# Patient Record
Sex: MALE | Race: WHITE | NOT HISPANIC OR LATINO | Employment: FULL TIME | URBAN - METROPOLITAN AREA
[De-identification: names, ages, dates, MRNs, and addresses within clinical notes are randomized per-mention and may not be internally consistent; named-entity substitution may affect disease eponyms.]

---

## 2018-10-20 ENCOUNTER — APPOINTMENT (EMERGENCY)
Dept: RADIOLOGY | Facility: HOSPITAL | Age: 63
DRG: 062 | End: 2018-10-20
Payer: COMMERCIAL

## 2018-10-20 ENCOUNTER — APPOINTMENT (EMERGENCY)
Dept: CT IMAGING | Facility: HOSPITAL | Age: 63
DRG: 062 | End: 2018-10-20
Payer: COMMERCIAL

## 2018-10-20 ENCOUNTER — HOSPITAL ENCOUNTER (INPATIENT)
Facility: HOSPITAL | Age: 63
LOS: 5 days | DRG: 062 | End: 2018-10-25
Attending: EMERGENCY MEDICINE | Admitting: INTERNAL MEDICINE
Payer: COMMERCIAL

## 2018-10-20 DIAGNOSIS — R47.1 DYSARTHRIA: ICD-10-CM

## 2018-10-20 DIAGNOSIS — I63.9 CVA (CEREBRAL VASCULAR ACCIDENT) (HCC): Primary | ICD-10-CM

## 2018-10-20 DIAGNOSIS — R53.1 LEFT-SIDED WEAKNESS: ICD-10-CM

## 2018-10-20 LAB
ANION GAP BLD CALC-SCNC: 15 MMOL/L (ref 4–13)
ANION GAP SERPL CALCULATED.3IONS-SCNC: 6 MMOL/L (ref 4–13)
APTT PPP: 22 SECONDS (ref 24–36)
BUN BLD-MCNC: 23 MG/DL (ref 5–25)
BUN SERPL-MCNC: 18 MG/DL (ref 5–25)
CA-I BLD-SCNC: 1.1 MMOL/L (ref 1.12–1.32)
CALCIUM SERPL-MCNC: 9.1 MG/DL (ref 8.3–10.1)
CHLORIDE BLD-SCNC: 103 MMOL/L (ref 100–108)
CHLORIDE SERPL-SCNC: 103 MMOL/L (ref 100–108)
CO2 SERPL-SCNC: 28 MMOL/L (ref 21–32)
CREAT BLD-MCNC: 1 MG/DL (ref 0.6–1.3)
CREAT SERPL-MCNC: 1.09 MG/DL (ref 0.6–1.3)
ERYTHROCYTE [DISTWIDTH] IN BLOOD BY AUTOMATED COUNT: 14.4 % (ref 11.6–15.1)
GFR SERPL CREATININE-BSD FRML MDRD: 71 ML/MIN/1.73SQ M
GFR SERPL CREATININE-BSD FRML MDRD: 79 ML/MIN/1.73SQ M
GLUCOSE SERPL-MCNC: 132 MG/DL (ref 65–140)
GLUCOSE SERPL-MCNC: 132 MG/DL (ref 65–140)
HCT VFR BLD AUTO: 43 % (ref 36.5–49.3)
HCT VFR BLD CALC: 42 % (ref 36.5–49.3)
HGB BLD-MCNC: 14.4 G/DL (ref 12–17)
HGB BLDA-MCNC: 14.3 G/DL (ref 12–17)
INR PPP: 0.94 (ref 0.86–1.17)
MCH RBC QN AUTO: 29.8 PG (ref 26.8–34.3)
MCHC RBC AUTO-ENTMCNC: 33.5 G/DL (ref 31.4–37.4)
MCV RBC AUTO: 89 FL (ref 82–98)
PCO2 BLD: 30 MMOL/L (ref 21–32)
PLATELET # BLD AUTO: 252 THOUSANDS/UL (ref 149–390)
PMV BLD AUTO: 10 FL (ref 8.9–12.7)
POTASSIUM BLD-SCNC: 5 MMOL/L (ref 3.5–5.3)
POTASSIUM SERPL-SCNC: 5.8 MMOL/L (ref 3.5–5.3)
PROTHROMBIN TIME: 12.3 SECONDS (ref 11.8–14.2)
RBC # BLD AUTO: 4.83 MILLION/UL (ref 3.88–5.62)
SODIUM BLD-SCNC: 142 MMOL/L (ref 136–145)
SODIUM SERPL-SCNC: 137 MMOL/L (ref 136–145)
SPECIMEN SOURCE: ABNORMAL
WBC # BLD AUTO: 8.35 THOUSAND/UL (ref 4.31–10.16)

## 2018-10-20 PROCEDURE — 71045 X-RAY EXAM CHEST 1 VIEW: CPT

## 2018-10-20 PROCEDURE — 86901 BLOOD TYPING SEROLOGIC RH(D): CPT | Performed by: EMERGENCY MEDICINE

## 2018-10-20 PROCEDURE — 96374 THER/PROPH/DIAG INJ IV PUSH: CPT

## 2018-10-20 PROCEDURE — 3E03317 INTRODUCTION OF OTHER THROMBOLYTIC INTO PERIPHERAL VEIN, PERCUTANEOUS APPROACH: ICD-10-PCS | Performed by: EMERGENCY MEDICINE

## 2018-10-20 PROCEDURE — 93005 ELECTROCARDIOGRAM TRACING: CPT

## 2018-10-20 PROCEDURE — 85610 PROTHROMBIN TIME: CPT | Performed by: EMERGENCY MEDICINE

## 2018-10-20 PROCEDURE — 36415 COLL VENOUS BLD VENIPUNCTURE: CPT | Performed by: EMERGENCY MEDICINE

## 2018-10-20 PROCEDURE — 85730 THROMBOPLASTIN TIME PARTIAL: CPT | Performed by: EMERGENCY MEDICINE

## 2018-10-20 PROCEDURE — 70450 CT HEAD/BRAIN W/O DYE: CPT

## 2018-10-20 PROCEDURE — 70498 CT ANGIOGRAPHY NECK: CPT

## 2018-10-20 PROCEDURE — 99291 CRITICAL CARE FIRST HOUR: CPT

## 2018-10-20 PROCEDURE — 86900 BLOOD TYPING SEROLOGIC ABO: CPT | Performed by: EMERGENCY MEDICINE

## 2018-10-20 PROCEDURE — 80047 BASIC METABLC PNL IONIZED CA: CPT

## 2018-10-20 PROCEDURE — 85014 HEMATOCRIT: CPT

## 2018-10-20 PROCEDURE — 86850 RBC ANTIBODY SCREEN: CPT | Performed by: EMERGENCY MEDICINE

## 2018-10-20 PROCEDURE — 85027 COMPLETE CBC AUTOMATED: CPT | Performed by: EMERGENCY MEDICINE

## 2018-10-20 PROCEDURE — 70496 CT ANGIOGRAPHY HEAD: CPT

## 2018-10-20 PROCEDURE — 80048 BASIC METABOLIC PNL TOTAL CA: CPT | Performed by: EMERGENCY MEDICINE

## 2018-10-20 RX ORDER — CHLORHEXIDINE GLUCONATE 0.12 MG/ML
15 RINSE ORAL EVERY 12 HOURS SCHEDULED
Status: DISCONTINUED | OUTPATIENT
Start: 2018-10-20 | End: 2018-10-25 | Stop reason: HOSPADM

## 2018-10-20 RX ORDER — LABETALOL HYDROCHLORIDE 5 MG/ML
10 INJECTION, SOLUTION INTRAVENOUS EVERY 6 HOURS PRN
Status: DISCONTINUED | OUTPATIENT
Start: 2018-10-20 | End: 2018-10-22

## 2018-10-20 RX ORDER — LABETALOL HYDROCHLORIDE 5 MG/ML
10 INJECTION, SOLUTION INTRAVENOUS ONCE
Status: COMPLETED | OUTPATIENT
Start: 2018-10-20 | End: 2018-10-20

## 2018-10-20 RX ORDER — ATORVASTATIN CALCIUM 40 MG/1
40 TABLET, FILM COATED ORAL EVERY EVENING
Status: DISCONTINUED | OUTPATIENT
Start: 2018-10-21 | End: 2018-10-21

## 2018-10-20 RX ADMIN — LABETALOL 20 MG/4 ML (5 MG/ML) INTRAVENOUS SYRINGE 10 MG: at 22:26

## 2018-10-20 RX ADMIN — ALTEPLASE 76.5 MG: KIT at 23:09

## 2018-10-20 RX ADMIN — IOHEXOL 100 ML: 350 INJECTION, SOLUTION INTRAVENOUS at 22:30

## 2018-10-21 ENCOUNTER — APPOINTMENT (INPATIENT)
Dept: NON INVASIVE DIAGNOSTICS | Facility: HOSPITAL | Age: 63
DRG: 062 | End: 2018-10-21
Payer: COMMERCIAL

## 2018-10-21 ENCOUNTER — APPOINTMENT (INPATIENT)
Dept: MRI IMAGING | Facility: HOSPITAL | Age: 63
DRG: 062 | End: 2018-10-21
Payer: COMMERCIAL

## 2018-10-21 PROBLEM — I63.9 RIGHT SIDED CEREBRAL HEMISPHERE CEREBROVASCULAR ACCIDENT (CVA) (HCC): Status: ACTIVE | Noted: 2018-10-21

## 2018-10-21 PROBLEM — G81.94 LEFT HEMIPLEGIA (HCC): Status: ACTIVE | Noted: 2018-10-21

## 2018-10-21 PROBLEM — R47.1 DYSARTHRIA: Status: ACTIVE | Noted: 2018-10-21

## 2018-10-21 PROBLEM — F41.9 ANXIETY: Status: ACTIVE | Noted: 2018-10-21

## 2018-10-21 PROBLEM — I10 HYPERTENSION: Status: ACTIVE | Noted: 2018-10-21

## 2018-10-21 PROBLEM — Z72.0 TOBACCO ABUSE: Status: ACTIVE | Noted: 2018-10-21

## 2018-10-21 LAB
ABO GROUP BLD: NORMAL
ALBUMIN SERPL BCP-MCNC: 3.7 G/DL (ref 3.5–5)
ALP SERPL-CCNC: 47 U/L (ref 46–116)
ALT SERPL W P-5'-P-CCNC: 34 U/L (ref 12–78)
ANION GAP SERPL CALCULATED.3IONS-SCNC: 9 MMOL/L (ref 4–13)
AST SERPL W P-5'-P-CCNC: 20 U/L (ref 5–45)
ATRIAL RATE: 84 BPM
BASOPHILS # BLD AUTO: 0.04 THOUSANDS/ΜL (ref 0–0.1)
BASOPHILS NFR BLD AUTO: 0 % (ref 0–1)
BILIRUB SERPL-MCNC: 0.4 MG/DL (ref 0.2–1)
BLD GP AB SCN SERPL QL: NEGATIVE
BUN SERPL-MCNC: 18 MG/DL (ref 5–25)
CALCIUM SERPL-MCNC: 9 MG/DL (ref 8.3–10.1)
CHLORIDE SERPL-SCNC: 104 MMOL/L (ref 100–108)
CHOLEST SERPL-MCNC: 161 MG/DL (ref 50–200)
CO2 SERPL-SCNC: 26 MMOL/L (ref 21–32)
CREAT SERPL-MCNC: 0.97 MG/DL (ref 0.6–1.3)
EOSINOPHIL # BLD AUTO: 0.13 THOUSAND/ΜL (ref 0–0.61)
EOSINOPHIL NFR BLD AUTO: 1 % (ref 0–6)
ERYTHROCYTE [DISTWIDTH] IN BLOOD BY AUTOMATED COUNT: 14.5 % (ref 11.6–15.1)
EST. AVERAGE GLUCOSE BLD GHB EST-MCNC: 143 MG/DL
GFR SERPL CREATININE-BSD FRML MDRD: 82 ML/MIN/1.73SQ M
GLUCOSE SERPL-MCNC: 108 MG/DL (ref 65–140)
GLUCOSE SERPL-MCNC: 118 MG/DL (ref 65–140)
GLUCOSE SERPL-MCNC: 155 MG/DL (ref 65–140)
HBA1C MFR BLD: 6.6 % (ref 4.2–6.3)
HCT VFR BLD AUTO: 41.3 % (ref 36.5–49.3)
HDLC SERPL-MCNC: 35 MG/DL (ref 40–60)
HGB BLD-MCNC: 13.7 G/DL (ref 12–17)
IMM GRANULOCYTES # BLD AUTO: 0.03 THOUSAND/UL (ref 0–0.2)
IMM GRANULOCYTES NFR BLD AUTO: 0 % (ref 0–2)
INR PPP: 0.99 (ref 0.86–1.17)
LDLC SERPL CALC-MCNC: 83 MG/DL (ref 0–100)
LYMPHOCYTES # BLD AUTO: 2.53 THOUSANDS/ΜL (ref 0.6–4.47)
LYMPHOCYTES NFR BLD AUTO: 26 % (ref 14–44)
MAGNESIUM SERPL-MCNC: 2.1 MG/DL (ref 1.6–2.6)
MCH RBC QN AUTO: 29.5 PG (ref 26.8–34.3)
MCHC RBC AUTO-ENTMCNC: 33.2 G/DL (ref 31.4–37.4)
MCV RBC AUTO: 89 FL (ref 82–98)
MONOCYTES # BLD AUTO: 0.69 THOUSAND/ΜL (ref 0.17–1.22)
MONOCYTES NFR BLD AUTO: 7 % (ref 4–12)
NEUTROPHILS # BLD AUTO: 6.25 THOUSANDS/ΜL (ref 1.85–7.62)
NEUTS SEG NFR BLD AUTO: 66 % (ref 43–75)
NRBC BLD AUTO-RTO: 0 /100 WBCS
P AXIS: 66 DEGREES
PHOSPHATE SERPL-MCNC: 3.1 MG/DL (ref 2.3–4.1)
PLATELET # BLD AUTO: 249 THOUSANDS/UL (ref 149–390)
PMV BLD AUTO: 9.5 FL (ref 8.9–12.7)
POTASSIUM SERPL-SCNC: 3.6 MMOL/L (ref 3.5–5.3)
PR INTERVAL: 144 MS
PROT SERPL-MCNC: 7.1 G/DL (ref 6.4–8.2)
PROTHROMBIN TIME: 12.8 SECONDS (ref 11.8–14.2)
QRS AXIS: 63 DEGREES
QRSD INTERVAL: 90 MS
QT INTERVAL: 344 MS
QTC INTERVAL: 406 MS
RBC # BLD AUTO: 4.65 MILLION/UL (ref 3.88–5.62)
RH BLD: POSITIVE
SODIUM SERPL-SCNC: 139 MMOL/L (ref 136–145)
SPECIMEN EXPIRATION DATE: NORMAL
T WAVE AXIS: 9 DEGREES
TRIGL SERPL-MCNC: 216 MG/DL
VENTRICULAR RATE: 84 BPM
WBC # BLD AUTO: 9.67 THOUSAND/UL (ref 4.31–10.16)

## 2018-10-21 PROCEDURE — 84100 ASSAY OF PHOSPHORUS: CPT | Performed by: NURSE PRACTITIONER

## 2018-10-21 PROCEDURE — 82948 REAGENT STRIP/BLOOD GLUCOSE: CPT

## 2018-10-21 PROCEDURE — 85610 PROTHROMBIN TIME: CPT | Performed by: NURSE PRACTITIONER

## 2018-10-21 PROCEDURE — G8997 SWALLOW GOAL STATUS: HCPCS

## 2018-10-21 PROCEDURE — 85025 COMPLETE CBC W/AUTO DIFF WBC: CPT | Performed by: NURSE PRACTITIONER

## 2018-10-21 PROCEDURE — 80053 COMPREHEN METABOLIC PANEL: CPT | Performed by: NURSE PRACTITIONER

## 2018-10-21 PROCEDURE — 93010 ELECTROCARDIOGRAM REPORT: CPT | Performed by: INTERNAL MEDICINE

## 2018-10-21 PROCEDURE — G8996 SWALLOW CURRENT STATUS: HCPCS

## 2018-10-21 PROCEDURE — 70551 MRI BRAIN STEM W/O DYE: CPT

## 2018-10-21 PROCEDURE — 99255 IP/OBS CONSLTJ NEW/EST HI 80: CPT | Performed by: PSYCHIATRY & NEUROLOGY

## 2018-10-21 PROCEDURE — 83735 ASSAY OF MAGNESIUM: CPT | Performed by: NURSE PRACTITIONER

## 2018-10-21 PROCEDURE — 99223 1ST HOSP IP/OBS HIGH 75: CPT | Performed by: INTERNAL MEDICINE

## 2018-10-21 PROCEDURE — 83036 HEMOGLOBIN GLYCOSYLATED A1C: CPT | Performed by: NURSE PRACTITIONER

## 2018-10-21 PROCEDURE — 80061 LIPID PANEL: CPT | Performed by: NURSE PRACTITIONER

## 2018-10-21 PROCEDURE — 93306 TTE W/DOPPLER COMPLETE: CPT | Performed by: INTERNAL MEDICINE

## 2018-10-21 PROCEDURE — 92610 EVALUATE SWALLOWING FUNCTION: CPT

## 2018-10-21 PROCEDURE — 93306 TTE W/DOPPLER COMPLETE: CPT

## 2018-10-21 RX ORDER — LORAZEPAM 2 MG/ML
0.25 INJECTION INTRAMUSCULAR ONCE
Status: COMPLETED | OUTPATIENT
Start: 2018-10-21 | End: 2018-10-21

## 2018-10-21 RX ORDER — SIMVASTATIN 20 MG
20 TABLET ORAL DAILY
COMMUNITY
End: 2018-10-25 | Stop reason: HOSPADM

## 2018-10-21 RX ORDER — OLMESARTAN MEDOXOMIL, AMLODIPINE AND HYDROCHLOROTHIAZIDE TABLET 40/5/25 MG 40; 5; 25 MG/1; MG/1; MG/1
1 TABLET ORAL DAILY
COMMUNITY
End: 2018-10-25 | Stop reason: HOSPADM

## 2018-10-21 RX ORDER — ATORVASTATIN CALCIUM 40 MG/1
80 TABLET, FILM COATED ORAL EVERY EVENING
Status: DISCONTINUED | OUTPATIENT
Start: 2018-10-21 | End: 2018-10-25 | Stop reason: HOSPADM

## 2018-10-21 RX ORDER — LORAZEPAM 2 MG/ML
0.5 INJECTION INTRAMUSCULAR ONCE AS NEEDED
Status: COMPLETED | OUTPATIENT
Start: 2018-10-21 | End: 2018-10-22

## 2018-10-21 RX ORDER — ALPRAZOLAM 1 MG/1
TABLET ORAL 2 TIMES DAILY
Status: ON HOLD | COMMUNITY
End: 2018-10-25

## 2018-10-21 RX ORDER — POTASSIUM CHLORIDE 14.9 MG/ML
20 INJECTION INTRAVENOUS ONCE
Status: COMPLETED | OUTPATIENT
Start: 2018-10-21 | End: 2018-10-21

## 2018-10-21 RX ORDER — ONDANSETRON 2 MG/ML
4 INJECTION INTRAMUSCULAR; INTRAVENOUS EVERY 6 HOURS PRN
Status: DISCONTINUED | OUTPATIENT
Start: 2018-10-21 | End: 2018-10-25 | Stop reason: HOSPADM

## 2018-10-21 RX ORDER — ONDANSETRON 2 MG/ML
INJECTION INTRAMUSCULAR; INTRAVENOUS
Status: COMPLETED
Start: 2018-10-21 | End: 2018-10-21

## 2018-10-21 RX ORDER — LORAZEPAM 2 MG/ML
0.5 INJECTION INTRAMUSCULAR ONCE
Status: COMPLETED | OUTPATIENT
Start: 2018-10-21 | End: 2018-10-21

## 2018-10-21 RX ORDER — NICOTINE 21 MG/24HR
14 PATCH, TRANSDERMAL 24 HOURS TRANSDERMAL DAILY
Status: DISCONTINUED | OUTPATIENT
Start: 2018-10-21 | End: 2018-10-25 | Stop reason: HOSPADM

## 2018-10-21 RX ORDER — INDOMETHACIN 50 MG/1
50 CAPSULE ORAL
COMMUNITY
End: 2018-10-25 | Stop reason: HOSPADM

## 2018-10-21 RX ADMIN — LORAZEPAM 0.5 MG: 2 INJECTION INTRAMUSCULAR; INTRAVENOUS at 11:23

## 2018-10-21 RX ADMIN — LORAZEPAM 0.25 MG: 2 INJECTION INTRAMUSCULAR; INTRAVENOUS at 04:39

## 2018-10-21 RX ADMIN — DESMOPRESSIN ACETATE 80 MG: 0.2 TABLET ORAL at 19:58

## 2018-10-21 RX ADMIN — LORAZEPAM 0.25 MG: 2 INJECTION INTRAMUSCULAR; INTRAVENOUS at 01:16

## 2018-10-21 RX ADMIN — CHLORHEXIDINE GLUCONATE 0.12% ORAL RINSE 15 ML: 1.2 LIQUID ORAL at 08:27

## 2018-10-21 RX ADMIN — NICOTINE 14 MG: 14 PATCH TRANSDERMAL at 02:29

## 2018-10-21 RX ADMIN — CHLORHEXIDINE GLUCONATE 0.12% ORAL RINSE 15 ML: 1.2 LIQUID ORAL at 21:58

## 2018-10-21 RX ADMIN — ONDANSETRON 4 MG: 2 INJECTION INTRAMUSCULAR; INTRAVENOUS at 09:34

## 2018-10-21 RX ADMIN — POTASSIUM CHLORIDE 20 MEQ: 200 INJECTION, SOLUTION INTRAVENOUS at 08:58

## 2018-10-21 RX ADMIN — NICOTINE 14 MG: 14 PATCH TRANSDERMAL at 08:55

## 2018-10-21 NOTE — PROGRESS NOTES
Stroke Alert Note     Activated - 9:54pm  Responded - 9:55pm    59 y/o M with time of onset at 9pm where he noticed he had right sided headache, and left sided weakness (facial droop and LUE)  NIH score = 3 for dysarthria, left facial droop, and left upper extremity weakness  BP is 197/110,  CT head is negative, and if BP less than 180/105, patient should get IV labetalol to decrease the BP  If BP less than 180/105,  then ok to give TPA  CTA head and neck officially is negative for any stenosis/occlusion, no intervention at this time  Patient can be admitted to ICU for post TPA care

## 2018-10-21 NOTE — ED PROVIDER NOTES
History  Chief Complaint   Patient presents with    STROKE Alert     per"pt  he was at a birthday and hisn girlfriend noticed a left sided droop on his face and he strted slurring, so EMS was called  "     27-year-old male presents to the emergency department via EMS with symptoms concerning for acute CVA  EMS did call ahead regarding abrupt onset dysarthria, left facial and arm weakness  Patient's arm weakness has been lessening as it was reported that the arm was flaccid at one point for EMS  The patient had been sitting at a birthday party with family  At 21:26 EMS had been informed by family that the symptoms had started 30 minutes prior (2056)  The patient shares that he developed a right-sided headache at the time symptoms started  This has fully resolved  He denies history of any similar symptoms  Past medical history significant for hypertension and elevated cholesterol  He does regularly smoke and consumes 2 beers tonight  He is a  and regularly the applies  He denies any other episodes of feeling poorly recently  He has not had any chest discomfort, shortness of breath or lightheadedness  He is not on anti platelet or anticoagulant medications  Prior to Admission Medications   Prescriptions Last Dose Informant Patient Reported? Taking?    ALPRAZolam (XANAX) 1 mg tablet   Yes Yes   Sig: Take by mouth 2 (two) times a day   Olmesartan-Amlodipine-HCTZ 40-5-25 MG TABS   Yes Yes   Sig: Take 1 tablet by mouth daily   indomethacin (INDOCIN) 50 mg capsule   Yes Yes   Sig: Take 50 mg by mouth 3 (three) times a day with meals   simvastatin (ZOCOR) 20 mg tablet   Yes Yes   Sig: Take 20 mg by mouth daily   valsartan 160 mg TABS 320 mg, hydrochlorothiazide 25 mg TABS 25 mg   Yes Yes   Sig: Take by mouth daily      Facility-Administered Medications: None       Past Medical History:   Diagnosis Date    Hypertension     Migraine        Past Surgical History:   Procedure Laterality Date    APPENDECTOMY      KNEE SURGERY      NECK SURGERY      TONSILLECTOMY         Family History   Problem Relation Age of Onset    No Known Problems Mother     No Known Problems Father      I have reviewed and agree with the history as documented  Social History   Substance Use Topics    Smoking status: Current Every Day Smoker     Types: Cigarettes, Cigars    Smokeless tobacco: Never Used    Alcohol use Yes      Comment: socially         Review of Systems   All other systems reviewed and are negative  Physical Exam  Physical Exam   Constitutional: He is oriented to person, place, and time  He appears well-developed and well-nourished  HENT:   Head: Normocephalic  Mouth/Throat: Oropharynx is clear and moist    Mild- moderate left facial droop appreciated   Eyes: Conjunctivae and EOM are normal    Neck: Normal range of motion  Cardiovascular: Normal rate and regular rhythm  Pulmonary/Chest: Effort normal and breath sounds normal    Neurological: He is alert and oriented to person, place, and time  Mild dysarthria to speech  Mild to moderate left facial droop appreciated  Pupils briskly reactive to light  EOMI  No nystagmus  Symmetric sensation in the upper, mid and lower portions of the face  5/5 strength on shoulder shrug, R bicep& tricep movement against resistance  4/5 strength with left bicep movement against resistance  5/5 strength on R hand  &finger abduction  3/5 strength on left hand  and finger abduction  5/5 strength on bilateral hip flexion, dorsi & plantar flexion  Symmetric grossly intact sensation in the UE & LE  No dysmetria or abnormality with heel to shin testing  Gait deferred  See NIH stroke scale     Skin: Skin is warm and dry  Psychiatric: He has a normal mood and affect  His behavior is normal    Nursing note and vitals reviewed        Vital Signs  ED Triage Vitals   Temperature Pulse Respirations Blood Pressure SpO2   10/20/18 2325 10/20/18 2245 10/20/18 2325 10/20/18 2226 10/20/18 2245   98 2 °F (36 8 °C) 74 20 148/72 97 %      Temp Source Heart Rate Source Patient Position - Orthostatic VS BP Location FiO2 (%)   10/20/18 2325 10/20/18 2325 10/20/18 2325 10/20/18 2226 --   Oral Monitor Lying Right arm       Pain Score       --                  Vitals:    10/21/18 0045 10/21/18 0050 10/21/18 0105 10/21/18 0120   BP: (!) 177/98 168/94 163/85 166/81   Pulse: 80 75 76 71   Patient Position - Orthostatic VS:           Visual Acuity  Visual Acuity      Most Recent Value   L Pupil Size (mm)  (P) 3   R Pupil Size (mm)  (P) 3   L Pupil Shape  (P) Round   R Pupil Shape  (P) Round          ED Medications  Medications   atorvastatin (LIPITOR) tablet 40 mg (not administered)   chlorhexidine (PERIDEX) 0 12 % oral rinse 15 mL (0 mL Swish & Spit Hold 10/21/18 0048)   labetalol (NORMODYNE) injection 10 mg (0 mg Intravenous Hold 10/21/18 0048)   labetalol (NORMODYNE) injection 10 mg (10 mg Intravenous Given 10/20/18 2226)   alteplase (ACTIVASE) bolus 8 5 mg (8 5 mg Intravenous Given 10/20/18 2309)   alteplase (ACTIVASE) infusion 76 5 mg (76 5 mg Intravenous Given 10/20/18 2309)   iohexol (OMNIPAQUE) 350 MG/ML injection (MULTI-DOSE) 100 mL (100 mL Intravenous Given 10/20/18 2230)   LORazepam (ATIVAN) 2 mg/mL injection 0 25 mg (0 25 mg Intravenous Given 10/21/18 0116)       Diagnostic Studies  Results Reviewed     Procedure Component Value Units Date/Time    Lipid Panel with Direct LDL reflex [[id number]]     Lab Status:  No result Specimen:  Blood     Hemoglobin A1c w/EAG Estimation [[id number]]     Lab Status:  No result Specimen:  Blood     Comprehensive metabolic panel [[id number]]     Lab Status:  No result Specimen:  Blood     Magnesium [[id number]]     Lab Status:  No result Specimen:  Blood     Phosphorus [[id number]]     Lab Status:  No result Specimen:  Blood     Protime-INR [[id number]]     Lab Status:  No result Specimen:  Blood     CBC and differential [[id number]]     Lab Status:  No result Specimen:  Blood     APTT [[id number]]  (Abnormal) Collected:  10/20/18 2205    Lab Status:  Final result Specimen:  Blood Updated:  10/20/18 2220     PTT 22 (L) seconds     Protime-INR [[id number]]  (Normal) Collected:  10/20/18 2205    Lab Status:  Final result Specimen:  Blood Updated:  10/20/18 2220     Protime 12 3 seconds      INR 1 65    Basic metabolic panel [[id number]]  (Abnormal) Collected:  10/20/18 2206    Lab Status:  Final result Specimen:  Blood Updated:  10/20/18 2218     Sodium 137 mmol/L      Potassium 5 8 (H) mmol/L      Chloride 103 mmol/L      CO2 28 mmol/L      ANION GAP 6 mmol/L      BUN 18 mg/dL      Creatinine 1 09 mg/dL      Glucose 132 mg/dL      Calcium 9 1 mg/dL      eGFR 71 ml/min/1 73sq m     Narrative:         National Kidney Disease Education Program recommendations are as follows:  GFR calculation is accurate only with a steady state creatinine  Chronic Kidney disease less than 60 ml/min/1 73 sq  meters  Kidney failure less than 15 ml/min/1 73 sq  meters      CBC [[id number]]  (Normal) Collected:  10/20/18 2205    Lab Status:  Final result Specimen:  Blood Updated:  10/20/18 2210     WBC 8 35 Thousand/uL      RBC 4 83 Million/uL      Hemoglobin 14 4 g/dL      Hematocrit 43 0 %      MCV 89 fL      MCH 29 8 pg      MCHC 33 5 g/dL      RDW 14 4 %      Platelets 854 Thousands/uL      MPV 10 0 fL     POCT Chem 8+ [[id number]]  (Abnormal) Collected:  10/20/18 2200    Lab Status:  Final result Updated:  10/20/18 2204     SODIUM, I-STAT 142 mmol/l      Potassium, i-STAT 5 0 mmol/L      Chloride, istat 103 mmol/L      CO2, i-STAT 30 mmol/L      Anion Gap, Istat 15 (H) mmol/L      Calcium, Ionized i-STAT 1 10 (L) mmol/L      BUN, I-STAT 23 mg/dl      Creatinine, i-STAT 1 0 mg/dl      eGFR 79 ml/min/1 73sq m      Glucose, i-STAT 132 mg/dl      Hct, i-STAT 42 %      Hgb, i-STAT 14 3 g/dl      Specimen Type VENOUS                 CTA stroke alert (head/neck)   Final Result by Андрей Kingston Esperanza Joseph MD (10/20 8290)      1  There is approximately 50% stenosis of the proximal right ICA  2   There is approximately 30-40% stenosis of the proximal left ICA  3   The basilar artery is diminutive throughout its course, however, is opacified  4   There is an approximately 1 5 cm nodule within the right lobe of the thyroid  Nonemergent outpatient thyroid ultrasound is recommended for further evaluation  5   There is fluid in the visualized thoracic esophagus suggesting gastroesophageal reflux  6   Other findings as described above, please see discussion  Findings were directly discussed with Dr Sanam Boo on October 20, 2018 at 11:02 PM                   Workstation performed: [id number]         XR stroke alert portable chest   ED Interpretation by Shekhar Dave MD (10/20 8061)   Unremarkable cardiac silhouette  Clear lungs  by Caryl Goodpasture (10/20 2212)      CT stroke alert brain    (Results Pending)   MRI Inpatient Order    (Results Pending)              Procedures  ECG 12 Lead Documentation  Date/Time: 10/20/2018 10:46 PM  Performed by: Katarzyna Lunsford  Authorized by: Katarzyna Lunsford     ECG reviewed by me, the ED Provider: yes    Patient location:  ED and bedside  Previous ECG:     Previous ECG:  Unavailable  Rate:     ECG rate:  84    ECG rate assessment: normal    Rhythm:     Rhythm: sinus rhythm    Ectopy:     Ectopy: none    QRS:     QRS axis:  Normal    QRS intervals:  Normal  Conduction:     Conduction: normal    ST segments:     ST segments:  Normal  T waves:     T waves: inverted      Inverted:  III       -    Phone Contacts  ED Phone Contact    ED Course  ED Course as of Oct 21 0129   Sat Oct 20, 2018   2216 Patient has just been moved to room 13  His family has been updated on studies performed and further evaluation/treatment plan  NIH stroke scale is 3   Blood pressure being recheck to see if lately at all as warranted  Neurologist-Dr Mckinney did contact me while the patient was having CT imaging performed  I will contact her with CT results  7664 Systolic pressure has lowered to 159  I did re-speak with neurology-Dr Roland after receiving phone call from radiologist   No acute hemorrhage or evidence of significant ischemia on noncontrast study  Another radiologist is reviewing the contrast study  TPA is to be considered at this time of patient and family are agreeable  Patient aware  Family to be brought to bedside  2231 TPA use reviewed with patient and family  Patient's blood pressure no repeats with systolic in the 284C  He continues to feel well though continues with left facial droop, left arm weakness and mild dysarthria  2319 Official report from CTA has been rendered  I did receive a phone call from the radiologist who reviewed this  No MCA clot identified  Patient does have diminutive basilar vessels  He additionally has 50% right-sided and 40% left-sided proximal internal carotid artery stenosis  Neurologist updated  Family updated  TPA is being administered  I contacted the  to page critical care  2326 Case discussed with Dr Valentin who accepts the patient to his service  Case additionally reviewed with critical care advanced practitioner Elise Mason                      Stroke Assessment     Row Name 10/20/18 2215             NIH Stroke Scale    Interval Baseline      Level of Consciousness (1a ) 0      LOC Questions (1b ) 0      LOC Commands (1c ) 0      Best Gaze (2 ) 0      Visual (3 ) 0      Facial Palsy (4 ) 1      Motor Arm, Left (5a ) 1      Motor Arm, Right (5b ) 0      Motor Leg, Left (6a ) 0      Motor Leg, Right (6b ) 0      Limb Ataxia (7 ) 0      Sensory (8 ) 0      Best Language (9 ) 0      Dysarthria (10 ) 1      Extinction and Inattention (11 ) (Formerly Neglect) 0      Total 3                          MDM  The patient presented with a condition in which there was a high probability of imminent or life-threatening deterioration, and critical care services (excluding separately billable procedures) totalled 30-74 minutes (30)  Disposition  Final diagnoses:   CVA (cerebral vascular accident) ([facility] [address] )   Dysarthria   Left-sided weakness     Time reflects when diagnosis was documented in both MDM as applicable and the Disposition within this note     Time User Action Codes Description Comment    10/20/2018 11:25 PM Teresita Lash A Add [I63 9] CVA (cerebral vascular accident) ([facility] [address] )     10/20/2018 11:25 PM Teresita Lash A Add [R47 1] Dysarthria     10/20/2018 11:25 PM Teresita Lash A Add [R53 1] Left-sided weakness       ED Disposition     ED Disposition Condition Comment    Admit  Case was discussed with Dr Elias Medrano and the patient's admission status was agreed to be Admission Status: inpatient status to the service of Dr Elias Medrano   Follow-up Information    None         Current Discharge Medication List      CONTINUE these medications which have NOT CHANGED    Details   ALPRAZolam (XANAX) 1 mg tablet Take by mouth 2 (two) times a day      indomethacin (INDOCIN) 50 mg capsule Take 50 mg by mouth 3 (three) times a day with meals      Olmesartan-Amlodipine-HCTZ 40-5-25 MG TABS Take 1 tablet by mouth daily      simvastatin (ZOCOR) 20 mg tablet Take 20 mg by mouth daily      valsartan 160 mg TABS 320 mg, hydrochlorothiazide 25 mg TABS 25 mg Take by mouth daily           No discharge procedures on file      ED Provider  Electronically Signed by           Linda Parada MD  10/21/18 Lois Ingram MD  10/21/18 8915

## 2018-10-21 NOTE — PLAN OF CARE
Problem: SLP ADULT - SWALLOWING, IMPAIRED  Goal: Initial SLP swallow eval performed  Outcome: Completed Date Met: 10/21/18  Recommend initiating diet of level 2 mechanical soft and nectar thick liquids at this time

## 2018-10-21 NOTE — H&P
H&P Exam - [address] 58 y o  male MRN: [id number]  Unit/Bed#: ED 15 Encounter: [id number]      Chief Complaint: "I have a headache on the right, and my left arm won't work"    History of Present Illness     Corrinne Ina is a 58 y o  male with past medical history significant for hypertension, hyperlipidemia, and tobacco abuse  The patient was at a party with his wife, he was feeling in his usual state of health, when he used his left hand to  candy  He noted that he was not able to grasp properly, in his wife noted that he had significant right facial drooping  When he went to speak, he was unable to articulate words  He also reported a right-sided headache  Family approximates that this was at approximately 2100  EMS was able to notify the emergency department of a stroke alert, and the patient obtained a CT CTA of the head on arrival, which were negative for hemorrhage  NIHSS score of 3 (dysarthria, left facial droop, left upper extremity weakness) was documented in the ED, although NIHSS of 9 upon my assessment  He received 10 mg IV labetalol for /110, and received tPA at 23:09  His symptoms have somewhat improved, although not completely resolved  He is being admitted to the ICU for post tPA neurologic monitoring  History obtained from the patient and family   -------------------------------------------------------------------------------------------------------------  Assessment and Plan  Principal Problem:    Right sided cerebral hemisphere cerebrovascular accident (CVA) ([facility] [address] )  Active Problems:    Left hemiplegia ([facility] [address] )    Dysarthria    Hypertension    Tobacco abuse    Anxiety      Neuro:   · Acute right-sided CVA - patient s/p tPA  Continue stroke protocol with neuro checks Q 15 minutes for the next 2 hours followed by Q 30 minutes for 6 hours, then hourly for the next 16 hours  For a neurologic deterioration or a new severe headache, will obtain stat CT head  Continue close blood pressure monitoring, detailed below  Neurology consulted  MRI ordered for tomorrow  Will hold off on placing 24 hour post tPA CT at this time  Can start anti-platelet therapy after 24 hours post tPA if imaging is negative for hemorrhage  · Anxiety - patient denies history of anxiety, however he does have Xanax as a previous home medication  He currently feels anxious  Will bring in family to help verbally redirected, however if this is unchanged, can consider very low-dose IV Ativan      CV:   · Hypertension - patient listed as taking almost start on amlodipine hydrochlorothiazide as well as valsartan and hydrochlorothiazide combination pills as an outpatient  Will confirm with family patient's outpatient regimen  Holding at this time  Goal BP < 180/105 for the next 24 hours  Continue frequent vital signs  Echo ordered for tomorrow per protocol   · Hyperlipidemia - start patient on atorvastatin 40 mg daily  Check lipid profile in AM      Pulm:  · Tobacco abuse - patient states that he smokes less than half a pack per day  He also smokes cigars  Refuses nicotine replacement patch at this time  Will provide cessation education  Maintain SpO2 >90%  Continue pulmonary hygiene  Incentive spirometer q1h while awake, encourage coughing and deep breathing  GI:   · Dysarthria - perform nursing dysphagia assessment  SLP consult in place  Keep NPO except for small sips at this time  · Patient does have evidence of GERD on CT  Can start bowel regimen when taking PO       :   · No acute issues  Baseline creatinine unknown  1 09 on admission  No nichols  Strict q4h I/O monitoring  Continue to follow renal function tests  F/E/N:   · No maintenance IVF at this time  Can consider starting gentle hydration if patient needs to remain NPO   · Electrolytes WNL  Repeat BMP in AM  · NPO except small sips at this time    Heme/Onc:   · No acute issues   Continue to trend hemoglobin and platelets, and evaluate for bleeding post tPA  · PPx- holding all chemical VTE prophylaxis for least 24 hours s/p tPA  Continue SCDs to bilateral lower extremities    Endo:   · No acute issues  Will start Accu-Cheks q6hr and can initiate sliding scale insulin if needed in order to maintain goal -180  No known A1c, will check A1c in AM         ID:   · No acute issues  History not consistent with infection  Continue to monitor fever and WBC curve  MSK/Skin:   · Reposition q2h, eliminate pressures points  · Close skin surveillance   · Left hemiplegia - PT/OT      Disposition: ICU for 24 hours     Code Status: Level 1 - Full Code  --------------------------------------------------------------------------------------------------------------    Historical Information   Past Medical History:   Diagnosis Date    Hypertension     Migraine      Past Surgical History:   Procedure Laterality Date    APPENDECTOMY      KNEE SURGERY      NECK SURGERY      TONSILLECTOMY       Social History   History   Alcohol Use    Yes     Comment: socially      History   Drug Use No     History   Smoking Status    Current Every Day Smoker    Types: Cigarettes, Cigars   Smokeless Tobacco    Never Used     Exercise History: Independent ADL    Family History:   Family History   Problem Relation Age of Onset    No Known Problems Mother     No Known Problems Father        Vitals:   Vitals:    10/20/18 2300 10/20/18 2315 10/20/18 2325 10/20/18 2347   BP: 134/70 157/84 156/92 144/80   BP Location:   Left arm Left arm   Pulse: 76 74 75 74   Resp:   20 20   Temp:   98 2 °F (36 8 °C) 97 8 °F (36 6 °C)   TempSrc:   Oral Oral   SpO2: 98% 97%     Weight:         Temp  Min: 97 8 °F (36 6 °C)  Max: 98 2 °F (36 8 °C)  IBW: -88 kg     There is no height or weight on file to calculate BMI  Physical Exam   Constitutional: He is oriented to person, place, and time  He appears well-developed and well-nourished  He is cooperative   No distress  HENT:   Head: Atraumatic  Right-sided facial droop   Eyes: Pupils are equal, round, and reactive to light  No scleral icterus  Right pupil is reactive  Left pupil is reactive  Neck: Neck supple  No JVD present  Carotid bruit is not present  Cardiovascular: Normal rate, regular rhythm and normal heart sounds  No extrasystoles are present  Pulses:       Radial pulses are 2+ on the right side, and 2+ on the left side  Dorsalis pedis pulses are 2+ on the right side, and 2+ on the left side  No peripheral edema    Pulmonary/Chest: Breath sounds normal  No accessory muscle usage  No tachypnea  No respiratory distress  He has no decreased breath sounds  He has no wheezes  He has no rhonchi  Abdominal: Soft  He exhibits no distension  Bowel sounds are decreased  There is no tenderness  Neurological: He is alert and oriented to person, place, and time  GCS eye subscore is 4  GCS verbal subscore is 5  GCS motor subscore is 6  LLE 4/5  LUE 3/5  RUE and RLE 5/5   Skin: Skin is warm, dry and intact  Capillary refill takes less than 2 seconds  He is not diaphoretic  Medications:  Current Facility-Administered Medications   Medication Dose Route Frequency    alteplase (ACTIVASE) infusion 76 5 mg  0 81 mg/kg Intravenous Once    atorvastatin (LIPITOR) tablet 40 mg  40 mg Oral QPM    chlorhexidine (PERIDEX) 0 12 % oral rinse 15 mL  15 mL Swish & Spit Q12H [address]    labetalol (NORMODYNE) injection 10 mg  10 mg Intravenous Q6H PRN     Home medications:  Prior to Admission Medications   Prescriptions Last Dose Informant Patient Reported? Taking?    ALPRAZolam (XANAX) 1 mg tablet   Yes Yes   Sig: Take by mouth 2 (two) times a day   Olmesartan-Amlodipine-HCTZ 40-5-25 MG TABS   Yes Yes   Sig: Take 1 tablet by mouth daily   indomethacin (INDOCIN) 50 mg capsule   Yes Yes   Sig: Take 50 mg by mouth 3 (three) times a day with meals   simvastatin (ZOCOR) 20 mg tablet   Yes Yes   Sig: Take 20 mg by mouth daily   valsartan 160 mg TABS 320 mg, hydrochlorothiazide 25 mg TABS 25 mg   Yes Yes   Sig: Take by mouth daily      Facility-Administered Medications: None     Allergies:  No Known Allergies    Review of Systems   HENT: Negative  Eyes: Negative  Negative for photophobia  Respiratory: Negative  Negative for shortness of breath  Cardiovascular: Negative  Negative for chest pain, palpitations and leg swelling  Gastrointestinal: Negative  Negative for constipation, diarrhea, nausea and vomiting  Endocrine: Negative  Genitourinary: Negative  Musculoskeletal: Negative  Skin: Negative  Allergic/Immunologic: Negative  Neurological: Positive for facial asymmetry, speech difficulty, weakness and headaches  Negative for dizziness, light-headedness and numbness  Psychiatric/Behavioral: Negative  Laboratory and Diagnostics:    Results from last 7 days  Lab Units 10/20/18  2205 10/20/18  2200   WBC Thousand/uL 8 35  --    HEMOGLOBIN g/dL 14 4  --    I STAT HEMOGLOBIN g/dl  --  14 3   HEMATOCRIT % 43 0 42   PLATELETS Thousands/uL 252  --        Results from last 7 days  Lab Units 10/20/18  2206 10/20/18  2200   SODIUM mmol/L 137  --    POTASSIUM mmol/L 5 8*  --    CHLORIDE mmol/L 103  --    CO2 mmol/L 28  --    BUN mg/dL 18  --    CREATININE mg/dL 1 09  --    CALCIUM mg/dL 9 1  --    GLUCOSE, ISTAT mg/dl  --  132            Results from last 7 days  Lab Units 10/20/18  2205   INR  0 94   PTT seconds 22*       EKG: NSR  Imaging: I have personally reviewed pertinent reports  and I have personally reviewed pertinent films in PACS    ------------------------------------------------------------------------------------------------------------    Anticipated Length of Stay is > 2 midnights    Counseling / Coordination of Care  Total time spent today 45 minutes  Greater than 50% of total time was spent with the patient and / or family counseling and / or coordination of care   A description of the counseling / coordination of care: Tanesha Elliott   [facility]

## 2018-10-21 NOTE — ED NOTES
Specimen collected on initial blood collection, not post alteplase as documented        Christ Darnell RN  10/20/18 0194

## 2018-10-21 NOTE — PHYSICAL THERAPY NOTE
PHYSICAL THERAPY NOTE    Patient Name: Eldridge Essex IWIAS'N Date: 10/21/2018  Chart reviewed  Per notes pt received TPA around 23:19   Will follow as appropriate for PT robert Gasca, PT

## 2018-10-21 NOTE — SPEECH THERAPY NOTE
Speech-Language Pathology Bedside Swallow Evaluation      Patient Name: Sebastian JOHNSON Date: 10/21/2018     Problem List  Patient Active Problem List   Diagnosis    Right sided cerebral hemisphere cerebrovascular accident (CVA) ([facility] [address] [facility]    Left hemiplegia ([facility] [address] [facility]    Dysarthria    Hypertension    Tobacco abuse    Anxiety       Past Medical History  Past Medical History:   Diagnosis Date    Hypertension     Migraine        Past Surgical History  Past Surgical History:   Procedure Laterality Date    APPENDECTOMY      KNEE SURGERY      NECK SURGERY      TONSILLECTOMY         Summary   Patient presented with a mild oropharyngeal dysphagia  Patient with reduced oral control and coordination of liquids, reduced mastication and pocketing of solids, and immediate cough with thin liquids  No overt s/s aspiration observed with nectar thick liquids via cup or straw  It is recommended that the patient begin a conservative diet of mechanical soft solids and nectar thick liquids at this time due to overall oropharyngeal weakness and lethargy  It is recommended that the patient be assisted with meals as needed, be monitored for s/s aspiration and pocketing of solids  Patient should eat all meals sitting upright 90 degrees, alternating solids and liquids, and utilizing a slow rate of intake  Recommendations: mechanically altered/level 2 diet and nectar thick liquids     Recommended Form of Meds: whole with liquid and whole with puree     Aspiration precautions and compensatory swallowing strategies: upright posture, only feed when fully alert, slow rate of feeding and alternating bites and sips, monitor for pocketing - cue for lingual and finger sweep, monitor for s/s aspiration or change in status          Current Medical Status  Pt is a 58 y o  male who presented to [facility] [place] with symptoms concerning for acute CVA    EMS did call ahead regarding abrupt onset dysarthria, left facial and arm weakness  Patient's arm weakness has been lessening as it was reported that the arm was flaccid at one point for EMS  The patient had been sitting at a birthday party with family  At 21:26 EMS had been informed by family that the symptoms had started 30 minutes prior (2056)  The patient shares that he developed a right-sided headache at the time symptoms started  This has fully resolved  He denies history of any similar symptoms  Past medical history significant for hypertension and elevated cholesterol  He does regularly smoke and consumes 2 beers tonight  He is a  and regularly the applies  He denies any other episodes of feeling poorly recently  He has not had any chest discomfort, shortness of breath or lightheadedness  He is not on anti platelet or anticoagulant medications  Patient placed on stroke pathway and made NPO  Swallowing orders placed and evaluation completed at bedside  Past medical history:  HTN, migraine  Please see H&P for details      Special Studies:  MRI: Moderate amount of multifocal cortical infarctions involving distinct and separate foci in the right middle cerebral territory  Small amount of petechial hemorrhage in the insular cortex in the region of recent infarction  CXR: No acute cardiopulmonary disease    Social/Education/Vocational Hx:  Pt lives with family/friends      Swallow Information   Current Risks for Dysphagia & Aspiration: CVA and dysarthria     Current Symptoms/Concerns: n/a    Current Diet: NPO      Baseline Diet: regular diet and thin liquids      Baseline Assessment   Behavior/Cognition: alert and lethargic    Speech/Language Status: able to participate in conversation, able to follow commands and intelligible speech     Patient Positioning: upright in bed    Pain Status/Interventions/Response to Interventions:  No report of or nonverbal indications of pain         Swallow Mechanism Exam   Facial: left facial droop  Labial: decreased ROM left side  Lingual: minimal left sided tongue deviation  Velum: unable to visualize  Mandible: adequate ROM  Dentition: adequate  Vocal quality:clear/adequate   Volitional Cough: strong/productive   Tracheostomy: n/a      Consistencies Assessed and Performance   Consistencies Administered: thin liquids, nectar thick, puree and hard solids  Specific materials administered included apple juice, nectar thick apple juice, applesauce, dakota cracker    Oral Stage: mild dysphagia  Patient took puree with adequate labial seal, manipulation, and transfer  Patient with slow and reduced mastication, manipulation, and bolus formation of solids  Patient with pocketing of solids in left buccal cavity  Patient cued for lingual sweep however, was not successful in removing all solids  SLP utilized finger sweep to obtain moderate sized bolus that patient was not aware of independently  Patient took nectar thick liquid and thin liquid via cup and straw with adequate labial seal and suck from straw  Suspect reduced oral control and coordination of liquids  Pharyngeal Stage: mild dysphagia   Swallowing initiation appeared prompt  Laryngeal rise was palpated and judged to be within functional limits  Immediate cough with thin liquids via cup, no overt s/s aspiration observed with nectar thick liquids or additional thin liquid trials  Esophageal Concerns: none reported    Strategies and Efficacy: lingual sweep not as effective, finger sweep more effective at clearing pocketed materials      Summary and Recommendations (see above)      Results Reviewed with: patient, RN and family     Treatment Recommended: Skilled speech therapy services to ensure tolerance of least restrictive diet and reduce risk for aspiration  Frequency of treatment: 3-4x/week    Dysphagia Goals per SLP:     Patient will tolerate least restrictive diet with no overt s/s aspiration     Patient will demonstrate independent use of compensatory strategies at meals with 95% accuracy  Pt/Family Education: initiated  Pt and caregivers would benefit from/require continued education      Speech Therapy Prognosis   Prognosis: good    Prognosis Considerations: age, prior medical history and cognitive status

## 2018-10-21 NOTE — CONSULTS
Consultation - Neurology   Pankaj Jimenez 58 y o  male MRN: [id number]  Unit/Bed#:  Encounter: [id number]      Assessment/Plan   Assessment:  Pankaj Jimenez is a 58 y o  right handed male who is a  and has a history of hypertension, hyperlipidemia, migraine, tobacco use and regular alcohol use who presented to the ED with abrupt onset dysarthria, left facial droop, left upper extremity weakness  CTH negative  CTA with 50% stenosis of the R ICA and 30-40% stenosis of the L ICA  tPA given  Stroke work-up in progress  Plan:  Dysarthria, left facial droop, left sided weakness  Symptoms are persistent and suggestive of acute right-sided ischemia  The patient and girlfriend perception of worsening left upper extremity weakness as well as the discrepancy in his NIH score is of concern and warrants repeat imaging  The patient is scheduled to have an MRI within the next 2 hours  Will await these results  Continue stroke workup  Etiology unknown at this time though vascular imaging suggests an irregular plaque in the right carotid  Will await MRI results to determine location of stroke and thus etiology  Acute ischemic stroke protocol  tPA administered, continue tPA protocol  Hold AC/AP until confirmation that repeat head CT 24 hours post tPA stable  Atorvastatin 40mg   Initial CTH negative for acute abnormality  Repeat CT head 24 hours post tPA as per protocol (even if MRI is completed mid day today)  CTA of head and neck with 50% stenosis of the R ICA and 30-40% stenosis of the L ICA  Hoda raya contrast pending  Echo pending  Telemetry  Lipid panel: total 161, triglycerides 216, HDL 35, LDL 83  HbA1C pending  Secondary risk factor modification  Permissive HTN with max of 180/105   Hyperglycemia control   PT/OT/ST  Stroke Education  Frequent neurological checks  Stat CT head with acute decline of in exam/mental status  Notify neurology with any changes in examination     The patient should follow-up with outpatient stroke neurology  Communication has been sent to the office to schedule a follow-up appointment  HLD  On a statin at home  Now on atorvastatin 40 mg    Hypertension  Permissive HTN with max of 180/105  Resume home antihypertensive regimen after 24 hours if appropriate    Tobacco use  Nicoderm  Tobacco cessation    History of Present Illness     Reason for Consult / Principal Problem: CVA    HPI: Pankaj Jimenez is a 58 y o  right handed male who has a history of hypertension, hyperlipidemia, migraine, tobacco use and regular alcohol use who presented to the ED late last night with stroke-like symptoms  The patient was apparently at a family birthday party when he developed abrupt onset right-sided headache, dysarthria, left facial droop and left upper extremity weakness  EMS apparently reported that the patient's left upper extremity was flaccid upon their arrival   Time of onset was noted to be approximately 2100  Stroke alert was called  NIHSS 3 documented by in neurology phone note and ED note  BP as per stroke alert note was noted to be 197/110  CT head negative for acute abnormality  CTA head and neck revealed 1) approximately 50% stenosis in the right proximal ICA, 2) approximately 30-40% stenosis of the left proximal ICA, 3) Opacified diminutive basilar artery  Patient received IV labetalol for an elevated blood pressure and received tPA at 2309  Subsequent evaluation by a a critical care nurse practitioner revealed an NIHSS of 9  After tPA, the patient's symptoms were reported to have improved somewhat though had not completely resolved  Today on exam, the patient's girlfriend is at bedside  The patient is alert but anxious  He is oriented to month and year and is able to describe the events that brought him to the ED  She and the patient state that his dysarthria has improved compared to yesterday    They both however feel that his left upper extremity weakness appears more significant today than last evening  The patient states that he was able to  better on previous exams  His left lower extremity seems to be improving  Patient has no new complaints  He denies headache  He denies chest pain, palpitation, shortness of breath  Inpatient consult to Neurology  Consult performed by: Jorge Coronado ordered by: Leonel Limon        Review of Systems A 12 point ROS was completed and other than the above mentioned complaints in the HPI, all remaining systems were negative  Historical Information   Past Medical History:   Diagnosis Date    Hypertension     Migraine      Past Surgical History:   Procedure Laterality Date    APPENDECTOMY      KNEE SURGERY      NECK SURGERY      TONSILLECTOMY       Social History   History   Alcohol Use    Yes     Comment: socially      History   Drug Use No     History   Smoking Status    Current Every Day Smoker    Packs/day: 1 00    Types: Cigarettes, Cigars   Smokeless Tobacco    Never Used     Family History:   Family History   Problem Relation Age of Onset    No Known Problems Mother     No Known Problems Father        Review of previous medical records was completed  Meds/Allergies   all current active meds have been reviewed    No Known Allergies    Objective   Vitals:Blood pressure 120/67, pulse 58, temperature 97 8 °F (36 6 °C), temperature source Oral, resp  rate 12, height 5' 9" (1 753 m), weight 90 7 kg (199 lb 15 3 oz), SpO2 95 %  ,Body mass index is 29 53 kg/m²  Intake/Output Summary (Last 24 hours) at 10/21/18 0802  Last data filed at 10/21/18 0350   Gross per 24 hour   Intake                0 ml   Output             1000 ml   Net            -1000 ml       Invasive Devices:    Invasive Devices     Peripheral Intravenous Line            Peripheral IV 10/20/18 Left Hand less than 1 day    Peripheral IV 10/20/18 Right Forearm less than 1 day                Physical Exam   Constitutional: He is oriented to person, place, and time  He appears well-developed and well-nourished  No distress  Supine in bed   HENT:   Head: Normocephalic and atraumatic  Eyes: Pupils are equal, round, and reactive to light  Conjunctivae and EOM are normal  Right eye exhibits no discharge  Left eye exhibits no discharge  No scleral icterus  Neck: Normal range of motion  Neck supple  Cardiovascular: Normal rate, regular rhythm and normal heart sounds  Exam reveals no gallop and no friction rub  No murmur heard  Pulmonary/Chest: Effort normal and breath sounds normal  No stridor  No respiratory distress  He has no wheezes  He has no rales  He exhibits no tenderness  Musculoskeletal: He exhibits no edema, tenderness or deformity  Neurological: He is alert and oriented to person, place, and time  Skin: Skin is warm and dry  No rash noted  No erythema  Psychiatric:   Anxious, pelasant     Neurologic Exam     Mental Status   Oriented to person, place, and time  Follows 2 step commands  Attention: normal  Concentration: normal    Level of consciousness: alert  Knowledge: good  Normal comprehension  Speech is dysarthric with no evidence of aphasia  Cranial Nerves     CN II   Visual acuity: normal (grossly)  Right visual field deficit: none  Left visual field deficit: none     CN III, IV, VI   Pupils are equal, round, and reactive to light  Extraocular motions are normal    Nystagmus: none   Diplopia: none  Conjugate gaze: present    CN V   Facial sensation intact       CN VII   Right facial weakness: none  Left facial weakness: central    CN VIII   Hearing: intact (grossly)    CN IX, X   CN IX normal      CN XI   Right sternocleidomastoid strength: normal  Left sternocleidomastoid strength: normal  Right trapezius strength: normal  Left trapezius strength: weak    CN XII   Tongue: not atrophic  Fasciculations: absent  Tongue deviation: left    Motor Exam   Muscle bulk: normal  Overall muscle tone: normal  Rside: 5/5 strength throughout    LUE:   Deltoids: 2-  Biceps: 1  Triceps: 2-  Wrist extension: trace  Wrist flexion: trace  : trace    LLE  Iliopsoas: 4+  Quadriceps: 5  Hamstrings: 5-  Dorsiflexion: 5-  Plantar flexion: 5       Sensory Exam   Sensation to light touch, temperature, vibration and pinprick intact on the right    - decreased sensation to light touch on the left side  - decreased sensation to temperature on the LUE, intact on LLE  - decreased sensation to pin prick and vibration throughout left side     Gait, Coordination, and Reflexes     Reflexes   Right plantar: normal  Left plantar: upgoing  DTRs 2+       Lab Results:   Recent Results (from the past 24 hour(s))   POCT Chem 8+    Collection Time: 10/20/18 10:00 PM   Result Value Ref Range    SODIUM, I-STAT 142 136 - 145 mmol/l    Potassium, i-STAT 5 0 3 5 - 5 3 mmol/L    Chloride, istat 103 100 - 108 mmol/L    CO2, i-STAT 30 21 - 32 mmol/L    Anion Gap, Istat 15 (H) 4 - 13 mmol/L    Calcium, Ionized i-STAT 1 10 (L) 1 12 - 1 32 mmol/L    BUN, I-STAT 23 5 - 25 mg/dl    Creatinine, i-STAT 1 0 0 6 - 1 3 mg/dl    eGFR 79 ml/min/1 73sq m    Glucose, i-STAT 132 65 - 140 mg/dl    Hct, i-STAT 42 36 5 - 49 3 %    Hgb, i-STAT 14 3 12 0 - 17 0 g/dl    Specimen Type VENOUS    APTT    Collection Time: 10/20/18 10:05 PM   Result Value Ref Range    PTT 22 (L) 24 - 36 seconds   CBC    Collection Time: 10/20/18 10:05 PM   Result Value Ref Range    WBC 8 35 4 31 - 10 16 Thousand/uL    RBC 4 83 3 88 - 5 62 Million/uL    Hemoglobin 14 4 12 0 - 17 0 g/dL    Hematocrit 43 0 36 5 - 49 3 %    MCV 89 82 - 98 fL    MCH 29 8 26 8 - 34 3 pg    MCHC 33 5 31 4 - 37 4 g/dL    RDW 14 4 11 6 - 15 1 %    Platelets 348 665 - 872 Thousands/uL    MPV 10 0 8 9 - 12 7 fL   Protime-INR    Collection Time: 10/20/18 10:05 PM   Result Value Ref Range    Protime 12 3 11 8 - 14 2 seconds    INR 0 94 0 86 - 5 89   Basic metabolic panel    Collection Time: 10/20/18 10:06 PM   Result Value Ref Range    Sodium 137 136 - 145 mmol/L    Potassium 5 8 (H) 3 5 - 5 3 mmol/L    Chloride 103 100 - 108 mmol/L    CO2 28 21 - 32 mmol/L    ANION GAP 6 4 - 13 mmol/L    BUN 18 5 - 25 mg/dL    Creatinine 1 09 0 60 - 1 30 mg/dL    Glucose 132 65 - 140 mg/dL    Calcium 9 1 8 3 - 10 1 mg/dL    eGFR 71 ml/min/1 73sq m   Type and screen    Collection Time: 10/20/18 11:16 PM   Result Value Ref Range    ABO Grouping A     Rh Factor Positive     Antibody Screen Negative     Specimen Expiration Date [id number]    Lipid Panel with Direct LDL reflex    Collection Time: 10/21/18  4:38 AM   Result Value Ref Range    Cholesterol 161 50 - 200 mg/dL    Triglycerides 216 (H) <=150 mg/dL    HDL, Direct 35 (L) 40 - 60 mg/dL    LDL Calculated 83 0 - 100 mg/dL   Comprehensive metabolic panel    Collection Time: 10/21/18  4:38 AM   Result Value Ref Range    Sodium 139 136 - 145 mmol/L    Potassium 3 6 3 5 - 5 3 mmol/L    Chloride 104 100 - 108 mmol/L    CO2 26 21 - 32 mmol/L    ANION GAP 9 4 - 13 mmol/L    BUN 18 5 - 25 mg/dL    Creatinine 0 97 0 60 - 1 30 mg/dL    Glucose 155 (H) 65 - 140 mg/dL    Calcium 9 0 8 3 - 10 1 mg/dL    AST 20 5 - 45 U/L    ALT 34 12 - 78 U/L    Alkaline Phosphatase 47 46 - 116 U/L    Total Protein 7 1 6 4 - 8 2 g/dL    Albumin 3 7 3 5 - 5 0 g/dL    Total Bilirubin 0 40 0 20 - 1 00 mg/dL    eGFR 82 ml/min/1 73sq m   Magnesium    Collection Time: 10/21/18  4:38 AM   Result Value Ref Range    Magnesium 2 1 1 6 - 2 6 mg/dL   Phosphorus    Collection Time: 10/21/18  4:38 AM   Result Value Ref Range    Phosphorus 3 1 2 3 - 4 1 mg/dL   Protime-INR    Collection Time: 10/21/18  4:38 AM   Result Value Ref Range    Protime 12 8 11 8 - 14 2 seconds    INR 0 99 0 86 - 1 17   CBC and differential    Collection Time: 10/21/18  4:38 AM   Result Value Ref Range    WBC 9 67 4 31 - 10 16 Thousand/uL    RBC 4 65 3 88 - 5 62 Million/uL    Hemoglobin 13 7 12 0 - 17 0 g/dL    Hematocrit 41 3 36 5 - 49 3 %    MCV 89 82 - 98 fL MCH 29 5 26 8 - 34 3 pg    MCHC 33 2 31 4 - 37 4 g/dL    RDW 14 5 11 6 - 15 1 %    MPV 9 5 8 9 - 12 7 fL    Platelets 681 233 - 552 Thousands/uL    nRBC 0 /100 WBCs    Neutrophils Relative 66 43 - 75 %    Immat GRANS % 0 0 - 2 %    Lymphocytes Relative 26 14 - 44 %    Monocytes Relative 7 4 - 12 %    Eosinophils Relative 1 0 - 6 %    Basophils Relative 0 0 - 1 %    Neutrophils Absolute 6 25 1 85 - 7 62 Thousands/µL    Immature Grans Absolute 0 03 0 00 - 0 20 Thousand/uL    Lymphocytes Absolute 2 53 0 60 - 4 47 Thousands/µL    Monocytes Absolute 0 69 0 17 - 1 22 Thousand/µL    Eosinophils Absolute 0 13 0 00 - 0 61 Thousand/µL    Basophils Absolute 0 04 0 00 - 0 10 Thousands/µL     Imaging Studies: I have personally reviewed pertinent films in PACS  EKG, Pathology, and Other Studies: I have personally reviewed pertinent reports      VTE Prophylaxis: Reason for no pharmacologic prophylaxis recent tPA    Code Status: Level 1 - Full Code

## 2018-10-22 ENCOUNTER — APPOINTMENT (INPATIENT)
Dept: CT IMAGING | Facility: HOSPITAL | Age: 63
DRG: 062 | End: 2018-10-22
Payer: COMMERCIAL

## 2018-10-22 PROBLEM — I10 HYPERTENSION: Chronic | Status: ACTIVE | Noted: 2018-10-21

## 2018-10-22 PROBLEM — Z72.0 TOBACCO ABUSE: Chronic | Status: ACTIVE | Noted: 2018-10-21

## 2018-10-22 PROBLEM — E78.5 HYPERLIPIDEMIA: Chronic | Status: ACTIVE | Noted: 2018-10-22

## 2018-10-22 PROBLEM — F41.9 ANXIETY: Chronic | Status: ACTIVE | Noted: 2018-10-21

## 2018-10-22 LAB
ANION GAP SERPL CALCULATED.3IONS-SCNC: 8 MMOL/L (ref 4–13)
BASOPHILS # BLD AUTO: 0.02 THOUSANDS/ΜL (ref 0–0.1)
BASOPHILS NFR BLD AUTO: 0 % (ref 0–1)
BUN SERPL-MCNC: 11 MG/DL (ref 5–25)
CALCIUM SERPL-MCNC: 9 MG/DL (ref 8.3–10.1)
CHLORIDE SERPL-SCNC: 104 MMOL/L (ref 100–108)
CO2 SERPL-SCNC: 28 MMOL/L (ref 21–32)
CREAT SERPL-MCNC: 0.96 MG/DL (ref 0.6–1.3)
EOSINOPHIL # BLD AUTO: 0.2 THOUSAND/ΜL (ref 0–0.61)
EOSINOPHIL NFR BLD AUTO: 2 % (ref 0–6)
ERYTHROCYTE [DISTWIDTH] IN BLOOD BY AUTOMATED COUNT: 14.6 % (ref 11.6–15.1)
GFR SERPL CREATININE-BSD FRML MDRD: 83 ML/MIN/1.73SQ M
GLUCOSE SERPL-MCNC: 107 MG/DL (ref 65–140)
GLUCOSE SERPL-MCNC: 108 MG/DL (ref 65–140)
GLUCOSE SERPL-MCNC: 127 MG/DL (ref 65–140)
GLUCOSE SERPL-MCNC: 165 MG/DL (ref 65–140)
GLUCOSE SERPL-MCNC: 167 MG/DL (ref 65–140)
HCT VFR BLD AUTO: 42.7 % (ref 36.5–49.3)
HGB BLD-MCNC: 14.1 G/DL (ref 12–17)
IMM GRANULOCYTES # BLD AUTO: 0.02 THOUSAND/UL (ref 0–0.2)
IMM GRANULOCYTES NFR BLD AUTO: 0 % (ref 0–2)
LYMPHOCYTES # BLD AUTO: 2.66 THOUSANDS/ΜL (ref 0.6–4.47)
LYMPHOCYTES NFR BLD AUTO: 27 % (ref 14–44)
MAGNESIUM SERPL-MCNC: 2 MG/DL (ref 1.6–2.6)
MCH RBC QN AUTO: 29.4 PG (ref 26.8–34.3)
MCHC RBC AUTO-ENTMCNC: 33 G/DL (ref 31.4–37.4)
MCV RBC AUTO: 89 FL (ref 82–98)
MONOCYTES # BLD AUTO: 0.74 THOUSAND/ΜL (ref 0.17–1.22)
MONOCYTES NFR BLD AUTO: 8 % (ref 4–12)
NEUTROPHILS # BLD AUTO: 6.21 THOUSANDS/ΜL (ref 1.85–7.62)
NEUTS SEG NFR BLD AUTO: 63 % (ref 43–75)
NRBC BLD AUTO-RTO: 0 /100 WBCS
PLATELET # BLD AUTO: 225 THOUSANDS/UL (ref 149–390)
PMV BLD AUTO: 9.5 FL (ref 8.9–12.7)
POTASSIUM SERPL-SCNC: 3.7 MMOL/L (ref 3.5–5.3)
RBC # BLD AUTO: 4.8 MILLION/UL (ref 3.88–5.62)
SODIUM SERPL-SCNC: 140 MMOL/L (ref 136–145)
WBC # BLD AUTO: 9.85 THOUSAND/UL (ref 4.31–10.16)

## 2018-10-22 PROCEDURE — G8988 SELF CARE GOAL STATUS: HCPCS

## 2018-10-22 PROCEDURE — 82948 REAGENT STRIP/BLOOD GLUCOSE: CPT

## 2018-10-22 PROCEDURE — G8979 MOBILITY GOAL STATUS: HCPCS

## 2018-10-22 PROCEDURE — 92526 ORAL FUNCTION THERAPY: CPT

## 2018-10-22 PROCEDURE — 97167 OT EVAL HIGH COMPLEX 60 MIN: CPT

## 2018-10-22 PROCEDURE — G8978 MOBILITY CURRENT STATUS: HCPCS

## 2018-10-22 PROCEDURE — 99232 SBSQ HOSP IP/OBS MODERATE 35: CPT | Performed by: INTERNAL MEDICINE

## 2018-10-22 PROCEDURE — 97163 PT EVAL HIGH COMPLEX 45 MIN: CPT

## 2018-10-22 PROCEDURE — 85025 COMPLETE CBC W/AUTO DIFF WBC: CPT | Performed by: PHYSICIAN ASSISTANT

## 2018-10-22 PROCEDURE — 80048 BASIC METABOLIC PNL TOTAL CA: CPT | Performed by: PHYSICIAN ASSISTANT

## 2018-10-22 PROCEDURE — 70450 CT HEAD/BRAIN W/O DYE: CPT

## 2018-10-22 PROCEDURE — 83735 ASSAY OF MAGNESIUM: CPT | Performed by: PHYSICIAN ASSISTANT

## 2018-10-22 PROCEDURE — 99254 IP/OBS CNSLTJ NEW/EST MOD 60: CPT | Performed by: PHYSICAL MEDICINE & REHABILITATION

## 2018-10-22 PROCEDURE — G8987 SELF CARE CURRENT STATUS: HCPCS

## 2018-10-22 PROCEDURE — 99232 SBSQ HOSP IP/OBS MODERATE 35: CPT | Performed by: PSYCHIATRY & NEUROLOGY

## 2018-10-22 PROCEDURE — 97530 THERAPEUTIC ACTIVITIES: CPT

## 2018-10-22 RX ORDER — LORAZEPAM 2 MG/ML
0.5 INJECTION INTRAMUSCULAR ONCE
Status: DISCONTINUED | OUTPATIENT
Start: 2018-10-22 | End: 2018-10-25 | Stop reason: HOSPADM

## 2018-10-22 RX ADMIN — DESMOPRESSIN ACETATE 80 MG: 0.2 TABLET ORAL at 17:07

## 2018-10-22 RX ADMIN — LORAZEPAM 0.5 MG: 2 INJECTION INTRAMUSCULAR; INTRAVENOUS at 00:17

## 2018-10-22 RX ADMIN — NICOTINE 14 MG: 14 PATCH TRANSDERMAL at 09:06

## 2018-10-22 RX ADMIN — CHLORHEXIDINE GLUCONATE 0.12% ORAL RINSE 15 ML: 1.2 LIQUID ORAL at 22:48

## 2018-10-22 NOTE — PLAN OF CARE
Problem: PHYSICAL THERAPY ADULT  Goal: Performs mobility at highest level of function for planned discharge setting  See evaluation for individualized goals  Treatment/Interventions: Functional transfer training, LE strengthening/ROM, Therapeutic exercise, Endurance training, Patient/family training, Equipment eval/education, Bed mobility (PT to see when gait training is appropriate )          See flowsheet documentation for full assessment, interventions and recommendations  Prognosis: Good  Problem List: Decreased strength, Decreased range of motion, Decreased endurance, Impaired balance, Decreased mobility, Decreased coordination, Decreased safety awareness  Assessment: Pt was at a party with his wife, he was feeling in his usual state of health, when he used his left hand to  candy  Noted that he was not able to grasp properly, in his wife noted that he had significant right facial drooping  When he went to speak, he was unable to articulate words  Also reported a right-sided headache  Dx: right sided cerebral hemisphere CVA, left hemiplegia, and dysarthria  order placed for PT eval and tx, w/ activity order of up w/ A and fall precautions  pt presents w/ comorbidities of HTN, hyperlipidemia, migraine, and knee surgery and personal factors of living in 2 story house, anxiety and tobacco use  pt presents w/ weakness, decreased ROM, decreased endurance, impaired balance, impaired coordination, decreased safety awareness and fall risk  these impairments are evident in findings from physical examination (weakness, decreased ROM and impaired coordination), mobility assessment (need for assist x 1 to 2 for bed mobility and transfers, inability to transfer out of bed, need for input for mobility technique), and Barthel Index: 30/100  pt needed input for mobility technique and safety  pt is at risk for falls due to physical and safety awareness deficits   pt's clinical presentation is unstable/unpredictable (evident in poor blood pressure control, need for assist x 1 to 2 w/ all phases of mobility when usually mobilizing independently and need for input for mobility technique/safety)  pt needs inpatient PT tx to improve mobility deficits and progress mobility training as appropriate  discharge recommendation is for inpatient rehab to reduce fall risk and maximize level of functional independence  Recommendation: Post acute IP rehab          See flowsheet documentation for full assessment

## 2018-10-22 NOTE — PLAN OF CARE
Problem: PHYSICAL THERAPY ADULT  Goal: Performs mobility at highest level of function for planned discharge setting  See evaluation for individualized goals  Treatment/Interventions: Functional transfer training, LE strengthening/ROM, Therapeutic exercise, Endurance training, Patient/family training, Equipment eval/education, Bed mobility (PT to see when gait training is appropriate )          See flowsheet documentation for full assessment, interventions and recommendations  Outcome: Progressing  Prognosis: Good  Problem List: Decreased strength, Decreased range of motion, Decreased endurance, Impaired balance, Decreased mobility, Decreased coordination, Decreased safety awareness  Assessment: Therapist introduced large base quad cane use w/ mobility to address impairments noted during evaluation  Pt was noted to have improvement in mobility status w/ use of quad cane w/ decreased level of assistance and completion of stand pivot transfer  Pt continues to require assist x2 and verbal cues w/ mobility for proper technique/safety  Pt is at risk for falling  continued inpatient PT tx is indicated to reduce fall risk factors  Recommendation: Post acute IP rehab          See flowsheet documentation for full assessment

## 2018-10-22 NOTE — PLAN OF CARE
Problem: OCCUPATIONAL THERAPY ADULT  Goal: Performs self-care activities at highest level of function for planned discharge setting  See evaluation for individualized goals  Treatment Interventions: ADL retraining, Functional transfer training, UE strengthening/ROM, Patient/family training, Equipment evaluation/education, Neuromuscular reeducation, Continued evaluation, Activityengagement, Compensatory technique education, Fine motor coordination activities  Equipment Recommended: Bedside commode, Tub seat with back (will continue to assess at rehab)       See flowsheet documentation for full assessment, interventions and recommendations  Limitation: Decreased ADL status, Decreased UE strength, Decreased UE ROM, Decreased Safe judgement during ADL, Decreased endurance, Decreased fine motor control, Decreased self-care trans, Decreased high-level ADLs     Assessment: Pt is a 57yo male [facility] on 10/20/2018  Pt presents w/ right sided cerebral hemisphere CVA and signficant PMH impacting his occupational performancei ncluding HTN  Personal factors include + smoker and steps inside house  Pt reports living w/ son and significant other in multi - level house PTA  Pt reports I w/ ADL/ IADL including driving and working full time  Pt reports no AD or DME use at baseline  Upon evaluation, pt required max A x1 to complete bed mobility supine <> sit and max A x2 using quad cane to complete functional SPT bed to chair to his left  Pt completed LBD to don pants and socks w/ max A  Pt demonstrated R UE AROM and strength WFL and L UE PROM WFL  Pt demonstrated active shoulder elevation and scapular retraction  Pt able to participate in conversation w/ increased time  Pt alert, cooperative and oriented X 4   Pt presents w/ decreased L UE/LE ROM / strength, decreased sitting balance, decreased sitting tolerance, decreased standing balance, decreased standing tolerance, decreased endurance, limited insight into deficits impacting his I w/ dressing, bathing, functional mobility, functional transfers, oral hygiene, food prep / clean up, and clothing mgmt  Pt would benefit from OT while in acute care to address deficits  From an OT perpsective, pt would benefit from post acute rehab when medically stable for discharge from acute care   Will continue to follow  Recommendation: Physiatry Consult  OT Discharge Recommendation: Other (Comment) (to post acute rehab when stable)  OT - OK to Discharge:  (to post acute rehab when stable)

## 2018-10-22 NOTE — PHYSICAL THERAPY NOTE
PHYSICAL THERAPY TREATMENT NOTE    Patient Name: Parag Kennedy  [id number] Date: 10/22/2018     10/22/18 0945   Pain Assessment   Pain Assessment No/denies pain   Restrictions/Precautions   Other Precautions Chair Alarm; Bed Alarm;Telemetry; Fall Risk;Aspiration   General   Chart Reviewed Yes   Family/Caregiver Present Yes   Cognition   Overall Cognitive Status WFL   Arousal/Participation Alert   Attention Attends with cues to redirect   Orientation Level Oriented X4;Other (Comment)  (pt was identified w/ full name and birth date)   Following Commands Follows one step commands with increased time or repetition   Subjective   Subjective pt agreed to participate in PT intervention  Bed Mobility   Additional Comments pt stood 1 minute w/ modx1 w/ large base quad cane  seated rest break x 2 minutes  pt completed stand pivot transfer as noted below  pt was unable to ambulate w/ quad cane  Transfers   Sit to Stand 2  Maximal assistance   Additional items Assist x 2; Increased time required;Verbal cues  (for LE positioning, hand placement)   Stand to Sit 3  Moderate assistance   Additional items Assist x 2; Increased time required;Verbal cues  (for body positioning, controlled descent)   Stand pivot 2  Maximal assistance   Additional items Assist x 2; Increased time required;Verbal cues  (for cane placement, posture)   Ambulation/Elevation   Gait pattern Not appropriate   Assistive Device Large base quad cane   Balance   Static Sitting Poor +   Dynamic Sitting Poor -   Static Standing Poor  (w/ large base quad cane)   Ambulatory Zero   Activity Tolerance   Activity Tolerance Patient limited by fatigue   Nurse Made Aware spoke to Claudeen Siva and Raeshell NSG, Nandini OT   Assessment   Prognosis Good   Problem List Decreased strength;Decreased range of motion;Decreased endurance; Impaired balance;Decreased mobility; Decreased coordination;Decreased safety awareness   Assessment Therapist introduced large base quad cane use w/ mobility to address impairments noted during evaluation  Pt was noted to have improvement in mobility status w/ use of quad cane w/ decreased level of assistance and completion of stand pivot transfer  Pt continues to require assist x2 and verbal cues w/ mobility for proper technique/safety  Pt is at risk for falling  continued inpatient PT tx is indicated to reduce fall risk factors  Goals   Patient Goals get out of bed   STG Expiration Date 11/01/18   Short Term Goal #1 pt will: Increase L LE strength 1/2 grade to facilitate independent mobility, Perform all bed mobility tasks w/ minx1 to decrease fall risk factors, Perform sit <---> stand transfers w/ modx1 to improve independence, Stand pivot transfer w/ least restrictive assistive device w/ modx1 w/o LOB to increase level of independence, Increase static standing balance 1 grade to decrease risk for falls, Complete exercise program independently to improve overall activity tolerance, Tolerate 3 hr OOB to faciliate upright tolerance and Improve Barthel Index score to 55 or greater to facilitate independence  PT to see when ambulation training is appropriate  Treatment Day 1   Plan   Treatment/Interventions Functional transfer training;LE strengthening/ROM; Therapeutic exercise; Endurance training;Patient/family training;Equipment eval/education; Bed mobility  (PT to see when gait training is appropriate)   Progress Progressing toward goals   PT Frequency 5x/wk; Other (Comment)  (and 1x on weekend)   Recommendation   Recommendation Post acute IP rehab   Equipment Recommended Other (Comment)  (large base quad cane)     Skilled inpatient PT recommended while in hospital to progress pt toward treatment goals      Berta Chambers, PT

## 2018-10-22 NOTE — OCCUPATIONAL THERAPY NOTE
[address]  Evaluation     Patient Name: Martha Witt  [id number] Date: 10/22/2018  Problem List  Patient Active Problem List   Diagnosis    Right sided [facility] hemisphere [facility]A) [facility]    Left hemiplegia ([facility]    Dysarthria    Hypertension    Tobacco abuse    Anxiety     Past Medical History  Past Medical History:   Diagnosis Date    Hypertension     Migraine      Past Surgical History  Past Surgical History:   Procedure Laterality Date    APPENDECTOMY      KNEE SURGERY      NECK SURGERY      TONSILLECTOMY        10/22/18 0947   Note Type   Note type Eval only   Restrictions/Precautions   Weight Bearing Precautions Per Order No   Other Precautions Multiple lines;Telemetry; Fall Risk;Aspiration; Chair Alarm   Pain Assessment   Pain Assessment No/denies pain   Home Living   Type of Home House   Home Layout Multi-level; Other (Comment); Laundry in basement  (0 MICKEY; 4 Steps from kitchen / bedroom to family room)   Bathroom Shower/Tub Walk-in shower   Bathroom Toilet Standard   Bathroom Equipment Built-in shower seat;Grab bars in shower   [address] Other (Comment);Grab bars  (no AD)   Additional Comments Pt reports living in apt off house w/ his son  Pt reports 0 MICKEY and 4 steps from [place] / bedroom to family room  Pt added that he is in apt off house and his son lives in house  Pt added that he has opened up apt   Prior Function   Level of [place] Independent with ADLs and functional mobility   Lives With Significant other; Son   ADL Assistance Independent   IADLs Independent   Falls in the last 6 months 0   Vocational Full time employment   Comments Pt reports I w/ ADL/ IADL PTA and no AD for functional mobility   Lifestyle   Autonomy Pt reports I w/ ADL/ IADL PTA including driving   Reciprocal Relationships Supportive / involved family   Service to Others 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 travels 4-5 times a year to [place] for few weeks at time   Intrinsic Gratification Pt reports enjoying his hot [employer] truck, his yard and pool  Pt reports having dog, Manuel   Psychosocial   Psychosocial (WDL) X   Patient Behaviors/Mood Tearful;Sad   ADL   Eating Assistance 4  Minimal Assistance   Eating Deficit Setup; Thickened liquids; Supervision/safety; Increased time to complete   Grooming Assistance 4  Minimal Assistance   Grooming Deficit Setup;Supervision/safety;Verbal cueing; Increased time to complete;Steadying   UB Bathing Assistance Unable to assess   LB Bathing Assistance Unable to assess   LB Dressing Assistance 2  Maximal Assistance   LB Dressing Deficit Don/doff R sock; Don/doff L sock; Thread RLE into pants; Thread LLE into pants;Pull up over hips;Setup;Steadying;Verbal cueing; Increased time to complete;Supervision/safety   Additional Comments benefits from cues for tech and to increase awareness / positioning L UE/LE   Bed Mobility   Supine to Sit 2  Maximal assistance   Additional items Assist x 1; Increased time required;Verbal cues;LE management   Sit to Supine Unable to assess   Additional Comments Pt seated OOB in chair post eval w/ call bell in reach and chair alarm activated; needs met   Transfers   Sit to Stand 2  Maximal assistance   Additional items Assist x 2; Increased time required;Verbal cues   Stand to Sit 2  Maximal assistance   Additional items Assist x 2; Increased time required;Verbal cues   Stand pivot 2  Maximal assistance   Additional items Assist x 2; Increased time required;Verbal cues  (using quad cane)   Additional Comments Pt completed sit <> stand three times during eval from EOB w/ A x2 (max A, x1, mod A x1) and signficant cues for hand placement, tech  Balance   Static Sitting Poor +   Dynamic Sitting Poor -   Static Standing Zero   Activity Tolerance   Activity Tolerance Patient limited by fatigue; Other (Comment)  (multiple lines in ICU)   Medical Staff Made Aware spoke to [facility], [address] [place] [address]   Nurse Made Aware spoke to Gladis KAMARA   RUE Assessment   RUE Assessment WFL   RUE Strength   RUE Overall Strength Within Functional Limits - able to perform ADL tasks with strength   LUE Assessment   LUE Assessment X  (PROM WFL; able to actively elevate shoulder, retract scapula)   LUE Strength   LUE Overall Strength Deficits; Other (Comment)  (2+/5 shoulder elevation, scapular retraction)   LUE Tone   LUE Tone Hypotonic   Hand Function   Gross Motor Coordination (functional R UE)   Fine Motor Coordination (R hand dominance)   Sensation   Light Touch No apparent deficits  (B UE and face)   Sharp/Dull Not tested   Vision-Basic Assessment   Current Vision Wears glasses all the time   Patient Visual Report Other (Comment)  (blurry vision, + time to focus when open eyes)   Vision - Complex Assessment   Ocular Range of Motion WFL   Head Position WDL   Tracking Able to track stimulus in all quads without difficulty   Saccades Decreased speed of pursuit between targets   Perception   Inattention/Neglect Cues to maintain midline in sitting;Cues to maintain midline in standing;Cues to attend to left side of body   Motor Planning Appears intact   Perseveration Not present   Cognition   Overall Cognitive Status [facility]   Arousal/Participation Alert; Cooperative   Attention Attends with cues to redirect   Orientation Level Oriented X4   Memory Decreased recall of precautions   Following Commands Follows one step commands with increased time or repetition   Comments Identified pt by full name and birthdate  Pt able to participate in conversation appropriately and provide social history  Increased time, dysrthria  L facial droop   Assessment   Limitation Decreased ADL status; Decreased UE strength;Decreased UE ROM; Decreased Safe judgement during ADL;Decreased endurance;Decreased fine motor control;Decreased self-care trans;Decreased high-level ADLs   Assessment Pt is a 55yo male [facility] on 10/20/2018   Pt presents w/ right sided cerebral hemisphere CVA and signficant PMH impacting his occupational performancei ncluding HTN  Personal factors include + smoker and steps inside house  Pt reports living w/ son and significant other in multi - level house PTA  Pt reports I w/ ADL/ IADL including driving and working full time  Pt reports no AD or DME use at baseline  Upon evaluation, pt required max A x1 to complete bed mobility supine <> sit and max A x2 using quad cane to complete functional SPT bed to chair to his left  Pt completed LBD to don pants and socks w/ max A  Pt demonstrated R UE AROM and strength WFL and L UE PROM WFL  Pt demonstrated active shoulder elevation and scapular retraction  Pt able to participate in conversation w/ increased time  Pt alert, cooperative and oriented X 4  Pt presents w/ decreased L UE/LE ROM / strength, decreased sitting balance, decreased sitting tolerance, decreased standing balance, decreased standing tolerance, decreased endurance, limited insight into deficits impacting his I w/ dressing, bathing, functional mobility, functional transfers, oral hygiene, food prep / clean up, and clothing mgmt  Pt would benefit from OT while in acute care to address deficits  From an OT perpsective, pt would benefit from post acute rehab when medically stable for discharge from acute care  Will continue to follow   Goals   Patient Goals Pt stated that he wanted to get out of bed  Plan   Treatment Interventions ADL retraining;Functional transfer training;UE strengthening/ROM; Patient/family training;Equipment evaluation/education; Neuromuscular reeducation;Continued evaluation; Activityengagement; Compensatory technique education; Fine motor coordination activities   Goal Expiration Date 11/01/18   OT Frequency 3-5x/wk   Recommendation   Recommendation Physiatry Consult   OT Discharge Recommendation Other (Comment)  (to post acute rehab when stable)   Equipment Recommended Bedside commode;Tub seat with back  (will continue to assess at rehab)   OT - OK to Discharge (to post acute rehab when stable)   Barthel Index   Feeding 5   Bathing 0   Grooming Score 0   Dressing Score 0   Bladder Score 10   Bowels Score 10   Toilet Use Score 0   Transfers (Bed/Chair) Score 5   Mobility (Level Surface) Score 0   Stairs Score 0   Barthel Index Score 30   Modified Yessenia Scale   Modified Yessenia Scale 4   Pt goals to be met in 10 days:  1  Pt will complete bed mobility supine <> sit w/ min A x1 to max I w/ ADL performance and improve activity engagement  2  Pt will demonstrate good attention and participation in continued evaluation to assess UBD  3  Pt will complete LBD w/ min A x1 after set- up while seated at EOB  4  Pt will complete functional transfers using least restrictive device w/ min A x1 to bed, chair, and toilet  5  Pt will complete functional shower transfer using DME / AD w/ min A x1 to max I and safety w/ bathing to return to PLOF  6  Pt will demonstrate increased functional standing tolerance for at least 15 minutes while engaged in oral hygiene using least restrictive device to max I w/ functional mobility / community mobility to return to PLOF  7  Pt will demonstrate good attention and verbalize understanding of L UE PROM / AAROM to increase ROM / strength, and positioning to max I w/ ADL performance  8  Pt will consistently demonstrate understanding of L UE ROM and positioning during ADL performance   9  Pt will demonstrate improved sitting balance unsupported at EOB to at least fair while actively engaged in ADL's to max I to return to PLOF  10  Pt will demonstrate good attention and verbalize understanding of safety precautions during ADL performance  11   Pt will demonstrate good attention and participation in continued evaluation to assess for DME needs and assist in safe discharge planning    Nandini Heredia, OTR/L

## 2018-10-22 NOTE — SOCIAL WORK
LOS 2 days  Patient is not a bundle  Patient is not a readmission  CM met with patient and a lot of family members including his daughter and SO  Patient lives at home in an apartment attached to his son's house  There are no MICKEY but 4 steps in the home  Laundry is located in the basement  Patient has no HX of DME, VNA, or STR  Patient has RX coverage and no issues getting or affording his medications  Patient denies any mental health or substance abuse issues  Patient does not have a POA  Patient works FT and drives himself  CM reviewed PT/OT recommendation for STR  CM discussed Acute rehab at [facility] with patient and family and they are agreeable to this  CM explained the process that once patient is medically stable CM will confirm bed availability then get prior auth from his insurance company  If for some reason a bed is not available at [facility] at that time referrals can be sent to other acute rehabs  CM name and role reviewed  Discharge Checklist reviewed and CM will continue to monitor for progress toward discharge goals in nursing and provider rounds  CM reviewed discharge planning process including the following: identifying caregivers at home, preference for d/c planning needs, [facility] Meds to Bed program, availability of treatment team to discuss questions or concerns patient and/or family may have regarding diagnosis, plan of care, old or new medications and discharge planning  CM name and role reviewed  CM will continue to follow for care coordination and update assessment as necessary

## 2018-10-22 NOTE — PROGRESS NOTES
Progress Note - Neurology   Radha Arzola 58 y o  male MRN: [id number]  Unit/Bed#:  Encounter: [id number]    Assessment/Plan:   3 58year old male with R MCA territory infarction    - Concern for R carotid stenosis as possible etiology  Consider possible vascular surgery evaluation but will discuss with attending neurologist     - Will discuss timing of ASA with attending neurologist given petechial hemorrhage noted on imaging     - Continue on Lipitor 80 mg QPM     - Echocardiogram reviewed, EF 60%, no regional wall motion abnormalities  - Hemoglobin A1c reviewed, 6 6    - Fasting lipid panel reviewed, total cholesterol 161, triglycerides 216, HDL 35, LDL 83     - Stroke education     - PT/OT/Speech therapy  - Monitor exam and notify with changes  2  Tobacco abuse    - Encourage tobacco cessation    3  HLD    - Lipitor     4  HTN    - May normalize blood pressure with goal normotension  Subjective:   Radha Arzola is a 58year old male with PMH HTN, HLD, migraine, tobacco abuse who presented to the hospital with abrupt onset of dysarthria, left facial droop and LUE weakness  IV tPA was given  Yesterday, he was noted to have increased weakness  Repeat imaging demonstrated R MCA territory ischemia with petechial hemorrhage  Today, patient notes persistent weakness of his LUE and notes that he is feeling frustrated due to having no movement in his arm  He also notes mild headache 1/10 in severity           ROS:  History obtained from the patient  General ROS: negative for - chills, fatigue or fever  Ophthalmic ROS: positive for - blurry vision  Respiratory ROS: negative for - shortness of breath  Cardiovascular ROS: negative for - chest pain  Gastrointestinal ROS: negative for - abdominal pain or nausea/vomiting  Musculoskeletal ROS: positive for - muscular weakness  Neurological ROS: positive for - headaches, visual changes and weakness    Medications  Scheduled Meds:  Current Facility-Administered Medications:  atorvastatin 80 mg Oral QPM Sree Valle PA-C   chlorhexidine 15 mL Swish & Spit Q12H [address] [place] , CRNP   labetalol 10 mg Intravenous Q6H PRN Raffaele , CRNP   LORazepam 0 5 mg Intravenous Once Rosa Brown PA-C   nicotine 14 mg Transdermal Daily Raffaele , CRNP   ondansetron 4 mg Intravenous Q6H PRN Sree Valle PA-C     Continuous Infusions:   PRN Meds: labetalol    ondansetron    Vitals: Blood pressure 125/72, pulse 67, temperature 98 6 °F (37 °C), temperature source Oral, resp  rate 16, height 5' 9" (1 753 m), weight 90 7 kg (199 lb 15 3 oz), SpO2 94 %  ,Body mass index is 29 53 kg/m²  Physical Exam: /79 (BP Location: Right arm)   Pulse 75   Temp 97 6 °F (36 4 °C) (Oral)   Resp 22   Ht 5' 9" (1 753 m)   Wt 90 7 kg (199 lb 15 3 oz)   SpO2 96%   BMI 29 53 kg/m²   General appearance: alert and oriented, in no acute distress and speech moderately dysarthric    Head: Normocephalic, without obvious abnormality, atraumatic  Eyes: negative findings: lids and lashes normal, conjunctivae and sclerae normal, pupils equal, round, reactive to light and accomodation and visual fields full to confrontation  Lungs: clear to auscultation bilaterally  Heart: regular rate and rhythm  Abdomen: soft, non-tender; bowel sounds normal; no masses,  no organomegaly  Extremities: extremities normal, warm and well-perfused; no cyanosis, clubbing, or edema  Skin: Skin color, texture, turgor normal  No rashes or lesions  Neurologic: Mental status: Alert, oriented, thought content appropriate  Cranial nerves: II: visual field normal, II: pupils equal, round, reactive to light and accommodation, III,VII: ptosis no ptosis noted, III,IV,VI: extraocular muscles extra-ocular motions intact, V: facial light touch sensation normal bilaterally, VII: upper facial muscle function abnormal on the left, VII: lower facial muscle function normal bilaterally, XII: tongue strength normal  Sensory: normal, Grossly intact to crude touch  Motor: Motor strength RUE and RLE 5/5, LUE 0/5, LLE 4+/5      Labs  Recent Results (from the past 24 hour(s))   Fingerstick Glucose (POCT)    Collection Time: 10/21/18 12:00 PM   Result Value Ref Range    POC Glucose 108 65 - 140 mg/dl   Fingerstick Glucose (POCT)    Collection Time: 10/21/18  6:40 PM   Result Value Ref Range    POC Glucose 118 65 - 140 mg/dl   Fingerstick Glucose (POCT)    Collection Time: 10/22/18 12:12 AM   Result Value Ref Range    POC Glucose 108 65 - 140 mg/dl   CBC and differential    Collection Time: 10/22/18  4:36 AM   Result Value Ref Range    WBC 9 85 4 31 - 10 16 Thousand/uL    RBC 4 80 3 88 - 5 62 Million/uL    Hemoglobin 14 1 12 0 - 17 0 g/dL    Hematocrit 42 7 36 5 - 49 3 %    MCV 89 82 - 98 fL    MCH 29 4 26 8 - 34 3 pg    MCHC 33 0 31 4 - 37 4 g/dL    RDW 14 6 11 6 - 15 1 %    MPV 9 5 8 9 - 12 7 fL    Platelets 705 325 - 039 Thousands/uL    nRBC 0 /100 WBCs    Neutrophils Relative 63 43 - 75 %    Immat GRANS % 0 0 - 2 %    Lymphocytes Relative 27 14 - 44 %    Monocytes Relative 8 4 - 12 %    Eosinophils Relative 2 0 - 6 %    Basophils Relative 0 0 - 1 %    Neutrophils Absolute 6 21 1 85 - 7 62 Thousands/µL    Immature Grans Absolute 0 02 0 00 - 0 20 Thousand/uL    Lymphocytes Absolute 2 66 0 60 - 4 47 Thousands/µL    Monocytes Absolute 0 74 0 17 - 1 22 Thousand/µL    Eosinophils Absolute 0 20 0 00 - 0 61 Thousand/µL    Basophils Absolute 0 02 0 00 - 0 10 Thousands/µL   Basic metabolic panel    Collection Time: 10/22/18  4:36 AM   Result Value Ref Range    Sodium 140 136 - 145 mmol/L    Potassium 3 7 3 5 - 5 3 mmol/L    Chloride 104 100 - 108 mmol/L    CO2 28 21 - 32 mmol/L    ANION GAP 8 4 - 13 mmol/L    BUN 11 5 - 25 mg/dL    Creatinine 0 96 0 60 - 1 30 mg/dL    Glucose 107 65 - 140 mg/dL    Calcium 9 0 8 3 - 10 1 mg/dL    eGFR 83 ml/min/1 73sq m   Magnesium    Collection Time: 10/22/18  4:36 AM   Result Value Ref Range    Magnesium 2 0 1 6 - 2 6 mg/dL     Imaging   Personally reviewed images and reports in PACs  MRI brain without contrast- Moderate amount of multifocal cortical infarctions involving distinct and separate foci in the right middle cerebral territory  Small amount of petechial hemorrhage in the insular cortex in the region of recent infarction  CTH- Sequela of embolic infarction including interval evolution and demarcation of right insular infarction  No midline shift  Small infarctions within the right frontal lobe and right temporal occipital junction are better evaluated on prior MRI  Areas of petechial hemorrhage are better detected on prior MRI  No large delayed intracranial hemorrhage  VTE Prophylaxis: Reason for no pharmacologic prophylaxis Recent IV tPA and petechial hemorrhage  Counseling / Coordination of Care  Total time spent today 30 minutes  Greater than 50% of total time was spent with the patient and / or family counseling and / or coordination of care  A description of the counseling / coordination of care: Discussed case and plan of care with patient and his family at bedside  Discussed that at this time, most important factors are to determine timing of antiplatelet and also to work with rehab  No way to determine exactly how much use patient will regain in his LUE at this time, but important to work with therapy early   Will discuss further work-up with attending neurologist

## 2018-10-22 NOTE — CONSULTS
PHYSICAL MEDICINE AND REHABILITATION CONSULT NOTE  Elsi Adrian 58 y o  male MRN: [id number]  Unit/Bed#:  Encounter: [id number]    Requested by (Physician/Service): Lima Amaro MD  Reason for Consultation:  Assessment of rehabilitation needs  Reason for Hospitalization:  CVA (cerebral vascular accident) ([facility] [address] ) [I63 9]  Stroke (cerebrum) ([address] ) [I63 9]  Dysarthria [R47 1]  Left-sided weakness [R53 1]     History of Present Illness:  Elsi Adrian is a 58 y o  male who  has a past medical history of Hypertension and Migraine  who presented to the [address] [place], [place] [address]  who presented to the ED with abrupt onset dysarthria, left facial droop, left upper extremity weakness  CT H negative  CTA with 50% stenosis of the right ICA and 30 to 40% stenosis of the left IC A  TPA was given  PM&R consulted for rehabilitation recommendations  Restrictions include:  none    Hospital Complications/Comorbidities:   Complications: As above  Comorbidities: As above    Morbid Obesity (BMI > 40) No     Last Weight Last BMI   Wt Readings from Last 1 Encounters:   10/21/18 90 7 kg (199 lb 15 3 oz)    Body mass index is 29 53 kg/m²  Functional History:     Prior to Admission:     Functional Status: Patient was independent with mobility/ambulation, transfers, ADL's, IADL's  Present:  Physical Therapy Occupational Therapy Speech Therapy          Past Medical History:   Past Surgical History:   Family History:     Past Medical History:   Diagnosis Date    Hypertension     Migraine     Past Surgical History:   Procedure Laterality Date    APPENDECTOMY      KNEE SURGERY      NECK SURGERY      TONSILLECTOMY       Family History   Problem Relation Age of Onset    No Known Problems Mother     No Known Problems Father      - No family history of neurologic diseases        Medications:    Current Facility-Administered Medications:     atorvastatin (LIPITOR) tablet 80 mg, 80 mg, Oral, QPM, Gwyn Vaughn PA-C, 80 mg at 10/21/18 1958    chlorhexidine (PERIDEX) 0 12 % oral rinse 15 mL, 15 mL, Swish & Spit, Q12H [address], Deo Atul [place], CRNP, 15 mL at 10/21/18 2158    LORazepam (ATIVAN) 2 mg/mL injection 0 5 mg, 0 5 mg, Intravenous, Once, Rakesh Jackson PA-C    nicotine (NICODERM CQ) 14 mg/24hr TD 24 hr patch 14 mg, 14 mg, Transdermal, Daily, Cinderella Side, CRNP, 14 mg at 10/22/18 8506    ondansetron (ZOFRAN) injection 4 mg, 4 mg, Intravenous, Q6H PRN, Gwyn Vaughn PA-C, 4 mg at 10/21/18 1327    Allergies:   No Known Allergies     Social History:    Social History     Social History    Marital status: Single     Spouse name: N/A    Number of children: N/A    Years of education: N/A     Social History Main Topics    Smoking status: Current Every Day Smoker     Packs/day: 1 00     Types: Cigarettes, Cigars    Smokeless tobacco: Never Used    Alcohol use Yes      Comment: socially     Drug use: No    Sexual activity: Not Asked     Other Topics Concern    None     Social History Narrative    None        Omero Norwood lives in a multilevel house with 0 steps to enter  Review of Systems: A 10-point review of systems was performed  Negative except as listed above       Physical Exam:  Vital Signs:      Temp:  [97 6 °F (36 4 °C)-98 6 °F (37 °C)] 97 6 °F (36 4 °C)  HR:  [54-75] 66  Resp:  [12-29] 16  BP: (106-163)/() 120/71   Intake/Output Summary (Last 24 hours) at 10/22/18 1344  Last data filed at 10/22/18 1327   Gross per 24 hour   Intake              360 ml   Output             1775 ml   Net            -1415 ml        Laboratory:      Lab Results   Component Value Date    HGB 14 1 10/22/2018    HCT 42 7 10/22/2018    WBC 9 85 10/22/2018     Lab Results   Component Value Date    BUN 11 10/22/2018     10/22/2018    K 3 7 10/22/2018     10/22/2018    GLUCOSE 132 10/20/2018    CREATININE 0 96 10/22/2018     Lab Results   Component Value Date    PROTIME 12 8 10/21/2018 INR 0 99 10/21/2018        General: alert, no apparent distress, cooperative and comfortable  Head: Normal, normocephalic, atraumatic  Eye: Normal external eye, conjunctiva, lids   Ears: Normal external ears  Nose: Normal external nose, mucus membranes  Pharynx: Dental Hygiene adequate  Normal buccal mucosa  Normal pharynx  Neck / Thyroid: Supple, no masses, nodes, nodules or enlargement  Pulmonary: clear to auscultation bilaterally and no crackles, no wheezes, chest expansion normal  Cardiovascular: normal rate, regular rhythm, normal S1, S2, no murmurs, rubs, clicks or gallops  Abdomen: soft, nontender, nondistended, no masses or organomegaly  Skin/Extremity: no rashes, no erythema, no peripheral edema    Neurologic:  Cranial nerves 2-12 are intact except left facial droop  Speech is dysarthric with no evidence of aphasia  Normal bulk and tone on motor examination  Deep tendon reflexes are 2+ bilaterally, right plantar is flexor and left plantar is extensor  Psych: Appropriate affect, alert and oriented to person, place and time   Musculoskeletal - Strength:   Right  Left  Site  Right  Left  Site    5 2- S Ab: Shoulder Abductors  5  4+  HF: Hip Flexors    5 1  EF: Elbow Flexors  5 5 KF: Knee Flexors    5  2- EE: Elbow Extensors  5  5-  KE: Knee Extensors    5  1 WE: Wrist Extensors  5  5-  DR: Dorsi Flexors    5  1 FF: Finger Flexors  5  5  PF: Plantar Flexors    5  1 HI: Hand Intrinsics  5  5-  EHL: Extensor Hallucis Longus         Imaging: Reviewed  Ct Head Wo Contrast    Result Date: 10/22/2018  Impression: 1  Sequela of embolic infarction including interval evolution and demarcation of right insular infarction  No midline shift  2   Small infarctions within the right frontal lobe and right temporal occipital junction are better evaluated on prior MRI  3   Areas of petechial hemorrhage are better detected on prior MRI  No large delayed intracranial hemorrhage   Workstation performed: [id number] Mri Brain Wo Contrast    Result Date: 10/21/2018  Impression: 1  Moderate amount of multifocal cortical infarctions involving distinct and separate foci in the right middle cerebral territory  2   Small amount of petechial hemorrhage in the insular cortex in the region of recent infarction Workstation performed: [id number]     Xr Stroke Alert Portable Chest    Result Date: 10/21/2018  Impression: No acute cardiopulmonary disease  Workstation performed: [id number]     Ct Stroke Alert Brain    Result Date: 10/21/2018  Impression: No evidence of acute intracranial hemorrhage  Proceed with stroke protocol  Findings were directly discussed with NICOLAS Husain on 10/20/2018 10:22 PM  Workstation performed: [id number]     Cta Stroke Alert (head/neck)    Result Date: 10/20/2018  Impression: 1  There is approximately 50% stenosis of the proximal right ICA  2   There is approximately 30-40% stenosis of the proximal left ICA  3   The basilar artery is diminutive throughout its course, however, is opacified  4   There is an approximately 1 5 cm nodule within the right lobe of the thyroid  Nonemergent outpatient thyroid ultrasound is recommended for further evaluation  5   There is fluid in the visualized thoracic esophagus suggesting gastroesophageal reflux  6   Other findings as described above, please see discussion  Findings were directly discussed with Dr Alma Lugo on October 20, 2018 at 11:02 PM  Workstation performed: [id number]       Assessment and Recommendations:  16-year-old male with right MCA CVA   Impairments:  Impaired functional mobility and ability to perform ADL's  Impaired speech    Recommendations:  - Chart reviewed  - Imaging reviewed   - Continue PT/OT while inpatient        - Pt is unable to safely return home until he is at the supervision/contact-guard functional level (25% assistance level with or without a device)  modified-independent functional level (0% assistance with a device) independent functional level (0% assistance without a device)  -Based on patient's prior and current functional status, he will benefit acute inpatient rehabilitation when medically stable         - Disposition unclear at this point in time but inpatient rehab a possibility in the near future pending achievement of clinically stability, potential for functional progress  Thank you for allowing the PM&R service to participate in the care of this patient  We will continue to follow Garfield[place] progress with you  Please do not hesitate to call with questions or concerns    ** Please Note: Fluency Direct voice to text software may have been used in the creation of this document   **

## 2018-10-22 NOTE — PHYSICAL THERAPY NOTE
PHYSICAL THERAPY EVALUATION NOTE    Patient Name: Alissa CONTEHIQUIRINO Date: 10/22/2018  AGE:   58 y o  Mrn:   [id number]  ADMIT DX:  CVA (cerebral vascular accident) ([facility] [address] ) [I63 9]  Stroke (cerebrum) ([facility] [address] ) [I63 9]  Dysarthria [R47 1]  Left-sided weakness [R53 1]    Past Medical History:   Diagnosis Date    Hypertension     Migraine      Length Of Stay: 2  PHYSICAL THERAPY EVALUATION :   10/22/18 0922   Pain Assessment   Pain Assessment No/denies pain   Home Living   Type of [facility] Two level; Other (Comment)  (no MICKEY  4 steps in home )   Additional Comments lives w/ significant other  ambulates w/o device  no DME  independent w/ ADLs and driving  no falls in last 6 months  Prior Function   Comments pt seen supine in bed w/ family present  agreed to participate in PT eval  denied pain or dizziness  pt wants to get out of bed  Restrictions/Precautions   Other Precautions Chair Alarm; Bed Alarm;Telemetry; Fall Risk;Aspiration   General   Additional Pertinent History 10/21/18 at 20:00, blood pressure was 149/102  Family/Caregiver Present Yes   Cognition   Overall Cognitive Status WFL   Arousal/Participation Alert   Orientation Level Oriented X4;Other (Comment)  (pt was identified w/ full name and birth date)   Following Commands Follows one step commands with increased time or repetition   Comments supine blood pressure 125/79   room air resting pulse ox 96% and 69 BPM, active 95% and 81 BPM    RUE Assessment   RUE Assessment WFL   LUE Assessment   LUE Assessment X   RLE Assessment   RLE Assessment WFL  (4/5)   LLE Assessment   LLE Assessment X  (hip flex 3-/5 ext 3/5, knee flex 3-/5 ext 3/5, ankle 3-/5)   Coordination   Movements are Fluid and Coordinated 0   Coordination and Movement Description (impaired coordination L LE)   Light Touch   RLE Light Touch Grossly intact   LLE Light Touch Grossly intact   Bed Mobility   Supine to Sit 2  Maximal assistance   Additional items Assist x 1;HOB elevated; Increased time required;Verbal cues;LE management   Additional Comments pt stood 45 seconds and 1 minute w/o device w/ maxx2  left trunk lean noted  facilitation was needed to maintain left hip and knee extension  pt performed bilateral weight shift but was unable to complete stand pivot transfer    (additional standing not possible due to fatigue )   Transfers   Sit to Stand 2  Maximal assistance   Additional items Assist x 2; Increased time required;Verbal cues  (for LE positioning, hand placement, breathing technique)   Stand to Sit 2  Maximal assistance   Additional items Assist x 2;Verbal cues  (for hand placement  controlled descent)   Stand pivot Unable to assess   Ambulation/Elevation   Gait pattern Not appropriate   Assistive Device Other (Comment)  (hand hold)   Balance   Static Sitting Poor +   Dynamic Sitting Poor -   Static Standing Zero   Ambulatory Zero   Activity Tolerance   Activity Tolerance Patient limited by fatigue   Nurse Made Aware spoke to [facility] [place] [facility] [place]   Assessment   Prognosis Good   Problem List Decreased strength;Decreased range of motion;Decreased endurance; Impaired balance;Decreased mobility; Decreased coordination;Decreased safety awareness   Assessment Pt was at a party with his wife, he was feeling in his usual state of health, when he used his left hand to  candy  Noted that he was not able to grasp properly, in his wife noted that he had significant right facial drooping  When he went to speak, he was unable to articulate words  Also reported a right-sided headache  Dx: right sided cerebral hemisphere CVA, left hemiplegia, and dysarthria  order placed for PT eval and tx, w/ activity order of up w/ A and fall precautions  pt presents w/ comorbidities of HTN, hyperlipidemia, migraine, and knee surgery and personal factors of living in 2 story house, anxiety and tobacco use   pt presents w/ weakness, decreased ROM, decreased endurance, impaired balance, impaired coordination, decreased safety awareness and fall risk  these impairments are evident in findings from physical examination (weakness, decreased ROM and impaired coordination), mobility assessment (need for assist x 1 to 2 for bed mobility and transfers, inability to transfer out of bed, need for input for mobility technique), and Barthel Index: 30/100  pt needed input for mobility technique and safety  pt is at risk for falls due to physical and safety awareness deficits  pt's clinical presentation is unstable/unpredictable (evident in poor blood pressure control, need for assist x 1 to 2 w/ all phases of mobility when usually mobilizing independently and need for input for mobility technique/safety)  pt needs inpatient PT tx to improve mobility deficits and progress mobility training as appropriate  discharge recommendation is for inpatient rehab to reduce fall risk and maximize level of functional independence  Goals   Patient Goals get out of bed   STG Expiration Date 11/01/18   Short Term Goal #1 pt will: Increase L LE strength 1/2 grade to facilitate independent mobility, Perform all bed mobility tasks w/ minx1 to decrease fall risk factors, Perform sit <---> stand transfers w/ modx1 to improve independence, Stand pivot transfer w/ least restrictive assistive device w/ modx1 w/o LOB to increase level of independence, Increase static standing balance 1 grade to decrease risk for falls, Complete exercise program independently to improve overall activity tolerance, Tolerate 3 hr OOB to faciliate upright tolerance and Improve Barthel Index score to 55 or greater to facilitate independence  PT to see when ambulation training is appropriate  Plan   Treatment/Interventions Functional transfer training;LE strengthening/ROM; Therapeutic exercise; Endurance training;Patient/family training;Equipment eval/education; Bed mobility  (PT to see when gait training is appropriate )   PT Frequency 5x/wk; Other (Comment)  (and 1x on weekend)   Recommendation   Recommendation Post acute IP rehab   Modified [place] Scale   Modified [place] Scale 4   Barthel Index   Feeding 5   Bathing 0   Grooming Score 0   Dressing Score 0   Bladder Score 10   Bowels Score 10   Toilet Use Score 0   Transfers (Bed/Chair) Score 5   Mobility (Level Surface) Score 0   Stairs Score 0   Barthel Index Score 30     Skilled PT recommended while in hospital and upon DC to progress pt toward treatment goals       Johanna Corral, PT

## 2018-10-22 NOTE — PROGRESS NOTES
Progress Note - Critical Care   Carmelita Haile 58 y o  male MRN: [id number]  Unit/Bed#:  Encounter: [id number]    Assessment:   Patient Active Problem List   Diagnosis    Right sided cerebral hemisphere cerebrovascular accident (CVA) ([facility] [address] )    Left hemiplegia ([address] )    Dysarthria    Hypertension    Tobacco abuse    Anxiety     Plan:              Neuro: Patient s/p R MCA distribution infarction s/p IV tPA in ED  24h CT imaging shows evolving infarction  MRI show insular infarct and some minor petechial hemorrhage  Will defer starting antiplatelet therapy to neurology   Continue to monitor neuro checks and NIH SS                 CV: Continue to monitor on telemetry  Echo shows yesenia EF with no valvular abnormalities   Continue statin therapy   BP controlled off antihypertensives                  Lung: Encourage pulmonary toilet                  GI: Patient evaluated by SLP and cleared for mechanical soft and nectar thick liquids                  FEN: Continue diet                  : Patient voiding spontaneously                  ID: NTD                 Heme: NTD                 Endo: NTD                            Msk/Skin: Mobilize with PT  Will need rehab                 Disposition: Transfer to floor    Chief Complaint: Worsened L arm weakness since arrival     HPI/24hr events: Patient had 24H post-TPA imaging yesterday that did not show any large hemorrhage  Physical Exam:   Physical Exam   Constitutional: No distress  HENT:   Head: Normocephalic and atraumatic  Eyes: Pupils are equal, round, and reactive to light  EOM are normal    Cardiovascular: Normal rate, regular rhythm, normal heart sounds and intact distal pulses  Pulmonary/Chest: Effort normal and breath sounds normal    Abdominal: Soft  Musculoskeletal: Normal range of motion  He exhibits no edema     Neurological:    NIH Stroke Scale      Level of Consciousness (1a ) 0      LOC Questions (1b ) 0      LOC Commands (1c ) 0 Best Gaze (2 ) 0      Visual (3 ) 0      Facial Palsy (4 ) 1      Motor Arm, Left (5a ) 4    Motor Arm, Right (5b ) 0      Motor Leg, Left (6a ) 0      Motor Leg, Right (6b ) 0      Limb Ataxia (7 ) 0      Sensory (8 ) 0      Best Language (9 ) 0      Dysarthria (10 ) 1      Extinction and Inattention (11 ) (Formerly Neglect) 0      Total 6           Skin: Skin is warm and dry  He is not diaphoretic  Vitals:    10/22/18 0400 10/22/18 0500 10/22/18 0600 10/22/18 0700   BP: 129/63 144/74 136/72 122/76   BP Location:    Right arm   Pulse: 55 59 60 55   Resp: 13 17 12 12   Temp:    98 6 °F (37 °C)   TempSrc:    Oral   SpO2: 95% 95% 93% 94%   Weight:       Height:                 Temperature:   Temp (24hrs), Av 2 °F (36 8 °C), Min:97 8 °F (36 6 °C), Max:98 6 °F (37 °C)    Current: Temperature: 98 6 °F (37 °C)    Weights:   IBW: 70 7 kg    Body mass index is 29 53 kg/m²  Weight (last 2 days)     Date/Time   Weight    10/21/18 0145  90 7 (199 96)    10/20/18 2334  90 7 (199 96)    10/20/18 2228  94 5 (208 34)              Hemodynamic Monitoring:  N/A     Non-Invasive/Invasive Ventilation Settings:  Respiratory    Lab Data (Last 4 hours)    None         O2/Vent Data (Last 4 hours)    None              No results found for: PHART, UPN5DAG, PO2ART, SLU3TMP, R7SKSSSV, BEART, SOURCE  SpO2: SpO2: 94 %    Intake and Outputs:  I/O       10/20 07 - 10/21 0700 10/21 0701 - 10/22 0700 10/22 0701 - 10/23 07    P  O  0 0     IV Piggyback  100     Total Intake(mL/kg) 0 (0) 100 (1 1)     Urine (mL/kg/hr) 1000 1675 (0 8)     Total Output 1000 1675      Net -1000 -1575                 UOP: 0 8mg/kg/hour   Nutrition:        Diet Orders            Start     Ordered    10/21/18 1600  Diet Dysphagia/Modified Consistency;  Dysphagia 2-Mechanical Soft; Nectar Thick Liquid  Diet effective now     Question Answer Comment   Diet Type Dysphagia/Modified Consistency    Dysphagia/Modified Consistency Dysphagia 2-Mechanical Soft Liquid Modifier Nectar Thick Liquid    RD to adjust diet per protocol? Yes        10/21/18 1559    10/21/18 0649  Room Service  Once     Question:  Type of Service  Answer:  Room Service - Appropriate with Assistance    10/21/18 0648          Labs:     Results from last 7 days  Lab Units 10/22/18  0436 10/21/18  0438 10/20/18  2205   WBC Thousand/uL 9 85 9 67 8 35   HEMOGLOBIN g/dL 14 1 13 7 14 4   HEMATOCRIT % 42 7 41 3 43 0   PLATELETS Thousands/uL 225 249 252   NEUTROS PCT % 63 66  --    MONOS PCT % 8 7  --       Results from last 7 days  Lab Units 10/22/18  0436 10/21/18  0438 10/20/18  2206 10/20/18  2200   SODIUM mmol/L 140 139 137  --    POTASSIUM mmol/L 3 7 3 6 5 8*  --    CHLORIDE mmol/L 104 104 103  --    CO2 mmol/L 28 26 28  --    BUN mg/dL 11 18 18  --    CREATININE mg/dL 0 96 0 97 1 09  --    CALCIUM mg/dL 9 0 9 0 9 1  --    ALK PHOS U/L  --  47  --   --    ALT U/L  --  34  --   --    AST U/L  --  20  --   --    GLUCOSE, ISTAT mg/dl  --   --   --  132       Results from last 7 days  Lab Units 10/22/18  0436 10/21/18  0438   MAGNESIUM mg/dL 2 0 2 1       Results from last 7 days  Lab Units 10/21/18  0438   PHOSPHORUS mg/dL 3 1        Results from last 7 days  Lab Units 10/21/18  0438 10/20/18  2205   INR  0 99 0 94   PTT seconds  --  22*         No results found for: TROPONINI    Imaging: CT brain- Evolving R infarct I have personally reviewed pertinent reports     and I have personally reviewed pertinent films in PACS    EKG: Reviewed    Micro:  No results found for: Lilliam Song, WOUNDCULT, SPUTUMCULTUR    Allergies: No Known Allergies    Medications:   Scheduled Meds:  Current Facility-Administered Medications:  atorvastatin 80 mg Oral QPM Neil Ly PA-C   chlorhexidine 15 mL Swish & Spit Q12H [address] Luan SinghPATEL   labetalol 10 mg Intravenous Q6H PRN Luan Singh, CRNP   LORazepam 0 5 mg Intravenous Once Shannan Kennedy PA-C   nicotine 14 mg Transdermal Daily Carlos Elliott PATEL Allred   ondansetron 4 mg Intravenous Q6H PRN Cameron Perez PA-C     Continuous Infusions:   PRN Meds:    labetalol 10 mg Q6H PRN   ondansetron 4 mg Q6H PRN       VTE Pharmacologic Prophylaxis: Will defer to neurology secondary to MRI findings   VTE Mechanical Prophylaxis: sequential compression device    Invasive lines and devices: Invasive Devices     Peripheral Intravenous Line            Peripheral IV 10/20/18 Left Hand 1 day    Peripheral IV 10/20/18 Right Forearm 1 day                   Counseling / Coordination of Care  Total Critical Care time spent 22 minutes excluding procedures, teaching and family updates  Code Status: Level 1 - Full Code     Portions of the record may have been created with voice recognition software  Occasional wrong word or "sound a like" substitutions may have occurred due to the inherent limitations of voice recognition software  Read the chart carefully and recognize, using context, where substitutions have occurred       Rise BAILEY Morfin

## 2018-10-22 NOTE — UTILIZATION REVIEW
Thank you,  Janee Tomasn  Utilization Review Department  Phone: [phone]; Fax [phone]  ATTENTION: Please call with any questions or concerns to [phone]  and carefully follow the prompts so that you are directed to the right person  Send all requests for admission clinical reviews, approved or denied determinations and any other requests to fax [phone]  All voicemails are confidential    Initial Clinical Review    Admission: Date/Time/Statement: INPATIENT ADMISSION 10/20/18 2326     Orders Placed This Encounter   Procedures    Inpatient Admission (expected length of stay for this patient is greater than two midnights)     Standing Status:   Standing     Number of Occurrences:   1     Order Specific Question:   Admitting Physician     Answer:   Elijah Hogan [[id number]]     Order Specific Question:   Level of Care     Answer:   Critical Care [15]     Order Specific Question:   Estimated length of stay     Answer:   More than 2 Midnights     Order Specific Question:   Certification     Answer:   I certify that inpatient services are medically necessary for this patient for a duration of greater than two midnights  See H&P and MD Progress Notes for additional information about the patient's course of treatment  ED: Date/Time/Mode of Arrival:   ED Arrival Information     Expected Arrival Acuity Means of Arrival Escorted By Service Admission Type    - 10/20/2018 21:41 Emergent Ambulance [address] Emergency    [address] Doron Barraza          Chief Complaint:   Chief Complaint   Patient presents with    STROKE Alert     per"pt  he was at a birthday and hisn girlfriend noticed a left sided droop on his face and he strted slurring, so EMS was called "       History of Illness:  58 y o  male with past medical history significant for hypertension, hyperlipidemia, and tobacco abuse    The patient was at a party with his wife, he was feeling in his usual state of health, when he used his left hand to  candy  He noted that he was not able to grasp properly, in his wife noted that he had significant right facial drooping  When he went to speak, he was unable to articulate words  He also reported a right-sided headache  Family approximates that this was at approximately 2100  EMS was able to notify the emergency department of a stroke alert,     ED Vital Signs:   ED Triage Vitals   Temperature Pulse Respirations Blood Pressure SpO2   10/20/18 2325 10/20/18 2245 10/20/18 2325 10/20/18 2226 10/20/18 2245   98 2 °F (36 8 °C) 74 20 148/72 97 %      Temp Source Heart Rate Source Patient Position - Orthostatic VS BP Location FiO2 (%)   10/20/18 2325 10/20/18 2325 10/20/18 2325 10/20/18 2226 --   Oral Monitor Lying Right arm       Pain Score       10/21/18 0120       No Pain        Wt Readings from Last 1 Encounters:   10/21/18 90 7 kg (199 lb 15 3 oz)       Vital Signs (abnormal): 10/21/2018   10/21/18 0030  181/97   10/21/18 0028  175/90   10/20/18 2245  171/101         Abnormal Labs/Diagnostic Test Results: 10/20/2018 anion gap 15, calcium ionized 1 1, potassium 5 8, PTT 22,   10/20/2018 CTA neck and brain w contrast:   There is approximately 50% stenosis of the proximal right ICA  2  Verma Luis is approximately 30-40% stenosis of the proximal left ICA  3   The basilar artery is diminutive throughout its course, however, is opacified  4  Verma Luis is an approximately 1 5 cm nodule within the right lobe of the thyroid   Nonemergent outpatient thyroid ultrasound is recommended for further evaluation  5   There is fluid in the visualized thoracic esophagus suggesting gastroesophageal reflux    10/21/2018 glucose 155, triglycerides 216, HDL 35, HGBA1c 6 6,   EKG:ECG rate assessment: normal    Rhythm:     Rhythm: sinus rhythm    Ectopy:     Ectopy: none    QRS:     QRS axis:  Normal    QRS intervals:  Normal  Conduction:     Conduction: normal    ST segments:   ST segments:  Normal  T waves:     T waves: inverted      Inverted:  III  10/21/2018 MRI brain wo contrast 1   Moderate amount of multifocal cortical infarctions involving distinct and separate foci in the right middle cerebral territory  2   Small amount of petechial hemorrhage in the insular cortex in the region of recent infarction   10/22/2018 CTA brain wo contrast:  1   Sequela of embolic infarction including interval evolution and demarcation of right insular infarction   No midline shift  2   Small infarctions within the right frontal lobe and right temporal occipital junction are better evaluated on prior MRI  3   Areas of petechial hemorrhage are better detected on prior MRI       ED Treatment:   Medication Administration from 10/20/2018 2140 to 10/21/2018 0039       Date/Time Order Dose Route Action Action by Comments     10/20/2018 2226 labetalol (NORMODYNE) injection 10 mg 10 mg Intravenous Given Izzy Miller RN      10/20/2018 2309 alteplase (ACTIVASE) bolus 8 5 mg 8 5 mg Intravenous Given Shannon Watkins RN      10/20/2018 2309 alteplase (ACTIVASE) infusion 76 5 mg 76 5 mg Intravenous Given Giselle Tsang RN           Past Medical/Surgical History: Active Ambulatory Problems     Diagnosis Date Noted    No Active Ambulatory Problems       Past Medical History:   Diagnosis Date    Hypertension     Migraine        Admitting Diagnosis: CVA (cerebral vascular accident) ([facility] [address] ) [I63 9]  Stroke (cerebrum) ([facility]) [I63 9]  Dysarthria [R47 1]  Left-sided weakness [R53 1]    Age/Sex: 58 y o  male    Assessment/Plan: 58 y o  Male past medical history significant for hypertension, hyperlipidemia, and tobacco abuse  At a party with his wife when he began experiencing symptoms of difficulty speaking ; unable to grasp proprly with right facial drooping with right sided headache   Stroke alert, continue with stroke protocol; -neuro checks Q 15 min for next 2 hrs followed by Q 30 min for 6 hrs then hourly for the next 16 hrs  For new headache-stat CT head  MRi tomorrow  Start antiplatelet therapy after 24 hrs post patient tPA if imaging negative  Continue BP medication  Goal BP<180/105; ECHO, lipid profile  Maintain SpO2 >90%  Nursing dysphagia assessment for dysarthria  Strict I/O  NPO except small sips if prolonged NPO-gentle IV hydration  Left hemiplegia-PT/OT; accuchecks & sliding scale insulin, goal BS (140-160)  HGB&HCT&platelets for bleeding post tPA      Admission Orders:  Scheduled Meds:   Current Facility-Administered Medications:  atorvastatin 80 mg Oral QPM   chlorhexidine 15 mL Swish & Spit Q12H [address]   labetalol 10 mg Intravenous Q6H PRN   LORazepam 0 5 mg Intravenous Once   nicotine 14 mg Transdermal Daily   ondansetron 4 mg Intravenous Q6H PRN     Continuous Infusions:    PRN Meds:   48 hr telemetry  CAM(ICU) assessment  Cardio-pulmonary monitoring  Baseline strole alert  Neuro checks vital signs per note (stat Q 15 min etc-see above)  OT/PT SPEECH eval & treat  Inpt to Nutrition, Physical medicine rehab  scd  Diet dysphagia/modified consistency after nursing dysphagia assessment  Fall precautions  Up w assist

## 2018-10-22 NOTE — SPEECH THERAPY NOTE
Speech Language/Pathology    Speech/Language Pathology Progress Note    Patient Name: Mikayla SANCHES Date: 10/22/2018       Subjective:  Pt seen sitting upright in a chair for follow up dysphagia tx  Pt was initially sleepy, but able to participate in tx  Many family members were present for support  Per nsg, pt with decreased PO intake today due to limited dietary choices  Objective:  Pt trialed toast with jelly, NTL via cup, and thins via cup and straw  Pt with occasional decreased bolus containment of toast with material hanging outside L corner of lips  Significantly prolonged mastication and decreased bolus formation with mild-mod L sided oral residue and occasional L sided pocketing in the buccal cavity was also seen with larger back to back bites of toast  Pt was able to implement a finger sweep to clear pocketed material and once instructed pt was also able to carry over an independent use of a liquid wash, which cleared oral residue  Adequate bolus retrieval, oral control, and posterior transfer with timely swallow initiation noted with NTL via cup and thins via cup and straw  No overt s/s of aspiration noted throughout tx session  Pt was noted to take larger sips via cup than straw but still displayed no overt s/s of aspiration  Assessment:  Pt presents with increased control and coordination of liquids- pt tolerated trials of thin liquids with no overt s/s of aspiration noted  Pt continues to present with mild-mod oral dysphagia characterized by increased precessing and pocketing/oral residue  Will continue strategy training with pt to clear L buccal pocketing and oral residue      Plan/Recommendations:  - Rx continue dysphagia 2 with upgrade to thin liquids with small single sips  - Pt sitting completely upright for all meals  - Alternate solids/liquids  - Continue finger sweep and liquid wash

## 2018-10-23 ENCOUNTER — APPOINTMENT (INPATIENT)
Dept: ULTRASOUND IMAGING | Facility: HOSPITAL | Age: 63
DRG: 062 | End: 2018-10-23
Payer: COMMERCIAL

## 2018-10-23 ENCOUNTER — APPOINTMENT (INPATIENT)
Dept: RADIOLOGY | Facility: HOSPITAL | Age: 63
DRG: 062 | End: 2018-10-23
Payer: COMMERCIAL

## 2018-10-23 PROBLEM — M79.606 LEG PAIN: Status: ACTIVE | Noted: 2018-10-23

## 2018-10-23 LAB
GLUCOSE SERPL-MCNC: 81 MG/DL (ref 65–140)
GLUCOSE SERPL-MCNC: 89 MG/DL (ref 65–140)

## 2018-10-23 PROCEDURE — 73610 X-RAY EXAM OF ANKLE: CPT

## 2018-10-23 PROCEDURE — 99232 SBSQ HOSP IP/OBS MODERATE 35: CPT | Performed by: PSYCHIATRY & NEUROLOGY

## 2018-10-23 PROCEDURE — 97535 SELF CARE MNGMENT TRAINING: CPT

## 2018-10-23 PROCEDURE — 92526 ORAL FUNCTION THERAPY: CPT

## 2018-10-23 PROCEDURE — 82948 REAGENT STRIP/BLOOD GLUCOSE: CPT

## 2018-10-23 PROCEDURE — 93880 EXTRACRANIAL BILAT STUDY: CPT | Performed by: SURGERY

## 2018-10-23 PROCEDURE — 93971 EXTREMITY STUDY: CPT | Performed by: SURGERY

## 2018-10-23 PROCEDURE — 97530 THERAPEUTIC ACTIVITIES: CPT

## 2018-10-23 PROCEDURE — 93880 EXTRACRANIAL BILAT STUDY: CPT

## 2018-10-23 PROCEDURE — 99232 SBSQ HOSP IP/OBS MODERATE 35: CPT | Performed by: INTERNAL MEDICINE

## 2018-10-23 PROCEDURE — 93971 EXTREMITY STUDY: CPT

## 2018-10-23 PROCEDURE — 73630 X-RAY EXAM OF FOOT: CPT

## 2018-10-23 RX ORDER — ASPIRIN 81 MG/1
81 TABLET ORAL DAILY
Status: DISCONTINUED | OUTPATIENT
Start: 2018-10-23 | End: 2018-10-25 | Stop reason: HOSPADM

## 2018-10-23 RX ORDER — ACETAMINOPHEN 325 MG/1
650 TABLET ORAL EVERY 6 HOURS PRN
Status: DISCONTINUED | OUTPATIENT
Start: 2018-10-23 | End: 2018-10-25 | Stop reason: HOSPADM

## 2018-10-23 RX ORDER — OXYCODONE HYDROCHLORIDE 5 MG/1
5 TABLET ORAL EVERY 4 HOURS PRN
Status: DISCONTINUED | OUTPATIENT
Start: 2018-10-23 | End: 2018-10-25 | Stop reason: HOSPADM

## 2018-10-23 RX ADMIN — ACETAMINOPHEN 650 MG: 325 TABLET ORAL at 08:49

## 2018-10-23 RX ADMIN — NICOTINE 14 MG: 14 PATCH TRANSDERMAL at 08:31

## 2018-10-23 RX ADMIN — ASPIRIN 81 MG: 81 TABLET, COATED ORAL at 16:03

## 2018-10-23 RX ADMIN — OXYCODONE HYDROCHLORIDE 5 MG: 5 TABLET ORAL at 14:05

## 2018-10-23 RX ADMIN — DESMOPRESSIN ACETATE 80 MG: 0.2 TABLET ORAL at 17:55

## 2018-10-23 RX ADMIN — CHLORHEXIDINE GLUCONATE 0.12% ORAL RINSE 15 ML: 1.2 LIQUID ORAL at 21:12

## 2018-10-23 NOTE — PLAN OF CARE
Problem: DISCHARGE PLANNING  Goal: Discharge to home or other facility with appropriate resources  INTERVENTIONS:  - Identify barriers to discharge w/patient and caregiver  - Arrange for needed discharge resources and transportation as appropriate  - Identify discharge learning needs (meds, wound care, etc )  - Arrange for interpretive services to assist at discharge as needed  - Refer to Case Management Department for coordinating discharge planning if the patient needs post-hospital services based on physician/advanced practitioner order or complex needs related to functional status, cognitive ability, or social support system   Outcome: Marcin Hernandez has been received from pt's insurance  Cm will wait to arrange transport for pt as Cardiology has ordered VAS carotid test to be done prior to DC  Cm will follow up in the morning to determine whether pt is able to be DC'd

## 2018-10-23 NOTE — PROGRESS NOTES
Progress Note - Neurology   Felipe Márquez 58 y o  male MRN: [id number]  Unit/Bed#: -01 Encounter: [id number]    Assessment/Plan:   3 58year old male with R MCA territory infarction   - Continue on stroke pathway  - Concern for R carotid stenosis as possible etiology  - Will likely start ASA 81 mg QD today  - Echocardiogram reviewed, EF 60%, no regional wall motion abnormalities  - Hemoglobin A1c reviewed, 6 6    - Will start Fluoxetine 20 mg post-stroke day 5 for motor function recovery and also may help with mood  - Stroke education     - PT/OT/Speech therapy  - Monitor exam and notify with changes  - Patient will need eventual outpatient stroke neurology follow-up  Office has been contacted to arrange appointment  2  Tobacco abuse    - Encourage tobacco cessation  Discussed this at bedside with patient and his daughter and he notes he is eager to quit and does not even think about wanting to smoke at this point  3  HLD    - Fasting lipid panel reviewed, total cholesterol 161, triglycerides 216, HDL 35, LDL 83     - Continue on atorvastatin 80 mg QPM      4  HTN    - Goal normotension  5  L ankle/foot pain    - Patient complains of L foot and ankle pain and also noted L ankle ecchymosis  - Denies fall or trauma but does state he thinks he hit his foot on the bottom of the bed  - Will check foot/ankle x-ray  Subjective:   Felipe Márquez is a 58year old male with PMH of HTN, HLD, migraine, tobacco abuse who presented to the hospital with abrupt onset of dysarthria, left facial droop and LUE weakness  IV tPA was given  Repeat imaging demonstrated R MCA territory ischemia with petechial hemorrhage  He complains today of L foot and ankle pain  He also notes that he is having mood changes        ROS:  History obtained from the patient  General ROS: negative for - chills, fatigue or fever  Respiratory ROS: negative for - shortness of breath  Cardiovascular ROS: negative for - chest pain  Gastrointestinal ROS: negative for - abdominal pain or nausea/vomiting  Musculoskeletal ROS: positive for - L ankle/foot pain  Neurological ROS: positive for - headaches and weakness  negative for - dizziness, numbness/tingling, speech problems or visual changes    Medications  Scheduled Meds:  Current Facility-Administered Medications:  atorvastatin 80 mg Oral QPM Suyapa Oviedo PA-C   chlorhexidine 15 mL Swish & Spit Q12H [address] PATEL Carlton   LORazepam 0 5 mg Intravenous Once Kalia Cunningham PA-C   nicotine 14 mg Transdermal Daily PATEL Lange   ondansetron 4 mg Intravenous Q6H PRN Suyapa Oviedo PA-C     Continuous Infusions:   PRN Meds: ondansetron    Vitals: Blood pressure 160/81, pulse 63, temperature 98 4 °F (36 9 °C), temperature source Oral, resp  rate 20, height 5' 9" (1 753 m), weight 90 7 kg (199 lb 15 3 oz), SpO2 96 %  ,Body mass index is 29 53 kg/m²  Physical Exam: /81 (BP Location: Right arm)   Pulse 63   Temp 98 4 °F (36 9 °C) (Oral)   Resp 20   Ht 5' 9" (1 753 m)   Wt 90 7 kg (199 lb 15 3 oz)   SpO2 96%   BMI 29 53 kg/m²   General appearance: alert and oriented, in no acute distress  Head: Normocephalic, without obvious abnormality, atraumatic  Eyes: negative findings: lids and lashes normal, conjunctivae and sclerae normal, pupils equal, round, reactive to light and accomodation and visual fields full to confrontation  Lungs: clear to auscultation bilaterally  Heart: regular rate and rhythm  Abdomen: soft, non-tender; bowel sounds normal; no masses,  no organomegaly  Extremities: No edema noted, L medial ankle ecchymosis noted  Skin: Skin color, texture, turgor normal  No rashes or lesions  Neurologic: Mental status: Alert, oriented, thought content appropriate, speech mildly dysarthric     Cranial nerves: II: visual field normal, II: pupils equal, round, reactive to light and accommodation, III,VII: ptosis no ptosis noted, III,IV,VI: extraocular muscles extra-ocular motions intact, V: facial light touch sensation normal bilaterally, VII: upper facial muscle function abnormal bilaterally, VII: lower facial muscle function abnormal on the left, XII: tongue strength normal  Sensory: normal, Grossly intact to crude touch  Motor: Motor strength 5/5 RUE and RLE, LUE slightly able to abduct L arm, LLE 4/5, pain limited distally secondary to ankle/foot pain  Coordination: finger to nose normal on the right, unable to assess L secondary to weakness  Labs  Recent Results (from the past 24 hour(s))   Fingerstick Glucose (POCT)    Collection Time: 10/22/18 11:42 AM   Result Value Ref Range    POC Glucose 167 (H) 65 - 140 mg/dl   Fingerstick Glucose (POCT)    Collection Time: 10/22/18  5:58 PM   Result Value Ref Range    POC Glucose 165 (H) 65 - 140 mg/dl   Fingerstick Glucose (POCT)    Collection Time: 10/22/18 11:46 PM   Result Value Ref Range    POC Glucose 127 65 - 140 mg/dl   Fingerstick Glucose (POCT)    Collection Time: 10/23/18  6:13 AM   Result Value Ref Range    POC Glucose 89 65 - 140 mg/dl     Imaging   No new neuro imaging available for review  VTE Prophylaxis: Reason for no pharmacologic prophylaxis Recent tPA and petechial hemorrhage  Counseling / Coordination of Care  Total time spent today 26 minutes  Greater than 50% of total time was spent with the patient and / or family counseling and / or coordination of care  A description of the counseling / coordination of care: Discussed plan of care with patient and his daughter at bedside  Will continue with therapies today  Plan to check L ankle/foot x-ray due to pain and ecchymosis (no history of fall but unclear if he his foot in his sleep)  Will plan to initiate Fluoxetine 20 mg post-stroke day 5 for motor recovery (FLAME trial)  This may also help with mood as patient notes that he is having significant mood changes post-stroke  Patient and his daughter expressed understanding

## 2018-10-23 NOTE — SPEECH THERAPY NOTE
Speech Language/Pathology    Speech/Language Pathology Progress Note    Patient Name: Alissa SAVAGE Date: 10/23/2018      Subjective:  Pt seen for follow up dysphagia tx sitting upright in a chair  Several family members at bedside  Objective:  Pt seen with lunch meal of andrews pasta with chicken gravy and thin liquids via straw  Pt with increased oral processing time, but with adequate mastication, posterior transfer and swallow initiation with pasta  Pt did have min-mild L sided pocketing and L sided oral residue, but pt was able to independently implement liquid wash and finger sweep when appropriate to remove material  Pt reported he has more sensation/awareness on L side and can feel the material get stuck  Pt did have an occasional immediate cough with thin liquids  Coughing was noted to happen when pt was attempting to eat and talk at the same time or when pt took larger bites of pasta and attempted to clear larger amounts of residue/pocketing with the liquid wash  ST reviewed safety strategies with pt and family  Pt demonstrated understanding  No other overt s/s of aspriation noted during tx session  Assessment:  Pt continues to present with oral dysphagia secondary to increased oral processing time with L sided pocketing/oral residue  Oral symptoms due appear to be improving due to decreased amounts of pocketing  Pt reports he is beginning to have more sensation/awareness when material is pocketed       Plan/Recommendations:  -Continue current diet dysphagia 2-mechanical/thins  -Will continue to follow for diet tolerance/possible diet upgrade as appropriate  -Will continue to reinforce the following swallowing/safety strategies:   Finger/tongue sweep for pocketed material   Alternate liquids/solids to clear oral residue   Small bites of solid textures   Slow rate of intake (family reports pt eats fast when ST not present)   Quiet environment during meals- no talking while food is in mouth

## 2018-10-23 NOTE — PLAN OF CARE
Activity Intolerance/Impaired Mobility     Mobility/activity is maintained at optimum level for patient Progressing        Communication Impairment     Ability to express needs and understand communication [address] Discharge to home or other facility with appropriate resources Progressing        Knowledge Deficit     Patient/family/caregiver demonstrates understanding of disease process, treatment plan, medications, and discharge instructions Progressing        Neurological Deficit     Neurological status is stable or improving Progressing        Nutrition     Nutrition/Hydration status is improving Progressing        Potential for Aspiration     Non-ventilated patient's risk of aspiration is minimized Progressing     Ventilated patient's risk of aspiration is minimized Progressing        Potential for Falls     Patient will remain free of falls Progressing        Prexisting or High Potential for Compromised Skin Integrity     Skin integrity is maintained or improved Progressing

## 2018-10-23 NOTE — PROGRESS NOTES
Progress Note Dequan Model 1955, 58 y o  male MRN: [id number]    Unit/Bed#: -01 Encounter: [id number]    Primary Care Provider: Daily Jackson MD   Date and time admitted to hospital: 10/20/2018  9:41 PM        Leg pain   Assessment & Plan    Venous doppler  X-ray   Will follow up  Oxycodone for now  Hypertension   Assessment & Plan    BP is 156/93  Will continue with current meds      * Right sided cerebral hemisphere cerebrovascular accident (CVA) [facility])   Assessment & Plan    Asa to be restarted when neuro cleared  Also need to clarify regarding chem prop for DVT discussed with neurology and they will advise after they review chart and see patient  Likely DC to arc tomorrow  VTE Pharmacologic Prophylaxis:   Pharmacologic: Heparin  Mechanical VTE Prophylaxis in Place: Yes    Patient Centered Rounds: I have performed bedside rounds with nursing staff today  Discussions with Specialists or Other Care Team Provider: Discussed with Neurology regarding chem prop for DvT and asa  Education and Discussions with Family / Patient: Discussed with patient and his wife and brother  Time Spent for Care: 30 minutes  More than 50% of total time spent on counseling and coordination of care as described above  Current Length of Stay: 3 day(s)    Current Patient Status: Inpatient   Certification Statement: The patient will continue to require additional inpatient hospital stay due to work up of limb pain  Discharge Plan: Not medically stable for DC  Code Status: Level 1 - Full Code      Subjective:   Patient seen and examined      " I feel better"  C/o right leg pain  Objective:     Vitals:   Temp (24hrs), Av 4 °F (36 9 °C), Min:98 2 °F (36 8 °C), Max:98 6 °F (37 °C)    Temp:  [98 2 °F (36 8 °C)-98 6 °F (37 °C)] 98 2 °F (36 8 °C)  HR:  [58-98] 81  Resp:  [13-20] 16  BP: (122-160)/(67-93) 156/93  SpO2:  [96 %-99 %] 96 %  Body mass index is 29 53 kg/m²       Input and Output Summary (last 24 hours): Intake/Output Summary (Last 24 hours) at 10/23/18 1546  Last data filed at 10/23/18 1531   Gross per 24 hour   Intake              360 ml   Output              480 ml   Net             -120 ml       Physical Exam:     Physical Exam   Constitutional: He is oriented to person, place, and time  He appears well-developed  No distress  HENT:   Head: Normocephalic and atraumatic  Mouth/Throat: No oropharyngeal exudate  Eyes: Right eye exhibits no discharge  Left eye exhibits no discharge  No scleral icterus  Neck: No JVD present  No tracheal deviation present  No thyromegaly present  Cardiovascular: Normal rate  Exam reveals no gallop and no friction rub  No murmur heard  Pulmonary/Chest: Effort normal  No respiratory distress  He has no wheezes  He has no rales  He exhibits no tenderness  Abdominal: Soft  He exhibits no distension and no mass  There is no tenderness  There is no rebound and no guarding  Musculoskeletal: He exhibits no edema, tenderness or deformity  Lymphadenopathy:     He has no cervical adenopathy  Neurological: He is alert and oriented to person, place, and time  He displays abnormal reflex  A cranial nerve deficit is present  Coordination abnormal    Skin: Skin is warm  No rash noted  He is not diaphoretic  No erythema  No pallor  Psychiatric: He has a normal mood and affect           Additional Data:     Labs:      Results from last 7 days  Lab Units 10/22/18  0436   WBC Thousand/uL 9 85   HEMOGLOBIN g/dL 14 1   HEMATOCRIT % 42 7   PLATELETS Thousands/uL 225   NEUTROS PCT % 63   LYMPHS PCT % 27   MONOS PCT % 8   EOS PCT % 2       Results from last 7 days  Lab Units 10/22/18  0436 10/21/18  0438  10/20/18  2200   SODIUM mmol/L 140 139  < >  --    POTASSIUM mmol/L 3 7 3 6  < >  --    CHLORIDE mmol/L 104 104  < >  --    CO2 mmol/L 28 26  < >  --    BUN mg/dL 11 18  < >  --    CREATININE mg/dL 0 96 0 97  < >  --    ANION GAP ISTAT mmol/L --   --   --  15*   ANION GAP mmol/L 8 9  < >  --    CALCIUM mg/dL 9 0 9 0  < >  --    ALBUMIN g/dL  --  3 7  --   --    TOTAL BILIRUBIN mg/dL  --  0 40  --   --    ALK PHOS U/L  --  47  --   --    ALT U/L  --  34  --   --    AST U/L  --  20  --   --    GLUCOSE, ISTAT mg/dl  --   --   --  132   < > = values in this interval not displayed  Results from last 7 days  Lab Units 10/21/18  0438   INR  0 99       Results from last 7 days  Lab Units 10/23/18  0613 10/22/18  2346 10/22/18  1758 10/22/18  1142 10/22/18  0012 10/21/18  1840 10/21/18  1200   POC GLUCOSE mg/dl 89 127 165* 167* 108 118 108       Results from last 7 days  Lab Units 10/21/18  0438   HEMOGLOBIN A1C % 6 6*               * I Have Reviewed All Lab Data Listed Above  * Additional Pertinent Lab Tests Reviewed: Maribel 65 Admission Reviewed    Imaging:    Imaging Reports Reviewed Today Include:   X-ray ankle -   "  No acute osseous abnormality "  Imaging Personally Reviewed by Myself Includes:  As above  Recent Cultures (last 7 days):           Last 24 Hours Medication List:     Current Facility-Administered Medications:  acetaminophen 650 mg Oral Q6H PRN Lendel Caulk, PA-C   aspirin 81 mg Oral Daily Lendel Caulk, PA-C   atorvastatin 80 mg Oral QPM Fantasma Olive, PA-C   chlorhexidine 15 mL Swish & Spit Q12H [address] PATEL Carlton   LORazepam 0 5 mg Intravenous Once Lajuan Mcardle, PA-C   nicotine 14 mg Transdermal Daily PATEL Dupont   ondansetron 4 mg Intravenous Q6H PRN Fantasma Segal PA-C   oxyCODONE 5 mg Oral Q4H PRN Meseret Mccormick MD        Today, Patient Was Seen By: Meseret Mccormick MD    ** Please Note: Dictation voice to text software may have been used in the creation of this document   **

## 2018-10-23 NOTE — OCCUPATIONAL THERAPY NOTE
OccupationalTherapy Progress Note     Patient Name: Yaneth Varghese  QANUP'N Date: 10/23/2018  Problem List  Patient Active Problem List   Diagnosis    Right sided cerebral hemisphere [facility]vascular accident [facility]A) ([facility] )    Left hemiplegia ([facility] )    Dysarthria    Hypertension    Tobacco abuse    Anxiety    Hyperlipidemia    Leg pain      10/23/18 1542   Restrictions/Precautions   Weight Bearing Precautions Per Order No   Other Precautions Limb alert;Telemetry; Fall Risk;Pain;Aspiration   General   Response to Previous Treatment Patient with no complaints from previous session   Family/Caregiver Present Several visitors / family members present   Pain Assessment   Pain Assessment 0-10  (no pain resting in bed)   Pain Type Acute pain   Pain Location Ankle   Pain Orientation Left   Pain Descriptors Other (Comment)  (pain to light touch and w/ weight bearing)   Hospital Pain Intervention(s) Repositioned; Ambulation/increased activity; Emotional support  (RN< Kathia provided medication prior to session)   Response to Interventions tolerated   ADL   Grooming Assistance 5  Supervision/Setup   Grooming Deficit Setup;Supervision/safety; Increased time to complete   Grooming Comments to doff / don glasses seated at EOB   LB Dressing Assistance 2  Maximal Assistance   LB Dressing Deficit Don/doff R sock; Don/doff L sock; Setup;Steadying;Supervision/safety; Increased time to complete   Bed Mobility   Supine to Sit 3  Moderate assistance   Additional items Assist x 1;HOB elevated; Increased time required;LE management;Verbal cues; Bedrails   Sit to Supine 3  Moderate assistance   Additional items Assist x 1; Increased time required;Verbal cues;LE management; Bedrails   Additional Comments Pt able to scoot HOB to pt's R w/ mod A x1 using bedrail on R  Pt returned to bed at end of session w/ call bell in reach and family present;  Needs met   Transfers   Sit to Stand Unable to assess  (due to pain in L ankle)   Therapeutic Exercise - ROM UE-ROM Yes   ROM - Left Upper Extremities    L Shoulder AAROM; Flexion   L Elbow Elbow flexion;Elbow extension;AAROM;PROM   L Wrist Wrist flexion;Wrist extension;AAROM;PROM   L Hand PROM   L Position Seated   Equipment Other (Comment)  (washcloth)   LUE ROM Comment seated at EOB  Pt engaged in L UE AAROM while seated at EOB using tray table and R UE   Exercise Tools   Exercise Tools (washcloth, stress ball)   Neuromuscular Education   Weight Bearing Technique Yes   Response to Weight Bearing Technique L UE weight bearing through forearm seated at EOB; max facilitation at elbow, wrist / hand    Cognition   Overall Cognitive Status WFL  (impulsive at times)   Arousal/Participation Alert; Cooperative   Attention Attends with cues to redirect   Orientation Level Oriented X4   Memory Decreased recall of precautions   Following Commands Follows one step commands with increased time or repetition   Comments Identified pt by full name and wrist band   Activity Tolerance   Activity Tolerance Patient limited by fatigue;Patient limited by pain   Medical Staff Made Aware spoke to RNKinza   Assessment   Assessment Pt seen for OT tx session this afternoon focusing on challenging sitting tolerance/ balance unsupported at EOB and L UE ROM / weight bearing  Pt required less physical assistance for bed mobiltiy and improved sitting balance at EOB this afternoon  Continue to recomment post acute rehab when medically stable for discharge from acute care  WIll continue to follow   Plan   Treatment Interventions ADL retraining;Functional transfer training;UE strengthening/ROM; Endurance training;Patient/family training;Equipment evaluation/education; Compensatory technique education;Continued evaluation; Activityengagement   Goal Expiration Date 11/01/18   Treatment Day 1   OT Frequency 3-5x/wk   Recommendation   Recommendation Physiatry Consult; Other (comment)  (neuropsych)   OT Discharge Recommendation Other (Comment)  (to post acute rehab)   Equipment Recommended Bedside commode;Tub seat with back   OT - OK to Discharge (when medically stable)   Barthel Index   Feeding 5   Bathing 0   Grooming Score 0   Dressing Score 5   Bladder Score 10   Bowels Score 10   Toilet Use Score 5   Transfers (Bed/Chair) Score 5   Mobility (Level Surface) Score 0   Stairs Score 0   Barthel Index Score 40   Modified Newberry Scale   Modified Newberry Scale 4   Nandini Daniel, OTR/L

## 2018-10-23 NOTE — PLAN OF CARE
Activity Intolerance/Impaired Mobility     Mobility/activity is maintained at optimum level for patient Progressing        Communication Impairment     Ability to express needs and understand communication [address] Discharge to home or other facility with appropriate resources Progressing        Knowledge Deficit     Patient/family/caregiver demonstrates understanding of disease process, treatment plan, medications, and discharge instructions Progressing        Neurological Deficit     Neurological status is stable or improving Progressing        Nutrition     Nutrition/Hydration status is improving Progressing        Potential for Aspiration     Non-ventilated patient's risk of aspiration is minimized Progressing     Ventilated patient's risk of aspiration is minimized Progressing        Potential for Falls     Patient will remain free of falls Progressing        Prexisting or High Potential for Compromised Skin Integrity     Skin integrity is maintained or improved Progressing

## 2018-10-23 NOTE — SOCIAL WORK
Brian Smith has been received from [facility]  Cm will wait to arrange transport for pt as Cardiology has ordered VAS carotid test to be done prior to DC  Cm will follow up in the morning to determine whether pt is able to be DC'd

## 2018-10-23 NOTE — PLAN OF CARE
Problem: OCCUPATIONAL THERAPY ADULT  Goal: Performs self-care activities at highest level of function for planned discharge setting  See evaluation for individualized goals  Treatment Interventions: ADL retraining, Functional transfer training, UE strengthening/ROM, Patient/family training, Equipment evaluation/education, Neuromuscular reeducation, Continued evaluation, Activityengagement, Compensatory technique education, Fine motor coordination activities  Equipment Recommended: Bedside commode, Tub seat with back (will continue to assess at rehab)       See flowsheet documentation for full assessment, interventions and recommendations  Outcome: Progressing  Limitation: Decreased ADL status, Decreased UE strength, Decreased UE ROM, Decreased Safe judgement during ADL, Decreased endurance, Decreased fine motor control, Decreased self-care trans, Decreased high-level ADLs     Assessment: Pt seen for OT tx session this afternoon focusing on challenging sitting tolerance/ balance unsupported at EOB and L UE ROM / weight bearing  Pt required less physical assistance for bed mobiltiy and improved sitting balance at EOB this afternoon  Continue to recomment post acute rehab when medically stable for discharge from acute care   WIll continue to follow  Recommendation: Physiatry Consult, Other (comment) (neuropsych)  OT Discharge Recommendation: Other (Comment) (to post acute rehab)  OT - OK to Discharge:  (when medically stable)

## 2018-10-23 NOTE — ASSESSMENT & PLAN NOTE
Asa to be restarted when neuro cleared  Also need to clarify regarding chem prop for DVT discussed with neurology and they will advise after they review chart and see patient  Likely DC to [facility] tomorrow

## 2018-10-23 NOTE — PLAN OF CARE
Problem: DISCHARGE PLANNING  Goal: Discharge to home or other facility with appropriate resources  INTERVENTIONS:  - Identify barriers to discharge w/patient and caregiver  - Arrange for needed discharge resources and transportation as appropriate  - Identify discharge learning needs (meds, wound care, etc )  - Arrange for interpretive services to assist at discharge as needed  - Refer to Case Management Department for coordinating discharge planning if the patient needs post-hospital services based on physician/advanced practitioner order or complex needs related to functional status, cognitive ability, or social support system   Outcome: Progressing  LOS 3  Not a bundle; Not a readmission  Cm received call from [facility] who stated pt is accepted  Admissions office is submitting for auth as pt is a potential DC for tomorrow  Cm updated pt and family  Cm will follow

## 2018-10-23 NOTE — SOCIAL WORK
LOS 3   Not a bundle; Not a readmission  Cm received call from [facility] who stated pt is accepted  Admissions office is submitting for auth as pt is a potential DC for tomorrow  Cm updated pt and family  Cm will follow

## 2018-10-24 LAB
ANION GAP SERPL CALCULATED.3IONS-SCNC: 11 MMOL/L (ref 4–13)
BASOPHILS # BLD AUTO: 0.04 THOUSANDS/ΜL (ref 0–0.1)
BASOPHILS NFR BLD AUTO: 0 % (ref 0–1)
BUN SERPL-MCNC: 12 MG/DL (ref 5–25)
CALCIUM SERPL-MCNC: 9.2 MG/DL (ref 8.3–10.1)
CHLORIDE SERPL-SCNC: 104 MMOL/L (ref 100–108)
CO2 SERPL-SCNC: 26 MMOL/L (ref 21–32)
CREAT SERPL-MCNC: 0.99 MG/DL (ref 0.6–1.3)
EOSINOPHIL # BLD AUTO: 0.27 THOUSAND/ΜL (ref 0–0.61)
EOSINOPHIL NFR BLD AUTO: 3 % (ref 0–6)
ERYTHROCYTE [DISTWIDTH] IN BLOOD BY AUTOMATED COUNT: 14.6 % (ref 11.6–15.1)
GFR SERPL CREATININE-BSD FRML MDRD: 80 ML/MIN/1.73SQ M
GLUCOSE SERPL-MCNC: 109 MG/DL (ref 65–140)
GLUCOSE SERPL-MCNC: 110 MG/DL (ref 65–140)
GLUCOSE SERPL-MCNC: 88 MG/DL (ref 65–140)
GLUCOSE SERPL-MCNC: 98 MG/DL (ref 65–140)
HCT VFR BLD AUTO: 41.8 % (ref 36.5–49.3)
HGB BLD-MCNC: 13.9 G/DL (ref 12–17)
IMM GRANULOCYTES # BLD AUTO: 0.03 THOUSAND/UL (ref 0–0.2)
IMM GRANULOCYTES NFR BLD AUTO: 0 % (ref 0–2)
LYMPHOCYTES # BLD AUTO: 2.41 THOUSANDS/ΜL (ref 0.6–4.47)
LYMPHOCYTES NFR BLD AUTO: 24 % (ref 14–44)
MCH RBC QN AUTO: 29.3 PG (ref 26.8–34.3)
MCHC RBC AUTO-ENTMCNC: 33.3 G/DL (ref 31.4–37.4)
MCV RBC AUTO: 88 FL (ref 82–98)
MONOCYTES # BLD AUTO: 0.95 THOUSAND/ΜL (ref 0.17–1.22)
MONOCYTES NFR BLD AUTO: 10 % (ref 4–12)
NEUTROPHILS # BLD AUTO: 6.26 THOUSANDS/ΜL (ref 1.85–7.62)
NEUTS SEG NFR BLD AUTO: 63 % (ref 43–75)
NRBC BLD AUTO-RTO: 0 /100 WBCS
PLATELET # BLD AUTO: 240 THOUSANDS/UL (ref 149–390)
PMV BLD AUTO: 9.8 FL (ref 8.9–12.7)
POTASSIUM SERPL-SCNC: 3.7 MMOL/L (ref 3.5–5.3)
RBC # BLD AUTO: 4.74 MILLION/UL (ref 3.88–5.62)
SODIUM SERPL-SCNC: 141 MMOL/L (ref 136–145)
WBC # BLD AUTO: 9.96 THOUSAND/UL (ref 4.31–10.16)

## 2018-10-24 PROCEDURE — 99232 SBSQ HOSP IP/OBS MODERATE 35: CPT | Performed by: INTERNAL MEDICINE

## 2018-10-24 PROCEDURE — 82948 REAGENT STRIP/BLOOD GLUCOSE: CPT

## 2018-10-24 PROCEDURE — 85025 COMPLETE CBC W/AUTO DIFF WBC: CPT | Performed by: INTERNAL MEDICINE

## 2018-10-24 PROCEDURE — 97535 SELF CARE MNGMENT TRAINING: CPT

## 2018-10-24 PROCEDURE — 99232 SBSQ HOSP IP/OBS MODERATE 35: CPT | Performed by: PSYCHIATRY & NEUROLOGY

## 2018-10-24 PROCEDURE — 97530 THERAPEUTIC ACTIVITIES: CPT

## 2018-10-24 PROCEDURE — 80048 BASIC METABOLIC PNL TOTAL CA: CPT | Performed by: INTERNAL MEDICINE

## 2018-10-24 RX ORDER — CLOPIDOGREL BISULFATE 75 MG/1
300 TABLET ORAL ONCE
Status: COMPLETED | OUTPATIENT
Start: 2018-10-24 | End: 2018-10-24

## 2018-10-24 RX ADMIN — CHLORHEXIDINE GLUCONATE 0.12% ORAL RINSE 15 ML: 1.2 LIQUID ORAL at 21:43

## 2018-10-24 RX ADMIN — DESMOPRESSIN ACETATE 80 MG: 0.2 TABLET ORAL at 17:08

## 2018-10-24 RX ADMIN — NICOTINE 14 MG: 14 PATCH TRANSDERMAL at 08:19

## 2018-10-24 RX ADMIN — CHLORHEXIDINE GLUCONATE 0.12% ORAL RINSE 15 ML: 1.2 LIQUID ORAL at 08:18

## 2018-10-24 RX ADMIN — ASPIRIN 81 MG: 81 TABLET, COATED ORAL at 08:20

## 2018-10-24 RX ADMIN — CLOPIDOGREL 300 MG: 75 TABLET, FILM COATED ORAL at 17:08

## 2018-10-24 RX ADMIN — ENOXAPARIN SODIUM 40 MG: 40 INJECTION SUBCUTANEOUS at 08:19

## 2018-10-24 RX ADMIN — OXYCODONE HYDROCHLORIDE 5 MG: 5 TABLET ORAL at 21:24

## 2018-10-24 RX ADMIN — ACETAMINOPHEN 650 MG: 325 TABLET ORAL at 01:39

## 2018-10-24 NOTE — PLAN OF CARE
Activity Intolerance/Impaired Mobility     Mobility/activity is maintained at optimum level for patient Progressing        Communication Impairment     Ability to express needs and understand communication Progressing        Knowledge Deficit     Patient/family/caregiver demonstrates understanding of disease process, treatment plan, medications, and discharge instructions Progressing        Neurological Deficit     Neurological status is stable or improving Progressing        Nutrition     Nutrition/Hydration status is improving Progressing        Potential for Aspiration     Non-ventilated patient's risk of aspiration is minimized Progressing     Ventilated patient's risk of aspiration is minimized Progressing        Potential for Falls     Patient will remain free of falls Progressing        Prexisting or High Potential for Compromised Skin Integrity     Skin integrity is maintained or improved Progressing

## 2018-10-24 NOTE — PLAN OF CARE
Problem: OCCUPATIONAL THERAPY ADULT  Goal: Performs self-care activities at highest level of function for planned discharge setting  See evaluation for individualized goals  Treatment Interventions: ADL retraining, Functional transfer training, UE strengthening/ROM, Patient/family training, Equipment evaluation/education, Neuromuscular reeducation, Continued evaluation, Activityengagement, Compensatory technique education, Fine motor coordination activities  Equipment Recommended: Bedside commode, Tub seat with back (will continue to assess at rehab)       See flowsheet documentation for full assessment, interventions and recommendations  Outcome: Progressing  Limitation: Decreased ADL status, Decreased UE strength, Decreased UE ROM, Decreased Safe judgement during ADL, Decreased endurance, Decreased fine motor control, Decreased self-care trans, Decreased high-level ADLs     Assessment: Pt seen for OT tx session this afternono focusing on functional toilet transfer and toileting using bedside commode  Pt required max A X 2 to complete functional transfer to / from commode and max A for clothing mgmt, personal hygiene after bowel movement  Pt seated OOB in chair at end of session w/ L UE positioned on pillow w/ rolled washcloth in hand for positioning  Continue to recommend post acute rehab when medically stable for discharge from acute care   Will continue to follow  Recommendation: Physiatry Consult, Other (comment) (neurpsych consult)  OT Discharge Recommendation: Other (Comment) (to post acute rehab)  OT - OK to Discharge:  (to post acute rehab when stable)

## 2018-10-24 NOTE — PHYSICAL THERAPY NOTE
PHYSICAL THERAPY NOTE    Patient Name: Anabelle FRAUSTO Date: 10/24/2018        10/24/18 1341   Pain Assessment   Pain Assessment 0-10   Pain Score Worst Possible Pain  (when standing )   Pain Type Acute pain   Pain Location Ankle   Pain Orientation Left   Restrictions/Precautions   Weight Bearing Precautions Per Order No   Other Precautions Limb alert;Telemetry; Fall Risk;Pain;Aspiration; Chair Alarm; Bed Alarm   General   Family/Caregiver Present Yes   Subjective   Subjective Patient supine in bed and is agreeable to therapy session  Patient identifers obtained from name &   Bed Mobility   Supine to Sit 3  Moderate assistance   Additional items Assist x 1;HOB elevated; Bedrails; Increased time required;Verbal cues;LE management   Sit to Supine Unable to assess   Additional Comments Patient seated OOB in recliner post session with chair alarm engaged, call bell and belongings in reach  Transfers   Sit to Stand 2  Maximal assistance  (max a x 1 & min a x 1; L knee block)   Additional items Assist x 2; Increased time required;Verbal cues   Stand to Sit 2  Maximal assistance  (max a x 1, min a x 1)   Additional items Assist x 2;Armrests; Increased time required;Verbal cues   Stand pivot 2  Maximal assistance   Additional items Assist x 2;Armrests; Increased time required;Verbal cues  (large base quad cane, L knee block)   Additional Comments Standing tolerance x 3 trials with max 70 seconds using large base quad cane on R and faciliation and L knee block   Ambulation/Elevation   Assistive Device Large base quad cane   Balance   Static Sitting Poor +   Dynamic Sitting Poor -   Static Standing Zero   Ambulatory Zero   Activity Tolerance   Activity Tolerance Patient limited by fatigue;Patient limited by pain   Medical Staff Made Aware Spoke to DOUGLAS Dominguez   Nurse Made Aware Spoke to Sharon Santos RN    Assessment   Prognosis Good   Problem List Decreased strength;Decreased range of motion;Decreased endurance; Impaired balance;Decreased mobility; Decreased coordination;Decreased safety awareness   Assessment Patient seen co-treat with DOUGLAS Delatorre secondary to increased amount of physical assistance required for mobility tasks  Patient eager and willing to participate in therapy session  Requires mod a x 1 for supine to sit transfers with ability to trasnfer with min a for inital 75% and mod a for remainder  Multiple sit to stand transfers performed needing max x1 on L side with L knee block as knee claudine when weight bearing, requires min a x1 on R side with use of large base quad cane for support  Standing tolerance with maximum of 70 seconds using large base quad cane for support  Stand pivot transfer from EOB to recliner with max a x 2 and extensive cuing for cane placement, stepping sequence and posture as well as L knee block and weight shift assistance  Patient fatigues post session seated OOB in recliner  Continue to focus on OOB mobility with progression of transfers and standing tolerance as appropriate  Goals   STG Expiration Date 11/01/18   Treatment Day 2   Plan   Treatment/Interventions Functional transfer training;LE strengthening/ROM; Therapeutic exercise; Endurance training;Patient/family training;Equipment eval/education; Bed mobility  (PT to see when gait training is appropriate)   Progress Progressing toward goals   PT Frequency 5x/wk; Other (Comment)  (and 1x on weekend)   Recommendation   Recommendation Post acute IP rehab   Equipment Recommended Other (Comment)  (large base quad cane)       Efra Steele PTA

## 2018-10-24 NOTE — PROGRESS NOTES
Progress Note Gregory Contreras 1955, 58 y o  male MRN: [id number]    Unit/Bed#: -01 Encounter: [id number]    Primary Care Provider: Suni Barrera MD   Date and time admitted to hospital: 10/20/2018  9:41 PM        Leg pain   Assessment & Plan    Venous doppler and X-ray are negative  Tobacco abuse   Assessment & Plan    States he is quitting  Hypertension   Assessment & Plan    BP is 130/80  Will continue with current meds      * Right sided cerebral hemisphere cerebrovascular accident (CVA) ([address] )   Assessment & Plan    Asa 81 mg QD  Also plavix load 300 mg now the 75 mg once a day for 90 days  For [facility] rehab tomorrow  VTE Pharmacologic Prophylaxis:   Pharmacologic: Enoxaparin (Lovenox)  Mechanical VTE Prophylaxis in Place: Yes    Patient Centered Rounds: I have performed bedside rounds with nursing staff today  Discussions with Specialists or Other Care Team Provider: Discussed with neuro today for DAPT  Education and Discussions with Family / Patient: Discussed with brother and wife  Time Spent for Care: 30 minutes  More than 50% of total time spent on counseling and coordination of care as described above  Current Length of Stay: 4 day(s)    Current Patient Status: Inpatient   Certification Statement: The patient will continue to require additional inpatient hospital stay due to  [address] rehab  Discharge Plan: Likely tomorrow   Medically stable for DC  Code Status: Level 1 - Full Code      Subjective:   Patient seen and examined      " I feel okay"    Objective:     Vitals:   Temp (24hrs), Av 1 °F (36 7 °C), Min:97 8 °F (36 6 °C), Max:98 7 °F (37 1 °C)    Temp:  [97 8 °F (36 6 °C)-98 7 °F (37 1 °C)] 97 8 °F (36 6 °C)  HR:  [63-74] 74  Resp:  [16-18] 18  BP: (124-152)/(61-80) 135/80  SpO2:  [95 %-97 %] 97 %  Body mass index is 29 24 kg/m²  Input and Output Summary (last 24 hours):        Intake/Output Summary (Last 24 hours) at 10/24/18 1607  Last data filed at 10/24/18 1500   Gross per 24 hour   Intake              360 ml   Output             1650 ml   Net            -1290 ml       Physical Exam:     Physical Exam   Constitutional: He is oriented to person, place, and time  He appears well-developed  No distress  HENT:   Head: Normocephalic  Mouth/Throat: No oropharyngeal exudate  Eyes: Right eye exhibits no discharge  Left eye exhibits no discharge  No scleral icterus  Neck: No JVD present  No tracheal deviation present  No thyromegaly present  Cardiovascular: Normal rate  Exam reveals no gallop and no friction rub  No murmur heard  Pulmonary/Chest: Effort normal  No respiratory distress  He has no wheezes  He has no rales  He exhibits no tenderness  Abdominal: Soft  He exhibits no distension and no mass  There is no tenderness  There is no rebound and no guarding  Musculoskeletal: Normal range of motion  He exhibits no edema, tenderness or deformity  Lymphadenopathy:     He has no cervical adenopathy  Neurological: He is alert and oriented to person, place, and time  He displays abnormal reflex  A cranial nerve deficit is present  He exhibits abnormal muscle tone  Coordination abnormal    Skin: Skin is warm  No rash noted  He is not diaphoretic  No erythema  No pallor  Psychiatric: He has a normal mood and affect           Additional Data:     Labs:      Results from last 7 days  Lab Units 10/24/18  0424   WBC Thousand/uL 9 96   HEMOGLOBIN g/dL 13 9   HEMATOCRIT % 41 8   PLATELETS Thousands/uL 240   NEUTROS PCT % 63   LYMPHS PCT % 24   MONOS PCT % 10   EOS PCT % 3       Results from last 7 days  Lab Units 10/24/18  0424  10/21/18  0438  10/20/18  2200   SODIUM mmol/L 141  < > 139  < >  --    POTASSIUM mmol/L 3 7  < > 3 6  < >  --    CHLORIDE mmol/L 104  < > 104  < >  --    CO2 mmol/L 26  < > 26  < >  --    BUN mg/dL 12  < > 18  < >  --    CREATININE mg/dL 0 99  < > 0 97  < >  --    ANION GAP ISTAT mmol/L  --   --   --   --  15* ANION GAP mmol/L 11  < > 9  < >  --    CALCIUM mg/dL 9 2  < > 9 0  < >  --    ALBUMIN g/dL  --   --  3 7  --   --    TOTAL BILIRUBIN mg/dL  --   --  0 40  --   --    ALK PHOS U/L  --   --  47  --   --    ALT U/L  --   --  34  --   --    AST U/L  --   --  20  --   --    GLUCOSE, ISTAT mg/dl  --   --   --   --  132   < > = values in this interval not displayed  Results from last 7 days  Lab Units 10/21/18  0438   INR  0 99       Results from last 7 days  Lab Units 10/24/18  1226 10/24/18  0645 10/23/18  1811 10/23/18  0613 10/22/18  2346 10/22/18  1758 10/22/18  1142 10/22/18  0012 10/21/18  1840 10/21/18  1200   POC GLUCOSE mg/dl 98 110 81 89 127 165* 167* 108 118 108       Results from last 7 days  Lab Units 10/21/18  0438   HEMOGLOBIN A1C % 6 6*               * I Have Reviewed All Lab Data Listed Above  * Additional Pertinent Lab Tests Reviewed: [address] Admission Reviewed    Imaging:    Imaging Reports Reviewed Today Include:   X-ray ankle -   "No acute osseous abnormality "  Imaging Personally Reviewed by Myself Includes:  As above  Recent Cultures (last 7 days):           Last 24 Hours Medication List:     Current Facility-Administered Medications:  acetaminophen 650 mg Oral Q6H PRN Margarita Lute, PA-C   aspirin 81 mg Oral Daily Margarita Lute, PA-C   atorvastatin 80 mg Oral QPM Waubaykathya Renteria PA-C   chlorhexidine 15 mL Swish & Spit Q12H [facility] PATEL Ramirez   clopidogrel 300 mg Oral Once Idalia Ureña MD   enoxaparin 40 mg Subcutaneous Q24H [facility] Margarita Kevin, PA-C   LORazepam 0 5 mg Intravenous Once Lugene Naval, PA-CASSI   nicotine 14 mg Transdermal Daily PATEL Ramirez   ondansetron 4 mg Intravenous Q6H PRN Luz Renteria PA-C   oxyCODONE 5 mg Oral Q4H PRN Idalia Ureña MD        Today, Patient Was Seen By: Idalia Ureña MD    ** Please Note: Dictation voice to text software may have been used in the creation of this document   **

## 2018-10-24 NOTE — OCCUPATIONAL THERAPY NOTE
OccupationalTherapy Progress Note     Patient Name: Soha Flanagan  KDPAC'U Date: 10/24/2018  Problem List  Patient Active Problem List   Diagnosis    Right sided cerebral hemisphere cerebrovascular accident (CVA) ([address] )    Left hemiplegia ([address] )    Dysarthria    Hypertension    Tobacco abuse    Anxiety    Hyperlipidemia    Leg pain           10/24/18 1339   Restrictions/Precautions   Weight Bearing Precautions Per Order No   Other Precautions Chair Alarm; Bed Alarm;Telemetry;Limb alert; Fall Risk;Pain;Aspiration   General   Response to Previous Treatment Patient with no complaints from previous session   Family/Caregiver Present Pt's wife ( but close) present throughout and friend / co - worker arrived at end of session   Lifestyle   Reciprocal Relationships Supportive, involved family / friends / co -workers present daily   Intrinsic Gratification Pt enjoyed talking about his family and dog, Manuel  Pain Assessment   Pain Assessment 0-10   Pain Type Acute pain   Pain Location Ankle   Pain Orientation Left   Hospital Pain Intervention(s) Cold applied;Repositioned; Ambulation/increased activity; Emotional support   Response to Interventions tolerated   ADL   Grooming Assistance 5  Supervision/Setup   Grooming Deficit Setup; Increased time to complete   Grooming Comments seated in chair at tray table to don glasses and use tissue to wipe face/ eyes   LB Dressing Assistance 2  Maximal Assistance   LB Dressing Deficit Thread RLE into pants; Thread LLE into pants;Pull up over hips; Other (Comment)  (don shorts while seated at EOB)   LB Dressing Comments mod A to thread R/L LE into shorts and max A to stand and pull up over hips   Bed Mobility   Supine to Sit 3  Moderate assistance  (min A to intiate w/ HOB elevated; mod A to complete)   Additional items Assist x 1;HOB elevated; Bedrails; Increased time required;Verbal cues;LE management   Sit to Supine Unable to assess   Additional Comments Pt intiated bed mobility supine to sit w/ min A; required mod A x1 to complete sit to square hips and advance L LE to floor  Pt seated OOB in chair at end of session w/ call bell in reach, chair alarm activated and needs met  Transfers   Sit to Stand 2  Maximal assistance  (max A x1, min A x1)   Additional items Assist x 2; Increased time required;Verbal cues;Armrests  (assist to support L UE for safety / positioning; block Lknee)   Stand to Sit 2  Maximal assistance  (max A x1, min A x1)   Additional items Assist x 2; Increased time required;Verbal cues;Armrests   Stand pivot 2  Maximal assistance   Additional items Assist x 2; Increased time required;Verbal cues;Armrests  (using wide base quad cane on R)   Additional Comments Pt seen w/ Neema RITCHIE for co -tx this afternoon due to physica asisstance needed and significant cues / prompts  Pt complete sit <> stand three times during session   ROM - Left Upper Extremities    LUE ROM Comment Provided pt w/ rolled up wash cloth for positioning L hand seated in chair w/ L UE propped on pillow at end of session  Provided pt w/ L UE sling for support / positioning during functional transfers / mobility  Therapist chasity (Tiger Text) MD, Dr Omar Graham RE: L UE sling   Cognition   Overall Cognitive Status [facility]   Arousal/Participation Alert; Cooperative   Attention Within functional limits   Orientation Level Oriented to person;Oriented to place;Oriented to situation;Oriented to time   Memory Decreased recall of precautions   Following Commands Follows one step commands with increased time or repetition   Comments Identified pt by full name and wristband  Pt labile and tearful at times during session; benefits from encouragement  Activity Tolerance   Activity Tolerance Patient limited by pain; Patient tolerated treatment well   Medical Staff Made Aware spoke to Raza RITCHIE; RN, Sp Echavarria;  Messaged via [place] Text MD, Dr Shellie Collet seen for OT tx session this afternoon w/ PTA, Jim Klein  Pt benefits from co - tx w/ PTA due to physical assistance and cues / prompts needed for OOB to chair  Pt demonstrated increased activity tolerance this afternoon  Pt enaged in LBD to don shorts w/ mod A x1  Continue to recommend post acute rehab when medically stable for discharge from acute care  Will continue to follow   Plan   Treatment Interventions ADL retraining;Functional transfer training;UE strengthening/ROM; Endurance training;Patient/family training;Equipment evaluation/education; Neuromuscular reeducation; Fine motor coordination activities; Compensatory technique education; Activityengagement   Goal Expiration Date 11/01/18   Treatment Day 2   OT Frequency 3-5x/wk   Recommendation   Recommendation Physiatry Consult; Other (comment)  (neuropsych to addres concerns identified on Neuro QQL)   OT Discharge Recommendation Other (Comment)  (to post acute rehab)   Equipment Recommended Bedside commode;Tub seat with back  (will continue to assess at rehab)   OT - OK to Discharge (to post acute rehab)   Barthel Index   Feeding 5   Bathing 0   Grooming Score 5   Dressing Score 5   Bladder Score 10   Bowels Score 10   Toilet Use Score 5   Transfers (Bed/Chair) Score 5   Mobility (Level Surface) Score 0   Stairs Score 0   Barthel Index Score 45   Modified Priddy Scale   Modified Priddy Scale 4   Nandini Daniel, OTR/L

## 2018-10-24 NOTE — PLAN OF CARE
Problem: OCCUPATIONAL THERAPY ADULT  Goal: Performs self-care activities at highest level of function for planned discharge setting  See evaluation for individualized goals  Treatment Interventions: ADL retraining, Functional transfer training, UE strengthening/ROM, Patient/family training, Equipment evaluation/education, Neuromuscular reeducation, Continued evaluation, Activityengagement, Compensatory technique education, Fine motor coordination activities  Equipment Recommended: Bedside commode, Tub seat with back (will continue to assess at rehab)       See flowsheet documentation for full assessment, interventions and recommendations  Outcome: Progressing  Limitation: Decreased ADL status, Decreased UE strength, Decreased UE ROM, Decreased Safe judgement during ADL, Decreased endurance, Decreased fine motor control, Decreased self-care trans, Decreased high-level ADLs     Assessment: Pt seen for OT tx session this afternoon w/ PTA, Neema  Pt benefits from co - tx w/ PTA due to physical assistance and cues / prompts needed for OOB to chair  Pt demonstrated increased activity tolerance this afternoon  Pt enaged in LBD to don shorts w/ mod A x1  Continue to recommend post acute rehab when medically stable for discharge from acute care   Will continue to follow  Recommendation: Physiatry Consult, Other (comment) (neuropsych to addres concerns identified on Neuro QQL)  OT Discharge Recommendation: Other (Comment) (to post acute rehab)  OT - OK to Discharge:  (to post acute rehab)

## 2018-10-24 NOTE — SOCIAL WORK
LOS 4   Not a bundle; Not a readmission  Cm contacted [facility] who stated they have one bed available today and they would need to know as soon as possible if pt is stable for DC  Leighann Mason has been received from insurance ([id number])  Cm contacted [facility] who stated they needed to touch base with Neuro to DC  Cm attempted to arrange transport for pt in the event pt is stable  [facility] stated they were not able to hold bed and pt's bed has been filled  [facility] stated they will have a bed tomorrow for pt  Cm arranged [facility] through [facility] with an 1100  time tomorrow  Pt, family, nursing, [facility] and facility aware of discharge arrangements  Pt is aware of cost associated with [facility] transport  Cm will continue to be available in the event additional needs arise

## 2018-10-24 NOTE — ASSESSMENT & PLAN NOTE
Asa 81 mg QD  Also plavix load 300 mg now the 75 mg once a day for 90 days  For [facility] [place] [place] rehab tomorrow

## 2018-10-24 NOTE — PROGRESS NOTES
Progress Note - Neurology   Anabelle Monet 58 y o  male MRN: [id number]  Unit/Bed#: -01 Encounter: [id number]    Assessment/Plan:   R MCA infarctions, etiology thromboembolic 2/2 large vessel atherosclerosis in R ICA vs  cardiac embolism  Per CUS, <50% stenosis bilaterally however with irregular plaque in R ICA  Also see potential thrombus formation overlying plaque on CTA, which raises risk of thromboembolic disease    - Would load with Plavix 300mg and continue on DAPT (ASA 81 and Plavix 75) for 90 days followed by aspirin monotherapy   - Echocardiogram reviewed, EF 60%, no regional wall motion abnormalities  -  Cardiology consulted with regard to prolonged cardiac monitoring as an outpatient for underlying Afib, which would change antithrombotic management if identified   - Hemoglobin A1c reviewed, 6 6    - Will start Fluoxetine 20 mg post-stroke day 5 for motor function recovery and also may help with mood  - Stroke education     - PT/OT/Speech therapy  - Patient will need eventual outpatient stroke neurology follow-up  Office has been contacted to arrange appointment  Subjective:   No events overnight  CUS performed yesterday, revealing irregular plaque in R ICA along with <50% stenosis bilaterally      ROS:  12-Point ROS was negative with exception of mood changes related to his deficits    Medications  Scheduled Meds:    Current Facility-Administered Medications:  acetaminophen 650 mg Oral Q6H PRN Andrew Almanzar PA-C   aspirin 81 mg Oral Daily Andrew Almanzar PA-C   atorvastatin 80 mg Oral QPM Sebastian Julien PA-C   chlorhexidine 15 mL Swish & Spit Q12H [facility] Camillia Buerger, CRNP   enoxaparin 40 mg Subcutaneous Q24H [facility] Andrew Almanzar PA-C   LORazepam 0 5 mg Intravenous Once Rachelle Morris PA-C   nicotine 14 mg Transdermal Daily Camillia Buerger, CRNP   ondansetron 4 mg Intravenous Q6H PRN Sebastian Julien PA-C   oxyCODONE 5 mg Oral Q4H PRN Johnny Baker MD     Continuous Infusions:   PRN Meds:   acetaminophen    ondansetron    oxyCODONE    Vitals: Blood pressure 152/61, pulse 68, temperature 97 8 °F (36 6 °C), temperature source Oral, resp  rate 18, height 5' 9" (1 753 m), weight 89 8 kg (197 lb 15 6 oz), SpO2 96 %  ,Body mass index is 29 24 kg/m²  Physical Exam: /61 (BP Location: Right arm)   Pulse 68   Temp 97 8 °F (36 6 °C) (Oral)   Resp 18   Ht 5' 9" (1 753 m)   Wt 89 8 kg (197 lb 15 6 oz) Comment: L sided weakness  SpO2 96%   BMI 29 24 kg/m²   General appearance: alert and oriented, in no acute distress  Head: Normocephalic, without obvious abnormality, atraumatic  Eyes: conjunctivae/corneas clear  PERRL, EOM's intact  Fundi benign    Lungs: clear to auscultation bilaterally  Heart: regular rate and rhythm  Abdomen: soft, non-tender; bowel sounds normal; no masses,  no organomegaly  Extremities: ecchymosis over L ankle  Skin: Skin color, texture, turgor normal  No rashes or lesions  Neurologic: Mental status: Alert, oriented, thought content appropriate  Cranial nerves: II: visual field normal, II: pupils equal, round, reactive to light and accommodation, III,VII: ptosis no ptosis noted, III,IV,VI: extraocular muscles extra-ocular motions intact, V: facial light touch sensation normal bilaterally, VII: upper facial muscle function normal bilaterally, VII: lower facial muscle function abnormal on the left, XII: tongue strength normal  Sensory: normal  Motor: 3-4/5 proximally in LUE, 0/5 distally; 4/5 in LLE; 5/5 throughout on R  Coordination: finger to nose normal on the right    Labs  Recent Results (from the past 24 hour(s))   Fingerstick Glucose (POCT)    Collection Time: 10/23/18  6:11 PM   Result Value Ref Range    POC Glucose 81 65 - 140 mg/dl   CBC and differential    Collection Time: 10/24/18  4:24 AM   Result Value Ref Range    WBC 9 96 4 31 - 10 16 Thousand/uL    RBC 4 74 3 88 - 5 62 Million/uL    Hemoglobin 13 9 12 0 - 17 0 g/dL    Hematocrit 41 8 36 5 - 49 3 %    MCV 88 82 - 98 fL    MCH 29 3 26 8 - 34 3 pg    MCHC 33 3 31 4 - 37 4 g/dL    RDW 14 6 11 6 - 15 1 %    MPV 9 8 8 9 - 12 7 fL    Platelets 875 611 - 032 Thousands/uL    nRBC 0 /100 WBCs    Neutrophils Relative 63 43 - 75 %    Immat GRANS % 0 0 - 2 %    Lymphocytes Relative 24 14 - 44 %    Monocytes Relative 10 4 - 12 %    Eosinophils Relative 3 0 - 6 %    Basophils Relative 0 0 - 1 %    Neutrophils Absolute 6 26 1 85 - 7 62 Thousands/µL    Immature Grans Absolute 0 03 0 00 - 0 20 Thousand/uL    Lymphocytes Absolute 2 41 0 60 - 4 47 Thousands/µL    Monocytes Absolute 0 95 0 17 - 1 22 Thousand/µL    Eosinophils Absolute 0 27 0 00 - 0 61 Thousand/µL    Basophils Absolute 0 04 0 00 - 0 10 Thousands/µL   Basic metabolic panel    Collection Time: 10/24/18  4:24 AM   Result Value Ref Range    Sodium 141 136 - 145 mmol/L    Potassium 3 7 3 5 - 5 3 mmol/L    Chloride 104 100 - 108 mmol/L    CO2 26 21 - 32 mmol/L    ANION GAP 11 4 - 13 mmol/L    BUN 12 5 - 25 mg/dL    Creatinine 0 99 0 60 - 1 30 mg/dL    Glucose 88 65 - 140 mg/dL    Calcium 9 2 8 3 - 10 1 mg/dL    eGFR 80 ml/min/1 73sq m   Fingerstick Glucose (POCT)    Collection Time: 10/24/18  6:45 AM   Result Value Ref Range    POC Glucose 110 65 - 140 mg/dl   Fingerstick Glucose (POCT)    Collection Time: 10/24/18 12:26 PM   Result Value Ref Range    POC Glucose 98 65 - 140 mg/dl     Imaging   No new neuro imaging available for review      VTE Prophylaxis: Enoxaparin (Lovenox)

## 2018-10-24 NOTE — PLAN OF CARE
Problem: PHYSICAL THERAPY ADULT  Goal: Performs mobility at highest level of function for planned discharge setting  See evaluation for individualized goals  Treatment/Interventions: Functional transfer training, LE strengthening/ROM, Therapeutic exercise, Endurance training, Patient/family training, Equipment eval/education, Bed mobility (PT to see when gait training is appropriate )          See flowsheet documentation for full assessment, interventions and recommendations  Outcome: Progressing  Prognosis: Good  Problem List: Decreased strength, Decreased range of motion, Decreased endurance, Impaired balance, Decreased mobility, Decreased coordination, Decreased safety awareness  Assessment: Patient seen co-treat with DOUGLAS Delatorre secondary to increased amount of physical assistance required for mobility tasks  Patient eager and willing to participate in therapy session  Requires mod a x 1 for supine to sit transfers with ability to trasnfer with min a for inital 75% and mod a for remainder  Multiple sit to stand transfers performed needing max x1 on L side with L knee block as knee claudine when weight bearing, requires min a x1 on R side with use of large base quad cane for support  Standing tolerance with maximum of 70 seconds using large base quad cane for support  Stand pivot transfer from EOB to recliner with max a x 2 and extensive cuing for cane placement, stepping sequence and posture as well as L knee block and weight shift assistance  Patient fatigues post session seated OOB in recliner  Continue to focus on OOB mobility with progression of transfers and standing tolerance as appropriate  Recommendation: Post acute IP rehab          See flowsheet documentation for full assessment

## 2018-10-24 NOTE — PROGRESS NOTES
PHYSICAL MEDICINE AND REHABILITATION   PREADMISSION ASSESSMENT     Projected [facility] Diagnoses:  Impairment of mobility, safety, Activities of Daily Living (ADLs), and cognitive/communication skills due to Stroke:  01 1  Left Body Involvement (Right Brain)  Etiologic: Multiple embolic-appearing Right MCA infarctions   Date of Onset: 10/20/18   Date of surgery: N/A    PATIENT INFORMATION  Name: Niesha Mccord Phone #: [phone] (home)   Address: [address] [place] [id number]  YOB: 1955 Age: 58 y o  SS#   Marital Status: Single  Ethnicity:    Employment Status: currently employed  Extended Emergency Contact Information  Primary Emergency Contact: Alvinrishi Lali  Address: [place] [address] [address] [place] [address] Phone: [phone]  Mobile Phone: [phone]  Relation: Daughter  Secondary Emergency Contact: Bartolome Max   [address] Phone: [phone]  Relation: None  Advance Directive: Level 1 Full Code, Unknown Advanced Directive     INSURANCE/COVERAGE:     Primary Payor: Zebedee Bumpers / Plan: [id number] / Product Type: Blue Fee for Service /   Secondary Payer: PRIVATE PAY   Payer Contact: [employer] Payer Contact:   Contact Phone: [phone] option 2 Contact Phone:   Authorization #: [id number]  Coverage Dates: 10/25/18 - 10/31/18  LCD: 10/31/18 with an update due on 10/31  Medicare Days:  Medical Record #: [id number]    REFERRAL SOURCE:   Referring provider: Mustapha Zamora MD  Referring facility: [address] [place] [address]   Room: /-01  PCP: Radha Gomes MD PCP phone number: [phone]    MEDICAL INFORMATION  HPI: Patient is a 58year old male who presented to the [address] [place] [address] on 10/20/18 with complaints of a right sided headache, left sided weakness (to the LUE) as well as a facial droop  The patient was unable to articulate his words   Upon admission the patients NIH score was 3 and had a BP of 197/110  A CT of the head was done, which was negative  A CTA of the head and neck were done and negative for any stenosis/occulsion - with no intervention planned  Upon arrival of critical care, the patient was noted to have an NIH of 9 by that time  The patient did receive tPA  An MRI was completed which revealed moderate multifocal cortical infarcts in right MCA territory, small petechial hemorrhage in insular cortex  A CT of the head was done on 10/22/18 which showed evolving infarct in the right insular regin, small infarcts right frontal lobe and temp/occiptal region  A echocardiogram was done which revealed an EF of 60% with no regional wall motion abnormalities  The patient continued with left sided hemiparesis and was started on Fluoxentine 20 mg daily 5 days post stroke to potentially help with motor function recovery  A CTA was with 50% stenosis of the right ICA and 30 to 40% stenosis of the left ICA  Neurology continued to follow the patient through the patients hospital course  On 10/23/18 Neurology was stating that the patient continues to demonstrate multiple embolic-appearing R MCA infarctions in setting of moderate R ICA stenosis  Does have considerable plaque with potential adjacent thrombus, which would raise suspicion for this carotid being the etiology of his infarcts  Per Neurology a carotid ultrasound was done to further clarify the level of stenosis  If the carotid ultrasound was negative, they would consider loop placement for afib vs  Dual antiplatelet therapy for large vessel athero going forward  At this time the patient is being cleared for discharge to rehab and both PT/OT as well as PM&R are all recommending inpatient acute rehab  Insurance authorization has been obtained and the patient will transfer to [address] [place] [address] in [facility] later today  Past Medical History:   Past Surgical History:    Allergies:     Past Medical History:   Diagnosis Date    Hypertension     Migraine     Past Surgical History:   Procedure Laterality Date    APPENDECTOMY      KNEE SURGERY      NECK SURGERY      TONSILLECTOMY       No Known Allergies      Comorbidities: left hemiplegia, dysarthria, hypertension, tobacco abuse, anxiety, hyperlipidemia, and leg pain, left lower facial droop  CURRENT VITAL SIGNS:   Temp:  [97 8 °F (36 6 °C)-98 8 °F (37 1 °C)] 98 °F (36 7 °C)  HR:  [60-74] 60  Resp:  [18] 18  BP: (135-152)/(61-80) 139/66   Intake/Output Summary (Last 24 hours) at 10/25/18 1035  Last data filed at 10/25/18 0900   Gross per 24 hour   Intake              840 ml   Output             1000 ml   Net             -160 ml        LABORATORY RESULTS:      Lab Results   Component Value Date    HGB 13 9 10/24/2018    HCT 41 8 10/24/2018    WBC 9 96 10/24/2018     Lab Results   Component Value Date    BUN 12 10/24/2018     10/24/2018    K 3 7 10/24/2018     10/24/2018    GLUCOSE 132 10/20/2018    CREATININE 0 99 10/24/2018     Lab Results   Component Value Date    PROTIME 12 8 10/21/2018    INR 0 99 10/21/2018        DIAGNOSTIC STUDIES:  Xr Ankle 3+ Vw Left    Result Date: 10/23/2018  Impression: No acute osseous abnormality  Workstation performed: [id number]     Xr Foot 3+ Vw Left    Result Date: 10/23/2018  Impression: No acute osseous abnormality  Workstation performed: [id number]     Ct Head Wo Contrast    Result Date: 10/22/2018  Impression: 1  Sequela of embolic infarction including interval evolution and demarcation of right insular infarction  No midline shift  2   Small infarctions within the right frontal lobe and right temporal occipital junction are better evaluated on prior MRI  3   Areas of petechial hemorrhage are better detected on prior MRI  No large delayed intracranial hemorrhage  Workstation performed: [id number]     Mri Brain Wo Contrast    Result Date: 10/21/2018  Impression: 1    Moderate amount of multifocal cortical infarctions involving distinct and separate foci in the right middle cerebral territory  2   Small amount of petechial hemorrhage in the insular cortex in the region of recent infarction Workstation performed: [id number]     Xr Stroke Alert Portable Chest    Result Date: 10/21/2018  Impression: No acute cardiopulmonary disease  Workstation performed: [id number]     Ct Stroke Alert Brain    Result Date: 10/21/2018  Impression: No evidence of acute intracranial hemorrhage  Proceed with stroke protocol  Findings were directly discussed with NICOLAS Tiwari on 10/20/2018 10:22 PM  Workstation performed: [id number]     Cta Stroke Alert (head/neck)    Result Date: 10/20/2018  Impression: 1  There is approximately 50% stenosis of the proximal right ICA  2   There is approximately 30-40% stenosis of the proximal left ICA  3   The basilar artery is diminutive throughout its course, however, is opacified  4   There is an approximately 1 5 cm nodule within the right lobe of the thyroid  Nonemergent outpatient thyroid ultrasound is recommended for further evaluation  5   There is fluid in the visualized thoracic esophagus suggesting gastroesophageal reflux  6   Other findings as described above, please see discussion   Findings were directly discussed with Dr Shaan Menendez on October 20, 2018 at 11:02 PM  Workstation performed: [id number]       PRECAUTIONS/SPECIAL NEEDS:  Tobacco:   History   Smoking Status    Current Every Day Smoker    Packs/day: 1 00    Types: Cigarettes, Cigars   Smokeless Tobacco    Never Used   , Alcohol:    History   Alcohol Use    Yes     Comment: socially    , Anticoagulation:  aspirin 81 mg orally every day and lovenox, Edema Management, Safety Concerns, Pain Management, IV: Type: peripheral Location: both the right forearm and the left hand Reason: medications and fluids, Aspiration Risk/Precautions, Dietary Restrictions: Dysphagia 2 Mechanical Soft - Thin Liquids, Language Preference: English and fall precautions    MEDICATIONS:     Current Facility-Administered Medications:     acetaminophen (TYLENOL) tablet 650 mg, 650 mg, Oral, Q6H PRN, Amalia Billingsley PA-C, 650 mg at 10/25/18 0541    aspirin (ECOTRIN LOW STRENGTH) EC tablet 81 mg, 81 mg, Oral, Daily, Amalia Billingsley PA-C, 81 mg at 10/25/18 3021    atorvastatin (LIPITOR) tablet 80 mg, 80 mg, Oral, QPM, Suyapa Oviedo PA-C, 80 mg at 10/24/18 1708    chlorhexidine (PERIDEX) 0 12 % oral rinse 15 mL, 15 mL, Swish & Spit, Q12H [address], PATEL Lange, 15 mL at 10/24/18 2143    enoxaparin (LOVENOX) subcutaneous injection 40 mg, 40 mg, Subcutaneous, Q24H [address], Amalia Billingsley PA-C, 40 mg at 10/25/18 3170    LORazepam (ATIVAN) 2 mg/mL injection 0 5 mg, 0 5 mg, Intravenous, Once, Rakesh Jackson PA-C    nicotine (NICODERM CQ) 14 mg/24hr TD 24 hr patch 14 mg, 14 mg, Transdermal, Daily, PATEL Lange, 14 mg at 10/25/18 2726    ondansetron (ZOFRAN) injection 4 mg, 4 mg, Intravenous, Q6H PRN, Suyapa Oviedo PA-C, 4 mg at 10/21/18 4881    oxyCODONE (ROXICODONE) IR tablet 5 mg, 5 mg, Oral, Q4H PRN, Ashly Salgado MD, 5 mg at 10/24/18 2124    SKIN INTEGRITY:   no rashes, no erythema, no peripheral edema    PRIOR LEVEL OF FUNCTION:  [place] [facility] [place]mother in law it" [facility] home  Lashonda Wong is single and lives alone, however he lives in the apartment that is directly attached to his sons home  The son does work, but they leave all of the doors open as if it is one large home in case the patient needs anything  Self Care: Independent, Indoor Mobility: Independent, Stairs (in/outdoor): Independent and Cognition: Independent    HOME ENVIRONMENT:  The living area: can live on one level  There are no steps to enter the home, however he would have 4 steps to navigate in order to get to the [facility] where the TV is located      The patient will not have 24 hour supervision/physical assistance available upon discharge  The patient does live in a home that is directly connected to his sons home  The son is able to assist and supervise when he is home, if the patient needs, however he does work  PREVIOUS DME:  Equipment in home (previous DME): Wendy Carrera    FUNCTIONAL STATUS:  Physical Therapy Occupational Therapy Speech Therapy   10/24/18  Subjective   Subjective Patient supine in bed and is agreeable to therapy session  Patient identifers obtained from name &   Bed Mobility   Supine to Sit 3  Moderate assistance   Additional items Assist x 1;HOB elevated; Bedrails; Increased time required;Verbal cues;LE management   Sit to Supine Unable to assess   Additional Comments Patient seated OOB in recliner post session with chair alarm engaged, call bell and belongings in reach  Transfers   Sit to Stand 2  Maximal assistance  (max a x 1 & min a x 1; L knee block)   Additional items Assist x 2; Increased time required;Verbal cues   Stand to Sit 2  Maximal assistance  (max a x 1, min a x 1)   Additional items Assist x 2;Armrests; Increased time required;Verbal cues   Stand pivot 2  Maximal assistance   Additional items Assist x 2;Armrests; Increased time required;Verbal cues  (large base quad cane, L knee block)   Additional Comments Standing tolerance x 3 trials with max 70 seconds using large base quad cane on R and faciliation and L knee block   Ambulation/Elevation   Assistive Device Large base quad cane   Balance   Static Sitting Poor +   Dynamic Sitting Poor -   Static Standing Zero   Ambulatory Zero   Activity Tolerance   Activity Tolerance Patient limited by fatigue;Patient limited by pain   Medical Staff Made Aware Spoke to DOUGLAS Dominguez   Nurse Made Aware Spoke to Sandoval Klein RN    Assessment   Prognosis Good   Problem List Decreased strength;Decreased range of motion;Decreased endurance; Impaired balance;Decreased mobility; Decreased coordination;Decreased safety awareness   Assessment Patient seen co-treat with Caleb Cabrera secondary to increased amount of physical assistance required for mobility tasks  Patient eager and willing to participate in therapy session  Requires mod a x 1 for supine to sit transfers with ability to trasnfer with min a for inital 75% and mod a for remainder  Multiple sit to stand transfers performed needing max x1 on L side with L knee block as knee claudine when weight bearing, requires min a x1 on R side with use of large base quad cane for support  Standing tolerance with maximum of 70 seconds using large base quad cane for support  Stand pivot transfer from EOB to recliner with max a x 2 and extensive cuing for cane placement, stepping sequence and posture as well as L knee block and weight shift assistance  Patient fatigues post session seated OOB in recliner  Continue to focus on OOB mobility with progression of transfers and standing tolerance as appropriate  10/24/18  ADL   LB Dressing Assistance 2  Maximal Assistance   LB Dressing Deficit Pull up over hips;Setup; Requires assistive device for steadying;Steadying;Verbal cueing;Supervision/safety; Increased time to complete   LB Dressing Comments standing in front of commode to manage shorts up / down over hips   Toileting Assistance  2  Maximal Assistance   Toileting Deficit Setup;Supervison/safety; Bedside commode;Clothing management up;Clothing management down;Perineal hygiene   Toileting Comments required max A to complete personal hygiene, clothing mgmt, and transfer to/ from commode   Bed Mobility   Supine to Sit Unable to assess   Sit to Supine Unable to assess   Additional Comments Pt seated OOB in chair upon therapist arrival and post session w/ call bell in reach, chair alarm activated and needs met   Transfers   Sit to Stand 2  Maximal assistance   Additional items Assist x 2; Increased time required;Armrests; Verbal cues   Stand to Sit 2  Maximal assistance   Additional items Assist x 2; Increased time required;Verbal cues;Armrests   Stand pivot 2  Maximal assistance   Additional items Assist x 2; Increased time required;Verbal cues;Armrests   Toilet transfer 2  Maximal assistance   Additional items Assist x 2;Commode; Increased time required;Armrests; Verbal cues   Additional Comments Pt required max A x2 (RN, Nadeen Gowers 2nd assist) to complete functional SPT using large bse quad cane to/ from commode towards pt's R side going to commode  Pt benefits from signficant cues for sequencing and assist / facilitation at L knee  Toilet Transfers   Toilet Transfer From Other (Comment)  (bedside recliner chair)   Toilet Transfer Type To and from   Toilet Transfer to Standard bedside commode   Toilet Transfer Technique Stand pivot   Toilet Transfers Maximal assistance;Verbal cues  (max A x2)   Cognition   Overall Cognitive Status WFL  (emotially labile, tearful)   Arousal/Participation Alert; Cooperative   Attention Within functional limits   Orientation Level Oriented to person;Oriented to place;Oriented to time;Oriented to situation   Memory Decreased recall of precautions   Following Commands Follows one step commands without difficulty  (increaed time)   Comments Identified pt by full name and wrist band  Activity Tolerance   Activity Tolerance Patient tolerated treatment well;Patient limited by fatigue   Medical Staff Made Aware spoke to RN, Nadeen Gowers   Assessment   Assessment Pt seen for OT tx session this afternono focusing on functional toilet transfer and toileting using bedside commode  Pt required max A X 2 to complete functional transfer to / from commode and max A for clothing mgmt, personal hygiene after bowel movement  Pt seated OOB in chair at end of session w/ L UE positioned on pillow w/ rolled washcloth in hand for positioning  Continue to recommend post acute rehab when medically stable for discharge from acute care  Will continue to follow      10/23/18  Subjective:  Pt seen for follow up dysphagia tx sitting upright in a chair   Several family members at bedside       Objective:  Pt seen with lunch meal of andrews pasta with chicken gravy and thin liquids via straw  Pt with increased oral processing time, but with adequate mastication, posterior transfer and swallow initiation with pasta  Pt did have min-mild L sided pocketing and L sided oral residue, but pt was able to independently implement liquid wash and finger sweep when appropriate to remove material  Pt reported he has more sensation/awareness on L side and can feel the material get stuck  Pt did have an occasional immediate cough with thin liquids  Coughing was noted to happen when pt was attempting to eat and talk at the same time or when pt took larger bites of pasta and attempted to clear larger amounts of residue/pocketing with the liquid wash  ST reviewed safety strategies with pt and family  Pt demonstrated understanding  No other overt s/s of aspriation noted during tx session       Assessment:  Pt continues to present with oral dysphagia secondary to increased oral processing time with L sided pocketing/oral residue  Oral symptoms due appear to be improving due to decreased amounts of pocketing  Pt reports he is beginning to have more sensation/awareness when material is pocketed       Plan/Recommendations:  -Continue current diet dysphagia 2-mechanical/thins  -Will continue to follow for diet tolerance/possible diet upgrade as appropriate  -Will continue to reinforce the following swallowing/safety strategies:              Finger/tongue sweep for pocketed material              Alternate liquids/solids to clear oral residue              Small bites of solid textures              Slow rate of intake (family reports pt eats fast when ST not present)              Quiet environment during meals- no talking while food is in mouth        CURRENT GAP IN FUNCTION     Prior to Admission:     Functional Status: Patient was independent with mobility/ambulation, transfers, ADL's, IADL's      Estimated length of stay: 10 to 14 days    Anticipated Post-Discharge Disposition/Treatment  Disposition: Return to previous home/apartment  Outpatient Services: Physical Therapy (PT), Occupational Therapy (OT) and Speech Therapy    BARRIERS TO DISCHARGE  Lovenox, Weakness, Pain, Diminished cognition/Mentation change, Balance Difficulty, Fatigue, Home Accessibility, Caregiver Accessibility, Financial Resources, Equipment Needs, Resource Availability and Lives Alone    INTERVENTIONS FOR DISCHARGE  Adaptive equipment, Patient/Family/Caregiver Education, [employer], Support Group, Financial Assistance, Arrange DME needs, Home Modifcations, Medication Changes per MD recommendations, Therapy exercises, Center of balance support  and Energy conservation education     REQUIRED THERAPY:  Patient will require PT, OT and ST 60 minutes each per day, five days per week to achieve rehab goals  REQUIRED FUNCTIONAL AND MEDICAL MANAGEMENT FOR INPATIENT REHABILITATION:  Skin:  There are no pressure sores currently, Pain Management: Overall pain is moderately controlled, Deep Vein Thrombosis (DVT) Prophylaxis:  lovenox and aspirin 81mg, and further internal medicine management of additional medical conditions while on the [facility], PT/OT/ST intervention, patient/family education and training, and any needed consults PRN    RECOMMENDED LEVEL OF CARE:  Patient is a 58year old male who originally presented to the [address] [place] [address] on 10/20/18 as a stroke alert  Imaging did confirm that the patient had sustained multiple embolic appearing right MCA infarctions in the setting of moderate right ICA stenosis  Prior to admission the patient was fully independent with both ADLs and IADLs as well as functional mobility  At this time the patient currently requires max assist with ADLS and max assist with PT  The patient is using a large base quad cane    The patient is also being seen by ST and is on a dysphagia 2 mechanical soft diet with thin liquids  At this time close medical management and PM&R management is recommended for the patient while on the [facility] to help monitor labs as well as other medical conditions  Nursing management will be required to monitor bowel/bladder function to prevent incontinent episodes and skin breakdown as well as education on medication changes  Inpatient acute rehab is recommended at this time for the patient to maximize overall strength, endurance, self care, and mobility upon discharge to home with the support of his family

## 2018-10-24 NOTE — PLAN OF CARE
Problem: DISCHARGE PLANNING  Goal: Discharge to home or other facility with appropriate resources  INTERVENTIONS:  - Identify barriers to discharge w/patient and caregiver  - Arrange for needed discharge resources and transportation as appropriate  - Identify discharge learning needs (meds, wound care, etc )  - Arrange for interpretive services to assist at discharge as needed  - Refer to Case Management Department for coordinating discharge planning if the patient needs post-hospital services based on physician/advanced practitioner order or complex needs related to functional status, cognitive ability, or social support system   Outcome: Completed Date Met: 10/24/18  LOS 4  Not a bundle; Not a readmission  Cm contacted [facility] who stated they have one bed available today and they would need to know as soon as possible if pt is stable for DC  Cm contacted [facility] who stated they needed to touch base with Neuro to DC  Cm attempted to arrange transport for pt in the event pt is stable  [facility] stated they were not able to hold bed and pt's bed has been filled  [facility] stated they will have a bed tomorrow for pt  Cm arranged [facility] through [facility] with an 1100  time tomorrow  Pt, family, nursing, [facility] and facility aware of discharge arrangements  Pt is aware of cost associated with [facility] transport  Cm will continue to be available in the event additional needs arise

## 2018-10-24 NOTE — OCCUPATIONAL THERAPY NOTE
OccupationalTherapy Progress Note     Patient Name: Pankaj GOLDBERG Date: 10/24/2018  Problem List  Patient Active Problem List   Diagnosis    Right sided cerebral hemisphere cerebrovascular accident (CVA) [facility] [address] [facility]    Left hemiplegia ([facility] [address] )    Dysarthria    Hypertension    Tobacco abuse    Anxiety    Hyperlipidemia    Leg pain         10/24/18 1459   Restrictions/Precautions   Weight Bearing Precautions Per Order No   Other Precautions Chair Alarm; Bed Alarm;Aspiration;Limb alert;Telemetry; Fall Risk;Pain   General   Response to Previous Treatment Patient reporting fatigue but able to participate   Family/Caregiver Present Pt's family present at start of tx; stepped out of room during session   Pain Assessment   Pain Assessment FLACC   Pain Location Ankle   Pain Orientation Left   Pain Rating: FLACC (Rest) - Face 0   Pain Rating: FLACC (Rest) - Legs 0   Pain Rating: FLACC (Rest) - Activity 0   Pain Rating: FLACC (Rest) - Cry 1   Pain Rating: FLACC (Rest) - Consolability 1   Score: FLACC (Rest) 2   Pain Rating: FLACC (Activity) - Face 1   Pain Rating: FLACC (Activity) - Legs 1   Pain Rating: FLACC (Activity) - Activity 1   Pain Rating: FLACC (Activity) - Cry 1   Pain Rating: FLACC (Activity) - Consolability 1   Score: FLACC (Activity) 5   ADL   LB Dressing Assistance 2  Maximal Assistance   LB Dressing Deficit Pull up over hips;Setup; Requires assistive device for steadying;Steadying;Verbal cueing;Supervision/safety; Increased time to complete   LB Dressing Comments standing in front of commode to manage shorts up / down over hips   Toileting Assistance  2  Maximal Assistance   Toileting Deficit Setup;Supervison/safety; Bedside commode;Clothing management up;Clothing management down;Perineal hygiene   Toileting Comments required max A to complete personal hygiene, clothing mgmt, and transfer to/ from commode   Bed Mobility   Supine to Sit Unable to assess   Sit to Supine Unable to assess   Additional Comments Pt seated OOB in chair upon therapist arrival and post session w/ call bell in reach, chair alarm activated and needs met   Transfers   Sit to Stand 2  Maximal assistance   Additional items Assist x 2; Increased time required;Armrests; Verbal cues   Stand to Sit 2  Maximal assistance   Additional items Assist x 2; Increased time required;Verbal cues;Armrests   Stand pivot 2  Maximal assistance   Additional items Assist x 2; Increased time required;Verbal cues;Armrests   Toilet transfer 2  Maximal assistance   Additional items Assist x 2;Commode; Increased time required;Armrests; Verbal cues   Additional Comments Pt required max A x2 (Omero KAMARA 2nd assist) to complete functional SPT using large bse quad cane to/ from commode towards pt's R side going to commode  Pt benefits from signficant cues for sequencing and assist / facilitation at L knee  Toilet Transfers   Toilet Transfer From Other (Comment)  (bedside recliner chair)   Toilet Transfer Type To and from   Toilet Transfer to Standard bedside commode   Toilet Transfer Technique Stand pivot   Toilet Transfers Maximal assistance;Verbal cues  (max A x2)   Cognition   Overall Cognitive Status WFL  (emotially labile, tearful)   Arousal/Participation Alert; Cooperative   Attention Within functional limits   Orientation Level Oriented to person;Oriented to place;Oriented to time;Oriented to situation   Memory Decreased recall of precautions   Following Commands Follows one step commands without difficulty  (increaed time)   Comments Identified pt by full name and wrist band  Activity Tolerance   Activity Tolerance Patient tolerated treatment well;Patient limited by fatigue   Medical Staff Made Aware spoke to Omero KAMARA   Assessment   Assessment Pt seen for OT tx session this afternono focusing on functional toilet transfer and toileting using bedside commode   Pt required max A X 2 to complete functional transfer to / from commode and max A for clothing mgmt, personal hygiene after bowel movement  Pt seated OOB in chair at end of session w/ L UE positioned on pillow w/ rolled washcloth in hand for positioning  Continue to recommend post acute rehab when medically stable for discharge from acute care  Will continue to follow   Plan   Treatment Interventions ADL retraining;Functional transfer training;UE strengthening/ROM; Patient/family training;Neuromuscular reeducation; Activityengagement   Goal Expiration Date 11/01/18   Treatment Day 3   OT Frequency 3-5x/wk   Recommendation   Recommendation Physiatry Consult; Other (comment)  (neurpsych consult)   OT Discharge Recommendation Other (Comment)  (to post acute rehab)   Equipment Recommended Bedside commode;Tub seat with back  (will continue to assess at rehab)   OT - OK to Discharge (to post acute rehab when stable)   Barthel Index   Feeding 5   Bathing 0   Grooming Score 5   Dressing Score 5   Bladder Score 10   Bowels Score 10   Toilet Use Score 5   Transfers (Bed/Chair) Score 5   Mobility (Level Surface) Score 0   Stairs Score 0   Barthel Index Score 45   Modified Worcester Scale   Modified Worcester Scale 4   Nandini Daniel, OTR/L

## 2018-10-25 ENCOUNTER — HOSPITAL ENCOUNTER (INPATIENT)
Facility: HOSPITAL | Age: 63
LOS: 27 days | Discharge: HOME | DRG: 041 | End: 2018-11-21
Attending: PHYSICAL MEDICINE & REHABILITATION | Admitting: PHYSICAL MEDICINE & REHABILITATION
Payer: COMMERCIAL

## 2018-10-25 VITALS
RESPIRATION RATE: 18 BRPM | BODY MASS INDEX: 29.29 KG/M2 | SYSTOLIC BLOOD PRESSURE: 139 MMHG | DIASTOLIC BLOOD PRESSURE: 66 MMHG | TEMPERATURE: 98 F | HEIGHT: 69 IN | HEART RATE: 60 BPM | OXYGEN SATURATION: 96 % | WEIGHT: 197.75 LBS

## 2018-10-25 DIAGNOSIS — I63.9 RIGHT SIDED CEREBRAL HEMISPHERE CEREBROVASCULAR ACCIDENT (CVA) (HCC): ICD-10-CM

## 2018-10-25 DIAGNOSIS — I10 HYPERTENSION: Chronic | ICD-10-CM

## 2018-10-25 DIAGNOSIS — E78.5 HYPERLIPIDEMIA: Chronic | ICD-10-CM

## 2018-10-25 DIAGNOSIS — F41.9 ANXIETY: Chronic | ICD-10-CM

## 2018-10-25 DIAGNOSIS — I10 ESSENTIAL HYPERTENSION: Primary | Chronic | ICD-10-CM

## 2018-10-25 PROBLEM — R73.09 ELEVATED HEMOGLOBIN A1C: Status: ACTIVE | Noted: 2018-10-25

## 2018-10-25 PROBLEM — R39.11 URINARY HESITANCY: Status: ACTIVE | Noted: 2018-10-25

## 2018-10-25 PROBLEM — E11.9 TYPE 2 DIABETES MELLITUS WITHOUT COMPLICATION, WITHOUT LONG-TERM CURRENT USE OF INSULIN (HCC): Status: ACTIVE | Noted: 2018-10-25

## 2018-10-25 PROBLEM — M25.572 ACUTE LEFT ANKLE PAIN: Status: ACTIVE | Noted: 2018-10-23

## 2018-10-25 LAB
GLUCOSE SERPL-MCNC: 151 MG/DL (ref 65–140)
GLUCOSE SERPL-MCNC: 92 MG/DL (ref 65–140)

## 2018-10-25 PROCEDURE — 99255 IP/OBS CONSLTJ NEW/EST HI 80: CPT | Performed by: PHYSICAL MEDICINE & REHABILITATION

## 2018-10-25 PROCEDURE — 82948 REAGENT STRIP/BLOOD GLUCOSE: CPT

## 2018-10-25 PROCEDURE — 99239 HOSP IP/OBS DSCHRG MGMT >30: CPT | Performed by: INTERNAL MEDICINE

## 2018-10-25 RX ORDER — NICOTINE 21 MG/24HR
14 PATCH, TRANSDERMAL 24 HOURS TRANSDERMAL DAILY
Status: DISCONTINUED | OUTPATIENT
Start: 2018-10-26 | End: 2018-11-12

## 2018-10-25 RX ORDER — CHLORHEXIDINE GLUCONATE 0.12 MG/ML
15 RINSE ORAL EVERY 12 HOURS SCHEDULED
Status: CANCELLED | OUTPATIENT
Start: 2018-10-25

## 2018-10-25 RX ORDER — ASPIRIN 81 MG/1
81 TABLET ORAL DAILY
Status: DISCONTINUED | OUTPATIENT
Start: 2018-10-26 | End: 2018-11-21 | Stop reason: HOSPADM

## 2018-10-25 RX ORDER — NICOTINE 21 MG/24HR
14 PATCH, TRANSDERMAL 24 HOURS TRANSDERMAL DAILY
Status: CANCELLED | OUTPATIENT
Start: 2018-10-26

## 2018-10-25 RX ORDER — ATORVASTATIN CALCIUM 80 MG/1
80 TABLET, FILM COATED ORAL EVERY EVENING
Qty: 30 TABLET | Refills: 0 | Status: ON HOLD | OUTPATIENT
Start: 2018-10-25 | End: 2018-11-20 | Stop reason: CLARIF

## 2018-10-25 RX ORDER — ALPRAZOLAM 1 MG/1
1 TABLET ORAL 2 TIMES DAILY PRN
Qty: 6 TABLET | Refills: 0 | Status: ON HOLD | OUTPATIENT
Start: 2018-10-25 | End: 2018-11-20 | Stop reason: CLARIF

## 2018-10-25 RX ORDER — ONDANSETRON 2 MG/ML
4 INJECTION INTRAMUSCULAR; INTRAVENOUS EVERY 6 HOURS PRN
Status: CANCELLED | OUTPATIENT
Start: 2018-10-25

## 2018-10-25 RX ORDER — OXYCODONE HYDROCHLORIDE 5 MG/1
5 TABLET ORAL EVERY 4 HOURS PRN
Status: CANCELLED | OUTPATIENT
Start: 2018-10-25

## 2018-10-25 RX ORDER — OXYCODONE HYDROCHLORIDE 5 MG/1
5 TABLET ORAL EVERY 4 HOURS PRN
Qty: 12 TABLET | Refills: 0 | Status: ON HOLD | OUTPATIENT
Start: 2018-10-25 | End: 2018-11-20 | Stop reason: CLARIF

## 2018-10-25 RX ORDER — COLCHICINE 0.6 MG/1
1.2 TABLET ORAL ONCE
Status: COMPLETED | OUTPATIENT
Start: 2018-10-25 | End: 2018-10-25

## 2018-10-25 RX ORDER — ATORVASTATIN CALCIUM 40 MG/1
80 TABLET, FILM COATED ORAL EVERY EVENING
Status: CANCELLED | OUTPATIENT
Start: 2018-10-25

## 2018-10-25 RX ORDER — PREDNISONE 20 MG/1
40 TABLET ORAL DAILY
Status: COMPLETED | OUTPATIENT
Start: 2018-10-26 | End: 2018-10-27

## 2018-10-25 RX ORDER — ACETAMINOPHEN 325 MG/1
650 TABLET ORAL EVERY 6 HOURS PRN
Status: DISCONTINUED | OUTPATIENT
Start: 2018-10-25 | End: 2018-11-20

## 2018-10-25 RX ORDER — CLOPIDOGREL BISULFATE 75 MG/1
75 TABLET ORAL DAILY
Qty: 30 TABLET | Refills: 0 | Status: ON HOLD | OUTPATIENT
Start: 2018-10-25 | End: 2018-11-20 | Stop reason: CLARIF

## 2018-10-25 RX ORDER — ATORVASTATIN CALCIUM 80 MG/1
80 TABLET, FILM COATED ORAL EVERY EVENING
Status: DISCONTINUED | OUTPATIENT
Start: 2018-10-25 | End: 2018-11-21 | Stop reason: HOSPADM

## 2018-10-25 RX ORDER — ONDANSETRON 2 MG/ML
4 INJECTION INTRAMUSCULAR; INTRAVENOUS EVERY 6 HOURS PRN
Status: DISCONTINUED | OUTPATIENT
Start: 2018-10-25 | End: 2018-11-20

## 2018-10-25 RX ORDER — NICOTINE 21 MG/24HR
1 PATCH, TRANSDERMAL 24 HOURS TRANSDERMAL DAILY
Qty: 28 PATCH | Refills: 0 | Status: ON HOLD | OUTPATIENT
Start: 2018-10-26 | End: 2018-11-20 | Stop reason: CLARIF

## 2018-10-25 RX ORDER — PREDNISONE 20 MG/1
20 TABLET ORAL DAILY
Status: COMPLETED | OUTPATIENT
Start: 2018-10-28 | End: 2018-10-29

## 2018-10-25 RX ORDER — ASPIRIN 81 MG/1
81 TABLET ORAL DAILY
Qty: 30 TABLET | Refills: 0 | Status: ON HOLD | OUTPATIENT
Start: 2018-10-26 | End: 2018-11-20 | Stop reason: CLARIF

## 2018-10-25 RX ORDER — COLCHICINE 0.6 MG/1
0.6 TABLET ORAL ONCE
Status: COMPLETED | OUTPATIENT
Start: 2018-10-25 | End: 2018-10-25

## 2018-10-25 RX ORDER — CLOPIDOGREL BISULFATE 75 MG/1
75 TABLET ORAL DAILY
Status: DISCONTINUED | OUTPATIENT
Start: 2018-10-26 | End: 2018-11-21 | Stop reason: HOSPADM

## 2018-10-25 RX ORDER — LOSARTAN POTASSIUM 50 MG/1
50 TABLET ORAL DAILY
Status: DISCONTINUED | OUTPATIENT
Start: 2018-10-25 | End: 2018-11-21 | Stop reason: HOSPADM

## 2018-10-25 RX ORDER — ASPIRIN 81 MG/1
81 TABLET ORAL DAILY
Status: CANCELLED | OUTPATIENT
Start: 2018-10-26

## 2018-10-25 RX ORDER — ACETAMINOPHEN 325 MG/1
650 TABLET ORAL EVERY 6 HOURS PRN
Status: CANCELLED | OUTPATIENT
Start: 2018-10-25

## 2018-10-25 RX ORDER — FLUOXETINE HYDROCHLORIDE 20 MG/1
20 CAPSULE ORAL DAILY
Status: DISCONTINUED | OUTPATIENT
Start: 2018-10-25 | End: 2018-11-21 | Stop reason: HOSPADM

## 2018-10-25 RX ORDER — OXYCODONE HYDROCHLORIDE 5 MG/1
2.5 TABLET ORAL EVERY 4 HOURS PRN
Status: DISCONTINUED | OUTPATIENT
Start: 2018-10-25 | End: 2018-11-20

## 2018-10-25 RX ADMIN — INSULIN LISPRO 1 UNITS: 100 INJECTION, SOLUTION INTRAVENOUS; SUBCUTANEOUS at 18:44

## 2018-10-25 RX ADMIN — COLCHICINE 0.6 MG: 0.6 TABLET, FILM COATED ORAL at 18:09

## 2018-10-25 RX ADMIN — ACETAMINOPHEN 650 MG: 325 TABLET ORAL at 05:41

## 2018-10-25 RX ADMIN — COLCHICINE 1.2 MG: 0.6 TABLET, FILM COATED ORAL at 15:51

## 2018-10-25 RX ADMIN — LOSARTAN POTASSIUM 50 MG: 50 TABLET, FILM COATED ORAL at 15:51

## 2018-10-25 RX ADMIN — ASPIRIN 81 MG: 81 TABLET, COATED ORAL at 08:28

## 2018-10-25 RX ADMIN — FLUOXETINE 20 MG: 20 CAPSULE ORAL at 15:51

## 2018-10-25 RX ADMIN — ATORVASTATIN CALCIUM 80 MG: 80 TABLET, FILM COATED ORAL at 18:09

## 2018-10-25 RX ADMIN — ENOXAPARIN SODIUM 40 MG: 40 INJECTION SUBCUTANEOUS at 08:28

## 2018-10-25 RX ADMIN — NICOTINE 14 MG: 14 PATCH TRANSDERMAL at 08:29

## 2018-10-25 NOTE — UTILIZATION REVIEW
Thank you,  Janee Tomasn  Utilization Review Department  Phone: [phone]; Fax [phone]  ATTENTION: Please call with any questions or concerns to [phone]  and carefully follow the prompts so that you are directed to the right person  Send all requests for admission clinical reviews, approved or denied determinations and any other requests to fax [phone]   All voicemails are confidential    Continued Stay Review    Date: 10/25/2018    Vital Signs: Temp:  [97 8 °F (36 6 °C)-98 8 °F (37 1 °C)] 98 °F (36 7 °C)  HR:  [60-74] 60  Resp:  [18] 18  BP: (135-152)/(61-80) 139/66    Medications:   Scheduled Meds: Current Facility-Administered Medications:     acetaminophen (TYLENOL) tablet 650 mg, 650 mg, Oral, Q6H PRN, Niles Desnon PA-C, 650 mg at 10/25/18 0541    aspirin (ECOTRIN LOW STRENGTH) EC tablet 81 mg, 81 mg, Oral, Daily, Niles Denson PA-C, 81 mg at 10/25/18 8837    atorvastatin (LIPITOR) tablet 80 mg, 80 mg, Oral, QPM, Herminia Rosado PA-C, 80 mg at 10/24/18 1708    chlorhexidine (PERIDEX) 0 12 % oral rinse 15 mL, 15 mL, Swish & Spit, Q12H [address], , CRNP, 15 mL at 10/24/18 2143    enoxaparin (LOVENOX) subcutaneous injection 40 mg, 40 mg, Subcutaneous, Q24H [address], Niles Denson PA-C, 40 mg at 10/25/18 9632    LORazepam (ATIVAN) 2 mg/mL injection 0 5 mg, 0 5 mg, Intravenous, Once, Rakesh Jackson PA-C    nicotine (NICODERM CQ) 14 mg/24hr TD 24 hr patch 14 mg, 14 mg, Transdermal, Daily, , CRNP, 14 mg at 10/25/18 0829    ondansetron (ZOFRAN) injection 4 mg, 4 mg, Intravenous, Q6H PRN, Herminia Rosado PA-C, 4 mg at 10/21/18 0934    oxyCODONE (ROXICODONE) IR tablet 5 mg, 5 mg, Oral, Q4H PRN, Jens Ritter MD, 5 mg at 10/24/18 8451  Continuous Infusions: none  PRN Meds:     Abnormal Labs/Diagnostic Results: none    Age/Sex: 58 y o  male     Assessment/Plan: 10/25/2018 Attending MD:  Leg pain   Assessment & Plan     Venous doppler and X-ray are negative  Improved        Tobacco abuse   Assessment & Plan     States he is quitting        Hypertension   Assessment & Plan     BP is 139/66  Will continue with current meds        * Right sided cerebral hemisphere cerebrovascular accident (CVA) ([address] )   Assessment & Plan     Asa 81 mg QD  Also plavix load 300 mg yesterday the 75 mg once a day for 90 days  For [facility] rehab today  To start SSRI on day five will defer to neuro/PM&R  Will need holter monitor, can be placed with EP consult at [place]  10/25 Physical Medicine MD:  currently medically stable and has goals appropriate for acute inpatient rehabilitation due to the recent R MCA CVA  He will need 24-hour physiatry and nursing to implement plan of care and comanagement with internal medicine to maintain medical stability  He is of Moderate Risk due to the following comorbid conditions: carotid artery disease with risk of thromboembolic stroke with worsening cognition and function, accelerated hypertension, maintenance of cardiopulmonary function to maximize participation in therapy, bowel/bladder management, monitoring for and treating migraines, hyperlipidemia, tobacco abuse, dysarthria, oral dysphagia with risk of aspiration, and left hemiparesis monitoring for the development of spasticity that can impact function  He will tolerate 3-4 hours of  physical therapy, occupational therapy, speech therapy, neuropsychology and nursing disciplines to maximize function  It is expected that Tory Cho will make gains and will improve to S-Jemima level with transfers,  S-Jemima with mobility (S with wheelchair mobility) and S with ADLs and will be able to [facility]  Post-discharge treatment care plan will be developed and implemented for discharge  Estimated length of stay is expected to be 2-3 weeks     Discharge Plan:10/25 LE

## 2018-10-25 NOTE — ASSESSMENT & PLAN NOTE
Asa 81 mg QD  Also plavix load 300 mg yesterday the 75 mg once a day for 90 days  For [facility] rehab today  To start SSRI on day five will defer to neuro/PM&R  Will need holter monitor, can be placed with EP consult at [place]

## 2018-10-25 NOTE — CONSULTS
Consultation - Tory hCo 58 y o  male MRN: [id number]    Unit/Bed#: -01 Encounter: [id number]        History of Present Illness     HPI: Tory Cho is a 58y o  year old male who presents with multiple embolic R MCA infarcts  The pt developed acute onset of left hemiparesis as well as dysarthria and right sided headache while at a party  He immediately presented to ER and was evaluated and felt a good candidate for tPA  After tPA he initially had improvement of his symptoms however, he then developed left hemiparesis within 24 hours  Echocardiogram was normal, carotid u/s shows diffuse athersclerosis and it was felt that this was the source of the CVA  He was loaded with plavix and ASA 81mg was added, his statin was switched to high dose atorvastatin  During his hospitalization he developed acute left ankle pain  Xrays were done which were negative  The patient does have a h/o of gout taking indocin 50mg tid at home  ROS:  Constitutional: generalized weakness  HENT: Negative  Respiratory: Negative  Cardiovascular: Negative  Gastrointestinal: Negative  Musculoskeletal: L hemiparesis  Neurological: L facial droop, mild dysarthria; L hemiparesis  Psychiatric/Behavioral: Negative          Historical Information   Past Medical History:   Diagnosis Date    Hypertension     Migraine      Past Surgical History:   Procedure Laterality Date    APPENDECTOMY      KNEE SURGERY      NECK SURGERY      TONSILLECTOMY       Social History   History   Alcohol Use    Yes     Comment: socially      History   Drug Use No     History   Smoking Status    Current Every Day Smoker    Packs/day: 1 00    Types: Cigarettes, Cigars   Smokeless Tobacco    Never Used     Family History   Problem Relation Age of Onset    No Known Problems Mother     No Known Problems Father        Meds/Allergies   current meds:  Current Facility-Administered Medications   Medication Dose Route Frequency    acetaminophen (TYLENOL) tablet 650 mg  650 mg Oral Q6H PRN    [START ON 10/26/2018] aspirin (ECOTRIN LOW STRENGTH) EC tablet 81 mg  81 mg Oral Daily    atorvastatin (LIPITOR) tablet 80 mg  80 mg Oral QPM    [START ON 10/26/2018] enoxaparin (LOVENOX) subcutaneous injection 40 mg  40 mg Subcutaneous Q24H [facility]    [START ON 10/26/2018] nicotine (NICODERM CQ) 14 mg/24hr TD 24 hr patch 14 mg  14 mg Transdermal Daily    ondansetron (ZOFRAN) injection 4 mg  4 mg Intravenous Q6H PRN    oxyCODONE (ROXICODONE) IR tablet 2 5 mg  2 5 mg Oral Q4H PRN    pneumococcal 23-valent polysaccharide vaccine (PNEUMOVAX-23) injection 0 5 mL  0 5 mL Subcutaneous Prior to discharge       PTA meds:   Prescriptions Prior to Admission   Medication    [START ON 10/26/2018] aspirin (ECOTRIN LOW STRENGTH) 81 mg EC tablet    atorvastatin (LIPITOR) 80 mg tablet    clopidogrel (PLAVIX) 75 mg tablet    [START ON 10/26/2018] enoxaparin (LOVENOX) 40 mg/0 4 mL    [START ON 10/26/2018] nicotine (NICODERM CQ) 14 mg/24hr TD 24 hr patch    oxyCODONE (ROXICODONE) 5 mg immediate release tablet    ALPRAZolam (XANAX) 1 mg tablet     No Known Allergies    Objective   Vitals: Blood pressure 146/90, pulse 65, temperature 97 6 °F (36 4 °C), temperature source Oral, resp  rate 18, height 5' 9" (1 753 m), weight 83 2 kg (183 lb 6 4 oz), SpO2 94 %  Physical Exam   Constitutional: Pt is oriented to person, place, and time  HENT:  Normocephalic  EOM are normal  PERRLAt  Neck supple  Cardiovascular: Normal rate and regular rhythm  Pulmonary: Breath sounds normal  No respiratory distress  Pt has no wheezes nor rales  Abdominal: Soft  Bowel sounds are normal  Pt exhibits no distension  There is no tenderness  There is no rebound and no guarding  Neurological: Pt is alert and oriented to person, place, and time  Psychiatric: Pt has a normal mood and affect         Lab Results:   Results from last 7 days  Lab Units 10/24/18  9128 10/22/18  0436   WBC Thousand/uL 9 96 9 85   HEMOGLOBIN g/dL 13 9 14 1   HEMATOCRIT % 41 8 42 7   PLATELETS Thousands/uL 240 225       Results from last 7 days  Lab Units 10/24/18  0424 10/22/18  0436  10/20/18  2200   SODIUM mmol/L 141 140  < >  --    POTASSIUM mmol/L 3 7 3 7  < >  --    CHLORIDE mmol/L 104 104  < >  --    CO2 mmol/L 26 28  < >  --    BUN mg/dL 12 11  < >  --    CREATININE mg/dL 0 99 0 96  < >  --    GLUCOSE, ISTAT mg/dl  --   --   --  132   CALCIUM mg/dL 9 2 9 0  < >  --    < > = values in this interval not displayed  Results from last 7 days  Lab Units 10/21/18  0438   HEMOGLOBIN A1C % 6 6*       Results from last 7 days  Lab Units 10/21/18  0438 10/20/18  2205   INR  0 99 0 94       Glucose, i-STAT (mg/dl)   Date Value   10/20/2018 132       Labs reviewed    Imaging: reviewed  EKG, Pathology, and Other Studies: I have personally reviewed pertinent reports  VTE Prophylaxis: Enoxaparin (Lovenox)    Code Status: Level 1 - Full Code   Advance Directive and Living Will:      Power of :    POLST:      Assessment/Plan     # Multiple embolic R MCA CVA:  Cont ASA/Plavix for 90 days then Aspirin and statin therapy only; aggressive rehabilitation per PMR; f/u neurology on d/c; pt will also need outpatient f/u w/cardiology to determine whether or not he will need a loop vs event recorder    # HTN:  Off all meds while in acute; was previously on ARB/amlodipine/hctz; will add back losartan 50mg daily    # DM II:  Diet controlled; recent hgb A1C 6 6; pt aware and avoids excess sugar; cont sliding scale    # Tobacco abuse:  Cont patch; recommend cessation    # Anxiety:  Cont xanax, add fluoxetine 20mg daily; neuropsychiatry to see    # LLE pain:  Xrays negative, likely gout; pt was taking indocin 50mg 3x daily as outpt; will give colchicine today and a steroid taper over 4 days then dc      Counseling / Coordination of Care  Total floor / unit time spent today 45 minutes   Greater than 50% of total time was spent with the patient and / or family counseling and / or coordination of care        Angus Adam

## 2018-10-25 NOTE — DISCHARGE SUMMARY
Discharge- Gricelda Lawton 1955, 58 y o  male MRN: [id number]    Unit/Bed#: -01 Encounter: [id number]    Primary Care Provider: Judi Tarango MD   Date and time admitted to hospital: 10/20/2018  9:41 PM        Leg pain   Assessment & Plan    Venous doppler and X-ray are negative  Improved  Tobacco abuse   Assessment & Plan    States he is quitting  Hypertension   Assessment & Plan    BP is 139/66  Will continue with current meds  * Right sided cerebral hemisphere cerebrovascular accident (CVA) ([address] )   Assessment & Plan    Asa 81 mg QD  Also plavix load 300 mg yesterday the 75 mg once a day for 90 days  For [facility] rehab today  To start SSRI on day five will defer to neuro/PM&R  Will need holter monitor, [facility] Tabatha Emerson MD  PCP: Judi Tarango MD  Admission Date:   Admission Orders     Ordered        10/20/18 2326  Inpatient Admission (expected length of stay for this patient is greater than two midnights)  Once             Discharge Date: 10/25/18    Resolved Problems  Date Reviewed: 10/25/2018    None          Consultations During Hospital Stay:  · Neurology- Dr Garrison Hillman    Procedures Performed:     · MRI brain -   "1   Moderate amount of multifocal cortical infarctions involving distinct and separate foci in the right middle cerebral territory  2   Small amount of petechial hemorrhage in the insular cortex in the region of recent infarction "    Significant Findings / Test Results:     · As above  Incidental Findings:   · None   Test Results Pending at Discharge (will require follow up):   · Holter monitor  Outpatient Tests Requested:  · Holter monitor - called [facility] and signed out to them to consult EP to have this placed  Complications:    None  Reason for Admission:   Left sided weakness and facial droop       Hospital Course:     Gricelda Lawton is a 58 y o  male patient who originally presented to the hospital on 10/20/2018 due to left-sided weakness and facial droop  He also had a speech impairment and was found to have an acute stroke  The patient received tPA at 11:00 p m  on the day of admission after neurology assessment  He was initially on the ICU for neuro checks and monitoring post tPA He had no further complications during his hospital stay apart from a small amount of petechial hemorrhage noted on MR I  Aspirin was started on day 3 following his stroke and Plavix load was started on day for 300 mg given once  Following this the patient is to be discharged arc on aspirin 81 mg once a day 75 mg once a day of Plavix as well as high-dose atorvastatin  The patient did have carotid Dopplers done as well which showed less than 50% stenosis bilaterally, these were reviewed by Neurology who did not think that the patient needed a vascular surgery consultation at this time  The patient will need Holter monitor in the outpatient setting and since he is going to [facility] I have relayed to the staff there that he will need an electrophysiology consult have this placed  The patient made good progress while in hospital for the last 5 days and his left-sided weakness was improving rapidly as well as speech at the time of discharge  He was initially complaining of some left lower extremity pain which was also improving at the time of discharge for completion of venous Doppler was done as well as an x-ray of his ankle both of which were negative for any acute pathology  Please see above list of diagnoses and related plan for additional information  Condition at Discharge: stable     Discharge Day Visit / Exam:     Subjective:    Patient seen and examined  Patient is "annoyed" about paperwork he has to fill out for work       Otherwise he is feeling "okay"  Vitals: Blood Pressure: 139/66 (10/25/18 0700)  Pulse: 60 (10/25/18 0700)  Temperature: 98 °F (36 7 °C) (10/25/18 0700)  Temp Source: Oral (10/25/18 0700)  Respirations: 18 (10/25/18 0700)  Height: 5' 9" (175 3 cm) (10/21/18 0145)  Weight - Scale: 89 7 kg (197 lb 12 oz) (10/25/18 0600)  SpO2: 96 % (10/25/18 0700)  Exam:   Physical Exam   Constitutional: He is oriented to person, place, and time  He appears well-developed  No distress  HENT:   Head: Normocephalic  Mouth/Throat: No oropharyngeal exudate  Eyes: Right eye exhibits no discharge  Left eye exhibits no discharge  No scleral icterus  Neck: No JVD present  No tracheal deviation present  No thyromegaly present  Cardiovascular: Normal rate  Exam reveals no gallop and no friction rub  No murmur heard  Pulmonary/Chest: Effort normal  No respiratory distress  He has no wheezes  He has no rales  He exhibits no tenderness  Abdominal: Soft  He exhibits no distension and no mass  There is no tenderness  There is no rebound and no guarding  Musculoskeletal: He exhibits no edema, tenderness or deformity  Lymphadenopathy:     He has no cervical adenopathy  Neurological: He is alert and oriented to person, place, and time  He displays abnormal reflex  A cranial nerve deficit is present  Coordination abnormal    Skin: No rash noted  He is not diaphoretic  No erythema  No pallor  Psychiatric: He has a normal mood and affect  Discussion with Family: Discussed with patient and his brother  Discharge instructions/Information to patient and family:   See after visit summary for information provided to patient and family  Provisions for Follow-Up Care:  See after visit summary for information related to follow-up care and any pertinent home health orders  Disposition:     Acute Rehab at [facility]  For Discharges to [facility]:   · Not Applicable to this Patient - Not Applicable to this Patient    Planned Readmission: none  Discharge Statement:  I spent 45 minutes discharging the patient   This time was spent on the day of discharge  I had direct contact with the patient on the day of discharge  Greater than 50% of the total time was spent examining patient, answering all patient questions, arranging and discussing plan of care with patient as well as directly providing post-discharge instructions  Additional time then spent on discharge activities  Discharge Medications:  See after visit summary for reconciled discharge medications provided to patient and family        ** Please Note: This note has been constructed using a voice recognition system **

## 2018-10-25 NOTE — H&P
PHYSICAL MEDICINE AND REHABILITATION H&P/ADMISSION NOTE  Carmelita Haile 58 y o  male MRN: [id number]  Unit/Bed#: -01 Encounter: [id number]     Rehab Diagnosis: Impairment of mobility, safety, Activities of Daily Living (ADLs), and cognitive/communication skills due to Stroke:  01 1  Left Body Involvement (Right Brain)    History of Present Illness:   Carmelita Haile is a 58 y o  male who presented to the [facility] on 10/20/18 with new onset left-sided diminished sensation, weakness, difficulty speaking, and facial droop  He has a PMH significant for HTN, HLD, and tobacco abuse  CTH was negative for hemorrhage  NIHSS 9  He received IV Labetalol for /110 and tPA  Echo showed EF 60% with no RWMA  A1C 6 6  CTA showed 50% stenosis of the proximal R ICA, and 30-40% stenosis of the proximal L ICA  MRI Brain showed multifocal cortical infarctions in the distribution of the R MCA  Concern was for thromboembolic etiology due to carotid disease  Ultimately, Neurology recommended Plavix load (10/24), and then DAPT for 90 days followed by monotherapy with ASA  Cardiology was consulted who recommended holter monitor to evaluate for underlying afib  They also started him on Fluoxetine 20mg post stroke day 5 for motor function recovery  His course was c/b by left lower extremity ankle pain, which began in his toe  Of note, he does have a history of gout  Venous doppler and X-ray were negative for clot or acute injury  He was determined to be stable for transport to the [facility] on 10/25/18  Subjective: At this time he denies any headache, dizziness, lightheadedness, difficulty sleeping, changes in vision, N/V  He denies any new numbness/tingling  He has a history of gout that his presented in his left great toe  He developed L great toe pain during his inpatient stay, which spread to his ankle, and now has an effusion   He has been off of his gout medication at home, which his daughter states he was on indomethacin  No fevers, chills  He had a BM this morning  Having some difficulty initiating his stream, but is able to urinate when he turns on a faucet  He denies ayn dysuria  This is a new symptoms since his stroke  Review of Systems: A 10-point review of systems was performed  Negative except as listed above  Plan:     * Right sided cerebral hemisphere cerebrovascular accident (CVA) ([address] )   Assessment & Plan    On DAPT for 90 days, with aspirin/plavix, followed by monotherapy with ASA  Possibly cardioembolic in etiology  Consulted EP for eval/placement of loop recorder    - Fluoxetine 20mg post stroke for motor recovery  Monitor for tone  Urinary hesitancy   Assessment & Plan    Denies dysuria  WBC WNL  Afebrile  - PVRx3, or bladder scan Q6hr if no void  Cath for >400cc  If all <150cc can discontinue  - Monitor  May need flomax if retaining and blood pressure can tolerate  Type 2 diabetes mellitus without complication, without long-term current use of insulin [facility])   Assessment & Plan    Lab Results   Component Value Date    HGBA1C 6 6 (H) 10/21/2018       Recent Labs      10/24/18   1226  10/24/18   1924  10/25/18   0636  10/25/18   1814   POCGLU  98  109  92  151*       Blood Sugar Average: Last 72 hrs:  (P) 151     Consult dietary, internal medicine  May need to be placed on oral medications to maintain euglycemia  Correctional dosing insulin and accuchecks  May need supplies to monitor blood sugars at home at discharge  Will need follow-up with PCP  Acute left ankle pain   Assessment & Plan    With swelling  Likely 2/2 to known history of gout  - No plan to tap ankle at this time  - On Indocin 50mg TID at home  - No NSAIDs at this time  - Per IM Colchicine and steroid taper   - Monitor  Hyperlipidemia   Assessment & Plan    Continue statin  Anxiety   Assessment & Plan    Was on Xanax on inpatient side, but has not used in several days    - Consult neuropsychology Dr Teresa Verduzco     Tobacco abuse   Assessment & Plan    Continue nicotine patch, wean down  Hypertension   Assessment & Plan    Continue Losartan 50mg  IM following, recs appreciated  Goal SBP <140     Dysarthria   Assessment & Plan    SLP following  Intelligible  Left hemiplegia [facility])   Assessment & Plan    Monitor for tone  At this time LUE with decreased tone  Has good strength in LLE  Drug regimen reviewed, all potential adverse effects identified and addressed:    Scheduled Meds:    Current Facility-Administered Medications:  acetaminophen 650 mg Oral Q6H PRN Johny Stewart MD   [START ON 10/26/2018] aspirin 81 mg Oral Daily Johny Stewart MD   atorvastatin 80 mg Oral QPM Johny Stewart MD   [START ON 10/26/2018] clopidogrel 75 mg Oral Daily PATEL Fox   [START ON 10/26/2018] enoxaparin 40 mg Subcutaneous Q24H [facility] Johny Stewart MD   FLUoxetine 20 mg Oral Daily PATEL Fox   insulin lispro 1-5 Units Subcutaneous 4 times day Johny Stewart MD   losartan 50 mg Oral Daily LorPATEL Sawyer   [START ON 10/26/2018] nicotine 14 mg Transdermal Daily Johny Stewart MD   ondansetron 4 mg Intravenous Q6H PRN Johny Stewart MD   oxyCODONE 2 5 mg Oral Q4H PRN Johny Stewart MD   pneumococcal 23-valent polysaccharide vaccine 0 5 mL Subcutaneous Prior to discharge Johny Stewart MD   Anaya Lay ON 10/28/2018] predniSONE 20 mg Oral Daily PATEL Fox   [START ON 10/26/2018] predniSONE 40 mg Oral Daily PATEL Fox        Restrictions include:  none Fall precautions      Functional History - Prior to Admission:      Functional Status: Patient was independent with mobility/ambulation, transfers, ADL's, IADL's  Functional Status Upon Admission to [facility]:  Mobility: ModA bed mobility  Transfers: MaxA  ADLs: MaxA Lb dressing, toileting    Susan Calles lives in a mother in law suite type home connected to son's house      The living area: can live on one level , 4 steps to [employer] where the TV is  Equipment in home: Grab Bars  There No steps to enter the home  Patient/family's goals: Return to previous home/apartment  The patient will not have 24 hour supervision/physical assistance available upon discharge      Physical Exam:  Temp:  [97 6 °F (36 4 °C)-98 8 °F (37 1 °C)] 97 6 °F (36 4 °C)  HR:  [60-74] 65  Resp:  [18] 18  BP: (135-146)/(66-90) 146/90  SpO2:  [94 %-97 %] 94 %    Gen: No acute distress, Well-nourished, well-appearing  HEENT: Moist mucus membranes, Normocephalic/Atraumatic PEERL  EOMI  Cardiovascular: Regular rate, rhythm, S1/S2  Distal pulses palpable  Heme/Extr: No edema/clubbing/cyanosis  Pulmonary: Non-labored breathing  Lungs CTAB  : No nichols  GI: Soft, non-tender, non-distended  BS+  MSK: PROM is full in all extremities  No effusions or deformities  Bulk is symmetric  No L heel cord tightness See below for MMT scores  Integumentary: Skin is warm, dry  No rashes or ulcers  Neuro: AAOx3, L CN7, tongue deviates to the left, peripheral vision intact  Sensation intact to light touch throughout  No extinction  Speech is intact but dysarthric  Appropriate to questioning  UE with decreased tone  DTRs: Diminished on the left compared to the right in biceps, triceps, brachioradialis  No clonus  Yoko's  absent, plantar response is extensor on the left  Coord: Normal FTN on the right  MMT:   Strength:   Right  Left  Site  Right  Left  Site    5 1-2  S Ab: Shoulder Abductors  5  4+  HF: Hip Flexors    5 1  EF: Elbow Flexors  5  5 KF: Knee Flexors    5  2  EE: Elbow Extensors  5  5  KE: Knee Extensors    5  0  WE: Wrist Extensors  5  4+  DR: Dorsi Flexors    5  0  FF: Finger Flexors  5  4+  PF: Plantar Flexors    5  0  HI: Hand Intrinsics  5  4  EHL: Extensor Hallucis Longus   Psych: Normal mood and affect       Laboratory:      Results from last 7 days  Lab Units 10/24/18  0424 10/22/18  0436 10/21/18  0438   HEMOGLOBIN g/dL 13 9 14 1 13 7   HEMATOCRIT % 41 8 42 7 41 3   WBC Thousand/uL 9 96 9 85 9 67       Results from last 7 days  Lab Units 10/24/18  0424 10/22/18  0436 10/21/18  0438  10/20/18  2200   BUN mg/dL 12 11 18  < >  --    SODIUM mmol/L 141 140 139  < >  --    POTASSIUM mmol/L 3 7 3 7 3 6  < >  --    CHLORIDE mmol/L 104 104 104  < >  --    GLUCOSE, ISTAT mg/dl  --   --   --   --  132   CREATININE mg/dL 0 99 0 96 0 97  < >  --    AST U/L  --   --  20  --   --    ALT U/L  --   --  34  --   --    < > = values in this interval not displayed  Results from last 7 days  Lab Units 10/21/18  0438 10/20/18  2205   PROTIME seconds 12 8 12 3   INR  0 99 0 94        Wt Readings from Last 1 Encounters:   10/25/18 83 2 kg (183 lb 6 4 oz)     Estimated body mass index is 27 08 kg/m² as calculated from the following:    Height as of this encounter: 5' 9" (1 753 m)  Weight as of this encounter: 83 2 kg (183 lb 6 4 oz)  Imaging: reviewed     Rehabilitation Prognosis: good     Tolerance for three hours of therapy a day: good     Family/Patient Goals:  Patient/family's goals: Return to previous home/apartment  Patient will receive PT, OT and ST 60 minutes each per day, five days per week to achieve rehab goals  Mobility Goals:   Bed Mobility: Moderate Rains  Bed to wheelchair transfer: Supervision  Sit to stand: Supervision  Ambulation: Stand by Assist  Stairs: Minimal Assist  Manual wheelchair: Moderate Rains    Activities of Daily Living (ADLs) Goals:  Eating: Independent  Hygiene and Grooming: Moderate Rains  Bathing: Supervision  Upper Extremity Dressing: Minimal Assist  Diet / Swallowing: Supervision  Lower Extremity Dressing: Supervision  Tub/shower Transfers: Contact Guard Assist  Toilet Transfers: Minimal Assist  Toileting / Hygiene:  Supervision  Directing cares:  Moderate Rains    Cognition / Communication:  Cognition grossly intact, Speech intact or Decreased carry over    Discharge Planning:  Rehabilitation and discharge goals discussed with the patient and/or family  Case Managment and Social Work to review patient/family resources and to coordinate Discharge Planning  Estimated length of stay: 2 - 3 weeks    Patient and Family Education and Training:  Rehabilitation and discharge goals discussed with the patient and/or family  Patient/family education/training needs to be discussed in weekly team meeting  Other equipment:  Will need a wheelchair evaluation      Past Medical History:   Past Surgical History:   Family History:   Social history:   Past Medical History:   Diagnosis Date    Hypertension     Migraine     Past Surgical History:   Procedure Laterality Date    APPENDECTOMY      KNEE SURGERY      NECK SURGERY      TONSILLECTOMY       Family History   Problem Relation Age of Onset    No Known Problems Mother     No Known Problems Father       Social History     Social History    Marital status: Single     Spouse name: N/A    Number of children: N/A    Years of education: N/A     Social History Main Topics    Smoking status: Current Every Day Smoker     Packs/day: 1 00     Types: Cigarettes, Cigars    Smokeless tobacco: Never Used    Alcohol use Yes      Comment: socially     Drug use: No    Sexual activity: Not Asked     Other Topics Concern    None     Social History Narrative    None          Current Medical Diagnosis Allergies   Patient Active Problem List   Diagnosis    Right sided cerebral [facility] [address] accident ([address]) [address] [address] [address]    Left hemiplegia [address] [address]    Dysarthria    Hypertension    Tobacco abuse    Anxiety    Hyperlipidemia    Leg pain    No Known Allergies        Medical Necessity Criteria for [facility] Admission: He is of Moderate Risk due to the following comorbid conditions: carotid artery disease with risk of thromboembolic stroke with worsening cognition and function, accelerated hypertension, maintenance of cardiopulmonary function to maximize participation in therapy, bowel/bladder management, monitoring for and treating migraines, hyperlipidemia, tobacco abuse, dysarthria, oral dysphagia with risk of aspiration, and left hemiparesis monitoring for the development of spasticity that can impact function    In addition, the preadmission screen, post-admission physical evaluation, overall plan of care and admissions order demonstrate a reasonable expectation that the following criteria were met at the time of admission to the [facility]  1  The patient requires active and ongoing therapeutic intervention of multiple therapy disciplines (physical therapy, occupational therapy, speech-language pathology, or prosthetics/orthotics), one of which is physical or occupational therapy  2  Patient requires an intensive rehabilitation therapy program, as defined in Chapter 1, section 110 2 2 of the CMS Medicare Policy Manual  This intensive rehabilitation therapy program will consist of at least 3 hours of therapy per day at least 5 days per week or at least 15 hours of intensive rehabilitation therapy within a 7 consecutive day period, beginning with the date of admission to the [facility]  3  The patient is reasonably expected to actively participate in, and benefit significantly from, the intensive rehabilitation therapy program as defined in Chapter 1, section 110 2 2 of the CMS Medicare Policy Manual at this time of admission to the [facility]  He can reasonably be expected to make measurable improvement (that will be of practical value to improve the patients functional capacity or adaptation to impairments) as a result of the rehabilitation treatment, as defined in section 110 3, and such improvement can be expected to be made within the prescribed period of time   As noted in the CMS Medicare Policy Manual, the patient need not be expected to achieve complete independence in the domain of self-care nor be expected to return to his or her prior level of functioning in order to meet this standard  4  The patient must require physician supervision by a rehabilitation physician  As such, a rehabilitation physician will conduct face-to-face visits with the patient at least 3 days per week throughout the patients stay in the [facility] to assess the patient both medically and functionally, as well as to modify the course of treatment as needed to maximize the patients capacity to benefit from the rehabilitation process  5  The patient requires an intensive and coordinated interdisciplinary approach to providing rehabilitation, as defined in Chapter 1, section 110 2 5 of the CMS Medicare Policy Manual  This will be achieved through periodic team conferences, conducted at least once in a 7-day period, and comprising of an interdisciplinary team of medical professionals consisting of: a rehabilitation physician, registered nurse,  and/or , and a licensed/certified therapist from each therapy discipline involved in treating the patient  Changes Since Pre-admission Assessment: None -This patient's participation in rehab continues to be reasonable, necessary and appropriate  CMS Required Post-Admission Physician Evaluation Elements  History and Physical, including medical history, functional history and active comorbidities as in above text      PostAdmission Physician Evaluation:  The patient has the potential to make improvement and is in need of physical, occupational, and/or therapy services  The patient may also need nutritional services  Given the patient's complex medical condition and risk of further medical complications, rehabilitative services cannot be safely provided at a lower level of care, such as a skilled nursing facility  I have reviewed the patient's functional and medical status at the time of the preadmission screening and they are the same as on the day of this admission   I acknowledge that I have personally performed a full physical examination on this patient within 24 hours of admission  The patient and/or family demonstrated understanding the rehabilitation program and the discharge process after we discussed them      Agree in entirety: yes  Minor adaptions: none    Major changes: none     Ulices Morejon MD  Physical Medicine and Rehabilitation    ** Please Note: Fluency Direct voice to text software may have been used in the creation of this document   **

## 2018-10-25 NOTE — PLAN OF CARE
Knowledge Deficit     Patient/family/caregiver demonstrates understanding of disease process, treatment plan, medications, and discharge instructions Adequate for Discharge

## 2018-10-26 LAB
ANION GAP SERPL CALCULATED.3IONS-SCNC: 7 MMOL/L (ref 4–13)
BUN SERPL-MCNC: 16 MG/DL (ref 5–25)
CALCIUM SERPL-MCNC: 8.9 MG/DL (ref 8.3–10.1)
CHLORIDE SERPL-SCNC: 105 MMOL/L (ref 100–108)
CO2 SERPL-SCNC: 24 MMOL/L (ref 21–32)
CREAT SERPL-MCNC: 0.84 MG/DL (ref 0.6–1.3)
ERYTHROCYTE [DISTWIDTH] IN BLOOD BY AUTOMATED COUNT: 14.2 % (ref 11.6–15.1)
GFR SERPL CREATININE-BSD FRML MDRD: 93 ML/MIN/1.73SQ M
GLUCOSE SERPL-MCNC: 101 MG/DL (ref 65–140)
GLUCOSE SERPL-MCNC: 115 MG/DL (ref 65–140)
GLUCOSE SERPL-MCNC: 131 MG/DL (ref 65–140)
GLUCOSE SERPL-MCNC: 160 MG/DL (ref 65–140)
GLUCOSE SERPL-MCNC: 91 MG/DL (ref 65–140)
GLUCOSE SERPL-MCNC: 98 MG/DL (ref 65–140)
HCT VFR BLD AUTO: 41.8 % (ref 36.5–49.3)
HCV AB SER QL: NORMAL
HGB BLD-MCNC: 13.9 G/DL (ref 12–17)
MCH RBC QN AUTO: 29.3 PG (ref 26.8–34.3)
MCHC RBC AUTO-ENTMCNC: 33.3 G/DL (ref 31.4–37.4)
MCV RBC AUTO: 88 FL (ref 82–98)
PLATELET # BLD AUTO: 253 THOUSANDS/UL (ref 149–390)
PMV BLD AUTO: 10 FL (ref 8.9–12.7)
POTASSIUM SERPL-SCNC: 3.8 MMOL/L (ref 3.5–5.3)
RBC # BLD AUTO: 4.75 MILLION/UL (ref 3.88–5.62)
SODIUM SERPL-SCNC: 136 MMOL/L (ref 136–145)
WBC # BLD AUTO: 8.06 THOUSAND/UL (ref 4.31–10.16)

## 2018-10-26 PROCEDURE — 97163 PT EVAL HIGH COMPLEX 45 MIN: CPT

## 2018-10-26 PROCEDURE — 97116 GAIT TRAINING THERAPY: CPT

## 2018-10-26 PROCEDURE — 86803 HEPATITIS C AB TEST: CPT | Performed by: PHYSICAL MEDICINE & REHABILITATION

## 2018-10-26 PROCEDURE — 97127 HB COGNITIVE SKILLS DEVELOPMENT, EACH 15 MUNUTES: CPT

## 2018-10-26 PROCEDURE — 82948 REAGENT STRIP/BLOOD GLUCOSE: CPT

## 2018-10-26 PROCEDURE — 97167 OT EVAL HIGH COMPLEX 60 MIN: CPT

## 2018-10-26 PROCEDURE — 92523 SPEECH SOUND LANG COMPREHEN: CPT

## 2018-10-26 PROCEDURE — G0515 COGNITIVE SKILLS DEVELOPMENT: HCPCS

## 2018-10-26 PROCEDURE — 85027 COMPLETE CBC AUTOMATED: CPT | Performed by: PHYSICAL MEDICINE & REHABILITATION

## 2018-10-26 PROCEDURE — 99232 SBSQ HOSP IP/OBS MODERATE 35: CPT | Performed by: PHYSICAL MEDICINE & REHABILITATION

## 2018-10-26 PROCEDURE — 92610 EVALUATE SWALLOWING FUNCTION: CPT

## 2018-10-26 PROCEDURE — 97530 THERAPEUTIC ACTIVITIES: CPT

## 2018-10-26 PROCEDURE — 97535 SELF CARE MNGMENT TRAINING: CPT

## 2018-10-26 PROCEDURE — 80048 BASIC METABOLIC PNL TOTAL CA: CPT | Performed by: PHYSICAL MEDICINE & REHABILITATION

## 2018-10-26 RX ORDER — LANOLIN ALCOHOL/MO/W.PET/CERES
3 CREAM (GRAM) TOPICAL
Status: DISCONTINUED | OUTPATIENT
Start: 2018-10-26 | End: 2018-11-01

## 2018-10-26 RX ADMIN — FLUOXETINE 20 MG: 20 CAPSULE ORAL at 08:54

## 2018-10-26 RX ADMIN — ATORVASTATIN CALCIUM 80 MG: 80 TABLET, FILM COATED ORAL at 17:12

## 2018-10-26 RX ADMIN — MELATONIN 3 MG: at 21:49

## 2018-10-26 RX ADMIN — LOSARTAN POTASSIUM 50 MG: 50 TABLET, FILM COATED ORAL at 08:54

## 2018-10-26 RX ADMIN — ENOXAPARIN SODIUM 40 MG: 40 INJECTION SUBCUTANEOUS at 08:53

## 2018-10-26 RX ADMIN — PREDNISONE 40 MG: 20 TABLET ORAL at 08:54

## 2018-10-26 RX ADMIN — INSULIN LISPRO 1 UNITS: 100 INJECTION, SOLUTION INTRAVENOUS; SUBCUTANEOUS at 16:32

## 2018-10-26 RX ADMIN — NICOTINE 14 MG: 14 PATCH, EXTENDED RELEASE TRANSDERMAL at 08:53

## 2018-10-26 RX ADMIN — NYSTATIN 500000 UNITS: 100000 SUSPENSION ORAL at 17:12

## 2018-10-26 RX ADMIN — CLOPIDOGREL 75 MG: 75 TABLET, FILM COATED ORAL at 08:54

## 2018-10-26 RX ADMIN — NYSTATIN 500000 UNITS: 100000 SUSPENSION ORAL at 21:49

## 2018-10-26 RX ADMIN — ASPIRIN 81 MG: 81 TABLET, COATED ORAL at 08:54

## 2018-10-26 RX ADMIN — NYSTATIN 500000 UNITS: 100000 SUSPENSION ORAL at 12:54

## 2018-10-26 NOTE — PROGRESS NOTES
Internal Medicine Progress Note  Patient: Parag Kennedy  Age/sex: 58 y o  male  Medical Record #: [id number]      ASSESSMENT/PLAN:  Parag Kennedy is seen and examined and management for following issues:    Multiple embolic R MCA CVA:  Cont ASA/Plavix for 90 days then Aspirin and statin therapy only; EP to see for a LOOP now     HTN:  Off all meds while in acute; was previously on ARB/amlodipine/hctz; yesterday added back Losartan 50mg daily  No other changes today  OP med list will need to be clarified so will call Dr Blake Filter office on Monday      DM II:  Diet controlled; recent HbA1C 6 6; pt aware and avoids excess sugar; cont sliding scale and add DM diet today; BS 92, X, 151, 91; fasting today 101     Tobacco abuse:  Cont patch; recommend cessation     Anxiety:  Cont Fluoxetine 20mg daily; neuropsychiatry to see     Left ankle and left 2nd toe pain:  Xray of foot and ankle werenegative, likely gout; pt was taking Indocin 50mg 3x daily as outpt; yesterday, gave Colchicine 1 2 mg x 1 and 0 6mg x 1 and started a steroid taper over 4 days then dc; says improving    Thrush:  Nystatin ordered      Subjective: Patient seen and examined   New or overnight issues     ROS:   GI: denies abdominal pain, change bowel habits or reflux symptoms  Neuro: No new neurologic changes  Respiratory: No Cough, SOB  Cardiovascular: No CP, palpitations     Scheduled Meds:    Current Facility-Administered Medications:  acetaminophen 650 mg Oral Q6H PRN Kevon Zhang MD   aspirin 81 mg Oral Daily Kevon Zhang MD   atorvastatin 80 mg Oral QPM Kevon Zhang MD   clopidogrel 75 mg Oral Daily Roxene Najjar, CRNP   enoxaparin 40 mg Subcutaneous Q24H [address] Kevon Zhang MD   FLUoxetine 20 mg Oral Daily PATEL Gautam   insulin lispro 1-5 Units Subcutaneous TID AC PATEL Sutherland   insulin lispro 1-5 Units Subcutaneous HS PATEL Sutherland   losartan 50 mg Oral Daily Roxene Najjar, CRNP   nicotine 14 mg Transdermal Daily Kumar Gabriel MD   ondansetron 4 mg Intravenous Q6H PRN Kumar Gabriel MD   oxyCODONE 2 5 mg Oral Q4H PRN Kumar Gabriel MD   pneumococcal 23-valent polysaccharide vaccine 0 5 mL Subcutaneous Prior to discharge Kumar Gabriel MD   Jong Juan Carlos ON 10/28/2018] predniSONE 20 mg Oral Daily PATEL Ho   predniSONE 40 mg Oral Daily PATEL Ho       Labs:       Results from last 7 days  Lab Units 10/26/18  0611 10/24/18  0424   WBC Thousand/uL 8 06 9 96   HEMOGLOBIN g/dL 13 9 13 9   HEMATOCRIT % 41 8 41 8   PLATELETS Thousands/uL 253 240       Results from last 7 days  Lab Units 10/26/18  0611 10/24/18  0424  10/20/18  2200   SODIUM mmol/L 136 141  < >  --    POTASSIUM mmol/L 3 8 3 7  < >  --    CHLORIDE mmol/L 105 104  < >  --    CO2 mmol/L 24 26  < >  --    BUN mg/dL 16 12  < >  --    CREATININE mg/dL 0 84 0 99  < >  --    GLUCOSE, ISTAT mg/dl  --   --   --  132   CALCIUM mg/dL 8 9 9 2  < >  --    < > = values in this interval not displayed      Results from last 7 days  Lab Units 10/21/18  0438   HEMOGLOBIN A1C % 6 6*       Results from last 7 days  Lab Units 10/21/18  0438 10/20/18  2205   INR  0 99 0 94        Glucose, i-STAT (mg/dl)   Date Value   10/20/2018 132       Labs reviewed    Physical Examination:  Vitals:   Vitals:    10/25/18 1253 10/25/18 2034 10/26/18 0500   BP: 146/90 151/87 131/86   BP Location: Right arm Left arm    Pulse: 65 63 65   Resp: 18 20 20   Temp: 97 6 °F (36 4 °C) 97 7 °F (36 5 °C) 99 3 °F (37 4 °C)   TempSrc: Oral Oral Oral   SpO2: 94% 96% 95%   Weight: 83 2 kg (183 lb 6 4 oz)     Height: 5' 9" (1 753 m)       Constitutional:  NAD; pleasant; nontoxic  HEENT:  AT/NC; oropharynx + for thrush on tongue   Neck: negative for JVD  CV:  +S1, S2;  RRR; no rub/murmur  Pulmonary:  BBS without crackles/wheeze/rhonci; resp are unlabored  Abdominal:  soft, +BS, ND/NT; no mass  Skin:  no rashes  Musculoskeletal:  Left medial ankle 1+ edema but no redness; left 2nd toe w/o red/erythema  :  no nichols   Neurological/Psych:  AAO; Can slightly move left fingers but not grasp = can lift at shoulder but not flex at elbow;  LLE HF 4/5 with DF/PF 4-/5; + left facial droop and tongue deviation; speech is clear; no depression/anxiety        [x ] Total time spent: 30 Mins and greater than 50% of this time was spent counseling/coordinating care  ** Please Note: Dragon 360 Dictation voice to text software may have been used in the creation of this document   **

## 2018-10-26 NOTE — ASSESSMENT & PLAN NOTE
· No record of any benzodiazepines (or any other controlled medications) found in the [employer] database going back 1 year  · No complaint of anxiety during admission  · On prozac for stroke motor recovery

## 2018-10-26 NOTE — ASSESSMENT & PLAN NOTE
· On DAPT for 90 days, with aspirin/plavix, followed by monotherapy with ASA  · Possibly cardioembolic in etiology   Consulted EP for eval/placement of loop recorder however they recommend JAYE first which was negative for thrombus in MICHELLE however did reveal aortic atheroma, will d/w neuro (Dr Mahlon Angelucci) if they feel the atheroma is the etiology of the stroke and did not feel that atheroma was likely severe enough to change from  to [facility] even if this was the potential etiology so management recommendations per neuro was unchanged and patient had LOOP placed   · Fluoxetine 20mg post stroke for motor recovery

## 2018-10-26 NOTE — PROGRESS NOTES
Pt is resting in bed  Pt has many family members at bedside   Pt has no complaints of pain at this time

## 2018-10-26 NOTE — PROGRESS NOTES
SLP TAA   10/26/18 1100   Patient Data   Rehab Impairment Impairment of mobility, safety, Activities of Daily Living (ADLs), and cognitive/communication skills due to Stroke   Etiologic Diagnosis Multiple embolic-appearing Right MCA infarctions    Date of Onset 10/20/18   Baseline Information   Vocation Work Full Time   Transportation    Prior IADL Participation   Money Management Identify Money;Estimate Costs;Estimate Change;Combine Bills;Manage Checkbook   Meal Preparation Full Participation   Laundry Full Participation   Home Cleaning Full Participation   Prior Level of Function   Functional Cognition 3  Independent - Patient completed the activities by him/herself, with or without an assistive device, with no assistance from a helper  Psychosocial   Psychosocial (WDL) WDL   Patient Behaviors/Mood Cooperative;Pleasant   Restrictions/Precautions   Precautions Aspiration; Fall Risk;Supervision on toilet/commode   Pain Assessment   Pain Assessment 0-10   Pain Score No Pain   QI: Eating   Assistance Needed Set-up / clean-up   Assistance Provided by [place] No physical assistance   Eating CARE Score 5   Eating Assessment   Swallow Precautions Yes   Bedside Swallow Results Yes  (recs for dysphagia 2 and thin liquids)   VBS Study Results No   Food To Mouth Yes   Noted Other  (throat clearing)   Positioning Upright;Out of Bed  (straws preferred due to decreased lip seal)   Safety Needs Increase Time;Other  (minimize distractions during meals )   Meal Assessed Lunch   QI: Swallowing/Nutritional Status Modified food consistency   Current Diet Dysphia II; Thin   Intake Mode PO;Self   Finishes Timely Yes   Opens Packages No   Findings Pt presenting w/ mild-moderate oral dysphagia at time of assessment  Recommend pt continue dysphagia 2 diet and thin liquids  See SLP Rehab note for full details      Eating (FIM) 5 - Patient needs help to open contianers or set up tray   Comprehension   Assist Devices Glasses Auditory Basic;Complex   Visual Basic;Complex   Findings Pt completing formalized cognitive linguistic assessment  See SLP Rehab note for full details  QI: Comprehension 4  Undestands: Clear comprehension without cues or repetitions   Comprehension (FIM) 6 - Has only MILD difficulty with complex/abstract info   Expression   Verbal Basic   Non-Verbal Basic   Intelligibility Sentence   Findings Pt presenting w/ mild dysarthria, more so noted as pt fatigues  Imprecise articulation and slurred speech noted at end of assessment, however, ability to express basic needs remained functional  See SLP Rehab note for full details  QI: Expression 3  Exhibits some difficulty with expressing needs and ideas (e g , some words or finishing thoughts) or speech is not clear   Expression (FIM) 5 - Needs help/cues only RARELY (< 10% of the time)   Social Interaction   Cooperation with staff   Participation Individual   Behaviors observed Appropriate   Findings Pt was cooperative and participatory throughout assessment  Social Interaction (FIM) 7 - Interacts appropriately without assistive device, medication or helper   Problem Solving   Routine Manages call bell   Findings Pt completed formalized cognitive linguistic assessment  See SLP Rehab note for full details  Problem solving (FIM) 5 - Solves basic problems 90% of time   Memory   Recognize People Yes   Remember Routine Yes   Initiates Tasks Yes   Short-Term Impaired  (very mild deficits noted as pt fatigues)   Long Term Intact   Findings Pt completed formalized cognitive linguistic assessment  See SLP Rehab note for full details  Memory (FIM) 5 - Needs cueing reminders <10%   Cognition   Overall Cognitive Status WFL   Arousal/Participation Alert; Cooperative   Attention Within functional limits   Orientation Level Oriented X4   Memory Decreased short term memory   Following Commands Follows multistep commands inconsistently   Comments Pt completed formalized cognitive linguistic assessment  See SLP Rehab note for full details  Discharge Information   Vocational Plan Return to work; Full Time   Barriers to Return to [employer] Access;Skill Set Limitation;Transportation Issues;Strength; Endurance;Communication   Patient's Discharge Plan To return home with support from family   Patient's Rehab Expectations "To get moving better and eventually go back to work"   Barriers to Discharge Home Decreased Strength;Decreased Endurance; Safety Considerations;Limited Family Support  (family works during the day)   Impressions Pt is presenting w/ overall functional basic level cognitive linguistic skills, as well as mild dysarthria in conversation  Pt was previously independent for all IADLs and working full time, therefore will benefit from further skilled ST intervention to maximize functional expression abilities, as well as for further assessment of higher level cognitive linguistic tasks/skills in order to maximize skills  Assessed bedside, pt is also presenting w/ mild-moderate oral dysphagia w/ recommendations for a dysphagia level 2 diet and thin liquids  Pt presents w/ good rehab potential to maximize swallow function and will benefit from skilled ST intervention to achieve safest and least restrictive diet w/ goal for baseline diet of regular/thins      SLP Therapy Minutes   SLP Time In 1100   SLP Time Out 1230   SLP Total Time (minutes) 90   SLP Mode of treatment - Individual (minutes) 90   SLP Mode of treatment - Concurrent (minutes) 0   SLP Mode of treatment - Group (minutes) 0   SLP Mode of treatment - Co-treat (minutes) 0   SLP Mode of Teatment - Total time(minutes) 90 minutes

## 2018-10-26 NOTE — UTILIZATION REVIEW
Notification of Discharge  This is a Notification of Discharge from our facility [address]  Please be advised that this patient has been discharge from our facility  Below you will find the admission and discharge date and time including the patients disposition  PRESENTATION DATE: 10/20/2018  9:41 PM  IP ADMISSION DATE: 10/20/18 2326  DISCHARGE DATE: 10/25/2018 12:00 PM  DISPOSITION: [facility] [place] Utilization Review Department  Phone: [phone]; Fax [phone]  ATTENTION: The Network Utilization Review Department is now centralized for our 9 Facilities  Make a note that we have a new phone and fax numbers for our Department  Please call with any questions or concerns to [phone] and carefully follow the prompts so that you are directed to the right person  All voicemails are confidential  Fax any determinations, approvals, denials, and requests for initial or continue stay review clinical to [phone]  Due to HIGH CALL volume, it would be easier if you could please send faxed requests to expedite your requests and in part, help us provide discharge notifications faster    Reference #[id number]

## 2018-10-26 NOTE — ASSESSMENT & PLAN NOTE
· Not on any meds at home  · Per IM ISS or accuchecks no longer needed based on blood sugars   · Diet controlled   · OP FU with PCP with further testing/treatment and/or specialist referral at PCP's discretion

## 2018-10-26 NOTE — PROGRESS NOTES
10/26/18 8361   Patient Data   Rehab Impairment Impairment of mobility, safety, Activities of Daily Living (ADLs), and cognitive/communication skills due to Stroke   Etiologic Diagnosis Multiple embolic-appearing Right MCA infarctions    Date of Onset 10/20/18   Home Setup   Type of Home Single Level   Method of Entry Ramp   Number of Stairs 0   Number of Stairs in Home 4  (Pt stated he does not need to access this area of the house)   Baseline Information   Prior Device(s) Used (none)   Prior Level of Function   Self-Care 3  Independent - Patient completed the activities by him/herself, with or without an assistive device, with no assistance from a helper  Indoor-Mobility (Ambulation) 3  Independent - Patient completed the activities by him/herself, with or without an assistive device, with no assistance from a helper  Stairs 3  Independent - Patient completed the activities by him/herself, with or without an assistive device, with no assistance from a helper  Functional Cognition 3  Independent - Patient completed the activities by him/herself, with or without an assistive device, with no assistance from a helper  Prior Device Used (none)   Patient Preference   Nickname (Patient Preference) Selin Mathews   Restrictions/Precautions   Precautions Fall Risk;Supervision on toilet/commode   Weight Bearing Restrictions No   ROM Restrictions No   Braces or Orthoses (none)   Pain Assessment   Pain Assessment 0-10   Pain Score 3   Pain Location Ankle   Pain Orientation Left   Hospital Pain Intervention(s) Ambulation/increased activity; Rest   Response to Interventions no complaints of inc pain    QI: Roll Left and Right   Assistance Needed Physical assistance   Assistance Provided by [place] Less than 25%   Comment S rolling to L, Darin rolling to R, use of bed rails to roll to R    Roll Left and Right CARE Score 3   Bed Mobility   Able to Roll Left to Right;Right to Left   Adaptive Equipment Side Rails   Findings S rolling to L, Darin rolling to R, use of bed rails to roll to R    QI: Sit to Lying   Assistance Needed Incidental touching   Sit to Lying CARE Score 4   QI: Lying to Sitting on Side of Bed   Assistance Needed Physical assistance   Assistance Provided by [place] Less than 25%   Lying to Sitting on Side of Bed CARE Score 3   QI: Sit to Stand   Assistance Needed Physical assistance   Assistance Provided by [place] 50%-74%   Sit to Stand CARE Score 2   QI: Chair/Bed-to-Chair Transfer   Assistance Needed Physical assistance   Assistance Provided by [place] Total assistance   Comment ModAx1, MinAx1   Chair/Bed-to-Chair Transfer CARE Score 1   QI: Car Transfer   Reason if not Attempted Safety concerns   Car Transfer CARE Score 88   Transfer Bed/Chair/Wheelchair   Positioning Concerns Hemiplegia   Limitations Noted In Balance; Coordination; Endurance;Sensation; Sequencing;UE Strength;LE Strength   Adaptive Equipment Hand Hold   Stand Pivot Moderate Assist;Minimal Assist;Assist x 2   Sit to Stand Moderate Assist;Assist x 1   Stand to Sit Moderate Assist;Assist x 1   Supine to Sit Minimal;Assist x 1   Sit to Supine (CGAx1)   Findings second person for safety   Bed, Chair, Wheelchair Transfer (FIM) 1 - Patient requires assist of two people   QI: Walk 10 Feet   Assistance Needed Physical assistance   Assistance Provided by [place] Total assistance   Comment HHAx2   Walk 10 Feet CARE Score 1   QI: Walk 50 Feet with Two Turns   Reason if not Attempted Safety concerns   Walk 50 Feet with Two Turns CARE Score 88   QI: Walk 150 Feet   Reason if not Attempted Safety concerns   Walk 150 Feet CARE Score 88   QI: Walking 10 Feet on Uneven Surfaces   Reason if not Attempted Safety concerns   Walking 10 Feet on Uneven Surfaces CARE Score 88   Ambulation   Does the patient walk? 2  Yes   Primary Discharge Mode of Locomotion Wheelchair  (WC vs walk based on pt progress)   Walk Assist Level Maximum Assist   Gait Pattern Inconsistant [place]; Slow [place];Decreased foot clearance;L hemiparesis;L knee hyperextension; Step to; Step through;Trendelenburg;Decreased L stance;Decreased R stance; Improper weight shift   Assist Device Hand Hold   Distance Walked (feet) 30 ft   Limitations Noted In Balance; Coordination; Endurance; Heel Strike;Midline Orientation;Posture;Speed; Sequencing;Strength;Swing   Findings 30'x6 HHAx1 with R handrail and chair follow  d/c primary mode of locmotion also pending family support as currently pt will be home alone during the day adn may need to use WC to get to [place] Level  Walking (FIM) 1 - Patient ambulates less than 49 feet regardless of assist/device/set up   Wheelchair mobility   QI: Does the patient use a wheelchair? 0  No   QI: 1 Step (Curb)   Reason if not Attempted Safety concerns   1 Step (Curb) CARE Score 88   QI: 4 Steps   Reason if not Attempted Safety concerns   4 Steps CARE Score 88   QI: 12 Steps   Reason if not Attempted Safety concerns   12 Steps CARE Score 88   Comprehension   QI: Comprehension 4  Undestands: Clear comprehension without cues or repetitions   Comprehension (FIM) 6 - Understands complex/abstract but requires more time   Expression   QI: Expression 4  Express complex messages without difficulty and with speech that is clear and easy to [place]   Expression (FIM) 6 - Expresses complex/abstract but requires:  more time   Social Interaction   Social Interaction (FIM) 7 - Interacts appropriately without assistive device, medication or helper   Problem Solving   Problem solving (FIM) 5 - Solves basic problems 90% of time   Memory   Memory (FIM) 5 - Recalls/performs request 90% of time   RLE Assessment   RLE Assessment WFL   LLE Assessment   LLE Assessment X   Strength LLE   LLE Overall Strength 3+/5  (grossly 3+/5)   Coordination   Movements are Fluid and Coordinated 0   Sensation   Light Touch No apparent deficits   Cognition   Arousal/Participation Alert; Cooperative   Discharge Information   Patient's Discharge Plan To return home to his house with support from family   Patient's Rehab Expectations "To walk better and be able to get back to work eventually"   Barriers to Discharge Home Decreased Strength;Decreased Endurance;Limited Family Support  (family works during the day)   Impressions Pt presents s/p CVA  Currently pt requires Ax2 for functional transfers and mobility secondary to inability to maintain midline, LUE and LLE hemiparesis, decreased strength, and decreased endurance all resulting in decreased balance and righting reactions leading to increased risk of falls  While ambulating pt demonstrates L knee hyperextension, L Trendelenberg and lateral lean to L  Pt demonstrates good rehab potential  Primary mode of ambulation will be determined based on pt's progression with therapy  Currently anticipate d/c home at [address] [facility] mobility as pt reports his family works during the day and would not have 24hr supervision/assist available  However future sessions should focus on increased ambulation, strengthening, and functional mobility to promote progress toward prior level of function  ELOS 4 weeks to achieve [place] WC level and S level ambulation      PT Therapy Minutes   PT Time In 0900   PT Time Out 0930   PT Total Time (minutes) 30   PT Mode of treatment - Individual (minutes) 30   PT Mode of treatment - Concurrent (minutes) 0   PT Mode of treatment - Group (minutes) 0   PT Mode of treatment - Co-treat (minutes) 0   PT Mode of Teatment - Total time(minutes) 30 minutes

## 2018-10-26 NOTE — ASSESSMENT & PLAN NOTE
· Resolved and cleared for regular consistency diet with thin liquids by ST which patient is tolerating w/o difficulty, no s/s of aspiration

## 2018-10-26 NOTE — ASSESSMENT & PLAN NOTE
· Currently on cozaar per IM   · discussed with patient that since he is unsure of his blood pressure medications at home to continue to take the cozaar (losartan) that he has been prescribed and not to resume any blood pressure medications he was taking prior to admission until after discussing with his family doctor first

## 2018-10-26 NOTE — PROGRESS NOTES
DOUGLAS jones   10/26/18 0700   Patient Data   Rehab Impairment Impairment of mobility, safety, Activities of Daily Living (ADLs), and cognitive/communication skills due to Stroke   Etiologic Diagnosis Impairment of mobility, safety, Activities of Daily Living (ADLs), and cognitive/communication skills due to Stroke   Date of Onset 10/20/18   Home Setup   Type of Home Single Level   Method of Entry Ramp   Number of Stairs 0   Number of Stairs in Home 4  (pt does not need to access, it is a sunken living room)   First Floor Bathroom Full; Shower;Curtain;Grab Bars   First Floor Bathroom Accessibility Grab bars in tub/shower   Home Modification Comment Pt currently lives in the "in law suite" of his sons home with no MICKEY and steps inside only to access sunken living room  Pt reports it is not necessary for him to do stairs  Pt lives alone there and his son works during the day   [address] [employer] Work Full Time   Transportation    Prior IADL Participation   Money Management Identify Money;Estimate Costs;Estimate Change;Combine Bills;Manage Checkbook   Meal Preparation Full Participation   Laundry Full Participation   Home Cleaning Full Participation   Prior Level of Function   Self-Care 3  Independent - Patient completed the activities by him/herself, with or without an assistive device, with no assistance from a helper  Functional Cognition 3  Independent - Patient completed the activities by him/herself, with or without an assistive device, with no assistance from a helper     Psychosocial   Psychosocial (WDL) WDL   Patient Behaviors/Mood Cooperative;Pleasant   Restrictions/Precautions   Precautions Fall Risk;Supervision on toilet/commode   Pain Assessment   Pain Assessment No/denies pain   Pain Score No Pain   QI: Eating   Assistance Needed Supervision;Set-up / clean-up   Eating CARE Score 4   Eating Assessment   Findings sitting in recliner, pt req cues to position L UE in supported position   Eating (FIM) 5 - Patient requires supervision, cueing or coaxing   QI: Oral Hygiene   Assistance Needed Physical assistance   Assistance Provided by [place] 75% or more   Oral Hygiene CARE Score 2   Grooming   Able To Initiate Tasks; Shave;Comb/Brush Hair;Wash/Dry Face;Brush/Clean Teeth;Wash/Dry Hands   Limitation Noted In Safety;Strength; Coordination   Findings OT initiated grooming ins tance with Mod/max A to maintain position  Pt completed from seated level with A for container management and shaving at this time  QI: Shower/Bathe Self   Assistance Needed Physical assistance   Assistance Provided by [place] Total assistance   Shower/Bathe Self CARE Score 1   Bathing   Assessed Bath Style Shower   Anticipated D/C Bath Style Shower   Able to Gurpreet Girish No   Able to [employer] Temperature No   Able to Wash/Rinse/Dry (body part) L Upper Leg;R Upper Leg;Chest;Abdomen   Limitations Noted in Balance; Coordination; Endurance;ROM;Safety;Strength   Positioning Seated;Standing   Adaptive Equipment Shower Bars; Shower Seat;[employer]  Pt completed bathing routine with R UE as L UE with no distal AROM or strength at this time  OT provided A for positioning of L UE for bathing with RU E pt req A to stand at grab bars with A of another for bottom hygiene   LLE knee blocking    Bathing (FIM) 1 - Patient requires two helpers   QI: Upper Body Dressing   Assistance Needed Physical assistance   Assistance Provided by [place] 75% or more   Upper Body Dressing CARE Score 2   QI: Lower Body Dressing   Assistance Needed Physical assistance   Assistance Provided by [place] Total assistance   Lower Body Dressing CARE Score 1   QI: Putting On/Taking Off Footwear   Assistance Needed Physical assistance   Assistance Provided by [place] 75% or more   Putting On/Taking Off Footwear CARE Score 2   QI: Picking Up Object   Reason if not Attempted Safety concerns   Picking Up Object CARE Score 88   Dressing/Undressing Clothing   Remove UB Clothes Pullover Shirt   Remove LB Clothes Pants;Socks   Don UB Clothes Pullover Shirt   Don LB Clothes Shorts;Socks; Shoes   Limitations Noted In Balance; Coordination; Endurance; Safety;Strength;ROM   Positioning Supported Sit;Standing   Findings Pt req Max A for UB dressing 2* L UE hemiparesis and sititng balance deficits  Pt additionally req TA for LB dressing with sit<>Stand transition utilizing grab bar  Pt attempts to release UE for assisting with clothing management however pt unable to maintain stability in stance  UB Dressing (FIM) 2 - Patient completes 25-49% of all tasks   LB Dressing (FIM) 1 - Patient completes less than 25% of all tasks   QI: [address] Needed Physical assistance   Assistance Provided by [place] Total assistance   Toileting Hygiene CARE Score 1   Toileting   Findings Recommending A x 2 for clothing management when in stance   Toileting (FIM) 1 - Patient requires two helpers   QI: Lying to Sitting on Side of Bed   Assistance Needed Physical assistance   Assistance Provided by [place] 50%-74%   Lying to Sitting on Side of Bed CARE Score 2   QI: Sit to Stand   Assistance Needed Physical assistance   Assistance Provided by [place] 50%-74%   Sit to Stand CARE Score 2   QI: Chair/Bed-to-Chair Transfer   Assistance Needed Physical assistance   Assistance Provided by [place] Total assistance   Chair/Bed-to-Chair Transfer CARE Score 1   Transfer Bed/Chair/Wheelchair   Findings Pt completed short distance fxnl mob and xfers with hemiwalker req A x 2 for safety   pt completed SPT with no AD at Max A x 1 level    Bed, Chair, Wheelchair Transfer (FIM) 1 - Patient requires mechanical lift for transfer   QI: Toilet Transfer   Assistance Needed Physical assistance   Assistance Provided by [place] Total assistance   Toilet Transfer CARE Score 1   Toilet Transfer   Findings SPT with hemiwalker, A x 2 for LLE instability and assist for balance and UE positioning   Toilet Transfer (FIM) 1 - Patient requires assist of two people   Tub/Shower Transfer   Limitations Noted In Balance; Endurance;ROM;Safety;UE Strength;LE Strength   Adaptive Equipment Grab Bars;Seat with Back   Assessed Shower   Shower Transfer (FIM) 1 - Patient requires assist of two people   Comprehension   QI: Comprehension 4  Undestands: Clear comprehension without cues or repetitions   Comprehension (FIM) 6 - Understands complex/abstract but requires  glasses for visual comp   Expression   QI: Expression 3  Exhibits some difficulty with expressing needs and ideas (e g , some words or finishing thoughts) or speech is not clear   Expression (FIM) 5 - Needs help/cues only RARELY (< 10% of the time)   Social Interaction   Social Interaction (FIM) 7 - Interacts appropriately without assistive device, medication or helper   Problem Solving   Problem solving (FIM) 5 - Solves complex problems But requires cues from helper   Memory   Memory (FIM) 5 - Needs cueing reminders <10%   LUE Assessment   LUE Assessment X   Coordination   Coordination and Movement Description Pt with noted limited ROM against gravity of LUE, shoulder and scap activation, OT will further assess ROM   Sensation   Light Touch No apparent deficits   Cognition   Overall Cognitive Status WFL   Perception   Inattention/Neglect Cues to attend to left side of body;Cues to maintain midline in standing;Cues to maintain midline in sitting   Discharge Information   Vocational Plan Return to work; Full Time   Barriers to Return to [employer] Access;Skill Set Limitation;Transportation Issues;Strength; Endurance;Communication   Patient's Discharge Plan To return home to his house with support from family   Patient's Rehab Expectations "To walk better and be able to get back to work eventually"   Barriers to Discharge Home Decreased Strength;Decreased Endurance; Safety Considerations   Impressions Pt is a 59 y/o male s/p R MCA CVA   Prior to admission, pt was fully indep, working FT, driving, and was caring for his dog  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  Pt lives in the in law suite of his sons home with no MICKEY and not req to complete steps when inside  Currently at time of evaluation pt presents with deficits in L UE and LE hemiparesis, impaired balance, dec ROM, minor anterior subluxation, and impaired proprioception limiting indep and safety with ADL fxn and xfers  Pt is extremely pleasant and motivated for tx and demo G potential to achieve greater level of indep with estimated 4 week acute rehab stay      OT Therapy Minutes   OT Time In 0700   OT Time Out 0845   OT Total Time (minutes) 105   OT Mode of treatment - Individual (minutes) 105   OT Mode of treatment - Concurrent (minutes) 0   OT Mode of treatment - Group (minutes) 0   OT Mode of treatment - Co-treat (minutes) 0   OT Mode of Teatment - Total time(minutes) 105 minutes

## 2018-10-26 NOTE — PROGRESS NOTES
10/26/18 1000   Pain Assessment   Pain Assessment 0-10   Pain Score 3   Pain Location Ankle   Pain Orientation Left   Hospital Pain Intervention(s) Ambulation/increased activity; Rest  (AFO while walking)   Response to Interventions noted increased L ankle pain with walking, improvement with use of AFO, pt did not quantify   Restrictions/Precautions   Precautions Fall Risk;Supervision on toilet/commode   Weight Bearing Restrictions No   ROM Restrictions No   Cognition   Arousal/Participation Alert; Cooperative   Subjective   Subjective pt reported ready for therapy    QI: Sit to Stand   Assistance Needed Physical assistance   Assistance Provided by [place] 50%-74%   Sit to Stand CARE Score 2   QI: Chair/Bed-to-Chair Transfer   Assistance Needed Physical assistance   Assistance Provided by [place] Total assistance   Comment ModAx1 with MinAx1   Chair/Bed-to-Chair Transfer CARE Score 1   Transfer Bed/Chair/Wheelchair   Limitations Noted In Balance; Coordination; Endurance; Sequencing;UE Strength;LE Strength   Adaptive Equipment Hand Hold   Stand Pivot Moderate Assist;Minimal Assist;Assist x 2   Sit to Stand Moderate Assist;Assist x 1   Stand to Sit Moderate Assist;Assist x 1   Bed, Chair, Wheelchair Transfer (FIM) 1 - Patient requires assist of two people   QI: Walk 10 Feet   Assistance Needed Physical assistance   Assistance Provided by [place] Total assistance   Comment HHAx2 and BWSS   Walk 10 Feet CARE Score 1   QI: Walk 50 Feet with Two Turns   Assistance Needed Physical assistance   Assistance Provided by [place] Total assistance   Comment HHAx2 and BWSS   Walk 50 Feet with Two Turns CARE Score 1   QI: Walk 150 Feet   Assistance Needed Physical assistance   Assistance Provided by [place] Total assistance   Comment HHAx2 and BWSS   Walk 150 Feet CARE Score 1   QI: Walking 10 Feet on Uneven Surfaces   Reason if not Attempted Safety concerns   Walking 10 Feet on Uneven Surfaces CARE Score 88   Ambulation   Does the patient walk? 2  Yes   Primary Discharge Mode of Locomotion Wheelchair   Walk Assist Level Maximum Assist   Gait Pattern Inconsistant Soraida; Slow [place];Decreased foot clearance;L foot drag;L hemiparesis;L knee hyperextension; Step to; Step through;Trendelenburg;Decreased L stance;Decreased R stance; Improper weight shift   Assist Device Hand Hold  (BWSS)   Distance Walked (feet) 30 ft   Limitations Noted In Balance; Coordination; Endurance; Heel Strike;Midline Orientation;Posture; Sequencing;Speed;Strength;Swing   Findings 30'x4 HHAx2 with chair follow, 180' in BWSS   Walking (FIM) 1 - Patient requires assist of two people   Wheelchair mobility   QI: Does the patient use a wheelchair? 0  No   Therapeutic Interventions   Neuromuscular Re-Education walking with HHAx2 or in BWSS with focus on step through gait pattern   Equipment Use   Body Weight Support System 360' in BWSS, use of flexion assist wrap and AFO together and with AFO individually   Assessment   Treatment Assessment Pt tolerated treatment session well  Session focused on increased ambulation using HHAx2 and with BWSS  Assessed gait with use of flexion assist wrap to minimize L knee hyperextension which decreased hyperextension but was still notable  Trialed use of L AFO to decrease L knee hyperextension which decreased hyperextension but still notable  TF to L glutes to decrease Trendelenberg gait pattern with improvement  Pt demonstrated increased difficulty maintainnig midline when in BWSS compared to HHAx2 as pt was unable to use RUE to correct lateral lean  Future sessions should continue to focus on increased ambulation, strengthening, endurance, and midline orientation with NuStep as additional time to promote progress toward prior level of function  Problem List Decreased range of motion;Decreased strength;Decreased endurance; Impaired balance;Decreased mobility; Decreased coordination   Barriers to Discharge Decreased caregiver support   PT Barriers Physical Impairment Decreased strength;Decreased range of motion;Decreased endurance; Impaired balance;Decreased mobility; Decreased coordination   Functional Limitation Car transfers; Ramp negotiation;Stair negotiation;Standing;Transfers; Walking; Wheelchair management   Plan   Treatment/Interventions Functional transfer training;LE strengthening/ROM; Elevations; Therapeutic exercise; Endurance training;Patient/family training;Equipment eval/education; Bed mobility;Gait training; Compensatory technique education   Progress Progressing toward goals   Recommendation   Recommendation Home with family support; Outpatient PT   Equipment Recommended (LRAD)   PT Therapy Minutes   PT Time In 1000   PT Time Out 1100   PT Total Time (minutes) 60   PT Mode of treatment - Individual (minutes) 60   PT Mode of treatment - Concurrent (minutes) 0   PT Mode of treatment - Group (minutes) 0   PT Mode of treatment - Co-treat (minutes) 0   PT Mode of Teatment - Total time(minutes) 60 minutes   Therapy Time missed   Time missed?  No

## 2018-10-26 NOTE — TREATMENT PLAN
Individualized Plan of Gavino Moreno 58 y o  male MRN: [id number]  Unit/Bed#: -01 Encounter: [id number]     PATIENT INFORMATION  ADMISSION DATE: 10/25/2018 12:41 PM KELECHI CATEGORY:Stroke:  01 1  Left Body Involvement (Right Brain)   ADMISSION DIAGNOSIS: Stroke ([facility] [address] ) [I63 [id number]]  EXPECTED LOS: 10-14 days     MEDICAL/FUNCTIONAL PROGNOSIS  Based on my assessment of the patient's medical conditions and current functional status, the prognosis for attaining medical and functional goals or the IRF stay is:  Good    Medical Goals: Patient will be able to manage medical conditions and comorbid conditions with medications and follow up upon completion of rehab program    [address]: Home - with supervision and intermittent assistance    ANTICIPATED FOLLOW-UP SERVICE:   Home Health Services: PT, OT, SLP and Nursing     DISCIPLINE SPECIFIC PLANS:  Required Disciplines & Services: Rehabillitation Nursing, Case Management and Psychology    REQUIRED THERAPY:  Therapy Hours per Day Days per Week Total Days   Physical Therapy 1 5 5   Occupational Therapy 1 5 5   Speech/Language Therapy 1 5 5   NOTE: Additional therapy time(s) may be added as appropriate to meet patient needs and to achieve functional goals      Patient will either participate in above therapy regimen or participate in 900 minutes of therapy within 7 day week consisting of PT, OT and SLP due to the following medical procedure/condition:Stroke:  01 1  Left Body Involvement (Right Brain)    ANTICIPATED FUNCTIONAL OUTCOMES:  ADL: Patient will require supervision with ADLs with least restrictive device upon completion of rehab program   Bladder/Bowel: Patient will return to premorbid level for bladder/bowel management upon completion of rehab program   Transfers: Patient will be independent with transfers with least restrictive device upon completion of rehab program Locomotion: Patient will be independent with locomotion with least restrictive device upon completion of rehab program   Cognitive: Patient will be independent for basic and complex tasks upon completion of rehab program     DISCHARGE PLANNING NEEDS  Equipment needs: Discharge needs to be reviewed with team      REHAB ANTICIPATED PARTICIPATION RESTRICTIONS:  None

## 2018-10-26 NOTE — ASSESSMENT & PLAN NOTE
· Left ankle  · Per patient was Indocin 50mg TID PTA however was not able to tolerate due to diarrhea and has not been taking for 2 months   · XR of left ankle and foot negative   · s/p colchicine and steroid taper per IM  · Resolved per patient   · Of note per patient this is only the 2nd gout flare patient has ever had

## 2018-10-26 NOTE — SOCIAL WORK
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

## 2018-10-26 NOTE — PROGRESS NOTES
SLP Cognitive Linguistic and Bedside Swallow Evaluations   10/26/18 1100   Pain Assessment   Pain Assessment 0-10   Pain Score No Pain   Restrictions/Precautions   Precautions Aspiration; Fall Risk;Supervision on toilet/commode   Cognitive Linguisitic Assessments   Cognitive Linquistic Quick Test (CLQT) Pt completed the CLQT w/ a Composite Severity Rating Score of 4 0 out of 4 0, correlating to overall cognitive linguistic skills that are [facility] at time of assessment in comparison to age matched peers ranging from 22-74 y/o  Pt's cognitive domain scores are as follows: Attention (197)-WNL, Memory (159)-WNL, Executive Functions (27)-WNL, Language (31)-WNL, Visuospatial Skills (90)-WNL and Clock Drawing (13)-WNL  Pt scored at or above criterion cut score for 9/10 total tasks assessed  Pt educated on results of assessment      Executive Function Skills   Problem Solving X   Managing Finances To be assessed in therapy   Managing Medications To be assessed in therapy   Safety/Judgement WFL   Insight WFL   Impulsive Mildly impulsive  (w/ rate of PO intake)   Task Initiation WFL   Flexibility of Thought Reduced flexibility   Planning Reduced planning skills   Organization WFL   Processing Speed Delayed  (was [facility] but became slower as session progressed)   Memory Skills   Orientation Level Oriented X4   Long Term Biographical Recall WFL - Within Functional Limits   Short Term Immediate Recall WFL - Within Functional Limits   Short Term Recall of Paragraph WFL - Within Functional Limits   Short Term Working Recall Mild Impairment  (imapcted by fatigue)   Memory (FIM) 5 - Needs cueing reminders <10%   Social Interaction (FIM) 7 - Interacts appropriately without assistive device, medication or helper   Auditory Comprehension   Word Level Comprehension WFL   Yes/No Questions WFL   Comphrehends Conversation Complex   Speech/Swallow Mechanism Exam   Labial Symmetry Abnormal symmetry left   Labial Strength Reduced   Labial ROM Reduced left   Facial Symmetry Left droop   Facial Strength Reduced   Facial ROM Mild; Moderate; Reduced left   Lingual Symmetry Left Deviation   Lingual Strength Mild reduced   Lingual ROM Mild reduced   Velum WFL   Mandible WFL   Dentition Adequate   Volitional Cough Strong   Vocal Quality clear adequate   Volitional Swallow WFL   Respratory Status Room air   Motor Speech Evaluation   Dysarthria Yes   Mild Dystharia Impaired articulation   Intelligibility Intelligibility reduced   Intelligibility Rating Conversation level intelligibility;75%-89%   Speech/Language/Cognition Assessmetn   Treatment Assessment Pt completed cognitive linguistic assessment where formalized assessment results correlated to overall basic cognitive linguistic skills  Pt engaged in informal pt interview where pt was oriented x4 and demonstrated LTM recall of biographical info  During pt interview, pt reports that he was completing all IADLs independently to include medication management, finance management, cooking, cleaning, and driving, including working a full time job as a  which he frequently travels for internationally  Pt also reported slower processing and difficulty w/ expression  Pt lives in an in law suite, however, pt's son works during the day and pt will be alone but reports that friends can assist during the day  Informally assessed, as evaluation progressed, pt began demonstrating mild dysarthria characterized by imprecise articulation and slurred speech which was 2/2 L sided lingual, labial and facial weakness  Pt educated on dysarthria and reports that at times he has difficulty w/ expression, especially when fatigued  Based on assessment, pt will benefit from skilled ST intervention at this time to further assess and maximize higher level cognitive linguistic skills required for completion of higher level IADLs and return to work and to maximize verbal expression skills      Swallow Information   Current Risks for Dysphagia & Aspiration Dysarthria;General debilitation;New Neuro event;Brain injury   Current Symptoms/Concerns Cough;Clear throat; With food; With liquids; Difficulty chewing;Pockets food   Current Diet Dysphagia mechanical soft; Thin liquid   Baseline Diet Regular; Thin liquids   Consistencies Assessed and Performance   Materials Admnistered Mechanical Soft/Level 2;Soft/Level 3; Thin liquid   Oral Stage Mild impaired; Moderate impaired   Phargngeal Stage WFL; Mild impaired   Swallow Mechanics WFL; Mild delayed;Swallow initation; Appears prompt;Good [place]al rise;Aspiration risk   Esophageal Concerns No s/s reported   Recommendations   Risk for Aspiration Mild   Diet Solid Recommendation Level 2 Dysphagia/ mechanical soft/altered   Diet Liquid Recommendation Thin liquid   Recommended Form of Meds Whole; With thin liquid; As tolerated   General Precautions Aspiration precautions; Feed only when alert;Minimize distractions;Upright as possible for all oral intake;Remain upright for 45 mins after meals  (OOB for meals)   Compensatory Swallowing Strategies Place food/straw in on right side; Alternate solids and liquids; External pacing; Check for pocketing of food on the left;Cue for lingual sweep;Voluntary throat clear/cough to clear penetration   Results Reviewed with RN;PT/Family/Caregiver   QI: Eating   Assistance Needed Set-up / clean-up   Assistance Provided by [place] No physical assistance   Eating CARE Score 5   Swallow Assessment   Swallow Treatment Assessment Pt assessed upright and OOB for swallow function where pt is currently on a dysphagia level 2 diet and thin liquids  Pt assessed w/ lunch tray consisting of pasta, minced carrots and roast beef which appeared more like dysphagia 3 chopped, along w/ ~180cc thin liquids taken by straw sips   Prior to beginning meal, pt expressed desire for thins to be taken by straw vs cup due to anterior leakage on L side when taken by cup 2/2 decreased lip seal  Following assist w/ tray set up, pt demonstrated ability to self feed w/ adequate bolus retrieval  Mastication of softer solids was mildly slower and effortful but appeared functional w/ current diet items, noting more difficulty w/ level 3 roast beef chunks  Decreased bolus formation, noting consistent mild L side pocketing which appeared to be in L lateral buccal area and L anterior area between lips and gums  Intermittently throughout meal, pt noted to use lingual sweeps and liquid wash independently  A-p transfers of thins appeared timely w/ the exception of when using as liquid wash  Overall swallow initiation of thins appeared [facility] and appeared to fluctuate between timely and mildly delayed w/ solids but remained functional  At end of meal, pt required verbal cue to clear trace to min amts of L pocketing that remained where pt effectively used a finger sweep and verbalized need for these strategies, demonstrating insight into deficits  Trace anterior loss of masticated items on L side which pt also demonstrated awareness of and cleared w/ a napkin  Throat clear x1 noted when pt was attempting to chew and talk, however, no further overt s/s aspiration observed w/ meal  Discussed refraining from speech during meals  Pt is presenting w/ overall mild-moderate oral dysphagia at time of assessment across items assessed  Will recommend dysphagia level 2 diet and thin liquids at this time  Pt will benefit from skilled ST intervention to maximize swallow function to achieve safest and least restrictive diet w/ pt goal to achieve baseline diet of regular and thins  Swallow Assessment Prognosis   Prognosis Good   Prognosis Considerations Medical diagnosis; Medical prognosis; Severity of impairments   SLP Therapy Minutes   SLP Time In 1100   SLP Time Out 1230   SLP Total Time (minutes) 90   SLP Mode of treatment - Individual (minutes) 90   SLP Mode of treatment - Concurrent (minutes) 0   SLP Mode of treatment - Group (minutes) 0   SLP Mode of treatment - Co-treat (minutes) 0   SLP Mode of Teatment - Total time(minutes) 90 minutes   Therapy Time missed   Time missed?  No   Daily FIM Score   Problem solving (FIM) 5 - Solves basic problems 90% of time   Comprehension (FIM) 6 - Has only MILD difficulty with complex/abstract info   Expression (FIM) 5 - Needs help/cues only RARELY (< 10% of the time)   Eating (FIM) 5 - Patient needs help to open contianers or set up tray

## 2018-10-26 NOTE — PROGRESS NOTES
Physical Medicine and Rehabilitation Progress Note  Omero Norwood 58 y o  male MRN: [id number]  Unit/Bed#: -01 Encounter: [id number]    HPI: 59yoM PMH HTN, HLD, tobacco abuse with R MCA CVA  Plan for work-up for cardioembolic etiology  Now on DAPT  Chief Complaint/Subjective: Feeling well today  Excited to work with therapy  Denies any headaches, pain, N/V, fevers, chills, CP, SOB  Had some difficulty sleeping overnight, and willing to try melatonin, but thinks this will ultimately improve  No acute events overnight  ROS:  A 10 point review of systems was negative except for what is noted in the HPI  Assessment/Plan:      * Right sided cerebral hemisphere cerebrovascular accident (CVA) ([facility] [address] )   Assessment & Plan    On DAPT for 90 days, with aspirin/plavix, followed by monotherapy with ASA  Possibly cardioembolic in etiology  Consulted EP for eval/placement of loop recorder    - Fluoxetine 20mg post stroke for motor recovery  Monitor for tone  Urinary hesitancy   Assessment & Plan    Denies dysuria  WBC WNL  Afebrile  Improved today  -PVR <150cc  Discontinue scanning   - Monitor  Type 2 diabetes mellitus without complication, without long-term current use of insulin [facility])   Assessment & Plan    Lab Results   Component Value Date    HGBA1C 6 6 (H) 10/21/2018       Recent Labs      10/25/18   1814  10/26/18   0002  10/26/18   0619  10/26/18   1110   POCGLU  151*  91  101  115       Blood Sugar Average: Last 72 hrs:  (P) 151     Consult dietary, internal medicine  May need to be placed on oral medications to maintain euglycemia  Correctional dosing insulin and accuchecks  May need supplies to monitor blood sugars at home at discharge  Will need follow-up with PCP  Acute left ankle pain   Assessment & Plan    With swelling   Likely 2/2 to known history of gout  - No plan to tap ankle at this time  - On Indocin 50mg TID at home  - No NSAIDs at this time  - Per IM Colchicine and steroid taper   - Monitor  Hyperlipidemia   Assessment & Plan    Continue statin  Anxiety   Assessment & Plan    Was on Xanax on inpatient side, but has not used in several days  - Consult neuropsychology Dr Rosalia Fajardo     Tobacco abuse   Assessment & Plan    Continue nicotine patch, wean down  Hypertension   Assessment & Plan    Continue Losartan 50mg  IM following, recs appreciated  Goal SBP <140     Dysarthria   Assessment & Plan    SLP following  Intelligible  Left hemiplegia [facility])   Assessment & Plan    Monitor for tone  At this time LUE with decreased tone  Has good strength in LLE  Scheduled Meds:    Current Facility-Administered Medications:  acetaminophen 650 mg Oral Q6H PRN Sasha Carmen, MD   aspirin 81 mg Oral Daily Sasha Carmen, MD   atorvastatin 80 mg Oral QPM Sasha Carmen, MD   clopidogrel 75 mg Oral Daily PATEL Stubbs   enoxaparin 40 mg Subcutaneous Q24H [facility] Sasha Carmen, MD   FLUoxetine 20 mg Oral Daily PATEL Stubbs   insulin lispro 1-5 Units Subcutaneous HS Sasha Carmen, MD   insulin lispro 1-5 Units Subcutaneous TID AC Sasha Carmen, MD   losartan 50 mg Oral Daily PATEL Stubbs   melatonin 3 mg Oral HS Sasha Carmen, MD   nicotine 14 mg Transdermal Daily Sasha Carmen, MD   nystatin 500,000 Units Swish & Swallow 4x Daily PATEL Blackwell   ondansetron 4 mg Intravenous Q6H PRN Sasha Carmen, MD   oxyCODONE 2 5 mg Oral Q4H PRN Sasha Carmen, MD   pneumococcal 23-valent polysaccharide vaccine 0 5 mL Subcutaneous Prior to discharge MD Harleen Byrne ON 10/28/2018] predniSONE 20 mg Oral Daily PATEL Stubbs   predniSONE 40 mg Oral Daily PATEL Stubbs      Health Maintenance  #Bowel: Last BM 10/25  Not on regimen  Monitor  #Bladder: Continent  No Back  #Skin/Pressure Injury Prevention: Turn Q2hr in bed, with weight shifts K23-42mmf in wheelchair  #DVT Prophylaxis: Lovenox 40mg,   SCDs  #Code Status:  Full Code  #FEN: Ladan Durant Soft/Thins  Diabetic diet  #Dispo: TBD at next team meeting 10/31  Objective:    Functional Update:  PT pending  10/26 OT: S setup/cleanup with eating, maxA oral hygiene, grooming, total A shower/bathe, maxA UB dressing, Total Assist LB dressing, toileting, toileting hygiene  Mechanical lift for transfer  SLP pending    Allergies per EMR    Physical Exam:  Temp:  [97 6 °F (36 4 °C)-99 3 °F (37 4 °C)] 99 3 °F (37 4 °C)  HR:  [63-65] 65  Resp:  [18-20] 20  BP: (131-151)/(86-90) 131/86  Oxygen Therapy  SpO2: 95 %    Gen: No acute distress, Well-nourished, well-appearing  HEENT: Moist mucus membranes  Cardiovascular: Regular rate, rhythm, S1/S2  Distal pulses palpable  Heme/Extr: No edema  Pulmonary: Non-labored beathing  : No nichols  GI: Soft, non-tender, non-distended  MSK: ROM is full in all extremities  No effusions or deformities  Bulk is symmetric  Observed transfer from bed to wheelchair require 2 person assist    Integumentary: Skin is warm, dry  No rashes or ulcers  Neuro: AAOx3, Speech is dysarthric  Appropriate to questioning  Ataxia LLE  LUE densely weak  Psych: Normal mood and affect  Diagnostic Studies: reviewed, no new imaging  No results found  Laboratory:      Results from last 7 days  Lab Units 10/26/18  0611 10/24/18  0424 10/22/18  0436   HEMOGLOBIN g/dL 13 9 13 9 14 1   HEMATOCRIT % 41 8 41 8 42 7   WBC [place]/uL 8 06 9 96 9 85       Results from last 7 days  Lab Units 10/26/18  0611 10/24/18  0424 10/22/18  0436 10/21/18  0438  10/20/18  2200   BUN mg/dL 16 12 11 18  < >  --    SODIUM mmol/L 136 141 140 139  < >  --    POTASSIUM mmol/L 3 8 3 7 3 7 3 6  < >  --    CHLORIDE mmol/L 105 104 104 104  < >  --    GLUCOSE, ISTAT mg/dl  --   --   --   --   --  132   CREATININE mg/dL 0 84 0 99 0 96 0 97  < >  --    AST U/L  --   --   --  20  --   --    ALT U/L  --   --   --  34  --   --    < > = values in this interval not displayed      Results from last 7 days  Lab Units 10/21/18  2904 10/20/18  2205   PROTIME seconds 12 8 12 3   INR  0 99 0 94        Patient Active Problem List   Diagnosis    Right sided cerebral hemisphere cerebrovascular accident (CVA) ([facility] [address] )    Left hemiplegia ([facility] [address] )    Dysarthria    Hypertension    Tobacco abuse    Anxiety    Hyperlipidemia    Acute left ankle pain    Type 2 diabetes mellitus without complication, without long-term current use of insulin ([facility] [address] )    Urinary hesitancy       ** Please Note: Fluency Direct voice to text software may have been used in the creation of this document  **    Total visit time:  At least 25 minutes, with more than 50% spent counseling/coordinating care

## 2018-10-27 LAB
GLUCOSE SERPL-MCNC: 125 MG/DL (ref 65–140)
GLUCOSE SERPL-MCNC: 137 MG/DL (ref 65–140)
GLUCOSE SERPL-MCNC: 181 MG/DL (ref 65–140)
GLUCOSE SERPL-MCNC: 57 MG/DL (ref 65–140)
GLUCOSE SERPL-MCNC: 96 MG/DL (ref 65–140)

## 2018-10-27 PROCEDURE — 82948 REAGENT STRIP/BLOOD GLUCOSE: CPT

## 2018-10-27 PROCEDURE — 97116 GAIT TRAINING THERAPY: CPT

## 2018-10-27 PROCEDURE — G0515 COGNITIVE SKILLS DEVELOPMENT: HCPCS

## 2018-10-27 PROCEDURE — 97127 HB COGNITIVE SKILLS DEVELOPMENT, EACH 15 MUNUTES: CPT

## 2018-10-27 PROCEDURE — 92526 ORAL FUNCTION THERAPY: CPT

## 2018-10-27 PROCEDURE — 97530 THERAPEUTIC ACTIVITIES: CPT

## 2018-10-27 PROCEDURE — 97112 NEUROMUSCULAR REEDUCATION: CPT

## 2018-10-27 PROCEDURE — 97110 THERAPEUTIC EXERCISES: CPT

## 2018-10-27 RX ADMIN — ATORVASTATIN CALCIUM 80 MG: 80 TABLET, FILM COATED ORAL at 17:12

## 2018-10-27 RX ADMIN — ENOXAPARIN SODIUM 40 MG: 40 INJECTION SUBCUTANEOUS at 08:59

## 2018-10-27 RX ADMIN — ASPIRIN 81 MG: 81 TABLET, COATED ORAL at 08:54

## 2018-10-27 RX ADMIN — NYSTATIN 500000 UNITS: 100000 SUSPENSION ORAL at 21:00

## 2018-10-27 RX ADMIN — NYSTATIN 500000 UNITS: 100000 SUSPENSION ORAL at 08:54

## 2018-10-27 RX ADMIN — MELATONIN 3 MG: at 20:43

## 2018-10-27 RX ADMIN — INSULIN LISPRO 1 UNITS: 100 INJECTION, SOLUTION INTRAVENOUS; SUBCUTANEOUS at 08:59

## 2018-10-27 RX ADMIN — NYSTATIN 500000 UNITS: 100000 SUSPENSION ORAL at 17:12

## 2018-10-27 RX ADMIN — FLUOXETINE 20 MG: 20 CAPSULE ORAL at 08:54

## 2018-10-27 RX ADMIN — CLOPIDOGREL 75 MG: 75 TABLET, FILM COATED ORAL at 08:54

## 2018-10-27 RX ADMIN — PREDNISONE 40 MG: 20 TABLET ORAL at 08:54

## 2018-10-27 RX ADMIN — NYSTATIN 500000 UNITS: 100000 SUSPENSION ORAL at 11:48

## 2018-10-27 RX ADMIN — NICOTINE 14 MG: 14 PATCH, EXTENDED RELEASE TRANSDERMAL at 08:54

## 2018-10-27 NOTE — PROGRESS NOTES
10/27/18 1000   Pain Assessment   Pain Assessment No/denies pain   Pain Score No Pain   Restrictions/Precautions   Precautions Aspiration; Fall Risk;Supervision on toilet/commode   Weight Bearing Restrictions No   ROM Restrictions No   QI: Sit to Stand   Assistance Needed Physical assistance   Assistance Provided by [place] 25%-49%   Comment VC's for proper technique and LLE knee blocking  Sit to Stand CARE Score 3   QI: Chair/Bed-to-Chair Transfer   Assistance Needed Physical assistance   Assistance Provided by [place] 75% or more   Chair/Bed-to-Chair Transfer CARE Score 2   Transfer Bed/Chair/Wheelchair   Positioning Concerns Hemiplegia   Limitations Noted In Balance; Coordination; Endurance;LE Strength;UE Strength   Adaptive Equipment None   Findings SPT with LLE knee blocking  Bed, Chair, Wheelchair Transfer (FIM) 2 - [place] needs to lift or boost to rise AND assist to sit   Functional Standing Tolerance   Time ~10 minutes with A to block LLE and maintain WBing through LUE while in stance but overall Jemima  Activity Card scan board while in static stance  Comments Pt with slight L lateral lean at times but was able to self correct with VC's to identify  Neuromuscular Education   Weight Bearing Technique Yes   LUE Weight Bearing Forearm standing   Response to Weight Bearing Technique WBing through extended LUE while in stance at tabletop with point of control at wrist and elbow to maintain position  Functional Movement Patterns Pt participated in [address] assisted movement therapy in order to promote proximal shoulder strength  Pt positioned on chair with trunk and shoulder straps in place to reduce compensation  Pt utilizes arm trough for LUE support  Pt engaged in Fit process adjusting all planes to pt's appropriate end range  Pt tolerates 20 reps x 4 sets on guided mode and an additional 10 reps x 2 on initiated mode with noticeable fatigue  Pt overall tolerates session well   States that he found the REOGO to be very beneficial    Cognition   Overall Cognitive Status [facility]   Arousal/Participation Alert; Cooperative   Attention Within functional limits   Orientation Level Oriented X4   Memory Decreased short term memory   Following Commands Follows multistep commands inconsistently   Activity Tolerance   Activity Tolerance Patient tolerated treatment well   Assessment   Treatment Assessment Pt participated in skilled OT services with focus on LUE neuro re-ed, functional transfers, and standing tolerance/balance  Pt is very motivated to participate in therapy and engages throughout session  Pt continues to be limited by L sided weakness  Pt demos increased ability to complete functional SPTs and standing tolerance with blocking of LLE and support of LUE  Pt will continue to benefit from skilled OT services with focus on LUE neuro re-ed, standing balance, functional transfers, and I/ADL retraining  Prognosis Good   Problem List Decreased range of motion;Decreased strength;Decreased endurance; Impaired balance;Decreased mobility; Decreased coordination   Plan   Treatment/Interventions ADL retraining;Functional transfer training; Therapeutic exercise; Endurance training;Equipment eval/education; Compensatory technique education   Progress Progressing toward goals   Recommendation   OT Discharge Recommendation Home with family support   OT Therapy Minutes   OT Time In 1000   OT Time Out 1130   OT Total Time (minutes) 90   OT Mode of treatment - Individual (minutes) 90   OT Mode of treatment - Concurrent (minutes) 0   OT Mode of treatment - Group (minutes) 0   OT Mode of treatment - Co-treat (minutes) 0   OT Mode of Teatment - Total time(minutes) 90 minutes   Therapy Time missed   Time missed?  No

## 2018-10-27 NOTE — PROGRESS NOTES
Speech Note     10/27/18 0800   Pain Assessment   Pain Assessment No/denies pain   Memory Skills   Orientation Level Oriented X4   Memory (FIM) 5 - Recalls/performs request 90% of time   Social Interaction (FIM) 7 - Interacts appropriately without assistive device, medication or helper   Speech/Language/Cognition Assessmetn   Treatment Assessment Pt  started session with a word deduction task, pt  provided 4 clue words auditorally, pt  stated key word with 26/30 accuracy increasing to 30/30 when given verbal cues  Pt  then provided with 4 words auditorally, pt  recalled words with 30/32 accuracy  Pt  answered questions based on the words with 14/16 accuracy  Pt  engaged in informal conversation demonstrating intact long term and short term memory  Pt continues to demonstrate mild higher level cognitive linguistic deficits  Pt  will continue to benefit from skilled ST services  Swallow Information   Current Risks for Dysphagia & Aspiration Dysarthria;General debilitation;New Neuro event;Brain injury   Current Symptoms/Concerns Cough;Clear throat; With food; With liquids; Difficulty chewing;Pockets food   Current Diet Dysphagia mechanical soft; Thin liquid   Baseline Diet Regular; Thin liquids   Consistencies Assessed and Performance   Materials Admnistered Mechanical Soft/Level 2;Soft/Level 3   Oral Stage Mild impaired; Moderate impaired   Phargngeal Stage Mild impaired;WFL   Swallow Mechanics Mild delayed;WFL;Swallow initation; Appears prompt;Good Larygneal rise;Aspiration risk   Esophageal Concerns No s/s reported   Recommendations   Risk for Aspiration Mild   Recommendations Consider oral diet; Dysphagia treatment   Diet Solid Recommendation Level 2 Dysphagia/ mechanical soft/altered   Diet Liquid Recommendation Thin liquid   Recommended Form of Meds Whole; With thin liquid; As desired; As tolerated   General Precautions Aspiration precautions; Feed only when alert;Remain upright for 45 mins after meals;Upright as possible for all oral intake;Minimize distractions   Compensatory Swallowing Strategies Place food/straw in on right side; Alternate solids and liquids; External pacing; Check for pocketing of food on the left;Cue for lingual sweep;Voluntary throat clear/cough to clear penetration   Results Reviewed with PT/Family/Caregiver   QI: Eating   Assistance Needed Set-up / clean-up   Assistance Provided by [place] No physical assistance   Eating CARE Score 5   Swallow Assessment   Swallow Treatment Assessment Pt  seen during breakfast with level 3 trial tray  Pt  completed 100% of meal and 120 cc of thin liquid via cup  Pt  was set up for tray and I with self-feed  Pt  demonstrated adequate bite size and rate throughout meal  Pt  demonstrated functional mastication of all solids  Pt  demonstrated anterior spillage on L x1 however, pt  aware and self corrected  v Pt  deomonstrated mild pocketing however independently cleared with lingual sweep and liquid wash  Pt  demonstrated adequate bolus formation and ap transfer  Pt  continues to demonstrates prompt swallow initiation and strong hyolaryngeal, rise  No overt s/sx observed throughout meal  Pt  continues to demonstrate mild-moderate oral dysphagia at this time  Continue to recommend level 2 diet with thin liquids with trials of level 3 with SLP supervision only  At the end of session pt  completed 5 sets of 9 lip exercises and 5 sets of 4 facial/cheek exercises  Swallow Assessment Prognosis   Prognosis Good   Prognosis Considerations Medical diagnosis; Medical prognosis; Severity of impairments   Additional Comments   SLP Therapy Minutes   SLP Time In 0800   SLP Time Out 0900   SLP Total Time (minutes) 60   SLP Mode of treatment - Individual (minutes) 60   SLP Mode of treatment - Concurrent (minutes) 0   SLP Mode of treatment - Group (minutes) 0   SLP Mode of treatment - Co-treat (minutes) 0   SLP Mode of Teatment - Total time(minutes) 60 minutes   Therapy Time missed   Time missed? No   Daily FIM Score   Problem solving (FIM) 5 - Solves basic problems 90% of time   Comprehension (FIM) 6 - Understands complex/abstract but requires more time   Expression (FIM) 6 - Expresses complex/abstract but requires:  more time   Eating (FIM) 6 - Patient requires modified diet

## 2018-10-27 NOTE — PROGRESS NOTES
10/27/18 1230   Pain Assessment   Pain Assessment No/denies pain   Pain Score No Pain   Restrictions/Precautions   Precautions Aspiration; Fall Risk   Weight Bearing Restrictions No   ROM Restrictions No   Cognition   Overall Cognitive Status WFL   Arousal/Participation Alert; Cooperative   Attention Within functional limits   Orientation Level Oriented X4   Following Commands Follows multistep commands inconsistently   Subjective   Subjective Pt pleasant and receptive to PT, eager to participate   QI: Sit to Stand   Assistance Needed Physical assistance   Assistance Provided by [place] Less than 25%   Comment Verbal cueing for generation of anterior weight shift, maintenance of midline during entirety of transfer   Sit to Stand CARE Score 3   Transfer Bed/Chair/Wheelchair   Limitations Noted In Balance; Coordination;UE Strength;LE Strength; Sequencing   Sit Pivot Moderate Assist   Sit to Stand Minimal Assist   Stand to Sit Minimal Assist   Bed, Chair, Wheelchair Transfer (FIM) 3 - Patient completes 50 - 74% of all tasks   QI: Walk 10 Feet   Assistance Needed Physical assistance; Adaptive equipment;Verbal cues   Assistance Provided by [place] 25%-49%   Walk 10 Feet CARE Score 3   QI: Walk 50 Feet with Two Turns   Reason if not Attempted Safety concerns   Walk 50 Feet with Two Turns CARE Score 88   QI: Walk 150 Feet   Reason if not Attempted Safety concerns   Walk 150 Feet CARE Score 88   QI: Walking 10 Feet on Uneven Surfaces   Reason if not Attempted Safety concerns   Walking 10 Feet on Uneven Surfaces CARE Score 88   Ambulation   Does the patient walk? 2  Yes   Primary Discharge Mode of Locomotion Walk   Walk Assist Level Minimum Assist   Gait Pattern L knee hyperextension;L hemiparesis; Inconsistant [place]ora[place];Decreased L stance;Narrow LIZA;Lateral deviation   Assist Device Parallel Bars  [employer] Rail)   Distance Walked (feet) 30 ft  (x3)   Limitations Noted In Balance; Coordination; Heel Strike;Midline Orientation;Posture;Swing;Speed;Strength   Findings Pt ambulates in // and then along persaud rail holding on with R hand with WCF  Left UE placed in sling to prevent LOB secondary to UE hemiplegia and abnormal trunk rotation  Demonstrates excellent heel strike initial contact bilaterally and is able to to control left knee stance phase hyperextension with verbal cueing to take shorter steps and consciously "squeeze" L quad prior and during right LE advancement  Verbal cueing for forward gaze maintenance ("Pick a point and walk towards it") eliminates abnormal trunk rotation creating greater dynamic stability  Walking (FIM) 1 - Patient ambulates less than 49 feet regardless of assist/device/set up   Wheelchair mobility   QI: Does the patient use a wheelchair? 0  No   QI: 1 Step (Curb)   Reason if not Attempted Safety concerns   1 Step (Curb) CARE Score 88   Therapeutic Interventions   Strengthening All standing TE performed within // with PT guarding left knee: stagger stance lunge and return with maintenance of partial knee flexion throughout, alt step toe tap with weigth shift and avoidance of left knee hyperextension, // march, persaud rail mini squats with sustained position of hip and knee flexion  Seated TE at end of session included marching, LAQs (L with 2 5# cw), and reciprocal DF/PF ankle pumps  Pt provided with written instruction to perform seated TE when resting in his room  Flexibility 2' manual stretching of left gastroc and hamstrings   Balance Standing weight shift, // sidestepping, sustained stagger stance without UE hold   Equipment Use   NuStep L3 x 10' B LE, R UE (L UE in sling)   Assessment   Treatment Assessment Pt participated in skilled PT session focusing on normalization of LE weight bearing, gait training over household distances, functional strengthening, and endurance training    Gait deviations consisting of left knee hyperextension in stance, poor midline maintenance with abnormalities of trunk rotation secondary to hemiplegia of the left UE (improves dramatically when slung), and occasional ataxia manifesting as left foot placement across midline with associated near LOB  Pt responds briskly and appropriately to verbal cueing demonstrating improved heel strike and avoidance of left knee hyperextension through conscious co-activation of left LE musculature prior to right LE advancement  Demonstrates good endurance while on NuStep maintaining > 90 steps per minute with peak HR of 98 BPM, no complaints of fatigue or dyspnea  Pt issued simple seated exercise guide to be performed when in his room focusing on reciprocal activation of LE muscle groups, demonstrating appropriate performance and understanding of all components  Pt resting comfortably in room chair at end of session, all needs in reach, family members present  He will continue to benefit from skilled PT intervention to maximize safety with all mobility while normalizing his gait pattern as he progresses towards d/c  Family/Caregiver Present Daughter and MARION   Problem List Decreased strength;Decreased range of motion; Impaired balance;Decreased coordination;Decreased mobility   Barriers to Discharge Decreased caregiver support   PT Barriers   Physical Impairment Decreased strength;Decreased range of motion;Decreased endurance; Impaired balance;Decreased coordination   Functional Limitation Car transfers; Ramp negotiation;Stair negotiation;Standing;Transfers; Walking   Plan   Treatment/Interventions Functional transfer training;LE strengthening/ROM; Therapeutic exercise;Elevations; Endurance training;Bed mobility;Gait training   Progress Progressing toward goals   Recommendation   Recommendation Outpatient PT; Home with family support   PT Therapy Minutes   PT Time In 1230   PT Time Out 1400   PT Total Time (minutes) 90   PT Mode of treatment - Individual (minutes) 90   PT Mode of treatment - Concurrent (minutes) 0   PT Mode of treatment - Group (minutes) 0   PT Mode of treatment - Co-treat (minutes) 0   PT Mode of Teatment - Total time(minutes) 90 minutes   Therapy Time missed   Time missed?  No

## 2018-10-27 NOTE — PROGRESS NOTES
Internal Medicine Progress Note  Patient: Nieves Nettles  Age/sex: 58 y o  male  Medical Record #: [id number]      ASSESSMENT/PLAN:  Nieves Nettles is seen and examined and management for following issues:    Multiple embolic R MCA CVA:  Cont ASA/Plavix for 90 days then Aspirin and statin therapy only; EP to see for a LOOP now     HTN:  Off all meds while in acute; was previously on ARB/amlodipine/hctz; yesterday added back Losartan 50mg daily  No other changes today  OP med list will need to be clarified so will call Dr Christina Longoria office on Monday      DM II:  Diet controlled; recent HbA1C 6 6; pt aware and avoids excess sugar; cont sliding scale and add DM diet today; , 160, 131, 57/181     Tobacco abuse:  Cont patch; recommend cessation     Anxiety:  Cont Fluoxetine 20mg daily; neuropsychiatry to see     Left ankle and left 2nd toe pain:  Xray of foot and ankle were negative, likely gout; pt was taking Indocin 50mg 3x daily as outpt; yesterday, gave Colchicine 1 2 mg x 1 and 0 6mg x 1 and started a steroid taper over 4 days then dc; says improving    Thrush:  Nystatin ordered      Subjective: Patient seen and examined  Patient states that the left ankle pain is greatly improved  He states that he did not sleep well last night despite melatonin  He denies any other complaints      ROS:   GI: denies abdominal pain, change bowel habits or reflux symptoms  Neuro: No new neurologic changes  Respiratory: No Cough, SOB  Cardiovascular: No CP, palpitations     Scheduled Meds:    Current Facility-Administered Medications:  acetaminophen 650 mg Oral Q6H PRN Randell De Guzman MD   aspirin 81 mg Oral Daily Randell De Guzman MD   atorvastatin 80 mg Oral QPM Randell De Guzman MD   clopidogrel 75 mg Oral Daily PATEL Puente   enoxaparin 40 mg Subcutaneous Q24H [facility] Randell De Guzman MD   FLUoxetine 20 mg Oral Daily PATEL Gautam   insulin lispro 1-5 Units Subcutaneous HS Randell De Guzman MD   insulin lispro 1-5 Units Subcutaneous TID [facility] Ivan Briones MD   losartan 50 mg Oral Daily PATEL Muñoz   melatonin 3 mg Oral HS Ivan Briones MD   nicotine 14 mg Transdermal Daily Ivan Briones MD   nystatin 500,000 Units Swish & Swallow 4x Daily Bill ObrienPATEL pittman   ondansetron 4 mg Intravenous Q6H PRN Ivan Briones MD   oxyCODONE 2 5 mg Oral Q4H PRN Ivan Briones MD   pneumococcal 23-valent polysaccharide vaccine 0 5 mL Subcutaneous Prior to discharge MD Gracie Lazo ON 10/28/2018] predniSONE 20 mg Oral Daily PATEL Muñoz   predniSONE 40 mg Oral Daily PATEL Muñoz       Labs:       Results from last 7 days  Lab Units 10/26/18  0611 10/24/18  0424   WBC Thousand/uL 8 06 9 96   HEMOGLOBIN g/dL 13 9 13 9   HEMATOCRIT % 41 8 41 8   PLATELETS Thousands/uL 253 240       Results from last 7 days  Lab Units 10/26/18  0611 10/24/18  0424  10/20/18  2200   SODIUM mmol/L 136 141  < >  --    POTASSIUM mmol/L 3 8 3 7  < >  --    CHLORIDE mmol/L 105 104  < >  --    CO2 mmol/L 24 26  < >  --    BUN mg/dL 16 12  < >  --    CREATININE mg/dL 0 84 0 99  < >  --    GLUCOSE, ISTAT mg/dl  --   --   --  132   CALCIUM mg/dL 8 9 9 2  < >  --    < > = values in this interval not displayed      Results from last 7 days  Lab Units 10/21/18  0438   HEMOGLOBIN A1C % 6 6*       Results from last 7 days  Lab Units 10/21/18  0438 10/20/18  2205   INR  0 99 0 94          Glucose, i-STAT (mg/dl)   Date Value   10/20/2018 132       Labs reviewed    Physical Examination:  Vitals:   Vitals:    10/26/18 0500 10/26/18 1313 10/26/18 2006 10/27/18 0610   BP: 131/86 135/73 120/70 111/61   BP Location:  Left arm Right arm Right arm   Pulse: 65 65 62 (!) 49   Resp: 20 20 19 18   Temp: 99 3 °F (37 4 °C) 97 9 °F (36 6 °C) 98 3 °F (36 8 °C) 97 6 °F (36 4 °C)   TempSrc: Oral Oral Oral Oral   SpO2: 95% 92% 97% 97%   Weight:       Height:         Constitutional:  NAD; pleasant; nontoxic  HEENT:  AT/NC; oropharynx + for thrush on tongue Neck: negative for JVD  CV:  +S1, S2;  RRR; no rub/murmur  Pulmonary:  BBS without crackles/wheeze/rhonci; resp are unlabored  Abdominal:  soft, +BS, ND/NT; no mass  Skin:  no rashes  Musculoskeletal:  Left medial ankle 1+ edema but no redness; left 2nd toe w/o red/erythema  :  no nichols   Neurological/Psych:  AAO; Can slightly move left fingers but not grasp = can lift at shoulder but not flex at elbow;  LLE HF 4/5 with DF/PF 4-/5; + left facial droop and tongue deviation; speech is clear; no depression/anxiety        [ X ] Total time spent: 30 Mins and greater than 50% of this time was spent counseling/coordinating care  ** Please Note: Dragon 360 Dictation voice to text software may have been used in the creation of this document   **

## 2018-10-28 LAB
GLUCOSE SERPL-MCNC: 103 MG/DL (ref 65–140)
GLUCOSE SERPL-MCNC: 110 MG/DL (ref 65–140)
GLUCOSE SERPL-MCNC: 121 MG/DL (ref 65–140)
GLUCOSE SERPL-MCNC: 94 MG/DL (ref 65–140)

## 2018-10-28 PROCEDURE — 97112 NEUROMUSCULAR REEDUCATION: CPT

## 2018-10-28 PROCEDURE — 97530 THERAPEUTIC ACTIVITIES: CPT

## 2018-10-28 PROCEDURE — 82948 REAGENT STRIP/BLOOD GLUCOSE: CPT

## 2018-10-28 RX ORDER — POLYETHYLENE GLYCOL 3350 17 G/17G
17 POWDER, FOR SOLUTION ORAL DAILY PRN
Status: DISCONTINUED | OUTPATIENT
Start: 2018-10-28 | End: 2018-11-20

## 2018-10-28 RX ORDER — SENNOSIDES 8.6 MG
1 TABLET ORAL
Status: DISCONTINUED | OUTPATIENT
Start: 2018-10-28 | End: 2018-11-20

## 2018-10-28 RX ORDER — DOCUSATE SODIUM 100 MG/1
100 CAPSULE, LIQUID FILLED ORAL 2 TIMES DAILY
Status: DISCONTINUED | OUTPATIENT
Start: 2018-10-28 | End: 2018-11-20

## 2018-10-28 RX ADMIN — PREDNISONE 20 MG: 20 TABLET ORAL at 10:04

## 2018-10-28 RX ADMIN — SENNOSIDES 8.6 MG: 8.6 TABLET, FILM COATED ORAL at 21:30

## 2018-10-28 RX ADMIN — NYSTATIN 500000 UNITS: 100000 SUSPENSION ORAL at 21:30

## 2018-10-28 RX ADMIN — FLUOXETINE 20 MG: 20 CAPSULE ORAL at 10:03

## 2018-10-28 RX ADMIN — ENOXAPARIN SODIUM 40 MG: 40 INJECTION SUBCUTANEOUS at 10:03

## 2018-10-28 RX ADMIN — LOSARTAN POTASSIUM 50 MG: 50 TABLET, FILM COATED ORAL at 10:05

## 2018-10-28 RX ADMIN — DOCUSATE SODIUM 100 MG: 100 CAPSULE, LIQUID FILLED ORAL at 19:50

## 2018-10-28 RX ADMIN — NYSTATIN 500000 UNITS: 100000 SUSPENSION ORAL at 10:04

## 2018-10-28 RX ADMIN — MELATONIN 3 MG: at 19:50

## 2018-10-28 RX ADMIN — DOCUSATE SODIUM 100 MG: 100 CAPSULE, LIQUID FILLED ORAL at 10:05

## 2018-10-28 RX ADMIN — ATORVASTATIN CALCIUM 80 MG: 80 TABLET, FILM COATED ORAL at 19:50

## 2018-10-28 RX ADMIN — NYSTATIN 500000 UNITS: 100000 SUSPENSION ORAL at 11:49

## 2018-10-28 RX ADMIN — ASPIRIN 81 MG: 81 TABLET, COATED ORAL at 10:03

## 2018-10-28 RX ADMIN — NICOTINE 14 MG: 14 PATCH, EXTENDED RELEASE TRANSDERMAL at 10:05

## 2018-10-28 RX ADMIN — CLOPIDOGREL 75 MG: 75 TABLET, FILM COATED ORAL at 10:05

## 2018-10-28 RX ADMIN — NYSTATIN 500000 UNITS: 100000 SUSPENSION ORAL at 19:50

## 2018-10-28 NOTE — PROGRESS NOTES
10/28/18 0900   Pain Assessment   Pain Assessment No/denies pain   Pain Score No Pain   Restrictions/Precautions   Precautions Fall Risk;Supervision on toilet/commode   Weight Bearing Restrictions No   ROM Restrictions No   QI: Lying to Sitting on Side of Bed   Assistance Needed Supervision;Verbal cues   Assistance Provided by [place] No physical assistance   Lying to Sitting on Side of Bed CARE Score 4   QI: Sit to Stand   Assistance Needed Physical assistance   Assistance Provided by [place] Less than 25%   Sit to Stand CARE Score 3   QI: Chair/Bed-to-Chair Transfer   Assistance Needed Physical assistance   Assistance Provided by [place] 50%-74%   Chair/Bed-to-Chair Transfer CARE Score 2   Transfer Bed/Chair/Wheelchair   Positioning Concerns Hemiplegia   Limitations Noted In Balance; Coordination;UE Strength;LE Strength   Adaptive Equipment None   Findings SPT VC's for proper set up  Bed, Chair, Wheelchair Transfer (FIM) 3 - [place] needs to lift, boost or assist to stand OR sit   Neuromuscular Education   Functional Movement Patterns Pt participated in [facility] 20 assisted movement therapy in order to promote proximal shoulder strength  Pt positioned on chair with trunk and shoulder straps in place to reduce compensation  Pt utilizes arm trough for LUE support  Pt tolerates 20 reps x 1 on guided mode and then progresses to initiated mode for 20 reps x 3 sets  Attempted Step initiated mode this session but pt was unable to achieve end range despite tactile/VC's  Pt overall tolerates session well and stays engaged and motivated throughout entirety of task  Cognition   Overall Cognitive Status WFL   Arousal/Participation Alert; Cooperative   Attention Within functional limits   Orientation Level Oriented X4   Memory Decreased short term memory   Following Commands Follows multistep commands inconsistently   Activity Tolerance   Activity Tolerance Patient tolerated treatment well   Assessment   Treatment Assessment Pt participated in skilled OT services with focus on LUE neuro re-ed and functional transfers  Pt demos increased independence with SPTs without an AD this session but continues to require up to modA to complete safely 2* to dec coordination/strength of both LUE/LLE  Pt continues to require VC's for proper LUE placement during transfers and when sitting OOB and on proper handling/joint protection techniques  Prognosis Good   Problem List Decreased strength;Decreased range of motion; Impaired balance;Decreased coordination;Decreased mobility   Plan   Treatment/Interventions ADL retraining;Functional transfer training; Therapeutic exercise; Endurance training;Equipment eval/education; Compensatory technique education   Progress Progressing toward goals   Recommendation   OT Discharge Recommendation Home with family support   OT Therapy Minutes   OT Time In 0900   OT Time Out 1000   OT Total Time (minutes) 60   OT Mode of treatment - Individual (minutes) 60   OT Mode of treatment - Concurrent (minutes) 0   OT Mode of treatment - Group (minutes) 0   OT Mode of treatment - Co-treat (minutes) 0   OT Mode of Teatment - Total time(minutes) 60 minutes   Therapy Time missed   Time missed?  No

## 2018-10-28 NOTE — PROGRESS NOTES
10/28/18 1100   Pain Assessment   Pain Assessment No/denies pain   Restrictions/Precautions   Precautions Aspiration;Bed/chair alarms; Fall Risk   Cognition   Overall Cognitive Status WFL   Subjective   Subjective pt states he slept well last night, pt ready for therapy   QI: Sit to Stand   Assistance Needed Physical assistance   Assistance Provided by [place] Less than 25%   Sit to Stand CARE Score 3   QI: Chair/Bed-to-Chair Transfer   Assistance Needed Physical assistance   Assistance Provided by [place] Less than 25%   Chair/Bed-to-Chair Transfer CARE Score 3   Transfer Bed/Chair/Wheelchair   Limitations Noted In Balance; Coordination;UE Strength;LE Strength   Adaptive Equipment None   Stand Pivot Minimal Assist   Sit to Stand Contact Guard;Minimal Assist   Stand to Sit Contact Guard   Bed, Chair, Wheelchair Transfer (FIM) 4 - Patient completes 75% of all tasks   QI: Walk 10 Feet   Assistance Needed Physical assistance   Assistance Provided by [place] 50%-74%   Comment Ax2 for safety   Walk 10 Feet CARE Score 2   QI: Walk 50 Feet with Two Turns   Assistance Needed Physical assistance   Assistance Provided by [place] 50%-74%   Walk 50 Feet with Two Turns CARE Score 2   QI: Walk 150 Feet   Assistance Needed Physical assistance   Assistance Provided by [place] 50%-74%   Walk 150 Feet CARE Score 2   QI: Walking 10 Feet on Uneven Surfaces   Reason if not Attempted Safety concerns   Walking 10 Feet on Uneven Surfaces CARE Score 88   Ambulation   Does the patient walk? 2  Yes   Primary Discharge Mode of Locomotion Wheelchair   Walk Assist Level Moderate Assist   Gait Pattern Inconsistant Soraida;Decreased foot clearance;L knee hyperextension; Step through; Improper weight shift; Lateral deviation   Assist Device Hand Hold   Distance Walked (feet) 75 ft   Limitations Noted In Balance; Coordination; Heel Strike;Midline Orientation;Speed;Strength;Swing   Findings HHA on R, second person for safety on L   poor wt shift, difficulty taking even step lengths at this  Walking (FIM) 1 - Patient requires assist of two people   Wheelchair mobility   QI: Does the patient use a wheelchair? 0  No   QI: Indicate the type of wheelchair 1  Manual   QI: 1 Step (Curb)   Reason if not Attempted Safety concerns   1 Step (Curb) CARE Score 88   QI: 4 Steps   Reason if not Attempted Safety concerns   4 Steps CARE Score 88   QI: 12 Steps   Reason if not Attempted Safety concerns   12 Steps CARE Score 88   Therapeutic Interventions   Other repeated STS on edge of mat, elevated  working on midline control  worked on trunk rotation R and L   repeated partial sits to work on glute strength and midline  Assessment   Treatment Assessment pt cont to have difficulty with maintaining midline control during functional txs and gait training  due to deficits in strenght, coordination and balance to remains fall risk and poor righting reactions at this time  will cont to work on deficits to progress with functional mobility and safety for d/c home  Problem List Decreased strength;Decreased range of motion; Impaired balance;Decreased coordination;Decreased mobility   Barriers to Discharge Decreased caregiver support   PT Barriers   Physical Impairment Decreased strength;Decreased range of motion;Decreased endurance; Impaired balance;Decreased coordination   Functional Limitation Car transfers; Ramp negotiation;Stair negotiation;Standing;Transfers; Walking   Plan   Treatment/Interventions Functional transfer training;LE strengthening/ROM; Endurance training;Patient/family training;Gait training   Progress Progressing toward goals   Recommendation   Recommendation Outpatient PT; Home with family support   PT Therapy Minutes   PT Time In 1100   PT Time Out 1130   PT Total Time (minutes) 30   PT Mode of treatment - Individual (minutes) 30   PT Mode of treatment - Concurrent (minutes) 0   PT Mode of treatment - Group (minutes) 0   PT Mode of treatment - Co-treat (minutes) 0 PT Mode of Teatment - Total time(minutes) 30 minutes   Therapy Time missed   Time missed?  No

## 2018-10-28 NOTE — PROGRESS NOTES
Internal Medicine Progress Note  Patient: Jeremy Chan  Age/sex: 58 y o  male  Medical Record #: [id number]      ASSESSMENT/PLAN:  Jeremy Chan is seen and examined and management for following issues:    Multiple embolic R MCA CVA:  Cont ASA/Plavix for 90 days then Aspirin and statin therapy only; EP to see for a LOOP      HTN:  Off all meds while in acute; was previously on ARB/amlodipine/hctz; yesterday added back Losartan 50mg daily  No other changes today  OP med list will need to be clarified so will call Dr Allison Morley office on Monday      DM II:  Diet controlled; recent HbA1C 6 6; pt aware and avoids excess sugar; cont sliding scale and add DM diet today; BS 96, 137, 125, 94     Tobacco abuse:  Cont patch; recommend cessation     Anxiety:  Cont Fluoxetine 20mg daily; neuropsychiatry to see     Left ankle and left 2nd toe pain:  Xray of foot and ankle were negative, likely gout; pt was taking Indocin 50mg 3x daily as outpt; yesterday, gave Colchicine 1 2 mg x 1 and 0 6mg x 1 and started a steroid taper over 4 days then dc; says improving    Thrush:  Nystatin ordered      Subjective: Patient seen and examined  Pt states he is doing well  He continues to have improvement of the Lt ankle pain  He denies any other complaints      ROS:   GI: denies abdominal pain, change bowel habits or reflux symptoms  Neuro: No new neurologic changes  Respiratory: No Cough, SOB  Cardiovascular: No CP, palpitations     Scheduled Meds:    Current Facility-Administered Medications:  acetaminophen 650 mg Oral Q6H PRN Snow Ness MD   aspirin 81 mg Oral Daily Snow Ness MD   atorvastatin 80 mg Oral QPM Snow Ness MD   clopidogrel 75 mg Oral Daily PATEL Jean   enoxaparin 40 mg Subcutaneous Q24H [facility] Snow Ness MD   FLUoxetine 20 mg Oral Daily PATEL Jean   insulin lispro 1-5 Units Subcutaneous HS Snow Ness MD   insulin lispro 1-5 Units Subcutaneous TID AC Snow Ness MD   losartan 50 mg Oral Daily PATEL Bunn   melatonin 3 mg Oral HS Yasmani Hooks MD   nicotine 14 mg Transdermal Daily Yasmani Hooks MD   nystatin 500,000 Units Swish & Swallow 4x Daily PATEL Kaba   ondansetron 4 mg Intravenous Q6H PRN Yasmani Hooks MD   oxyCODONE 2 5 mg Oral Q4H PRN Yasmani Hooks MD   pneumococcal 23-valent polysaccharide vaccine 0 5 mL Subcutaneous Prior to discharge Yasmani Hooks MD   predniSONE 20 mg Oral Daily PATEL Bunn       Labs:       Results from last 7 days  Lab Units 10/26/18  0611 10/24/18  0424   WBC Thousand/uL 8 06 9 96   HEMOGLOBIN g/dL 13 9 13 9   HEMATOCRIT % 41 8 41 8   PLATELETS Thousands/uL 253 240       Results from last 7 days  Lab Units 10/26/18  0611 10/24/18  0424   SODIUM mmol/L 136 141   POTASSIUM mmol/L 3 8 3 7   CHLORIDE mmol/L 105 104   CO2 mmol/L 24 26   BUN mg/dL 16 12   CREATININE mg/dL 0 84 0 99   CALCIUM mg/dL 8 9 9 2                  Glucose, i-STAT (mg/dl)   Date Value   10/20/2018 132       Labs reviewed    Physical Examination:  Vitals:   Vitals:    10/27/18 0900 10/27/18 1358 10/27/18 2025 10/28/18 0500   BP: 115/59 137/82 122/61 128/65   BP Location:  Left arm Right arm Right arm   Pulse: (!) 54 71 57 58   Resp:  20 18 18   Temp:  97 5 °F (36 4 °C) 98 °F (36 7 °C) 97 6 °F (36 4 °C)   TempSrc:  Oral Oral Oral   SpO2:  96% 95% 95%   Weight:       Height:         Constitutional:  NAD; pleasant; nontoxic  HEENT:  AT/NC; oropharynx + for thrush on tongue   Neck: negative for JVD  CV:  +S1, S2;  RRR; no rub/murmur  Pulmonary:  BBS without crackles/wheeze/rhonci; resp are unlabored  Abdominal:  soft, +BS, ND/NT; no mass  Skin:  no rashes  Musculoskeletal:  Left medial ankle 1+ edema but no redness; left 2nd toe w/o red/erythema  :  no nichols   Neurological/Psych:  AAO;   Can slightly move left fingers but not grasp = can lift at shoulder but not flex at elbow;  LLE HF 4/5 with DF/PF 4-/5; + left facial droop and tongue deviation; speech is clear; no depression/anxiety        [ X ] Total time spent: 30 Mins and greater than 50% of this time was spent counseling/coordinating care  ** Please Note: Dragon 360 Dictation voice to text software may have been used in the creation of this document   **

## 2018-10-29 PROBLEM — M10.9 GOUT: Status: ACTIVE | Noted: 2018-10-23

## 2018-10-29 PROBLEM — R13.10 DYSPHAGIA: Status: ACTIVE | Noted: 2018-10-21

## 2018-10-29 LAB
ANION GAP SERPL CALCULATED.3IONS-SCNC: 7 MMOL/L (ref 4–13)
BASOPHILS # BLD AUTO: 0.03 THOUSANDS/ΜL (ref 0–0.1)
BASOPHILS NFR BLD AUTO: 0 % (ref 0–1)
BUN SERPL-MCNC: 21 MG/DL (ref 5–25)
CALCIUM SERPL-MCNC: 8.8 MG/DL (ref 8.3–10.1)
CHLORIDE SERPL-SCNC: 107 MMOL/L (ref 100–108)
CO2 SERPL-SCNC: 26 MMOL/L (ref 21–32)
CREAT SERPL-MCNC: 0.84 MG/DL (ref 0.6–1.3)
EOSINOPHIL # BLD AUTO: 0.13 THOUSAND/ΜL (ref 0–0.61)
EOSINOPHIL NFR BLD AUTO: 2 % (ref 0–6)
ERYTHROCYTE [DISTWIDTH] IN BLOOD BY AUTOMATED COUNT: 13.8 % (ref 11.6–15.1)
GFR SERPL CREATININE-BSD FRML MDRD: 93 ML/MIN/1.73SQ M
GLUCOSE P FAST SERPL-MCNC: 84 MG/DL (ref 65–99)
GLUCOSE SERPL-MCNC: 131 MG/DL (ref 65–140)
GLUCOSE SERPL-MCNC: 82 MG/DL (ref 65–140)
GLUCOSE SERPL-MCNC: 84 MG/DL (ref 65–140)
HCT VFR BLD AUTO: 40.2 % (ref 36.5–49.3)
HEMOCCULT STL QL: NEGATIVE
HGB BLD-MCNC: 13.4 G/DL (ref 12–17)
IMM GRANULOCYTES # BLD AUTO: 0.04 THOUSAND/UL (ref 0–0.2)
IMM GRANULOCYTES NFR BLD AUTO: 1 % (ref 0–2)
LYMPHOCYTES # BLD AUTO: 3.27 THOUSANDS/ΜL (ref 0.6–4.47)
LYMPHOCYTES NFR BLD AUTO: 38 % (ref 14–44)
MCH RBC QN AUTO: 29.3 PG (ref 26.8–34.3)
MCHC RBC AUTO-ENTMCNC: 33.3 G/DL (ref 31.4–37.4)
MCV RBC AUTO: 88 FL (ref 82–98)
MONOCYTES # BLD AUTO: 0.81 THOUSAND/ΜL (ref 0.17–1.22)
MONOCYTES NFR BLD AUTO: 9 % (ref 4–12)
NEUTROPHILS # BLD AUTO: 4.31 THOUSANDS/ΜL (ref 1.85–7.62)
NEUTS SEG NFR BLD AUTO: 50 % (ref 43–75)
NRBC BLD AUTO-RTO: 0 /100 WBCS
PLATELET # BLD AUTO: 267 THOUSANDS/UL (ref 149–390)
PMV BLD AUTO: 9.8 FL (ref 8.9–12.7)
POTASSIUM SERPL-SCNC: 3.6 MMOL/L (ref 3.5–5.3)
RBC # BLD AUTO: 4.58 MILLION/UL (ref 3.88–5.62)
SODIUM SERPL-SCNC: 140 MMOL/L (ref 136–145)
WBC # BLD AUTO: 8.59 THOUSAND/UL (ref 4.31–10.16)

## 2018-10-29 PROCEDURE — 97530 THERAPEUTIC ACTIVITIES: CPT

## 2018-10-29 PROCEDURE — 97112 NEUROMUSCULAR REEDUCATION: CPT

## 2018-10-29 PROCEDURE — 82272 OCCULT BLD FECES 1-3 TESTS: CPT | Performed by: PHYSICAL MEDICINE & REHABILITATION

## 2018-10-29 PROCEDURE — 97127 HB COGNITIVE SKILLS DEVELOPMENT, EACH 15 MUNUTES: CPT

## 2018-10-29 PROCEDURE — 97150 GROUP THERAPEUTIC PROCEDURES: CPT

## 2018-10-29 PROCEDURE — 82948 REAGENT STRIP/BLOOD GLUCOSE: CPT

## 2018-10-29 PROCEDURE — 99232 SBSQ HOSP IP/OBS MODERATE 35: CPT | Performed by: PHYSICAL MEDICINE & REHABILITATION

## 2018-10-29 PROCEDURE — 97535 SELF CARE MNGMENT TRAINING: CPT

## 2018-10-29 PROCEDURE — 92526 ORAL FUNCTION THERAPY: CPT

## 2018-10-29 PROCEDURE — 85025 COMPLETE CBC W/AUTO DIFF WBC: CPT | Performed by: NURSE PRACTITIONER

## 2018-10-29 PROCEDURE — 97116 GAIT TRAINING THERAPY: CPT

## 2018-10-29 PROCEDURE — G0515 COGNITIVE SKILLS DEVELOPMENT: HCPCS

## 2018-10-29 PROCEDURE — 80048 BASIC METABOLIC PNL TOTAL CA: CPT | Performed by: NURSE PRACTITIONER

## 2018-10-29 RX ADMIN — NYSTATIN 500000 UNITS: 100000 SUSPENSION ORAL at 21:21

## 2018-10-29 RX ADMIN — NYSTATIN 500000 UNITS: 100000 SUSPENSION ORAL at 17:04

## 2018-10-29 RX ADMIN — NICOTINE 14 MG: 14 PATCH, EXTENDED RELEASE TRANSDERMAL at 08:54

## 2018-10-29 RX ADMIN — DOCUSATE SODIUM 100 MG: 100 CAPSULE, LIQUID FILLED ORAL at 08:53

## 2018-10-29 RX ADMIN — ATORVASTATIN CALCIUM 80 MG: 80 TABLET, FILM COATED ORAL at 17:04

## 2018-10-29 RX ADMIN — NYSTATIN 500000 UNITS: 100000 SUSPENSION ORAL at 08:52

## 2018-10-29 RX ADMIN — ENOXAPARIN SODIUM 40 MG: 40 INJECTION SUBCUTANEOUS at 08:53

## 2018-10-29 RX ADMIN — ASPIRIN 81 MG: 81 TABLET, COATED ORAL at 08:53

## 2018-10-29 RX ADMIN — NYSTATIN 500000 UNITS: 100000 SUSPENSION ORAL at 12:09

## 2018-10-29 RX ADMIN — PREDNISONE 20 MG: 20 TABLET ORAL at 08:53

## 2018-10-29 RX ADMIN — LOSARTAN POTASSIUM 50 MG: 50 TABLET, FILM COATED ORAL at 08:53

## 2018-10-29 RX ADMIN — FLUOXETINE 20 MG: 20 CAPSULE ORAL at 08:53

## 2018-10-29 RX ADMIN — SENNOSIDES 8.6 MG: 8.6 TABLET, FILM COATED ORAL at 21:21

## 2018-10-29 RX ADMIN — CLOPIDOGREL 75 MG: 75 TABLET, FILM COATED ORAL at 08:53

## 2018-10-29 RX ADMIN — MELATONIN 3 MG: at 21:21

## 2018-10-29 NOTE — PROGRESS NOTES
10/29/18 1030   Pain Assessment   Pain Assessment No/denies pain   Restrictions/Precautions   Precautions Aspiration;Bed/chair alarms; Fall Risk;Supervision on toilet/commode   Cognition   Arousal/Participation Cooperative   Subjective   Subjective pt reported ready for therapy    QI: Sit to Stand   Assistance Needed Physical assistance; Adaptive equipment   Assistance Provided by [place] Less than 25%   Sit to Stand CARE Score 3   QI: Chair/Bed-to-Chair Transfer   Assistance Needed Physical assistance; Adaptive equipment   Assistance Provided by [place] 50%-74%   Chair/Bed-to-Chair Transfer CARE Score 2   Transfer Bed/Chair/Wheelchair   Limitations Noted In Balance; Endurance;LE Strength;UE Strength   Adaptive Equipment None   Stand Pivot Moderate Assist   Sit to Stand Minimal Assist   Stand to Sit Minimal Assist   Bed, Chair, Wheelchair Transfer (FIM) 2 - [place] needs to lift or boost to rise AND assist to sit   Equipment Use   NuStep 20 Min, started at level 5 then 4  1 05 miles total  no UE support    Assessment   Treatment Assessment Session focused on NuStep for additional LLE strengthening and to progress overall activity tolerance  Pt will cont to benefit from skilled PT to make further gains in I and safety wtih functional mobility  Barriers to Discharge Inaccessible home environment;Decreased caregiver support   PT Barriers   Physical Impairment Decreased strength;Decreased range of motion;Decreased endurance; Impaired balance;Decreased mobility; Decreased coordination   Functional Limitation Car transfers;Stair negotiation;Standing;Transfers; Walking   Plan   Treatment/Interventions LE strengthening/ROM; Functional transfer training; Therapeutic exercise; Endurance training;Patient/family training;Equipment eval/education; Bed mobility;Gait training   Progress Progressing toward goals   Recommendation   Recommendation Outpatient PT; Home with family support   PT Therapy Minutes   PT Time In 1030   PT Time Out 1100   PT Total Time (minutes) 30   PT Mode of treatment - Individual (minutes) 0   PT Mode of treatment - Concurrent (minutes) 30   PT Mode of treatment - Group (minutes) 0   PT Mode of treatment - Co-treat (minutes) 0   PT Mode of Teatment - Total time(minutes) 30 minutes   Therapy Time missed   Time missed?  No

## 2018-10-29 NOTE — PROGRESS NOTES
10/29/18 1300   Pain Assessment   Pain Assessment No/denies pain   Pain Score No Pain   Restrictions/Precautions   Precautions Aspiration;Bed/chair alarms; Fall Risk;Supervision on toilet/commode   Weight Bearing Restrictions No   ROM Restrictions No   Cognition   Arousal/Participation Alert; Cooperative   Subjective   Subjective Pt reported he was ready for therapy   QI: Sit to Stand   Assistance Needed Verbal cues; Physical assistance   Assistance Provided by [place] Less than 25%   Comment VC to use BLE equally   Sit to Stand CARE Score 3   QI: Chair/Bed-to-Chair Transfer   Assistance Needed Physical assistance   Assistance Provided by [place] 50%-74%   Chair/Bed-to-Chair Transfer CARE Score 2   Transfer Bed/Chair/Wheelchair   Limitations Noted In Balance; Endurance;UE Strength;LE Strength   Adaptive Equipment None   Stand Pivot Moderate Assist   Sit to Stand Minimal Assist   Stand to Sit Minimal Assist   Bed, Chair, Wheelchair Transfer (FIM) 2 - Patient completes 25 - 49% of all tasks   QI: Walk 10 Feet   Assistance Needed Physical assistance   Assistance Provided by [place] Total assistance   Comment R handrail Darin or HHAx1 on R with ModAx2   Walk 10 Feet CARE Score 1   QI: Walk 50 Feet with Two Turns   Assistance Needed Physical assistance   Assistance Provided by [place] Total assistance   Comment R handrail Darin or HHAx1 on R with ModAx2   Walk 50 Feet with Two Turns CARE Score 1   QI: Walk 150 Feet   Assistance Needed Physical assistance   Assistance Provided by [place] Total assistance   Comment R handrail Darin or HHAx1 on R with ModAx2 and chair follow   Walk 150 Feet CARE Score 1   Ambulation   Does the patient walk? 2  Yes   Primary Discharge Mode of Locomotion Walk;Wheelchair  (WC vs walk based on pt progress)   Walk Assist Level Maximum Assist   Gait Pattern Inconsistant Soraida; Slow Soraida;Decreased foot clearance;L foot drag;L hemiparesis;L knee hyperextension; Step through;Trendelenburg;Decreased L stance; Improper weight shift   Assist Device Hand Hold   Distance Walked (feet) 150 ft   Limitations Noted In Balance; Coordination; Endurance;Midline Orientation;Posture;Speed;Strength;Swing   Findings 30'x14 with R handrail, 450'x1 ModAx2 with chair follow HHA on R   Walking (FIM) 1 - Patient requires assist of two people   Wheelchair mobility   QI: Does the patient use a wheelchair? 0  No   Assessment   Treatment Assessment Pt tolerated treatment session well  Session focused on increased ambulation  Continued to note L knee hyperextension, L trendelenberg, LLE adduction and external rotation  At beginning of session pt was able to decrease L knee hyperextension with minimal verbal cuing  Pt reports he is able to feel when his knee hyperextends  As pt fatigues frequency and severity of hyperextension increases  Used flexion assist wrap and AFO to minimize L knee hyperextension with some noted improvement  Trendelenberg gait likely due to decreased abductor strengthen and decreased core strength  Pt continues to demonstrate difficulty maintaining midline when not ambulating with assist of handrail  Trialed use of SPC on R side to increase sensory input; pt was better able to maintain midline however continued to demonstrate difficulty secondary to weakness and inability to weight shift  Future sessions should continue to focus on increased ambulation, L sided strengthening, and functional mobility to promote progress toward prior level of function  Problem List Decreased strength;Decreased range of motion;Decreased endurance; Impaired balance;Decreased mobility; Decreased coordination   Barriers to Discharge Inaccessible home environment;Decreased caregiver support   PT Barriers   Physical Impairment Decreased strength;Decreased range of motion;Decreased endurance; Impaired balance;Decreased mobility; Decreased coordination   Functional Limitation Car transfers;Stair negotiation;Standing;Transfers; Walking   Plan Treatment/Interventions Functional transfer training;LE strengthening/ROM; Elevations; Therapeutic exercise; Endurance training;Patient/family training;Equipment eval/education; Bed mobility;Gait training; Compensatory technique education   Progress Progressing toward goals   Recommendation   Recommendation Home with family support; Outpatient PT   Equipment Recommended (LRAD)   PT Therapy Minutes   PT Time In 1300   PT Time Out 1400   PT Total Time (minutes) 60   PT Mode of treatment - Individual (minutes) 30   PT Mode of treatment - Concurrent (minutes) 30   PT Mode of treatment - Group (minutes) 0   PT Mode of treatment - Co-treat (minutes) 0   PT Mode of Teatment - Total time(minutes) 60 minutes   Therapy Time missed   Time missed?  No

## 2018-10-29 NOTE — PROGRESS NOTES
Internal Medicine Progress Note  Patient: Shea Hanson  Age/sex: 58 y o  male  Medical Record #: [id number]      ASSESSMENT/PLAN:  Shea Hanson is seen and examined and management for following issues:    Multiple embolic R MCA CVA:  Cont ASA/Plavix for 90 days then Aspirin and statin therapy only; EP to see for a LOOP      HTN:  Off all meds while in acute; was previously on ARB/amlodipine/hctz;  added back Losartan 50mg daily  No other changes today  OP med list will need to be clarified so will call Dr Shahriar Vora office on Monday      DM II:  Diet controlled; recent HbA1C 6 6; pt aware and avoids excess sugar; cont sliding scale and DM diet = will stop Accuchecks/coverage since BSs have been ok; BS 94, 103, 121, 110; fasting today 82     Tobacco abuse:  Cont patch; recommend cessation     Anxiety:  Cont Fluoxetine 20mg daily; neuropsychiatry to see     Left ankle and left 2nd toe pain:  Xray of foot and ankle were negative, likely gout; pt was taking Indocin 50mg 3x daily as outpt; yesterday, gave Colchicine 1 2 mg x 1 and 0 6mg x 1 and started a steroid taper over 4 days then dc; says resolved    Thrush:  Nystatin ordered      Subjective: Patient seen and examined  Pt states he is doing well  He continues to have improvement of the Lt ankle pain  He denies any other complaints      ROS:   GI: denies abdominal pain, change bowel habits or reflux symptoms  Neuro: No new neurologic changes  Respiratory: No Cough, SOB  Cardiovascular: No CP, palpitations     Scheduled Meds:    Current Facility-Administered Medications:  acetaminophen 650 mg Oral Q6H PRN Melodie Myers MD   aspirin 81 mg Oral Daily Melodie Myers MD   atorvastatin 80 mg Oral QPM Melodie Myers MD   clopidogrel 75 mg Oral Daily PATEL Gautam   docusate sodium 100 mg Oral BID Bernarda Yao PA-C   enoxaparin 40 mg Subcutaneous Q24H [facility] Melodie Myers MD   FLUoxetine 20 mg Oral Daily PATEL Gautam   insulin lispro 1-5 Units Subcutaneous HS Aubrie Mtz MD   insulin lispro 1-5 Units Subcutaneous TID [facility] Aubrie Mtz MD   losartan 50 mg Oral Daily PATEL Hopkins   melatonin 3 mg Oral HS Aubrie Mtz MD   nicotine 14 mg Transdermal Daily Aubrie Mtz MD   nystatin 500,000 Units Swish & Swallow 4x Daily ShantiPATEL Harmon   ondansetron 4 mg Intravenous Q6H PRN Aubrie Mtz MD   oxyCODONE 2 5 mg Oral Q4H PRN Aubrie Mtz MD   pneumococcal 23-valent polysaccharide vaccine 0 5 mL Subcutaneous Prior to discharge Aubrie Mtz MD   polyethylene glycol 17 g Oral Daily PRN Bernarda Yao PA-C   senna 1 tablet Oral HS Bernarda Yao PA-C       Labs:       Results from last 7 days  Lab Units 10/29/18  0652 10/26/18  0611   WBC Thousand/uL 8 59 8 06   HEMOGLOBIN g/dL 13 4 13 9   HEMATOCRIT % 40 2 41 8   PLATELETS Thousands/uL 267 253       Results from last 7 days  Lab Units 10/29/18  0610 10/26/18  0611   SODIUM mmol/L 140 136   POTASSIUM mmol/L 3 6 3 8   CHLORIDE mmol/L 107 105   CO2 mmol/L 26 24   BUN mg/dL 21 16   CREATININE mg/dL 0 84 0 84   CALCIUM mg/dL 8 8 8 9                  Glucose, i-STAT (mg/dl)   Date Value   10/20/2018 132       Labs reviewed    Physical Examination:  Vitals:   Vitals:    10/28/18 0500 10/28/18 1341 10/28/18 2046 10/29/18 0608   BP: 128/65 119/61 114/60 140/73   BP Location: Right arm Right arm Right arm Right arm   Pulse: 58 59 (!) 50 55   Resp: 18 18 16 18   Temp: 97 6 °F (36 4 °C) 97 9 °F (36 6 °C) 98 2 °F (36 8 °C) 97 8 °F (36 6 °C)   TempSrc: Oral Oral Oral Oral   SpO2: 95% 98% 95% 97%   Weight:       Height:         Constitutional:  NAD; pleasant; nontoxic  HEENT:  AT/NC; oropharynx + for thrush on tongue   Neck: negative for JVD  CV:  +S1, S2;  RRR; no rub/murmur  Pulmonary:  BBS without crackles/wheeze/rhonci; resp are unlabored  Abdominal:  soft, +BS, ND/NT; no mass  Skin:  no rashes  Musculoskeletal:  Left medial ankle 1+ edema but no redness; left 2nd toe w/o red/erythema  :  no nichols   Neurological/Psych:  AAO; Can slightly move left fingers but not grasp = can lift at shoulder but not flex at elbow;  LLE HF 4/5 with DF/PF 4-/5; + left facial droop and tongue deviation; speech is clear; no depression/anxiety        [ X ] Total time spent: 30 Mins and greater than 50% of this time was spent counseling/coordinating care  ** Please Note: Dragon 360 Dictation voice to text software may have been used in the creation of this document   **

## 2018-10-29 NOTE — PROGRESS NOTES
10/29/18 1110   Pain Assessment   Pain Assessment No/denies pain   Pain Score No Pain   Restrictions/Precautions   Precautions Aspiration;Bed/chair alarms; Fall Risk;Supervision on toilet/commode   Memory Skills   Memory (FIM) 5 - Needs cueing reminders <10%   Social Interaction (FIM) 7 - Interacts appropriately without assistive device, medication or helper   Speech/Language/Cognition Assessmetn   Treatment Assessment Pt seen for skilled ST session w/ focus on higher level, more complex cognitive linguistic skills  When engaged in conversation targeting STM recall at beginning of session, pt accurately recalled weekend visitors and informed SLP of therapy tasks completed in [address] [place] [address] Dr wynn/ other therapist  Pt also discussed the types of tasks required for his job duties which include time calculations, use of computers/excel and process management, which SLP will aim to incorporate in upcoming sessions  Pt then engaged in a map reading task focusing on visual logic and reasoning skills  When presented w/ questions requiring the need for both comprehension skills, visual attention and logic, pt provided accurate responses in 5/8 trials  Pt was observed to attempt to respond quickly and did not always scan all info/attend to details, however, when cued for scanning all info, pt then improved accuracy of task in total w/ need for only min verbal cues  When presented w/ a deduction puzzle, again focusing on higher level reasoning, as well as attention to detail and working memory skills, pt completed task w/ overall min assist but did benefit from cues to carryover use of strategies to maintain attention to important details and to use process of elimination  Of note, pt completed tasks in a louder environment in which he did appear to maintain concentration w/o increased difficulty   Pt is making progress towards goals, however, will continue to benefit from skilled ST intervention at this time targeting higher level cognitive linguistic skills for increased independence  Swallow Information   Current Risks for Dysphagia & Aspiration General debilitation;New Neuro event;Brain injury; Dysarthria   Current Symptoms/Concerns Cough;Clear throat;Difficulty chewing;Pockets food   Current Diet Dysphagia mechanical soft; Thin liquid   Baseline Diet Regular; Thin liquids   Consistencies Assessed and Performance   Materials Admnistered Soft/Level 3;Regular/Solid; Thin liquid   Oral Stage Mild impaired   Phargngeal Stage Mild impaired;WFL   Swallow Mechanics Mild delayed;WFL;Swallow initation; Appears prompt;Good Larygneal rise;Aspiration risk   Esophageal Concerns No s/s reported   Recommendations   Risk for Aspiration Mild   Diet Solid Recommendation Level 3 Dysphagia/ advanced/ soft to chew   Diet Liquid Recommendation Thin liquid   Recommended Form of Meds Whole; With thin liquid; As desired; As tolerated   General Precautions Aspiration precautions; Feed only when alert;Minimize distractions;Upright as possible for all oral intake;Remain upright for 45 mins after meals  (OOB for meals)   Compensatory Swallowing Strategies Place food/straw in on right side; Alternate solids and liquids; External pacing; Check for pocketing of food on the left;Cue for lingual sweep;Voluntary throat clear/cough to clear penetration   Results Reviewed with MD;RN;PT/Family/Caregiver   QI: Eating   Assistance Needed Set-up / clean-up   Assistance Provided by [place] No physical assistance   Eating CARE Score 5   Swallow Assessment   Swallow Treatment Assessment Pt seen for dysphagia therapy w/ full level 3/regular diet trial tray consisting of chicken marsala, brussel sprouts, rice and fresh fruit where pt consumed 100% of meal and ~120cc thin liquids taken by straw as per pt's preference  Following assist w/ tray set up, pt demonstrated ability to self feed w/ appropriate rate of feeding but at times taking larger bite sizes   Demonstrated functional mastication of all solids w/ slower initial manipulation of bolus w/ improved bolus formation, noting mild L side pocketing  Utilizing lingual sweeps and liquid wash, pt independently cleared all residual  Overall swallow initiation continued to appear timely throughout meal  While no overt s/s noted w/ chicken, rise or brussel sprouts, however, coughing x2 observed w/ fresh fruit, suspecting due to decreased oral control/containment of ambient fluid/juice from fruit  Pt encouraged to continue coughing until cleared  Due to improvement of mastication/bolus formation/transfers of solids, will recommend upgrade to dysphagia level 3 diet and thin liquids  Pt will continue to benefit from skilled ST intervention to maximize swallow function and to continue to determine safest and least restrictive diet at this time  Swallow Assessment Prognosis   Prognosis Good   Prognosis Considerations Medical diagnosis; Medical prognosis; Severity of impairments   SLP Therapy Minutes   SLP Time In 1110   SLP Time Out 1210   SLP Total Time (minutes) 60   SLP Mode of treatment - Individual (minutes) 60   SLP Mode of treatment - Concurrent (minutes) 0   SLP Mode of treatment - Group (minutes) 0   SLP Mode of treatment - Co-treat (minutes) 0   SLP Mode of Teatment - Total time(minutes) 60 minutes   Therapy Time missed   Time missed?  No   Daily FIM Score   Problem solving (FIM) 5 - Solves complex problems But requires cues from helper   Comprehension (FIM) 6 - Understands complex/abstract but requires more time   Expression (FIM) 6 - Expresses complex/abstract but requires:  more time   Eating (FIM) 5 - Patient needs help to open contianers or set up tray

## 2018-10-29 NOTE — PROGRESS NOTES
Physical Medicine and Rehabilitation Progress Note  Patience Driver 58 y o  male MRN: [id number]  Unit/Bed#: -01 Encounter: [id number]    HPI: Patient is 59 yo male with right cerebral infarcts likely embolic in nature     Chief Complaint/Subjective: Patient denies any issues over the weekend     ROS:  negative unless noted above     Assessment/Plan:      DM (diabetes mellitus) ([facility] )   Assessment & Plan    · Not on any meds at home  · On ISS as inpt, IM to determine if oral agent will need to be started     Gout   Assessment & Plan    · Left ankle  · On Indocin 50mg TID at home (currently on hold, will d/w IM as patient is on DAPT)  · XR of left ankle and foot negative   · s/p colchicine and steroid taper per IM  · Resolved per patient      Hyperlipidemia   Assessment & Plan    · Home zocor 20 mg changed to lipitor 80 mg      Anxiety   Assessment & Plan    · No record of any benzodiazepines (or any other controlled medications) found in the [place] database going back 1 year  · Neuropsychology following       Hypertension   Assessment & Plan    · IM to clarify home meds as records are not clear ( olmesartan vs valsartan ?, HCTZ 25 mg vs 50 mg ? norvasc ?)  · Currently on cozaar per IM      Dysphagia   Assessment & Plan    · SLP following  · Modified diet per SLP recs      * Right sided cerebral hemisphere cerebrovascular accident (CVA) ([facility] )   Assessment & Plan    · On DAPT for 90 days, with aspirin/plavix, followed by monotherapy with ASA  · Possibly cardioembolic in etiology   Consulted EP for eval/placement of loop recorder however they have recommend JAYE first and if negative then they will place loop  · Fluoxetine 20mg post stroke for motor recovery         Scheduled Meds:  Current Facility-Administered Medications:  acetaminophen 650 mg Oral Q6H PRN Yoon Blanco MD   aspirin 81 mg Oral Daily Yoon Blanco MD   atorvastatin 80 mg Oral QPM Yoon Blanco MD   clopidogrel 75 mg Oral Daily Queta Henley CRNP   docusate sodium 100 mg Oral BID Bernarda Yao PA-C   enoxaparin 40 mg Subcutaneous Q24H Jerson Linder MD   FLUoxetine 20 mg Oral Daily PATEL Gautam   losartan 50 mg Oral Daily PATEL Kern   melatonin 3 mg Oral HS Phuong Roberts MD   nicotine 14 mg Transdermal Daily Phuong Roberts MD   nystatin 500,000 Units Swish & Swallow 4x Daily PATEL Whitman   ondansetron 4 mg Intravenous Q6H PRN Phuong Roberts MD   oxyCODONE 2 5 mg Oral Q4H PRN Phuong Roberts MD   pneumococcal 23-valent polysaccharide vaccine 0 5 mL Subcutaneous Prior to discharge Phuong Roberts MD   polyethylene glycol 17 g Oral Daily PRN Bernarda Yao PA-C   senna 1 tablet Oral HS Bernarda Yao PA-C        Incidental findings  1) thyroid nodules: per radiology report recs non-emergent OP thyroid U/S, OP FU with PCP to arrange U/S  2) subpleural bullae: OP FU with PCP with further testing/treatment and/or specialist referral at PCP's discretion     Note: patient noted to have petechial hemorrhage on imaging however has been cleared for DAPT and lovenox (for DVT ppx) by neuro  DVT ppx: SCDs, lovenox     Objective:    Functional Update:  Mobility: max WC  Transfers: mod  ADLs: max     Allergies per EMR    Physical Exam:      General: alert, no apparent distress, cooperative and comfortable  HEENT:  Head: Normocephalic, no lesions, without obvious abnormality    CARDIAC:  +S1/2  LUNGS:  respirations unlabored  ABDOMEN:  soft   EXTREMITIES:  no signficant LE edema  NEURO:   awake, alert, appropriately answering questions, 2/5 LUE finger flexion, 2/5 LUE shoulder abduction otherwise 0-1/5 LUE, +dysarthria  PSYCH:  mood/affect currently stable      Diagnostic Studies: reviewed, no new imaging  No orders to display       Laboratory:      Results from last 7 days  Lab Units 10/29/18  1509 10/26/18  0611 10/24/18  0424   HEMOGLOBIN g/dL 13 4 13 9 13 9   HEMATOCRIT % 40 2 41 8 41 8   WBC Thousand/uL 8 59 8  06 9 96       Results from last 7 days  Lab Units 10/29/18  0610 10/26/18  0611 10/24/18  0424   BUN mg/dL 21 16 12   SODIUM mmol/L 140 136 141   POTASSIUM mmol/L 3 6 3 8 3 7   CHLORIDE mmol/L 107 105 104   CREATININE mg/dL 0 84 0 84 0 99            Patient Active Problem List   Diagnosis    Right sided cerebral hemisphere cerebrovascular accident (CVA) ([facility] )    Dysphagia    Hypertension    Anxiety    Hyperlipidemia    Gout    DM (diabetes mellitus) ([facility] )           Total visit time:  At least 25 minutes, with more than 50% spent counseling/coordinating care

## 2018-10-29 NOTE — PROGRESS NOTES
10/29/18 0700   Pain Assessment   Pain Assessment No/denies pain   Pain Score No Pain   Restrictions/Precautions   Precautions Aspiration;Bed/chair alarms; Fall Risk   Weight Bearing Restrictions No   ROM Restrictions No   QI: Oral Hygiene   Assistance Needed Verbal cues; Supervision   Assistance Provided by [employer] No physical assistance   Comment Pt demos compensatory strategies with oral care while using RUE seated in w/c at this time  Oral Hygiene CARE Score 4   Grooming   Able To Initiate Tasks; Acquire Items; Wash/Dry Hands;Brush/Clean Teeth;Wash/Dry Face;Comb/Brush Hair   Limitation Noted In Coordination; Safety;Strength   Grooming (FIM) 5 - [employer] sets up supplies or applies device   QI: Shower/Bathe Self   Assistance Needed Physical assistance   Assistance Provided by [employer] 25%-49%   Shower/Bathe Self CARE Score 3   Bathing   Assessed Bath Style Shower   Anticipated D/C Bath Style Shower   Able to Gurpreet Girish No   Able to [employer] Temperature Yes   Able to Wash/Rinse/Dry (body part) Left Arm;L Upper Leg;R Upper Leg;L Lower Leg/Foot;R Lower Leg/Foot;Chest;Abdomen;Perineal Area   Limitations Noted in Balance; Coordination; Endurance; Safety;Strength;ROM   Positioning Seated;Standing   Adaptive Equipment Hand Held Shower; Tub Bench; Shower Bars   Findings  Pt completes all bathing tasks with use of RUE as pt with dec ROM on LUE  Pt demos ability to complete shoulder flexion on L side to wash underarm  Pt requires A to wash RUE and rear hygiene  Bathing (FIM) 3 - Patient completes 5/10  6/10 or 7/10 parts   Tub/Shower Transfer   Limitations Noted In Balance; Endurance;UE Strength;LE Strength;ROM; Coordination   Adaptive Equipment Transfer Bench;Grab Bars   Assessed Shower   Findings Pt was able to complete sit pivot from w/c to tub bench with use of UE support on grab bar to go into shower  Swap out completed while in stance at grab bar 2* to safety concerns      Shower Transfer (FIM) 1 - Patient requires assist of two people   QI: Upper Body Dressing   Assistance Needed Supervision;Verbal cues   Assistance Provided by [place] No physical assistance   Comment Pt is able to don/doff pullover shirt with VC's for antoine dressing technique  Upper Body Dressing CARE Score 4   QI: Lower Body Dressing   Assistance Needed Physical assistance   Assistance Provided by [place] Less than 25%   Comment Pt requires A to complete CM over hips  Lower Body Dressing CARE Score 3   QI: Putting On/Taking Off Footwear   Assistance Needed Physical assistance   Assistance Provided by [place] 25%-49%   Comment Pt requires A to tie laces on shoes only  Educated on elastic shoe laces for increased independence  Pt was able to successfully don/doff slip on style with Supervision  Putting On/Taking Off Footwear CARE Score 3   Dressing/Undressing Clothing   Remove UB Clothes Pullover Shirt   Remove LB Clothes Pants;Socks   Don UB Clothes Pullover Shirt   Don LB Clothes Socks; Shoes; Shorts   Limitations Noted In Balance; Endurance;ROM;Strength   Positioning Supported Sit;Standing   UB Dressing (FIM) 5 - Patient requires supervision/monitoring   LB Dressing (FIM) 3 - Patient completes  50-74% of all tasks   QI: Lying to Sitting on Side of Bed   Assistance Needed Supervision;Verbal cues   Assistance Provided by [place] No physical assistance   Comment VC's for LUE attention  Lying to Sitting on Side of Bed CARE Score 4   QI: Sit to Stand   Assistance Needed Physical assistance   Assistance Provided by [place] Less than 25%   Comment Pt completes sit to stand transfers with overall Jemima after VC's for proper technique/form  Sit to Stand CARE Score 3   QI: Chair/Bed-to-Chair Transfer   Assistance Needed Physical assistance   Assistance Provided by [place] 50%-74%   Chair/Bed-to-Chair Transfer CARE Score 2   Transfer Bed/Chair/Wheelchair   Positioning Concerns Hemiplegia   Limitations Noted In Balance; Coordination; Endurance;LE Strength;UE Strength   Bed, Chair, Wheelchair Transfer (FIM) 3 - Steele needs to lift, boost or assist to stand OR sit   QI: Toilet Transfer   Assistance Needed Physical assistance   Assistance Provided by [place] 50%-74%   Toilet Transfer CARE Score 2   Toilet Transfer   Findings SPT with use of grab bars for UE support  Toilet Transfer (FIM) 3 - Steele needs to lift, boost or assist to stand OR sit   Functional Standing Tolerance   Time 5 minutes    Activity Static standing with focus on WBing through LLE/LUE while at tabletop to complete higher level peg board activity  Comments Pt tolerates well with overall CGA and VC's for maintaining posture  Cognition   Overall Cognitive Status WFL   Arousal/Participation Alert; Cooperative   Attention Within functional limits   Orientation Level Oriented X4   Memory Within functional limits   Following Commands Follows multistep commands inconsistently   Activity Tolerance   Activity Tolerance Patient tolerated treatment well   Assessment   Treatment Assessment Pt participated in skilled OT services with focus on ADL retraining  Pt is demonstrating increased shoulder movement including flexion/ext but requires VC's to dec trunk compensation during dressing/bathing tasks  Pt continues to demo distal weakness from elbow to digits with trace digit flexion noted  Pt demos increased compensatory strategies to utilize LUE as functional A and manipulate grooming supplies with RUE  Pt does at times demos slight L neglect and requires VC's to protect LUE specifically with bed mobility tasks and functional transfers  Pt will continue to benefit from skilled OT services with focus on LUE neuro re-ed, functional transfers, standing/sitting balance  Prognosis Good   Problem List Decreased strength;Decreased range of motion; Impaired balance;Decreased coordination;Decreased mobility   Plan   Treatment/Interventions ADL retraining;Functional transfer training; Therapeutic exercise; Endurance training;Equipment eval/education; Compensatory technique education; Bed mobility   Progress Progressing toward goals   Recommendation   OT Discharge Recommendation Home with family support   OT Therapy Minutes   OT Time In 0700   OT Time Out 0830   OT Total Time (minutes) 90   OT Mode of treatment - Individual (minutes) 90   OT Mode of treatment - Concurrent (minutes) 0   OT Mode of treatment - Group (minutes) 0   OT Mode of treatment - Co-treat (minutes) 0   OT Mode of Teatment - Total time(minutes) 90 minutes   Therapy Time missed   Time missed?  No

## 2018-10-29 NOTE — PROGRESS NOTES
Stroke Education Series    Pt participated in skilled Stroke Education Series in a group setting to address the topic of Depression and Anxiety post stroke in both verbal and written formats  Education within this session included the signs and symptoms of Depression and Anxiety and the impact of psychological health after experiencing a stroke  The goal of this education session was to provide patient with the tools to identify the presence of these signs and symptoms within his/her self and to have the resources to improve overall coping skills; therefore increasing his/her overall quality of life  Following education, pt's response to education is: verbalizes understanding        Start Time: 1430    End Time: 4556

## 2018-10-30 ENCOUNTER — APPOINTMENT (INPATIENT)
Dept: NON INVASIVE DIAGNOSTICS | Facility: HOSPITAL | Age: 63
DRG: 041 | End: 2018-10-30
Payer: COMMERCIAL

## 2018-10-30 PROCEDURE — C1764 EVENT RECORDER, CARDIAC: HCPCS

## 2018-10-30 PROCEDURE — 97112 NEUROMUSCULAR REEDUCATION: CPT | Performed by: OCCUPATIONAL THERAPY ASSISTANT

## 2018-10-30 PROCEDURE — 0JH632Z INSERTION OF MONITORING DEVICE INTO CHEST SUBCUTANEOUS TISSUE AND FASCIA, PERCUTANEOUS APPROACH: ICD-10-PCS | Performed by: INTERNAL MEDICINE

## 2018-10-30 PROCEDURE — 97530 THERAPEUTIC ACTIVITIES: CPT | Performed by: OCCUPATIONAL THERAPY ASSISTANT

## 2018-10-30 PROCEDURE — 33282 HB IMPLANT PAT-ACTIVE HT RECORD: CPT

## 2018-10-30 PROCEDURE — 97116 GAIT TRAINING THERAPY: CPT

## 2018-10-30 PROCEDURE — 92526 ORAL FUNCTION THERAPY: CPT

## 2018-10-30 PROCEDURE — 97530 THERAPEUTIC ACTIVITIES: CPT

## 2018-10-30 PROCEDURE — 93312 ECHO TRANSESOPHAGEAL: CPT

## 2018-10-30 PROCEDURE — 93312 ECHO TRANSESOPHAGEAL: CPT | Performed by: INTERNAL MEDICINE

## 2018-10-30 PROCEDURE — 97112 NEUROMUSCULAR REEDUCATION: CPT

## 2018-10-30 PROCEDURE — 93320 DOPPLER ECHO COMPLETE: CPT | Performed by: INTERNAL MEDICINE

## 2018-10-30 PROCEDURE — 93325 DOPPLER ECHO COLOR FLOW MAPG: CPT | Performed by: INTERNAL MEDICINE

## 2018-10-30 PROCEDURE — 33282 PR IMPLANTATION PT-ACTIVATED CARDIAC EVENT RECORDER: CPT | Performed by: INTERNAL MEDICINE

## 2018-10-30 PROCEDURE — 99232 SBSQ HOSP IP/OBS MODERATE 35: CPT | Performed by: PHYSICAL MEDICINE & REHABILITATION

## 2018-10-30 PROCEDURE — 97110 THERAPEUTIC EXERCISES: CPT | Performed by: OCCUPATIONAL THERAPY ASSISTANT

## 2018-10-30 RX ORDER — PROPOFOL 10 MG/ML
INJECTION, EMULSION INTRAVENOUS AS NEEDED
Status: DISCONTINUED | OUTPATIENT
Start: 2018-10-30 | End: 2018-10-30 | Stop reason: SURG

## 2018-10-30 RX ORDER — LIDOCAINE HYDROCHLORIDE AND EPINEPHRINE 10; 10 MG/ML; UG/ML
INJECTION, SOLUTION INFILTRATION; PERINEURAL CODE/TRAUMA/SEDATION MEDICATION
Status: COMPLETED | OUTPATIENT
Start: 2018-10-30 | End: 2018-10-30

## 2018-10-30 RX ORDER — LIDOCAINE HYDROCHLORIDE 10 MG/ML
INJECTION, SOLUTION INFILTRATION; PERINEURAL AS NEEDED
Status: DISCONTINUED | OUTPATIENT
Start: 2018-10-30 | End: 2018-10-30 | Stop reason: SURG

## 2018-10-30 RX ORDER — SODIUM CHLORIDE 9 MG/ML
INJECTION, SOLUTION INTRAVENOUS CONTINUOUS PRN
Status: DISCONTINUED | OUTPATIENT
Start: 2018-10-30 | End: 2018-10-30 | Stop reason: SURG

## 2018-10-30 RX ORDER — PROPOFOL 10 MG/ML
INJECTION, EMULSION INTRAVENOUS CONTINUOUS PRN
Status: DISCONTINUED | OUTPATIENT
Start: 2018-10-30 | End: 2018-10-30 | Stop reason: SURG

## 2018-10-30 RX ADMIN — ASPIRIN 81 MG: 81 TABLET, COATED ORAL at 10:36

## 2018-10-30 RX ADMIN — ENOXAPARIN SODIUM 40 MG: 40 INJECTION SUBCUTANEOUS at 10:27

## 2018-10-30 RX ADMIN — PROPOFOL 80 MG: 10 INJECTION, EMULSION INTRAVENOUS at 08:36

## 2018-10-30 RX ADMIN — LIDOCAINE HYDROCHLORIDE 50 MG: 10 INJECTION, SOLUTION INFILTRATION; PERINEURAL at 08:36

## 2018-10-30 RX ADMIN — NICOTINE 14 MG: 14 PATCH, EXTENDED RELEASE TRANSDERMAL at 10:38

## 2018-10-30 RX ADMIN — MELATONIN 3 MG: at 21:51

## 2018-10-30 RX ADMIN — ACETAMINOPHEN 650 MG: 325 TABLET, FILM COATED ORAL at 23:20

## 2018-10-30 RX ADMIN — NYSTATIN 500000 UNITS: 100000 SUSPENSION ORAL at 17:34

## 2018-10-30 RX ADMIN — DOCUSATE SODIUM 100 MG: 100 CAPSULE, LIQUID FILLED ORAL at 10:27

## 2018-10-30 RX ADMIN — CLOPIDOGREL 75 MG: 75 TABLET, FILM COATED ORAL at 10:36

## 2018-10-30 RX ADMIN — NYSTATIN 500000 UNITS: 100000 SUSPENSION ORAL at 21:51

## 2018-10-30 RX ADMIN — PROPOFOL 130 MCG/KG/MIN: 10 INJECTION, EMULSION INTRAVENOUS at 08:37

## 2018-10-30 RX ADMIN — LIDOCAINE HYDROCHLORIDE AND EPINEPHRINE 15 ML: 10; 10 INJECTION, SOLUTION INFILTRATION; PERINEURAL at 17:03

## 2018-10-30 RX ADMIN — ATORVASTATIN CALCIUM 80 MG: 80 TABLET, FILM COATED ORAL at 17:34

## 2018-10-30 RX ADMIN — FLUOXETINE 20 MG: 20 CAPSULE ORAL at 10:28

## 2018-10-30 RX ADMIN — DOCUSATE SODIUM 100 MG: 100 CAPSULE, LIQUID FILLED ORAL at 17:34

## 2018-10-30 RX ADMIN — NYSTATIN 500000 UNITS: 100000 SUSPENSION ORAL at 10:28

## 2018-10-30 RX ADMIN — SODIUM CHLORIDE: 0.9 INJECTION, SOLUTION INTRAVENOUS at 08:34

## 2018-10-30 RX ADMIN — SENNOSIDES 8.6 MG: 8.6 TABLET, FILM COATED ORAL at 21:51

## 2018-10-30 NOTE — PROGRESS NOTES
Internal Medicine Progress Note  Patient: Milind Sandoval  Age/sex: 58 y o  male  Medical Record #: [id number]      ASSESSMENT/PLAN:  Milind Sandoval is seen and examined and management for following issues:    Multiple embolic R MCA CVA:  Cont ASA/Plavix for 90 days then Aspirin and statin therapy only; EP did order JAYE which was done today and now he is for a LOOP      HTN:  Off all meds while in acute; was previously on ARB/amlodipine/hctz;  added back Losartan 50mg daily  No other changes today  OP med list will need to be clarified so will call Dr Viera Pleasant office      DM II:  Diet controlled; recent HbA1C 6 6; pt aware and avoids excess sugar; cont sliding scale and DM diet = yesterday, stopped Accuchecks/coverage since BSs have been ok      Tobacco abuse:  Cont patch; recommend cessation     Anxiety:  Cont Fluoxetine 20mg daily; neuropsychiatry to see     Left ankle and left 2nd toe pain:  Xray of foot and ankle were negative, likely gout; pt was taking Indocin 50mg 3x daily as outpt;  Colchicine 1 2 mg x 1, 0 6mg x 1 and a steroid taper over 4 --> now resolved    Thrush:  Nystatin ordered      Subjective: Patient seen and examined  Pt states he is doing well  He continues to have improvement of the Lt ankle pain  He denies any other complaints      ROS:   GI: denies abdominal pain, change bowel habits or reflux symptoms  Neuro: No new neurologic changes  Respiratory: No Cough, SOB  Cardiovascular: No CP, palpitations     Scheduled Meds:    Current Facility-Administered Medications:  acetaminophen 650 mg Oral Q6H PRN Tk George MD   aspirin 81 mg Oral Daily Tk George MD   atorvastatin 80 mg Oral QPM Tk George MD   clopidogrel 75 mg Oral Daily PATEL Gautam   docusate sodium 100 mg Oral BID Bernarda Yao PA-C   enoxaparin 40 mg Subcutaneous Q24H [address] Tk George MD   FLUoxetine 20 mg Oral Daily PATEL Gautam   losartan 50 mg Oral Daily PATEL Gautam   melatonin 3 mg Oral HS Francisco Javier Brambila MD   nicotine 14 mg Transdermal Daily Francisco Javier Brambila MD   nystatin 500,000 Units Swish & Swallow 4x Daily Carmencita Mohs, CRNP   ondansetron 4 mg Intravenous Q6H PRN Francisco Javier Brambila MD   oxyCODONE 2 5 mg Oral Q4H PRN Francisco Javier Brambila MD   pneumococcal 23-valent polysaccharide vaccine 0 5 mL Subcutaneous Prior to discharge Francisco Javier Brambila MD   polyethylene glycol 17 g Oral Daily PRN Bernarda Yao PA-C   senna 1 tablet Oral HS Bernarda Yao PA-C       Labs:       Results from last 7 days  Lab Units 10/29/18  1509 10/26/18  0611   WBC Thousand/uL 8 59 8 06   HEMOGLOBIN g/dL 13 4 13 9   HEMATOCRIT % 40 2 41 8   PLATELETS Thousands/uL 267 253       Results from last 7 days  Lab Units 10/29/18  0610 10/26/18  0611   SODIUM mmol/L 140 136   POTASSIUM mmol/L 3 6 3 8   CHLORIDE mmol/L 107 105   CO2 mmol/L 26 24   BUN mg/dL 21 16   CREATININE mg/dL 0 84 0 84   CALCIUM mg/dL 8 8 8 9                  Glucose, i-STAT (mg/dl)   Date Value   10/20/2018 132       Labs reviewed    Physical Examination:  Vitals:   Vitals:    10/29/18 0608 10/29/18 1409 10/29/18 2032 10/30/18 0643   BP: 140/73 155/87 145/70 116/69   BP Location: Right arm Right arm Right arm Left arm   Pulse: 55 73 56 (!) 52   Resp: 18 18 18 20   Temp: 97 8 °F (36 6 °C) 98 5 °F (36 9 °C) 97 7 °F (36 5 °C) (!) 97 4 °F (36 3 °C)   TempSrc: Oral Oral Oral Oral   SpO2: 97% 96% 96% 96%   Weight:       Height:         Constitutional:  NAD; pleasant; nontoxic  HEENT:  AT/NC; oropharynx + for thrush on tongue   Neck: negative for JVD  CV:  +S1, S2;  RRR; no rub/murmur  Pulmonary:  BBS without crackles/wheeze/rhonci; resp are unlabored  Abdominal:  soft, +BS, ND/NT; no mass  Skin:  no rashes  Musculoskeletal:  Left medial ankle 1+ edema but no redness; left 2nd toe w/o red/erythema  :  no nichols   Neurological/Psych:  AAO;   Can slightly move left fingers but not grasp = can lift at shoulder but not flex at elbow;  LLE HF 4/5 with DF/PF 4-/5; + left facial droop and tongue deviation; speech is clear; no depression/anxiety        [ X ] Total time spent: 30 Mins and greater than 50% of this time was spent counseling/coordinating care  ** Please Note: Dragon 360 Dictation voice to text software may have been used in the creation of this document   **

## 2018-10-30 NOTE — ANESTHESIA POSTPROCEDURE EVALUATION
Post-Op Assessment Note      CV Status:  Stable    Mental Status:  Alert and awake    Hydration Status:  Euvolemic    PONV Controlled:  Controlled    Airway Patency:  Patent    Post Op Vitals Reviewed: Yes          Staff: CRNA           BP   103/52   Temp     Pulse 44   Resp   16   SpO2   99

## 2018-10-30 NOTE — PCC SPEECH THERAPY
Pt is currently being followed for speech/cognitive/dysphagia therapy  On initial evaluation, pt completed the CLQT with scores correlating to overall cognitive linguistic skills that are [facility] [place] at time of assessment in comparison to age matched peers  However, informally assessed, pt demonstrating slower processing and decreased higher level reasoning, which was also reported by pt  Pt also presenting with mild dysarthria characterized by imprecise articulation of sounds  While pt demonstrates overall functional basic level cognitive linguistic skills and verbal expression, pt's functioning is impacted by his level of fatigue  Pt reports that he completed all ADLs independently and had a full time job prior to hospitalization  Pt will benefit from skilled ST intervention targeting higher level, more complex cognitive linguistic skills to maximize overall independence and to work towards returning to his job  Regarding swallow function, pt is presenting with mild oral dysphagia characterized by weaker mastication, L side pocketing and slower a-p transfers, however, pt is demonstrating improvement in independent use of strategies to clear residual  Pt was on a level 2 diet and thin liquids on admission but has recently been advanced to a dysphagia level 3 diet and appears to be tolerating without increased oral/pharyngeal difficulties or overt s/sx aspiration  Pt will benefit from further skilled ST intervention to maximize swallow function to safely achieve baseline diet of regular and thin liquids  Update week of 11/6/2018: Pt was previously being followed for dysphagia therapy, however, pt has now progressed to tolerating a regular diet and thin liquids without overt s/sx aspiration and without increased oropharyngeal difficulties  Pt demonstrating good carryover of swallow strategies to include liquid wash, lingual sweeps and finger sweeps to clear min to trace amts of L side pocketing   Improvement in pacing and bite size also noted  Further skilled ST intervention is not warranted to continue at this time for dysphagia therapy  Regarding cognition, pt has made progress towards goals and is functioning at overall mod I level, noting problem solving to fluctuate to from supervision to mod I level  Pt inconsistently demonstrates slower processing, decreased abstract thinking and decreased attention  Due to the above mentioned deficits, as well as pt's desire to continue skilled ST intervention (pt reports continued cognitive deficits), pt will benefit from further skilled ST intervention to maximize higher level, more abstract cognitive linguistic skills at this time  Update week of 11/13/2018: Pt continues to be seen for ST with sessions targeting higher level cognitive linguistic skills, along with higher level language skills  Pt continues to report and demonstrate difficulty with higher level processing and reasoning related to executive functions, as well a more complex language tasks related to expression and comprehension, specifically with written information  Pt demonstrating good insight into deficits and their impact on his ability to return to work due to the nature of his job which requires abstract thinking with higher level complexity tasks  While pt is demonstrating overall functional basic level cognitive linguistic and language skills, pt will continue to benefit from further skilled ST intervention to maximize higher level cognitive linguistic/language skills to achieve mod I to in independent level in these areas  Update week of 11/20/2018: Pt is currently being followed for skilled ST services with focus on higher level cognitive and language skills  Pt has made good progress and has achieved all goals related to cognition   Pt has demonstrated improvement in mental flexibility and more complex reasoning/problem solving, however, does still benefit from increased time for processing and preparation/organization of ideas  Related to language, pt is demonstrating verbal expression skills at mod I level, demonstrating the ability to verbalize complex information without assistance  However, pt continues to present with difficulties in higher level written expression skills in relation to spelling and syntax to which demonstrates insight  Pt is highly motivate to improve cognitive functioning on order to achieve baseline skills  Overall, pt is functioning at mod I level for cognitive linguistic skills but will benefit from further skilled ST through outpatient services to continue to improve higher level cognitive linguistic skills and language skills as pt desires to return to work

## 2018-10-30 NOTE — PROGRESS NOTES
10/30/18 1030   Pain Assessment   Pain Assessment No/denies pain   Pain Score No Pain   Restrictions/Precautions   Precautions Fall Risk;Supervision on toilet/commode   Weight Bearing Restrictions No   ROM Restrictions No   Cognition   Arousal/Participation Cooperative; Alert   Subjective   Subjective Pt reports he is tried but ready for therapy  QI: Sit to Stand   Assistance Needed Incidental touching   Sit to Stand CARE Score 4   QI: Chair/Bed-to-Chair Transfer   Assistance Needed Physical assistance   Assistance Provided by [place] Less than 25%   Comment stand pivot to R side   Chair/Bed-to-Chair Transfer CARE Score 3   Transfer Bed/Chair/Wheelchair   Limitations Noted In Balance; Coordination; Endurance; Sequencing;UE Strength;LE Strength   Adaptive Equipment None   Stand Pivot Minimal Assist   Sit to Stand Contact Guard   Stand to [employer] [place], Chair, Wheelchair Transfer (FIM) 4 - Patient completes 75% of all tasks   QI: Walk 10 Feet   Assistance Needed Physical assistance   Assistance Provided by [place] Total assistance   Comment BWSS   Walk 10 Feet CARE Score 1   QI: Walk 50 Feet with Two Turns   Assistance Needed Physical assistance   Assistance Provided by [place] Total assistance   Comment BWSS   Walk 50 Feet with Two Turns CARE Score 1   QI: Walk 150 Feet   Assistance Needed Physical assistance   Assistance Provided by [place] Total assistance   Comment BWSS   Walk 150 Feet CARE Score 1   Ambulation   Does the patient walk? 2  Yes   Primary Discharge Mode of Locomotion Walk;Wheelchair  (WC vs walk based on pt progress)   Walk Assist Level Maximum Assist   Gait Pattern Inconsistant [place];Decreased foot clearance;L hemiparesis;L knee hyperextension; Step to; Step through;Trendelenburg;Decreased L stance; Improper weight shift   Assist Device (BWSS)   Distance Walked (feet) 360 ft   Limitations Noted In Balance; Coordination; Endurance;Midline Orientation;Posture;Speed;Strength;Swing   Findings 450'x1, 360'x1, 90'x1, 45'x3 forward and backward all in BWSS   Walking (FIM) 1 - Patient requires assist of two people   Therapeutic Interventions   Balance walking forward/backwards in BWSS 270', side stepping in BWSS 45' L and R    Equipment Use   Body Weight Support System see distances walked forward, backward, and sidestepping in BWSS above   Assessment   Treatment Assessment Pt tolerated treatment well  Session focused on increased ambulation in BWSS  Pt demonstrated improvement compared to initial use of BWSS  Continues to demonstrate L knee hyperextension with use of L AFO and L trendelenberg  Pt is able to feel L knee hyperextension and is able to control motion initially; dynamic stability decreases as pt fatigues  Pt demonstrates increased difficulty controling hyperextension when walking backwards  Side steps to the L were more difficult compared to side steps to R secondary to difficulty weight shifting  When walking forward, noted improvement in weight shift when instructed to "walk as fast as you can " Pt was fatigued during session secondary to JAYE done earlier in the morning; seated rest breaks required throughout session secondary to fatigue  Future sessions should continue to focus on increased ambulation, strengthening, endurance, and functional mobility to promote progress toward PLOF  Family/Caregiver Present Brother   Problem List Decreased strength;Decreased range of motion;Decreased endurance; Impaired balance;Decreased mobility; Decreased coordination   Barriers to Discharge Inaccessible home environment;Decreased caregiver support   PT Barriers   Physical Impairment Decreased strength;Decreased range of motion;Decreased endurance; Impaired balance;Decreased mobility; Decreased coordination   Functional Limitation Car transfers; Ramp negotiation;Stair negotiation;Standing;Transfers; Walking; Wheelchair management   Plan   Treatment/Interventions Functional transfer training;LE strengthening/ROM; Elevations; Therapeutic exercise; Endurance training;Patient/family training;Equipment eval/education; Bed mobility;Gait training; Compensatory technique education   Progress Progressing toward goals   Recommendation   Recommendation Home with family support; Outpatient PT   Equipment Recommended (LRAD)   PT Therapy Minutes   PT Time In 1030   PT Time Out 1145   PT Total Time (minutes) 75   PT Mode of treatment - Individual (minutes) 75   PT Mode of treatment - Concurrent (minutes) 0   PT Mode of treatment - Group (minutes) 0   PT Mode of treatment - Co-treat (minutes) 0   PT Mode of Teatment - Total time(minutes) 75 minutes   Therapy Time missed   Time missed?  No

## 2018-10-30 NOTE — PROGRESS NOTES
10/30/18 1145   Pain Assessment   Pain Assessment No/denies pain   Pain Score No Pain   Restrictions/Precautions   Precautions Aspiration; Fall Risk;Supervision on toilet/commode   Speech/Language/Cognition Assessmetn   Treatment Assessment Prior to meal, pt engaged in oral motor exercises, which also included resistance exercises targeting labial, lingual and jaw ROM and strength  Pt completed 10-15reps of 5 different exercises  Pt demonstrated ability to follow all commands and carryover exercises w/o SLP models  Educated on completing exercises bedside 2x per day as directed on handout  Swallow Information   Current Risks for Dysphagia & Aspiration Dysarthria;General debilitation;New Neuro event;Brain injury   Current Symptoms/Concerns Cough;Clear throat;Difficulty chewing;Pockets food   Current Diet Dysphagia advance; Thin liquid   Baseline Diet Regular; Thin liquids   Consistencies Assessed and Performance   Materials Admnistered Soft/Level 3; Thin liquid   Oral Stage Mild impaired   Phargngeal Stage WFL; Mild impaired   Swallow Mechanics WFL; Mild delayed;Swallow initation;Good [place]arygneal rise;Aspiration risk   Esophageal Concerns No s/s reported   Recommendations   Risk for Aspiration Mild   Diet Solid Recommendation Level 3 Dysphagia/ advanced/ soft to chew   Diet Liquid Recommendation Thin liquid   Recommended Form of Meds Whole; With thin liquid; As desired; As tolerated   General Precautions Aspiration precautions; Feed only when alert;Minimize distractions;Upright as possible for all oral intake;Remain upright for 45 mins after meals  (OOB for meals)   Compensatory Swallowing Strategies Place food/straw in on right side; Alternate solids and liquids; External pacing; Check for pocketing of food on the left;Cue for lingual sweep;Voluntary throat clear/cough to clear penetration   Results Reviewed with RN;PT/Family/Caregiver   QI: Eating   Assistance Needed Set-up / clean-up   Assistance Provided by [place] No physical assistance   Eating CARE Score 5   Swallow Assessment   Swallow Treatment Assessment Pt seen during lunch where pt was recently upgraded to a level 3 diet w/ thin liquids  Pt consumed ~40% of meal consisting of pulled pork, chopped broccoli, and mashed potatoes, along w/ ~2oz thin liquids taken by straw, noting pt reports of decreased appetite today  Following assist w/ tray set up, pt demonstrated fully functional mastication of all items, noting mild L side pocketing present to which pt remains able to independently clear using lingual sweeps, finger sweeps and liquid wash  Trace L anterior loss of solids w/ pt requiring verbal cue to clear  Timely a-p transfers of solids and liquids w/ overall swallow initiation appearing to fluctuate from timely to intermittently mildly delayed w/ solids  No overt s/sx of aspiration observed during meal w/ pt appearing to tolerating diet upgrade w/ improved oral function  Will continue to recommend level 3 diet and thin liquids  Discussed POC to trial regular diet textures in next session to work towards safely achieving baseline diet of regular/thins as per pt's personal goal     Swallow Assessment Prognosis   Prognosis Good   Prognosis Considerations Medical diagnosis; Medical prognosis; Severity of impairments   SLP Therapy Minutes   SLP Time In 1145   SLP Time Out 1220   SLP Total Time (minutes) 35   SLP Mode of treatment - Individual (minutes) 35   SLP Mode of treatment - Concurrent (minutes) 0   SLP Mode of treatment - Group (minutes) 0   SLP Mode of treatment - Co-treat (minutes) 0   SLP Mode of Teatment - Total time(minutes) 35 minutes   Therapy Time missed   Time missed?  No   Daily FIM Score   Eating (FIM) 5 - Patient needs help to open contianers or set up tray

## 2018-10-30 NOTE — PROGRESS NOTES
Physical Medicine and Rehabilitation Progress Note  Carmelita Haile 58 y o  male MRN: [id number]  Unit/Bed#: -01 Encounter: [id number]    HPI: Patient is 59 yo male with right cerebral infarcts likely embolic in nature     Chief Complaint/Subjective: Updated patient on active medical issues including JAYE and possible LOOP     ROS:  negative unless noted above     Assessment/Plan:      DM (diabetes mellitus) ([facility] )   Assessment & Plan    · Not on any meds at home  · On ISS as inpt, IM to determine if oral agent will need to be started     Gout   Assessment & Plan    · Left ankle  · On Indocin 50mg TID at home (currently on hold, will d/w IM as patient is on DAPT)  · XR of left ankle and foot negative   · s/p colchicine and steroid taper per IM  · Resolved per patient      Hyperlipidemia   Assessment & Plan    · Home zocor 20 mg changed to lipitor 80 mg      Anxiety   Assessment & Plan    · No record of any benzodiazepines (or any other controlled medications) found in the [place] database going back 1 year  · Neuropsychology following       Hypertension   Assessment & Plan    · IM to clarify home meds as records are not clear ( olmesartan vs valsartan ?, HCTZ 25 mg vs 50 mg ? norvasc ?)  · Currently on cozaar per IM      Dysphagia   Assessment & Plan    · SLP following  · Modified diet per SLP recs      * Right sided cerebral hemisphere cerebrovascular accident (CVA) ([facility] )   Assessment & Plan    · On DAPT for 90 days, with aspirin/plavix, followed by monotherapy with ASA  · Possibly cardioembolic in etiology   Consulted EP for eval/placement of loop recorder however they recommend JAYE first which was negative for thrombus in [place] however did reveal aortic atheroma, will d/w neuro if they feel the atheroma is the etiology of the stroke or if EP should proceed with LOOP  · Fluoxetine 20mg post stroke for motor recovery         Scheduled Meds:    Current Facility-Administered Medications:  acetaminophen 650 mg Oral Q6H PRN Kathryn Drake MD   aspirin 81 mg Oral Daily Kathryn Drake MD   atorvastatin 80 mg Oral QPM Kathryn Drake MD   clopidogrel 75 mg Oral Daily PATEL Gautam   docusate sodium 100 mg Oral BID Bernarda Yao PA-C   enoxaparin 40 mg Subcutaneous Q24H Niurka Turcios MD   FLUoxetine 20 mg Oral Daily Jewelene Sample, CRNP   losartan 50 mg Oral Daily Jewelene Sample, CRANDREINA   melatonin 3 mg Oral HS Kathryn Drake MD   nicotine 14 mg Transdermal Daily Kathryn Drake MD   nystatin 500,000 Units Swish & Swallow 4x Daily Jeffrey Bounds, PATEL   ondansetron 4 mg Intravenous Q6H PRN Kathryn Drake MD   oxyCODONE 2 5 mg Oral Q4H PRN Kathryn Drake MD   pneumococcal 23-valent polysaccharide vaccine 0 5 mL Subcutaneous Prior to discharge Kathryn Drake MD   polyethylene glycol 17 g Oral Daily PRN Bernarda Yao PA-C   senna 1 tablet Oral HS Bernarda Yao PA-C        Incidental findings  1) thyroid nodules: per radiology report recs non-emergent OP thyroid U/S, OP FU with PCP to arrange U/S  2) subpleural bullae: OP FU with PCP with further testing/treatment and/or specialist referral at PCP's discretion     Note: patient noted to have petechial hemorrhage on imaging however has been cleared for DAPT and lovenox (for DVT ppx) by neuro  DVT ppx: SCDs, lovenox     Objective:    Functional Update:  Mobility: max WC  Transfers: mod  ADLs: max         Physical Exam:        General: alert, no apparent distress, cooperative and comfortable  HEENT:  Head: Normocephalic, no lesions, without obvious abnormality    CARDIAC:  +S1/2  LUNGS:  respirations unlabored  ABDOMEN:  soft   EXTREMITIES:  no signficant LE edema  NEURO:   awake, alert, appropriately answering questions, 2/5 LUE finger flexion, 2/5 LUE shoulder abduction otherwise 0-1/5 LUE, +dysarthria  PSYCH:  mood/affect currently stable      Diagnostic Studies: reviewed, no new imaging  No orders to display       Laboratory:      Results from last 7 days  Lab Units 10/29/18  1509 10/26/18  0611 10/24/18  0424   HEMOGLOBIN g/dL 13 4 13 9 13 9   HEMATOCRIT % 40 2 41 8 41 8   WBC [place]/uL 8 59 8 06 9 96       Results from last 7 days  Lab Units 10/29/18  0610 10/26/18  0611 10/24/18  0424   BUN mg/dL 21 16 12   SODIUM mmol/L 140 136 141   POTASSIUM mmol/L 3 6 3 8 3 7   CHLORIDE mmol/L 107 105 104   CREATININE mg/dL 0 84 0 84 0 99            Patient Active Problem List   Diagnosis    Right sided cerebral hemisphere cerebrovascular accident (CVA) ([facility] )    Dysphagia    Hypertension    Anxiety    Hyperlipidemia    Gout    DM (diabetes mellitus) ([facility] )           Total visit time:  At least 25 minutes, with more than 50% spent counseling/coordinating care

## 2018-10-30 NOTE — PROGRESS NOTES
10/30/18 1500   Pain Assessment   Pain Assessment No/denies pain   Pain Score No Pain   Restrictions/Precautions   Precautions Fall Risk;Supervision on toilet/commode   Weight Bearing Restrictions No   ROM Restrictions No   Cognition   Arousal/Participation Cooperative; Alert   Subjective   Subjective Pt reports he is ready for therapy  QI: Sit to Lying   Assistance Needed Incidental touching   Sit to Lying CARE Score 4   QI: Sit to Stand   Assistance Needed Physical assistance   Assistance Provided by [place] Less than 25%   Sit to Stand CARE Score 3   QI: Chair/Bed-to-Chair Transfer   Assistance Needed Physical assistance   Assistance Provided by [place] 50%-74%   Comment Darin at beginning of session SPT to R side, ModA at end of session SPT to L   Chair/Bed-to-Chair Transfer CARE Score 2   Transfer Bed/Chair/Wheelchair   Limitations Noted In Balance; Coordination; Endurance;UE Strength;LE Strength   Adaptive Equipment None   Stand Pivot Moderate Assist   Sit to Stand Minimal Assist   Stand to Sit Minimal Assist   Sit to Supine Contact Guard   Bed, Chair, Wheelchair Transfer (FIM) 2 - Patient completes 25 - 49% of all tasks   Equipment Use   NuStep 20 min, level 4 for first 15 min then level 5 for last 5 min, BLE only without foot straps to promote extensor use and minimize hip flexor use   Assessment   Treatment Assessment Pt tolerated treatment session well  Session focused on use of NuStep to promote LLE strengthening  Increased assist required at end of session secondary to SPT to weaker side and fatigue  Pt would continue to benefit from skilled PT to address functional deficits and progress toward prior level of function  Problem List Decreased strength;Decreased range of motion;Decreased endurance; Impaired balance;Decreased mobility; Decreased coordination   Barriers to Discharge Inaccessible home environment;Decreased caregiver support   PT Barriers   Physical Impairment Decreased strength;Decreased range of motion;Decreased endurance; Impaired balance;Decreased mobility; Decreased coordination   Functional Limitation Car transfers; Ramp negotiation;Stair negotiation;Transfers;Standing;Walking; Wheelchair management   Plan   Treatment/Interventions Functional transfer training;LE strengthening/ROM; Elevations; Therapeutic exercise; Endurance training;Patient/family training;Equipment eval/education; Bed mobility;Gait training; Compensatory technique education   Progress Progressing toward goals   Recommendation   Recommendation Home with family support; Outpatient PT   Equipment Recommended (LRAD)   PT Therapy Minutes   PT Time In 1500   PT Time Out 1530   PT Total Time (minutes) 30   PT Mode of treatment - Individual (minutes) 30   PT Mode of treatment - Concurrent (minutes) 0   PT Mode of treatment - Group (minutes) 0   PT Mode of treatment - Co-treat (minutes) 0   PT Mode of Teatment - Total time(minutes) 30 minutes   Therapy Time missed   Time missed?  No

## 2018-10-30 NOTE — PCC OCCUPATIONAL THERAPY
Pt made excellent progress throughout rehab stay, plan to D/C 11/21 home with family support and continued outpatient OT for neuromuscular re-education of (L) U to increase pt's independence in ADL, IADL, leisure, work, and social participation

## 2018-10-30 NOTE — PROGRESS NOTES
Per Cath lab, patient will receive loop recorder procedure at bedside on Oct 31 in the afternoon   Patient had JAYE this AM

## 2018-10-30 NOTE — PROGRESS NOTES
Occupational Therapy Treatment Note:       10/30/18 1230   Pain Assessment   Pain Assessment No/denies pain   Pain Score No Pain   Restrictions/Precautions   Precautions Fall Risk;Supervision on toilet/commode   Weight Bearing Restrictions No   ROM Restrictions No   QI: Sit to Stand   Assistance Needed [place] / Haroon Fuentes; Verbal cues; Physical assistance   Assistance Provided by [place] 25%-49%   Comment Minimal lifting assist warranted; support provided from the front with blocking of L knee  Sit to Stand CARE Score 3   QI: Chair/Bed-to-Chair Transfer   Assistance Needed Rebecca Starr / Haroon Fuentes; Physical assistance   Assistance Provided by [place] Less than 25%   Comment SPT to R with support provided from the front with blocking of L knee  Chair/Bed-to-Chair Transfer CARE Score 3   Transfer Bed/Chair/Wheelchair   Positioning Concerns Hemiplegia   Limitations Noted In Balance; Coordination; Endurance;UE Strength;LE Strength   Adaptive Equipment None   Stand Pivot Minimal Assist   Sit to Stand Minimal   Stand to Sit Minimal   Bed, Chair, Wheelchair Transfer (FIM) 4 - Patient completes 75% of all tasks   Exercise Tools   Exercise Tools Yes   Other Exercise Tool 1 Pt completed LUE AAROM table top towel exercises for shoulder flex/ext, horizontal abd/add, and elbow flex/ext at 3 sets/10 reps; pt able to complete AROM for shoulder ext, horizontal add, and elbow flex with support to stabilize elbow and isolate mov't; VCs warranted to maintain upright and stationary trunk in order to avoid compensation  Pt completed AAROM wrist flex/ext with FA stabilized at 2 sets/10 reps  Other Exercise Tool 2 Performed PROM to LUE in all planes with prolonged stretch for shoulder mov'ts in order to decrease limitations in ROM and risk for contracture; pt reported to feel noted tension relief   Instructed pt on self ROM exercises in all planes which pt able to complete at 1 set/10 reps with S     Other Exercise Tool 3 Pt completed chair push ups with Jemima at 4 sets/5 reps with assist to WB through L hand and block L knee; VCs warranted for foot placement and to initially scoot forward edge of chair for increased success  Pt completed RUE 1# free weight exercises in various planes with exception of 4# for bicep curls at 3 sets/10 reps with focus on maintaining good upright and unsupported posture in order to improve upon core strengthening as well  Cognition   Overall Cognitive Status WFL   Arousal/Participation Alert; Cooperative   Attention Within functional limits   Orientation Level Oriented X4   Memory Within functional limits   Following Commands Follows multistep commands with increased time or repetition   Activity Tolerance   Activity Tolerance Patient tolerated treatment well   Assessment   Treatment Assessment OT tx sessions focused on transfers, LUE neuro re-ed, LUE ROM, RUE strength, and overall activity tolerance/endurance  Refer above for details on pt performance  Pt would benefit from continued skilled OT services in order to achieve highest functional abilities  Prognosis Good   Problem List Decreased strength;Decreased range of motion;Decreased endurance; Impaired balance;Decreased mobility; Decreased coordination; Impaired tone   Barriers to Discharge Inaccessible home environment;Decreased caregiver support   Plan   Treatment/Interventions Functional transfer training;LE strengthening/ROM; Therapeutic exercise; Endurance training;Patient/family training;Equipment eval/education; Compensatory technique education;OT   Progress Progressing toward goals   Recommendation   OT Discharge Recommendation Home with family support   OT Therapy Minutes   OT Time In 1230   OT Time Out 1400   OT Total Time (minutes) 90   OT Mode of treatment - Individual (minutes) 90   OT Mode of treatment - Concurrent (minutes) 0   OT Mode of treatment - Group (minutes) 0   OT Mode of treatment - Co-treat (minutes) 0   OT Mode of Teatment - Total time(minutes) 90 minutes   Therapy Time missed   Time missed?  No   Josefa Gave, [address]

## 2018-10-30 NOTE — PCC PHYSICAL THERAPY
Pt has met LTGs and is able to d/c home today with continued outpt PT services and family support  Barriers to home resolved

## 2018-10-30 NOTE — ANESTHESIA PREPROCEDURE EVALUATION
Review of Systems/Medical History  Patient summary reviewed  Chart reviewed  No history of anesthetic complications     Cardiovascular  EKG reviewed, Hyperlipidemia, Hypertension ,    Pulmonary  Smoker cigarette smoker and cigar smoker  ,        GI/Hepatic    GERD well controlled,        Negative  ROS        Endo/Other  Diabetes type 2 ,      GYN  Negative gynecology ROS          Hematology  Negative hematology ROS      Musculoskeletal    Comment: Cervical fusion in past      Neurology    CVA , residual symptoms, Headaches,    Psychology   Anxiety,              Physical Exam    Airway    Mallampati score: II  TM Distance: >3 FB  Neck ROM: full     Dental   No notable dental hx     Cardiovascular  Cardiovascular exam normal    Pulmonary  Pulmonary exam normal     Other Findings        Anesthesia Plan  ASA Score- 3     Anesthesia Type- IV sedation with anesthesia with ASA Monitors  Additional Monitors:   Airway Plan:         Plan Factors-  Patient did not smoke on day of surgery  Induction- intravenous  Postoperative Plan-     Informed Consent- Anesthetic plan and risks discussed with patient  I personally reviewed this patient with the CRNA  Discussed and agreed on the Anesthesia Plan with the CRNA  Elba Barron Chart reviewed and pt seen/examined prior to anesthetic  I agree with the CRNA's assessment and plan   R/B/A d/w pt who agrees with plan, questions were answered & case d/w CRNA prior to initiation of anesthetic  Hunter Rothman MD

## 2018-10-31 PROCEDURE — 97110 THERAPEUTIC EXERCISES: CPT

## 2018-10-31 PROCEDURE — 99233 SBSQ HOSP IP/OBS HIGH 50: CPT | Performed by: PHYSICAL MEDICINE & REHABILITATION

## 2018-10-31 PROCEDURE — 97116 GAIT TRAINING THERAPY: CPT

## 2018-10-31 PROCEDURE — 97530 THERAPEUTIC ACTIVITIES: CPT

## 2018-10-31 PROCEDURE — 92526 ORAL FUNCTION THERAPY: CPT

## 2018-10-31 PROCEDURE — 97112 NEUROMUSCULAR REEDUCATION: CPT

## 2018-10-31 PROCEDURE — 97535 SELF CARE MNGMENT TRAINING: CPT

## 2018-10-31 RX ADMIN — FLUOXETINE 20 MG: 20 CAPSULE ORAL at 09:03

## 2018-10-31 RX ADMIN — CLOPIDOGREL 75 MG: 75 TABLET, FILM COATED ORAL at 09:02

## 2018-10-31 RX ADMIN — DOCUSATE SODIUM 100 MG: 100 CAPSULE, LIQUID FILLED ORAL at 09:02

## 2018-10-31 RX ADMIN — MELATONIN 3 MG: at 21:21

## 2018-10-31 RX ADMIN — SENNOSIDES 8.6 MG: 8.6 TABLET, FILM COATED ORAL at 21:21

## 2018-10-31 RX ADMIN — ASPIRIN 81 MG: 81 TABLET, COATED ORAL at 09:02

## 2018-10-31 RX ADMIN — DOCUSATE SODIUM 100 MG: 100 CAPSULE, LIQUID FILLED ORAL at 17:47

## 2018-10-31 RX ADMIN — NYSTATIN 500000 UNITS: 100000 SUSPENSION ORAL at 09:04

## 2018-10-31 RX ADMIN — LOSARTAN POTASSIUM 50 MG: 50 TABLET, FILM COATED ORAL at 09:03

## 2018-10-31 RX ADMIN — NYSTATIN 500000 UNITS: 100000 SUSPENSION ORAL at 17:47

## 2018-10-31 RX ADMIN — NYSTATIN 500000 UNITS: 100000 SUSPENSION ORAL at 21:21

## 2018-10-31 RX ADMIN — NYSTATIN 500000 UNITS: 100000 SUSPENSION ORAL at 14:06

## 2018-10-31 RX ADMIN — ATORVASTATIN CALCIUM 80 MG: 80 TABLET, FILM COATED ORAL at 17:47

## 2018-10-31 RX ADMIN — ENOXAPARIN SODIUM 40 MG: 40 INJECTION SUBCUTANEOUS at 09:03

## 2018-10-31 RX ADMIN — NICOTINE 14 MG: 14 PATCH, EXTENDED RELEASE TRANSDERMAL at 09:03

## 2018-10-31 RX ADMIN — ACETAMINOPHEN 650 MG: 325 TABLET, FILM COATED ORAL at 23:31

## 2018-10-31 NOTE — PCC NURSING
Admitted to [facility] s/p new onset left-sided diminished sensation, weakness, difficulty speaking, and facial droop  He has a PMH significant for HTN, HLD, and tobacco abuse  CTH was negative for hemorrhage  NIHSS 9  He received IV Labetalol for /110 and tPA  Echo showed EF 60% with no RWMA  A1C 6 6  CTA showed 50% stenosis of the proximal R ICA, and 30-40% stenosis of the proximal L ICA  MRI Brain showed multifocal cortical infarctions in the distribution of the R MCA  Concern was for thromboembolic etiology due to carotid disease  Ultimately, Neurology recommended Plavix load (10/24), and then DAPT for 90 days followed by monotherapy with ASA  Cardiology was consulted who recommended holter monitor to evaluate for underlying afib  They also started him on Fluoxetine 20mg post stroke day 5 for motor function recovery  have a history of gout  Venous doppler and X-ray were negative for clot or acute injury  Pt underwent JAYE and loop recorder placement on 10/30  continent of bowel and bladder  HTN managed with Losartan  Nicotine patch intact  This week we will continue to labs and vitals, we will increase safety awareness and keep pt free from injury  We will prevent skin breakdown with turning and repositioning and weight shifts  Pt will continue to manage dm with diet  11/21- pt D/C [place]

## 2018-10-31 NOTE — PROGRESS NOTES
Internal Medicine Progress Note  Patient: Omero Norwood  Age/sex: 58 y o  male  Medical Record #: [id number]      ASSESSMENT/PLAN:  Omero Norwood is seen and examined and management for following issues:    Multiple embolic R MCA CVA:  Cont ASA/Plavix for 90 days then Aspirin and statin therapy only; EP did JAYE/LOOP implant yesterday  LOOP recorder implant 10/30: Will watch site      HTN:  Off all meds while in acute; was previously on ARB/amlodipine/hctz;  added back Losartan 50mg daily on 10/25/18  No other changes today  OP med list will need to be clarified so will call Dr Jagdish Funes office      DM II:  Diet controlled; recent HbA1C 6 6; pt aware and avoids excess sugar; cont sliding scale and DM diet = stopped Accuchecks/coverage since BSs had been ok      Tobacco abuse:  Cont patch; recommend cessation     Anxiety:  Cont Fluoxetine 20mg daily; neuropsychiatry to see     Left ankle and left 2nd toe pain:  Xray of foot and ankle were negative, likely gout; pt was taking Indocin 50mg 3x daily as outpt;  Colchicine 1 2 mg x 1, 0 6mg x 1 and a steroid taper over 4 --> now resolved    Thrush:  Nystatin ordered      Subjective: Patient seen and examined  He denies any complaints      ROS:   GI: denies abdominal pain, change bowel habits or reflux symptoms  Neuro: No new neurologic changes  Respiratory: No Cough, SOB  Cardiovascular: No CP, palpitations     Scheduled Meds:    Current Facility-Administered Medications:  acetaminophen 650 mg Oral Q6H PRN Kumar Gabriel MD   aspirin 81 mg Oral Daily Kumar Gabriel MD   atorvastatin 80 mg Oral QPM Kumar Gabriel MD   clopidogrel 75 mg Oral Daily PATEL Gautam   docusate sodium 100 mg Oral BID Bernarda Yao PA-C   enoxaparin 40 mg Subcutaneous Q24H [address] Kumar Gabriel MD   FLUoxetine 20 mg Oral Daily PATEL Ho   losartan 50 mg Oral Daily PATEL Ho   melatonin 3 mg Oral HS Kumar Gabriel MD   nicotine 14 mg Transdermal Daily Evan Rivera Depadua, MD   nystatin 500,000 Units Swish & Swallow 4x Daily PATEL Olivo   ondansetron 4 mg Intravenous Q6H PRN Dolly Kate MD   oxyCODONE 2 5 mg Oral Q4H PRN Dolly Kate MD   pneumococcal 23-valent polysaccharide vaccine 0 5 mL Subcutaneous Prior to discharge Dolly Kate MD   polyethylene glycol 17 g Oral Daily PRN Bernarda Yao PA-C   senna 1 tablet Oral HS Bernarda Yao PA-C       Labs:       Results from last 7 days  Lab Units 10/29/18  1509 10/26/18  0611   WBC Thousand/uL 8 59 8 06   HEMOGLOBIN g/dL 13 4 13 9   HEMATOCRIT % 40 2 41 8   PLATELETS Thousands/uL 267 253       Results from last 7 days  Lab Units 10/29/18  0610 10/26/18  0611   SODIUM mmol/L 140 136   POTASSIUM mmol/L 3 6 3 8   CHLORIDE mmol/L 107 105   CO2 mmol/L 26 24   BUN mg/dL 21 16   CREATININE mg/dL 0 84 0 84   CALCIUM mg/dL 8 8 8 9                  Glucose, i-STAT (mg/dl)   Date Value   10/20/2018 132       Labs reviewed    Physical Examination:  Vitals:   Vitals:    10/30/18 1000 10/30/18 2032 10/31/18 0542 10/31/18 0848   BP: 128/69 124/73 119/65 122/61   BP Location: Right arm Right arm Right arm Right arm   Pulse: (!) 51 58 (!) 54 64   Resp: 20 20 20    Temp: 97 5 °F (36 4 °C) 98 4 °F (36 9 °C) 98 2 °F (36 8 °C)    TempSrc: Oral Oral Oral    SpO2: 97% 96% 95%    Weight:   83 2 kg (183 lb 6 8 oz)    Height:         Constitutional:  NAD; pleasant; nontoxic  HEENT:  AT/NC; oropharynx + for thrush on tongue   Neck: negative for JVD  CV:  +S1, S2;  RRR; no rub/murmur; LOOP dressing is dry/intact  Pulmonary:  BBS without crackles/wheeze/rhonci; resp are unlabored  Abdominal:  soft, +BS, ND/NT; no mass  Skin:  no rashes  Musculoskeletal:  Left medial ankle 1+ edema but no redness; left 2nd toe w/o red/erythema  :  no nichols   Neurological/Psych:  AAO;   Can weakly move left fingers = can lift at shoulder but not flex at elbow;  LLE HF 4/5 with DF/PF 4-/5; + left facial droop and tongue deviation; speech is clear; no depression/anxiety        [ X ] Total time spent: 30 Mins and greater than 50% of this time was spent counseling/coordinating care  ** Please Note: Dragon 360 Dictation voice to text software may have been used in the creation of this document   **

## 2018-10-31 NOTE — PROGRESS NOTES
10/31/18 0700   Pain Assessment   Pain Assessment 0-10   Pain Score 3   Pain Location Chest  (loop recorder placement site)   Restrictions/Precautions   Precautions Fall Risk;Supervision on toilet/commode   Eating Assessment   Findings seated upright in recliner chair with LUE supported on pillow   Eating (FIM) 5 - Patient needs help to open contianers or set up tray   QI: Oral Hygiene   Assistance Needed Supervision   Oral Hygiene CARE Score 4   Grooming   Able To Initiate Tasks;Comb/Brush Hair;Wash/Dry Face;Brush/Clean Teeth;Wash/Dry Hands; Shave   Limitation Noted In Strength; Safety; Coordination   Findings Pt completed grooming tasks at seated level 2* dec balance with UE release  Pt able to complete compensatory container management  Grooming (FIM) 5 - [place] sets up supplies or applies device   QI: Shower/Bathe Self   Assistance Needed Physical assistance   Assistance Provided by [place] 25%-49%   Shower/Bathe Self CARE Score 3   Bathing   Assessed Bath Style Sponge Bath   Anticipated D/C Bath Style Shower   Able to [place] No   Able to [employer] Temperature No   Able to Wash/Rinse/Dry (body part) Left Arm;L Upper Leg;R Upper Leg;R Lower Leg/Foot;Chest;Abdomen;Perineal Area; Buttocks   Limitations Noted in Balance; Coordination; Endurance;ROM;Safety;Strength   Positioning Seated;Standing   Findings  Pt with loop recorder placed yesterday and req to keep incision dry for 2 days  Pt unable to shower today, pt completes sponge bathing today  Pt req A for steadying when in stance 2* LLE instability and weakness  pt demo improved proximal L UE control today, able to flex shoulder to allow for bathing of L underarm with RUE     Bathing (FIM) 3 - Patient completes 5/10  6/10 or 7/10 parts   QI: Upper Body Dressing   Assistance Needed Physical assistance   Assistance Provided by [place] Less than 25%   Upper Body Dressing CARE Score 3   QI: Lower Body Dressing   Assistance Needed Physical assistance Assistance Provided by [place] Less than 25%   Lower Body Dressing CARE Score 3   QI: Putting On/Taking Off Footwear   Assistance Needed Physical assistance   Assistance Provided by [place] 50%-74%   Putting On/Taking Off Footwear CARE Score 2   QI: Picking Up Object   Reason if not Attempted Safety concerns   Picking Up Object CARE Score 88   Dressing/Undressing Clothing   Remove UB Clothes Pullover Shirt   Remove LB Clothes Pants;Socks   [address] LB Clothes Pants;Socks; Shoes   Limitations Noted In Balance; Buttoning; Endurance; Safety;Strength;ROM   Positioning Supported Sit;Standing   Findings Pt able to thread b/l LEs to pants with use of R UE 2* L UE distal weakness  pt able to don over hips in stance with min/mod A to steady with UE release  Pt req cues for carryover of hemidressing tech for UB dressing to inc success  UB Dressing (FIM) 4 - Patient completes 75% of all tasks   LB Dressing (FIM) 3 - Patient completes  50-74% of all tasks   QI: Lying to Sitting on Side of Bed   Assistance Needed Physical assistance   Assistance Provided by [place] Less than 25%   Comment hand rail   Lying to Sitting on Side of Bed CARE Score 3   QI: Sit to Stand   Assistance Needed Physical assistance   Assistance Provided by [place] Less than 25%   Sit to Stand CARE Score 3   QI: Chair/Bed-to-Chair Transfer   Assistance Needed Physical assistance   Assistance Provided by [place] 50%-74%   Chair/Bed-to-Chair Transfer CARE Score 2   Transfer Bed/Chair/Wheelchair   Findings LOB with stand>sit when transferring to L 2* hemiparesis and balance deficits     Bed, Chair, Wheelchair Transfer (FIM) 3 - [place] needs to lift, boost or assist to stand OR sit   QI: [address] Needed Physical assistance   Assistance Provided by [place] 25%-49%   Toileting Hygiene CARE Score 3   Toileting   Findings mod A to steady in stance with clothing management   Toileting (FIM) 3 - Patient completes  50-74% of all tasks   QI: Toilet Transfer   Assistance Needed Physical assistance   Assistance Provided by [place] 50%-74%   Toilet Transfer CARE Score 2   Toilet Transfer   Findings SPT with grab bar assist   Toilet Transfer (FIM) 3 - [place] needs to lift, boost or assist to stand OR sit   Neuromuscular Education   Vibration Pt engaged in vibration tech application to distal L UE for muscular facilitation and to promote and facilitate functional strength and range of distal LUE digits and wrist  OT applied high frequency vibration to digit flexors in neutral forearm position with manual support applied at wrist  Pt without vibration applied with noted trace contraction and able to sustain digit 1-3 flexion <1/4 range  With high freq vibration applied, pt demo ability to perform digit flexion of MCP>DIP of digits 1-4 full range to contact palm and and 3/4 range of 5th digit  Pt completed grasp/release training with use of cup for functional purpose  Pt then completed wrist extension/flexion with high freq vibration applied to wrist extensors, pt able to achieve 3/4 range  OT finally applied high freq vibration to bicep/tricep for reciprocal elbow flex/ext with full tricep ROM achieved and 1/2 range bicep flex   Cognition   Overall Cognitive Status WFL   Assessment   Treatment Assessment Pt engaged in skilled OT tx with focus on ADL fxn, L UE neuromuscular reducation, and pt education  see above for further details  Pt had a loop recorder placed yesterday and is to keep incision dry for 2 days  Pt continues to complain of lack of sleep, stating his mind races at night and he was uncomfortable in the bed  OT to review side lying positioning and notified MD  Pt is demonstrating improvement of L UE strength with active strength of digit flexors/extensors noted that inc with high freq vibration   Pt is continuing to make progress and is highly motivated for tx   OT Therapy Minutes   OT Time In 0700   OT Time Out 0830   OT Total Time (minutes) 90   OT Mode of treatment - Individual (minutes) 90   OT Mode of treatment - Concurrent (minutes) 0   OT Mode of treatment - Group (minutes) 0   OT Mode of treatment - Co-treat (minutes) 0   OT Mode of Teatment - Total time(minutes) 90 minutes   Therapy Time missed   Time missed?  No

## 2018-10-31 NOTE — PROGRESS NOTES
10/31/18 0900   Pain Assessment   Pain Assessment No/denies pain   Pain Score No Pain   Response to Interventions Pt reports soreness in chest secondary to loop recorder placement   Restrictions/Precautions   Precautions Fall Risk;Supervision on toilet/commode   Weight Bearing Restrictions No   ROM Restrictions No   Cognition   Arousal/Participation Cooperative; Alert   Subjective   Subjective Pt reports he is ready for therapy  QI: Sit to Lying   Assistance Needed Incidental touching   Sit to Lying CARE Score 4   QI: Lying to Sitting on Side of Bed   Assistance Needed Incidental touching   Lying to Sitting on Side of Bed CARE Score 4   QI: Sit to Stand   Assistance Needed Physical assistance   Assistance Provided by [place] Less than 25%   Sit to Stand CARE Score 3   QI: Chair/Bed-to-Chair Transfer   Assistance Needed Physical assistance   Assistance Provided by [place] 25%-49%   Comment Stand pivot WC<>mat table   Chair/Bed-to-Chair Transfer CARE Score 3   Transfer Bed/Chair/Wheelchair   Limitations Noted In Balance; Coordination; Endurance;UE Strength;LE Strength   Adaptive Equipment None   Stand Pivot Moderate Assist   Sit to Stand Minimal Assist   Stand to Sit Contact Guard   Supine to Sit Contact Guard   Sit to Supine Contact Guard   Bed, Chair, Wheelchair Transfer (FIM) 3 - Patient completes 50 - 74% of all tasks   Therapeutic Interventions   Strengthening step to and step through (step through with and without yellow TB and green TB) with L and R on 4" and 6"  steps, bridge 2x10, bridge with adduction squeeze 1x10, supine L hip adduction with green TB 3 10, supine L abduction with green TB 3x10   Assessment   Treatment Assessment Pt tolerated treatment well  Session focused on LLE strengthening to decrease L knee hyperextension and trendelenberg gait pattern and to promote functional mobility   Verbal cuing required for step though pattern on  steps for pt to abduct L leg slightly when stepping down as pt was landing with foot in adduction  Pt reported difficulty in controling placement of LLE as he steps down  Added adduction squeeze while bridging secondary to difficulty keeping LLE in neutral when not focusing on LLE positioning  Future sessions should continue to benefit from skilled PT to address deficits in ambulation, strength, endurance, and functional mobility to promote progress toward prior level of function  Family/Caregiver Present Brother present for second half of session   Problem List Decreased strength;Decreased endurance; Impaired balance;Decreased mobility; Decreased coordination   Barriers to Discharge Decreased caregiver support   PT Barriers   Physical Impairment Decreased strength;Decreased endurance; Impaired balance;Decreased mobility; Decreased coordination   Functional Limitation Car transfers;Stair negotiation;Standing;Transfers; Walking;Ramp negotiation; Wheelchair management   Plan   Treatment/Interventions Functional transfer training;LE strengthening/ROM; Therapeutic exercise;Elevations; Endurance training;Patient/family training;Equipment eval/education; Bed mobility;Gait training; Compensatory technique education   Progress Progressing toward goals   Recommendation   Recommendation Home with family support; Outpatient PT   Equipment Recommended (LRAD)   PT Therapy Minutes   PT Time In 0900   PT Time Out 1020   PT Total Time (minutes) 80   PT Mode of treatment - Individual (minutes) 80   PT Mode of treatment - Concurrent (minutes) 0   PT Mode of treatment - Group (minutes) 0   PT Mode of treatment - Co-treat (minutes) 0   PT Mode of Teatment - Total time(minutes) 80 minutes   Therapy Time missed   Time missed?  No

## 2018-10-31 NOTE — PROGRESS NOTES
10/31/18 1300   Pain Assessment   Pain Assessment 0-10   Pain Score (No pain, c/o chest soreness from loop recoreder placement)   Restrictions/Precautions   Precautions Fall Risk;Supervision on toilet/commode   Weight Bearing Restrictions No   ROM Restrictions No   Cognition   Arousal/Participation Cooperative; Alert   Subjective   Subjective Pt reports he is ready for therapy  QI: Sit to Lying   Assistance Needed Supervision   Sit to Lying CARE Score 4   QI: Sit to Stand   Assistance Needed Incidental touching   Sit to Stand CARE Score 4   QI: Chair/Bed-to-Chair Transfer   Assistance Needed Physical assistance;Verbal cues   Assistance Provided by [place] Less than 25%   Comment stand pivot from recliner to WC and WC to bed, VCs to  feet as he turns   Chair/Bed-to-Chair Transfer CARE Score 3   Transfer Bed/Chair/Wheelchair   Limitations Noted In Balance; Coordination; Endurance;UE Strength;LE Strength   Adaptive Equipment None   Stand Pivot Minimal Assist   Sit to Stand Contact Guard   Stand to [facility]   Sit to Supine Supervision   Bed, Chair, Wheelchair Transfer (FIM) 4 - Patient completes 75% of all tasks   QI: Walk 10 Feet   Assistance Needed Physical assistance   Assistance Provided by [place] Total assistance   Comment BWSS   Walk 10 Feet CARE Score 1   QI: Walk 50 Feet with Two Turns   Assistance Needed Physical assistance   Assistance Provided by [place] Total assistance   Comment BWSS   Walk 50 Feet with Two Turns CARE Score 1   QI: Walk 150 Feet   Assistance Needed Physical assistance   Assistance Provided by [place] Total assistance   Comment BWSS   Walk 150 Feet CARE Score 1   Ambulation   Does the patient walk? 2  Yes   Primary Discharge Mode of Locomotion Walk;Wheelchair  (WC vs walk based on pt progress)   Walk Assist Level Maximum Assist   Gait Pattern Inconsistant Soraida;Decreased foot clearance;L foot drag;L hemiparesis;L knee hyperextension; Step through; Step to;Decreased L stance; Improper weight shift   Assist Device (BWSS)   Distance Walked (feet) 90 ft   Limitations Noted In Balance; Endurance;Midline Orientation;Posture;Speed;Strength;Swing   Findings 450'x2, side step 45'x1 B/L, 90'x1, side step 20'x1 B/L, forward then backward back to starting point 270'x1 all in BWSS   Walking (FIM) 1 - Patient requires assist of two people   Equipment Use   Body Weight Support System See above for distances walked in BWSS   Assessment   Treatment Assessment Pt tolerated treatment well  Session focused on increased ambulation with use of BWSS  Noted improvements in gait compared to yesterday's session using BWSS  Noted improvement in L knee hyperextension; did not need to use L AFO for additional support to minimize hyperextension  Additionally, noted improvement in midline orientation while walking  Pt reported increased fatigue throughout session; increase in L foot drag and L knee hyperextension with increased fatigue  Pt would continue to benefit from skilled PT to address deficits in L sided weakness, endurance, ambulation, and functional mobility to promote progress toward prior level of function  Problem List Decreased strength;Decreased endurance;Decreased mobility; Impaired balance;Decreased coordination   Barriers to Discharge Decreased caregiver support   PT Barriers   Physical Impairment Decreased strength;Decreased endurance; Impaired balance;Decreased mobility; Decreased coordination   Functional Limitation Car transfers; Ramp negotiation;Stair negotiation;Standing;Transfers; Walking; Wheelchair management   Plan   Treatment/Interventions Functional transfer training;Elevations;LE strengthening/ROM; Therapeutic exercise; Endurance training;Patient/family training;Equipment eval/education; Bed mobility;Gait training; Compensatory technique education   Progress Progressing toward goals   Recommendation   Recommendation Home with family support; Outpatient PT   Equipment Recommended (LRAD) PT Therapy Minutes   PT Time In 1300   PT Time Out 1400   PT Total Time (minutes) 60   PT Mode of treatment - Individual (minutes) 60   PT Mode of treatment - Concurrent (minutes) 0   PT Mode of treatment - Group (minutes) 0   PT Mode of treatment - Co-treat (minutes) 0   PT Mode of Teatment - Total time(minutes) 60 minutes   Therapy Time missed   Time missed?  No

## 2018-10-31 NOTE — PROGRESS NOTES
10/31/18 1200   Pain Assessment   Pain Assessment No/denies pain   Pain Score No Pain   Restrictions/Precautions   Precautions Fall Risk;Supervision on toilet/commode   Swallow Information   Current Risks for Dysphagia & Aspiration Dysarthria;General debilitation;New Neuro event;Brain injury   Current Symptoms/Concerns Cough;Clear throat;Difficulty chewing;Pockets food   Current Diet Dysphagia advance; Thin liquid   Baseline Diet Regular; Thin liquids   Consistencies Assessed and Performance   Materials Admnistered Soft/Level 3;Regular/Solid; Thin liquid   Oral Stage WFL; Mild impaired   Phargngeal Stage WFL; Mild impaired   Swallow Mechanics WFL; Mild delayed;Swallow initation;Good [place]al rise;Aspiration risk   Esophageal Concerns No s/s reported   Recommendations   Risk for Aspiration Mild   Diet Solid Recommendation Level 3 Dysphagia/ advanced/ soft to chew   Diet Liquid Recommendation Thin liquid   Recommended Form of Meds Whole; With thin liquid; As desired; As tolerated   General Precautions Aspiration precautions; Feed only when alert;Minimize distractions;Upright as possible for all oral intake;Remain upright for 45 mins after meals  (OOB for meals)   Compensatory Swallowing Strategies Place food/straw in on right side; Alternate solids and liquids; External pacing; Check for pocketing of food on the left;Cue for lingual sweep   Results Reviewed with MD;RN;PT/Family/Caregiver   QI: Eating   Assistance Needed Set-up / clean-up   Assistance Provided by [place] No physical assistance   Eating CARE Score 5   Swallow Assessment   Swallow Treatment Assessment Pt seen during lunch w/ trials of regular diet items where pt consumed 100% of turkey, roasted potatoes and mixed veggie blend, along w/ ~180cc thin liquids taken by straw sips   Pt offered additional regular diet items to include a tossed salad and fresh fruit, however, pt declined and was full from rest of meal  Following assist w/ tray set up, pt demonstrated ability to self feed using appropriate rate and bite sizes  Mastication appeared more functional and complete across meal w/ improvement in bolus formation and a-p transfers, noting only trace to min L side pocketing which pt continues to clear by independently utilizing strategies  Overall a-p transfers of solids are improving w/ swallow initiation also appearing timely across all items  No overt s/s aspiration observed w/ meal  Pt appears to be tolerating initial trials of regular diet items, however, will continue to recommend level 3 diet and thin liquids at this time  Will further trial regular diet items w/ SLP supervision w/ potential for diet upgrade in upcoming sessions  Swallow Assessment Prognosis   Prognosis Good   Prognosis Considerations Medical diagnosis; Medical prognosis; Severity of impairments   SLP Therapy Minutes   SLP Time In 1200   SLP Time Out 1230   SLP Total Time (minutes) 30   SLP Mode of treatment - Individual (minutes) 30   SLP Mode of treatment - Concurrent (minutes) 0   SLP Mode of treatment - Group (minutes) 0   SLP Mode of treatment - Co-treat (minutes) 0   SLP Mode of Teatment - Total time(minutes) 30 minutes   Therapy Time missed   Time missed?  No   Daily FIM Score   Eating (FIM) 5 - Patient needs help to open contianers or set up tray

## 2018-10-31 NOTE — PROGRESS NOTES
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     Chief Complaint/Subjective: Updated patient on d/w neuro regarding atheroma and LOOP recorder     ROS:  negative unless noted above     Assessment/Plan:      DM (diabetes mellitus) ([facility] )   Assessment & Plan    · Not on any meds at home  · On ISS as inpt, IM to determine if oral agent will need to be started     Gout   Assessment & Plan    · Left ankle  · On Indocin 50mg TID at home (currently on hold, will d/w IM as patient is on DAPT)  · XR of left ankle and foot negative   · s/p colchicine and steroid taper per IM  · Resolved per patient      Hyperlipidemia   Assessment & Plan    · Home zocor 20 mg changed to lipitor 80 mg      Anxiety   Assessment & Plan    · No record of any benzodiazepines (or any other controlled medications) found in the [place]APD[place] database going back 1 year  · Neuropsychology following       Hypertension   Assessment & Plan    · IM to clarify home meds as records are not clear ( olmesartan vs valsartan ?, HCTZ 25 mg vs 50 mg ? norvasc ?)  · Currently on cozaar per IM      Dysphagia   Assessment & Plan    · SLP following  · Modified diet per SLP recs      * Right sided cerebral hemisphere cerebrovascular accident (CVA) ([facility] )   Assessment & Plan    · On DAPT for 90 days, with aspirin/plavix, followed by monotherapy with ASA  · Possibly cardioembolic in etiology   Consulted EP for eval/placement of loop recorder however they recommend JAYE first which was negative for thrombus in [place] however did reveal aortic atheroma, will d/w neuro (Dr Tigist Avalos) if they feel the atheroma is the etiology of the stroke and did not feel that atheroma was likely severe enough to change from  to [facility] even if this was the potential etiology so management recommendations per neuro was unchanged and patient had LOOP placed   · Fluoxetine 20mg post stroke for motor recovery         Scheduled Meds:    Current Facility-Administered Medications:  acetaminophen 650 mg Oral Q6H PRN Sasha Carmen, MD   aspirin 81 mg Oral Daily Sasha Filter, MD   atorvastatin 80 mg Oral QPM Sasha Filter, MD   clopidogrel 75 mg Oral Daily PATEL Gautam   docusate sodium 100 mg Oral BID Bernarda Yao PA-C   enoxaparin 40 mg Subcutaneous Q24H Jayashree Vasquez MD   FLUoxetine 20 mg Oral Daily PATEL Stubbs   losartan 50 mg Oral Daily PATEL Stubbs   melatonin 3 mg Oral HS Sasha Filter, MD   nicotine 14 mg Transdermal Daily Sasha Filter, MD   nystatin 500,000 Units Swish & Swallow 4x Daily PATEL Blackwell   ondansetron 4 mg Intravenous Q6H PRN Sasha Filter, MD   oxyCODONE 2 5 mg Oral Q4H PRN Sasha Filter, MD   pneumococcal 23-valent polysaccharide vaccine 0 5 mL Subcutaneous Prior to discharge Sasha Carmen, MD   polyethylene glycol 17 g Oral Daily PRN Bernarda Yao PA-C   senna 1 tablet Oral HS Bernarda Yao PA-C        Incidental findings  1) thyroid nodules: per radiology report recs non-emergent OP thyroid U/S, OP FU with PCP to arrange U/S  2) subpleural bullae: OP FU with PCP with further testing/treatment and/or specialist referral at PCP's discretion     Note: patient noted to have petechial hemorrhage on imaging however has been cleared for DAPT and lovenox (for DVT ppx) by neuro  DVT ppx: SCDs, lovenox  Dispo: reteam     Objective:    Functional Update:  Mobility: amb mod x 2 (with chair follow)   Transfers: mod  ADLs: mod         Physical Exam:    T: 98 2  HR: 64  BP: 122/61  RR: 16 (not 20)  POx: 95%      General: alert, no apparent distress, cooperative and comfortable  HEENT:  Head: Normocephalic, no lesions, without obvious abnormality    CARDIAC:  +S1/2  LUNGS:  respirations unlabored  ABDOMEN:  soft   EXTREMITIES:  no signficant LE edema  NEURO:   awake, alert, appropriately answering questions, 2/5 LUE finger flexion, 2/5 LUE shoulder abduction otherwise 0-1/5 LUE, +dysarthria  PSYCH:  mood/affect currently stable      Diagnostic Studies: reviewed, no new imaging  No orders to display       Laboratory:      Results from last 7 days  Lab Units 10/29/18  1509 10/26/18  0611   HEMOGLOBIN g/dL 13 4 13 9   HEMATOCRIT % 40 2 41 8   WBC Thousand/uL 8 59 8 06       Results from last 7 days  Lab Units 10/29/18  0610 10/26/18  0611   BUN mg/dL 21 16   SODIUM mmol/L 140 136   POTASSIUM mmol/L 3 6 3 8   CHLORIDE mmol/L 107 105   CREATININE mg/dL 0 84 0 84            Patient Active Problem List   Diagnosis    Right sided [facility] )    Dysphagia    Hypertension    Anxiety    Hyperlipidemia    Gout    DM (diabetes mellitus) ([facility] )           Total time spent: At least 35 minutes, with more than 50% spent counseling/coordinating care  In addition, this patient was discussed by the interdisciplinary team in weekly case conference today  The care of the patient was extensively discussed with all care providers and an appropriate rehabilitation plan was formulated unique for this patient  Barriers were identified preventing progression of therapy and appropriate interventions were discussed with each discipline  Please see the team note for input from all disciplines regarding barriers, intervention, and discharge planning

## 2018-10-31 NOTE — TEAM CONFERENCE
Acute RehabilitationTeam Conference Note  Date: 10/31/2018   Time: 11:36 AM       Patient Name:  Elsi Adrian       Medical Record Number: [id number]   YOB: 1955  Sex: Male          Room/Bed:  [facility] 973/[facility] 973-01  Payor Info:  Payor: Josh Jordan / Plan: [id number] / Product Type: Blue Fee for Service /      Admitting Diagnosis: Stroke ([facility] ) [I63 9]   Admit Date/Time:  10/25/2018 12:41 PM  Admission Comments: No comment available     Primary Diagnosis:  Right sided cerebral hemisphere cerebrovascular accident (CVA) ([facility] )  Principal Problem: Right sided cerebral hemisphere cerebrovascular accident (CVA) ([facility] )    Patient Active Problem List    Diagnosis Date Noted    DM (diabetes mellitus) ([place] [address] ) 10/25/2018    Gout 10/23/2018    Hyperlipidemia 10/22/2018    Right sided cerebral hemisphere cerebrovascular accident (CVA) ([place] [address] ) 10/21/2018    Dysphagia 10/21/2018    Hypertension 10/21/2018    Anxiety 10/21/2018       Physical Therapy:    Weight Bearing Status: Full Weight Bearing  Transfers: Moderate Assistance  Bed Mobility: Minimal Assistance  Amulation Distance (ft): 150 feet  Ambulation: Moderate Assistance, Assist of 2 (with chair follow)  Assistive Device for Ambulation: Hand Hold Assistance  Discharge Recommendations: Home with:  [address] with[de-identified] Family Support, First Floor Setup, Outpatient Physical Therapy    10/30: Pt presents s/p CVA  Currently pt requires modAx1 for transfers and modAx2 with chair follow for ambulation  Pt demonstrates deficits in midline orientation, L sided weakness, balance, righting reactions, ambulation, and functional mobility  Barriers to home include limited family support during the day as family is at work and decreased safety with functional mobility secondary to weakness and decreased balance and righting reactions  Pt would continue to benefit from skilled PT to address previously stated deficits to promote progress toward PLOF      Occupational Therapy:  Eating: Supervision  Grooming: Supervision  Bathing: Moderate Assistance  Bathing: Moderate Assistance  Upper Body Dressing: Supervision  Lower Body Dressing: Moderate Assistance  Toileting: Total Assistance  Tub/Shower Transfer: Total Assistance, Assist of 2 (Shower )  Toilet Transfer: Minimal Assistance  Cognition: Within Defined Limits  Orientation: Person, Place, Time, Situation  Discharge Recommendations: Home with:  [address] with[de-identified] 24 Hour Supervision, 24 Hour Assistance (Vs  SNF pending pt progress and level of support available  )       Pt is showing gradual progress towards achieving OT goals  Pt's barriers towards achieving goals include L sided hemiplegia, and decreased dynamic sitting balance, standing balance/tolerance, independence with ADL tasks, and overall strength and activity tolerance/endurance  Pt would benefit from continued skilled OT services in order to achieve highest functional abilities  Speech Therapy:  Mode of Communication: Verbal  Speech/Language: Dysarthia (mild, more notable when fatigued)  Cognition: Exceptions to WNL  Cognition: Decreased Executive Functions, Decreased Attention  Orientation: Person, Place, Time, Situation  Swallowing: Exceptions to WNL  Swallowing: Oral Dysphagia, Aspiration Risk  Diet Recommendations: Level 3/Denture Soft, Thin  Discharge Recommendations: Home with:  [address] with[de-identified] Family Support, Outpatient Speech Therapy  Pt is currently being followed for speech/cognitive/dysphagia therapy  On initial evaluation, pt completed the CLQT with scores correlating to overall cognitive linguistic skills that are [facility] at time of assessment in comparison to age matched peers  However, informally assessed, pt demonstrating slower processing and decreased higher level reasoning, which was also reported by pt  Pt also presenting with mild dysarthria characterized by imprecise articulation of sounds   While pt demonstrates overall functional basic level cognitive linguistic skills and verbal expression, pt's functioning is impacted by his level of fatigue  Pt reports that he completed all ADLs independently and had a full time job prior to hospitalization  Pt will benefit from skilled ST intervention targeting higher level, more complex cognitive linguistic skills to maximize overall independence and to work towards returning to his job  Regarding swallow function, pt is presenting with mild oral dysphagia characterized by weaker mastication, L side pocketing and slower a-p transfers, however, pt is demonstrating improvement in independent use of strategies to clear residual  Pt was on a level 2 diet and thin liquids on admission but has recently been advanced to a dysphagia level 3 diet and appears to be tolerating without increased oral/pharyngeal difficulties or overt s/sx aspiration  Pt will benefit from further skilled ST intervention to maximize swallow function to safely achieve baseline diet of regular and thin liquids  Nursing Notes:  Appetite: Good  Diet Type: Dysphagia III, Thin Liquids                      Diet Patient/Family Education Complete: Yes                Incision 10/30/18 Chest Mid (Active)   Incision Description Clean;Dry; Intact 10/31/2018  8:57 AM   [place]-wound Assessment Clean;Dry; Intact 10/31/2018  8:57 AM   Closure Sutures 10/30/2018  5:03 PM   Dressing Other (Comment) 10/31/2018  8:57 AM   Wound packed? No 10/30/2018  5:03 PM   Patient Tolerance Tolerated well 10/31/2018  8:57 AM   Dressing Status Clean;Dry; Intact 10/31/2018  8:57 AM              Bladder: 6 - Modified [place]     Bladder Patient/Family Education: Yes  Bowel: 6 - Modified [place]     Bowel Patient/Family Education: Yes  Pain Location: Chest (loop recorder placement site)  Pain Orientation: Mid  Pain Score: 0                       Hospital Pain Intervention(s): Ambulation/increased activity, Rest (AFO while walking)     Medication Management/Safety  Injectable:  (heparin)  Safe Administration: Yes  Medication Patient/Family Education Complete: Yes    Eldridge Essex is a 58 y o  male who presented to the [facility] on 10/20/18 with new onset left-sided diminished sensation, weakness, difficulty speaking, and facial droop  He has a PMH significant for HTN, HLD, and tobacco abuse  CTH was negative for hemorrhage  NIHSS 9  He received IV Labetalol for /110 and tPA  Echo showed EF 60% with no RWMA  A1C 6 6  CTA showed 50% stenosis of the proximal R ICA, and 30-40% stenosis of the proximal L ICA  MRI Brain showed multifocal cortical infarctions in the distribution of the R MCA  Concern was for thromboembolic etiology due to carotid disease  Ultimately, Neurology recommended Plavix load (10/24), and then DAPT for 90 days followed by monotherapy with ASA  Cardiology was consulted who recommended holter monitor to evaluate for underlying afib  They also started him on Fluoxetine 20mg post stroke day 5 for motor function recovery  His course was c/b by left lower extremity ankle pain, which began in his toe  Of note, he does have a history of gout  Venous doppler and X-ray were negative for clot or acute injury  He was determined to be stable for transport to the [facility] on 10/25/18  Pt underwent JAYE and loop recorder placement on 10/30    This week we will continue to encourage independence with ADL's  We will monitor labs and vitals, we will increase safety awareness and keep pt free from injury  Case Management:     Discharge Planning  Living Arrangements: Children  Support Systems: Children, Family members  Assistance Needed: tbd  Type of Current Residence: Private residence  Current [address] : No  Pt is participating well with therapy and is expected to return home with family support  Pt has been educated on potential recommendations for contd therapy services  contd stay review due today  Is the patient actively participating in therapies? yes  List any modifications to the treatment plan:     Barriers Interventions   Bed  Mobility, repositioning Therapy exercises   fatigue Energy conservation education   Left ue hemiparesis Therapy exercises   Mild inattention Cueing to attend to the left   balance Therapy exercises     Is the patient making expected progress toward goals? yes  List any update or changes to goals:     Medical Goals: Patient will be medically stable for discharge to [facility] upon completion of rehab program and Patient will be able to manage medical conditions and comorbid conditions with medications and follow up upon completion of rehab program    Weekly Team Goals:   Rehab Team Goals  ADL Team Goal: Patient will require supervision with ADLs with least restrictive device upon completion of rehab program  Bowel/Bladder Team Goal: Patient will return to premorbid level for bladder/bowel management upon completion of rehab program  Transfer Team Goal: Patient will be independent with transfers with least restrictive device upon completion of rehab program  Locomotion Team Goal: Patient will be independent with locomotion with least restrictive device upon completion of rehab program  Cognitive Team Goal: Patient will be independent for basic and complex tasks upon completion of rehab program    Discussion: in attendance to review pts progress is rn pt ot slp cm and physician  Pt is making good progress and is resolving barriers to dc  Pt is on modified diet but is making good progress and willl be advanced soon  Pt is using body weight support system but w/o is a mod a of two for funcitonal movement  Pt is getting some return in left wrist and hand  Pt does not have 24 hr support on dc at this time       Anticipated Discharge Date:  reteam [facility] Team Members Present:

## 2018-10-31 NOTE — SOCIAL WORK
CM spoke with Jesus Hernández at [facility] [place] [phone] with clinical review   Patient approved for continued stay with LCD 11/7 and review at that time

## 2018-11-01 PROBLEM — E87.6 HYPOKALEMIA: Status: ACTIVE | Noted: 2018-11-01

## 2018-11-01 LAB
ANION GAP SERPL CALCULATED.3IONS-SCNC: 6 MMOL/L (ref 4–13)
BASOPHILS # BLD AUTO: 0.03 THOUSANDS/ΜL (ref 0–0.1)
BASOPHILS NFR BLD AUTO: 0 % (ref 0–1)
BUN SERPL-MCNC: 14 MG/DL (ref 5–25)
CALCIUM SERPL-MCNC: 8.6 MG/DL (ref 8.3–10.1)
CHLORIDE SERPL-SCNC: 107 MMOL/L (ref 100–108)
CO2 SERPL-SCNC: 25 MMOL/L (ref 21–32)
CREAT SERPL-MCNC: 0.87 MG/DL (ref 0.6–1.3)
EOSINOPHIL # BLD AUTO: 0.2 THOUSAND/ΜL (ref 0–0.61)
EOSINOPHIL NFR BLD AUTO: 3 % (ref 0–6)
ERYTHROCYTE [DISTWIDTH] IN BLOOD BY AUTOMATED COUNT: 14 % (ref 11.6–15.1)
GFR SERPL CREATININE-BSD FRML MDRD: 91 ML/MIN/1.73SQ M
GLUCOSE SERPL-MCNC: 92 MG/DL (ref 65–140)
HCT VFR BLD AUTO: 40.8 % (ref 36.5–49.3)
HGB BLD-MCNC: 13.6 G/DL (ref 12–17)
IMM GRANULOCYTES # BLD AUTO: 0.05 THOUSAND/UL (ref 0–0.2)
IMM GRANULOCYTES NFR BLD AUTO: 1 % (ref 0–2)
LYMPHOCYTES # BLD AUTO: 2.34 THOUSANDS/ΜL (ref 0.6–4.47)
LYMPHOCYTES NFR BLD AUTO: 30 % (ref 14–44)
MCH RBC QN AUTO: 29.5 PG (ref 26.8–34.3)
MCHC RBC AUTO-ENTMCNC: 33.3 G/DL (ref 31.4–37.4)
MCV RBC AUTO: 89 FL (ref 82–98)
MONOCYTES # BLD AUTO: 0.84 THOUSAND/ΜL (ref 0.17–1.22)
MONOCYTES NFR BLD AUTO: 11 % (ref 4–12)
NEUTROPHILS # BLD AUTO: 4.29 THOUSANDS/ΜL (ref 1.85–7.62)
NEUTS SEG NFR BLD AUTO: 55 % (ref 43–75)
NRBC BLD AUTO-RTO: 0 /100 WBCS
PLATELET # BLD AUTO: 254 THOUSANDS/UL (ref 149–390)
PMV BLD AUTO: 10.2 FL (ref 8.9–12.7)
POTASSIUM SERPL-SCNC: 3.4 MMOL/L (ref 3.5–5.3)
RBC # BLD AUTO: 4.61 MILLION/UL (ref 3.88–5.62)
SODIUM SERPL-SCNC: 138 MMOL/L (ref 136–145)
WBC # BLD AUTO: 7.75 THOUSAND/UL (ref 4.31–10.16)

## 2018-11-01 PROCEDURE — 85025 COMPLETE CBC W/AUTO DIFF WBC: CPT | Performed by: NURSE PRACTITIONER

## 2018-11-01 PROCEDURE — 99232 SBSQ HOSP IP/OBS MODERATE 35: CPT | Performed by: PHYSICAL MEDICINE & REHABILITATION

## 2018-11-01 PROCEDURE — 97530 THERAPEUTIC ACTIVITIES: CPT

## 2018-11-01 PROCEDURE — 92526 ORAL FUNCTION THERAPY: CPT

## 2018-11-01 PROCEDURE — 97112 NEUROMUSCULAR REEDUCATION: CPT

## 2018-11-01 PROCEDURE — 97116 GAIT TRAINING THERAPY: CPT

## 2018-11-01 PROCEDURE — 80048 BASIC METABOLIC PNL TOTAL CA: CPT | Performed by: NURSE PRACTITIONER

## 2018-11-01 PROCEDURE — 97150 GROUP THERAPEUTIC PROCEDURES: CPT

## 2018-11-01 RX ORDER — POTASSIUM CHLORIDE 20 MEQ/1
40 TABLET, EXTENDED RELEASE ORAL ONCE
Status: COMPLETED | OUTPATIENT
Start: 2018-11-01 | End: 2018-11-01

## 2018-11-01 RX ORDER — LANOLIN ALCOHOL/MO/W.PET/CERES
6 CREAM (GRAM) TOPICAL
Status: DISCONTINUED | OUTPATIENT
Start: 2018-11-01 | End: 2018-11-09

## 2018-11-01 RX ADMIN — NYSTATIN 500000 UNITS: 100000 SUSPENSION ORAL at 21:10

## 2018-11-01 RX ADMIN — NYSTATIN 500000 UNITS: 100000 SUSPENSION ORAL at 11:27

## 2018-11-01 RX ADMIN — SENNOSIDES 8.6 MG: 8.6 TABLET, FILM COATED ORAL at 21:10

## 2018-11-01 RX ADMIN — ENOXAPARIN SODIUM 40 MG: 40 INJECTION SUBCUTANEOUS at 09:33

## 2018-11-01 RX ADMIN — DOCUSATE SODIUM 100 MG: 100 CAPSULE, LIQUID FILLED ORAL at 17:07

## 2018-11-01 RX ADMIN — MELATONIN 6 MG: at 21:10

## 2018-11-01 RX ADMIN — ATORVASTATIN CALCIUM 80 MG: 80 TABLET, FILM COATED ORAL at 17:06

## 2018-11-01 RX ADMIN — NYSTATIN 500000 UNITS: 100000 SUSPENSION ORAL at 09:30

## 2018-11-01 RX ADMIN — ASPIRIN 81 MG: 81 TABLET, COATED ORAL at 09:30

## 2018-11-01 RX ADMIN — NICOTINE 14 MG: 14 PATCH, EXTENDED RELEASE TRANSDERMAL at 09:30

## 2018-11-01 RX ADMIN — POTASSIUM CHLORIDE 40 MEQ: 1500 TABLET, EXTENDED RELEASE ORAL at 11:27

## 2018-11-01 RX ADMIN — CLOPIDOGREL 75 MG: 75 TABLET, FILM COATED ORAL at 09:29

## 2018-11-01 RX ADMIN — NYSTATIN 500000 UNITS: 100000 SUSPENSION ORAL at 17:07

## 2018-11-01 RX ADMIN — LOSARTAN POTASSIUM 50 MG: 50 TABLET, FILM COATED ORAL at 09:29

## 2018-11-01 RX ADMIN — FLUOXETINE 20 MG: 20 CAPSULE ORAL at 09:30

## 2018-11-01 RX ADMIN — DOCUSATE SODIUM 100 MG: 100 CAPSULE, LIQUID FILLED ORAL at 09:30

## 2018-11-01 NOTE — SOCIAL WORK
Met w/pt and reviewed team update and barriers to dc  Cm confirmed insurance approval for an additional week  Pt in agreement that balance and his left arm use are his biggest barriers and goals for dc

## 2018-11-01 NOTE — PROGRESS NOTES
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     Chief Complaint/Subjective: Patient denies any events overnight     ROS:  negative unless noted above     Assessment/Plan:      Hypokalemia   Assessment & Plan    · Mild at 3 4   · Repletion given by IM      DM (diabetes mellitus) ([facility] )   Assessment & Plan    · Not on any meds at home  · On ISS as inpt, IM to determine if oral agent will need to be started     Gout   Assessment & Plan    · Left ankle  · On Indocin 50mg TID at home (currently on hold, will d/w IM as patient is on DAPT)  · XR of left ankle and foot negative   · s/p colchicine and steroid taper per IM  · Resolved per patient      Hyperlipidemia   Assessment & Plan    · Home zocor 20 mg changed to lipitor 80 mg      Anxiety   Assessment & Plan    · No record of any benzodiazepines (or any other controlled medications) found in the [facility] database going back 1 year  · Neuropsychology following       Hypertension   Assessment & Plan    · IM to clarify home meds as records are not clear ( olmesartan vs valsartan ?, HCTZ 25 mg vs 50 mg ? norvasc ?)  · Currently on cozaar per IM      Dysphagia   Assessment & Plan    · SLP following  · Modified diet per SLP recs      * Right sided cerebral hemisphere cerebrovascular accident (CVA) ([facility] )   Assessment & Plan    · On DAPT for 90 days, with aspirin/plavix, followed by monotherapy with ASA  · Possibly cardioembolic in etiology   Consulted EP for eval/placement of loop recorder however they recommend JAYE first which was negative for thrombus in MICHELLE however did reveal aortic atheroma, will d/w neuro (Dr Heber mEery) if they feel the atheroma is the etiology of the stroke and did not feel that atheroma was likely severe enough to change from  to [facility] even if this was the potential etiology so management recommendations per neuro was unchanged and patient had LOOP placed   · Fluoxetine 20mg post stroke for motor recovery         Scheduled Meds:    Current Facility-Administered Medications:  acetaminophen 650 mg Oral Q6H PRN Justine Tinoco MD   aspirin 81 mg Oral Daily Justine Tinoco MD   atorvastatin 80 mg Oral QPM Justine Tinoco MD   clopidogrel 75 mg Oral Daily PATEL Gautam   docusate sodium 100 mg Oral BID Bernarda Yao PA-C   enoxaparin 40 mg Subcutaneous Q24H Tania Palacio MD   FLUoxetine 20 mg Oral Daily PATEL Gautam   losartan 50 mg Oral Daily PATEL Kelley   melatonin 3 mg Oral HS Justine Tinoco MD   nicotine 14 mg Transdermal Daily Justine Tinoco MD   nystatin 500,000 Units Swish & Swallow 4x Daily [place] PATEL Kothari   ondansetron 4 mg Intravenous Q6H PRN Justine Tinoco MD   oxyCODONE 2 5 mg Oral Q4H PRN Justine Tinoco MD   pneumococcal 23-valent polysaccharide vaccine 0 5 mL Subcutaneous Prior to discharge Justine Tinoco MD   polyethylene glycol 17 g Oral Daily PRN Bernarda Yao PA-C   senna 1 tablet Oral HS Bernarda Yao PA-C        Incidental findings  1) thyroid nodules: per radiology report recs non-emergent OP thyroid U/S, OP FU with PCP to arrange U/S  2) subpleural bullae: OP FU with PCP with further testing/treatment and/or specialist referral at PCP's discretion     Note: patient noted to have petechial hemorrhage on imaging however has been cleared for DAPT and lovenox (for DVT ppx) by neuro  DVT ppx: SCDs, lovenox  Dispo: reteam     Objective:    Functional Update:  Mobility: amb mod x 2 (with chair follow)   Transfers: mod  ADLs: mod         Physical Exam:      T: 98 3  HR: 69  BP: 131/59  RR: 16 (not 20)  POx: 96%      General: alert, no apparent distress, cooperative and comfortable  HEENT:  Head: Normocephalic, no lesions, without obvious abnormality    CARDIAC:  +S1/2  LUNGS:  respirations unlabored  ABDOMEN:  soft EXTREMITIES:  no signficant LE edema  NEURO:   awake, alert, appropriately answering questions, 2/5 LUE finger flexion, 2/5 LUE shoulder abduction otherwise 0-1/5 LUE, +dysarthria  PSYCH:  mood/affect currently stable      Diagnostic Studies: reviewed, no new imaging  No orders to display       Laboratory:      Results from last 7 days  Lab Units 11/01/18  0611 10/29/18  1509 10/26/18  0611   HEMOGLOBIN g/dL 13 6 13 4 13 9   HEMATOCRIT % 40 8 40 2 41 8   WBC Thousand/uL 7 75 8 59 8 06       Results from last 7 days  Lab Units 11/01/18  0611 10/29/18  0610 10/26/18  0611   BUN mg/dL 14 21 16   POTASSIUM mmol/L 3 4* 3 6 3 8   CHLORIDE mmol/L 107 107 105   CREATININE mg/dL 0 87 0 84 0 84            Patient Active Problem List   Diagnosis    Right sided cerebral hemisphere cerebrovascular accident (CVA) ([facility] )    Dysphagia    Hypertension    Anxiety    Hyperlipidemia    Gout    DM (diabetes mellitus) ([facility] )    Hypokalemia           Total visit time:  At least 25 minutes, with more than 50% spent counseling/coordinating care

## 2018-11-01 NOTE — PROGRESS NOTES
11/01/18 1230   Pain Assessment   Pain Assessment No/denies pain   Restrictions/Precautions   Precautions Fall Risk;Supervision on toilet/commode   Cognition   Arousal/Participation Cooperative   Subjective   Subjective pt reported being ready for therapy    QI: Sit to Stand   Assistance Needed Incidental touching   Sit to Stand CARE Score 4   QI: Chair/Bed-to-Chair Transfer   Assistance Needed Physical assistance   Assistance Provided by [place] Less than 25%   Chair/Bed-to-Chair Transfer CARE Score 3   Transfer Bed/Chair/Wheelchair   Limitations Noted In Balance; Coordination; Endurance;LE Strength;UE Strength   Stand Pivot Minimal Assist   Sit to Stand Contact Guard   Stand to [facility], Chair, Wheelchair Transfer (FIM) 4 - [place] lifts one extremity during transfer   QI: 4 Steps   Assistance Needed Physical assistance   Assistance Provided by [place] Total assistance   Comment Ax2   4 Steps CARE Score 1   QI: 12 Steps   Assistance Needed Physical assistance   Assistance Provided by [place] Total assistance   Comment Ax2   12 Steps CARE Score 1   Stairs   Type Stairs   # of Steps 20  (x2)   Weight Bearing Precautions Fall Risk   Assist Devices Single Rail   Findings session foucsed on stair training  step through pattern on 6" side with R hand on, with LLE up and down first, then wtih added 3lb ankle weight  then no ankle weight and no RUE on rail  Also with LLE on first step and RLE up and down  Darin at most, 2nd person for safety irais when TF to L glutes/guarding L knee so it didn;'t buckle  Practiced up and down forward on  steps x12 then FF x2  in FF< gait belt, reciprocal pattern the whole time   Stairs (FIM) 1 - Patient requires assist of two people   Assessment   Treatment Assessment Pt cont to make progress towards LTGs, with further improvement in LLE strength and standing balance   Pt cont to have minor LOB to L side when practicing stair training without RUE, indicating L trunk cont to be weak  Pt demonstrates progress with LLE strength in regards to being able to perform FF with reciprocal pattern and no LOB, however L knee does demonstrate some dec motor control due to weakness in quads  Pt will cont to benefit from skilled PT to progress towards PLOF  CUrrently planning on POC focusing on 1 hour of gait training in BWSS and then 30 min of stair training, and NuStep as additional     Barriers to Discharge Inaccessible home environment;Decreased caregiver support   PT Barriers   Physical Impairment Decreased strength;Decreased range of motion;Decreased endurance; Impaired balance;Decreased mobility   Functional Limitation Car transfers;Transfers; Walking;Standing;Stair negotiation   Plan   Treatment/Interventions Functional transfer training;LE strengthening/ROM; Elevations; Therapeutic exercise; Endurance training;Patient/family training;Equipment eval/education; Bed mobility;Gait training   Progress Progressing toward goals   Recommendation   Recommendation Outpatient PT; Home with family support   PT Therapy Minutes   PT Time In 1230   PT Time Out 1300   PT Total Time (minutes) 30   PT Mode of treatment - Individual (minutes) 30   PT Mode of treatment - Concurrent (minutes) 0   PT Mode of treatment - Group (minutes) 0   PT Mode of treatment - Co-treat (minutes) 0   PT Mode of Teatment - Total time(minutes) 30 minutes   Therapy Time missed   Time missed?  No

## 2018-11-01 NOTE — PROGRESS NOTES
11/01/18 0730   Pain Assessment   Pain Assessment No/denies pain   Pain Score No Pain   Restrictions/Precautions   Precautions Fall Risk;Supervision on toilet/commode;Aspiration   Swallow Information   Current Risks for Dysphagia & Aspiration Dysarthria;General debilitation;New Neuro event;Brain injury   Current Symptoms/Concerns Difficulty chewing;Pockets food   Current Diet Dysphagia advance; Thin liquid   Baseline Diet Regular; Thin liquids   Consistencies Assessed and Performance   Materials Admnistered Soft/Level 3;Regular/Solid; Thin liquid   Oral Stage WFL; Mild impaired   Phargngeal Stage WFL; Mild impaired   Swallow Mechanics WFL; Mild delayed;Swallow initation;Good [place] rise;Aspiration risk   Esophageal Concerns No s/s reported   Recommendations   Risk for Aspiration Mild   Diet Solid Recommendation Level 3 Dysphagia/ advanced/ soft to chew   Diet Liquid Recommendation Thin liquid   Recommended Form of Meds Whole; With thin liquid; As desired; As tolerated   General Precautions Aspiration precautions; Feed only when alert;Minimize distractions;Upright as possible for all oral intake;Remain upright for 45 mins after meals  (OOB for meals)   Compensatory Swallowing Strategies Place food/straw in on right side; Alternate solids and liquids; External pacing; Check for pocketing of food on the left;Cue for lingual sweep   Results Reviewed with PT/Family/Caregiver   QI: Eating   Assistance Needed Set-up / clean-up   Assistance Provided by [place] No physical assistance   Eating CARE Score 5   Swallow Assessment   Swallow Treatment Assessment Pt seen during bfast for ongoing trials of regular diet items where pt consumed ~50% of meal consisting of an english muffin, sausage thalia, an english muffin and fresh fruit (pt declined fresh fruit), along w/ ~120cc thin liquids by cup sip  Improvement in L sided lip seal w/o anterior loss of solids or liquids during session   Mastication of solids was grossly [facility] w/ improvement in a-p transfers, however, pt does continue to have mild L sided pocketing which he independently clears  Timely a-p transfers of all items w/ swallow initiation of softer solids and thins appearing timely but mildly delayed w/ more regular solids  No overt s/s aspiration observed during meal  Pt is making good progress towards swallow goals  Will continue to recommend level 3 diet w/ thin liquids at this time  Recommend additional trial x1 of regular diet items w/ SLP w/ potential for diet upgrade  Swallow Assessment Prognosis   Prognosis Good   Prognosis Considerations Medical diagnosis; Medical prognosis; Severity of impairments   SLP Therapy Minutes   SLP Time In 0730   SLP Time Out 0800   SLP Total Time (minutes) 30   SLP Mode of treatment - Individual (minutes) 30   SLP Mode of treatment - Concurrent (minutes) 0   SLP Mode of treatment - Group (minutes) 0   SLP Mode of treatment - Co-treat (minutes) 0   SLP Mode of Teatment - Total time(minutes) 30 minutes   Therapy Time missed   Time missed?  No   Daily FIM Score   Eating (FIM) 5 - Patient needs help to open contianers or set up tray

## 2018-11-01 NOTE — PROGRESS NOTES
11/01/18 1345   Pain Assessment   Pain Assessment No/denies pain   Pain Score No Pain   Restrictions/Precautions   Precautions Fall Risk;Supervision on toilet/commode   Weight Bearing Restrictions No   ROM Restrictions No   QI: Sit to Stand   Assistance Needed Physical assistance   Assistance Provided by [place] 25%-49%   Sit to Stand CARE Score 3   QI: Chair/Bed-to-Chair Transfer   Assistance Needed Physical assistance   Assistance Provided by [place] 50%-74%   Chair/Bed-to-Chair Transfer CARE Score 2   Transfer Bed/Chair/Wheelchair   Positioning Concerns Hemiplegia   Limitations Noted In Balance; Endurance;UE Strength;LE Strength   Bed, Chair, Wheelchair Transfer (FIM) 3 - [place] needs to lift, boost or assist to stand OR sit   Cognition   Overall Cognitive Status WFL   Arousal/Participation Cooperative   Attention Within functional limits   Orientation Level Oriented X4   Memory Within functional limits   Following Commands Follows multistep commands with increased time or repetition   Additional Activities   Additional Activities (Psychosocial)   Additional Activities Comments Neuro QOL Anxiety and Depression short form completed  Anxiety- Short Form:--used to measure anxious s/s  For scoring purposes, scores of 25+ indicative of moderate-severe depressive or anxious symptoms warranting medical management or psychotherapeutic interventions Score: 26 /40 Depression- Short Form: --used to measure depressive s/s  For scoring purposes, scores of 25+ indicative of moderate-severe depressive or anxious symptoms warranting medical management or psychotherapeutic interventions Score: 18/40  Activity Tolerance   Activity Tolerance Patient tolerated treatment well   Assessment   Treatment Assessment Pt participated in skilled OT services with focus on functional transfers, neuro QOL anxiety/depression scale, and joint protection/positioning techniques   Pt continues to demo slight inattention to LUE during functional transfers and requires VC's for proper positioning while seated in chair  Pt reports feeling nervous about having another stroke and states his feelings amplify in the evenings  Pt will continue to benefit from skilled OT services with focus on LUE neuro re-ed, functional transfers, and endurance  Prognosis Good   Problem List Decreased strength;Decreased endurance; Impaired balance;Decreased mobility; Decreased coordination   Plan   Treatment/Interventions ADL retraining;Functional transfer training; Therapeutic exercise; Endurance training;Equipment eval/education; Compensatory technique education   Progress Progressing toward goals   OT Therapy Minutes   OT Time In 1345   OT Time Out 1410   OT Total Time (minutes) 25   OT Mode of treatment - Individual (minutes) 25   OT Mode of treatment - Concurrent (minutes) 0   OT Mode of treatment - Group (minutes) 0   OT Mode of treatment - Co-treat (minutes) 0   OT Mode of Teatment - Total time(minutes) 25 minutes   Therapy Time missed   Time missed?  No

## 2018-11-01 NOTE — PROGRESS NOTES
Stroke Education Series    Pt participated in skilled Stroke Education Series in a group setting to address the topic of Stroke 101: Understanding the Basics of Stroke in both verbal and written formats  Education within this session reviewed the basic structural/functional components of the brain and included information on the causes of stroke, related signs/symptoms, risk factors, and the process of stroke rehabilitation  The goal of this education was to provide the patient with general understanding of how the brain functions and how a stroke can impact his/her performance  In addition, this series aimed to provide the patient with the information that can help reduce the risk of sustaining another stroke  Following education, pt's response to education is: verbalizes understanding and demonstrates understanding        Start Time: 1430    End Time: 0719

## 2018-11-01 NOTE — PROGRESS NOTES
Internal Medicine Progress Note  Patient: Kalee Pizarro  Age/sex: 58 y o  male  Medical Record #: [id number]      ASSESSMENT/PLAN:  Kalee Pizarro is seen and examined and management for following issues:    Multiple embolic R MCA CVA:  Cont ASA/Plavix for 90 days then Aspirin and statin therapy only; EP did JAYE/LOOP implant 10/31/18  LOOP recorder implant 10/30: Will watch site      HTN:  Off all meds while in acute; was previously on ARB/amlodipine/hctz;  added back Losartan 50mg daily on 10/25/18  No other changes today        DM II:  Diet controlled; recent HbA1C 6 6; pt aware and avoids excess sugar; cont sliding scale and DM diet = stopped Accuchecks/coverage since BSs had been ok      Tobacco abuse:  Cont patch; recommend cessation     Anxiety:  Cont Fluoxetine 20mg daily; neuropsychiatry to see     Left ankle and left 2nd toe pain:  Xray of foot and ankle were negative, likely gout; pt was taking Indocin 50mg 3x daily as outpt;  Colchicine 1 2 mg x 1, 0 6mg x 1 and a steroid taper over 4 days --> now resolved    Thrush:  Nystatin ordered    Hypokalemia:  Kdur 40 meq x 1 now      Subjective: Patient seen and examined  He denies any complaints      ROS:   GI: denies abdominal pain, change bowel habits or reflux symptoms  Neuro: No new neurologic changes  Respiratory: No Cough, SOB  Cardiovascular: No CP, palpitations     Scheduled Meds:    Current Facility-Administered Medications:  acetaminophen 650 mg Oral Q6H PRN Jose Payan MD   aspirin 81 mg Oral Daily Jose Payan MD   atorvastatin 80 mg Oral QPM Jose Payan MD   clopidogrel 75 mg Oral Daily PATEL Gautam   docusate sodium 100 mg Oral BID Bernarda Yao PA-C   enoxaparin 40 mg Subcutaneous Q24H [address] Jose Payan MD   FLUoxetine 20 mg Oral Daily PATEL Gautam   losartan 50 mg Oral Daily PATEL Cruz   melatonin 3 mg Oral HS Jose Payan MD   nicotine 14 mg Transdermal Daily Jose Payan MD   nystatin 500,000 Units Swish & Swallow 4x Daily [place] PATEL Thomas   ondansetron 4 mg Intravenous Q6H PRN Melodie Myers MD   oxyCODONE 2 5 mg Oral Q4H PRN Melodie Myers MD   pneumococcal 23-valent polysaccharide vaccine 0 5 mL Subcutaneous Prior to discharge Melodie Myers MD   polyethylene glycol 17 g Oral Daily PRN Bernarda Yao PA-C   senna 1 tablet Oral HS Bernarda Yao PA-C       Labs:       Results from last 7 days  Lab Units 11/01/18  0611 10/29/18  1509   WBC Thousand/uL 7 75 8 59   HEMOGLOBIN g/dL 13 6 13 4   HEMATOCRIT % 40 8 40 2   PLATELETS Thousands/uL 254 267       Results from last 7 days  Lab Units 11/01/18  0611 10/29/18  0610   SODIUM mmol/L 138 140   POTASSIUM mmol/L 3 4* 3 6   CHLORIDE mmol/L 107 107   CO2 mmol/L 25 26   BUN mg/dL 14 21   CREATININE mg/dL 0 87 0 84   CALCIUM mg/dL 8 6 8 8                  Glucose, i-STAT (mg/dl)   Date Value   10/20/2018 132       Labs reviewed    Physical Examination:  Vitals:   Vitals:    10/31/18 1403 10/31/18 2035 11/01/18 0500 11/01/18 0936   BP: 121/67 122/58 127/60 131/59   BP Location: Right arm Right arm  Right arm   Pulse: 57 68 (!) 52 69   Resp: 20 17 20    Temp: 98 1 °F (36 7 °C) 98 °F (36 7 °C) 98 3 °F (36 8 °C)    TempSrc: Oral Oral Oral    SpO2: 95% 95% 96%    Weight:       Height:         Constitutional:  NAD; pleasant; nontoxic  HEENT:  AT/NC; oropharynx + for thrush on tongue   Neck: negative for JVD  CV:  +S1, S2;  RRR; no rub/murmur; LOOP dressing is dry/intact  Pulmonary:  BBS without crackles/wheeze/rhonci; resp are unlabored  Abdominal:  soft, +BS, ND/NT; no mass  Skin:  no rashes  Musculoskeletal:  No edema  :  no nichols   Neurological/Psych:  AAO;   Can weakly move left fingers = can lift at shoulder but not flex at elbow;  LLE HF 4/5 with DF/PF 4-/5; + left facial droop and tongue deviation; speech is clear; no depression/anxiety        [ X ] Total time spent: 30 Mins and greater than 50% of this time was spent counseling/coordinating care  ** Please Note: Dragon 360 Dictation voice to text software may have been used in the creation of this document   **

## 2018-11-01 NOTE — PROGRESS NOTES
Occupational Therapy Treatment Note       11/01/18 1000   Pain Assessment   Pain Assessment No/denies pain   Restrictions/Precautions   Precautions Fall Risk;Supervision on toilet/commode   Lifestyle   Autonomy "I feel really good, I could see my arm muscles working"    QI: Sit to [employer] Physical assistance   Assistance Provided by [place] 25%-49%   Sit to Stand CARE Score 3   QI: Chair/Bed-to-Chair Transfer   Assistance Needed Physical assistance   Assistance Provided by [place] 50%-74%   Comment mod assist stand pivot transfer    Chair/Bed-to-Chair Transfer CARE Score 2   Transfer Bed/Chair/Wheelchair   Positioning Concerns Hemiplegia   Limitations Noted In Balance; Endurance;UE Strength;LE Strength   Adaptive Equipment None   Stand Pivot Moderate Assist;Assist x 1   Sit to Stand Minimal;Assist x 1   Stand to Sit Minimal;Assist x 1   Findings mod assist x1 stand pivot transfer with no device, provided with (L) knee block    Bed, Chair, Wheelchair Transfer (FIM) 3 - [place] needs to lift, boost or assist to stand OR sit   Neuromuscular Education   Functional Movement Patterns Pt engaged in ReoGo-Assisted Therapy for motor learning and mass repetition to increase (L) UE functional use during ADLs/IADLS/functional transfers, as well as to facilitate and promote normal movement patterns  Pt under "[email or web address]" in Reo-go system  Pt assisted into chair with trunk and shoulder supports in place to decrease compensatory trunk movement patterns  Pt utilized arm trough to engage in session  Educated pt on visual imagery and guided thought to increase neuromuscular recovery and promote muscle facilitation while engaging in Reo-Go exercises  Pt completed 1 set of 20 reps forward thrust in initiated motion  Pt able to initiate forward thrust in protraction without assistance   During first set of 20 reps pt required physical assistance to decrease compensatory movement into (L) scapular elevation while leaning to (R) 2* impaired strength at (L) shoulder/elbow while completing retraction  OTR/L instructed pt on visual imagery and focusing on moving (L) elbow "backwards" during retraction to decrease compensatory movement patterns and initiate scapular retraction with elbow flexion  Pt with good carryover of technique and improved movement patterns  Pt completed 1 set of 10 reps with minimal assistance to decrease compensatory movement patterns and max verbal cues to use visual imagery while visually attending to (L) elbow  During last set of 10 reps pt required no physical assistance, noted with no compensatory movement patterns and required only mod verbal cues to visually attend to (L) elbow  Cognition   Overall Cognitive Status WFL   Activity Tolerance   Activity Tolerance Patient tolerated treatment well   Assessment   Treatment Assessment Pt participated in skilled OT tx session focused on (L) UE neuromuscular re-education using Reo-Go assisted therapy, pt/family (brother) education, stand pivot transfers with no device  See above for further details on functional performance  Pt with good tolerance of Reo-go assisted therapy and presented with improved use of (L) scapular protraction/retraction and elbow flexion/extension instead of compensatory (L) scapular elevation and trunk leaning to (R) after education on visually attending to (L) UE and focusing on flexion of elbow during retraction  Pt able to initiate (L) digits flexion to 1/3 range  Continue OT plan of care with focus on (L) UE neuromuscular re-education using Reo-Go assisted therapy, weight bearing, standing balance/tolerance, to increase independence in ADL/IADL/leisure/work activities and decrease burden of care  Prognosis Good   Problem List Decreased strength;Decreased endurance; Impaired balance;Decreased mobility; Decreased coordination   Plan   Treatment/Interventions Functional transfer training;ADL retraining; Therapeutic exercise; Endurance training;Cognitive reorientation;Patient/family training;Equipment eval/education; Compensatory technique education; Bed mobility   Progress Progressing toward goals   Recommendation   OT Discharge Recommendation (pending progress )   OT Therapy Minutes   OT Time In 1000   OT Time Out 1100   OT Total Time (minutes) 60   OT Mode of treatment - Individual (minutes) 60   OT Mode of treatment - Concurrent (minutes) 0   OT Mode of treatment - Group (minutes) 0   OT Mode of treatment - Co-treat (minutes) 0   OT Mode of Teatment - Total time(minutes) 60 minutes   Therapy Time missed   Time missed?  No       Yuridia Parisi MS, OTR/L , CBIS

## 2018-11-01 NOTE — PROGRESS NOTES
11/01/18 0830   Pain Assessment   Pain Assessment No/denies pain   Restrictions/Precautions   Precautions Fall Risk;Supervision on toilet/commode   Cognition   Arousal/Participation Cooperative   Subjective   Subjective pt reported he didn't sleep well last night  He reported "feeling down"  Discussed with pt how when depression  symptoms are managed appropriately, stroke recovery is better  When pt;'s are exercising/doing therapy, this also helps to manage depression and will improve stroke recovery  Discussed this with Dr Lexi Marcano as well who is seeing pt later today  QI: Sit to Stand   Assistance Needed Incidental touching   Sit to Stand CARE Score 4   QI: Chair/Bed-to-Chair Transfer   Assistance Needed Physical assistance   Assistance Provided by [place] Less than 25%   Chair/Bed-to-Chair Transfer CARE Score 3   Transfer Bed/Chair/Wheelchair   Limitations Noted In Balance; Coordination; Endurance;LE Strength;UE Strength   Adaptive Equipment None   Stand Pivot Minimal Assist   Sit to Stand Contact Guard   Stand to [facility], Chair, Wheelchair Transfer (FIM) 4 - Patient requires steadying assist or light touching   QI: Walk 10 Feet   Assistance Needed Physical assistance   Assistance Provided by [place] Total assistance   Walk 10 Feet CARE Score 1   QI: Walk 50 Feet with Two Turns   Assistance Needed Physical assistance   Assistance Provided by [place] Total assistance   Walk 50 Feet with Two Turns CARE Score 1   QI: Walk 150 Feet   Assistance Needed Physical assistance   Assistance Provided by [place] Total assistance   Walk 150 Feet CARE Score 1   QI: Walking 10 Feet on Uneven Surfaces   Assistance Needed Physical assistance   Assistance Provided by [place] Total assistance   Walking 10 Feet on Uneven Surfaces CARE Score 1   Ambulation   Does the patient walk? 2   Yes   Primary Discharge Mode of Locomotion Walk   Walk Assist Level (TOTAL, BWSS)   Gait Pattern Decreased foot clearance;L knee hyperextension; Improper weight shift; Step through   Assist Device (BWSS)   Distance Walked (feet) 800 ft  (640, 480, 160)   Limitations Noted In Balance; Heel Strike; Endurance;Swing;Strength;Speed   Findings pt able to cont to ambualte at faster gait speeds, which improves step through pattern and L foot clearance   Walking (FIM) 1 - Patient requires assist of two people   Assessment   Treatment Assessment Cont to note inc L knee hyperextension when pt is more fatigued  Pt cont to demonstrate improvement in step through gait pattern bilat and walking backwards with greater step length and walking sideways each direction with improved lateral weight shifting  Started walking over obstacles, starting with smaller weighted dowels and adding in WC leg rest and small bolster, all of which move and will allow pt to know when he hits them  Gradually throgh session he improved upon how to clear each object, adjust step length and height and speed to accomodate for various spacing between objects  Barriers to Discharge Inaccessible home environment;Decreased caregiver support   PT Barriers   Physical Impairment Decreased strength;Decreased range of motion;Decreased endurance; Impaired balance;Decreased mobility   Functional Limitation Car transfers;Standing;Stair negotiation;Transfers; Walking   Plan   Treatment/Interventions Functional transfer training;LE strengthening/ROM; Elevations; Therapeutic exercise; Endurance training;Patient/family training;Equipment eval/education; Bed mobility;Gait training   Progress Progressing toward goals   Recommendation   Recommendation Outpatient PT; Home with family support   PT Therapy Minutes   PT Time In 0830   PT Time Out 0930   PT Total Time (minutes) 60   PT Mode of treatment - Individual (minutes) 60   PT Mode of treatment - Concurrent (minutes) 0   PT Mode of treatment - Group (minutes) 0   PT Mode of treatment - Co-treat (minutes) 0   PT Mode of Teatment - Total time(minutes) 60 minutes   Therapy Time missed   Time missed?  No

## 2018-11-02 PROCEDURE — 99232 SBSQ HOSP IP/OBS MODERATE 35: CPT | Performed by: PHYSICAL MEDICINE & REHABILITATION

## 2018-11-02 PROCEDURE — 97150 GROUP THERAPEUTIC PROCEDURES: CPT

## 2018-11-02 PROCEDURE — 97112 NEUROMUSCULAR REEDUCATION: CPT

## 2018-11-02 PROCEDURE — 97530 THERAPEUTIC ACTIVITIES: CPT

## 2018-11-02 PROCEDURE — 97116 GAIT TRAINING THERAPY: CPT

## 2018-11-02 RX ADMIN — ENOXAPARIN SODIUM 40 MG: 40 INJECTION SUBCUTANEOUS at 08:01

## 2018-11-02 RX ADMIN — ATORVASTATIN CALCIUM 80 MG: 80 TABLET, FILM COATED ORAL at 18:38

## 2018-11-02 RX ADMIN — MELATONIN 6 MG: at 22:07

## 2018-11-02 RX ADMIN — FLUOXETINE 20 MG: 20 CAPSULE ORAL at 08:01

## 2018-11-02 RX ADMIN — NICOTINE 14 MG: 14 PATCH, EXTENDED RELEASE TRANSDERMAL at 08:01

## 2018-11-02 RX ADMIN — ASPIRIN 81 MG: 81 TABLET, COATED ORAL at 08:01

## 2018-11-02 RX ADMIN — SENNOSIDES 8.6 MG: 8.6 TABLET, FILM COATED ORAL at 22:07

## 2018-11-02 RX ADMIN — ACETAMINOPHEN 650 MG: 325 TABLET, FILM COATED ORAL at 08:04

## 2018-11-02 RX ADMIN — CLOPIDOGREL 75 MG: 75 TABLET, FILM COATED ORAL at 08:01

## 2018-11-02 RX ADMIN — ACETAMINOPHEN 650 MG: 325 TABLET, FILM COATED ORAL at 22:06

## 2018-11-02 RX ADMIN — LOSARTAN POTASSIUM 50 MG: 50 TABLET, FILM COATED ORAL at 08:01

## 2018-11-02 RX ADMIN — NYSTATIN 500000 UNITS: 100000 SUSPENSION ORAL at 08:01

## 2018-11-02 NOTE — PROGRESS NOTES
11/02/18 1000   Pain Assessment   Pain Assessment No/denies pain   Pain Score No Pain   Restrictions/Precautions   Precautions Fall Risk;Supervision on toilet/commode   Weight Bearing Restrictions No   ROM Restrictions No   QI: Sit to Stand   Assistance Needed Physical assistance   Assistance Provided by [place] Less than 25%   Sit to Stand CARE Score 3   QI: Chair/Bed-to-Chair Transfer   Assistance Needed Physical assistance   Assistance Provided by [place] Less than 25%   Chair/Bed-to-Chair Transfer CARE Score 3   Transfer Bed/Chair/Wheelchair   Positioning Concerns Hemiplegia   Limitations Noted In Balance; Endurance; Coordination;UE Strength;LE Strength   Adaptive Equipment None   Bed, Chair, Wheelchair Transfer (FIM) 4 - Patient requires steadying assist or light touching   Neuromuscular Education   Weight Bearing Technique Yes   LUE Weight Bearing Extended arm seated; Extended arm standing   Response to Weight Bearing Technique WBing through extended arm while in stance with support at elbow  Then while seated with extended arm into therapy ball with A to maintain position  Functional Movement Patterns Pt participated in [facility] 20 assisted movement therapy in order to promote proximal shoulder strength  Pt positioned on chair with trunk and shoulder straps in place to reduce compensation  Pt utilizes arm trough for LUE support  Pt completes forward thrust exercise  Pt tolerates 20 reps x 1 on guided mode and then an additional 20 reps x 7 on initiated mode  Pt demos increased ability to achieve shoulder protraction but requires increased effort to achieve shoulder retraction  Pt overall tolerates session well with increased rest breaks     Cognition   Overall Cognitive Status WFL   Arousal/Participation Cooperative   Attention Within functional limits   Orientation Level Oriented X4   Memory Within functional limits   Following Commands Follows multistep commands with increased time or repetition   Activity Tolerance   Activity Tolerance Patient tolerated treatment well   Assessment   Treatment Assessment Pt participated in skilled OT services with focus on LUE neuro re-ed, standing tolerance, balance, and functional transfers  Pt orly increased stability while in stance this session requiring only steadying A while in stance  He orly increased attention this session with only min VC's required to adjust positioning during functional transfers  Pt continues to be very  Motivated to meet goals and engages well during session  Pt will continue to benefit from skilled OT services following POC  Prognosis Good   Problem List Decreased strength;Decreased endurance; Impaired balance;Decreased mobility; Decreased coordination   Plan   Treatment/Interventions ADL retraining;Functional transfer training; Therapeutic exercise; Endurance training;Equipment eval/education; Compensatory technique education;Patient/family training   Progress Progressing toward goals   OT Therapy Minutes   OT Time In 1000   OT Time Out 1130   OT Total Time (minutes) 90   OT Mode of treatment - Individual (minutes) 90   OT Mode of treatment - Concurrent (minutes) 0   OT Mode of treatment - Group (minutes) 0   OT Mode of treatment - Co-treat (minutes) 0   OT Mode of Teatment - Total time(minutes) 90 minutes   Therapy Time missed   Time missed?  No

## 2018-11-02 NOTE — PHYSICAL THERAPY NOTE
Stroke Education Series    Pt participated in skilled Stroke Education Series in a group setting to address the topic of Purpose of Rehab post stroke in both verbal and written formats  Education within this session included a review of the individual roles of the rehab team, functions of the acute rehab center, and neuro rehabilitation treatment strategies  The goal of this education session was to provide the patient with understanding of the overall importance of the therapy process  Additionally, this series aimed to help the patient connect neuro rehabilitation treatment strategies to his or her individual therapy process  Overall, to increase his/her engagement and provide contextual meaning to daily treatment sessions  Following education, pt's response to education is: verbalizes understanding and demonstrates understanding        Start Time: 1330    End Time: 1415

## 2018-11-02 NOTE — PROGRESS NOTES
Internal Medicine Progress Note  Patient: Adelfa Lanes  Age/sex: 58 y o  male  Medical Record #: [id number]      ASSESSMENT/PLAN:  Adelfa Lanes is seen and examined and management for following issues:    Multiple embolic R MCA CVA:  Cont ASA/Plavix for 90 days then Aspirin and statin therapy only; EP did JAYE/LOOP implant 10/31/18  LOOP recorder implant 10/30: Will continue to watch site      HTN:  Off all meds while in acute; was previously on ARB/amlodipine/hctz;  added back Losartan 50mg daily on 10/25/18  No other changes today        DM II:  Diet controlled; recent HbA1C 6 6; pt aware and avoids excess sugar; cont sliding scale and DM diet = stopped Accuchecks/coverage since BSs had been ok      Tobacco abuse:  Cont patch; recommend cessation     Anxiety:  Cont Fluoxetine 20mg daily; neuropsychiatry to see     Left ankle and left 2nd toe pain:  Xray of foot and ankle were negative, likely gout; pt was taking Indocin 50mg 3x daily prn as outpt;  Colchicine 1 2 mg x 1, 0 6mg x 1 and a steroid taper over 4 days --> resolved but this AM had some mild pain = will watch and if reoccurs then restart Prednisone taper and not use Indocin  Exam reveals to redness/swelling tenderness of left ankle today  Thrush:  Nystatin ordered    Hypokalemia:  Kdur 40 meq x 1 given yesterday      Subjective: Patient seen and examined  He denies any complaints      ROS:   GI: denies abdominal pain, change bowel habits or reflux symptoms  Neuro: No new neurologic changes  Respiratory: No Cough, SOB  Cardiovascular: No CP, palpitations     Scheduled Meds:    Current Facility-Administered Medications:  acetaminophen 650 mg Oral Q6H PRN Jerzy Norris MD   aspirin 81 mg Oral Daily Jerzy Norris MD   atorvastatin 80 mg Oral QPM Jerzy Norris MD   clopidogrel 75 mg Oral Daily PATEL Gautam   docusate sodium 100 mg Oral BID Bernarda Yao PA-C   enoxaparin 40 mg Subcutaneous Q24H [address] Jerzy Norris MD FLUoxetine 20 mg Oral Daily PATEL Gautam   losartan 50 mg Oral Daily Elizabeth Heller CRANDREINA   melatonin 6 mg Oral HS Wilbert Torres MD   nicotine 14 mg Transdermal Daily Mandeep Cao MD   nystatin 500,000 Units Swish & Swallow 4x Daily PATEL Owen   ondansetron 4 mg Intravenous Q6H PRN Mandeep Cao MD   oxyCODONE 2 5 mg Oral Q4H PRN Mandeep Cao MD   pneumococcal 23-valent polysaccharide vaccine 0 5 mL Subcutaneous Prior to discharge Mandeep Cao MD   polyethylene glycol 17 g Oral Daily PRN Bernarda Yao PA-C   senna 1 tablet Oral HS Bernarda Yao PA-C       Labs:       Results from last 7 days  Lab Units 11/01/18  0611 10/29/18  1509   WBC Thousand/uL 7 75 8 59   HEMOGLOBIN g/dL 13 6 13 4   HEMATOCRIT % 40 8 40 2   PLATELETS Thousands/uL 254 267       Results from last 7 days  Lab Units 11/01/18  0611 10/29/18  0610   POTASSIUM mmol/L 3 4* 3 6   CHLORIDE mmol/L 107 107   CO2 mmol/L 25 26   BUN mg/dL 14 21   CREATININE mg/dL 0 87 0 84   CALCIUM mg/dL 8 6 8 8                  Glucose, i-STAT (mg/dl)   Date Value   10/20/2018 132       Labs reviewed    Physical Examination:  Vitals:   Vitals:    11/01/18 0936 11/01/18 1421 11/01/18 2030 11/02/18 0608   BP: 131/59 135/65 132/63 118/62   BP Location: Right arm Right arm Right arm Right arm   Pulse: 69 66 (!) 54 (!) 50   Resp:  20 20 20   Temp:  98 1 °F (36 7 °C) 98 6 °F (37 °C) 98 6 °F (37 °C)   TempSrc:  Oral Oral Oral   SpO2:  95% 96% 96%   Weight:       Height:         Constitutional:  NAD; pleasant; nontoxic  HEENT:  AT/NC; oropharynx + for thrush on tongue   Neck: negative for JVD  CV:  +S1, S2;  RRR; no rub/murmur; LOOP incision is w/o erythema/drainage  Pulmonary:  BBS without crackles/wheeze/rhonci; resp are unlabored  Abdominal:  soft, +BS, ND/NT; no mass  Skin:  no rashes  Musculoskeletal:  No edema  :  no nichols   Neurological/Psych:  AAO;   Can weakly move left fingers = can lift at shoulder but not flex at elbow; LLE HF 4/5 with DF/PF 4-/5; + left facial droop and tongue deviation; speech is clear; no depression/anxiety        [ X ] Total time spent: 30 Mins and greater than 50% of this time was spent counseling/coordinating care  ** Please Note: Dragon 360 Dictation voice to text software may have been used in the creation of this document   **

## 2018-11-02 NOTE — PROGRESS NOTES
11/02/18 1230   Pain Assessment   Pain Assessment No/denies pain   Pain Score No Pain  (c/o soreness in L ankle)   Restrictions/Precautions   Precautions Fall Risk;Supervision on toilet/commode   Weight Bearing Restrictions No   ROM Restrictions No   Braces or Orthoses (none)   Cognition   Arousal/Participation Cooperative; Alert   Subjective   Subjective Pt reports he is ready for therapy  QI: Sit to Stand   Assistance Needed Physical assistance   Assistance Provided by [place] Less than 25%   Sit to Stand CARE Score 3   QI: Chair/Bed-to-Chair Transfer   Assistance Needed Physical assistance   Assistance Provided by [place] Less than 25%   Chair/Bed-to-Chair Transfer CARE Score 3   Transfer Bed/Chair/Wheelchair   Limitations Noted In Balance; Endurance;UE Strength;LE Strength   Adaptive Equipment (BWSS)   Stand Pivot Minimal Assist   Sit to Stand Minimal Assist   Stand to Sit Minimal Assist   Bed, Chair, Wheelchair Transfer (FIM) 2 - [place] needs to lift or boost to rise AND assist to sit   QI: Walk 10 Feet   Assistance Needed Physical assistance   Assistance Provided by [place] Total assistance   Comment BWSS   Walk 10 Feet CARE Score 1   QI: Walk 50 Feet with Two Turns   Assistance Needed Physical assistance   Assistance Provided by [place] Total assistance   Comment BWSS   Walk 50 Feet with Two Turns CARE Score 1   QI: Walk 150 Feet   Assistance Needed Physical assistance   Assistance Provided by [place] Total assistance   Comment BWSS   Walk 150 Feet CARE Score 1   Ambulation   Does the patient walk? 2  Yes   Primary Discharge Mode of Locomotion Walk;Wheelchair  (WC vs walk based on pt progress)   Walk Assist Level Maximum Assist   Gait Pattern Decreased foot clearance;L hemiparesis;L knee hyperextension; Improper weight shift;Decreased R stance; Step through   Assist Device (BWSS)   Distance Walked (feet) 720 ft   Limitations Noted In Balance; Coordination;Midline Orientation;Speed;Strength;Swing;Posture   Findings 720'x2, 350'x2 all in BWSS   Walking (FIM) 1 - Patient requires assist of two people   Equipment Use   Body Weight Support System See above for distances walked  Distances are a combination of walking forward, backward, side stepping, stepping over bolsters and therabars, walking forward with ball toss  Assessment   Treatment Assessment Pt tolerated treatment session well  Session focused on use of BWSS to promote improved gait pattern  Noted decrease in lateral lean to L compared to previous sessions in BWSS  Continued to note improvement in L knee hyperextension and L foot clearance  Used 2# and 3# ankle weight on LLE to promote LLE hip flexor strengthening  Noted improvement in quality of gait after removing ankle weight; additionally, pt noted improvemnt as he stated it felt easier compared to before walking with the weight on  Pt was able to step over obstacles of varying sizes with inconsistency; noted improvement when pt was focused on clearing the object  Trialed walking with ball toss to challenge pt's balance with external focus; noted increase in L foot drag with ball toss compared to without  Future sessions should continue to focus on use of BWSS and challenge dynamic balance to promote progress toward LTGs  Problem List Decreased endurance; Impaired balance;Decreased mobility; Impaired sensation;Decreased range of motion;Decreased strength  (Simultaneous filing  User may not have seen previous data )   Barriers to Discharge Decreased caregiver support   PT Barriers   Physical Impairment Decreased strength;Decreased endurance; Impaired balance;Decreased mobility; Impaired sensation;Decreased range of motion   Functional Limitation Car transfers;Stair negotiation;Standing;Transfers; Walking   Plan   Treatment/Interventions Functional transfer training;LE strengthening/ROM; Elevations; Therapeutic exercise; Endurance training;Patient/family training;Equipment eval/education; Bed mobility;Gait training; Compensatory technique education   Progress Progressing toward goals   Recommendation   Recommendation Home with family support; Outpatient PT   Equipment Recommended (LRAD)   PT Therapy Minutes   PT Time In 1230   PT Time Out 1330   PT Total Time (minutes) 60   PT Mode of treatment - Individual (minutes) 60   PT Mode of treatment - Concurrent (minutes) 0   PT Mode of treatment - Group (minutes) 0   PT Mode of treatment - Co-treat (minutes) 0   PT Mode of Teatment - Total time(minutes) 60 minutes   Therapy Time missed   Time missed?  No

## 2018-11-02 NOTE — PROGRESS NOTES
Physical Medicine and Rehabilitation Progress Note  Soha Flanagan 58 y o  male MRN: [id number]  Unit/Bed#: -01 Encounter: [id number]    HPI: Patient is 59 yo male with right cerebral infarcts likely embolic in nature     Chief Complaint/Subjective: Patient without complaint currently     ROS:  negative unless noted above     Assessment/Plan:      Hypokalemia   Assessment & Plan    · Mild at 3 4   · Repletion given by IM      DM [facility] ([facility]   Assessment & Plan    · Not on any meds at home  · On ISS as inpt, IM to determine if oral agent will need to be started     Gout   Assessment & Plan    · Left ankle  · Per patient was Indocin 50mg TID PTA however was not able to tolerate due to diarrhea and has not been taking for 2 months   · XR of left ankle and foot negative   · s/p colchicine and steroid taper per IM  · Resolved per patient   · Of note per patient this is only the 2nd gout flare patient has ever had     Hyperlipidemia   Assessment & Plan    · Home zocor 20 mg changed to lipitor 80 mg      Anxiety   Assessment & Plan    · No record of any benzodiazepines (or any other controlled medications) found in the [place] database going back 1 year  · Neuropsychology following       Hypertension   Assessment & Plan    · IM to clarify home meds as records are not clear ( olmesartan vs valsartan ?, HCTZ 25 mg vs 50 mg ? norvasc ?)  · Currently on cozaar per IM      Dysphagia   Assessment & Plan    · SLP following  · Modified diet per SLP recs      * Right sided cerebral hemisphere cerebrovascular accident (CVA) ([facility] )   Assessment & Plan    · On DAPT for 90 days, with aspirin/plavix, followed by monotherapy with ASA  · Possibly cardioembolic in etiology   Consulted EP for eval/placement of loop recorder however they recommend JAYE first which was negative for thrombus in [place] however did reveal aortic atheroma, will d/w neuro (Dr Harriett Erickson) if they feel the atheroma is the etiology of the stroke and did not feel that atheroma was likely severe enough to change from  to [facility] even if this was the potential etiology so management recommendations per neuro was unchanged and patient had LOOP placed   · Fluoxetine 20mg post stroke for motor recovery         Scheduled Meds:    Current Facility-Administered Medications:  acetaminophen 650 mg Oral Q6H PRN Maritza Kumari MD   aspirin 81 mg Oral Daily Maritza Kumari MD   atorvastatin 80 mg Oral QPM Maritza Kumari MD   clopidogrel 75 mg Oral Daily PATEL Gautam   docusate sodium 100 mg Oral BID Bernarda Yao PA-C   enoxaparin 40 mg Subcutaneous Q24H [facility] Maritza Kumari MD   FLUoxetine 20 mg Oral Daily PATEL Gautam   losartan 50 mg Oral Daily PATEL Gautam   melatonin 6 mg Oral HS Maritza Pryor MD   nicotine 14 mg Transdermal Daily Maritza Kumari MD   ondansetron 4 mg Intravenous Q6H PRN aMritza Kumari MD   oxyCODONE 2 5 mg Oral Q4H PRN Maritza Kumari MD   pneumococcal 23-valent polysaccharide vaccine 0 5 mL Subcutaneous Prior to discharge Maritza Kumari MD   polyethylene glycol 17 g Oral Daily PRN Bernarda Yao PA-C   senna 1 tablet Oral HS Bernarda Yao PA-C        Incidental findings  1) thyroid nodules: per radiology report recs non-emergent OP thyroid U/S, OP FU with PCP to arrange U/S  2) subpleural bullae: OP FU with PCP with further testing/treatment and/or specialist referral at PCP's discretion     Note: patient noted to have petechial hemorrhage on imaging however has been cleared for DAPT and lovenox (for DVT ppx) by neuro  DVT ppx: SCDs, lovenox  Dispo: reteam     Objective:    Functional Update:  Mobility: amb mod x 2 (with chair follow)   Transfers: mod  ADLs: mod         Physical Exam:        General: alert, no apparent distress, cooperative and comfortable  HEENT:  Head: Normocephalic, no lesions, without obvious abnormality    CARDIAC:  +S1/2  LUNGS:  respirations unlabored  ABDOMEN:  soft   EXTREMITIES:  no signficant LE edema  NEURO:   awake, alert, appropriately answering questions, 2/5 LUE finger flexion, 2/5 LUE shoulder abduction otherwise 0-1/5 LUE, +dysarthria improved  PSYCH:  mood/affect currently stable      Diagnostic Studies: reviewed, no new imaging  No orders to display       Laboratory:      Results from last 7 days  Lab Units 11/01/18  0611 10/29/18  1509   HEMOGLOBIN g/dL 13 6 13 4   HEMATOCRIT % 40 8 40 2   WBC Thousand/uL 7 75 8 59       Results from last 7 days  Lab Units 11/01/18  0611 10/29/18  0610   BUN mg/dL 14 21   POTASSIUM mmol/L 3 4* 3 6   CHLORIDE mmol/L 107 107   CREATININE mg/dL 0 87 0 84            Patient Active Problem List   Diagnosis    [facility]    Dysphagia    Hypertension    Anxiety    Hyperlipidemia    Gout    [facility]    Hypokalemia           Total visit time:  At least 25 minutes, with more than 50% spent counseling/coordinating care

## 2018-11-02 NOTE — PROGRESS NOTES
11/02/18 0900   Pain Assessment   Pain Assessment No/denies pain   Restrictions/Precautions   Precautions Fall Risk;Supervision on toilet/commode   Cognition   Arousal/Participation Cooperative   Subjective   Subjective pt reported feeling ready for therapy  pt reported L ankle was sore earlier today and he took tylenol with nursing, discuscsed how we can use the AFO during the session if he gets ankle pain  Pt did not report any ankle pain during the session    QI: Sit to Stand   Assistance Needed Physical assistance   Assistance Provided by [place] Less than 25%   Sit to Stand CARE Score 3   QI: Chair/Bed-to-Chair Transfer   Assistance Needed Physical assistance   Assistance Provided by [place] Less than 25%   Chair/Bed-to-Chair Transfer CARE Score 3   Transfer Bed/Chair/Wheelchair   Limitations Noted In Balance; Endurance;LE Strength;UE Strength   Stand Pivot Minimal Assist   Sit to Stand Minimal Assist   Stand to Sit Minimal Assist   Bed, Chair, Wheelchair Transfer (FIM) 2 - [place] needs to lift or boost to rise AND assist to sit   Wheelchair mobility   Wheelchair Assist Level Minimum Assist   Method Right lower extremity; Left lower extremity   Distance Level Surface (feet) 50 ft   Findings Darin for around turns, for therex purposes   QI: 4 Steps   Assistance Needed Physical assistance; Adaptive equipment   Assistance Provided by [place] Total assistance   Comment 2nd person for safety   4 Steps CARE Score 1   QI: 12 Steps   Assistance Needed Physical assistance; Adaptive equipment   Assistance Provided by [place] Total assistance   Comment 2nd person for safety   12 Steps CARE Score 1   Stairs   Type Stairs   # of Steps 20  (x2)   Weight Bearing Precautions Fall Risk   Assist Devices Single Rail   Findings reciprocal pattern up and down  gait belt, Darin x1 and 2nd person present for safety  toes caught x1 ascending at very end of 2nd attempt   also practiced reciprocal step through pattern on 6"  steps with LLE in stance first and then LLE moving up/down in swing phase with 4lb ankle weight on, use of RUE on hand rail  one person to guard L knee to prevent hyperextension and other person guarding for balance support  attempted without RUE adn with HHA, however this was not enough support and pt was having LOB to L side    Stairs (FIM) 1 - Patient requires assist of two people   Equipment Use   NuStep 20 min level 4 start of session BLE only    Assessment   Treatment Assessment Session focused on NuStep to facilitate activity tolerance and stair training  Pt cont to demonstrate progress in LLE strength in regards to lifting through a reciprocal pattern on stairs as well as in stance phase, however cont to need RUE on HR for support to prevent a fall  Pt will cont to benefit from skilled PT to further progress overall activity tolerance (pt asked if he could wheel in the hallway in [facility] during the weekend to get out of his room and he is okay to do this), as well as to continue gait training in [facility] to further facilitate imrpovement in ambulation and functional mobility  Barriers to Discharge Inaccessible home environment;Decreased caregiver support   PT Barriers   Physical Impairment Decreased strength;Decreased range of motion;Decreased endurance; Impaired balance;Decreased mobility   Functional Limitation Car transfers;Stair negotiation;Standing;Transfers; Walking   Plan   Treatment/Interventions Functional transfer training;LE strengthening/ROM; Elevations; Therapeutic exercise; Endurance training;Patient/family training;Equipment eval/education; Bed mobility;Gait training   Progress Progressing toward goals   Recommendation   Recommendation Outpatient PT; Home with family support   PT Therapy Minutes   PT Time In 0900   PT Time Out 1000   PT Total Time (minutes) 60   PT Mode of treatment - Individual (minutes) 30   PT Mode of treatment - Concurrent (minutes) 30   PT Mode of treatment - Group (minutes) 0   PT Mode of treatment - Co-treat (minutes) 0   PT Mode of Teatment - Total time(minutes) 60 minutes   Therapy Time missed   Time missed?  No

## 2018-11-03 PROCEDURE — G0515 COGNITIVE SKILLS DEVELOPMENT: HCPCS

## 2018-11-03 PROCEDURE — 97112 NEUROMUSCULAR REEDUCATION: CPT

## 2018-11-03 PROCEDURE — 97116 GAIT TRAINING THERAPY: CPT

## 2018-11-03 PROCEDURE — 97127 HB COGNITIVE SKILLS DEVELOPMENT, EACH 15 MUNUTES: CPT

## 2018-11-03 RX ADMIN — NICOTINE 14 MG: 14 PATCH, EXTENDED RELEASE TRANSDERMAL at 09:39

## 2018-11-03 RX ADMIN — CLOPIDOGREL 75 MG: 75 TABLET, FILM COATED ORAL at 09:39

## 2018-11-03 RX ADMIN — ENOXAPARIN SODIUM 40 MG: 40 INJECTION SUBCUTANEOUS at 09:39

## 2018-11-03 RX ADMIN — ASPIRIN 81 MG: 81 TABLET, COATED ORAL at 09:39

## 2018-11-03 RX ADMIN — FLUOXETINE 20 MG: 20 CAPSULE ORAL at 09:39

## 2018-11-03 RX ADMIN — MELATONIN 6 MG: at 23:00

## 2018-11-03 RX ADMIN — LOSARTAN POTASSIUM 50 MG: 50 TABLET, FILM COATED ORAL at 09:39

## 2018-11-03 RX ADMIN — ATORVASTATIN CALCIUM 80 MG: 80 TABLET, FILM COATED ORAL at 17:25

## 2018-11-03 RX ADMIN — SENNOSIDES 8.6 MG: 8.6 TABLET, FILM COATED ORAL at 23:28

## 2018-11-03 NOTE — PROGRESS NOTES
Pt has been complaining that he cant sleep at night  He was on 3 mg of melatonin that was upped to 6 mg and he was still awake all night   Every time I went in to check on him he was awake and seemed anxious and restless

## 2018-11-03 NOTE — PROGRESS NOTES
11/03/18 0830   Pain Assessment   Pain Assessment No/denies pain   Restrictions/Precautions   Precautions Fall Risk   Cognition   Overall Cognitive Status WFL   Subjective   Subjective not sleeping well   QI: Sit to Stand   Assistance Needed Incidental touching   Sit to Stand CARE Score 4   QI: Chair/Bed-to-Chair Transfer   Assistance Needed Incidental touching   Chair/Bed-to-Chair Transfer CARE Score 4   Transfer Bed/Chair/Wheelchair   Adaptive Equipment None   Stand Pivot Contact Guard   Bed, Chair, Wheelchair Transfer (FIM) 4 - Patient requires steadying assist or light touching   QI: Walk 10 Feet   Assistance Needed Physical assistance;Verbal cues   Assistance Provided by [place] 50%-74%   Walk 10 Feet CARE Score 2   QI: Walk 50 Feet with Two Turns   Assistance Needed Physical assistance   Assistance Provided by [place] 50%-74%   Walk 50 Feet with Two Turns CARE Score 2   QI: Walk 150 Feet   Assistance Needed Physical assistance   Assistance Provided by [place] 50%-74%   Walk 150 Feet CARE Score 2   QI: Walking 10 Feet on Uneven Surfaces   Reason if not Attempted Safety concerns   Walking 10 Feet on Uneven Surfaces CARE Score 88   Ambulation   Does the patient walk? 2  Yes   Walk Assist Level Moderate Assist;Maximum Assist   Gait Pattern Narrow LIZA; Trendelenburg;L hemiparesis   Assist Device Hand Hold   Distance Walked (feet) 200 ft   Limitations Noted In Endurance;Balance; Coordination; Heel Strike;Strength;Posture;Midline Orientation;Swing   Findings Left MAFO   Walking (FIM) 2 - Patient ambulates between 50 - 149 feet regardless of assist/device/set up   Therapeutic Interventions   Neuromuscular Re-Education using  steps; woked L LE stepping floor to top step and also in stance phase   Pt abducting L LE onto 6" step while working on wt shifting and improving midline control, then carried over to gait training   Other Comments   Comments Pt continues to have Left hip weakness and decreased knee control resulting in trendlenberg and hyeprextension in stance phase   Assessment   Treatment Assessment Pt able to carryover improved l ERROL velazqueziol during practice until fatigue sets in  Will benefit from continued neuro re-ed, closed chain strengthening and fnctional activities to maxiimize his functional Ind    PT Barriers   Physical Impairment Decreased endurance;Decreased strength; Impaired balance;Decreased mobility; Decreased coordination   Functional Limitation Walking;Transfers;Standing   Recommendation   Recommendation Outpatient PT; Home with family support   PT Therapy Minutes   PT Time In 0830   PT Time Out 0900   PT Total Time (minutes) 30   PT Mode of treatment - Individual (minutes) 30   PT Mode of treatment - Concurrent (minutes) 0   PT Mode of treatment - Group (minutes) 0   PT Mode of treatment - Co-treat (minutes) 0   PT Mode of Teatment - Total time(minutes) 30 minutes   Therapy Time missed   Time missed?  No

## 2018-11-03 NOTE — PROGRESS NOTES
11/03/18 1300   Pain Assessment   Pain Assessment No/denies pain   Pain Score No Pain   Restrictions/Precautions   Precautions Fall Risk   Memory Skills   Memory (FIM) 6 - Recognizes with extra time   Social Interaction (FIM) 6 - Interacts appropriately with others BUT requires extra  time   Speech/Language/Cognition Assessmetn   Treatment Assessment Pt participated in skilled ST session w/ focus on higher level cognitive linguistic skills  Began session w/ informal discussion about therapy progress and routines where pt demonstrated STM recall to be [facility], noting accurate recall of specific details regarding situations and previous conversations  Pt also reports that today he logged onto his computer and was able to pay bills, as well as respond to work emails w/o difficulty  Pt also engaged in discussion about improvement in his speech clarity, but also stated other physical areas of need, demonstrating insight into deficits  When presented w/ a Fo3 words, pt correctly sequenced items by progressing degree in 14/15 trials, noting pt's ability to prompting correct single error  Of note, pt also completed the second portion of this task w/ a visitor present where he was demonstrating divided attention skills by engaging in convo along w/ written sequencing  Pt will benefit from brief skilled ST f/u to maximize higher level cognitive linguistic skills at this time  SLP Therapy Minutes   SLP Time In 1300   SLP Time Out 1330   SLP Total Time (minutes) 30   SLP Mode of treatment - Individual (minutes) 30   SLP Mode of treatment - Concurrent (minutes) 0   SLP Mode of treatment - Group (minutes) 0   SLP Mode of treatment - Co-treat (minutes) 0   SLP Mode of Teatment - Total time(minutes) 30 minutes   Therapy Time missed   Time missed?  No   Daily FIM Score   Problem solving (FIM) 6 - Solves complex problems BUT requires extra time   Comprehension (FIM) 6 - Understands complex/abstract but requires more time Expression (FIM) 6 - Expresses complex/abstract but requires:  more time

## 2018-11-03 NOTE — PROGRESS NOTES
Internal Medicine Progress Note  Patient: Omero Norwood  Age/sex: 58 y o  male  Medical Record #: [id number]      ASSESSMENT/PLAN:  Omero Norwood is seen and examined and management for following issues:    Multiple embolic R MCA CVA:  Cont ASA/Plavix for 90 days then Aspirin and statin therapy only; EP did JAYE/LOOP implant 10/31/18  LOOP recorder implant 10/30: Will continue to watch site      HTN:  Off all meds while in acute; was previously on ARB/amlodipine/hctz;  added back Losartan 50mg daily on 10/25/18  No other changes today        DM II:  Diet controlled; recent HbA1C 6 6; pt aware and avoids excess sugar; cont sliding scale and DM diet = stopped Accuchecks/coverage since BSs had been ok      Tobacco abuse:  Cont patch; recommend cessation     Anxiety:  Cont Fluoxetine 20mg daily; neuropsychiatry to see     Left ankle and left 2nd toe pain:  Xray of foot and ankle were negative, likely gout; pt was taking Indocin 50mg 3x daily prn as outpt;  Colchicine 1 2 mg x 1, 0 6mg x 1 and a steroid taper over 4 days --> resolved but this AM had some mild pain = will watch and if reoccurs then restart Prednisone taper and not use Indocin  Exam reveals to redness/swelling tenderness of left ankle today  Thrush:  Nystatin ordered    Hypokalemia:  Kdur 40 meq x 1 given yesterday      Subjective: Patient seen and examined  He denies any complaints      ROS:   GI: denies abdominal pain, change bowel habits or reflux symptoms  Neuro: No new neurologic changes  Respiratory: No Cough, SOB  Cardiovascular: No CP, palpitations     Scheduled Meds:    Current Facility-Administered Medications:  acetaminophen 650 mg Oral Q6H PRN Kumar Gabriel MD   aspirin 81 mg Oral Daily Kumar Gabriel MD   atorvastatin 80 mg Oral QPM Kumar Gabriel MD   clopidogrel 75 mg Oral Daily PATEL Gautam   docusate sodium 100 mg Oral BID Bernarda Yao PA-C   enoxaparin 40 mg Subcutaneous Q24H [address] Kumar Gabriel MD FLUoxetine 20 mg Oral Daily PATEL Gautam   losartan 50 mg Oral Daily PATEL Cruz   melatonin 6 mg Oral HS Mireya Delgado MD   nicotine 14 mg Transdermal Daily Jose Payan MD   ondansetron 4 mg Intravenous Q6H PRN Jose Payan MD   oxyCODONE 2 5 mg Oral Q4H PRN Jose Payan MD   pneumococcal 23-valent polysaccharide vaccine 0 5 mL Subcutaneous Prior to discharge Jose Payan MD   polyethylene glycol 17 g Oral Daily PRN Bernarda Yao PA-C   senna 1 tablet Oral HS Bernarda Yao PA-C       Labs:       Results from last 7 days  Lab Units 11/01/18  0611 10/29/18  1509   WBC Thousand/uL 7 75 8 59   HEMOGLOBIN g/dL 13 6 13 4   HEMATOCRIT % 40 8 40 2   PLATELETS Thousands/uL 254 267       Results from last 7 days  Lab Units 11/01/18  0611 10/29/18  0610   POTASSIUM mmol/L 3 4* 3 6   CHLORIDE mmol/L 107 107   CO2 mmol/L 25 26   BUN mg/dL 14 21   CREATININE mg/dL 0 87 0 84   CALCIUM mg/dL 8 6 8 8                  Glucose, i-STAT (mg/dl)   Date Value   10/20/2018 132       Labs reviewed    Physical Examination:  Vitals:   Vitals:    11/02/18 0608 11/02/18 2001 11/03/18 0500 11/03/18 1343   BP: 118/62 131/66 113/62 149/70   BP Location: Right arm Right arm  Right arm   Pulse: (!) 50 59 (!) 52 59   Resp: 20 20 20 20   Temp: 98 6 °F (37 °C) 97 8 °F (36 6 °C) 97 5 °F (36 4 °C) 98 7 °F (37 1 °C)   TempSrc: Oral Oral Oral Oral   SpO2: 96% 93% 98% 98%   Weight:       Height:         Constitutional:  NAD; pleasant; nontoxic  HEENT:  AT/NC; oropharynx + for thrush on tongue   Neck: negative for JVD  CV:  +S1, S2;  RRR; no rub/murmur; LOOP incision is w/o erythema/drainage  Pulmonary:  BBS without crackles/wheeze/rhonci; resp are unlabored  Abdominal:  soft, +BS, ND/NT; no mass  Skin:  no rashes  Musculoskeletal:  No edema  :  no nichols   Neurological/Psych:  AAO;   Can weakly move left fingers = can lift at shoulder but not flex at elbow;  LLE HF 4/5 with DF/PF 4-/5; + left facial droop and tongue deviation; speech is clear; no depression/anxiety        [ X ] Total time spent: 30 Mins and greater than 50% of this time was spent counseling/coordinating care  ** Please Note: Dragon 360 Dictation voice to text software may have been used in the creation of this document   **

## 2018-11-03 NOTE — PROGRESS NOTES
Occupational therapy progress note     11/03/18 1030   Pain Assessment   Pain Assessment No/denies pain   Restrictions/Precautions   Precautions Fall Risk   QI: Chair/Bed-to-Chair Transfer   Assistance Needed Physical assistance   Assistance Provided by [place] Less than 25%   Chair/Bed-to-Chair Transfer CARE Score 3   Transfer Bed/Chair/Wheelchair   Positioning Concerns Skin Integrity; Hemiplegia   Limitations Noted In Balance; Endurance;UE Strength   Stand Pivot Contact Guard   Sit to Stand Minimal   Findings Pt CGA for all stand pivot transfers  No new concerns   ROM - Left Upper Extremities    LUE ROM Comment PROM all planes 2 x 30 second holds to increased ROM and decrease risk of contracture  AROM with muscle tapping biceps flexion/ext, wrist flexion/ext and gross grasp  Neuromuscular Education   Weight Bearing Technique Yes   LUE Weight Bearing Forearm seated   Response to Weight Bearing Technique tolerated well   Comments Pt engaged in seated weight bearing through R forarm with cross body reaching with LUE to retrieve bean bag and toss into basket above head level  3 x 20 reps  Therapist support required to maintain positioning for maximal benefit  Cognition   Arousal/Participation Alert; Cooperative   Orientation Level Oriented X4   Activity Tolerance   Activity Tolerance Patient tolerated treatment well   Assessment   Treatment Assessment Pt engaged in skilled OT session with focus on functional transfers, LUE weight bearing and P/AROM  Continue with current POC  Problem List Decreased strength;Decreased range of motion;Decreased endurance; Impaired balance;Decreased mobility   Plan   Treatment/Interventions ADL retraining;Functional transfer training; Therapeutic exercise; Endurance training;Patient/family training;Bed mobility; Compensatory technique education   Progress Progressing toward goals   OT Therapy Minutes   OT Time In 1030   OT Time Out 1100   OT Total Time (minutes) 30   OT Mode of treatment - Individual (minutes) 30   OT Mode of treatment - Concurrent (minutes) 0   OT Mode of treatment - Group (minutes) 0   OT Mode of treatment - Co-treat (minutes) 0   OT Mode of Teatment - Total time(minutes) 30 minutes   Therapy Time missed   Time missed?  No   Chayo Mckee, OT

## 2018-11-04 PROCEDURE — 97112 NEUROMUSCULAR REEDUCATION: CPT

## 2018-11-04 PROCEDURE — 97112 NEUROMUSCULAR REEDUCATION: CPT | Performed by: OCCUPATIONAL THERAPY ASSISTANT

## 2018-11-04 PROCEDURE — 97116 GAIT TRAINING THERAPY: CPT

## 2018-11-04 PROCEDURE — 97530 THERAPEUTIC ACTIVITIES: CPT | Performed by: OCCUPATIONAL THERAPY ASSISTANT

## 2018-11-04 PROCEDURE — 92526 ORAL FUNCTION THERAPY: CPT

## 2018-11-04 RX ADMIN — NICOTINE 14 MG: 14 PATCH, EXTENDED RELEASE TRANSDERMAL at 08:35

## 2018-11-04 RX ADMIN — ASPIRIN 81 MG: 81 TABLET, COATED ORAL at 08:34

## 2018-11-04 RX ADMIN — CLOPIDOGREL 75 MG: 75 TABLET, FILM COATED ORAL at 08:33

## 2018-11-04 RX ADMIN — ATORVASTATIN CALCIUM 80 MG: 80 TABLET, FILM COATED ORAL at 17:18

## 2018-11-04 RX ADMIN — MELATONIN 6 MG: at 21:40

## 2018-11-04 RX ADMIN — FLUOXETINE 20 MG: 20 CAPSULE ORAL at 08:34

## 2018-11-04 RX ADMIN — LOSARTAN POTASSIUM 50 MG: 50 TABLET, FILM COATED ORAL at 08:33

## 2018-11-04 RX ADMIN — ENOXAPARIN SODIUM 40 MG: 40 INJECTION SUBCUTANEOUS at 08:34

## 2018-11-04 RX ADMIN — SENNOSIDES 8.6 MG: 8.6 TABLET, FILM COATED ORAL at 21:40

## 2018-11-04 NOTE — PROGRESS NOTES
Occupational Therapy Treatment Note:       11/04/18 1100   Pain Assessment   Pain Assessment No/denies pain   Pain Score No Pain   Restrictions/Precautions   Precautions Fall Risk   Weight Bearing Restrictions No   ROM Restrictions No   QI: Sit to Lying   Assistance Needed Supervision   Assistance Provided by [place] No physical assistance   Sit to Lying CARE Score 4   QI: Lying to Sitting on Side of Bed   Assistance Needed Supervision   Assistance Provided by [place] No physical assistance   Comment HOB flat without use of bed rail  Lying to Sitting on Side of Bed CARE Score 4   QI: Sit to Stand   Assistance Needed Incidental touching   Assistance Provided by [place] No physical assistance   Comment No AD  Sit to Stand CARE Score 4   QI: Chair/Bed-to-Chair Transfer   Assistance Needed Yusuf Melara / Leyda Schaeffer; Incidental touching   Assistance Provided by [place] Less than 25%   Comment SPT to R no AD  Chair/Bed-to-Chair Transfer CARE Score 3   Transfer Bed/Chair/Wheelchair   Positioning Concerns Hemiplegia   Limitations Noted In Balance; Coordination; Endurance;UE Strength;LE Strength   Adaptive Equipment None   Stand Pivot Contact Guard   Sit to Stand Minimal  (CGA )   Stand to Sit Minimal  (CGA )   Supine to Sit Supervision   Sit to Supine Supervision   Bed, Chair, Wheelchair Transfer (FIM) 4 - Patient requires steadying assist or light touching   Neuromuscular Education   Comments While supine in bed pt completed LUE AROM with air splint donned for shoulder flex/ext, horizontal abd/add, and abd/add at 2 sets/5 reps with focus on performing with slow and controlled mov'ts  Pt completed AAROM for elbow flex/ext with upper arm stabilized in order to isolate elbow mov't at 3 sets/5 reps   Performed PROM for FA supination/pronation, wrist flex/ext, radial/ulnar deviation, digit flex/ext, thumb abd/add, and thumb opposition in order to decrease risk for contracture and limitations in ROM; pt unable to initiate FA sup/pron at this time however was able to complete AAROM for wrist flex/ext and initiate digit flex  Pt reported he completes self ROM exercises often daily  Cognition   Overall Cognitive Status WFL   Arousal/Participation Alert; Cooperative   Attention Within functional limits   Orientation Level Oriented X4   Memory Within functional limits   Following Commands Follows multistep commands with increased time or repetition   Activity Tolerance   Activity Tolerance Patient tolerated treatment well   Assessment   Treatment Assessment OT tx session focused on bed mobility, transfers, LUE neuro re-ed, and overall activity tolerance/endurance  Refer above for details on pt performance  Pt would benefit from continued skilled OT services in order to achieve highest functional abilities  Prognosis Good   Problem List Decreased strength;Decreased range of motion;Decreased endurance; Impaired balance;Decreased mobility; Decreased coordination; Impaired tone   Barriers to Discharge Decreased caregiver support   Plan   Treatment/Interventions Functional transfer training; Therapeutic exercise; Endurance training;Patient/family training;Equipment eval/education; Bed mobility; Compensatory technique education;OT   Progress Progressing toward goals   OT Therapy Minutes   OT Time In 1100   OT Time Out 1130   OT Total Time (minutes) 30   OT Mode of treatment - Individual (minutes) 30   OT Mode of treatment - Concurrent (minutes) 0   OT Mode of treatment - Group (minutes) 0   OT Mode of treatment - Co-treat (minutes) 0   OT Mode of Teatment - Total time(minutes) 30 minutes   Therapy Time missed   Time missed?  No   Elvie Ang, [address]

## 2018-11-04 NOTE — PROGRESS NOTES
11/04/18 1155   Pain Assessment   Pain Assessment No/denies pain   Pain Score No Pain   Restrictions/Precautions   Precautions Aspiration; Fall Risk   Swallow Information   Current Risks for Dysphagia & Aspiration Dysarthria;General debilitation;New Neuro event;Brain injury   Current Symptoms/Concerns Difficulty chewing;Pockets food   Current Diet Dysphagia advance; Thin liquid   Baseline Diet Regular; Thin liquids   Consistencies Assessed and Performance   Materials Admnistered Soft/Level 3;Regular/Solid; Thin liquid   Oral Stage WFL; Mild impaired   Phargngeal Stage WFL; Mild impaired   Swallow Mechanics WFL; Mild delayed;Swallow initation;Good Larygneal rise;Aspiration risk   Esophageal Concerns No s/s reported   Recommendations   Risk for Aspiration Mild   Diet Solid Recommendation Regular consistency   Diet Liquid Recommendation Thin liquid   Recommended Form of Meds Whole; With thin liquid; As desired; As tolerated   General Precautions Aspiration precautions; Feed only when alert;Minimize distractions;Upright as possible for all oral intake;Remain upright for 45 mins after meals  (OOB for meals, minimize distractions)   Compensatory Swallowing Strategies Place food/straw in on right side; Alternate solids and liquids; External pacing; Check for pocketing of food on the left;Cue for lingual sweep   Results Reviewed with RN;PT/Family/Caregiver   QI: Eating   Assistance Needed Set-up / clean-up   Assistance Provided by [place] No physical assistance   Eating CARE Score 5   Swallow Assessment   Swallow Treatment Assessment Pt seen during lunch w/ regular diet trial tray consisting of a cheeseburger w/ lettuce/tomato, tossed salad, and tomato soup where he consumed 100% of meal and ~120cc thin liquids by straw sips  Following assist w/ tray set up, pt demonstrated ability to self feed w/ appropriate pacing and bite sizes   Mastication of solids appeared functional and complete w/ timely transfers, however, pt continues w/ mild L sided pocketing  Pt able to independently clear pocketing given increased time by using linguals sweeps, liquid wash and finger sweeps  Swallows appeared overall timely, intermittently observing a slower swallow w/ regular solids  Coughing noted x1 when pt was talking while eating, however, after review of refraining from speech while eating, pt carried over strategy w/ remainder of meal  No further overt s/sx aspiration observed w/ meal  Pt demonstrating overall improvement in oral and pharyngeal swallow function  Will recommend upgrade to regular diet and thin liquids  Pt will benefit from brief skilled ST f/u to monitor and assure tolerance of recent diet upgrade, as well as to further educate and assure carryover of safe swallow strategies  Swallow Assessment Prognosis   Prognosis Good   Prognosis Considerations Medical diagnosis; Medical prognosis; Severity of impairments   SLP Therapy Minutes   SLP Time In 1155   SLP Time Out 1225   SLP Total Time (minutes) 30   SLP Mode of treatment - Individual (minutes) 30   SLP Mode of treatment - Concurrent (minutes) 0   SLP Mode of treatment - Group (minutes) 0   SLP Mode of treatment - Co-treat (minutes) 0   SLP Mode of Teatment - Total time(minutes) 30 minutes   Therapy Time missed   Time missed?  No   Daily FIM Score   Eating (FIM) 5 - Patient needs help to open contianers or set up tray

## 2018-11-04 NOTE — PROGRESS NOTES
Internal Medicine Progress Note  Patient: Kian Dubon  Age/sex: 58 y o  male  Medical Record #: [id number]      ASSESSMENT/PLAN:  Kian Dubon is seen and examined and management for following issues:    Multiple embolic R MCA CVA:  Cont ASA/Plavix for 90 days then Aspirin and statin therapy only; EP did JAYE/LOOP implant 10/31/18  LOOP recorder implant 10/30: Will continue to watch site      HTN:  Off all meds while in acute; was previously on ARB/amlodipine/hctz;  added back Losartan 50mg daily on 10/25/18  No other changes today        DM II:  Diet controlled; recent HbA1C 6 6; pt aware and avoids excess sugar; cont sliding scale and DM diet = stopped Accuchecks/coverage since BSs had been ok      Tobacco abuse:  Cont patch; recommend cessation     Anxiety:  Cont Fluoxetine 20mg daily; neuropsychiatry to see     Left ankle and left 2nd toe pain:  Xray of foot and ankle were negative, likely gout; pt was taking Indocin 50mg 3x daily prn as outpt;  Colchicine 1 2 mg x 1, 0 6mg x 1 and a steroid taper over 4 days --> resolved but this AM had some mild pain = will watch and if reoccurs then restart Prednisone taper and not use Indocin  Exam reveals to redness/swelling tenderness of left ankle today  Thrush:  Nystatin ordered    Hypokalemia:  Kdur 40 meq x 1 give 11/1      Subjective: Patient seen and examined  He denies any complaints      ROS:   GI: denies abdominal pain, change bowel habits or reflux symptoms  Neuro: No new neurologic changes  Respiratory: No Cough, SOB  Cardiovascular: No CP, palpitations     Scheduled Meds:    Current Facility-Administered Medications:  acetaminophen 650 mg Oral Q6H PRN Dalia Coon MD   aspirin 81 mg Oral Daily Dalia Coon MD   atorvastatin 80 mg Oral QPM Dalia Coon MD   clopidogrel 75 mg Oral Daily PATEL Gautam   docusate sodium 100 mg Oral BID Bernarda Yao PA-C   enoxaparin 40 mg Subcutaneous Q24H [facility] Dalia Coon MD   FLUoxetine 20 mg Oral Daily PATEL Betancourt   losartan 50 mg Oral Daily PATEL Betancourt   melatonin 6 mg Oral HS Maritza Pryor MD   nicotine 14 mg Transdermal Daily Maritza Kumari MD   ondansetron 4 mg Intravenous Q6H PRN Maritza Kumari MD   oxyCODONE 2 5 mg Oral Q4H PRN Maritza Kumari MD   pneumococcal 23-valent polysaccharide vaccine 0 5 mL Subcutaneous Prior to discharge Maritza Kumari MD   polyethylene glycol 17 g Oral Daily PRN Bernarda Yao PA-C   senna 1 tablet Oral HS Bernarda Yao PA-C       Labs:       Results from last 7 days  Lab Units 11/01/18  0611 10/29/18  1509   WBC Thousand/uL 7 75 8 59   HEMOGLOBIN g/dL 13 6 13 4   HEMATOCRIT % 40 8 40 2   PLATELETS Thousands/uL 254 267       Results from last 7 days  Lab Units 11/01/18  0611 10/29/18  0610   POTASSIUM mmol/L 3 4* 3 6   CHLORIDE mmol/L 107 107   CO2 mmol/L 25 26   BUN mg/dL 14 21   CREATININE mg/dL 0 87 0 84   CALCIUM mg/dL 8 6 8 8                  Glucose, i-STAT (mg/dl)   Date Value   10/20/2018 132       Labs reviewed    Physical Examination:  Vitals:   Vitals:    11/03/18 1343 11/03/18 2024 11/03/18 2235 11/04/18 0645   BP: 149/70 141/77  131/67   BP Location: Right arm Right arm  Right arm   Pulse: 59 (!) 51 58 (!) 52   Resp: 20 18 18   Temp: 98 7 °F (37 1 °C) 98 4 °F (36 9 °C)  98 °F (36 7 °C)   TempSrc: Oral Oral  Oral   SpO2: 98% 97%  97%   Weight:       Height:         Constitutional:  NAD; pleasant; nontoxic  HEENT:  AT/NC; oropharynx + for thrush on tongue   Neck: negative for JVD  CV:  +S1, S2;  RRR; no rub/murmur; LOOP incision is w/o erythema/drainage  Pulmonary:  BBS without crackles/wheeze/rhonci; resp are unlabored  Abdominal:  soft, +BS, ND/NT; no mass  Skin:  no rashes  Musculoskeletal:  No edema  :  no nichols   Neurological/Psych:  AAO;   Can weakly move left fingers = can lift at shoulder but not flex at elbow;  LLE HF 4/5 with DF/PF 4-/5; + left facial droop and tongue deviation; speech is clear; no depression/anxiety        [ X ] Total time spent: 30 Mins and greater than 50% of this time was spent counseling/coordinating care  ** Please Note: Dragon 360 Dictation voice to text software may have been used in the creation of this document   **

## 2018-11-04 NOTE — PROGRESS NOTES
11/04/18 1400   Pain Assessment   Pain Assessment No/denies pain   Restrictions/Precautions   Precautions Fall Risk   Braces or Orthoses Sling  ([place] sling for mobility L UE)   Cognition   Overall Cognitive Status WFL   Subjective   Subjective Slept better last night   QI: Sit to Stand   Assistance Needed Supervision   Sit to Stand CARE Score 4   QI: Chair/Bed-to-Chair Transfer   Assistance Needed Supervision; Incidental touching   Chair/Bed-to-Chair Transfer CARE Score 4   Transfer Bed/Chair/Wheelchair   Stand Pivot Contact Guard   Sit to Stand Supervision   Stand to Sit Supervision   Bed, Chair, Wheelchair Transfer (FIM) 4 - Patient requires steadying assist or light touching   QI: Walk 10 Feet   Assistance Needed Physical assistance; Adaptive equipment;Verbal cues   Assistance Provided by [place] 25%-49%   Walk 10 Feet CARE Score 3   QI: Walk 50 Feet with Two Turns   Assistance Needed Physical assistance;Verbal cues; Adaptive equipment   Assistance Provided by [place] 25%-49%   Walk 50 Feet with Two Turns CARE Score 3   QI: Walk 150 Feet   Assistance Needed Physical assistance;Verbal cues; Adaptive equipment   Assistance Provided by [place] 25%-49%   Walk 150 Feet CARE Score 3   QI: Walking 10 Feet on Uneven Surfaces   Reason if not Attempted Safety concerns   Walking 10 Feet on Uneven Surfaces CARE Score 88   Ambulation   Does the patient walk? 2  Yes   Primary Discharge Mode of Locomotion Walk   Walk Assist Level Minimum Assist;Moderate Assist   Gait Pattern L hemiparesis; Improper weight shift;L knee hyperextension;Trendelenburg   Assist Device Cane   Distance Walked (feet) 150 ft  (x2, 325 x 1)   Limitations Noted In Strength; Sequencing;Midline Orientation; Heel Strike; Endurance; Coordination;Balance   Findings Able to ambulate with improved left knee control today, NO MAFO requred  Pt did require A with wt shift as he fatigued      Walking (FIM) 4 - Patient requires steadying assist or light touching AND distance 150 feet or more, no rest   Therapeutic Interventions   Neuromuscular Re-Education using  steps pt performed hip flex to top step, worked on B Le as stance leg  Pt with much improved midline control and wt shift throughout  L LE abduction onto 6" step to work on coord, strength and wt shift  Pt demo good carryover then to ambulation  Assessment   Treatment Assessment Pt seen for 30 min session agian to work on L LE coordniation, control and gait  All activity and wlaking performed w/o MAFO and pt demo impropved awareness and control of left knee, intermittent hyperextension only  PT Therapy Minutes   PT Time In 1400   PT Time Out 1430   PT Total Time (minutes) 30   PT Mode of treatment - Individual (minutes) 30   PT Mode of treatment - Concurrent (minutes) 0   PT Mode of treatment - Group (minutes) 0   PT Mode of treatment - Co-treat (minutes) 0   PT Mode of Teatment - Total time(minutes) 30 minutes   Therapy Time missed   Time missed?  No

## 2018-11-05 LAB
ANION GAP SERPL CALCULATED.3IONS-SCNC: 4 MMOL/L (ref 4–13)
BASOPHILS # BLD AUTO: 0.04 THOUSANDS/ΜL (ref 0–0.1)
BASOPHILS NFR BLD AUTO: 0 % (ref 0–1)
BUN SERPL-MCNC: 11 MG/DL (ref 5–25)
CALCIUM SERPL-MCNC: 9 MG/DL (ref 8.3–10.1)
CHLORIDE SERPL-SCNC: 105 MMOL/L (ref 100–108)
CO2 SERPL-SCNC: 28 MMOL/L (ref 21–32)
CREAT SERPL-MCNC: 1.03 MG/DL (ref 0.6–1.3)
EOSINOPHIL # BLD AUTO: 0.16 THOUSAND/ΜL (ref 0–0.61)
EOSINOPHIL NFR BLD AUTO: 2 % (ref 0–6)
ERYTHROCYTE [DISTWIDTH] IN BLOOD BY AUTOMATED COUNT: 14.4 % (ref 11.6–15.1)
GFR SERPL CREATININE-BSD FRML MDRD: 76 ML/MIN/1.73SQ M
GLUCOSE SERPL-MCNC: 91 MG/DL (ref 65–140)
HCT VFR BLD AUTO: 41.9 % (ref 36.5–49.3)
HGB BLD-MCNC: 13.9 G/DL (ref 12–17)
IMM GRANULOCYTES # BLD AUTO: 0.03 THOUSAND/UL (ref 0–0.2)
IMM GRANULOCYTES NFR BLD AUTO: 0 % (ref 0–2)
LYMPHOCYTES # BLD AUTO: 1.88 THOUSANDS/ΜL (ref 0.6–4.47)
LYMPHOCYTES NFR BLD AUTO: 21 % (ref 14–44)
MCH RBC QN AUTO: 29.8 PG (ref 26.8–34.3)
MCHC RBC AUTO-ENTMCNC: 33.2 G/DL (ref 31.4–37.4)
MCV RBC AUTO: 90 FL (ref 82–98)
MONOCYTES # BLD AUTO: 0.73 THOUSAND/ΜL (ref 0.17–1.22)
MONOCYTES NFR BLD AUTO: 8 % (ref 4–12)
NEUTROPHILS # BLD AUTO: 6.27 THOUSANDS/ΜL (ref 1.85–7.62)
NEUTS SEG NFR BLD AUTO: 69 % (ref 43–75)
NRBC BLD AUTO-RTO: 0 /100 WBCS
PLATELET # BLD AUTO: 268 THOUSANDS/UL (ref 149–390)
PMV BLD AUTO: 10.3 FL (ref 8.9–12.7)
POTASSIUM SERPL-SCNC: 3.7 MMOL/L (ref 3.5–5.3)
RBC # BLD AUTO: 4.66 MILLION/UL (ref 3.88–5.62)
SODIUM SERPL-SCNC: 137 MMOL/L (ref 136–145)
WBC # BLD AUTO: 9.11 THOUSAND/UL (ref 4.31–10.16)

## 2018-11-05 PROCEDURE — 92526 ORAL FUNCTION THERAPY: CPT

## 2018-11-05 PROCEDURE — 99232 SBSQ HOSP IP/OBS MODERATE 35: CPT | Performed by: PHYSICAL MEDICINE & REHABILITATION

## 2018-11-05 PROCEDURE — 97535 SELF CARE MNGMENT TRAINING: CPT

## 2018-11-05 PROCEDURE — 85025 COMPLETE CBC W/AUTO DIFF WBC: CPT | Performed by: NURSE PRACTITIONER

## 2018-11-05 PROCEDURE — 97116 GAIT TRAINING THERAPY: CPT

## 2018-11-05 PROCEDURE — 97112 NEUROMUSCULAR REEDUCATION: CPT

## 2018-11-05 PROCEDURE — 80048 BASIC METABOLIC PNL TOTAL CA: CPT | Performed by: NURSE PRACTITIONER

## 2018-11-05 RX ADMIN — ATORVASTATIN CALCIUM 80 MG: 80 TABLET, FILM COATED ORAL at 17:54

## 2018-11-05 RX ADMIN — NICOTINE 14 MG: 14 PATCH, EXTENDED RELEASE TRANSDERMAL at 09:15

## 2018-11-05 RX ADMIN — DOCUSATE SODIUM 100 MG: 100 CAPSULE, LIQUID FILLED ORAL at 17:54

## 2018-11-05 RX ADMIN — ENOXAPARIN SODIUM 40 MG: 40 INJECTION SUBCUTANEOUS at 09:15

## 2018-11-05 RX ADMIN — FLUOXETINE 20 MG: 20 CAPSULE ORAL at 09:15

## 2018-11-05 RX ADMIN — ASPIRIN 81 MG: 81 TABLET, COATED ORAL at 09:15

## 2018-11-05 RX ADMIN — LOSARTAN POTASSIUM 50 MG: 50 TABLET, FILM COATED ORAL at 09:14

## 2018-11-05 RX ADMIN — MELATONIN 6 MG: at 20:26

## 2018-11-05 RX ADMIN — CLOPIDOGREL 75 MG: 75 TABLET, FILM COATED ORAL at 09:15

## 2018-11-05 NOTE — PROGRESS NOTES
11/05/18 1430   Pain Assessment   Pain Assessment No/denies pain   Restrictions/Precautions   Precautions Fall Risk;Supervision on toilet/commode   Cognition   Arousal/Participation Cooperative   Subjective   Subjective pt reported feleing ready for therapy    QI: Sit to Stand   Assistance Needed Incidental touching; Adaptive equipment   Sit to Stand CARE Score 4   QI: Chair/Bed-to-Chair Transfer   Assistance Needed Physical assistance; Adaptive equipment   Assistance Provided by [place] Less than 25%   Chair/Bed-to-Chair Transfer CARE Score 3   Transfer Bed/Chair/Wheelchair   Limitations Noted In Balance; Endurance;LE Strength;UE Strength   Adaptive Equipment None   Stand Pivot Minimal Assist   Sit to Stand Contact Guard   Stand to [facility], Chair, Wheelchair Transfer (FIM) 4 - Patient completes 75% of all tasks   QI: Walk 10 Feet   Assistance Needed Physical assistance; Adaptive equipment   Assistance Provided by [place] Less than 25%   Walk 10 Feet CARE Score 3   QI: Walk 50 Feet with Two Turns   Assistance Needed Physical assistance; Adaptive equipment   Assistance Provided by [place] Less than 25%   Walk 50 Feet with Two Turns CARE Score 3   QI: Walk 150 Feet   Assistance Needed Physical assistance   Assistance Provided by [place] Less than 25%   Walk 150 Feet CARE Score 3   Ambulation   Does the patient walk? 2  Yes   Primary Discharge Mode of Locomotion Walk   Walk Assist Level Minimum Assist   Gait Pattern Step through; Improper weight shift;L knee hyperextension;Decreased foot clearance;L hemiparesis   Assist Device Cane;Hand Hold   Distance Walked (feet) 380 ft  (x3)   Limitations Noted In Heel Strike;Balance   Findings x2 with HHA RUE and x2 with SPC RUE and gait belt   Darin for all 4, 2nd person when HHA but Darin x1 with SPC   Walking (FIM) 4 - Patient requires steadying assist or light touching AND distance 150 feet or more, no rest   Equipment Use   NuStep 24 min end of session BLE Only level 4 Assessment   Treatment Assessment Pt cont to make gains in functional ambulation  Pt demonstrates continued progress with activity tolerance and safety with walking  Cont to use gait belt for safety due to dec righting reactions, however pt is also progressing in this area  Pt will cont to benefit from skilled PT to further progress safety and I and continue with stair and gait training  PT Barriers   Physical Impairment Decreased strength;Decreased range of motion;Decreased endurance; Impaired balance;Decreased mobility   Functional Limitation Car transfers;Stair negotiation;Standing;Transfers; Walking   Plan   Treatment/Interventions Functional transfer training;Elevations;LE strengthening/ROM; Therapeutic exercise; Endurance training;Patient/family training;Equipment eval/education; Bed mobility;Gait training   Progress Progressing toward goals   Recommendation   Recommendation Outpatient PT   PT Therapy Minutes   PT Time In 1430   PT Time Out 1530   PT Total Time (minutes) 60   PT Mode of treatment - Individual (minutes) 60   PT Mode of treatment - Concurrent (minutes) 0   PT Mode of treatment - Group (minutes) 0   PT Mode of treatment - Co-treat (minutes) 0   PT Mode of Teatment - Total time(minutes) 60 minutes   Therapy Time missed   Time missed?  No

## 2018-11-05 NOTE — PROGRESS NOTES
11/05/18 0950   Pain Assessment   Pain Assessment No/denies pain   Restrictions/Precautions   Precautions Fall Risk;Supervision on toilet/commode   Cognition   Arousal/Participation Cooperative   Subjective   Subjective pt reported feelnig well and ready for therapy    QI: Roll Left and Right   Assistance Needed Supervision   Roll Left and Right CARE Score 4   QI: Sit to Lying   Assistance Needed Supervision   Sit to Lying CARE Score 4   QI: Lying to Sitting on Side of Bed   Assistance Needed Supervision   Lying to Sitting on Side of Bed CARE Score 4   QI: Sit to Stand   Assistance Needed Incidental touching   Sit to Stand CARE Score 4   QI: Chair/Bed-to-Chair Transfer   Assistance Needed Physical assistance   Assistance Provided by [place] Less than 25%   Chair/Bed-to-Chair Transfer CARE Score 3   Transfer Bed/Chair/Wheelchair   Limitations Noted In Balance; Endurance;LE Strength;UE Strength   Adaptive Equipment None   Stand Pivot Minimal Assist   Sit to Stand Contact Guard   Stand to Sit Contact Guard   Supine to Sit Supervision   Sit to Supine Supervision   Bed, Chair, Wheelchair Transfer (FIM) 4 - Patient completes 75% of all tasks   QI: Walk 10 Feet   Assistance Needed Adaptive equipment;Physical assistance   Assistance Provided by [place] Less than 25%   Walk 10 Feet CARE Score 3   QI: Walk 50 Feet with Two Turns   Assistance Needed Physical assistance   Assistance Provided by [place] Less than 25%   Comment 2nd person for safety    Walk 50 Feet with Two Turns CARE Score 3   QI: Walk 150 Feet   Assistance Needed Physical assistance   Assistance Provided by [place] Less than 25%   Comment 2nd person for safety    Walk 150 Feet CARE Score 3   QI: Walking 10 Feet on Uneven Surfaces   Assistance Needed Physical assistance; Adaptive equipment   Assistance Provided by [place] Total assistance   Comment 2nd person for safety    Walking 10 Feet on Uneven Surfaces CARE Score 1   Ambulation   Does the patient walk? 2   Yes Primary Discharge Mode of Locomotion Walk   Walk Assist Level Minimum Assist   Gait Pattern Inconsistant [place]; Slow [place];Decreased foot clearance; Step through; Improper weight shift   Assist Device Hand Hold;Cane   Distance Walked (feet) 350 ft  (x4)   Limitations Noted In Balance; Endurance; Heel Strike;Swing;Strength;Speed   Findings session foucsed on step through walking outside of BWSS, practiced with SPC RUE and HHA RUE x2 each, noted dec L foot clearance when fatigued  also incorporated 4 weighted dowels for pt to walk over, and 2nd person for safety  went both directions around the loop to practice turns to the R and L  overall Darin, CGA at times, gait belt, 2nd person for safety    Walking (FIM) 1 - Patient requires assist of two people   QI: 4 Steps   Assistance Needed Physical assistance   Assistance Provided by [place] Total assistance   Comment 2nd person for safety    4 Steps CARE Score 1   QI: 12 Steps   Assistance Needed Physical assistance; Adaptive equipment   Assistance Provided by [place] Total assistance   Comment 2nd person for safety    12 Steps CARE Score 1   Stairs   Type Stairs   # of Steps 20   Weight Bearing Precautions Fall Risk   Assist Devices Single Rail   Findings CGA-Darin x1 with 2nd person for safety  x4 in FF, reciprocal up and down no LOB  Last time up proivded VC to note catch the lip of the stairs and pt able to do so 100% of the time    Therapeutic Interventions   Neuromuscular Re-Education practiced repeated scooting up and down side of mat with A to position L hand to facilitate WBing into LUE  repeated supine<>sit with HOB positioned on both ends to practice rolling and movement of body with awareness of LUE and use of LUE as able based upon positioning  repeated rolling both directions with legs bent and then wtih legs straight to promote core strengthening and when rolling to R side, AAROM for D2 PNF pattern LUE to facilitate LUE use and core strength   repeated sit<>stands from low high lo mat with RUE on 2" then 4" block with both arms extended in front to facilitate use of abs and AAROM LUE as needed tos upport arm and shoulder, blocking behind L knee to prevent hyperextension  stair practice: repeated reciprocal pattern on stairs to promote strength of LLE in stance phase without and then with 5lb ankle weight RLE  when RLE in stance phase and LLE lifting up and down without ankle weight, no RUE on arm rest as long as able to, and then RUE on arm rest with 5lb ankle weight LLE, with therapist leg on top landing to provide external support for pt to not swing leg out to the side  Assessment   Treatment Assessment Pt cont to make gains towards LTGs  Pt was able to perform session wtih improvement in activity tolerance and ambulatory balance outside of the BWSS  Session focused on variable and random gait training practice, including stair training and practice  Cont to work on other dynamic exercises to strengthen L side, promote use of LUE and improve strength of L core to improve balance with walking and stairs  Barriers to Discharge Inaccessible home environment;Decreased caregiver support   PT Barriers   Physical Impairment Decreased strength;Decreased range of motion;Decreased endurance; Impaired balance;Decreased mobility   Functional Limitation Car transfers;Stair negotiation;Standing;Transfers; Walking   Plan   Treatment/Interventions Functional transfer training;LE strengthening/ROM; Elevations; Therapeutic exercise; Endurance training;Patient/family training;Equipment eval/education;Gait training;Bed mobility   Progress Progressing toward goals   Recommendation   Recommendation Outpatient PT; Home with family support   PT Therapy Minutes   PT Time In 36   PT Time Out 1100   PT Total Time (minutes) 70   PT Mode of treatment - Individual (minutes) 70   PT Mode of treatment - Concurrent (minutes) 0   PT Mode of treatment - Group (minutes) 0   PT Mode of treatment - Co-treat (minutes) 0   PT Mode of Teatment - Total time(minutes) 70 minutes   Therapy Time missed   Time missed?  No

## 2018-11-05 NOTE — PROGRESS NOTES
11/05/18 0700   Pain Assessment   Pain Assessment No/denies pain   Pain Score No Pain   Restrictions/Precautions   Precautions Fall Risk   Weight Bearing Restrictions No   ROM Restrictions No   Eating Assessment   Eating (FIM) 5 - Patient needs help to open contianers or set up tray   QI: [address] Provided by [place] No physical assistance   Oral Hygiene CARE Score 4   Grooming   Able To Initiate Tasks; Acquire Items; Wash/Dry Hands;Brush/Clean Teeth;Wash/Dry Face;Comb/Brush Hair   Limitation Noted In Strength; Coordination   Grooming (FIM) 5 - Patient requires supervision/monitoring   QI: Shower/Bathe Self   Assistance Needed Physical assistance   Assistance Provided by Emery Less than 25%   Shower/Bathe Self CARE Score 3   Bathing   Assessed Bath Style Shower   Anticipated D/C Bath Style Shower   Able to [place] Yes   Able to [employer] Temperature Yes   Able to Wash/Rinse/Dry (body part) Left Arm;L Upper Leg;R Upper Leg;L Lower Leg/Foot;R Lower Leg/Foot;Chest;Abdomen;Perineal Area; Buttocks   Limitations Noted in Balance; Coordination; Endurance; Safety;ROM;Strength   Positioning Seated;Standing   Adaptive Equipment Hand Held Shower; Shower Bars;Tub Bench   Findings  Pt 6 days post having loop recorder placed, per nursing pt ok to shower this date  Pt continues to present with LUE weakness and requires A to bathe RUE at this time  Bathing (FIM) 4 - Patient completes 8/10 or 9/10 parts   Tub/Shower Transfer   Limitations Noted In Balance; Endurance; Coordination;UE Strength;LE Strength;ROM   Adaptive Equipment Transfer Bench;Grab Bars   Assessed Shower   Findings Sit pivot from w/c <> tub bench with use of UE support on grab bar      Shower Transfer (FIM) 4 - Patient requires steadying assist or light touching   QI: Upper Body Dressing   Assistance Needed Supervision   Assistance Provided by Emery No physical assistance   Comment Steve GARCIA carryover of antoine dressing techniques  Upper Body Dressing CARE Score 4   QI: Lower Body Dressing   Assistance Needed Physical assistance   Assistance Provided by [employer] Less than 25%   Comment Pt requires A to manage clothing over hip on L side  Lower Body Dressing CARE Score 3   QI: Putting On/Taking Off Footwear   Assistance Needed Physical assistance   Assistance Provided by [employer] Less than 25%   Comment Pt is able to don/doff footwear with overall Jemima for managing laces  Pt educated on elastic shoe laces and demos ability to simulate donning/doffing slip ons with overall supervision  Putting On/Taking Off Footwear CARE Score 3   Dressing/Undressing Clothing   Remove UB Clothes Pullover Shirt   Remove LB Clothes Shorts;Socks   Don UB Clothes Pullover Shirt   Don LB Clothes Socks; Shoes; Shorts   Limitations Noted In Balance; Endurance; Safety;ROM;Strength   Positioning Supported Sit;Standing   Findings Pt requires A to complete CM over hips  UB Dressing (FIM) 5 - Patient requires supervision/monitoring   LB Dressing (FIM) 3 - Patient completes  50-74% of all tasks   QI: Sit to Stand   Assistance Needed Incidental touching   Assistance Provided by [employer] No physical assistance   Sit to Stand CARE Score 4   QI: Chair/Bed-to-Chair Transfer   Assistance Needed Physical assistance   Assistance Provided by [employer] Less than 25%   Chair/Bed-to-Chair Transfer CARE Score 3   Transfer Bed/Chair/Wheelchair   Bed, Chair, Wheelchair Transfer (FIM) 4 - Patient requires steadying assist or light touching   Neuromuscular Education   Functional Movement Patterns Pt participated in [facility] 20 assisted movement therapy in order to promote proximal shoulder strength  Pt positioned on chair with trunk and shoulder straps in place to reduce compensation  Pt utilizes arm trough for LUE support  Pt tolerates 20 reps x 1 on guided mode and then an additional 20 reps x 3 on initiated mode   Pt continues to demo increased ability to complete shoulder protraction but requires increased time and effort for shoulder retraction  Pt provided with VC's to breathe and to initiate rest breaks appropriately  Pt overall tolerates session well with increased time and rest breaks to manage fatigue  Cognition   Overall Cognitive Status WFL   Arousal/Participation Alert; Cooperative   Attention Within functional limits   Orientation Level Oriented X4   Memory Within functional limits   Following Commands Follows multistep commands with increased time or repetition   Activity Tolerance   Activity Tolerance Patient tolerated treatment well   Assessment   Treatment Assessment Pt participated in skilled OT services with focus on LUE neuro re-ed, ADL retraining, and functional transfers  Pt continues to make progress with functional transfers requiring only Jemima for SPTs this session  Pt continues to require Jemima for UB bathing 2* to LUE weakness  Pt also requires A for CM over hips while in stance  Pt continues to be motivated to meet goals  Pt will continue to benefit from skilled OT services with focus on LUE neuro re-ed, functional transfers, and endurance  Prognosis Good   Problem List Decreased strength;Decreased range of motion;Decreased endurance; Impaired balance;Decreased mobility; Decreased coordination; Impaired tone   Plan   Treatment/Interventions ADL retraining;Functional transfer training; Therapeutic exercise; Endurance training;Cognitive reorientation;Equipment eval/education; Bed mobility; Compensatory technique education   Progress Progressing toward goals   Recommendation   OT Discharge Recommendation Home with family support   OT Therapy Minutes   OT Time In 0700   OT Time Out 0830   OT Total Time (minutes) 90   OT Mode of treatment - Individual (minutes) 90   OT Mode of treatment - Concurrent (minutes) 0   OT Mode of treatment - Group (minutes) 0   OT Mode of treatment - Co-treat (minutes) 0   OT Mode of Teatment - Total time(minutes) 90 minutes   Therapy Time missed   Time missed?  No

## 2018-11-05 NOTE — PROGRESS NOTES
Internal Medicine Progress Note  Patient: Susan Calles  Age/sex: 58 y o  male  Medical Record #: [id number]      ASSESSMENT/PLAN:  Susan Calles is seen and examined and management for following issues:    Multiple embolic R MCA CVA:  Cont ASA/Plavix for 90 days then Aspirin and statin therapy only; EP did JAYE/LOOP implant 10/31/18  LOOP recorder implant 10/30: Will continue to watch site  Suture to be due out 11/7/18      HTN:  Off all meds while in acute; was previously on ARB/amlodipine/hctz;  added back Losartan 50mg daily on 10/25/18  No other changes today        DM II:  Diet controlled; recent HbA1C 6 6; pt aware and avoids excess sugar; cont sliding scale and DM diet = stopped Accuchecks/coverage since BSs had been ok      Tobacco abuse:  Cont patch; recommend cessation     Anxiety:  Cont Fluoxetine 20mg daily; neuropsychiatry to see     Left ankle and left 2nd toe pain:  Xray of foot and ankle were negative, likely gout; pt was taking Indocin 50mg 3x daily prn as outpt;  Colchicine 1 2 mg x 1, 0 6mg x 1 and a steroid taper over 4 days     Thrush:  Nystatin completed      Subjective: Patient seen and examined  He denies any complaints      ROS:   GI: denies abdominal pain, change bowel habits or reflux symptoms  Neuro: No new neurologic changes  Respiratory: No Cough, SOB  Cardiovascular: No CP, palpitations     Scheduled Meds:    Current Facility-Administered Medications:  acetaminophen 650 mg Oral Q6H PRN Johny Stewart MD   aspirin 81 mg Oral Daily Johny Stewart MD   atorvastatin 80 mg Oral QPM Johny Stewart MD   clopidogrel 75 mg Oral Daily PATEL Gautam   docusate sodium 100 mg Oral BID Bernarda Yao PA-C   enoxaparin 40 mg Subcutaneous Q24H [address] Johny Stewart MD   FLUoxetine 20 mg Oral Daily PATEL Gautam   losartan 50 mg Oral Daily PATEL Gautam   melatonin 6 mg Oral HS Starla Hickman MD   nicotine 14 mg Transdermal Daily Johny Stewart MD   ondansetron 4 mg Intravenous Q6H PRN Jose Payan MD   oxyCODONE 2 5 mg Oral Q4H PRN Jose Payan MD   pneumococcal 23-valent polysaccharide vaccine 0 5 mL Subcutaneous Prior to discharge Jose Payan MD   polyethylene glycol 17 g Oral Daily PRN Bernarda Yao PA-C   senna 1 tablet Oral HS Bernarda Yao PA-C       Labs:       Results from last 7 days  Lab Units 11/05/18  0948 11/01/18  0611   WBC Thousand/uL 9 11 7 75   HEMOGLOBIN g/dL 13 9 13 6   HEMATOCRIT % 41 9 40 8   PLATELETS Thousands/uL 268 254       Results from last 7 days  Lab Units 11/05/18  0948 11/01/18  0611   POTASSIUM mmol/L 3 7 3 4*   CHLORIDE mmol/L 105 107   CO2 mmol/L 28 25   BUN mg/dL 11 14   CREATININE mg/dL 1 03 0 87   CALCIUM mg/dL 9 0 8 6                  Glucose, i-STAT (mg/dl)   Date Value   10/20/2018 132       Labs reviewed    Physical Examination:  Vitals:   Vitals:    11/04/18 0645 11/04/18 1300 11/04/18 2010 11/05/18 0708   BP: 131/67 108/59 131/63 139/66   BP Location: Right arm Right arm Right arm Right arm   Pulse: (!) 52 78 56 57   Resp: 18 18 18 18   Temp: 98 °F (36 7 °C) 97 9 °F (36 6 °C) 98 °F (36 7 °C) 97 9 °F (36 6 °C)   TempSrc: Oral Oral Oral Oral   SpO2: 97% 97% 98% 94%   Weight:       Height:         Constitutional:  NAD; pleasant; nontoxic  HEENT:  AT/NC; oropharynx + for thrush on tongue   Neck: negative for JVD  CV:  +S1, S2;  RRR; no rub/murmur; LOOP incision is w/o erythema/drainage  Pulmonary:  BBS without crackles/wheeze/rhonci; resp are unlabored  Abdominal:  soft, +BS, ND/NT; no mass  Skin:  no rashes  Musculoskeletal:  No edema  :  no nichols   Neurological/Psych:  AAO;  Very weak left hand grasp = can lift at shoulder but not flex at elbow;  LLE HF 4/5 with DF/PF 4-/5; + left facial droop and tongue deviation; speech is clear; no depression/anxiety        [ X ] Total time spent: 30 Mins and greater than 50% of this time was spent counseling/coordinating care      ** Please Note: Dragon 360 Dictation voice to text software may have been used in the creation of this document   **

## 2018-11-05 NOTE — PROGRESS NOTES
Physical Medicine and Rehabilitation Progress Note  Yaneth Varghese 58 y o  male MRN: [id number]  Unit/Bed#: -01 Encounter: [id number]    HPI: Patient is 57 yo male with right cerebral infarcts likely embolic in nature     Chief Complaint/Subjective: Patient denies any issues over the weekend     ROS:  negative unless noted above     Assessment/Plan:      Hypokalemia   Assessment & Plan    · Currently WNL      [facility]   Assessment & Plan    · Not on any meds at home  · On ISS as inpt, IM to determine if oral agent will need to be started     Gout   Assessment & Plan    · Left ankle  · Per patient was Indocin 50mg TID PTA however was not able to tolerate due to diarrhea and has not been taking for 2 months   · XR of left ankle and foot negative   · s/p colchicine and steroid taper per IM  · Resolved per patient   · Of note per patient this is only the 2nd gout flare patient has ever had     Hyperlipidemia   Assessment & Plan    · Home zocor 20 mg changed to lipitor 80 mg      Anxiety   Assessment & Plan    · No record of any benzodiazepines (or any other controlled medications) found in the [place] database going back 1 year  · Neuropsychology following       Hypertension   Assessment & Plan    · IM to clarify home meds as records are not clear ( olmesartan vs valsartan ?, HCTZ 25 mg vs 50 mg ? norvasc ?)  · Currently on cozaar per IM      Dysphagia   Assessment & Plan    · SLP following  · Modified diet per SLP recs      * Right sided cerebral hemisphere cerebrovascular accident (CVA) ([facility] )   Assessment & Plan    · On DAPT for 90 days, with aspirin/plavix, followed by monotherapy with ASA  · Possibly cardioembolic in etiology   Consulted EP for eval/placement of loop recorder however they recommend JAYE first which was negative for thrombus in MICHELLE however did reveal aortic atheroma, will d/w neuro (Dr Irma Arnold) if they feel the atheroma is the etiology of the stroke and did not feel that atheroma was likely severe enough to change from  to [facility] [place] even if this was the potential etiology so management recommendations per neuro was unchanged and patient had LOOP placed   · Fluoxetine 20mg post stroke for motor recovery         Scheduled Meds:    Current Facility-Administered Medications:  acetaminophen 650 mg Oral Q6H PRN Maritza Kuamri MD   aspirin 81 mg Oral Daily Maritza Kumari MD   atorvastatin 80 mg Oral QPM Maritza Kumari MD   clopidogrel 75 mg Oral Daily PATEL Gautam   docusate sodium 100 mg Oral BID Bernarda Yao PA-C   enoxaparin 40 mg Subcutaneous Q24H [address] Maritza Kumari MD   FLUoxetine 20 mg Oral Daily PATEL Gautam   losartan 50 mg Oral Daily PATEL Gautam   melatonin 6 mg Oral HS Maritza Pryor MD   nicotine 14 mg Transdermal Daily Maritza Kumari MD   ondansetron 4 mg Intravenous Q6H PRN Maritza Kumari MD   oxyCODONE 2 5 mg Oral Q4H PRN Maritza Kumari MD   pneumococcal 23-valent polysaccharide vaccine 0 5 mL Subcutaneous Prior to discharge Maritza Kumari MD   polyethylene glycol 17 g Oral Daily PRN Bernarda Yao PA-C   senna 1 tablet Oral HS Bernarda Yao PA-C        Incidental findings  1) thyroid nodules: per radiology report recs non-emergent OP thyroid U/S, OP FU with PCP to arrange U/S  2) subpleural bullae: OP FU with PCP with further testing/treatment and/or specialist referral at PCP's discretion     Note: patient noted to have petechial hemorrhage on imaging however has been cleared for DAPT and lovenox (for DVT ppx) by neuro  DVT ppx: SCDs, lovenox  Dispo: reteam     Objective:    Functional Update:  Mobility: amb mod x 2 (with chair follow)   Transfers: mod  ADLs: mod         Physical Exam:        General: alert, no apparent distress, cooperative and comfortable  HEENT:  Head: Normocephalic, no lesions, without obvious abnormality    CARDIAC:  +S1/2  LUNGS:  respirations unlabored  ABDOMEN:  soft   EXTREMITIES:  no signficant LE edema  NEURO:   awake, alert, appropriately answering questions, 2/5 LUE, +dysarthria (essentially resolved)  PSYCH:  mood/affect currently stable      Diagnostic Studies: reviewed, no new imaging  No orders to display       Laboratory:      Results from last 7 days  Lab Units 11/05/18  0948 11/01/18  0611   HEMOGLOBIN g/dL 13 9 13 6   HEMATOCRIT % 41 9 40 8   WBC Thousand/uL 9 11 7 75       Results from last 7 days  Lab Units 11/05/18  0948 11/01/18  0611   BUN mg/dL 11 14   POTASSIUM mmol/L 3 7 3 4*   CHLORIDE mmol/L 105 107   CREATININE mg/dL 1 03 0 87            Patient Active Problem List   Diagnosis    Right sided cerebral hemisphere cerebrovascular accident ([facility]A) [facility]    Dysphagia    Hypertension    Anxiety    Hyperlipidemia    Gout    [facility]    Hypokalemia           Total visit time:  At least 25 minutes, with more than 50% spent counseling/coordinating care

## 2018-11-05 NOTE — PROGRESS NOTES
11/05/18 1130   Pain Assessment   Pain Assessment No/denies pain   Pain Score No Pain   Restrictions/Precautions   Precautions Fall Risk;Supervision on toilet/commode   Swallow Information   Current Risks for Dysphagia & Aspiration Dysarthria;General debilitation;New Neuro event;Brain injury   Current Symptoms/Concerns Cough; Difficulty chewing;Pockets food   Current Diet Regular; Thin liquid   Baseline Diet Regular; Thin liquids   Consistencies Assessed and Performance   Materials Admnistered Soft/Level 3;Regular/Solid; Thin liquid   Oral Stage WFL; Mild impaired   Phargngeal Stage WFL   Swallow Mechanics WFL;Swallow initation; Appears prompt;Good Larygneal rise   Esophageal Concerns No s/s reported   Recommendations   Diet Solid Recommendation Regular consistency   Diet Liquid Recommendation Thin liquid   Recommended Form of Meds Whole; With thin liquid; As desired; As tolerated   General Precautions Aspiration precautions; Feed only when alert;Minimize distractions;Upright as possible for all oral intake;Remain upright for 45 mins after meals  (OOB for meals)   Compensatory Swallowing Strategies Place food/straw in on right side; Alternate solids and liquids; External pacing; Check for pocketing of food on the left;Cue for lingual sweep   Results Reviewed with RN;PT/Family/Caregiver   QI: [address] Provided by [place] No physical assistance   Eating CARE Score 6   Swallow Assessment   Swallow Treatment Assessment Pt seen for ongoing dysphagia tx where pt was recently upgraded to a regular diet and thin liquids  Pt consumed 100% of meal consisting of chicken marsala, brussel sprouts, baked potato and apple crumb, along w/ ~180cc thin liquids taken by straw sips  Pt was mod I for tray set up and demonstrated appropriate pacing and bite sizes during meal  Mastication of regular solids was [facility] [place] w/ improvement in bolus formation and a-p transfers   While pt continues to have mild L side pocketing throughout meal, pt is able to independently use strategies such as lingual sweeps, finger sweeps and liquid wash to clear residual  Transfers of thins also appeared timely  Overall, swallow initiation of solids and thin liquids appeared timely w/ hyolaryngeal rise judged to be [place] to palpation  No overt s/sx of aspiration noted w/ meal  Pt denied any difficulty or concerns  Pt is demonstrating overall functional swallow skills w/o increased oral or pharyngeal difficulties or overt s/sx of aspiration w/ baseline diet of regular and thin liquids  Based on safe achievement of baseline diet, further skilled ST intervention for dysphagia therapy is not warranted to continue at this time  Pt educated on current recs and in agreement  Please re-consult should new difficulties or concerns arise  Please continue to assist w/ tray set up as needed  SLP Therapy Minutes   SLP Time In 1130   SLP Time Out 1150   SLP Total Time (minutes) 20   SLP Mode of treatment - Individual (minutes) 0   SLP Mode of treatment - Concurrent (minutes) 20   SLP Mode of treatment - Group (minutes) 0   SLP Mode of treatment - Co-treat (minutes) 0   SLP Mode of Teatment - Total time(minutes) 20 minutes   Therapy Time missed   Time missed?  No   Daily FIM Score   Eating (FIM) 6 - Patient requires increased time or safety concern

## 2018-11-06 PROCEDURE — 97116 GAIT TRAINING THERAPY: CPT

## 2018-11-06 PROCEDURE — 97530 THERAPEUTIC ACTIVITIES: CPT

## 2018-11-06 PROCEDURE — 97112 NEUROMUSCULAR REEDUCATION: CPT

## 2018-11-06 PROCEDURE — 99232 SBSQ HOSP IP/OBS MODERATE 35: CPT | Performed by: PHYSICAL MEDICINE & REHABILITATION

## 2018-11-06 PROCEDURE — 97127 HB COGNITIVE SKILLS DEVELOPMENT, EACH 15 MUNUTES: CPT

## 2018-11-06 PROCEDURE — G0515 COGNITIVE SKILLS DEVELOPMENT: HCPCS

## 2018-11-06 PROCEDURE — 97150 GROUP THERAPEUTIC PROCEDURES: CPT

## 2018-11-06 RX ADMIN — LOSARTAN POTASSIUM 50 MG: 50 TABLET, FILM COATED ORAL at 08:18

## 2018-11-06 RX ADMIN — CLOPIDOGREL 75 MG: 75 TABLET, FILM COATED ORAL at 08:18

## 2018-11-06 RX ADMIN — ASPIRIN 81 MG: 81 TABLET, COATED ORAL at 08:18

## 2018-11-06 RX ADMIN — NICOTINE 14 MG: 14 PATCH, EXTENDED RELEASE TRANSDERMAL at 08:19

## 2018-11-06 RX ADMIN — FLUOXETINE 20 MG: 20 CAPSULE ORAL at 08:18

## 2018-11-06 RX ADMIN — MELATONIN 6 MG: at 21:12

## 2018-11-06 RX ADMIN — ATORVASTATIN CALCIUM 80 MG: 80 TABLET, FILM COATED ORAL at 17:47

## 2018-11-06 RX ADMIN — ENOXAPARIN SODIUM 40 MG: 40 INJECTION SUBCUTANEOUS at 08:19

## 2018-11-06 NOTE — PROGRESS NOTES
11/06/18 1001   Pain Assessment   Pain Assessment No/denies pain   Pain Score No Pain   Restrictions/Precautions   Precautions Fall Risk;Supervision on toilet/commode   Weight Bearing Restrictions No   ROM Restrictions No   QI: Sit to Stand   Assistance Needed Incidental touching   Assistance Provided by [place] No physical assistance   Sit to Stand CARE Score 4   QI: Chair/Bed-to-Chair Transfer   Assistance Needed Incidental touching   Assistance Provided by [place] Less than 25%   Comment SPT no AD  Chair/Bed-to-Chair Transfer CARE Score 3   Transfer Bed/Chair/Wheelchair   Positioning Concerns Hemiplegia   Limitations Noted In Balance; Coordination; Endurance;UE Strength;LE Strength   Adaptive Equipment None   Findings SPTs with overall Jemima for safe management of LUE  Bed, Chair, Wheelchair Transfer (FIM) 4 - [place] lifts one extremity during transfer   Neuromuscular Education   Functional Movement Patterns [facility] [place] [facility] in order to promote proximal shoulder strength  Pt positioned on chair with trunk and shoulder straps in place to reduce compensation  Pt utilizes arm trough for LUE support  Pt tolerates 20 reps x 1 on guided mode then progresses to 30 reps x 3 on initiated  Trialed forward reach 2D this session with 10 reps x 1 on guided then an additional 10 reps x 1 on initiated  Pt overall tolerates session well with rest breaks to manage fatigue  Pt reports "it's amazing that my arm is doing more "    Cognition   Overall Cognitive Status WFL   Arousal/Participation Cooperative   Attention Within functional limits   Orientation Level Oriented X4   Memory Within functional limits   Following Commands Follows multistep commands with increased time or repetition   Activity Tolerance   Activity Tolerance Patient tolerated treatment well   Assessment   Treatment Assessment Pt participated in skilled OT services with focus on functional transfers and LUE neuro re-ed   Pt continues to make progress with functional transfers requiring only Darin for SPTs  Pt continues to make progress with LUE proximal shoulder strength/ROM but continues to present with distal elbow/digit weakness  Pt continues to be very motivated to meet goals and states he'd like to get back to how he was PTA  Pt at baseline did not use any AD and was completely independent with all I/ADL tasks  Pt also reports having difficulty managing his laptop/cell phone since LUE antoine and states this is something he'd like to address as he uses both devices for work  Pt will continue to benefit from skilled OT services with focus on LUE neuro re-ed, functional mobility, and strength for increased safety and independence with I/ADL tasks  Prognosis Good   Problem List Decreased strength;Decreased range of motion;Decreased endurance; Impaired balance;Decreased mobility; Decreased coordination; Impaired tone   Plan   Treatment/Interventions ADL retraining;Functional transfer training; Therapeutic exercise; Endurance training;Equipment eval/education; Compensatory technique education; Bed mobility; Patient/family training   Progress Progressing toward goals   Recommendation   OT Discharge Recommendation Home with family support   OT Therapy Minutes   OT Time In 1000   OT Time Out 1130   OT Total Time (minutes) 90   OT Mode of treatment - Individual (minutes) 90   OT Mode of treatment - Concurrent (minutes) 0   OT Mode of treatment - Group (minutes) 0   OT Mode of treatment - Co-treat (minutes) 0   OT Mode of Teatment - Total time(minutes) 90 minutes   Therapy Time missed   Time missed?  No

## 2018-11-06 NOTE — PROGRESS NOTES
11/06/18 0900   Pain Assessment   Pain Assessment No/denies pain   Pain Score No Pain   Memory Skills   Memory (FIM) 6 - Recognizes with extra time   Social Interaction (FIM) 6 - Interacts appropriately with others BUT requires extra  time   Speech/Language/Cognition Assessmetn   Treatment Assessment Pt participated in skilled ST session w/ focus on higher level cognitive linguistic skills  Began session by discussing pt's current scheduled and PRN medications  When reviewing medications, pt demonstrated ability to accurately read and comprehend all instructions for meds, as well as state whether meds were new to his regimen or previously taken at home  When discussing when each med should be taken, pt able to state appropriate times of day to taken each med  Pt then engaged in a map reading task of a mock hospital map/directory  Although pt required mildly increased time, pt able to locate items to respond to questions w/ 10/10 accuracy  Targeting functional money management, pt presented w/ three written amts of different denominations of coins and was able to accurately determine the sums of coins in 6/10 trials  Pt at times demonstrated decreased working memory and ability to maintain attention during task, benefiting from overall min verbal cues to correctly calculate sums  When real coins were used, pt demonstrated ability to correctly add coins in 5/5 trials  Discussed pt's current progress towards current cognitive linguistic goals where pt feels that he is improving but does still at times have difficulty with processing and more abstract thinking  Pt desires to continue skilled ST services to achieve baseline cognition, stating that these skills will be required for return to work  Pt will benefit from skilled ST intervention 3-5x per week to maximize higher level cognitive linguistic skills at this time      SLP Therapy Minutes   SLP Time In 0900   SLP Time Out 0940   SLP Total Time (minutes) 40   SLP Mode of treatment - Individual (minutes) 40   SLP Mode of treatment - Concurrent (minutes) 0   SLP Mode of treatment - Group (minutes) 0   SLP Mode of treatment - Co-treat (minutes) 0   SLP Mode of Teatment - Total time(minutes) 40 minutes   Therapy Time missed   Time missed?  No   Daily FIM Score   Problem solving (FIM) 5 - Solves complex problems But requires cues from helper   Comprehension (FIM) 6 - Understands complex/abstract but requires more time   Expression (FIM) 6 - Expresses complex/abstract but requires:  more time

## 2018-11-06 NOTE — PROGRESS NOTES
Internal Medicine Progress Note  Patient: Shea Hanson  Age/sex: 58 y o  male  Medical Record #: [id number]      ASSESSMENT/PLAN:  Shea Hanson is seen and examined and management for following issues:    Multiple embolic R MCA CVA:  Cont ASA/Plavix for 90 days then Aspirin and statin therapy only; EP did JAYE/LOOP implant 10/31/18  LOOP recorder implant 10/30: Will continue to watch site  Suture to be due out 11/7/18      HTN:  Off all meds while in acute; was previously on ARB/amlodipine/hctz;  added back Losartan 50mg daily on 10/25/18  No other changes today        DM II:  Diet controlled; recent HbA1C 6 6; pt aware and avoids excess sugar; cont sliding scale and DM diet = stopped Accuchecks/coverage since BSs had been ok      Tobacco abuse:  Cont patch; recommend cessation     Anxiety:  Cont Fluoxetine 20mg daily; neuropsychiatry to see     Left ankle and left 2nd toe pain:  Xray of foot and ankle were negative, likely gout; pt was taking Indocin 50mg 3x daily prn as outpt;  Colchicine 1 2 mg x 1, 0 6mg x 1 and a steroid taper over 4 days     Thrush:  Nystatin completed      Subjective: Patient seen and examined  He denies any complaints      ROS:   GI: denies abdominal pain, change bowel habits or reflux symptoms  Neuro: No new neurologic changes  Respiratory: No Cough, SOB  Cardiovascular: No CP, palpitations     Scheduled Meds:    Current Facility-Administered Medications:  acetaminophen 650 mg Oral Q6H PRN Melodie Myers MD   aspirin 81 mg Oral Daily Melodie Myers MD   atorvastatin 80 mg Oral QPM Melodie Myers MD   clopidogrel 75 mg Oral Daily PATEL Gautam   docusate sodium 100 mg Oral BID Bernarda Yao PA-C   enoxaparin 40 mg Subcutaneous Q24H [address] Melodie Myers MD   FLUoxetine 20 mg Oral Daily PATEL Gautam   losartan 50 mg Oral Daily PATEL Gautam   melatonin 6 mg Oral HS Sylvester Aschoff, MD   nicotine 14 mg Transdermal Daily Melodie Myers MD   ondansetron 4 mg Intravenous Q6H PRN Justine Tinoco MD   oxyCODONE 2 5 mg Oral Q4H PRN Justine Tinoco MD   pneumococcal 23-valent polysaccharide vaccine 0 5 mL Subcutaneous Prior to discharge Justine Tinoco MD   polyethylene glycol 17 g Oral Daily PRN Bernarda Yao PA-C   senna 1 tablet Oral HS Bernarda Yao PA-C       Labs:       Results from last 7 days  Lab Units 11/05/18  0948 11/01/18  0611   WBC Thousand/uL 9 11 7 75   HEMOGLOBIN g/dL 13 9 13 6   HEMATOCRIT % 41 9 40 8   PLATELETS Thousands/uL 268 254       Results from last 7 days  Lab Units 11/05/18  0948 11/01/18  0611   POTASSIUM mmol/L 3 7 3 4*   CHLORIDE mmol/L 105 107   CO2 mmol/L 28 25   BUN mg/dL 11 14   CREATININE mg/dL 1 03 0 87   CALCIUM mg/dL 9 0 8 6                  Glucose, i-STAT (mg/dl)   Date Value   10/20/2018 132       Labs reviewed    Physical Examination:  Vitals:   Vitals:    11/05/18 0708 11/05/18 1322 11/05/18 2002 11/06/18 0514   BP: 139/66 154/79 134/64 138/61   BP Location: Right arm Right arm Right arm Right arm   Pulse: 57 66 55 (!) 48   Resp: 18 18 18 18   Temp: 97 9 °F (36 6 °C) 97 8 °F (36 6 °C) 98 °F (36 7 °C) 97 8 °F (36 6 °C)   TempSrc: Oral Oral Oral Oral   SpO2: 94% 96% 95% 96%   Weight:       Height:         Constitutional:  NAD; pleasant; nontoxic  HEENT:  AT/NC; oropharynx + for thrush on tongue   Neck: negative for JVD  CV:  +S1, S2;  RRR; no rub/murmur; LOOP incision is w/o erythema/drainage  Pulmonary:  BBS without crackles/wheeze/rhonci; resp are unlabored  Abdominal:  soft, +BS, ND/NT; no mass  Skin:  no rashes  Musculoskeletal:  No edema  :  no nichols   Neurological/Psych:  AAO; Weak left hand grasp = can lift at shoulder and weakly flex at elbow;  LLE HF 4/5 with DF/PF 4-/5; + left facial droop and tongue deviation; speech is clear; no depression/anxiety        [ X ] Total time spent: 30 Mins and greater than 50% of this time was spent counseling/coordinating care      ** Please Note: Dragon 360 Dictation voice to text software may have been used in the creation of this document   **

## 2018-11-06 NOTE — PROGRESS NOTES
Stroke Education Series    Pt participated in skilled Stroke Education Series in a group setting to address the topic of Home Safety post stroke in both verbal and written formats  Education within this session focused on safety strategies within the home including environmental and lifestyle changes that should be made to support a safe discharge to his/her home setting  The goal of this education session was to provide the patient with recommended home set up and changes that could be made to promote increased independence and safety upon discharge  Additionally, to instill confidence in his/her return home within their functional capabilities for fall prevention  Following education, pt's response to education is: verbalizes understanding  Start Time: 1430    End Time: 1445    Stroke Education Series    Patient (and family/caregivers as appropriate) participated in skilled Stroke Education Series in a group setting to address the topic of Caregiver Education post stroke in both verbal and written formats  Education within this session included the roles of family/caregivers within the rehabilitation process and how they will be involved and instrumental in his/her return home  In addition, education on how "home life" may be affected and the impact of caring for loved ones post stroke was provided  The goal of this education session was to provide patients and his/her caregivers with a greater understanding of their roles in the rehabilitation process and how instrumental that relationship could and will be at time of discharge to facilitate both patient and caregiver safety; therefore improving overall quality of life individually, and as a unit  Following education, pt's response to education is: verbalizes understanding        Start Time: 1445     End Time: 0124

## 2018-11-06 NOTE — PROGRESS NOTES
11/06/18 1300   Pain Assessment   Pain Assessment No/denies pain   Restrictions/Precautions   Precautions Fluid restriction;Supervision on toilet/commode   Cognition   Arousal/Participation Cooperative   Subjective   Subjective pt reported feeling well and he is happy he is walking better    QI: Roll Left and Right   Assistance Needed Supervision   Roll Left and Right CARE Score 4   QI: Sit to Lying   Assistance Needed Supervision   Sit to Lying CARE Score 4   QI: Lying to Sitting on Side of Bed   Assistance Needed Supervision   Lying to Sitting on Side of Bed CARE Score 4   QI: Sit to Stand   Assistance Needed Incidental touching; Adaptive equipment   Sit to Stand CARE Score 4   QI: Chair/Bed-to-Chair Transfer   Assistance Needed Physical assistance   Assistance Provided by [place] 50%-74%   Chair/Bed-to-Chair Transfer CARE Score 2   Transfer Bed/Chair/Wheelchair   Limitations Noted In Balance; Endurance;LE Strength;UE Strength   Adaptive Equipment Cane;None   Stand Pivot Minimal Assist   Sit to Stand Contact Guard   Stand to Sit Contact Guard   Supine to Sit Supervision   Sit to Supine Supervision   Findings ModA x1 when going to sit down when pt had LOB to his L side   Bed, Chair, Wheelchair Transfer (FIM) 3 - [place] needs to lift, boost or assist to stand OR sit   QI: Walk 10 Feet   Assistance Needed Physical assistance; Adaptive equipment   Assistance Provided by [place] Less than 25%   Walk 10 Feet CARE Score 3   QI: Walk 50 Feet with Two Turns   Assistance Needed Physical assistance; Adaptive equipment   Assistance Provided by [place] Less than 25%   Walk 50 Feet with Two Turns CARE Score 3   QI: Walk 150 Feet   Assistance Needed Physical assistance; Adaptive equipment   Assistance Provided by [place] Less than 25%   Walk 150 Feet CARE Score 3   Ambulation   Primary Discharge Mode of Locomotion Walk   Walk Assist Level Minimum Assist;Assist x 1   Gait Pattern Decreased foot clearance;L hemiparesis;L knee hyperextension; Step through; Improper weight shift   Assist Device [address] Walked (feet) 350 ft  (x2, 300 x2, 150 x3 total 1750)   Limitations Noted In Balance; Endurance; Heel Strike;Swing;Strength;Speed   Findings session focused on using SPC RUE to facilitate stance phase LLE muscle activation  Pt cont to be able to sequence well with SPC  gait belt for safety, 2nd person at times for guarding on the other side to facilitate dec A from therapist    Walking (FIM) 4 - Patient requires steadying assist or light touching AND distance 150 feet or more, no rest   QI: 4 Steps   Assistance Needed Physical assistance; Adaptive equipment   Assistance Provided by [place] Less than 25%   4 Steps CARE Score 3   QI: 12 Steps   Assistance Needed Physical assistance; Adaptive equipment   Assistance Provided by [place] Less than 25%   12 Steps CARE Score 3   Stairs   Type Stairs   # of Steps 40   Weight Bearing Precautions Fall Risk   Assist Devices Single Rail   Findings reciprocal pattern up and down with Darin from gait belt and RUE on rail  Stairs (FIM) 4 - Patient requires steadying assist or light touching AND patient goes up and down full flight (12- 14 stairs)   Therapeutic Interventions   Neuromuscular Re-Education supine on mat, lifting LLE up off the mat and back on and then with added resistance of green theraband with knee flexed when off the mat  R sidelying L hip abd and gave this as part of HEP  R sidelying PNF to L pelvis  quadruped with AAROM for support for L shoulder and approximation and 2 sets of kneeling to high kneeling    6"  steps lateral step ups both directions,    Equipment Use   NuStep 10 min start of session BLE level 5 and 4    Assessment   Treatment Assessment Pt cont to make gains in L side strength and AROM and overall activity tolerance   Due to continued deficits in these areas, pt has inconsistent L foot clearance and therefore still needs Ax1 for balance support in smaller spaces, with distractions or fatigue when walking and performing standing functional tasks  Pt will cont to benefit from skilled PT to further progress overall activity tolerance, strength, muscle activation and righting reactions  Due to continued deficits in righting reactions due to L side weakness, cont to recommend that pt has S-Darin for walking when home  Pt confirmed that a WC will fit in his home and he is okay with using a WC during the day when he is home alone to dec falls  Barriers to Discharge Inaccessible home environment;Decreased caregiver support  (recommended HR installed for 3 steps inside)   PT Barriers   Physical Impairment Decreased strength;Decreased range of motion;Decreased endurance; Impaired balance;Decreased mobility   Functional Limitation Car transfers;Stair negotiation;Standing;Transfers; Walking   Plan   Treatment/Interventions Functional transfer training;LE strengthening/ROM; Elevations; Therapeutic exercise; Endurance training;Patient/family training;Equipment eval/education; Bed mobility;Gait training   Progress Progressing toward goals   Recommendation   Recommendation Outpatient PT; Home with family support   PT Therapy Minutes   PT Time In 1300   PT Time Out 1430   PT Total Time (minutes) 90   PT Mode of treatment - Individual (minutes) 60   PT Mode of treatment - Concurrent (minutes) 30   PT Mode of treatment - Group (minutes) 0   PT Mode of treatment - Co-treat (minutes) 0   PT Mode of Teatment - Total time(minutes) 90 minutes   Therapy Time missed   Time missed?  No

## 2018-11-06 NOTE — PROGRESS NOTES
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     Chief Complaint/Subjective: Patient denies any events overnight     ROS:  negative unless noted above     Assessment/Plan:      DM (diabetes mellitus) ([place] [address] )   Assessment & Plan    · Not on any meds at home  · Per IM ISS or accuchecks no longer needed based on blood sugars      Gout   Assessment & Plan    · Left ankle  · Per patient was Indocin 50mg TID PTA however was not able to tolerate due to diarrhea and has not been taking for 2 months   · XR of left ankle and foot negative   · s/p colchicine and steroid taper per IM  · Resolved per patient   · Of note per patient this is only the 2nd gout flare patient has ever had     Hyperlipidemia   Assessment & Plan    · Home zocor 20 mg changed to lipitor 80 mg      Anxiety   Assessment & Plan    · No record of any benzodiazepines (or any other controlled medications) found in the [facility] database going back 1 year  · Neuropsychology following       Hypertension   Assessment & Plan    · IM to clarify home meds as records are not clear ( olmesartan vs valsartan ?, HCTZ 25 mg vs 50 mg ? norvasc ?)  · Currently on cozaar per IM      Dysphagia   Assessment & Plan    · SLP following  · Modified diet per SLP recs      * Right sided cerebral hemisphere cerebrovascular accident (CVA) ([facility] )   Assessment & Plan    · On DAPT for 90 days, with aspirin/plavix, followed by monotherapy with ASA  · Possibly cardioembolic in etiology   Consulted EP for eval/placement of loop recorder however they recommend JAYE first which was negative for thrombus in [place] however did reveal aortic atheroma, will d/w neuro (Dr Thirza Aase) if they feel the atheroma is the etiology of the stroke and did not feel that atheroma was likely severe enough to change from  to [facility] even if this was the potential etiology so management recommendations per neuro was unchanged and patient had LOOP placed   · Fluoxetine 20mg post stroke for motor recovery         Scheduled Meds:    Current Facility-Administered Medications:  acetaminophen 650 mg Oral Q6H PRN Duyen Mack MD   aspirin 81 mg Oral Daily Duyen Mack, MD   atorvastatin 80 mg Oral QPM Duyen Mack, MD   clopidogrel 75 mg Oral Daily Elizabeth Arron, CRNP   docusate sodium 100 mg Oral BID Bernarda Yao PA-C   enoxaparin 40 mg Subcutaneous Q24H [address] Duyen Mack MD   FLUoxetine 20 mg Oral Daily Elizabeth Heller, CRNP   losartan 50 mg Oral Daily Elizabeth Arron, CANDENP   melatonin 6 mg Oral HS Chiquita Keller MD   nicotine 14 mg Transdermal Daily Duyen Mack, MD   ondansetron 4 mg Intravenous Q6H PRN Duyen Mack MD   oxyCODONE 2 5 mg Oral Q4H PRN Duyen Mack MD   pneumococcal 23-valent polysaccharide vaccine 0 5 mL Subcutaneous Prior to discharge Duyen Mack MD   polyethylene glycol 17 g Oral Daily PRN Bernarda Yao PA-C   senna 1 tablet Oral HS Bernarda Yao PA-C        Incidental findings  1) thyroid nodules: per radiology report recs non-emergent OP thyroid U/S, OP FU with PCP to arrange U/S  2) subpleural bullae: OP FU with PCP with further testing/treatment and/or specialist referral at PCP's discretion     Note: patient noted to have petechial hemorrhage on imaging however has been cleared for DAPT and lovenox (for DVT ppx) by neuro  DVT ppx: SCDs, lovenox  Dispo: reteam     Objective:    Functional Update:  Mobility: amb mod x 2 (with chair follow)   Transfers: mod  ADLs: mod         Physical Exam:        General: alert, no apparent distress, cooperative and comfortable  HEENT:  Head: Normocephalic, no lesions, without obvious abnormality    CARDIAC:  +S1/2  LUNGS:  respirations unlabored  ABDOMEN:  soft   EXTREMITIES:  no signficant LE edema  NEURO:   awake, alert, appropriately answering questions, 2/5 LUE, +dysarthria (essentially resolved)  PSYCH:  mood/affect currently stable      Diagnostic Studies: reviewed, no new imaging  No orders to display       Laboratory:      Results from last 7 days  Lab Units 11/05/18  0948 11/01/18  0611   HEMOGLOBIN g/dL 13 9 13 6   HEMATOCRIT % 41 9 40 8   WBC Thousand/uL 9 11 7 75       Results from last 7 days  Lab Units 11/05/18  0948 11/01/18  0611   BUN mg/dL 11 14   POTASSIUM mmol/L 3 7 3 4*   CHLORIDE mmol/L 105 107   CREATININE mg/dL 1 03 0 87            Patient Active Problem List   Diagnosis    Right sided cerebral hemisphere cerebrovascular accident (CVA) ([facility] )    Dysphagia    Hypertension    Anxiety    Hyperlipidemia    Gout    DM (diabetes mellitus) ([facility] )           Total visit time:  At least 25 minutes, with more than 50% spent counseling/coordinating care

## 2018-11-07 PROCEDURE — 97112 NEUROMUSCULAR REEDUCATION: CPT

## 2018-11-07 PROCEDURE — 97530 THERAPEUTIC ACTIVITIES: CPT

## 2018-11-07 PROCEDURE — 99233 SBSQ HOSP IP/OBS HIGH 50: CPT | Performed by: PHYSICAL MEDICINE & REHABILITATION

## 2018-11-07 PROCEDURE — 97535 SELF CARE MNGMENT TRAINING: CPT

## 2018-11-07 PROCEDURE — 97150 GROUP THERAPEUTIC PROCEDURES: CPT

## 2018-11-07 PROCEDURE — 97116 GAIT TRAINING THERAPY: CPT

## 2018-11-07 RX ADMIN — CLOPIDOGREL 75 MG: 75 TABLET, FILM COATED ORAL at 08:19

## 2018-11-07 RX ADMIN — ENOXAPARIN SODIUM 40 MG: 40 INJECTION SUBCUTANEOUS at 08:20

## 2018-11-07 RX ADMIN — MELATONIN 6 MG: at 21:03

## 2018-11-07 RX ADMIN — LOSARTAN POTASSIUM 50 MG: 50 TABLET, FILM COATED ORAL at 08:20

## 2018-11-07 RX ADMIN — ASPIRIN 81 MG: 81 TABLET, COATED ORAL at 08:19

## 2018-11-07 RX ADMIN — FLUOXETINE 20 MG: 20 CAPSULE ORAL at 08:19

## 2018-11-07 RX ADMIN — NICOTINE 14 MG: 14 PATCH, EXTENDED RELEASE TRANSDERMAL at 10:27

## 2018-11-07 RX ADMIN — DOCUSATE SODIUM 100 MG: 100 CAPSULE, LIQUID FILLED ORAL at 17:15

## 2018-11-07 RX ADMIN — ATORVASTATIN CALCIUM 80 MG: 80 TABLET, FILM COATED ORAL at 17:14

## 2018-11-07 NOTE — PHYSICAL THERAPY NOTE
Stroke Education Series    Pt participated in skilled Stroke Education Series in a group setting to address the topic of Energy Conservation post stroke in both verbal and written formats  Education within this session included information on stroke recovery and the essential use of energy conservation strategies and the importance of sleep hygiene  Additionally discussed was the Theory of the "Four P's" including planning, prioritizing, pacing, and positioning as energy conservation standards  The goal of this education session was to provide the patient with a more well rounded understanding of how the brain utilizes sleep/rest as a healing and "refeuling" tool and how he/she can incorporate energy conservation strategies in their daily routines  Following education, pt's response to education is: verbalizes understanding        Start Time: 1430      End Time: 1500

## 2018-11-07 NOTE — PROGRESS NOTES
11/07/18 1220   Pain Assessment   Pain Assessment No/denies pain   Restrictions/Precautions   Precautions Fall Risk   Cognition   Arousal/Participation Cooperative   Subjective   Subjective pt reported feeling well and ready for therapy    QI: Sit to [address] to Lying CARE Score 4   QI: Lying to Sitting on Side of Bed   Assistance Needed Supervision   Lying to Sitting on Side of Bed CARE Score 4   QI: Sit to Stand   Assistance Needed Incidental touching   Sit to Stand CARE Score 4   QI: Chair/Bed-to-Chair Transfer   Assistance Needed Incidental touching; Adaptive equipment   Chair/Bed-to-Chair Transfer CARE Score 4   Transfer Bed/Chair/Wheelchair   Limitations Noted In Balance; Endurance;LE Strength;UE Strength   Stand Pivot Contact Guard   Sit to Stand Supervision   Stand to Sit Supervision   Supine to Sit Supervision   Sit to Supine Supervision   Car Transfer Contact Guard   Bed, Chair, Wheelchair Transfer (FIM) 4 - Patient completes 75% of all tasks   QI: Car Transfer   Assistance Needed Incidental touching; Adaptive equipment   Car Transfer CARE Score 4   QI: Walk 10 Feet   Assistance Needed Physical assistance   Assistance Provided by [place] Less than 25%   Walk 10 Feet CARE Score 3   QI: Walk 50 Feet with Two Turns   Assistance Needed Physical assistance; Adaptive equipment   Assistance Provided by [place] Less than 25%   Walk 50 Feet with Two Turns CARE Score 3   QI: Walk 150 Feet   Assistance Needed Physical assistance; Adaptive equipment   Assistance Provided by [place] Less than 25%   Walk 150 Feet CARE Score 3   Ambulation   Primary Discharge Mode of Locomotion Walk   Walk Assist Level Minimum Assist   Gait Pattern Decreased foot clearance;L hemiparesis;L knee hyperextension; Step through   Assist Device Hand Hold;Cane  (none)   Distance Walked (feet) 350 ft  (x4)   Limitations Noted In Balance; Heel Strike;Posture   Walking (FIM) 4 - Patient requires steadying assist or light touching AND distance 150 feet or more, no rest   QI: 4 Steps   Assistance Needed Physical assistance; Adaptive equipment   Assistance Provided by [place] Less than 25%   4 Steps CARE Score 3   QI: 12 Steps   Assistance Needed Physical assistance; Adaptive equipment   Assistance Provided by [place] Less than 25%   12 Steps CARE Score 3   Stairs   Type Stairs   # of Steps 40   Weight Bearing Precautions Fall Risk   Assist Devices Single Rail   Findings reciprocal pattern up and down, CGA with gait belt   Stairs (FIM) 4 - Patient requires steadying assist or light touching AND patient goes up and down full flight (12- 14 stairs)   Therapeutic Interventions   Neuromuscular Re-Education repeated sit<>stands x10 and then x5 with no rest breaks during each set  practiced toe taps alternating legs on 4" block with occasional LOB but mainly able to perform at CGA-Darin for safety  also practiced side toe taps on 4" block  repeated walking over weighted dowels, around balance discs, forward and backward, inc speeds, dec speeds sideways both directions, head turns up and down  Assessment   Treatment Assessment Pt cont to make progress towards LTGs; due to progress pt has been making and consistency of progress, discussed with pt about Khalida for short distance walking at home as LTG  Pt also reproted that his brother and friends will be home with him for a few hours a day, so he will be home less often by himself  We talked about modifications, such as family leaving food in a cooler on the table so he won't need to carry anything while walking at home and family/friends will be able to help walk with him outside for safety  Pt and Dr Kimberli Biswas in agreement with this plan  Barriers to Discharge Decreased caregiver support   PT Barriers   Physical Impairment Decreased strength;Decreased range of motion;Decreased endurance; Impaired balance;Decreased mobility   Functional Limitation Car transfers;Stair negotiation;Standing;Transfers; Walking   Plan   Treatment/Interventions Functional transfer training;LE strengthening/ROM; Elevations; Therapeutic exercise; Endurance training;Patient/family training;Equipment eval/education; Bed mobility;Gait training   Progress Progressing toward goals   Recommendation   Recommendation Outpatient PT; Home with family support   PT Therapy Minutes   PT Time In 1220   PT Time Out 1300   PT Total Time (minutes) 40   PT Mode of treatment - Individual (minutes) 40   PT Mode of treatment - Concurrent (minutes) 0   PT Mode of treatment - Group (minutes) 0   PT Mode of treatment - Co-treat (minutes) 0   PT Mode of Teatment - Total time(minutes) 40 minutes   Therapy Time missed   Time missed?  No

## 2018-11-07 NOTE — TEAM CONFERENCE
Acute RehabilitationTeam Conference Note  Date: 11/7/2018   Time: 11:11 AM       Patient Name:  Joseph Ariza Record Number: [id number]   YOB: 1955  Sex: Male          Room/Bed:  [facility] 973-01  Payor Info:  Payor: Cristobal Simpson / Plan: [id number] / Product Type: Blue Fee for Service /      Admitting Diagnosis: Stroke ([place] [id number] ) [I63 9]   Admit Date/Time:  10/25/2018 12:41 PM  Admission Comments: No comment available     Primary Diagnosis:  Right sided cerebral hemisphere cerebrovascular accident (CVA) ([facility] )  Principal Problem: Right sided cerebral hemisphere cerebrovascular accident (CVA) ([facility] )    Patient Active Problem List    Diagnosis Date Noted    DM (diabetes mellitus) ([place] [id number] ) 10/25/2018    Gout 10/23/2018    Hyperlipidemia 10/22/2018    Right sided cerebral hemisphere cerebrovascular accident (CVA) ([place] [address] ) 10/21/2018    Dysphagia 10/21/2018    Hypertension 10/21/2018    Anxiety 10/21/2018       Physical Therapy:    Weight Bearing Status: Full Weight Bearing  Transfers: Minimal Assistance  Bed Mobility: Supervision  Amulation Distance (ft): 150 feet  Ambulation: Minimal Assistance  Assistive Device for Ambulation: Single [employer]  Number of Stairs: 20  Assistive Device for Stairs: Right Hand Rail  Stair Assistance: Minimal Assistance  Discharge Recommendations: Home with:  [address] [facility] [place] with[de-identified] Family Support, Outpatient Physical Therapy    Pt presents with gradual improvement towards LTGs in regards to strength,activity tolerance, balance and safety  Pt cont to present with fall risk components due to continued L side weakness and dec motor control, however pt is progressing with walking and stairs  Pt will need continued skilled PT to make further gains towards PLOF; currently pt needs Ax1-2 for safe walking due to fatigue and balance  Occupational Therapy:  Eating: Supervision  Grooming: Minimal Assistance  Bathing: Moderate Assistance  Bathing:  Moderate Assistance  Upper Body Dressing: Minimal Assistance  Lower Body Dressing: Minimal Assistance  Toileting: Minimal Assistance  Tub/Shower Transfer: Minimal Assistance  Toilet Transfer: Minimal Assistance  Cognition: Within Defined Limits  Orientation: Person, Place, Time, Situation  Discharge Recommendations: Home with:  [address] with[de-identified] 24 Hour Supervision, 24 Hour Assistance (Vs  SNF pending pt progress and level of support available  )       Pt continues to present with impairments in activity tolerance, endurance, standing balance/tolerance and (L) UE strength, ROM, FMC, GMC  Additional functional barriers include fatigue, (L) hemiparesis, decreased caregiver support, risk for falls and home environment  Pt also beginning to present with (L) UE flexor synergy noted with (L) elbow and digit flexion reflexive movement patterns  Pt demonstrates improvements in ADL routine with use of SPC, standing tolerance/balance during ADLS, (L) scapular elevation/depression - able to actively complete without assistance through 3/4 range  While pt is demonstrating significant progress, pt's impairments listed above continue to limit pt's independence and safety during ADLS, IADLS, work, social participation, and leisure activities  From OT standpoint, pt making good progress to 4 week long term goals  Continue to recommend inpatient OT services in the acute rehab setting to maximize pt's rehab potential, increase independence in all baseline areas of occupation, and decrease caregiver burden  OT D/C recommendations will continue to be made pending pt's progress                   Speech Therapy:  Mode of Communication: Verbal  Speech/Language: Dysarthia (mild, more notable when fatigued)  Cognition: Exceptions to WNL  Cognition: Decreased Executive Functions  Orientation: Person, Place, Time, Situation  Swallowing: Within Defined Limits  Swallowing: Oral Dysphagia, Aspiration Risk  Diet Recommendations: Regular Diet, Thin  Discharge Recommendations: Home with:  [address] with[de-identified] Family Support  Pt is currently being followed for speech/cognitive/dysphagia therapy  On initial evaluation, pt completed the CLQT with scores correlating to overall cognitive linguistic skills that are [facility] at time of assessment in comparison to age matched peers  However, informally assessed, pt demonstrating slower processing and decreased higher level reasoning, which was also reported by pt  Pt also presenting with mild dysarthria characterized by imprecise articulation of sounds  While pt demonstrates overall functional basic level cognitive linguistic skills and verbal expression, pt's functioning is impacted by his level of fatigue  Pt reports that he completed all ADLs independently and had a full time job prior to hospitalization  Pt will benefit from skilled ST intervention targeting higher level, more complex cognitive linguistic skills to maximize overall independence and to work towards returning to his job  Regarding swallow function, pt is presenting with mild oral dysphagia characterized by weaker mastication, L side pocketing and slower a-p transfers, however, pt is demonstrating improvement in independent use of strategies to clear residual  Pt was on a level 2 diet and thin liquids on admission but has recently been advanced to a dysphagia level 3 diet and appears to be tolerating without increased oral/pharyngeal difficulties or overt s/sx aspiration  Pt will benefit from further skilled ST intervention to maximize swallow function to safely achieve baseline diet of regular and thin liquids  Update week of 11/6/2018: Pt was previously being followed for dysphagia therapy, however, pt has now progressed to tolerating a regular diet and thin liquids without overt s/sx aspiration and without increased oropharyngeal difficulties   Pt demonstrating good carryover of swallow strategies to include liquid wash, lingual sweeps and finger sweeps to clear min to trace amts of L side pocketing  Improvement in pacing and bite size also noted  Further skilled ST intervention is not warranted to continue at this time for dysphagia therapy  Regarding cognition, pt has made progress towards goals and is functioning at overall mod I level, noting problem solving to fluctuate to from supervision to mod I level  Pt inconsistently demonstrates slower processing, decreased abstract thinking and decreased attention  Due to the above mentioned deficits, as well as pt's desire to continue skilled ST intervention (pt reports continued cognitive deficits), pt will benefit from further skilled ST intervention to maximize higher level, more abstract cognitive linguistic skills at this time  Nursing Notes:  Appetite: Good  Diet Type: Dysphagia III, Thin Liquids                      Diet Patient/Family Education Complete: Yes                Incision 10/30/18 Chest Mid (Active)   Incision Description Clean;Dry; Intact 11/7/2018  9:53 AM   Leatha-wound Assessment Clean;Dry; Intact 11/7/2018  9:53 AM   Closure Open to air 11/7/2018  9:53 AM   Dressing Dry dressing 11/4/2018 10:46 PM   Wound packed? No 10/30/2018  5:03 PM   Patient Tolerance Tolerated well 11/4/2018 10:06 AM   Dressing Status Clean;Dry; Intact 11/4/2018 10:46 PM              Bladder: 6 - Modified [place]     Bladder Patient/Family Education: Yes  Bowel: 6 - Modified [place]     Bowel Patient/Family Education: Yes  Pain Location: Ankle  Pain Orientation: Left  Pain Score: 0                       Hospital Pain Intervention(s): Ambulation/increased activity, Rest (AFO while walking)     Medication Management/Safety  Injectable:  (heparin)  Safe Administration: Yes  Medication Patient/Family Education Complete: Yes    Admitted to [facility] s/p new onset left-sided diminished sensation, weakness, difficulty speaking, and facial droop  He has a PMH significant for HTN, HLD, and tobacco abuse  CTH was negative for hemorrhage   NIHSS 9  He received IV Labetalol for /110 and tPA  Echo showed EF 60% with no RWMA  A1C 6 6  CTA showed 50% stenosis of the proximal R ICA, and 30-40% stenosis of the proximal L ICA  MRI Brain showed multifocal cortical infarctions in the distribution of the R MCA  Concern was for thromboembolic etiology due to carotid disease  Ultimately, Neurology recommended Plavix load (10/24), and then DAPT for 90 days followed by monotherapy with ASA  Cardiology was consulted who recommended holter monitor to evaluate for underlying afib  They also started him on Fluoxetine 20mg post stroke day 5 for motor function recovery  have a history of gout  Venous doppler and X-ray were negative for clot or acute injury  Pt underwent JAYE and loop recorder placement on 10/30  Is continent of bowel and bladder  This week we will continue to labs and vitals, we will increase safety awareness and keep pt free from injury  We will prevent skin breakdown with turning and repositioning and weight shifts  Pt will continue to manage dm with diet  Case Management:     Discharge Planning  Living Arrangements: Children  Support Systems: Children, Family members  Assistance Needed: tbd  Type of Current Residence: Private residence  Current Home Care Services: No  Pt is making good progress and is expecting to return home  Pt has been educated on potential recommendations for contd therapy services  contd stay review is due Wednesday  Following to assist w/dc planning needs  Is the patient actively participating in therapies? yes  List any modifications to the treatment plan:     Barriers Interventions   Alone during the day Supervision/mod I goals on dc   Left side hemiparesis Getting tone, and flexor movement, continue therapy exercises   Coordination, strength, rom Therapy exercises   Standing tolerance, balance Therapy exercises         Is the patient making expected progress toward goals?  yes  List any update or changes to goals: Medical Goals: Patient will be medically stable for discharge to [facility] [place] upon completion of rehab program and Patient will be able to manage medical conditions and comorbid conditions with medications and follow up upon completion of rehab program    Weekly Team Goals:   Rehab Team Goals  ADL Team Goal: Patient will require supervision with ADLs with least restrictive device upon completion of rehab program  Bowel/Bladder Team Goal: Patient will return to premorbid level for bladder/bowel management upon completion of rehab program  Transfer Team Goal: Patient will be independent with transfers with least restrictive device upon completion of rehab program  Locomotion Team Goal: Patient will be independent with locomotion with least restrictive device upon completion of rehab program  Cognitive Team Goal: Patient will be independent for basic and complex tasks upon completion of rehab program    Discussion: pt is making good progress and is getting some tone and return in his left arm  Pt continues with balance and coordination deficit  Team still dicussing the possibity of having pt w/c mobile on dc  Pt is overall min a with adls, walking and a full flight of stairs  Anticipated Discharge Date:  reteam [facility] Team Members Present: The following team members are supervising care for this patient and were present during this Weekly Team Conference      Physician: Dr Ann Monsalve MD  : JOLIE Coronado  Registered Nurse: Simin Li RN, BSN, CRRN  Physical Therapist: Jacinda Verde DPT  Occupational Therapist: Maria Eugenia Zhang MS, OTR/L, CBIS  Speech Therapist: Elsi Roy MA, CCC-SLP  Other:

## 2018-11-07 NOTE — PROGRESS NOTES
11/07/18 1330   Pain Assessment   Pain Assessment No/denies pain   Restrictions/Precautions   Precautions Fall Risk   Cognition   Arousal/Participation Cooperative   Subjective   Subjective pt rpeorted feeling happy he was able to walk outside without a SPC today    QI: Sit to Stand   Assistance Needed Incidental touching   Sit to Stand CARE Score 4   QI: Chair/Bed-to-Chair Transfer   Assistance Needed Physical assistance; Adaptive equipment   Assistance Provided by [place] Less than 25%   Chair/Bed-to-Chair Transfer CARE Score 3   Transfer Bed/Chair/Wheelchair   Limitations Noted In Balance; Endurance;LE Strength;UE Strength   Stand Pivot Minimal Assist   Sit to Stand Contact Guard   Stand to [facility] [place]ia, Chair, Wheelchair Transfer (FIM) 4 - Patient requires steadying assist or light touching   QI: Walk 10 Feet   Assistance Needed Physical assistance   Assistance Provided by [place] Less than 25%   Walk 10 Feet CARE Score 3   QI: Walk 50 Feet with Two Turns   Assistance Needed Physical assistance   Assistance Provided by [place] Less than 25%   Walk 50 Feet with Two Turns CARE Score 3   QI: Walk 150 Feet   Assistance Needed Physical assistance   Assistance Provided by [place] Less than 25%   Walk 150 Feet CARE Score 3   QI: Walking 10 Feet on Uneven Surfaces   Assistance Needed Physical assistance   Assistance Provided by [place] Less than 25%   Walking 10 Feet on Uneven Surfaces CARE Score 3   Ambulation   Primary Discharge Mode of Locomotion Walk   Walk Assist Level Contact Guard;Minimum Assist   Gait Pattern Decreased foot clearance;L hemiparesis;L knee hyperextension; Step through   Assist Device Cane;Hand Hold  (none)   Distance Walked (feet) 600 ft  (x3 outside)   Limitations Noted In Balance; Heel Strike;Posture   Walking (FIM) 4 - Patient requires steadying assist or light touching AND distance 150 feet or more, no rest   QI: 1 Step (Curb)   Assistance Needed Physical assistance; Adaptive equipment Assistance Provided by [place] Less than 25%   1 Step (Curb) CARE Score 3   QI: 4 Steps   Assistance Needed Physical assistance; Adaptive equipment   Assistance Provided by [place] Less than 25%   4 Steps CARE Score 3   QI: 12 Steps   Assistance Needed Physical assistance; Adaptive equipment   Assistance Provided by [place] Less than 25%   12 Steps CARE Score 3   Stairs   Type Stairs   # of Steps 15  (outside)   Weight Bearing Precautions Fall Risk   Assist Devices Single Rail   Findings reciprocal pattern  outside sides with leaves on them, CGA-Darin with gait belt for safety   Stairs (FIM) 4 - Patient requires steadying assist or light touching AND patient goes up and down full flight (12- 14 stairs)   Therapeutic Interventions   Neuromuscular Re-Education with extra assistance for balance support as needed, practiced kicking a ball back and forth, occasional LOB to L side,needing Min-ModA to help correct as needed, otherwise pt able to perform at CS x2 for safety  With support for arm and guarding for safety, practiced with a 1 5 weighted dowel a baseball swing (pt used to play baseball) with weight shifting through legs and taking a step with LLE in a lung like position  floor to stand trasnfers x3 for each leg, at end of session; MOdA to stand leading with RLE and Darin standing with LLE first, RUE support on high lo mat  Assessment   Treatment Assessment Pt was able to demonstrate improvement in ambulatory balance, strength and activity tolerance with walking outside and continued balance/strength exercises performed at end of session  Pt had difficulty performing floor to stand transfers leading with RLE due to continued weakness through L trunk, however he was able to stabilize his trunk well while kneeling and turning around   Pt will cont to benefit from higher level skilled balance tasks and interventions to further progress activity tolerance and LLE motor control/coordination to improve righitng reactions and dec fall risk  Barriers to Discharge Decreased caregiver support   PT Barriers   Physical Impairment Decreased strength;Decreased range of motion;Decreased endurance; Impaired balance;Decreased mobility   Functional Limitation Car transfers;Stair negotiation;Standing;Transfers; Walking   Plan   Treatment/Interventions Functional transfer training;LE strengthening/ROM; Elevations; Therapeutic exercise; Endurance training;Patient/family training;Equipment eval/education; Bed mobility;Gait training   Progress Progressing toward goals   Recommendation   Recommendation Outpatient PT; Home with family support   PT Therapy Minutes   PT Time In 1330   PT Time Out 1430   PT Total Time (minutes) 60   PT Mode of treatment - Individual (minutes) 60   PT Mode of treatment - Concurrent (minutes) 0   PT Mode of treatment - Group (minutes) 0   PT Mode of treatment - Co-treat (minutes) 0   PT Mode of Teatment - Total time(minutes) 60 minutes   Therapy Time missed   Time missed?  No

## 2018-11-07 NOTE — PROGRESS NOTES
Physical Medicine and Rehabilitation Progress Note  Severiano Finner 58 y o  male MRN: [id number]  Unit/Bed#: -01 Encounter: [id number]    HPI: Patient is 57 yo male with right cerebral infarcts likely embolic in nature     Chief Complaint/Subjective: D/W patient that more rehab time will be requested from insurance company as patient is making progress and patient is agreeable    ROS:  negative unless noted above     Assessment/Plan:      DM (diabetes mellitus) ([facility] )   Assessment & Plan    · Not on any meds at home  · Per IM ISS or accuchecks no longer needed based on blood sugars      Gout   Assessment & Plan    · Left ankle  · Per patient was Indocin 50mg TID PTA however was not able to tolerate due to diarrhea and has not been taking for 2 months   · XR of left ankle and foot negative   · s/p colchicine and steroid taper per IM  · Resolved per patient   · Of note per patient this is only the 2nd gout flare patient has ever had     Hyperlipidemia   Assessment & Plan    · Home zocor 20 mg changed to lipitor 80 mg      Anxiety   Assessment & Plan    · No record of any benzodiazepines (or any other controlled medications) found in the [place] database going back 1 year  · Neuropsychology following       Hypertension   Assessment & Plan    · IM to clarify home meds as records are not clear ( olmesartan vs valsartan ?, HCTZ 25 mg vs 50 mg ? norvasc ?)  · Currently on cozaar per IM      Dysphagia   Assessment & Plan    · SLP following  · Modified diet per SLP recs      * Right sided cerebral hemisphere cerebrovascular accident (CVA) ([facility] )   Assessment & Plan    · On DAPT for 90 days, with aspirin/plavix, followed by monotherapy with ASA  · Possibly cardioembolic in etiology   Consulted EP for eval/placement of loop recorder however they recommend JAYE first which was negative for thrombus in MICHELLE however did reveal aortic atheroma, will d/w neuro (Dr Leidy Prieto) if they feel the atheroma is the etiology of the stroke and did not feel that atheroma was likely severe enough to change from  to [facility] even if this was the potential etiology so management recommendations per neuro was unchanged and patient had LOOP placed   · Fluoxetine 20mg post stroke for motor recovery         Scheduled Meds:    Current Facility-Administered Medications:  acetaminophen 650 mg Oral Q6H PRN Aleks Machado MD   aspirin 81 mg Oral Daily Aleks Macahdo MD   atorvastatin 80 mg Oral QPM Aleks Machado MD   clopidogrel 75 mg Oral Daily PATEL Gautam   docusate sodium 100 mg Oral BID Bernarda Yao PA-C   FLUoxetine 20 mg Oral Daily PATEL Gautam   losartan 50 mg Oral Daily PATEL Galvan   melatonin 6 mg Oral HS Daiana Benedict MD   nicotine 14 mg Transdermal Daily Aleks Machado MD   ondansetron 4 mg Intravenous Q6H PRN Aleks Machado MD   oxyCODONE 2 5 mg Oral Q4H PRN Aleks Machado MD   pneumococcal 23-valent polysaccharide vaccine 0 5 mL Subcutaneous Prior to discharge Aleks Machado MD   polyethylene glycol 17 g Oral Daily PRN Bernarda Yao PA-C   senna 1 tablet Oral HS Bernarda Yao PA-C        Incidental findings  1) thyroid nodules: per radiology report recs non-emergent OP thyroid U/S, OP FU with PCP to arrange U/S  2) subpleural bullae: OP FU with PCP with further testing/treatment and/or specialist referral at PCP's discretion     Note: patient noted to have petechial hemorrhage on imaging however has been cleared for DAPT and lovenox (for DVT ppx) by neuro  DVT ppx: SCDs, ambulating adequate distances   Dispo: reteam     Objective:    Functional Update:  Mobility: min  Transfers: min  ADLs: min-mod         Physical Exam:    Vitals:    11/07/18 1424   BP: 145/82   Pulse: 62   Resp: 20   Temp: 97 6 °F (36 4 °C)   SpO2: 95%         General: alert, no apparent distress, cooperative and comfortable  HEENT:  Head: Normocephalic, no lesions, without obvious abnormality    CARDIAC:  +S1/2  LUNGS:  respirations unlabored  ABDOMEN:  soft   EXTREMITIES:  no signficant LE edema  NEURO:   awake, alert, appropriately answering questions, 2/5 LUE, +dysarthria (essentially resolved)  PSYCH:  mood/affect currently stable      Diagnostic Studies: reviewed, no new imaging  No orders to display       Laboratory:      Results from last 7 days  Lab Units 11/05/18  0948 11/01/18  0611   HEMOGLOBIN g/dL 13 9 13 6   HEMATOCRIT % 41 9 40 8   WBC Thousand/uL 9 11 7 75       Results from last 7 days  Lab Units 11/05/18  0948 11/01/18  0611   BUN mg/dL 11 14   POTASSIUM mmol/L 3 7 3 4*   CHLORIDE mmol/L 105 107   CREATININE mg/dL 1 03 0 87            Patient Active Problem List   Diagnosis    Right sided [facility]    Dysphagia    Hypertension    Anxiety    Hyperlipidemia    Gout    DM (diabetes mellitus) ([facility] 75 )           Total time spent: At least 35 minutes, with more than 50% spent counseling/coordinating care  In addition, this patient was discussed by the interdisciplinary team in weekly case conference today  The care of the patient was extensively discussed with all care providers and an appropriate rehabilitation plan was formulated unique for this patient  Barriers were identified preventing progression of therapy and appropriate interventions were discussed with each discipline  Please see the team note for input from all disciplines regarding barriers, intervention, and discharge planning

## 2018-11-07 NOTE — PROGRESS NOTES
Occupational Therapy Treatment Note   11/07/18 0835   Pain Assessment   Pain Assessment No/denies pain   Restrictions/Precautions   Precautions Supervision on toilet/commode; Fall Risk   Braces or Orthoses Sling  (support (L) during mobilty/stance)   Lifestyle   Autonomy "I was feeling really anxious but it's been better since talking to Dr Mandeep Goncalves"    QI: Oral Hygiene   Assistance Needed Physical assistance   Assistance Provided by [place] 25%-49%   Oral Hygiene CARE Score 3   Grooming   Able To Brush/Clean Teeth;Wash/Dry Hands; Wash/Dry Face;Comb/Brush Hair; Shave   Limitation Noted In Strength; Safety;Timeliness  (balance)   Findings pt requires min assist for steadying in stance to complete grooming 2* impaired standing balance/tolerance    Grooming (FIM) 4 - Patient requires steadying assist or light touching   QI: Shower/Bathe Self   Assistance Needed Physical assistance   Assistance Provided by [place] 50%-74%   Shower/Bathe Self CARE Score 2   Bathing   Assessed Bath Style Shower   Anticipated D/C Bath Style Shower   Able to Gurpreet Girish No   Able to [employer] Temperature Yes   Able to Wash/Rinse/Dry (body part) Left Arm;R Upper Leg;L Lower Leg/Foot;R Lower Leg/Foot;Chest;Abdomen   Limitations Noted in Balance; Endurance; Safety;Strength;Timeliness; Coordination   Positioning Seated;Standing   Adaptive Equipment Shower Bars; Shower Seat;Hand Rodríguez's   Findings  Pt requires steadying assist in stance to bathe buttock/perineal area  Pt requires hand over hand assist of (L) UE to bathe (R) UE and (L) LE 2* (L) UE hemiparesis  Pt demonstrating progress in proximal UE strength, able to initiate (L) shoulder flexion/extension but requires assistance to maintain  on washcloth 2* impaired elbow/digit strength  Bathing (FIM) 3 - Patient completes 5/10  6/10 or 7/10 parts   Tub/Shower Transfer   Limitations Noted In Balance; Endurance; Safety;UE Strength;LE Strength   Adaptive Equipment Grab Bars;Seat with out Back Assessed Shower   Findings min assist using SPC    Shower Transfer (FIM) 4 - Patient requires steadying assist or light touching   QI: Upper Body Dressing   Assistance Needed Physical assistance   Assistance Provided by [place] 25%-49%   Comment min assist to thread (L) UE into sleeve    Upper Body Dressing CARE Score 3   QI: Lower Body Dressing   Assistance Needed Physical assistance   Assistance Provided by [place] 25%-49%   Comment assist in stance to manage clothing over hips    Lower Body Dressing CARE Score 3   QI: Putting On/Taking Off Footwear   Assistance Needed Physical assistance   Assistance Provided by [place] 25%-49%   Comment assist to tie shoes    Putting On/Taking Off Footwear CARE Score 3   Dressing/Undressing Clothing   Remove UB Clothes Pullover Shirt   Remove LB Clothes Shorts;Socks   [address]; Undergarment;Socks; Shoes   Limitations Noted In Balance; Endurance; Safety;Strength;Timeliness   Positioning Supported Sit;Standing   Findings see above    UB Dressing (FIM) 4 - Patient completes 75% of all tasks   LB Dressing (FIM) 4 - Patient completes 75% of all tasks   QI: Sit to Stand   Assistance Needed Physical assistance   Assistance Provided by [place] 25%-49%   Comment SPC   Sit to Stand CARE Score 3   QI: Chair/Bed-to-Chair Transfer   Assistance Needed Physical assistance   Assistance Provided by [place] 25%-49%   Comment SPC   Chair/Bed-to-Chair Transfer CARE Score 3   Transfer Bed/Chair/Wheelchair   Positioning Concerns Hemiplegia   Limitations Noted In Balance; Coordination; Endurance;UE Strength;LE Strength   Adaptive Equipment Cane   Sit to Stand Minimal;Assist x 1   Stand to Sit Minimal;Assist x 1   Findings pt requires min assist for functional mobility/transfers with SPC, gait belt and (L) UE Givmohr sling donned for all functional mobility    Bed, Chair, Wheelchair Transfer (FIM) 4 - Patient requires steadying assist or light touching   QI: Toileting Hygiene   Assistance Needed Physical assistance   Assistance Provided by [place] 25%-49%   Toileting Hygiene CARE Score 3   Toileting   Able to [address] down yes, up yes  Limitations Noted In Balance; Safety;UE Strength;LE Strength   Toileting (FIM) 4 - Patient requires steadying assist or light touching   QI: Toilet Transfer   Assistance Needed Physical assistance   Assistance Provided by [place] 25%-49%   Toilet Transfer CARE Score 3   Toilet Transfer   Surface Assessed Standard Toilet   Transfer Technique Standard   Limitations Noted In Balance; Endurance; Safety;UE Strength;LE Strength   Adaptive Equipment Grab Bar   Toilet Transfer (FIM) 4 - Patient requires steadying assist or light touching   ROM - Left Upper Extremities    L Shoulder AAROM; Flexion; Extension  (scapular elevation/depression)   L Elbow AAROM;Elbow flexion;Elbow extension   LUE ROM Comment Pt engaged in (L) UE ROM while seated on edge of mat  Mirror provided in front of pt to decrease compensatory movement patterns and increase pt attention to task  Pt able to complete (L) scapular elevation/depression 10x2 sets with min assist to achieve full ROM, able to complete 3/4 range without assistance  Pt then engaged in repetitive (L) shoulder flexion/extension where pt required manual cue at trapezius to decrease compensatory movement patterns  Pt able to complete 10 reps x2 sets of (L) shoulder flexion/extenson with AAROM achieving 1/3 range without compensatory strategies while manual cues were provided  Pt then engaged in AAROM (L) elbow flexion/extension, able to initiate biceps/triceps but requires point of control at wrist and tricep to decrease compensatory movement patterns and complete full range      Cognition   Overall Cognitive Status WFL   Comments pt reports feelings of anxiety noted on Neuro QOL Anxiety screen completed last week in OT have decreased after speaking with Dr Antoine Thayer and pt noticing his improvement    Activity Tolerance Activity Tolerance Patient tolerated treatment well   Assessment   Treatment Assessment Pt participated in skilled OT tx session focused on (L) UE AAROM for neuromuscular re-education, ADLs, functional mobility/transfers using SPC  Pt demonstrating improvement in standing balance/tolerance and transfers using SPC during ADLS  Pt also demonstrating progress in (L) scapular elevation/depression but continues to present with compensatory movement patterns during (L) shoulder/elbow flexion/extension  Pt also presenting with (L) UE flexor synergy, OT reported to Dr Mariangel Verduzco  Pt continues to present with impairments in activity tolerance, endurance, standing balance/tolerance and (L) UE strength, ROM, FMC, GMC  Additional functional barriers include fatigue, (L) hemiparesis, decreased caregiver support, risk for falls and home environment  Pt also beginning to present with (L) UE flexor synergy noted with (L) elbow and digit flexion reflexive movement patterns  While pt is demonstrating significant progress, pt's impairments listed above continue to limit pt's independence and safety during ADLS, IADLS, work, social participation, and leisure activities  From OT standpoint, pt making good progress to 4 week long term goals  Continue to recommend inpatient OT services in the acute rehab setting to maximize pt's rehab potential, increase independence in all baseline areas of occupation, and decrease caregiver burden  OT D/C recommendations will continue to be made pending pt's progress  Prognosis Good   Problem List Decreased strength;Decreased endurance; Impaired balance;Decreased mobility; Decreased coordination; Impaired tone   Plan   Treatment/Interventions ADL retraining;Functional transfer training; Therapeutic exercise; Endurance training;Cognitive reorientation;Patient/family training;Equipment eval/education; Compensatory technique education; Bed mobility   Progress Progressing toward goals   Recommendation   OT Discharge Recommendation Other (Comment)  (pending progress)   OT Equipment ordered will be made pending pt progress    OT Therapy Minutes   OT Time In 0835   OT Time Out 1005   OT Total Time (minutes) 90   OT Mode of treatment - Individual (minutes) 90   OT Mode of treatment - Concurrent (minutes) 0   OT Mode of treatment - Group (minutes) 0   OT Mode of treatment - Co-treat (minutes) 0   OT Mode of Teatment - Total time(minutes) 90 minutes   Therapy Time missed   Time missed?  No     Yuridia Parisi MS, OTR/L , CBIS

## 2018-11-07 NOTE — PROGRESS NOTES
Internal Medicine Progress Note  Patient: Corrinne Ina  Age/sex: 58 y o  male  Medical Record #: [id number]      ASSESSMENT/PLAN:  Corrinne Ina is seen and examined and management for following issues:    Multiple embolic R MCA CVA:  Cont ASA/Plavix for 90 days then Aspirin and statin therapy only; EP did JAYE/LOOP implant 10/31/18  LOOP recorder implant 10/30: Will continue to watch site  Suture to be due out 11/7/18-11/10/18      HTN:  Off all meds while in acute; was previously on ARB/amlodipine/hctz;  added back Losartan 50mg daily on 10/25/18  No other changes today        DM II:  Diet controlled; recent HbA1C 6 6; pt aware and avoids excess sugar; cont sliding scale and DM diet = stopped Accuchecks/coverage since BSs had been ok      Tobacco abuse:  Cont patch; recommend cessation     Anxiety:  Cont Fluoxetine 20mg daily; neuropsychiatry to see     Left ankle and left 2nd toe pain:  Xray of foot and ankle were negative, likely gout; pt was taking Indocin 50mg 3x daily prn as outpt;  Colchicine 1 2 mg x 1, 0 6mg x 1 and a steroid taper over 4 days     Thrush:  Nystatin completed      Subjective: No overnight issues  He denies any complaints      Scheduled Meds:    Current Facility-Administered Medications:  acetaminophen 650 mg Oral Q6H PRN Sasha Carmen MD   aspirin 81 mg Oral Daily Sasha Carmen MD   atorvastatin 80 mg Oral QPM Sasha Carmen MD   clopidogrel 75 mg Oral Daily Elizabeth Heller, CRNP   docusate sodium 100 mg Oral BID Bernarda Yao PA-C   enoxaparin 40 mg Subcutaneous Q24H [address] Sasha Carmen MD   FLUoxetine 20 mg Oral Daily Elizabeth Heller, CRNP   losartan 50 mg Oral Daily Elizabeth Heller, CRNP   melatonin 6 mg Oral HS Melissa Drew MD   nicotine 14 mg Transdermal Daily Sasha Carmen MD   ondansetron 4 mg Intravenous Q6H PRN Sasha Carmen MD   oxyCODONE 2 5 mg Oral Q4H PRN Sasha Carmen MD   pneumococcal 23-valent polysaccharide vaccine 0 5 mL Subcutaneous Prior to discharge Mandeep Cao MD   polyethylene glycol 17 g Oral Daily PRN Bernarda Yao PA-C   senna 1 tablet Oral HS Bernarda Yao PA-C       Labs:       Results from last 7 days  Lab Units 11/05/18  0948 11/01/18  0611   WBC Thousand/uL 9 11 7 75   HEMOGLOBIN g/dL 13 9 13 6   HEMATOCRIT % 41 9 40 8   PLATELETS Thousands/uL 268 254       Results from last 7 days  Lab Units 11/05/18  0948 11/01/18  0611   POTASSIUM mmol/L 3 7 3 4*   CHLORIDE mmol/L 105 107   CO2 mmol/L 28 25   BUN mg/dL 11 14   CREATININE mg/dL 1 03 0 87   CALCIUM mg/dL 9 0 8 6                  Glucose, i-STAT (mg/dl)   Date Value   10/20/2018 132       Labs reviewed    Physical Examination:  Vitals:   Vitals:    11/06/18 1400 11/06/18 2101 11/07/18 0602 11/07/18 0725   BP: 152/73 140/73 145/67    BP Location: Right arm Right arm Right arm    Pulse: 72 56 (!) 48    Resp: 18 18 19    Temp: 97 9 °F (36 6 °C) 98 1 °F (36 7 °C) 98 °F (36 7 °C)    TempSrc: Oral Oral Oral    SpO2: 97% 96% 98%    Weight:    81 3 kg (179 lb 3 7 oz)   Height:         Constitutional:  NAD; pleasant; nontoxic  HEENT:  AT/NC; oropharynx + for thrush on tongue   Neck: negative for JVD  CV:  +S1, S2;  RRR; no rub/murmur; LOOP incision is w/o erythema/drainage  Pulmonary:  BBS without crackles/wheeze/rhonci; resp are unlabored  Abdominal:  soft, +BS, ND/NT; no mass  Skin:  no rashes  Musculoskeletal:  No edema  :  no nichols   Neurological/Psych:  AAO; Weak left hand grasp = can lift at shoulder and weakly flex at elbow;  LLE HF 4/5 with DF/PF 4-/5; + left facial droop and tongue deviation; speech is clear; no depression/anxiety        [ X ] Total time spent: 30 Mins and greater than 50% of this time was spent counseling/coordinating care  ** Please Note: Dragon 360 Dictation voice to text software may have been used in the creation of this document   **

## 2018-11-07 NOTE — NUTRITION
Stroke Education Series    Pt participated in skilled Stroke Education Series in a group setting to address the topic of Nutrition and Healthy Eating post stroke in both verbal and written formats  Education within this session included tips for the establishment of a healthy diet and the tools to make lifestyle changes that can aide in the prevention of sustaining another stroke  The goal of this education session was to provide the patient with the understanding of how a well balanced diet and the development of a healthy lifestyle can impact his/her health and assist in controlling cholesterol, blood pressure, diabetes, and weight  Following education, pt's response to education is: good, pt shows good interest in making changes to reduce fat/Na intake       Start Time:3180  End Time:  7938

## 2018-11-08 LAB
ANION GAP SERPL CALCULATED.3IONS-SCNC: 6 MMOL/L (ref 4–13)
BASOPHILS # BLD AUTO: 0.03 THOUSANDS/ΜL (ref 0–0.1)
BASOPHILS NFR BLD AUTO: 0 % (ref 0–1)
BUN SERPL-MCNC: 10 MG/DL (ref 5–25)
CALCIUM SERPL-MCNC: 8.9 MG/DL (ref 8.3–10.1)
CHLORIDE SERPL-SCNC: 107 MMOL/L (ref 100–108)
CO2 SERPL-SCNC: 26 MMOL/L (ref 21–32)
CREAT SERPL-MCNC: 0.89 MG/DL (ref 0.6–1.3)
EOSINOPHIL # BLD AUTO: 0.21 THOUSAND/ΜL (ref 0–0.61)
EOSINOPHIL NFR BLD AUTO: 3 % (ref 0–6)
ERYTHROCYTE [DISTWIDTH] IN BLOOD BY AUTOMATED COUNT: 14.4 % (ref 11.6–15.1)
GFR SERPL CREATININE-BSD FRML MDRD: 90 ML/MIN/1.73SQ M
GLUCOSE SERPL-MCNC: 89 MG/DL (ref 65–140)
HCT VFR BLD AUTO: 39.8 % (ref 36.5–49.3)
HGB BLD-MCNC: 13.3 G/DL (ref 12–17)
IMM GRANULOCYTES # BLD AUTO: 0.02 THOUSAND/UL (ref 0–0.2)
IMM GRANULOCYTES NFR BLD AUTO: 0 % (ref 0–2)
LYMPHOCYTES # BLD AUTO: 2.14 THOUSANDS/ΜL (ref 0.6–4.47)
LYMPHOCYTES NFR BLD AUTO: 30 % (ref 14–44)
MCH RBC QN AUTO: 29.5 PG (ref 26.8–34.3)
MCHC RBC AUTO-ENTMCNC: 33.4 G/DL (ref 31.4–37.4)
MCV RBC AUTO: 88 FL (ref 82–98)
MONOCYTES # BLD AUTO: 0.64 THOUSAND/ΜL (ref 0.17–1.22)
MONOCYTES NFR BLD AUTO: 9 % (ref 4–12)
NEUTROPHILS # BLD AUTO: 4.13 THOUSANDS/ΜL (ref 1.85–7.62)
NEUTS SEG NFR BLD AUTO: 58 % (ref 43–75)
NRBC BLD AUTO-RTO: 0 /100 WBCS
PLATELET # BLD AUTO: 256 THOUSANDS/UL (ref 149–390)
PMV BLD AUTO: 10.1 FL (ref 8.9–12.7)
POTASSIUM SERPL-SCNC: 3.8 MMOL/L (ref 3.5–5.3)
RBC # BLD AUTO: 4.51 MILLION/UL (ref 3.88–5.62)
SODIUM SERPL-SCNC: 139 MMOL/L (ref 136–145)
WBC # BLD AUTO: 7.17 THOUSAND/UL (ref 4.31–10.16)

## 2018-11-08 PROCEDURE — 80048 BASIC METABOLIC PNL TOTAL CA: CPT | Performed by: NURSE PRACTITIONER

## 2018-11-08 PROCEDURE — 97112 NEUROMUSCULAR REEDUCATION: CPT

## 2018-11-08 PROCEDURE — 97116 GAIT TRAINING THERAPY: CPT

## 2018-11-08 PROCEDURE — 97110 THERAPEUTIC EXERCISES: CPT

## 2018-11-08 PROCEDURE — G0515 COGNITIVE SKILLS DEVELOPMENT: HCPCS

## 2018-11-08 PROCEDURE — 99232 SBSQ HOSP IP/OBS MODERATE 35: CPT | Performed by: PHYSICAL MEDICINE & REHABILITATION

## 2018-11-08 PROCEDURE — 97127 HB COGNITIVE SKILLS DEVELOPMENT, EACH 15 MUNUTES: CPT

## 2018-11-08 PROCEDURE — 97530 THERAPEUTIC ACTIVITIES: CPT

## 2018-11-08 PROCEDURE — 85025 COMPLETE CBC W/AUTO DIFF WBC: CPT | Performed by: NURSE PRACTITIONER

## 2018-11-08 RX ADMIN — ASPIRIN 81 MG: 81 TABLET, COATED ORAL at 08:33

## 2018-11-08 RX ADMIN — MELATONIN 6 MG: at 21:34

## 2018-11-08 RX ADMIN — ATORVASTATIN CALCIUM 80 MG: 80 TABLET, FILM COATED ORAL at 17:17

## 2018-11-08 RX ADMIN — DOCUSATE SODIUM 100 MG: 100 CAPSULE, LIQUID FILLED ORAL at 08:33

## 2018-11-08 RX ADMIN — CLOPIDOGREL 75 MG: 75 TABLET, FILM COATED ORAL at 08:33

## 2018-11-08 RX ADMIN — NICOTINE 14 MG: 14 PATCH, EXTENDED RELEASE TRANSDERMAL at 08:32

## 2018-11-08 RX ADMIN — FLUOXETINE 20 MG: 20 CAPSULE ORAL at 08:33

## 2018-11-08 RX ADMIN — LOSARTAN POTASSIUM 50 MG: 50 TABLET, FILM COATED ORAL at 08:33

## 2018-11-08 NOTE — PROGRESS NOTES
11/08/18 1330   Pain Assessment   Pain Assessment 0-10   Pain Score No Pain   Restrictions/Precautions   Precautions Fall Risk   Cognition   Overall Cognitive Status WFL   Arousal/Participation Cooperative; Alert   Attention Within functional limits   Orientation Level Oriented X4   Memory Within functional limits   Following Commands Follows multistep commands with increased time or repetition   Subjective   Subjective reports it is hard to move his arm but he is trying   QI: Sit to Stand   Assistance Needed Supervision   Sit to Stand CARE Score 4   QI: Chair/Bed-to-Chair Transfer   Assistance Needed Incidental touching   Chair/Bed-to-Chair Transfer CARE Score 4   Transfer Bed/Chair/Wheelchair   Limitations Noted In Balance; Endurance;LE Strength   Stand Pivot Contact Guard   Sit to Stand Supervision   Stand to Sit Supervision   Bed, Chair, Wheelchair Transfer (FIM) 4 - Patient requires steadying assist or light touching   QI: Walk 10 Feet   Assistance Needed Incidental touching   Walk 10 Feet CARE Score 4   QI: Walk 50 Feet with Two Turns   Assistance Needed Incidental touching   Walk 50 Feet with Two Turns CARE Score 4   QI: Walk 150 Feet   Assistance Needed Incidental touching   Walk 150 Feet CARE Score 4   Ambulation   Does the patient walk? 2  Yes   Primary Discharge Mode of Locomotion Walk   Walk Assist Level Contact Guard   Gait Pattern Inconsistant Soraida;Decreased foot clearance; Step through; Improper weight shift   Distance Walked (feet) 150 ft   Limitations Noted In Balance; Endurance;Speed;Strength;Swing   Walking (FIM) 4 - Patient requires steadying assist or light touching AND distance 150 feet or more, no rest   Wheelchair mobility   QI: Does the patient use a wheelchair? 0   No   Assessment   Treatment Assessment 30 min cotx with OT focus on reaching with LUE, see OT note for details; PT focus on standing and reaching to vary CoG over LIZA which was fairly narrow; increased speed of fatigue with static stance as compared to amb, but consider TOD and earlier activity; good safety awareness for balance and xfers; consistently has good sequencing for <> STS xfers; continue to increase strength, balance, and coordination for functional mobility and activity; continue POC as per PT   Problem List Decreased strength;Decreased endurance; Impaired balance;Decreased mobility; Decreased coordination; Impaired tone   Barriers to Discharge Decreased caregiver support   PT Barriers   Functional Limitation Stair negotiation; Walking;Standing;Transfers;Car transfers   Plan   Treatment/Interventions ADL retraining;Functional transfer training;LE strengthening/ROM; Elevations; Therapeutic exercise; Endurance training;Patient/family training;Gait training;Bed mobility   Progress Progressing toward goals   Recommendation   Recommendation Outpatient PT; Home with family support   PT Therapy Minutes   PT Time In 1330   PT Time Out 1400   PT Total Time (minutes) 30   PT Mode of treatment - Individual (minutes) 0   PT Mode of treatment - Concurrent (minutes) 0   PT Mode of treatment - Group (minutes) 0   PT Mode of treatment - Co-treat (minutes) 30   PT Mode of Teatment - Total time(minutes) 30 minutes   Therapy Time missed   Time missed?  No

## 2018-11-08 NOTE — PROGRESS NOTES
11/08/18 0930   Pain Assessment   Pain Assessment 0-10   Pain Score No Pain   Restrictions/Precautions   Precautions Fall Risk   Braces or Orthoses (givmohr)   Cognition   Overall Cognitive Status WFL   Arousal/Participation Cooperative; Alert   Attention Within functional limits   Orientation Level Oriented X4   Memory Within functional limits   Following Commands Follows multistep commands with increased time or repetition   Subjective   Subjective reports he is doing well and wants to work hard to get better   QI: Sit to Stand   Assistance Needed Supervision   Sit to Stand CARE Score 4   QI: Chair/Bed-to-Chair Transfer   Assistance Needed Incidental touching   Chair/Bed-to-Chair Transfer CARE Score 4   Transfer Bed/Chair/Wheelchair   Limitations Noted In Balance; Endurance;LE Strength   Stand Pivot Contact Guard   Sit to Stand Supervision   Stand to Sit Supervision   Bed, Chair, Wheelchair Transfer (FIM) 4 - Patient requires steadying assist or light touching   QI: Walk 10 Feet   Assistance Needed Incidental touching   Walk 10 Feet CARE Score 4   QI: Walk 50 Feet with Two Turns   Assistance Needed Incidental touching   Walk 50 Feet with Two Turns CARE Score 4   QI: Walk 150 Feet   Assistance Needed Incidental touching   Walk 150 Feet CARE Score 4   QI: Walking 10 Feet on Uneven Surfaces   Assistance Needed Incidental touching   Walking 10 Feet on Uneven Surfaces CARE Score 4   Ambulation   Does the patient walk? 2  Yes   Primary Discharge Mode of Locomotion Walk   Walk Assist Level Contact Guard   Gait Pattern Inconsistant Soraida;Decreased foot clearance; Step through;Narrow LIZA   Distance Walked (feet) 700 ft  (additional amb trials, see below)   Limitations Noted In Balance; Coordination; Endurance;Speed;Strength;Swing   Walking (FIM) 4 - Patient requires steadying assist or light touching AND distance 150 feet or more, no rest   Wheelchair mobility   QI: Does the patient use a wheelchair? 0   No   QI: 1 Step (Curb)   Assistance Needed Incidental touching   1 Step (Curb) CARE Score 4   QI: 4 Steps   Assistance Needed Incidental touching   4 Steps CARE Score 4   QI: 12 Steps   Assistance Needed Incidental touching   12 Steps CARE Score 4   Stairs   Type Stairs   # of Steps 12   Weight Bearing Precautions Fall Risk   Assist Devices Single Rail   Findings reciprocal up and down   Stairs (FIM) 4 - Patient requires steadying assist or light touching AND patient goes up and down full flight (12- 14 stairs)   Therapeutic Interventions   Flexibility hamstring and gastroc 60"x2   Balance retro amb, amb outside, ball kick, reaching and lifting opposite leg for SLS   Neuromuscular Re-Education amb at higher speed and circuits with stairs   Other Comments   Comments amb trials with CG gait belt and no AD: 700'x1, 350'x3, 150'x1 then 12 stairs circuit x2, 200'x1 outside   Assessment   Treatment Assessment pt continues to show improvement toward goals; amb is consistently CG with gait belt for this session with minor disturbances in balance but he has good righting reactions for correcting; occasionally drags his LLE toes but this is after fatigue; LLE hyperextension is limited when he is not faigued but as he fatigues it becomes harder for him to control; circuit training with amb and stairs with good balance and endurance but he needs to rest after 150' amb and 12 stairs; good awareness of when he needs to rest for safety; LLE continues to be weak with hip flexion which makes clearance on stairs difficult at times but he is able to notice and correct with no assistance or VC; SLS is difficult for pt, especially on the L due to decreased strength and control of hamstrings, quads overpower the hamstrings and force hyperextension; SLS on the RLE is better but still unstable; continue to increase balance, walk with no AD, and stair training to increase hip flexion strength and increase hamstring strength and coordination to help limit hyperextension; continue POC as per PT   Problem List Decreased strength;Decreased endurance; Impaired balance;Decreased mobility; Decreased coordination; Impaired tone   Barriers to Discharge Decreased caregiver support   PT Barriers   Functional Limitation Stair negotiation; Walking;Standing;Transfers;Car transfers   Plan   Treatment/Interventions ADL retraining;Functional transfer training;LE strengthening/ROM; Elevations; Therapeutic exercise; Endurance training;Patient/family training;Bed mobility;Gait training   Recommendation   Recommendation Outpatient PT; Home with family support   PT Therapy Minutes   PT Time In 0930   PT Time Out 1100   PT Total Time (minutes) 90   PT Mode of treatment - Individual (minutes) 90   PT Mode of treatment - Concurrent (minutes) 0   PT Mode of treatment - Group (minutes) 0   PT Mode of treatment - Co-treat (minutes) 0   PT Mode of Teatment - Total time(minutes) 90 minutes   Therapy Time missed   Time missed?  No

## 2018-11-08 NOTE — PROGRESS NOTES
Internal Medicine Progress Note  Patient: Alissa Underwood  Age/sex: 58 y o  male  Medical Record #: [id number]      ASSESSMENT/PLAN:  Alissa Underwood is seen and examined and management for following issues:    Multiple embolic R MCA CVA:  Cont ASA/Plavix for 90 days then Aspirin and statin therapy only; EP did JAYE/LOOP implant 10/31/18  LOOP recorder implant 10/30: Will continue to watch site  Suture to be due out 11/7/18-11/10/18      HTN:  Off all meds while in acute; was previously on ARB/amlodipine/hctz;  added back Losartan 50mg daily on 10/25/18  No changes today        DM II:  Diet controlled; recent HbA1C 6 6; pt aware and avoids excess sugar; cont sliding scale and DM diet = stopped Accuchecks/coverage since BSs had been ok      Tobacco abuse:  Cont patch; recommend cessation     Anxiety:  Cont Fluoxetine 20mg daily; neuropsychiatry to see     Left ankle and left 2nd toe pain:  Xray of foot and ankle were negative, likely gout; pt was taking Indocin 50mg 3x daily prn as outpt;  Colchicine 1 2 mg x 1, 0 6mg x 1 and a steroid taper over 4 days     Thrush:  Nystatin completed      Subjective: No overnight issues  He denies any complaints      Scheduled Meds:    Current Facility-Administered Medications:  acetaminophen 650 mg Oral Q6H PRN Ambrose Welsh MD   aspirin 81 mg Oral Daily Ambrose Welsh MD   atorvastatin 80 mg Oral QPM Ambrose Welsh MD   clopidogrel 75 mg Oral Daily Elizabeth PATEL Heller   docusate sodium 100 mg Oral BID Bernarda Yao PA-C   FLUoxetine 20 mg Oral Daily Elizabeth Heller, CANDENP   losartan 50 mg Oral Daily PATEL Gautam   melatonin 6 mg Oral HS Remus Stairs, MD   nicotine 14 mg Transdermal Daily Ambrose Welsh MD   ondansetron 4 mg Intravenous Q6H PRN Ambrose Welsh MD   oxyCODONE 2 5 mg Oral Q4H PRN Ambrose Welsh MD   pneumococcal 23-valent polysaccharide vaccine 0 5 mL Subcutaneous Prior to discharge Ambrose Welsh MD   polyethylene glycol 17 g Oral Daily PRN Bernarda BAILEY Yao   senna 1 tablet Oral HS Bernarda BAILEY Yao       Labs:       Results from last 7 days  Lab Units 11/08/18  0649 11/05/18  0948   WBC Thousand/uL 7 17 9 11   HEMOGLOBIN g/dL 13 3 13 9   HEMATOCRIT % 39 8 41 9   PLATELETS Thousands/uL 256 268       Results from last 7 days  Lab Units 11/08/18  0649 11/05/18  0948   POTASSIUM mmol/L 3 8 3 7   CHLORIDE mmol/L 107 105   CO2 mmol/L 26 28   BUN mg/dL 10 11   CREATININE mg/dL 0 89 1 03   CALCIUM mg/dL 8 9 9 0                  Glucose, i-STAT (mg/dl)   Date Value   10/20/2018 132       Labs reviewed    Physical Examination:  Vitals:   Vitals:    11/07/18 0725 11/07/18 1424 11/07/18 2045 11/08/18 0646   BP:  145/82 153/71 122/65   BP Location:  Right arm Right arm Right arm   Pulse:  62 (!) 46 (!) 50   Resp:  20 18 18   Temp:  97 6 °F (36 4 °C) 98 °F (36 7 °C) 98 2 °F (36 8 °C)   TempSrc:  Oral Oral Oral   SpO2:  95% 94% 94%   Weight: 81 3 kg (179 lb 3 7 oz)      Height:         Constitutional:  NAD; pleasant; nontoxic  HEENT:  AT/NC; oropharynx + for thrush on tongue   Neck: negative for JVD  CV:  +S1, S2;  RRR; no rub/murmur; LOOP incision is w/o erythema/drainage  Pulmonary:  BBS without crackles/wheeze/rhonci; resp are unlabored  Abdominal:  soft, +BS, ND/NT; no mass  Skin:  no rashes  Musculoskeletal:  No edema  :  no nichols   Neurological/Psych:  AAO; Weak left hand grasp = can lift at shoulder and weakly flex at elbow;  LLE HF 4/5 with DF/PF 4-/5; + left facial droop and tongue deviation; speech is clear; no depression/anxiety        [ X ] Total time spent: 30 Mins and greater than 50% of this time was spent counseling/coordinating care  ** Please Note: Dragon 360 Dictation voice to text software may have been used in the creation of this document   **

## 2018-11-08 NOTE — PROGRESS NOTES
Physical Medicine and Rehabilitation Progress Note  Kian Dubon 58 y o  male MRN: [id number]  Unit/Bed#: -01 Encounter: [id number]    HPI: Patient is 57 yo male with right cerebral infarcts likely embolic in nature     Chief Complaint/Subjective: Patient denies any events overnight     ROS:  negative unless noted above     Assessment/Plan:      DM (diabetes mellitus) ([place] [address] )   Assessment & Plan    · Not on any meds at home  · Per IM ISS or accuchecks no longer needed based on blood sugars      Gout   Assessment & Plan    · Left ankle  · Per patient was Indocin 50mg TID PTA however was not able to tolerate due to diarrhea and has not been taking for 2 months   · XR of left ankle and foot negative   · s/p colchicine and steroid taper per IM  · Resolved per patient   · Of note per patient this is only the 2nd gout flare patient has ever had     Hyperlipidemia   Assessment & Plan    · Home zocor 20 mg changed to lipitor 80 mg      Anxiety   Assessment & Plan    · No record of any benzodiazepines (or any other controlled medications) found in the [facility] database going back 1 year  · Neuropsychology following       Hypertension   Assessment & Plan    · IM to clarify home meds as records are not clear ( olmesartan vs valsartan ?, HCTZ 25 mg vs 50 mg ? norvasc ?)  · Currently on cozaar per IM      Dysphagia   Assessment & Plan    · SLP following  · Modified diet per SLP recs      * Right sided cerebral hemisphere cerebrovascular accident (CVA) ([facility] )   Assessment & Plan    · On DAPT for 90 days, with aspirin/plavix, followed by monotherapy with ASA  · Possibly cardioembolic in etiology   Consulted EP for eval/placement of loop recorder however they recommend JAYE first which was negative for thrombus in MICHELLE however did reveal aortic atheroma, will d/w neuro (Dr Tigist Avalos) if they feel the atheroma is the etiology of the stroke and did not feel that atheroma was likely severe enough to change from  to [facility] even if this was the potential etiology so management recommendations per neuro was unchanged and patient had LOOP placed   · Fluoxetine 20mg post stroke for motor recovery         Scheduled Meds:    Current Facility-Administered Medications:  acetaminophen 650 mg Oral Q6H PRN Abimbola Iraheta MD   aspirin 81 mg Oral Daily Abimbola Iraheta MD   atorvastatin 80 mg Oral QPM Abimbola Iraheta MD   clopidogrel 75 mg Oral Daily PATEL Gautam   docusate sodium 100 mg Oral BID Bernarda Yao PA-C   FLUoxetine 20 mg Oral Daily PATEL Gautam   losartan 50 mg Oral Daily PATEL Gautam   melatonin 6 mg Oral HS Rayray Rivera MD   nicotine 14 mg Transdermal Daily Abimbola Iraheta MD   ondansetron 4 mg Intravenous Q6H PRN Abimbola Iraheta MD   oxyCODONE 2 5 mg Oral Q4H PRN Abimbola Iraheta MD   pneumococcal 23-valent polysaccharide vaccine 0 5 mL Subcutaneous Prior to discharge Abimbola Iraheta MD   polyethylene glycol 17 g Oral Daily PRN Bernarda Yao PA-C   senna 1 tablet Oral HS Bernarda Yao PA-C        Incidental findings  1) thyroid nodules: per radiology report recs non-emergent OP thyroid U/S, OP FU with PCP to arrange U/S  2) subpleural bullae: OP FU with PCP with further testing/treatment and/or specialist referral at PCP's discretion     Note: patient noted to have petechial hemorrhage on imaging however has been cleared for DAPT and lovenox (for DVT ppx) by neuro  DVT ppx: SCDs, ambulating adequate distances   Dispo: reteam     Objective:    Functional Update:  Mobility: min  Transfers: min  ADLs: min-mod         Physical Exam:          General: alert, no apparent distress, cooperative and comfortable  HEENT:  Head: Normocephalic, no lesions, without obvious abnormality    CARDIAC:  +S1/2  LUNGS:  respirations unlabored  ABDOMEN:  soft   EXTREMITIES:  no signficant LE edema  NEURO:   awake, alert, appropriately answering questions, 2/5 LUE, +dysarthria (essentially resolved)  PSYCH:  mood/affect currently stable      Diagnostic Studies: reviewed, no new imaging  No orders to display       Laboratory:      Results from last 7 days  Lab Units 11/08/18  0649 11/05/18  0948   HEMOGLOBIN g/dL 13 3 13 9   HEMATOCRIT % 39 8 41 9   WBC Thousand/uL 7 17 9 11       Results from last 7 days  Lab Units 11/08/18  0649 11/05/18  0948   BUN mg/dL 10 11   POTASSIUM mmol/L 3 8 3 7   CHLORIDE mmol/L 107 105   CREATININE mg/dL 0 89 1 03            Patient Active Problem List   Diagnosis    Right sided cerebral hemisphere cerebrovascular accident (CVA) ([facility] )    Dysphagia    Hypertension    Anxiety    Hyperlipidemia    Gout    [facility]           Total visit time:  At least 25 minutes, with more than 50% spent counseling/coordinating care

## 2018-11-08 NOTE — PROGRESS NOTES
Occupational Therapy Treatment Note       11/08/18 9330   Pain Assessment   Pain Assessment No/denies pain   Restrictions/Precautions   Precautions Fall Risk   Braces or Orthoses ((L) givemohr during transfers/mobility)   Lifestyle   Autonomy "I feel like this was better than yesterday"    QI: Sit to Stand   Assistance Needed Physical assistance   Assistance Provided by [place] 25%-49%   Comment no device    Sit to Stand CARE Score 3   QI: Chair/Bed-to-Chair Transfer   Assistance Needed Physical assistance   Assistance Provided by [place] 25%-49%   Comment no device    Chair/Bed-to-Chair Transfer CARE Score 3   Transfer Bed/Chair/Wheelchair   Positioning Concerns Hemiplegia   Limitations Noted In Balance; Endurance;UE Strength;LE Strength;Confidence; Coordination   Adaptive Equipment None   Sit to Stand Minimal;Assist x 1   Stand to Sit Minimal;Assist x 1   Findings min assist for functional mobility/transfers using no device with gait belt donned    Bed, Chair, Wheelchair Transfer (FIM) 4 - Patient requires steadying assist or light touching   QI: Toilet Transfer   Assistance Needed Physical assistance   Assistance Provided by [place] 25%-49%   Toilet Transfer CARE Score 3   Toilet Transfer   Surface Assessed Standard Toilet   Transfer Technique Standard   Limitations Noted In Balance; Endurance; Safety;UE Strength;LE Strength   Adaptive Equipment Grab Bar   Toilet Transfer (FIM) 4 - Patient requires steadying assist or light touching   ROM - Left Upper Extremities    L Shoulder AAROM; Flexion; Extension   L Elbow AAROM;Elbow extension;Elbow flexion   L Wrist AAROM;Wrist flexion;Wrist extension;PROM   L Position Seated;Standing   LUE ROM Comment Pt engaged in repetitive AAROM of (L) UE to increase functional use of (L) UE for participation in ADL/IADLS tasks  While seated on mat in front of mirror for visual feedback pt engaged in repetitive (L) shoulder and elbow flexion/extension   Pt able to complete 1 set of 10 reps scapular elevation/depression with AROM to 3/4 range  OTR/L then provided point of control at (L) forearm and bicep during shoulder flexion/extension  Pt presented with decreased compensatory (L) trapezius movement patterns, requiring only min tactile cues to facilitate decreased compensatory movement  Pt then completed (L) elbow flexion/extension with point of control at (L) wrist and tapping provided to bicep/tricep to facilitiate appropriate movement patterns and decrease compensatory movements  Pt completed AAROM repetisiously over 25 minute period with rest breaks as needed 2* fatigue  Later in session during co tx with PT pt engaged in simulating functional reaching of (L) UE while in stance  Pt required points of control at (L) elbow, wrist, and minimal tactile cueing at (L) trapezius to decrease compensatory movement patterns, pt completed functional reaching 25x with rest breaks as needed 2* fatigue  While seated at tabletop using arm skate pt engaged in (L) shoulder flexion/extension 20x with rest breaks as needed 2* fatigue  Pt required assistance to maintain neutral position of forearm and decrease flexor synergy pattern at elbow during shoulder flexion  Pt then completed (L) wrist flexion/extension 10x with (L) UE positioned on towel on tabletop while pt seated  Pt able to initiate (L) wrist flexion and complete 1/2 range but requires total assistance for flexion in gravity eliminated plane  Pt will continue to benefit from (L) UE AROM/AAROM/PROM using mirror to provide visual feedback, arm skate, and plan to trial vibration to increase functional use of (L) UE during ADL/IADL/work and social participation activities  Of note, pt benefits from performing motion with (R) UE and using mirror to provide visual feedback prior to and during performing ROM with (L) UE      Cognition   Overall Cognitive Status WFL   Comments while pt's cognitive status overall WFL, pt reports decreased memory and concentration during functional activities while using computer and checking email  OTR/L educated pt on compensatory strategy to recall passwords, which pt required assistance to re-set for Apple ID  Pt wrote down parts of password to intiate recall of password  Additional Activities   Additional Activities Comments pt reporting decreased sleep hygiene pattern and concentration 2* constantly "thinking about a lot of things" including - work, finances, rehab, family  OTR/L encouraged pt to speak with neuropsychologist about this, pt in agreement  OTR/L also suggested using calming strategies to improve sleep hygiene routine, including downloading relaxation sylwia  Attempted to complete however unable to fully access 2* difficulties with pt's phone  OTR/L wrote down information for free relaxation sylwia pt can download  Activity Tolerance   Activity Tolerance Patient tolerated treatment well   Medical Staff Made Aware spoke with PTA Lela Contreras and RN Tariq Graves made aware pt able to complete functional mobility to/from bathroom with min/mod assist x1 using gait belt donned, stand pivot transfers if fatigued  OTR/L demonstrated to RN how to don Givmohr sling and gait belt and assist pt to/from bathroom and on/off toilet  RN with no further questions and will pass along education in report  Assessment   Treatment Assessment Pt participated in skilled 68 minute OT tx session focused on (L) UE neuromuscular re-education through ROM of (L) UE to increase functional use during ADL/IADL tasks, 30 minute cotx with OT focus on (L) UE neuromuscular re-education while pt in stance, education on improving sleep hygiene and relaxation strategies, and staff education  See above for further details on functional performance  Pt presents with decreased (L) UE compensatory movement patterns during ROM, demonstrating progress towards improving functional use of (L) UE  OTR/L provided pt with information on free relaxation sylwia to improve sleep hygiene  Spoke with PTA and RN, pt progressed to able to complete functional mobility to/from bathroom with nursing staff with givmohr sling and gait belt donkraig - OTR/L demonstrated to Vascular Imaging who will pass along in report  Continue OT plan of care with focus on (L) UE neuromuscular re-education and ROM using vibration stimulation and with mirror to provide pt with visual feedback, standing balance/tolerance, promoting relaxation/coping/sleep hygiene strategies  Prognosis Good   Problem List Decreased strength;Decreased endurance; Impaired balance;Decreased mobility; Decreased coordination   Plan   Treatment/Interventions ADL retraining;Functional transfer training; Therapeutic exercise; Endurance training;Patient/family training;Equipment eval/education; Compensatory technique education   Progress Progressing toward goals   Recommendation   OT Discharge Recommendation (pending progress )   OT Therapy Minutes   OT Time In 1230   OT Time Out 1408   OT Total Time (minutes) 98   OT Mode of treatment - Individual (minutes) 68   OT Mode of treatment - Concurrent (minutes) 0   OT Mode of treatment - Group (minutes) 0   OT Mode of treatment - Co-treat (minutes) 30   OT Mode of Teatment - Total time(minutes) 98 minutes   Therapy Time missed   Time missed?  No       Yuridia Parisi, MS, OTR/L , CBIS

## 2018-11-08 NOTE — PROGRESS NOTES
11/08/18 1100   Pain Assessment   Pain Assessment No/denies pain   Pain Score No Pain   Restrictions/Precautions   Precautions Fall Risk   Memory Skills   Memory (FIM) 6 - Recognizes with extra time   Social Interaction (FIM) 6 - Interacts appropriately with others BUT requires extra  time   Speech/Language/Cognition Assessmetn   Treatment Assessment Pt seen for skilled ST session focusing on higher level cognitive linguistic skills  Began session engaging in a deduction task which pt was required to utilize cues to determine responses which also targeted reasoning, attention to detail, and working memory  Pt completed task w/ a single indirect verbal cue to which he demonstrated carryover of strategies SLP introduced prior to beginning task which assists w/ pacing and working memory  Pt then presented w/ a word deduction task which included word finding skills through letter exclusion  Overall, pt completed w/ min assist level for for finding skills but again was able to carryover letter exclusion portion of task for each trial w/o need for cues  Of note, during task pt reported difficulty w/ word finding and spelling which he states that was not difficult prior to his stroke  Lastly, pt engaged in a functional money/mathematics task where he broke down a given amt of money into change  Pt w/ immediate comprehension of task and demonstrated ability to determine correct amts/calculations in 13/15 trials, requiring only indirect verbal cues to check his responses for both errored trials  Following tasks, pt reported cognitive fatigue and engaged in discussion w/ SLP stating that he desires to return to work following his recovery but realizes that his cognition is not at baseline  Re-educated on impact of stroke on cognition, along w/ recovery process where pt re-stated his goals in this area   While pt is demonstrating functional basic level cognitive linguistic skills, pt continues to present w/ slower processing, cognitive fatigue, decreased abstract thinking and difficulty w/ more complex info  Pt will benefit from further skilled ST intervention to maximize higher level cognitive linguistic skills at this time  SLP Therapy Minutes   SLP Time In 1100   SLP Time Out 1130   SLP Total Time (minutes) 30   SLP Mode of treatment - Individual (minutes) 30   SLP Mode of treatment - Concurrent (minutes) 0   SLP Mode of treatment - Group (minutes) 0   SLP Mode of treatment - Co-treat (minutes) 0   SLP Mode of Teatment - Total time(minutes) 30 minutes   Therapy Time missed   Time missed?  No   Daily FIM Score   Problem solving (FIM) 5 - Solves complex problems But requires cues from helper   Comprehension (FIM) 6 - Understands complex/abstract but requires more time   Expression (FIM) 6 - Has only MILD difficulty with complex/abstract info

## 2018-11-09 PROBLEM — R00.1 BRADYCARDIA: Status: ACTIVE | Noted: 2018-11-09

## 2018-11-09 PROCEDURE — 99232 SBSQ HOSP IP/OBS MODERATE 35: CPT | Performed by: PHYSICAL MEDICINE & REHABILITATION

## 2018-11-09 PROCEDURE — 97112 NEUROMUSCULAR REEDUCATION: CPT

## 2018-11-09 PROCEDURE — 97127 HB COGNITIVE SKILLS DEVELOPMENT, EACH 15 MUNUTES: CPT

## 2018-11-09 PROCEDURE — 97530 THERAPEUTIC ACTIVITIES: CPT

## 2018-11-09 PROCEDURE — G0515 COGNITIVE SKILLS DEVELOPMENT: HCPCS

## 2018-11-09 PROCEDURE — 97116 GAIT TRAINING THERAPY: CPT

## 2018-11-09 RX ORDER — LANOLIN ALCOHOL/MO/W.PET/CERES
9 CREAM (GRAM) TOPICAL
Status: DISCONTINUED | OUTPATIENT
Start: 2018-11-09 | End: 2018-11-21 | Stop reason: HOSPADM

## 2018-11-09 RX ADMIN — CLOPIDOGREL 75 MG: 75 TABLET, FILM COATED ORAL at 08:34

## 2018-11-09 RX ADMIN — MELATONIN 9 MG: at 21:25

## 2018-11-09 RX ADMIN — DOCUSATE SODIUM 100 MG: 100 CAPSULE, LIQUID FILLED ORAL at 17:04

## 2018-11-09 RX ADMIN — FLUOXETINE 20 MG: 20 CAPSULE ORAL at 08:34

## 2018-11-09 RX ADMIN — NICOTINE 14 MG: 14 PATCH, EXTENDED RELEASE TRANSDERMAL at 08:34

## 2018-11-09 RX ADMIN — ASPIRIN 81 MG: 81 TABLET, COATED ORAL at 08:34

## 2018-11-09 RX ADMIN — LOSARTAN POTASSIUM 50 MG: 50 TABLET, FILM COATED ORAL at 08:34

## 2018-11-09 RX ADMIN — ATORVASTATIN CALCIUM 80 MG: 80 TABLET, FILM COATED ORAL at 17:04

## 2018-11-09 RX ADMIN — DOCUSATE SODIUM 100 MG: 100 CAPSULE, LIQUID FILLED ORAL at 08:34

## 2018-11-09 NOTE — PROGRESS NOTES
11/09/18 0910   Pain Assessment   Pain Assessment No/denies pain   Pain Score No Pain   Restrictions/Precautions   Precautions Fall Risk   Memory Skills   Memory (FIM) 6 - Recognizes with extra time   Social Interaction (FIM) 6 - Interacts appropriately with others BUT requires medication for control   Speech/Language/Cognition Assessmetn   Treatment Assessment Pt seen for continued skilled ST targeting higher level, more abstract cognitive linguistic skills  Began session where pt demonstrated accurate STM recall of previous therapy tasks, conversations and his progress in therapy  Pt then engaged in a problem solving/topographical orientation task where pt was presented w/ a mock map of a city  Pt demonstrated ability to correctly answer 15/15 questions by locating items, determining routes, etc which required concrete and abstract reasoning skills  Pt then presented w/ an additional reasoning/logic task where he was presented w/ sequences of numbers and required to state next two numbers in sequence  Overall, pt completed task at min assist to supervision level which at times sequences were more basic/direct and others were more complex  Pt frequently able to ID and describe sequence but required verbal cue to state first number in new sequence  Slower processing noted at times which pt also commented on  When explaining rationale for task, pt then initiated discussion about his current job and his duties, reporting that much of his job requires him to problem solve w/ more abstract thinking, providing alternative solutions to problems  Discussed targeting these types of skills in upcoming sessions  At this time, pt will continue to benefit from skilled ST intervention to maximize higher level cognitive linguistic skills      SLP Therapy Minutes   SLP Time In 5388   SLP Time Out 0950   SLP Total Time (minutes) 40   SLP Mode of treatment - Individual (minutes) 40   SLP Mode of treatment - Concurrent (minutes) 0 SLP Mode of treatment - Group (minutes) 0   SLP Mode of treatment - Co-treat (minutes) 0   SLP Mode of Teatment - Total time(minutes) 40 minutes   Therapy Time missed   Time missed? No   Daily FIM Score   Problem solving (FIM) 5 - Solves complex problems But requires cues from helper   Comprehension (FIM) 6 - Understands complex/abstract but requires  glasses for visual comp   Expression (FIM) 7 - Expresses complex/abstract ideas in a reasonable time w/o devices or helper

## 2018-11-09 NOTE — PROGRESS NOTES
Internal Medicine Progress Note  Patient: Patience Driver  Age/sex: 58 y o  male  Medical Record #: [id number]      ASSESSMENT/PLAN:  Patience Driver is seen and examined and management for following issues:    Multiple embolic R MCA CVA:  Cont ASA/Plavix for 90 days then Aspirin and statin therapy only; EP did JAYE/LOOP implant 10/31/18  LOOP recorder implant 10/30:  Suture to be due out 11/7/18-11/10/18      HTN:  Off all meds while in acute; was previously on ARB/amlodipine/hctz;  added back Losartan 50mg daily on 10/25/18  No changes today        DM II:  Diet controlled = stopped Accuchecks/coverage since BSs had been ok      Tobacco abuse:  Cont patch; recommend cessation     Anxiety:  Cont Fluoxetine 20mg daily; neuropsychiatry to see     Left ankle and left 2nd toe pain:  Xray of foot and ankle were negative, likely gout; pt was taking Indocin 50mg 3x daily prn as outpt;  Colchicine 1 2 mg x 1, 0 6mg x 1 and a steroid taper over 4 days     Thrush:  Nystatin completed      Subjective: No overnight issues  He denies any complaints      Scheduled Meds:    Current Facility-Administered Medications:  acetaminophen 650 mg Oral Q6H PRN Yoon Blanco MD   aspirin 81 mg Oral Daily Yoon Blanco MD   atorvastatin 80 mg Oral QPM Yoon Blanco MD   clopidogrel 75 mg Oral Daily Elizabeth Heller, CRNP   docusate sodium 100 mg Oral BID Bernarda Yao PA-C   FLUoxetine 20 mg Oral Daily Elizabeth Heller, CRNP   losartan 50 mg Oral Daily Elizabeth Heller, CRNP   melatonin 6 mg Oral HS Nara Ramos MD   nicotine 14 mg Transdermal Daily Yoon Blanco MD   ondansetron 4 mg Intravenous Q6H PRN Yoon Blanco MD   oxyCODONE 2 5 mg Oral Q4H PRN Yoon Blanco MD   pneumococcal 23-valent polysaccharide vaccine 0 5 mL Subcutaneous Prior to discharge Yoon Blanco MD   polyethylene glycol 17 g Oral Daily PRN Bernarda Yao PA-C   senna 1 tablet Oral HS Bernarda Yao PA-C       Labs:       Results from last 7 days  Lab Units 11/08/18  0649 11/05/18  0948   WBC Thousand/uL 7 17 9 11   HEMOGLOBIN g/dL 13 3 13 9   HEMATOCRIT % 39 8 41 9   PLATELETS Thousands/uL 256 268       Results from last 7 days  Lab Units 11/08/18  0649 11/05/18  0948   POTASSIUM mmol/L 3 8 3 7   CHLORIDE mmol/L 107 105   CO2 mmol/L 26 28   BUN mg/dL 10 11   CREATININE mg/dL 0 89 1 03   CALCIUM mg/dL 8 9 9 0                  Glucose, i-STAT (mg/dl)   Date Value   10/20/2018 132       Labs reviewed    Physical Examination:  Vitals:   Vitals:    11/08/18 0646 11/08/18 1252 11/08/18 2021 11/09/18 0700   BP: 122/65 130/64 137/72 125/73   BP Location: Right arm Right arm Right arm Right arm   Pulse: (!) 50 60 (!) 51 (!) 48   Resp: 18 18 16 16   Temp: 98 2 °F (36 8 °C) 97 6 °F (36 4 °C) 97 6 °F (36 4 °C) 97 8 °F (36 6 °C)   TempSrc: Oral Oral Oral Oral   SpO2: 94% 95% 93% 97%   Weight:       Height:         Constitutional:  NAD; pleasant; nontoxic  HEENT:  AT/NC; oropharynx + for thrush on tongue   Neck: negative for JVD  CV:  +S1, S2;  RRR; no rub/murmur; LOOP incision is w/o erythema/drainage  Pulmonary:  BBS without crackles/wheeze/rhonci; resp are unlabored  Abdominal:  soft, +BS, ND/NT; no mass  Skin:  no rashes  Musculoskeletal:  No edema  :  no nichols   Neurological/Psych:  AAO; Weak left hand grasp = can lift at shoulder and weakly flex at elbow;  LLE HF 4/5 with DF/PF 4-/5; + left facial droop and tongue deviation; speech is clear; no depression/anxiety        [ X ] Total time spent: 30 Mins and greater than 50% of this time was spent counseling/coordinating care  ** Please Note: Dragon 360 Dictation voice to text software may have been used in the creation of this document   **

## 2018-11-09 NOTE — PROGRESS NOTES
Physical Medicine and Rehabilitation Progress Note  Felipe Garciaschester 58 y o  male MRN: [id number]  Unit/Bed#: -01 Encounter: [id number]    HPI: Patient is 57 yo male with right cerebral infarcts likely embolic in nature     Chief Complaint/Subjective: Patient without complaint currently     ROS:  negative unless noted above     Assessment/Plan:      Bradycardia   Assessment & Plan    · Asymptomatic  · BP WNL  · d/w IM and are monitoring at this time      DM (diabetes mellitus) ([facility] )   Assessment & Plan    · Not on any meds at home  · Per IM ISS or accuchecks no longer needed based on blood sugars      Gout   Assessment & Plan    · Left ankle  · Per patient was Indocin 50mg TID PTA however was not able to tolerate due to diarrhea and has not been taking for 2 months   · XR of left ankle and foot negative   · s/p colchicine and steroid taper per IM  · Resolved per patient   · Of note per patient this is only the 2nd gout flare patient has ever had     Hyperlipidemia   Assessment & Plan    · Home zocor 20 mg changed to lipitor 80 mg      Anxiety   Assessment & Plan    · No record of any benzodiazepines (or any other controlled medications) found in the [place] database going back 1 year  · Neuropsychology following       Hypertension   Assessment & Plan    · IM to clarify home meds as records are not clear ( olmesartan vs valsartan ?, HCTZ 25 mg vs 50 mg ? norvasc ?)  · Currently on cozaar per IM      Dysphagia   Assessment & Plan    · SLP following  · Modified diet per SLP recs      * Right sided cerebral hemisphere cerebrovascular accident (CVA) ([facility] )   Assessment & Plan    · On DAPT for 90 days, with aspirin/plavix, followed by monotherapy with ASA  · Possibly cardioembolic in etiology   Consulted EP for eval/placement of loop recorder however they recommend JAYE first which was negative for thrombus in [place]HELLE however did reveal aortic atheroma, will d/w neuro (Dr Claudia Valdes) if they feel the atheroma is the etiology of the stroke and did not feel that atheroma was likely severe enough to change from  to [facility] even if this was the potential etiology so management recommendations per neuro was unchanged and patient had LOOP placed   · Fluoxetine 20mg post stroke for motor recovery         Scheduled Meds:    Current Facility-Administered Medications:  acetaminophen 650 mg Oral Q6H PRN Yoon Blanco MD   aspirin 81 mg Oral Daily Yoon Blanco MD   atorvastatin 80 mg Oral QPM Yoon Blanco MD   clopidogrel 75 mg Oral Daily Elizabeth PATEL Heller   docusate sodium 100 mg Oral BID Bernarda Yao PA-C   FLUoxetine 20 mg Oral Daily Elizabeth CANDE HellerNP   losartan 50 mg Oral Daily Monda Gorhonorio, PATEL   melatonin 6 mg Oral HS Nara Ramos MD   nicotine 14 mg Transdermal Daily Yoon Blanco MD   ondansetron 4 mg Intravenous Q6H PRN Yoon Blanco MD   oxyCODONE 2 5 mg Oral Q4H PRN Yoon Blanco MD   pneumococcal 23-valent polysaccharide vaccine 0 5 mL Subcutaneous Prior to discharge Yoon Blanco MD   polyethylene glycol 17 g Oral Daily PRN Bernarda Yao PA-C   senna 1 tablet Oral HS Bernarda Yao PA-C        Incidental findings  1) thyroid nodules: per radiology report recs non-emergent OP thyroid U/S, OP FU with PCP to arrange U/S  2) subpleural bullae: OP FU with PCP with further testing/treatment and/or specialist referral at PCP's discretion     Note: patient noted to have petechial hemorrhage on imaging however has been cleared for DAPT and lovenox (for DVT ppx) by neuro  DVT ppx: SCDs, ambulating adequate distances   Dispo: reteam     Objective:    Functional Update:  Mobility: min  Transfers: min  ADLs: min-mod         Physical Exam:    Vitals:    11/09/18 1335   BP: 138/80   Pulse: 58   Resp: 18   Temp: 97 8 °F (36 6 °C)   SpO2: 96%             General: alert, no apparent distress, cooperative and comfortable  HEENT:  Head: Normocephalic, no lesions, without obvious abnormality    CARDIAC:  +S1/2  LUNGS: respirations unlabored  ABDOMEN:  soft   EXTREMITIES:  no signficant LE edema  NEURO:   awake, alert, appropriately answering questions, 2/5 LUE, +dysarthria (essentially resolved)  PSYCH:  mood/affect currently stable      Diagnostic Studies: reviewed, no new imaging  No orders to display       Laboratory:      Results from last 7 days  Lab Units 11/08/18  0649 11/05/18  0948   HEMOGLOBIN g/dL 13 3 13 9   HEMATOCRIT % 39 8 41 9   WBC Thousand/uL 7 17 9 11       Results from last 7 days  Lab Units 11/08/18  0649 11/05/18  0948   BUN mg/dL 10 11   POTASSIUM mmol/L 3 8 3 7   CHLORIDE mmol/L 107 105   CREATININE mg/dL 0 89 1 03            Patient Active Problem List   Diagnosis    Right sided [facility]    Dysphagia    Hypertension    Anxiety    Hyperlipidemia    Gout    [facility]    Bradycardia           Total visit time:  At least 25 minutes, with more than 50% spent counseling/coordinating care

## 2018-11-09 NOTE — PROGRESS NOTES
11/09/18 1400   Pain Assessment   Pain Assessment No/denies pain   Restrictions/Precautions   Precautions Fall Risk   Cognition   Arousal/Participation Cooperative   Subjective   Subjective pt reported feeling tired but ready for therapy    QI: Sit to Stand   Assistance Needed Supervision   Sit to Stand CARE Score 4   QI: Chair/Bed-to-Chair Transfer   Assistance Needed Supervision; Adaptive equipment   Chair/Bed-to-Chair Transfer CARE Score 4   Transfer Bed/Chair/Wheelchair   Limitations Noted In Balance; Endurance;LE Strength;UE Strength   Adaptive Equipment None   Stand Pivot Minimal Assist;Supervision   Sit to Stand Supervision   Stand to Sit Supervision   Bed, Chair, Wheelchair Transfer (FIM) 4 - Patient completes 75% of all tasks   QI: Walk 10 Feet   Assistance Needed Supervision   Walk 10 Feet CARE Score 4   QI: Walk 50 Feet with Two Turns   Assistance Needed Incidental touching;Supervision   Walk 50 Feet with Two Turns CARE Score 4   QI: Walk 150 Feet   Assistance Needed Physical assistance;Supervision   Assistance Provided by [place] Less than 25%   Walk 150 Feet CARE Score 3   QI: Walking 10 Feet on Uneven Surfaces   Assistance Needed Physical assistance   Assistance Provided by [place] Less than 25%   Walking 10 Feet on Uneven Surfaces CARE Score 3   Ambulation   Does the patient walk? 2  Yes   Primary Discharge Mode of Locomotion Walk   Walk Assist Level Close Supervision;Minimum Assist   Gait Pattern Decreased foot clearance;L hemiparesis; Step through   Assist Device (none)   Distance Walked (feet) 500 ft  (x5, 700 x3)   Limitations Noted In Balance; Endurance; Heel Strike;Swing;Strength;Speed   Findings the majority of the time pt was CS, however Darin for when needing help for LOB   walked throughout hosptial on different floors, different stairwells, around turns etc    Walking (FIM) 4 - Patient requires steadying assist or light touching AND distance 150 feet or more, no rest   Wheelchair mobility   QI: Does the patient use a wheelchair? 0  No   QI: 4 Steps   Assistance Needed Supervision; Adaptive equipment   4 Steps CARE Score 4   QI: 12 Steps   Assistance Needed Supervision; Adaptive equipment   12 Steps CARE Score 4   Stairs   Type Stairs   # of Steps 20  (x2)   Assist Devices Single Rail   Findings reciprocal pattern up and down  CS   Stairs (FIM) 5 - Patient requires supervision/monitoring AND goes up and down full flight (12- 14 stairs)   Therapeutic Interventions   Neuromuscular Re-Education played baseball, where pt used 1lb weighted dowel to swing  one person to throw the ball, one to A LUE and one to guard  pt able to take steps and be in a squat  Practiced where pt pitched to another person, practicing taking steps and sequencing with RUE arm swing  practiced D2 PNF LUE with AAROM while squatting, reachign down to R side and up to L side  practiced walking with a cup of water in RUE   standing soccer ball kicks   Other Comments   Comments pt ed with small model of the brain to provide visual representation of where pt had his stroke and what this means for LUE vs LLE function and how the brain works with sensory/motor and coordination components to get the desired output    Assessment   Treatment Assessment Pt cont to demonstrate progress towards LTGs with being able to perform walking more consistently at S level with out [address] or RUE support  Pt cont to have minor LOB when he is fatigued, needing Darin to stabilize him and then he is able to correct his position  Pt cont to make progress with overall activity tolerance, however is more fatigued on days where he doesn't sleep well the night before  PT Barriers   Physical Impairment Decreased strength;Decreased range of motion;Decreased endurance; Impaired balance;Decreased mobility   Functional Limitation Car transfers;Stair negotiation;Standing;Transfers; Walking   Plan   Treatment/Interventions Functional transfer training;LE strengthening/ROM; Elevations; Therapeutic exercise; Endurance training;Patient/family training;Equipment eval/education; Bed mobility;Gait training   Progress Progressing toward goals   Recommendation   Recommendation Outpatient PT; Home with family support   Equipment Recommended Cane   PT Therapy Minutes   PT Time In 1400   PT Time Out 1530   PT Total Time (minutes) 90   PT Mode of treatment - Individual (minutes) 90   PT Mode of treatment - Concurrent (minutes) 0   PT Mode of treatment - Group (minutes) 0   PT Mode of treatment - Co-treat (minutes) 0   PT Mode of Teatment - Total time(minutes) 90 minutes   Therapy Time missed   Time missed?  No

## 2018-11-09 NOTE — ASSESSMENT & PLAN NOTE
· Consistently asymptomatic  · IM aware and as patient is asymptomatic and fluctuates between bradycardia and low normal with a normal BP they are not planning any further testing/treatment at this time   · OP FU with PCP with further testing/treatment and/or specialist referral at PCP's discretion

## 2018-11-09 NOTE — PROGRESS NOTES
11/09/18 1000   Pain Assessment   Pain Assessment No/denies pain   Pain Score No Pain   Restrictions/Precautions   Precautions Fall Risk   Weight Bearing Restrictions No   ROM Restrictions No   QI: Sit to Stand   Assistance Needed Incidental touching   Assistance Provided by [place] No physical assistance   Sit to Stand CARE Score 4   QI: Chair/Bed-to-Chair Transfer   Assistance Needed Incidental touching   Assistance Provided by [place] Less than 25%   Chair/Bed-to-Chair Transfer CARE Score 3   Transfer Bed/Chair/Wheelchair   Positioning Concerns Hemiplegia   Limitations Noted In Balance; Endurance; Coordination;UE Strength;LE Strength   Adaptive Equipment None   Bed, Chair, Wheelchair Transfer (FIM) 4 - Patient requires steadying assist or light touching   Functional Standing Tolerance   Time 8 minutes    Activity static standing at tabletop with WBing through LUE  Comments CGA while in stance  Neuromuscular Education   Functional Movement Patterns Pt engaged in LUE neuro re-ed while seated in front of mirror to promote symmetry and to avoid compensation  Pt engaged in shoulder flexion/ext and bicep curls while holding on to 1# dowel 3 x 10 each, pt requires min A at wrist and digits to maintain grasp on L side and tactile cues to avoid compensation  Pt noted to have finger flexion with vibration applied to forearm continues to present with difficulty with digit extension  Pt engaged in arm skate at tabletop to promote shoulder flexion and horizontal ab/adduction with increased ability to complete adduction noted this session  Cognition   Overall Cognitive Status WFL   Arousal/Participation Cooperative; Alert   Attention Within functional limits   Orientation Level Oriented X4   Memory Within functional limits   Following Commands Follows multistep commands with increased time or repetition   Activity Tolerance   Activity Tolerance Patient tolerated treatment well   Assessment   Treatment Assessment Pt participated in skilled OT services with focus on functional transfers, endurance, and LUE neuro re-ed  Pt continues to make progress with LUE function and with functional mobility  Pt able to complete functional mobility from his room <> OT gym without an AD with Giv Jessica sling applied at an overall [place] level  Pt will continue to benefit from skilled OT services with focus on LUE neuro re-ed, functional mobility, standing balance, and endurance  Prognosis Good   Problem List Decreased strength;Decreased endurance; Impaired balance;Decreased mobility; Decreased coordination; Impaired tone   Plan   Treatment/Interventions ADL retraining;Functional transfer training; Therapeutic exercise; Endurance training;Equipment eval/education; Compensatory technique education; Bed mobility   Progress Progressing toward goals   OT Therapy Minutes   OT Time In 1000   OT Time Out 1130   OT Total Time (minutes) 90   OT Mode of treatment - Individual (minutes) 90   OT Mode of treatment - Concurrent (minutes) 0   OT Mode of treatment - Group (minutes) 0   OT Mode of treatment - Co-treat (minutes) 0   OT Mode of Teatment - Total time(minutes) 90 minutes   Therapy Time missed   Time missed?  No

## 2018-11-10 PROCEDURE — 97112 NEUROMUSCULAR REEDUCATION: CPT

## 2018-11-10 PROCEDURE — 97110 THERAPEUTIC EXERCISES: CPT

## 2018-11-10 RX ADMIN — SENNOSIDES 8.6 MG: 8.6 TABLET, FILM COATED ORAL at 21:02

## 2018-11-10 RX ADMIN — MELATONIN 9 MG: at 21:02

## 2018-11-10 RX ADMIN — FLUOXETINE 20 MG: 20 CAPSULE ORAL at 09:03

## 2018-11-10 RX ADMIN — NICOTINE 14 MG: 14 PATCH, EXTENDED RELEASE TRANSDERMAL at 09:04

## 2018-11-10 RX ADMIN — ATORVASTATIN CALCIUM 80 MG: 80 TABLET, FILM COATED ORAL at 17:13

## 2018-11-10 RX ADMIN — DOCUSATE SODIUM 100 MG: 100 CAPSULE, LIQUID FILLED ORAL at 17:13

## 2018-11-10 RX ADMIN — DOCUSATE SODIUM 100 MG: 100 CAPSULE, LIQUID FILLED ORAL at 09:03

## 2018-11-10 RX ADMIN — ASPIRIN 81 MG: 81 TABLET, COATED ORAL at 09:04

## 2018-11-10 RX ADMIN — CLOPIDOGREL 75 MG: 75 TABLET, FILM COATED ORAL at 09:03

## 2018-11-10 RX ADMIN — LOSARTAN POTASSIUM 50 MG: 50 TABLET, FILM COATED ORAL at 09:03

## 2018-11-10 NOTE — PROGRESS NOTES
11/10/18 1500   Pain Assessment   Pain Assessment No/denies pain   Restrictions/Precautions   Precautions Fall Risk   Cognition   Arousal/Participation Cooperative   Subjective   Subjective pt reported feeling fine   QI: Sit to Stand   Assistance Needed Supervision   Sit to Stand CARE Score 4   QI: Chair/Bed-to-Chair Transfer   Assistance Needed Incidental touching; Adaptive equipment   Chair/Bed-to-Chair Transfer CARE Score 4   Transfer Bed/Chair/Wheelchair   Limitations Noted In Balance; Endurance;LE Strength;UE Strength   Stand Pivot Contact Guard   Sit to Stand Supervision   Stand to СВЕТЛАНА Moore, Chair, Wheelchair Transfer (FIM) 4 - [place] lifts one extremity during transfer   QI: Walk 10 Feet   Assistance Needed Supervision   Walk 10 Feet CARE Score 4   QI: Walk 50 Feet with Two Turns   Assistance Needed Supervision   Walk 50 Feet with Two Turns CARE Score 4   QI: Walk 150 Feet   Assistance Needed Incidental touching   Walk 150 Feet CARE Score 4   Ambulation   Does the patient walk? 2  Yes   Primary Discharge Mode of Locomotion Walk   Walk Assist Level Contact Guard;Close Supervision   Gait Pattern Decreased foot clearance;L hemiparesis;L knee hyperextension   Assist Device (none)   Distance Walked (feet) 350 ft   Limitations Noted In Balance; Endurance;Swing   Walking (FIM) 4 - Patient requires steadying assist or light touching AND distance 150 feet or more, no rest   Equipment Use   NuStep 20 min level 4-5, SPM    Assessment   Treatment Assessment Pt performed additional PT session to focus on activity tolerance with use of NuStep  Pt cont ot be able to perform walking without an AD at [facility] with WNL for gait speed  Pt will cont to benefit from skilled PT to further progress functional mobility  Problem List Decreased strength;Decreased range of motion;Decreased endurance; Impaired balance;Decreased mobility   Plan   Treatment/Interventions Functional transfer training;LE strengthening/ROM; Elevations; Therapeutic exercise; Endurance training;Patient/family training;Equipment eval/education; Bed mobility;Gait training   Progress Progressing toward goals   Recommendation   Recommendation Outpatient PT; Home with family support   Equipment Recommended Cane   PT Therapy Minutes   PT Time In 1500   PT Time Out 1530   PT Total Time (minutes) 30   PT Mode of treatment - Individual (minutes) 0   PT Mode of treatment - Concurrent (minutes) 30   PT Mode of treatment - Group (minutes) 0   PT Mode of treatment - Co-treat (minutes) 0   PT Mode of Teatment - Total time(minutes) 30 minutes   Therapy Time missed   Time missed?  No

## 2018-11-10 NOTE — PROGRESS NOTES
Internal Medicine Progress Note  Patient: Yaneth Varghese  Age/sex: 58 y o  male  Medical Record #: [id number]      ASSESSMENT/PLAN:  Yaneth Varghese is seen and examined and management for following issues:    Multiple embolic R MCA CVA:  Cont ASA/Plavix for 90 days then Aspirin and statin therapy only; EP did JAYE/LOOP implant 10/31/18  LOOP recorder implant 10/30:  Suture to be due out 11/7/18-11/10/18      HTN:  previously on ARB/amlodipine/hctz;  added back Losartan 50mg daily on 10/25/18  No changes today        DM II:  Diet controlled = stopped Accuchecks/coverage since BSs had been ok      Tobacco abuse:  Cont patch; recommend cessation     Anxiety: stable; Cont Fluoxetine 20mg daily     Left ankle and left 2nd toe pain:  Xray of foot and ankle were negative, likely gout; pt was taking Indocin 50mg 3x daily prn as outpt;  Colchicine 1 2 mg x 1, 0 6mg x 1 and a steroid taper over 4 days     Thrush:  Nystatin completed      Subjective: No overnight issues  He denies any complaints      Scheduled Meds:    Current Facility-Administered Medications:  acetaminophen 650 mg Oral Q6H PRN Justine Tinoco MD   aspirin 81 mg Oral Daily Justine Tinoco MD   atorvastatin 80 mg Oral QPM Justine Tinoco MD   clopidogrel 75 mg Oral Daily PATEL Gautam   docusate sodium 100 mg Oral BID Bernarda Yao PA-C   FLUoxetine 20 mg Oral Daily PATEL Gautam   losartan 50 mg Oral Daily PATEL Kelley   melatonin 9 mg Oral HS Morris Dennis MD   nicotine 14 mg Transdermal Daily Justine Tinoco MD   ondansetron 4 mg Intravenous Q6H PRN Justine Tinoco MD   oxyCODONE 2 5 mg Oral Q4H PRN Justine Tinoco MD   pneumococcal 23-valent polysaccharide vaccine 0 5 mL Subcutaneous Prior to discharge Justine Tinoco MD   polyethylene glycol 17 g Oral Daily PRN Bernarda Yao PA-C   senna 1 tablet Oral HS Bernarda Yao PA-C       Labs:       Results from last 7 days  Lab Units 11/08/18  0649 11/05/18  0948   WBC Thousand/uL 7 17 9 11   HEMOGLOBIN g/dL 13 3 13 9   HEMATOCRIT % 39 8 41 9   PLATELETS Thousands/uL 256 268       Results from last 7 days  Lab Units 11/08/18  0649 11/05/18  0948   POTASSIUM mmol/L 3 8 3 7   CHLORIDE mmol/L 107 105   CO2 mmol/L 26 28   BUN mg/dL 10 11   CREATININE mg/dL 0 89 1 03   CALCIUM mg/dL 8 9 9 0                  Glucose, i-STAT (mg/dl)   Date Value   10/20/2018 132       Labs reviewed    Physical Examination:  Vitals:   Vitals:    11/09/18 0700 11/09/18 1335 11/09/18 2007 11/10/18 0544   BP: 125/73 138/80 117/59 129/62   BP Location: Right arm Right arm Right arm Right arm   Pulse: (!) 48 58 (!) 50 (!) 51   Resp: 16 18 18 18   Temp: 97 8 °F (36 6 °C) 97 8 °F (36 6 °C) 98 °F (36 7 °C) 97 7 °F (36 5 °C)   TempSrc: Oral Oral Oral Oral   SpO2: 97% 96% 95% 95%   Weight:       Height:         Constitutional:  NAD; pleasant; nontoxic  CV:  +S1, S2;  RRR; no rub/murmur; LOOP incision w/o erythema/drainage  Pulmonary:  BBS without crackles/wheeze/rhonci; resp are unlabored  Abdominal:  soft, +BS, ND/NT; no mass  Skin:  no rashes  Musculoskeletal:  No edema  :  no nichols   Neurological/Psych:  AAO;  left hand grasp 2+/5 = can lift at shoulder and weakly flex at elbow;  LLE HF 4/5 with DF/PF 4-/5; + left facial droop; speech is clear; no depression/anxiety        [ X ] Total time spent: 30 Mins and greater than 50% of this time was spent counseling/coordinating care  ** Please Note: Dragon 360 Dictation voice to text software may have been used in the creation of this document   **

## 2018-11-10 NOTE — PROGRESS NOTES
11/10/18 0915   Pain Assessment   Pain Assessment 0-10   Pain Score No Pain   Transfer Bed/Chair/Wheelchair   Positioning Concerns Hemiplegia   Limitations Noted In Balance; Coordination; Endurance;UE Strength;LE Strength   Adaptive Equipment Cane   Sit to Stand Supervision   Stand to Sit Supervision   Supine to Sit Supervision   Sit to Supine Supervision   Findings Functional mob with and with AD with min A   Bed, Chair, Wheelchair Transfer (FIM) 4 - Patient requires steadying assist or light touching   Transfers   Sit to Stand 5  Supervision   Stand to Sit 5  Supervision   Additional Comments Functional mob with and with AD with min A   Therapeutic Exercise - ROM   UE-ROM Yes   ROM- Right Upper Extremities   R Position Seated   R Weight/Reps/Sets 10 x    RUE ROM Comment Scap elevation/depression and retraction/protraction    ROM - Left Upper Extremities    L Shoulder AAROM; Flexion; Extension   L Elbow AAROM;Elbow extension;Elbow flexion   L Position Seated   L Weight/Reps/Sets 10 x; 5 min scap mobs    LUE ROM Comment Scap elevation/depression and retraction/protraction  Scap mobs sidelying    Neuromuscular Education   Weight Bearing Technique Yes   LUE Weight Bearing Quadruped   Response to Weight Bearing Technique + engement of bicep and tricep; mod A at elbow to maintain position  Able to weight shift approx 25% into LUE    Functional Movement Patterns Bimanual  of ball; mod A to stabilize  Grasp release cones; hand to month and low surface  Response to Techniques Able to initiate gross grasp with hand stability  + engagement of elbow ext with distal isometric hold  + engagment of shldr flex; with occassional A to initiate with fatigue  Unable to actively release, however able to relax hand  3/4 elbow flex and close to full ext noted against gravity with reaching task  Coordination   Gross Motor Grasp with ball B; + grasp and release with cones      Assessment   Treatment Assessment Good tolerance of OT session, however requries rest breasks secondary to fatigue  Pt demonstrates cont decreased prox stability and shldr strength limiting functional use of LUE  Good activation and initiation throughout LUE  Pt demonstrates improved grasp, however unable to activelty extend digits; able to relax hand consistently  Cont with OT as per POC  Prognosis Good   Problem List Decreased strength;Decreased endurance; Impaired balance;Decreased mobility; Decreased coordination; Impaired tone   Plan   Treatment/Interventions ADL retraining;Functional transfer training; Therapeutic exercise; Endurance training;Patient/family training; Compensatory technique education   Progress Progressing toward goals   OT Therapy Minutes   OT Time In 0915   OT Time Out 0950   OT Total Time (minutes) 35   OT Mode of treatment - Individual (minutes) 15   OT Mode of treatment - Concurrent (minutes) 0   OT Mode of treatment - Group (minutes) 0   OT Mode of treatment - Co-treat (minutes) 15   OT Mode of Teatment - Total time(minutes) 30 minutes   Therapy Time missed   Time missed?  No

## 2018-11-10 NOTE — PROGRESS NOTES
11/10/18 0830   Pain Assessment   Pain Assessment No/denies pain   Restrictions/Precautions   Precautions Fall Risk   Cognition   Arousal/Participation Cooperative   Subjective   Subjective pt reported that he slept better last night but was still tired today    QI: Roll Left and Right   Assistance Needed Supervision   Roll Left and Right CARE Score 4   QI: Sit to Lying   Assistance Needed Supervision   Sit to Lying CARE Score 4   QI: Lying to Sitting on Side of Bed   Assistance Needed Supervision   Lying to Sitting on Side of Bed CARE Score 4   QI: Sit to Stand   Assistance Needed Supervision   Sit to Stand CARE Score 4   QI: Chair/Bed-to-Chair Transfer   Assistance Needed Supervision   Chair/Bed-to-Chair Transfer CARE Score 4   Transfer Bed/Chair/Wheelchair   Limitations Noted In Balance; Endurance;LE Strength   Adaptive Equipment None   Stand Pivot Supervision   Sit to Stand Supervision   Stand to Sit Supervision   Supine to Sit Supervision   Sit to Supine Supervision   Bed, Chair, Wheelchair Transfer (FIM) 5 - Patient requires supervision/monitoring   QI: Walk 10 Feet   Assistance Needed Supervision; Adaptive equipment   Walk 10 Feet CARE Score 4   QI: Walk 50 Feet with Two Turns   Assistance Needed Supervision; Adaptive equipment   Walk 50 Feet with Two Turns CARE Score 4   QI: Walk 150 Feet   Assistance Needed Physical assistance; Adaptive equipment   Assistance Provided by [place] Less than 25%   Walk 150 Feet CARE Score 3   Ambulation   Primary Discharge Mode of Locomotion Walk   Walk Assist Level Supervision;Minimum Assist   Gait Pattern Decreased foot clearance;L hemiparesis;L knee hyperextension; Step through   Assist Device Cane  (none)   Distance Walked (feet) 500 ft  (350 x2)   Limitations Noted In Balance;Swing   Walking (FIM) 4 - Patient requires steadying assist or light touching AND distance 150 feet or more, no rest   QI: 4 Steps   Assistance Needed Supervision; Adaptive equipment   4 Steps CARE Score 4 QI: 12 Steps   Assistance Needed Physical assistance; Adaptive equipment   Assistance Provided by [place] Less than 25%   12 Steps CARE Score 3   Stairs   Type Stairs   # of Steps 20   Assist Devices Single Rail   Findings reciprocal pattern, CS except Darin x1 when L foot caught step on way up   Stairs (FIM) 4 - Patient requires steadying assist or light touching AND patient goes up and down full flight (12- 14 stairs)   Therapeutic Interventions   Neuromuscular Re-Education during co treat with OT, practiced quadruped with OT focusing on LUE strength/ROM and PT focusing on trunk control  then practiced kneeling to high kneeling with arms in front of pt and then hodling ball to facilitate abd muscles and LUE AROM with distal arm fixed   on mat, practiced and reviewed R sidelying LLE clamshells, bridging with pillow between knees and single leg LLE  also practiced lifitng LLE off the side of the mat and then lifting back up, with manual resistance and facilitating knee flexion and plantarflexion before lifting up and gave resistance at toes; noticed improvement in great toe ext and dorsiflexion   Other Comments   Comments reviewed how family and friends will help him when he is home, going for walks, outside/going to run errands, etc    Assessment   Treatment Assessment Pt cont to make progress towards LTGs, being able to walk more often at a CS level with or without SPC  Cont to need CS for mobility due to occasional LOB that requires Darin when walking or performing stairs  Pt will cont to benefit from skilled PT to further progress dynamic balance and strength to progress to [place] for short household distance walking to prepare for safe dc home  PT Barriers   Physical Impairment Decreased strength;Decreased range of motion;Decreased endurance; Impaired balance;Decreased mobility   Functional Limitation Car transfers;Stair negotiation;Standing;Transfers; Walking   Plan   Treatment/Interventions Functional transfer training;LE strengthening/ROM; Elevations; Therapeutic exercise; Endurance training;Patient/family training;Equipment eval/education; Bed mobility;Gait training   Progress Progressing toward goals   Recommendation   Recommendation Outpatient PT; Home with family support   Equipment Recommended Cane   PT Therapy Minutes   PT Time In 0830   PT Time Out 0930   PT Total Time (minutes) 60   PT Mode of treatment - Individual (minutes) 45   PT Mode of treatment - Concurrent (minutes) 0   PT Mode of treatment - Group (minutes) 0   PT Mode of treatment - Co-treat (minutes) 15   PT Mode of Teatment - Total time(minutes) 60 minutes   Therapy Time missed   Time missed?  No

## 2018-11-11 PROCEDURE — 97530 THERAPEUTIC ACTIVITIES: CPT

## 2018-11-11 PROCEDURE — 97112 NEUROMUSCULAR REEDUCATION: CPT

## 2018-11-11 PROCEDURE — 97535 SELF CARE MNGMENT TRAINING: CPT

## 2018-11-11 PROCEDURE — 97110 THERAPEUTIC EXERCISES: CPT

## 2018-11-11 PROCEDURE — 97116 GAIT TRAINING THERAPY: CPT

## 2018-11-11 RX ADMIN — LOSARTAN POTASSIUM 50 MG: 50 TABLET, FILM COATED ORAL at 08:56

## 2018-11-11 RX ADMIN — FLUOXETINE 20 MG: 20 CAPSULE ORAL at 08:55

## 2018-11-11 RX ADMIN — MELATONIN 9 MG: at 20:49

## 2018-11-11 RX ADMIN — ATORVASTATIN CALCIUM 80 MG: 80 TABLET, FILM COATED ORAL at 17:12

## 2018-11-11 RX ADMIN — CLOPIDOGREL 75 MG: 75 TABLET, FILM COATED ORAL at 08:55

## 2018-11-11 RX ADMIN — DOCUSATE SODIUM 100 MG: 100 CAPSULE, LIQUID FILLED ORAL at 08:55

## 2018-11-11 RX ADMIN — ASPIRIN 81 MG: 81 TABLET, COATED ORAL at 08:55

## 2018-11-11 RX ADMIN — NICOTINE 14 MG: 14 PATCH, EXTENDED RELEASE TRANSDERMAL at 08:57

## 2018-11-11 NOTE — PROGRESS NOTES
Internal Medicine Progress Note  Patient: Radha Arzola  Age/sex: 58 y o  male  Medical Record #: [id number]      ASSESSMENT/PLAN:  Radha Arzola is seen and examined and management for following issues:    Multiple embolic R MCA CVA:  Cont ASA/Plavix for 90 days then Aspirin and statin therapy only; EP did JAYE/LOOP implant 10/31/18  LOOP recorder implant 10/30: Will see if OK d/c suture tomorrow      HTN:  previously on ARB/amlodipine/hctz;  added back Losartan 50mg daily on 10/25/18  No changes today        DM II:  Diet controlled = stopped Accuchecks/coverage since BSs had been ok      Tobacco abuse:  Cont patch; recommend cessation     Anxiety: stable; Cont Fluoxetine 20mg daily     Left ankle and left 2nd toe pain:  Xray of foot and ankle were negative, likely gout; pt was taking Indocin 50mg 3x daily prn as outpt;  Colchicine 1 2 mg x 1, 0 6mg x 1 and a steroid taper over 4 days     Thrush:  Nystatin completed      Subjective: No overnight issues  He denies any complaints      Scheduled Meds:    Current Facility-Administered Medications:  acetaminophen 650 mg Oral Q6H PRN Maritza Kumari MD   aspirin 81 mg Oral Daily Maritza Kumari MD   atorvastatin 80 mg Oral QPM Maritza Kumari MD   clopidogrel 75 mg Oral Daily PATEL Gautam   docusate sodium 100 mg Oral BID Bernarda Yao PA-C   FLUoxetine 20 mg Oral Daily CANDE GautamNP   losartan 50 mg Oral Daily CANDE BetancourtNP   melatonin 9 mg Oral HS Maritza Pryor MD   nicotine 14 mg Transdermal Daily Maritza Kumari MD   ondansetron 4 mg Intravenous Q6H PRN Maritza Kumari MD   oxyCODONE 2 5 mg Oral Q4H PRN Maritza Kumari MD   pneumococcal 23-valent polysaccharide vaccine 0 5 mL Subcutaneous Prior to discharge Maritza Kumari MD   polyethylene glycol 17 g Oral Daily PRN Bernarda Yao PA-C   senna 1 tablet Oral HS Bernarda Yao PA-C       Labs:       Results from last 7 days  Lab Units 11/08/18  0649 11/05/18  0948   WBC Thousand/uL 7 17 9 11   HEMOGLOBIN g/dL 13 3 13 9   HEMATOCRIT % 39 8 41 9   PLATELETS Thousands/uL 256 268       Results from last 7 days  Lab Units 11/08/18  0649 11/05/18  0948   POTASSIUM mmol/L 3 8 3 7   CHLORIDE mmol/L 107 105   CO2 mmol/L 26 28   BUN mg/dL 10 11   CREATININE mg/dL 0 89 1 03   CALCIUM mg/dL 8 9 9 0                  Glucose, i-STAT (mg/dl)   Date Value   10/20/2018 132       Labs reviewed    Physical Examination:  Vitals:   Vitals:    11/10/18 1100 11/10/18 1337 11/10/18 2051 11/11/18 0615   BP:  128/72 128/78 136/83   BP Location:  Right arm Right arm Right arm   Pulse: (!) 53 62 (!) 50    Resp:  18 18 18   Temp:  (!) 97 4 °F (36 3 °C) 97 8 °F (36 6 °C) 98 3 °F (36 8 °C)   TempSrc:  Oral Oral Oral   SpO2:  97% 98% 96%   Weight:       Height:         Constitutional:  NAD; pleasant; nontoxic  CV:  +S1, S2;  RRR; no rub/murmur; LOOP incision w/o erythema/drainage  Pulmonary:  BBS without crackles/wheeze/rhonci; resp are unlabored  Abdominal:  soft, +BS, ND/NT; no mass  Skin:  no rashes  Musculoskeletal:  No edema  :  no nichols   Neurological/Psych:  AAO;  left hand grasp 2+/5 = can lift at shoulder and weakly flex at elbow;  LLE HF 4/5 with DF/PF 4-/5; + left facial droop; speech is clear; no depression/anxiety        [ X ] Total time spent: 30 Mins and greater than 50% of this time was spent counseling/coordinating care  ** Please Note: Dragon 360 Dictation voice to text software may have been used in the creation of this document   **

## 2018-11-11 NOTE — PROGRESS NOTES
11/11/18 2318   Pain Assessment   Pain Assessment No/denies pain   Restrictions/Precautions   Precautions Fall Risk   Cognition   Arousal/Participation Cooperative   Subjective   Subjective pt reported still feeling tired despite sleeping well last night  Discussed will cont to monitor  QI: Sit to Stand   Assistance Needed Supervision; Adaptive equipment   Sit to Stand CARE Score 4   QI: Chair/Bed-to-Chair Transfer   Assistance Needed Physical assistance; Adaptive equipment   Assistance Provided by [place] Less than 25%   Chair/Bed-to-Chair Transfer CARE Score 3   Transfer Bed/Chair/Wheelchair   Limitations Noted In Balance; Endurance;UE Strength;LE Strength   Adaptive Equipment Cane   Stand Pivot Minimal Assist;Supervision   Sit to Stand Supervision   Stand to W  R  Teresa, Chair, Wheelchair Transfer (FIM) 4 - Patient completes 75% of all tasks   QI: Car Transfer   Assistance Needed Physical assistance; Adaptive equipment   Assistance Provided by [place] Less than 25%   Car Transfer CARE Score 3   QI: Walk 10 Feet   Assistance Needed Supervision; Adaptive equipment   Walk 10 Feet CARE Score 4   QI: Walk 50 Feet with Two Turns   Assistance Needed Supervision; Adaptive equipment   Walk 50 Feet with Two Turns CARE Score 4   QI: Walk 150 Feet   Assistance Needed Physical assistance;Supervision; Adaptive equipment   Assistance Provided by [place] Less than 25%   Walk 150 Feet CARE Score 3   Ambulation   Primary Discharge Mode of Locomotion Walk   Walk Assist Level Minimum Assist;Contact Guard;Close Supervision   Gait Pattern Decreased foot clearance;L hemiparesis;L knee hyperextension; Improper weight shift   Assist Device Cane  (none)   Distance Walked (feet) 350 ft  (150 x3)   Limitations Noted In Balance; Endurance; Heel Strike;Swing;Strength;Speed   Findings session also focused on repeated short distance walking in ADL suite, between kitchen, bedroom area and bathroom, with pt getting a glass out of the cabinet and filling it with water, and getting something in and out of the fridge  Cont to discuss with pt and his borther fall risk factors, how currently he still needs someone to walk with him to prevent a fall, but that he is gradually being more consisent at a S level  Walking (FIM) 4 - Patient requires steadying assist or light touching AND distance 150 feet or more, no rest   QI: 4 Steps   Assistance Needed Adaptive equipment; Incidental touching   4 Steps CARE Score 4   QI: 12 Steps   Assistance Needed Incidental touching; Adaptive equipment   12 Steps CARE Score 4   Stairs   Type Stairs   # of Steps 20   Weight Bearing Precautions Fall Risk   Assist Devices Single Rail   Findings CGA   Stairs (FIM) 4 - Patient requires steadying assist or light touching AND patient goes up and down full flight (12- 14 stairs)   QI: Toilet Transfer   Assistance Needed Supervision; Adaptive equipment   Toilet Transfer CARE Score 4   Toilet Transfer   Surface Assessed Standard Toilet   Limitations Noted In Balance; Endurance;LE Strength   Adaptive Equipment (SPC)   Findings x2 for practice in ADL suite   Toilet Transfer (FIM) 5 - Patient requires supervision/monitoring   Equipment Use   NuStep 10 min level 4 BLE only SPM around 90 start of session    Assessment   Treatment Assessment Pt cont to make progress towards LTGs, being able to perform all tasks bewteen S-[place] level  Cont to recommend that pt walks/transfers with someone due to occasional LOB that requires Darin to correct  Pt will cont to benefit from skilled PT to further progress in these areas, with strength/activity tolerance/balance/righting reactions to progress to [place] for short distances when he is home alone  Pt's brother began FT today  Family/Caregiver Present brotherruthann   Barriers to Discharge Decreased caregiver support   PT Barriers   Physical Impairment Decreased strength;Decreased range of motion;Decreased endurance; Impaired balance;Decreased mobility Functional Limitation Car transfers;Stair negotiation;Standing;Transfers; Walking   Plan   Treatment/Interventions Functional transfer training;LE strengthening/ROM; Elevations; Therapeutic exercise; Endurance training;Patient/family training;Equipment eval/education; Bed mobility;Gait training   Progress Progressing toward goals   Recommendation   Recommendation Outpatient PT; Home with family support   Equipment Recommended Cane   PT Therapy Minutes   PT Time In 0930   PT Time Out 1030   PT Total Time (minutes) 60   PT Mode of treatment - Individual (minutes) 60   PT Mode of treatment - Concurrent (minutes) 0   PT Mode of treatment - Group (minutes) 0   PT Mode of treatment - Co-treat (minutes) 0   PT Mode of Teatment - Total time(minutes) 60 minutes   Therapy Time missed   Time missed?  No

## 2018-11-11 NOTE — PROGRESS NOTES
11/11/18 1230   Pain Assessment   Pain Assessment No/denies pain   Pain Score No Pain   Restrictions/Precautions   Precautions Fall Risk   Weight Bearing Restrictions No   ROM Restrictions No   Braces or Orthoses Sling  (givmohr for LUE during ambulation)   Grooming   Able To Initiate Tasks; Acquire Items; Shave;Comb/Brush Hair;Wash/Dry Face;Wash/Dry Hands   Limitation Noted In Strength; Safety   Findings in stance with support at hips from sink  Pt able to manage all containers with RUE   Grooming (FIM) 5 - Patient requires supervision/monitoring   QI: Shower/Bathe Self   Assistance Needed Supervision   Assistance Provided by [place] No physical assistance   Shower/Bathe Self CARE Score 4   Bathing   Assessed Bath Style Shower   Anticipated D/C Bath Style Shower   Able to Gurpreet Girish No   Able to [employer] Temperature Yes   Able to Wash/Rinse/Dry (body part) Left Arm;Right Arm;L Upper Leg;R Upper Leg;L Lower Leg/Foot;R Lower Leg/Foot;Chest;Abdomen;Perineal Area; Buttocks   Limitations Noted in Balance; Endurance; Safety   Positioning Seated;Standing   Adaptive Equipment Shower Bars; Shower Seat;Hand Rodríguez's   Findings  Pt wrapped wash clothe around L hand and rubbed RUE under wash clothe to wash RUE  Pt S in stance with occasional spport from grab bar to perform rear bathing   Bathing (FIM) 5 - Patient requires supervision/monitoring but completes 10/10 parts   Tub/Shower Transfer   Limitations Noted In Balance; Safety   Adaptive Equipment Grab Bars;Seat with Back   Assessed Shower   Findings CGA   Shower Transfer (FIM) 4 - Patient requires steadying assist or light touching   QI: Upper Body Dressing   Assistance Needed Incidental touching   Assistance Provided by [place] Less than 25%   Comment adjust shirt in back   Upper Body Dressing CARE Score 3   QI: Lower Body [address] Provided by [place] No physical assistance   Lower Body Dressing CARE Score 4 QI: Putting On/Taking Off Footwear   Assistance Needed Physical assistance   Assistance Provided by [place] Less than 25%   Comment assist to tie, v/c to perform leg crossover to don socks and shoes   Putting On/Taking Off Footwear CARE Score 3   Dressing/Undressing Clothing   Remove UB Clothes Pullover Shirt   Remove LB Clothes Pants;Socks; [address] Clothes Pullover Shirt   Don LB Clothes Pants;Socks; Shoes   Limitations Noted In Balance; Coordination;Strength;ROM   Positioning Supported Sit;Standing   UB Dressing (FIM) 4 - Patient requires steadying assist or light touching   LB Dressing (FIM) 4 - Patient completes 75% of all tasks   QI: Sit to [address] Provided by [place] No physical assistance   Sit to Stand CARE Score 4   QI: Chair/Bed-to-Chair Transfer   Assistance Needed Incidental touching   Assistance Provided by [place] Less than 25%   Comment v/c to slow pace for safety   Chair/Bed-to-Chair Transfer CARE Score 3   Transfer Bed/Chair/Wheelchair   Positioning Concerns Hemiplegia   Limitations Noted In Balance; Coordination;LE Strength   Adaptive Equipment Cane   Findings CGA during all transfers and functioanl mobility  v/c t slow pace at times   Bed, Chair, Wheelchair Transfer (FIM) 4 - Patient requires steadying assist or light touching   Functional Standing Tolerance   Time 7 5min   Activity shaving in stance   Comments No LOB with hips supported on edge of sink   Therapeutic Exercise - ROM   UE-ROM Yes   ROM - Left Upper Extremities    L Shoulder AAROM; Flexion; Extension;Prolonged stretch;Horizontal ABduction   L Elbow AAROM;Elbow extension;Elbow flexion   L Position Seated   Equipment Other (Comment)   L Weight/Reps/Sets 10/3   LUE ROM Comment Pt engaged in LUE AAROM while seated in chair  Pt req v/c and physical intervention to limit trunk compensation with task   Pt utilized towel on table top to perform shoulder flex/ext and hor ABD/ADD with gravity eliminated  Pt able to initiate movements but req A from therapist to complete full range  Pt then completed self ROM for elbow flex/ext, shoulder flex/ext, and hor ABD/ADD against gravity  Pt tolerated all motions but req rest breaks every 5-7 reps to decrease compensation  Cognition   Overall Cognitive Status WFL   Arousal/Participation Alert; Cooperative   Attention Within functional limits   Orientation Level Oriented X4   Memory Within functional limits   Following Commands Follows multistep commands with increased time or repetition   Activity Tolerance   Activity Tolerance Patient tolerated treatment well   Assessment   Treatment Assessment Pt participated in skilled OT session focusing on ADL retraining, functional transfers and mobility, and LUE ROM  Pt motivated to participate this session reporting that he "really notices a difference in how I'm moving " Pt req only incidental A with ADLs at this time 2* decreased b/l coordination  Pt req A to tie shoe laces and would benefit from elastic laces to increase indep  Pt cont to demo G safety and balance while performing transfers  Pt able to don givmohr sling around shoulders but req Jemima to place hand  Pt seated in recliner at end of session with all necessary items in reach  Pt edu that he must cont to call and wait for A for toileting and pt verbalized understanding  Pt would benefit from cont therapy focusing on safety awareness with transfers, LUE function, and overall activity tolerance  Cont with POC  Prognosis Good   Problem List Decreased strength;Decreased endurance;Decreased coordination   Barriers to Discharge Decreased caregiver support   Plan   Treatment/Interventions Functional transfer training;ADL retraining; Therapeutic exercise; Endurance training   Progress Progressing toward goals   OT Therapy Minutes   OT Time In 1230   OT Time Out 1400   OT Total Time (minutes) 90   OT Mode of treatment - Individual (minutes) 90   OT Mode of treatment - Concurrent (minutes) 0   OT Mode of treatment - Group (minutes) 0   OT Mode of treatment - Co-treat (minutes) 0   OT Mode of Teatment - Total time(minutes) 90 minutes   Therapy Time missed   Time missed?  No

## 2018-11-12 LAB
ANION GAP SERPL CALCULATED.3IONS-SCNC: 4 MMOL/L (ref 4–13)
BASOPHILS # BLD AUTO: 0.04 THOUSANDS/ΜL (ref 0–0.1)
BASOPHILS NFR BLD AUTO: 1 % (ref 0–1)
BUN SERPL-MCNC: 10 MG/DL (ref 5–25)
CALCIUM SERPL-MCNC: 8.6 MG/DL (ref 8.3–10.1)
CHLORIDE SERPL-SCNC: 108 MMOL/L (ref 100–108)
CO2 SERPL-SCNC: 28 MMOL/L (ref 21–32)
CREAT SERPL-MCNC: 0.84 MG/DL (ref 0.6–1.3)
EOSINOPHIL # BLD AUTO: 0.24 THOUSAND/ΜL (ref 0–0.61)
EOSINOPHIL NFR BLD AUTO: 4 % (ref 0–6)
ERYTHROCYTE [DISTWIDTH] IN BLOOD BY AUTOMATED COUNT: 14.5 % (ref 11.6–15.1)
GFR SERPL CREATININE-BSD FRML MDRD: 93 ML/MIN/1.73SQ M
GLUCOSE P FAST SERPL-MCNC: 84 MG/DL (ref 65–99)
GLUCOSE SERPL-MCNC: 84 MG/DL (ref 65–140)
HCT VFR BLD AUTO: 38.9 % (ref 36.5–49.3)
HGB BLD-MCNC: 12.8 G/DL (ref 12–17)
IMM GRANULOCYTES # BLD AUTO: 0.01 THOUSAND/UL (ref 0–0.2)
IMM GRANULOCYTES NFR BLD AUTO: 0 % (ref 0–2)
LYMPHOCYTES # BLD AUTO: 1.59 THOUSANDS/ΜL (ref 0.6–4.47)
LYMPHOCYTES NFR BLD AUTO: 25 % (ref 14–44)
MCH RBC QN AUTO: 29.4 PG (ref 26.8–34.3)
MCHC RBC AUTO-ENTMCNC: 32.9 G/DL (ref 31.4–37.4)
MCV RBC AUTO: 89 FL (ref 82–98)
MONOCYTES # BLD AUTO: 0.64 THOUSAND/ΜL (ref 0.17–1.22)
MONOCYTES NFR BLD AUTO: 10 % (ref 4–12)
NEUTROPHILS # BLD AUTO: 3.75 THOUSANDS/ΜL (ref 1.85–7.62)
NEUTS SEG NFR BLD AUTO: 60 % (ref 43–75)
NRBC BLD AUTO-RTO: 0 /100 WBCS
PLATELET # BLD AUTO: 243 THOUSANDS/UL (ref 149–390)
PMV BLD AUTO: 10.3 FL (ref 8.9–12.7)
POTASSIUM SERPL-SCNC: 3.6 MMOL/L (ref 3.5–5.3)
RBC # BLD AUTO: 4.35 MILLION/UL (ref 3.88–5.62)
SODIUM SERPL-SCNC: 140 MMOL/L (ref 136–145)
WBC # BLD AUTO: 6.27 THOUSAND/UL (ref 4.31–10.16)

## 2018-11-12 PROCEDURE — 97530 THERAPEUTIC ACTIVITIES: CPT

## 2018-11-12 PROCEDURE — 97112 NEUROMUSCULAR REEDUCATION: CPT

## 2018-11-12 PROCEDURE — 99232 SBSQ HOSP IP/OBS MODERATE 35: CPT | Performed by: PHYSICAL MEDICINE & REHABILITATION

## 2018-11-12 PROCEDURE — 97116 GAIT TRAINING THERAPY: CPT

## 2018-11-12 PROCEDURE — G0515 COGNITIVE SKILLS DEVELOPMENT: HCPCS

## 2018-11-12 PROCEDURE — 85025 COMPLETE CBC W/AUTO DIFF WBC: CPT | Performed by: NURSE PRACTITIONER

## 2018-11-12 PROCEDURE — 97127 HB COGNITIVE SKILLS DEVELOPMENT, EACH 15 MUNUTES: CPT

## 2018-11-12 PROCEDURE — 80048 BASIC METABOLIC PNL TOTAL CA: CPT | Performed by: NURSE PRACTITIONER

## 2018-11-12 RX ADMIN — LOSARTAN POTASSIUM 50 MG: 50 TABLET, FILM COATED ORAL at 09:42

## 2018-11-12 RX ADMIN — NICOTINE 14 MG: 14 PATCH, EXTENDED RELEASE TRANSDERMAL at 09:42

## 2018-11-12 RX ADMIN — ASPIRIN 81 MG: 81 TABLET, COATED ORAL at 09:42

## 2018-11-12 RX ADMIN — DOCUSATE SODIUM 100 MG: 100 CAPSULE, LIQUID FILLED ORAL at 17:11

## 2018-11-12 RX ADMIN — FLUOXETINE 20 MG: 20 CAPSULE ORAL at 09:42

## 2018-11-12 RX ADMIN — ATORVASTATIN CALCIUM 80 MG: 80 TABLET, FILM COATED ORAL at 17:11

## 2018-11-12 RX ADMIN — MELATONIN 9 MG: at 20:54

## 2018-11-12 RX ADMIN — CLOPIDOGREL 75 MG: 75 TABLET, FILM COATED ORAL at 09:42

## 2018-11-12 NOTE — PROGRESS NOTES
Occupational Therapy Treatment Note         11/12/18 0842   Pain Assessment   Pain Assessment No/denies pain   Restrictions/Precautions   Precautions Fall Risk   Braces or Orthoses Sling  (givmohr)   Lifestyle   Autonomy "I feel like this is a lot better" - reference (L) UE    QI: Sit to [address] Provided by [place] No physical assistance   Sit to Stand CARE Score 4   QI: Chair/Bed-to-Chair Transfer   Assistance Needed Incidental touching   Assistance Provided by [place] Less than 25%   Comment SPC   Chair/Bed-to-Chair Transfer CARE Score 3   Transfer Bed/Chair/Wheelchair   Positioning Concerns Hemiplegia   Limitations Noted In Balance; Endurance;UE Strength;LE Strength; Sequencing   Adaptive Equipment Cane   Sit to Stand Minimal;Assist x 1;Supervision   Stand to [employer]   Findings pt requires contact guard assist for functional mobility/transfers using SPC within room and hallway  Pt noted with decreased balance during turns and noted with increased shuffling of (L) LE with fatigue  OTR/L spoke with PT Nate Jacques, plan for OT to continue functional mobility with SPC to increase pt's independence in household mobility upon D/C  Pt initially required min assist for completing sit to stand transfer from standard couch using SPC, after 5x progressed to close supervision      Bed, Chair, Wheelchair Transfer (FIM) 4 - Patient requires steadying assist or light touching   QI: Toilet Transfer   Assistance Needed Incidental touching   Assistance Provided by [place] Less than 25%   Toilet Transfer CARE Score 3   Toilet Transfer   Surface Assessed Standard Toilet   Findings contact guard assist with [facility]   Toilet Transfer (FIM) 4 - Patient requires steadying assist or light touching   Kitchen Mobility   Kitchen-Mobility Level Cane   Kitchen Activity Retrieve items;Transport items   Kitchen Mobility Comments pt requires contact guard assist to retrieve items from refrigerator and transport them  OTR/L educated pt on sliding items across counter and using (R) UE support along countertop, pt demonstrates good carryover of strategy but requires contact guard/min assist for kitchen mobility  ROM - Left Upper Extremities    L Shoulder AAROM; Flexion; Extension;ABduction  (adduction)   L Elbow AROM;Elbow flexion;Elbow extension   L Wrist AAROM;PROM;Wrist flexion;Wrist extension   L Hand AAROM; Thumb; Index finger; Long finger;Ring finger;Little finger   L Position Seated   LUE ROM Comment Pt engaged in (L) UE neuromuscular re-education with focus on ROM/strengthening of (L) UE to increase independence during ADLs, IADLS, functional transfers  Pt seated on mat in front of mirror for visual feedback to decrease compensatory movement  Pt demonstrating progress in (L) UE ROM, able to complete AROM (L) scapular elevation/depression x10  Pt completes (L) shoulder flexion/extension 1/2 range with AAROM - point of control at (L) wrist and (L) shoulder to decrease internal rotation, demonstrating progress by not using trapezius to hike neck while attempting to compensate for impaired shoulder strength  Pt additionally demonstrates progress in (L) elbow flexion/extension, able to complete through AROM with minimal/moderate resistance and point of control at elbow to decrease internal rotation during flexion  Pt engaged in (L) wrist flexion/extension in gravity eliminated plane on towel while seated at tabletop  Pt able to complete (L) wrist flexion 3/4 range AROM with point of control at elbow to decrease compensatory movement patterns, pt also able to complete digit flexion actively without assistance  Pt unable to initially complete digit/wrist extension, however with vibration applied to wrist/digit extensors pt able to initiate wrist extension 10x with assistance 50% of the time, initiate digit 1 extension 10x, requires assistance to extend digits 2-5   Pt reports at baseline he experienced digits 3-4 locking at proximal interphalangeal joints, which is observed during session with digit extension  Overall pt continues to demonstrate good progress in (L) UE ROM/strength while decreasing compensatory movement patterns  Neuromuscular Education   Response to Weight Bearing Technique Pt engaged in (L) UE weightbearing through medium sized therapy ball while seated in chair with ball positioned against the wall  OTR/L provided point of control at (L) forearm and tricep throughout reps  Overall pt completed x3 sets of 10 reps of pushing therapy ball against wall, promoting (L) UE weight bearing, shoulder flexion with elbow, wrist, and digit extension with resistance x10 seconds per rep  Pt benefits from continued stretching/weight bearing of (L) wrist/digit extensors  Vibration pt with good tolerance to high frequency vibration applied to (L) digit/wrist extensors to increase ROM for improved functional use of (L) UE - see above for further details  Cognition   Overall Cognitive Status WFL   Activity Tolerance   Activity Tolerance Patient tolerated treatment well   Assessment   Treatment Assessment Pt participated in skilled OT tx session focused on (L) UE neuromuscular re-education with ROM, weight bearing, and high vibration frequency applied, kitchen mobility, toilet transfers, functional mobility  See above for further details on functional performance  Spoke with PT Ke Worthy, plan for OT to continue to utilize [facility] during functional mobility/transfers to increase pt's independence with use of least restrictive device during ADL/IADL and household mobility   Pt demonstrates good progress with (L) UE ROM/strength, plan to trial Reo-Go assisted therapy using arm trough for active ROM during (L) forward thrust  Continue OT plan of care with focus on increasing pt's independence to mod I level with use of least restrictive device for ADLs, increasing independence in IADLS, leisure activities, social participation and work related tasks through (L) UE neuromuscular re-education and standing balance  Prognosis Good   Problem List Decreased strength;Decreased endurance; Impaired balance;Decreased coordination;Decreased mobility; Decreased range of motion   Plan   Treatment/Interventions ADL retraining;Functional transfer training; Therapeutic exercise; Endurance training;Patient/family training;Equipment eval/education; Bed mobility; Compensatory technique education   Progress Progressing toward goals   Recommendation   OT Discharge Recommendation Outpatient OT   OT Therapy Minutes   OT Time In 0830   OT Time Out 1000   OT Total Time (minutes) 90   OT Mode of treatment - Individual (minutes) 90   OT Mode of treatment - Concurrent (minutes) 0   OT Mode of treatment - Group (minutes) 0   OT Mode of treatment - Co-treat (minutes) 0   OT Mode of Teatment - Total time(minutes) 90 minutes   Therapy Time missed   Time missed?  No       Yuridia Parisi MS, OTR/L , CBIS

## 2018-11-12 NOTE — PROGRESS NOTES
Physical Medicine and Rehabilitation Progress Note  Lashonda Rosemead 58 y o  male MRN: [id number]  Unit/Bed#: -01 Encounter: [id number]    HPI: Patient is 57 yo male with right cerebral infarcts likely embolic in nature     Chief Complaint/Subjective: Patient denies any issues over the weekend     ROS:  negative unless noted above     Assessment/Plan:      Bradycardia   Assessment & Plan    · Asymptomatic  · HR currently WNL at 69  · d/w IM and are monitoring at this time      DM (diabetes mellitus) ([facility] )   Assessment & Plan    · Not on any meds at home  · Per IM ISS or accuchecks no longer needed based on blood sugars      Gout   Assessment & Plan    · Left ankle  · Per patient was Indocin 50mg TID PTA however was not able to tolerate due to diarrhea and has not been taking for 2 months   · XR of left ankle and foot negative   · s/p colchicine and steroid taper per IM  · Resolved per patient   · Of note per patient this is only the 2nd gout flare patient has ever had     Hyperlipidemia   Assessment & Plan    · Home zocor 20 mg changed to lipitor 80 mg      Anxiety   Assessment & Plan    · No record of any benzodiazepines (or any other controlled medications) found in the [place] database going back 1 year  · Neuropsychology following       Hypertension   Assessment & Plan    · IM to clarify home meds as records are not clear ( olmesartan vs valsartan ?, HCTZ 25 mg vs 50 mg ? norvasc ?)  · Currently on cozaar per IM      Dysphagia   Assessment & Plan    · SLP following  · Modified diet per SLP recs      * Right sided cerebral hemisphere cerebrovascular accident (CVA) ([facility] )   Assessment & Plan    · On DAPT for 90 days, with aspirin/plavix, followed by monotherapy with ASA  · Possibly cardioembolic in etiology   Consulted EP for eval/placement of loop recorder however they recommend JAYE first which was negative for thrombus in MICHELLE however did reveal aortic atheroma, will d/w neuro (Dr Zoraida Lang) if they feel the atheroma is the etiology of the stroke and did not feel that atheroma was likely severe enough to change from  to [facility] even if this was the potential etiology so management recommendations per neuro was unchanged and patient had LOOP placed   · Fluoxetine 20mg post stroke for motor recovery         Scheduled Meds:    Current Facility-Administered Medications:  acetaminophen 650 mg Oral Q6H PRN Leslye Calderón MD   aspirin 81 mg Oral Daily Leslye Calderón, MD   atorvastatin 80 mg Oral QPM Leslye Calderón, MD   clopidogrel 75 mg Oral Daily Elizabeth Arron, PATEL   docusate sodium 100 mg Oral BID Bernarda Yao PA-C   FLUoxetine 20 mg Oral Daily Elizabeth Heller, CANDENP   losartan 50 mg Oral Daily Tanesha Wong, PATEL   melatonin 9 mg Oral HS Nuria Castelan MD   nicotine 14 mg Transdermal Daily Leslye Calderón MD   ondansetron 4 mg Intravenous Q6H PRN Leslye Calderón MD   oxyCODONE 2 5 mg Oral Q4H PRN Leslye Calderón MD   pneumococcal 23-valent polysaccharide vaccine 0 5 mL Subcutaneous Prior to discharge Leslye Calderón MD   polyethylene glycol 17 g Oral Daily PRN Bernarda Yao PA-C   senna 1 tablet Oral HS Bernarda Yao PA-C        Incidental findings  1) thyroid nodules: per radiology report recs non-emergent OP thyroid U/S, OP FU with PCP to arrange U/S  2) subpleural bullae: OP FU with PCP with further testing/treatment and/or specialist referral at PCP's discretion     Note: patient noted to have petechial hemorrhage on imaging however has been cleared for DAPT and lovenox (for DVT ppx) by neuro  DVT ppx: SCDs, ambulating adequate distances   Dispo: reteam     Objective:    Functional Update:  Mobility: min  Transfers: min  ADLs: min-mod         Physical Exam:    Vitals:    11/12/18 1300   BP: 109/62   Pulse: 69   Resp: 18   Temp: 98 1 °F (36 7 °C)   SpO2: 96%             General: alert, no apparent distress, cooperative and comfortable  HEENT:  Head: Normocephalic, no lesions, without obvious abnormality  CARDIAC:  +S1/2  LUNGS:  respirations unlabored  ABDOMEN:  soft   EXTREMITIES:  no signficant LE edema  NEURO:   awake, alert, appropriately answering questions, 2/5 LUE, +dysarthria (essentially resolved)  PSYCH:  mood/affect currently stable      Diagnostic Studies: reviewed, no new imaging  No orders to display       Laboratory:      Results from last 7 days  Lab Units 11/12/18  0550 11/08/18  0649   HEMOGLOBIN g/dL 12 8 13 3   HEMATOCRIT % 38 9 39 8   WBC Thousand/uL 6 27 7 17       Results from last 7 days  Lab Units 11/12/18  0550 11/08/18  0649   BUN mg/dL 10 10   POTASSIUM mmol/L 3 6 3 8   CHLORIDE mmol/L 108 107   CREATININE mg/dL 0 84 0 89            Patient Active Problem List   Diagnosis    Right sided cerebral hemisphere cerebrovascular accident (CVA) ([facility] )    Dysphagia    Hypertension    Anxiety    Hyperlipidemia    Gout    DM (diabetes mellitus) ([facility] )    Bradycardia           Total visit time:  At least 25 minutes, with more than 50% spent counseling/coordinating care

## 2018-11-12 NOTE — PROGRESS NOTES
11/12/18 1030   Pain Assessment   Pain Assessment No/denies pain   Restrictions/Precautions   Precautions Fall Risk   Cognition   Arousal/Participation Cooperative   Subjective   Subjective pt reported feeling better today than yesterday   QI: Sit to Stand   Assistance Needed Supervision   Sit to Stand CARE Score 4   QI: Chair/Bed-to-Chair Transfer   Assistance Needed Supervision   Chair/Bed-to-Chair Transfer CARE Score 4   Transfer Bed/Chair/Wheelchair   Limitations Noted In Balance; Endurance;LE Strength;UE Strength   Stand Pivot Supervision   Sit to Stand Supervision   Stand to Sit Supervision   Bed, Chair, Wheelchair Transfer (FIM) 5 - Patient requires supervision/monitoring   QI: Walk 10 Feet   Assistance Needed Supervision   Walk 10 Feet CARE Score 4   QI: Walk 50 Feet with Two Turns   Assistance Needed Supervision   Walk 50 Feet with Two Turns CARE Score 4   QI: Walk 150 Feet   Assistance Needed Physical assistance   Assistance Provided by [place] Less than 25%   Walk 150 Feet CARE Score 3   QI: Walking 10 Feet on Uneven Surfaces   Assistance Needed Physical assistance   Assistance Provided by [place] Less than 25%   Comment walking over weighted dowels and bolsters in the hallway, 30' x6   Walking 10 Feet on Uneven Surfaces CARE Score 3   Ambulation   Primary Discharge Mode of Locomotion Walk   Walk Assist Level Minimum Assist;Close Supervision   Gait Pattern Decreased foot clearance;L hemiparesis;L knee hyperextension; Step through   Assist Device (none)   Distance Walked (feet) 500 ft  (x3)   Limitations Noted In Balance; Endurance;Speed;Strength;Swing   Findings Darin for LOB, otherwise more consistent at S level   Walking (FIM) 4 - Patient requires steadying assist or light touching AND distance 150 feet or more, no rest   Therapeutic Interventions   Neuromuscular Re-Education scooting up/down side of high lo mat (from one side all the way to the other) without use of BUE to facilitate ant weight shift, BLE and trunk strength  seated EOB AAROM as needed and then with manual resistance for PNF patterns D1 and D2 for LUE  Review of shoulder ER LUE with elbow at 90 deg flexion and shoulder in netural and reviewed with his brother for HEP  Pt able to initiate more through AROM of both PNF patterns but has difficulty with shoudler ER and forearm supination  practiced standing toe taps alternating legs to 4" block, more consistently at CS level and with better foot clearance LLE  sideways lunges alternating sides and forward /backward lungs but with shorter step forward/backward, Jemima at times to help with balance and otherwise at CS level for safety  Assessment   Treatment Assessment Pt cont to make progress towards LTGs, cont to progress with being able to perform transfers and walking more consistently at a S level, however cont to need occasional Jemima for a LOB if he is unable to self correct  Cont to note walking at a faster gait speeds improves balance  Pt was able to make a pot of coffee in OT gym and sequence through side stepping, getting items, adn throwing out items appropriately without a LOB  Reviewed how to perform scap squeezes for L shoulder ER with pt's brother Tawana Arreolaats for Treasure Perez to further strengthen this to improve LUE strength and AROM  Pt will cont to benefit from skilled PT to further progress dynamic balance and righting reactions to improve safety and I with functional mobility  Barriers to Discharge Decreased caregiver support   PT Barriers   Physical Impairment Decreased strength;Decreased range of motion;Decreased endurance; Impaired balance;Decreased mobility   Functional Limitation Car transfers;Stair negotiation;Transfers;Standing;Walking   Plan   Treatment/Interventions Functional transfer training;LE strengthening/ROM; Elevations; Therapeutic exercise; Endurance training;Patient/family training;Equipment eval/education; Bed mobility;Gait training   Progress Progressing toward goals Recommendation   Recommendation Home with family support; Outpatient PT   Equipment Recommended Cane   PT Therapy Minutes   PT Time In 1030   PT Time Out 1130   PT Total Time (minutes) 60   PT Mode of treatment - Individual (minutes) 60   PT Mode of treatment - Concurrent (minutes) 0   PT Mode of treatment - Group (minutes) 0   PT Mode of treatment - Co-treat (minutes) 0   PT Mode of Teatment - Total time(minutes) 60 minutes   Therapy Time missed   Time missed?  No

## 2018-11-12 NOTE — PROGRESS NOTES
Internal Medicine Progress Note  Patient: Eldridge Essex  Age/sex: 58 y o  male  Medical Record #: [id number]      ASSESSMENT/PLAN:  Eldridge Essex is seen and examined and management for following issues:    Multiple embolic R MCA CVA:  Cont ASA/Plavix for 90 days then Aspirin and statin therapy only; EP did JAYE/LOOP implant 10/31/18  LOOP recorder implant 10/30:  Per EP OK d/c suture today      HTN:  stable; no changes today        DM II:  Diet controlled = stopped Accuchecks/coverage since BSs had been ok      Tobacco abuse:  Cont patch; recommended cessation     Anxiety: stable; Cont Fluoxetine 20mg daily     Left ankle and left 2nd toe pain/gout:  resolved        Subjective: No overnight issues  He denies any complaints      Scheduled Meds:    Current Facility-Administered Medications:  acetaminophen 650 mg Oral Q6H PRN Francisco Javier Brambila MD   aspirin 81 mg Oral Daily Francisco Jaiver Brambila MD   atorvastatin 80 mg Oral QPM Francisco Javier Brambila MD   clopidogrel 75 mg Oral Daily Elizabeth Heller, CRNP   docusate sodium 100 mg Oral BID Bernarda Yao PA-C   FLUoxetine 20 mg Oral Daily Elizabeth CANDE HellerNP   losartan 50 mg Oral Daily Tobin Ninfa, CANDENP   melatonin 9 mg Oral HS Vane Gomes MD   nicotine 14 mg Transdermal Daily Francisco Javier Brambila MD   ondansetron 4 mg Intravenous Q6H PRN Francisco Javier Brambila MD   oxyCODONE 2 5 mg Oral Q4H PRN Francisco Javier Brambila MD   pneumococcal 23-valent polysaccharide vaccine 0 5 mL Subcutaneous Prior to discharge Francisco Javier Brambila MD   polyethylene glycol 17 g Oral Daily PRN Bernarda Yao PA-C   senna 1 tablet Oral HS Bernarda Yao PA-C       Labs:       Results from last 7 days  Lab Units 11/12/18  0550 11/08/18  0649   WBC Thousand/uL 6 27 7 17   HEMOGLOBIN g/dL 12 8 13 3   HEMATOCRIT % 38 9 39 8   PLATELETS Thousands/uL 243 256       Results from last 7 days  Lab Units 11/12/18  0550 11/08/18  0649   POTASSIUM mmol/L 3 6 3 8   CHLORIDE mmol/L 108 107   CO2 mmol/L 28 26   BUN mg/dL 10 10 CREATININE mg/dL 0 84 0 89   CALCIUM mg/dL 8 6 8 9                  Glucose, i-STAT (mg/dl)   Date Value   10/20/2018 132       Labs reviewed    Physical Examination:  Vitals:   Vitals:    11/11/18 0615 11/11/18 1323 11/11/18 2105 11/12/18 0545   BP: 136/83 133/76 143/65 141/80   BP Location: Right arm Right arm Right arm Left arm   Pulse:  64 (!) 50 (!) 52   Resp: 18 18 18 18   Temp: 98 3 °F (36 8 °C) 98 2 °F (36 8 °C) 97 6 °F (36 4 °C) 97 8 °F (36 6 °C)   TempSrc: Oral Oral Oral Oral   SpO2: 96% 94% 96% 98%   Weight:       Height:         Constitutional:  NAD; pleasant; nontoxic  CV:  +S1, S2;  RRR; no rub/murmur; LOOP incision w/o erythema/drainage  Pulmonary:  BBS without crackles/wheeze/rhonci; resp are unlabored  Abdominal:  soft, +BS, ND/NT; no mass  Skin:  no rashes  Musculoskeletal:  No edema  :  no nichols   Neurological/Psych:  AAO;  left hand grasp 2+/5 = can lift at shoulder and weakly flex at elbow;  LLE HF 4/5 with DF/PF 4-/5; + left facial droop; speech is clear; no depression/anxiety        [ X ] Total time spent: 30 Mins and greater than 50% of this time was spent counseling/coordinating care  ** Please Note: Dragon 360 Dictation voice to text software may have been used in the creation of this document   **

## 2018-11-12 NOTE — PROGRESS NOTES
11/12/18 1310   Pain Assessment   Pain Assessment No/denies pain   Pain Score No Pain   Restrictions/Precautions   Precautions Fall Risk   Memory Skills   Memory (FIM) 6 - Recognizes with extra time   Social Interaction (FIM) 7 - Interacts appropriately without assistive device, medication or helper   Speech/Language/Cognition Assessmetn   Treatment Assessment Pt participated in skilled ST session w/ focus on higher level cognitive linguistic skills, more specifically more abstract reasoning  Began session where pt demonstrated STM recall of weekend events, along w/ AM therapies w/ accurate recall  Pt then presented w/ diagram of words/pictures where goal of task was to determine a common phrase represented by picture  Independently, pt determined common phrases w/ ~80% accuracy independently, which was a high success rate w/ task, benefiting from verbal cues to determine remaining phrases  Pt then requested to elicit an alternative use of common objects other than their typical use which pt completed w/o difficulty, eliciting prompt and full, descriptive responses, often eliciting multiple uses  Lastly, pt engaged in task by deciding minimum of two factors to consider prior to making a decision  Pt stated at least two logical factors for 95% of situations, requiring a single verbal cue  At end of session, pt initiated discussion regarding pt's own concerns in relation to his current cognition  Pt reports that these types of activities are beneficial since 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 job requires abstract reasoning and providing alternative solutions  Additionally, pt reports intermittent difficulty w/ reading comprehension such as when reading his work emails or when paying bills online  Discussed therapy to also target these types of skills to which pt is in agreement  At this time, pt will continue to benefit from skilled ST services to maximize higher level cognitive linguistic skills for increased independence      SLP Therapy Minutes   SLP Time In 1310   SLP Time Out 1400   SLP Total Time (minutes) 50   SLP Mode of treatment - Individual (minutes) 50   SLP Mode of treatment - Concurrent (minutes) 0   SLP Mode of treatment - Group (minutes) 0   SLP Mode of treatment - Co-treat (minutes) 0   SLP Mode of Teatment - Total time(minutes) 50 minutes   Therapy Time missed   Time missed?  No   Daily FIM Score   Problem solving (FIM) 5 - Solves complex problems But requires cues from helper   Comprehension (FIM) 6 - Understands complex/abstract but requires more time   Expression (FIM) 6 - Expresses complex/abstract but requires:  more time

## 2018-11-13 PROCEDURE — 97530 THERAPEUTIC ACTIVITIES: CPT

## 2018-11-13 PROCEDURE — G0515 COGNITIVE SKILLS DEVELOPMENT: HCPCS

## 2018-11-13 PROCEDURE — 97112 NEUROMUSCULAR REEDUCATION: CPT

## 2018-11-13 PROCEDURE — 97127 HB COGNITIVE SKILLS DEVELOPMENT, EACH 15 MUNUTES: CPT

## 2018-11-13 PROCEDURE — 99232 SBSQ HOSP IP/OBS MODERATE 35: CPT | Performed by: PHYSICAL MEDICINE & REHABILITATION

## 2018-11-13 RX ADMIN — LOSARTAN POTASSIUM 50 MG: 50 TABLET, FILM COATED ORAL at 09:29

## 2018-11-13 RX ADMIN — DOCUSATE SODIUM 100 MG: 100 CAPSULE, LIQUID FILLED ORAL at 09:28

## 2018-11-13 RX ADMIN — NICOTINE 7 MG: 7 PATCH, EXTENDED RELEASE TRANSDERMAL at 09:29

## 2018-11-13 RX ADMIN — DOCUSATE SODIUM 100 MG: 100 CAPSULE, LIQUID FILLED ORAL at 17:31

## 2018-11-13 RX ADMIN — CLOPIDOGREL 75 MG: 75 TABLET, FILM COATED ORAL at 09:28

## 2018-11-13 RX ADMIN — ASPIRIN 81 MG: 81 TABLET, COATED ORAL at 09:29

## 2018-11-13 RX ADMIN — FLUOXETINE 20 MG: 20 CAPSULE ORAL at 09:28

## 2018-11-13 RX ADMIN — MELATONIN 9 MG: at 20:15

## 2018-11-13 RX ADMIN — ATORVASTATIN CALCIUM 80 MG: 80 TABLET, FILM COATED ORAL at 17:31

## 2018-11-13 NOTE — PROGRESS NOTES
11/13/18 1305   Pain Assessment   Pain Assessment No/denies pain   Pain Score No Pain   Restrictions/Precautions   Precautions Fall Risk   Weight Bearing Restrictions No   ROM Restrictions No   Braces or Orthoses Sling  (givmohr)   Memory Skills   Memory (FIM) 6 - Recognizes with extra time   Social Interaction (FIM) 7 - Interacts appropriately without assistive device, medication or helper   Speech/Language/Cognition Assessmetn   Treatment Assessment Pt seen for ongoing skilled ST targeting higher level cognition and language skills to which pt reports difficulty w/ higher level tasks  Pt engaged in a sentence reading comprehension task targeting reasoning, attention to detail and comprehension  When presented w/ written sentences, pt recognized incongruous sentences independently in 29/30 trials, independently  While pt able to recognize errors and provide more appropriate info, pt did require min cues to assist w/ correct spelling which pt did express concern about these deficits which are new since his stroke  Additionally, pt w/ recent reports of difficulty w/ visual word recognition and comprehension, specifically when reading his work emails and written info such as the newspaper  Targeting mental flexibility and spelling, pt then presented w/ sentences containing words which are using the incorrect homophone  Pt demonstrated ability to verbally explain correct meaning for all trials (30), however, did have difficulty using correct form of word, requiring cues for ~25% of trials  At times, pt did recognize errors but continued to require cues to correct them  At end of session, pt initiated discussion regarding his current cognition and language skills which he is concerned about in regards to returning to work   SLP discussed that while his current skills are fully functional at the basic level, that he does continue to require supervision level for problem solving/reasoning and for higher level language tasks  Will continue to benefit from skilled ST intervention to maximize higher level cognition and language skills at this time  SLP Therapy Minutes   SLP Time In 0616   SLP Time Out 1340   SLP Total Time (minutes) 35   SLP Mode of treatment - Individual (minutes) 35   SLP Mode of treatment - Concurrent (minutes) 0   SLP Mode of treatment - Group (minutes) 0   SLP Mode of treatment - Co-treat (minutes) 0   SLP Mode of Teatment - Total time(minutes) 35 minutes   Therapy Time missed   Time missed?  No   Daily FIM Score   Problem solving (FIM) 5 - Solves complex problems But requires cues from helper   Comprehension (FIM) 6 - Understands complex/abstract but requires  glasses for visual comp   Expression (FIM) 6 - Has only MILD difficulty with complex/abstract info

## 2018-11-13 NOTE — PROGRESS NOTES
Occupational Therapy Treatment Note         11/13/18 1113   Pain Assessment   Pain Assessment No/denies pain   Restrictions/Precautions   Precautions Fall Risk   Braces or Orthoses Sling   Lifestyle   Autonomy "I have been waiting to be able to do this by myself"    Bathing   Findings  pt demonstrating progress in UB bathing, able to complete while seated in w/c with setup assistance, demonstrates good carryover of compensatory technique to bathe (R) under arm using (R) UE  Pt able to bathe under (L) arm as pt presents with increased ROM (L) abduction  QI: Putting On/Taking Off Footwear   Assistance Needed Physical assistance   Assistance Provided by [place] 25%-49%   Comment assistance for managing shoe laces - OT to follow up with pt regarding elastic laces to increase pt's indepednence    Putting On/Taking Off Footwear CARE Score 3   Dressing/Undressing Clothing   Don LB Clothes Socks; Shoes   Findings see above    LB Dressing (FIM) 3 - Patient completes  50-74% of all tasks   QI: Sit to [address] Provided by [place] No physical assistance   Comment SPC   Sit to Stand CARE Score 4   QI: Chair/Bed-to-Chair Transfer   Assistance Needed Supervision   Assistance Provided by [place] No physical assistance   Comment [facility]    Chair/Bed-to-Chair Transfer CARE Score 4   Transfer Bed/Chair/Wheelchair   Positioning Concerns Hemiplegia   Sit to Stand Supervision   Stand to Sit Supervision   Findings pt completes functional mobility/transfers at supervision level with use of SPC, demonstrating progress  Pt requires increased attention to task during functional mobility, noted with (L) foot drag when distracted      Bed, Chair, Wheelchair Transfer (FIM) 5 - Patient requires supervision/monitoring   ROM - Left Upper Extremities    L Elbow AROM;AAROM;Elbow flexion;Elbow extension   LUE ROM Comment Pt engaged in AROM (L) elbow flexion/extension with tapping provided at biceps/triceps to decrease compensatory movement patterns at shoulder  Pt completed 1 set of 15 reps elbow flexion/extension with minimal resistance provided for strengthening  Pt with good tolerance  Neuromuscular Education   Functional Movement Patterns Pt engaged in ReoGo-Assisted Therapy for motor learning and mass repetition to increase (L) UE functional use during ADLs/IADLS/functional transfers, as well as to facilitate and promote normal movement patterns  Pt assisted into chair with trunk and shoulder supports in place to decrease compensatory trunk movement patterns  Pt engaged in fit process for free fit, to be utilized for forward thrust motion as pt has been demonstrating progress in (L) shoulder ROM and strength  Pt completed 8 reps of 10 sets (L) UE forward thrust in free motion with minimal tactile cues provided to decrease (L) scapular elevation as a compensatory movement pattern during shoulder extension and elbow flexion  Pt benefits from cues to "bend your elbow and move it back" to promote appropriate movement patterns during retraction and decrease compensatory movement patterns  Overall pt with good tolerance to free fit for forward thrust, requires 15-30 second rest breaks between sets  Pt will continue to benefit from Reo-Go assisted therapy in free motion for forward thrust, however at this pt appropriate for initiated motion during other exercise programs to decrease compensatory movement patterns  Cognition   Overall Cognitive Status WFL   Activity Tolerance   Activity Tolerance Patient tolerated treatment well   Assessment   Treatment Assessment Pt participated in skilled OT tx session focused on Reo-Go assisted therapy using free motion to engage in (L) forward thrust, demonstrating progress in ROM/strength of (L) shoulder and elbow flexion/extension; LB ADLS; functional mobility/transfers  See above for further details on functional performance   Pt additionally demonstrating progress in functional mobility/transfers, able to complete at close supervision level using SPC  However, when distracted or during conversation pt noted with (L) foot drag and requires cues to attend to (L) LE to decrease fall risk  Pt demonstrating good progress, however remains limited in independence during ADLS, IADLS, work, social participation, and leisure activites 2* impaired standing balance/tolerance (L) UE weakness proximally > distally  Pt will continue to benefit from acute rehab OT services to address these barriers  Family training scheduled with pt's brother on Thursday from  for ADL training with focus on shower transfer  Continue OT plan of care with focus on providing high frequency vibration to (L) wrist/digits to increase wrist/digit extension, (L) UE neuromuscular re-education and use of Reo-Go assisted therapy in free motion for forward thrust, initiated motion for other exercise patterns - in order to increase pt's independence in baseline areas of occupation  Prognosis Good   Problem List Decreased strength;Decreased endurance;Decreased range of motion; Impaired balance;Decreased mobility   Plan   Treatment/Interventions ADL retraining;Functional transfer training; Therapeutic exercise; Endurance training;Patient/family training;Equipment eval/education; Compensatory technique education; Bed mobility   Progress Progressing toward goals   Recommendation   OT Discharge Recommendation Outpatient OT   Equipment Recommended (pending progress )   OT Therapy Minutes   OT Time In 0820   OT Time Out 0930   OT Total Time (minutes) 70   OT Mode of treatment - Individual (minutes) 70   OT Mode of treatment - Concurrent (minutes) 0   OT Mode of treatment - Group (minutes) 0   OT Mode of treatment - Co-treat (minutes) 0   OT Mode of Teatment - Total time(minutes) 70 minutes   Therapy Time missed   Time missed?  No       Yuridia Parisi MS, OTR/L , CBIS

## 2018-11-13 NOTE — PROGRESS NOTES
Internal Medicine Progress Note  Patient: Jeremy Chan  Age/sex: 58 y o  male  Medical Record #: [id number]      ASSESSMENT/PLAN:  Jeremy Chan is seen and examined and management for following issues:    Multiple embolic R MCA CVA:  Cont ASA/Plavix for 90 days then Aspirin and statin therapy only; EP did JAYE/LOOP implant 10/31/18  LOOP recorder implant 10/30:  suture out     HTN:  stable; no changes today        DM II:  Diet controlled = stopped Accuchecks/coverage since BSs had been ok      Tobacco abuse:  Cont patch; recommended cessation     Anxiety: stable; Cont Fluoxetine 20mg daily     Left ankle and left 2nd toe pain/gout:  resolved        Subjective: No overnight issues  He denies any complaints      Scheduled Meds:    Current Facility-Administered Medications:  acetaminophen 650 mg Oral Q6H PRN Snow Ness MD   aspirin 81 mg Oral Daily Snow Ness MD   atorvastatin 80 mg Oral QPM Snow Ness MD   clopidogrel 75 mg Oral Daily Elizabeth Heller, CRNP   docusate sodium 100 mg Oral BID Bernarda Yao PA-C   FLUoxetine 20 mg Oral Daily Elizabeth Heller, CRNP   losartan 50 mg Oral Daily Elizabeth Heller, CRNP   melatonin 9 mg Oral HS Mercedes Mann MD   nicotine 7 mg Transdermal Daily Mercedes Mann MD   ondansetron 4 mg Intravenous Q6H PRN Snow Ness MD   oxyCODONE 2 5 mg Oral Q4H PRN Snow Ness MD   pneumococcal 23-valent polysaccharide vaccine 0 5 mL Subcutaneous Prior to discharge Snow Ness MD   polyethylene glycol 17 g Oral Daily PRN Bernarda Yao PA-C   senna 1 tablet Oral HS Bernarda Yao PA-C       Labs:       Results from last 7 days  Lab Units 11/12/18  0550 11/08/18  0649   WBC Thousand/uL 6 27 7 17   HEMOGLOBIN g/dL 12 8 13 3   HEMATOCRIT % 38 9 39 8   PLATELETS Thousands/uL 243 256       Results from last 7 days  Lab Units 11/12/18  0550 11/08/18  0649   POTASSIUM mmol/L 3 6 3 8   CHLORIDE mmol/L 108 107   CO2 mmol/L 28 26   BUN mg/dL 10 10   CREATININE mg/dL 0  84 0 89   CALCIUM mg/dL 8 6 8 9                  Glucose, i-STAT (mg/dl)   Date Value   10/20/2018 132       Labs reviewed    Physical Examination:  Vitals:   Vitals:    11/12/18 1300 11/12/18 2116 11/13/18 0639 11/13/18 0900   BP: 109/62 151/72 132/77 141/75   BP Location: Right arm Right arm Right arm Right arm   Pulse: 69 67 (!) 47 59   Resp: 18 18 18    Temp: 98 1 °F (36 7 °C) 97 7 °F (36 5 °C) (!) 97 3 °F (36 3 °C)    TempSrc: Oral Oral Oral    SpO2: 96% 95% 99%    Weight:       Height:         Constitutional:  NAD; pleasant; nontoxic  CV:  +S1, S2;  RRR; no rub/murmur; LOOP incision w/o erythema/drainage  Pulmonary:  BBS without crackles/wheeze/rhonci; resp are unlabored  Abdominal:  soft, +BS, ND/NT; no mass  Skin:  no rashes  Musculoskeletal:  No edema  :  no inchols   Neurological/Psych:  AAO;  left hand grasp 2+/5 = can lift at shoulder and weakly flex at elbow;  LLE HF 4/5; + left facial droop; speech is clear; no depression/anxiety        [ X ] Total time spent: 30 Mins and greater than 50% of this time was spent counseling/coordinating care  ** Please Note: Dragon 360 Dictation voice to text software may have been used in the creation of this document   **

## 2018-11-13 NOTE — PROGRESS NOTES
11/13/18 1400   Pain Assessment   Pain Assessment 0-10   Pain Score No Pain   Restrictions/Precautions   Precautions Fall Risk   Braces or Orthoses Sling   Cognition   Overall Cognitive Status WFL   Arousal/Participation Cooperative; Alert   Attention Within functional limits   Orientation Level Oriented X4   Memory Within functional limits   Following Commands Follows multistep commands with increased time or repetition   Subjective   Subjective reports the hardest thing was holding the washcloth between his thumb and forefinger   QI: Sit to Stand   Assistance Needed Supervision   Sit to Stand CARE Score 4   QI: Chair/Bed-to-Chair Transfer   Assistance Needed Supervision   Chair/Bed-to-Chair Transfer CARE Score 4   Transfer Bed/Chair/Wheelchair   Limitations Noted In Balance;UE Strength;LE Strength   Stand Pivot Supervision   Sit to Stand Supervision   Stand to Sit Supervision   Bed, Chair, Wheelchair Transfer (FIM) 5 - Patient requires supervision/monitoring   QI: Walk 10 Feet   Assistance Needed Incidental touching;Supervision   Walk 10 Feet CARE Score 4   QI: Walk 50 Feet with Two Turns   Assistance Needed Incidental touching;Supervision   Walk 50 Feet with Two Turns CARE Score 4   QI: Walk 150 Feet   Assistance Needed Incidental touching;Supervision   Walk 150 Feet CARE Score 4   Ambulation   Does the patient walk? 2  Yes   Primary Discharge Mode of Locomotion Walk   Walk Assist Level Close Supervision;Contact Guard   Gait Pattern Inconsistant Soraida;Decreased foot clearance;L hemiparesis;L knee hyperextension; Step through; Improper weight shift   Distance Walked (feet) 1000 ft   Limitations Noted In Balance; Heel Strike;Speed;Strength;Swing   Walking (FIM) 4 - Patient requires steadying assist or light touching AND distance 150 feet or more, no rest   QI: 4 Steps   Assistance Needed Incidental touching;Supervision   4 Steps CARE Score 4   QI: 12 Steps   Assistance Needed Incidental touching;Supervision   12 Steps CARE Score 4   Stairs   Type Stairs   # of Steps 60   Weight Bearing Precautions Fall Risk   Assist Devices Single Rail   Findings reciprocal up and down   Stairs (FIM) 5 - Patient requires supervision/monitoring AND goes up and down full flight (12- 14 stairs)   Therapeutic Interventions   Neuromuscular Re-Education stepping up on step with 2# CW on LLE 20x2 and then with RLE using 4# CW 20x2; stepping up on step with black TB resistance at hips 30x2; washing window with AAROM from therapist for LUE and then AAROM from his RUE to assist LUE; PNF for LUE 30x2 each pattern; retrom amb 150'x2; lateral amb 150' each direction   Assessment   Treatment Assessment continues to have weakness on L side but has better ability to move L hand and place on lap when sitting and not using RUE to compensate; pinch  is improving for holding a washcloth and walking to his room; education on prevention of compensation only but also safety for shoulder; clearance of LLE for stairs is consistent and only caught slightly on 1 stair out of 60; 1 LOB for retro amb due to not lifting his LLE enough and needed mod A to recover but attempted to recover on his own; short rest breaks between activity to increase exertion and increase endurance; for stair exercises with weights and band, good balance even though he uses RUE for support on rail, difficulty with preventing LLE from releasing quickly and also hyperextending when he stands up on stair with LLE in stance; continue activity to increase balance for dynamic exercises and incorporate LUE for neuro recovery and increase in functional activity; monitor hyperextension of LLE to prevent pain or damage to ligaments; continue POC as per PT   Problem List Decreased strength;Decreased endurance;Decreased range of motion; Impaired balance;Decreased mobility   Barriers to Discharge Decreased caregiver support   PT Barriers   Functional Limitation Car transfers;Stair negotiation;Standing;Transfers; Walking   Plan   Treatment/Interventions ADL retraining;Functional transfer training;LE strengthening/ROM; Elevations; Therapeutic exercise; Endurance training;Patient/family training;Equipment eval/education; Bed mobility;Gait training   Progress Progressing toward goals   Recommendation   Recommendation Outpatient PT; Home with family support   Equipment Recommended (TBD)   PT Therapy Minutes   PT Time In 1400   PT Time Out 1540   PT Total Time (minutes) 100   PT Mode of treatment - Individual (minutes) 100   PT Mode of treatment - Concurrent (minutes) 0   PT Mode of treatment - Group (minutes) 0   PT Mode of treatment - Co-treat (minutes) 0   PT Mode of Teatment - Total time(minutes) 100 minutes   Therapy Time missed   Time missed?  No

## 2018-11-13 NOTE — PROGRESS NOTES
Physical Medicine and Rehabilitation Progress Note  Jocelyne Bobby 58 y o  male MRN: [id number]  Unit/Bed#: -01 Encounter: [id number]    HPI: Patient is 59 yo male with right cerebral infarcts likely embolic in nature     Chief Complaint/Subjective: Patient states he slept well last night     ROS:  negative unless noted above     Assessment/Plan:      Bradycardia   Assessment & Plan    · Consistently asymptomatic  · HR currently 56 with BP of 133/73  · d/w IM and are monitoring at this time      DM (diabetes mellitus) ([facility] )   Assessment & Plan    · Not on any meds at home  · Per IM ISS or accuchecks no longer needed based on blood sugars      Gout   Assessment & Plan    · Left ankle  · Per patient was Indocin 50mg TID PTA however was not able to tolerate due to diarrhea and has not been taking for 2 months   · XR of left ankle and foot negative   · s/p colchicine and steroid taper per IM  · Resolved per patient   · Of note per patient this is only the 2nd gout flare patient has ever had     Hyperlipidemia   Assessment & Plan    · Home zocor 20 mg changed to lipitor 80 mg      Anxiety   Assessment & Plan    · No record of any benzodiazepines (or any other controlled medications) found in the PAPDMP database going back 1 year  · Neuropsychology following       Hypertension   Assessment & Plan    · IM to clarify home meds as records are not clear ( olmesartan vs valsartan ?, HCTZ 25 mg vs 50 mg ? norvasc ?)  · Currently on cozaar per IM      Dysphagia   Assessment & Plan    · SLP following  · Modified diet per SLP recs      * Right sided cerebral hemisphere cerebrovascular accident (CVA) ([facility] )   Assessment & Plan    · On DAPT for 90 days, with aspirin/plavix, followed by monotherapy with ASA  · Possibly cardioembolic in etiology   Consulted EP for eval/placement of loop recorder however they recommend JAYE first which was negative for thrombus in MICHELLE however did reveal aortic atheroma, will d/w neuro (  [place]) if they feel the atheroma is the etiology of the stroke and did not feel that atheroma was likely severe enough to change from  to [facility] even if this was the potential etiology so management recommendations per neuro was unchanged and patient had LOOP placed   · Fluoxetine 20mg post stroke for motor recovery         Scheduled Meds:    Current Facility-Administered Medications:  acetaminophen 650 mg Oral Q6H PRN Phuong Roberts MD   aspirin 81 mg Oral Daily Phuong Roberts MD   atorvastatin 80 mg Oral QPM Phuong Roberts MD   clopidogrel 75 mg Oral Daily Elizabeth Heller, CRNP   docusate sodium 100 mg Oral BID Bernarda Yao PA-C   FLUoxetine 20 mg Oral Daily Elizabeth Heller, CRNP   losartan 50 mg Oral Daily Elizabeth Arron, CANDENP   melatonin 9 mg Oral HS Lillie Wasserman MD   nicotine 7 mg Transdermal Daily Lillie Wasserman MD   ondansetron 4 mg Intravenous Q6H PRN Phuong Roberts MD   oxyCODONE 2 5 mg Oral Q4H PRN Phuong Roberts MD   pneumococcal 23-valent polysaccharide vaccine 0 5 mL Subcutaneous Prior to discharge Phuong Roberts MD   polyethylene glycol 17 g Oral Daily PRN Bernarda Yao PA-C   senna 1 tablet Oral HS Bernarda Yao PA-C        Incidental findings  1) thyroid nodules: per radiology report recs non-emergent OP thyroid U/S, OP FU with PCP to arrange U/S  2) subpleural bullae: OP FU with PCP with further testing/treatment and/or specialist referral at PCP's discretion     Note: patient noted to have petechial hemorrhage on imaging however has been cleared for DAPT and lovenox (for DVT ppx) by neuro  DVT ppx: SCDs, ambulating adequate distances   Dispo: reteam     Objective:    Functional Update:  Mobility: min  Transfers: min  ADLs: min-mod         Physical Exam:    Vitals:    11/13/18 1400   BP: 133/73   Pulse: 56   Resp: 18   Temp: 98 °F (36 7 °C)   SpO2: 96%       General: alert, no apparent distress, cooperative and comfortable  HEENT:  Head: Normocephalic, no lesions, without obvious abnormality  CARDIAC:  +S1/2  LUNGS:  respirations unlabored  ABDOMEN:  soft   EXTREMITIES:  no signficant LE edema  NEURO:   awake, alert, appropriately answering questions, 2/5 LUE (stable), facial asymmetry & tongue deviation remain   PSYCH:  mood/affect currently stable, denies depression       Diagnostic Studies: reviewed, no new imaging  No orders to display       Laboratory:      Results from last 7 days  Lab Units 11/12/18  0550 11/08/18  0649   HEMOGLOBIN g/dL 12 8 13 3   HEMATOCRIT % 38 9 39 8   WBC Thousand/uL 6 27 7 17       Results from last 7 days  Lab Units 11/12/18  0550 11/08/18  0649   BUN mg/dL 10 10   POTASSIUM mmol/L 3 6 3 8   CHLORIDE mmol/L 108 107   CREATININE mg/dL 0 84 0 89            Patient Active Problem List   Diagnosis    Right sided cerebral hemisphere cerebrovascular accident (CVA) ([facility] )    Dysphagia    Hypertension    Anxiety    Hyperlipidemia    Gout    DM (diabetes mellitus) ([facility] )    Bradycardia           Total visit time:  At least 25 minutes, with more than 50% spent counseling/coordinating care

## 2018-11-14 PROCEDURE — G0515 COGNITIVE SKILLS DEVELOPMENT: HCPCS

## 2018-11-14 PROCEDURE — 97112 NEUROMUSCULAR REEDUCATION: CPT

## 2018-11-14 PROCEDURE — 97530 THERAPEUTIC ACTIVITIES: CPT

## 2018-11-14 PROCEDURE — 99233 SBSQ HOSP IP/OBS HIGH 50: CPT | Performed by: PHYSICAL MEDICINE & REHABILITATION

## 2018-11-14 PROCEDURE — 97127 HB COGNITIVE SKILLS DEVELOPMENT, EACH 15 MUNUTES: CPT

## 2018-11-14 PROCEDURE — 97116 GAIT TRAINING THERAPY: CPT

## 2018-11-14 RX ADMIN — DOCUSATE SODIUM 100 MG: 100 CAPSULE, LIQUID FILLED ORAL at 08:28

## 2018-11-14 RX ADMIN — MELATONIN 9 MG: at 20:25

## 2018-11-14 RX ADMIN — NICOTINE 7 MG: 7 PATCH, EXTENDED RELEASE TRANSDERMAL at 08:29

## 2018-11-14 RX ADMIN — LOSARTAN POTASSIUM 50 MG: 50 TABLET, FILM COATED ORAL at 08:32

## 2018-11-14 RX ADMIN — ATORVASTATIN CALCIUM 80 MG: 80 TABLET, FILM COATED ORAL at 17:36

## 2018-11-14 RX ADMIN — FLUOXETINE 20 MG: 20 CAPSULE ORAL at 08:28

## 2018-11-14 RX ADMIN — CLOPIDOGREL 75 MG: 75 TABLET, FILM COATED ORAL at 08:28

## 2018-11-14 RX ADMIN — ASPIRIN 81 MG: 81 TABLET, COATED ORAL at 08:28

## 2018-11-14 NOTE — PROGRESS NOTES
11/14/18 1310   Pain Assessment   Pain Assessment No/denies pain   Pain Score No Pain   Restrictions/Precautions   Precautions Fall Risk   Memory Skills   Memory (FIM) 6 - Recognizes with extra time   Social Interaction (FIM) 7 - Interacts appropriately without assistive device, medication or helper   Speech/Language/Cognition Assessmetn   Treatment Assessment Pt seen for skilled ST session w/ continued focus on higher level, more complex cognitive and language skills  Began session w/ recall of AM events which pt was 100% accurate in recall of therapies, as well as recall of scheduled PT session following ST  Pt engaged in letter placement task of word rebuses where pt was required to use clues to determine correct word, along w/ mental manipulation/placement strategies  Following education on use of specific strategies, pt then demonstrated ability to carryover strategies consistently throughout session, completing task at mod I level (9 trials)  Targeting logical thinking and reading comprehension, pt utilized context clues to determine missing words where pt independently determined target/described word in 6/8 trials, benefiting from verbal cues which were aimed to increase pt's attention to specific details, noting improvement to 8/8 accuracy  As pt is commonly required to create schedules/plan his day at work which consists of meetings, etc, pt then engaged in a scheduling planning task when provided w/ specific locations, tasks and time frames where pt required overall min assist w/ task  Pt at times w/ decreased attention to detail and reasoning skills, however, was overall ~80% successful w/ task, independently  Lastly, pt presented w/ various word problems related to time and measurement management  Increased time required for pt to process info and plan where pt again required min assist w/ higher level word problems/math but was able to ultimately determine correct responses w/ min verbal cues   At end of session, discussed d/c planning w/ SLP educating pt on recs for continued skilled ST through outpatient services in order to achieve more independent levels of functioning for cognition to which pt was in agreement  Also answered pt's additional questions about location for outpatient services regarding need for neuro rehab  Will continue to benefit from skilled ST intervention at this time w/ focus on higher level cognition and language skills  SLP Therapy Minutes   SLP Time In 1310   SLP Time Out 8222   SLP Total Time (minutes) 65   SLP Mode of treatment - Individual (minutes) 65   SLP Mode of treatment - Concurrent (minutes) 0   SLP Mode of treatment - Group (minutes) 0   SLP Mode of treatment - Co-treat (minutes) 0   SLP Mode of Teatment - Total time(minutes) 65 minutes   Therapy Time missed   Time missed?  No   Daily FIM Score   Problem solving (FIM) 5 - Solves complex problems But requires cues from helper   Comprehension (FIM) 6 - Understands complex/abstract but requires  glasses for visual comp   Expression (FIM) 6 - Has only MILD difficulty with complex/abstract info

## 2018-11-14 NOTE — SOCIAL WORK
Clinical update provided to Art at [facility], pt approved for an additional 7 days with lcd 11/21  Pt's scheduled dc is 11/21 to home with outpt pt ot and slp services  Met w/pt and reviewed team update and informed of insurance contd stay coverage  Pt in agreement with dc although feels nervous and apprehensive  Pt in agreement with recommendations for contd outpt pt ot and slp services and wishes to go to [address]  mss left [address] requesting appmt times  Awaiting call back  Received call back from douglas at [facility]  appmts scheduled [facility] at [facility] for contd therapy  11/26 PT 5p, SLP 6p, 11/28 OT 1p  Placed on dc instructions and provided to pt

## 2018-11-14 NOTE — PROGRESS NOTES
Internal Medicine Progress Note  Patient: Gricelda Lawton  Age/sex: 58 y o  male  Medical Record #: [id number]      ASSESSMENT/PLAN:  Gricelda Lawton is seen and examined and management for following issues:    Multiple embolic R MCA CVA:  Cont ASA/Plavix for 90 days then Aspirin and statin therapy only; EP did JAYE/LOOP implant 10/31/18  LOOP recorder implant 10/30:  suture out     HTN:  stable; no changes today        DM II:  Diet controlled = stopped Accuchecks/coverage since BSs had been ok      Tobacco abuse:  Cont patch; recommended cessation     Anxiety: stable; Cont Fluoxetine 20mg daily     Left ankle and left 2nd toe pain/gout:  resolved        Subjective: No overnight issues  He denies any complaints      Scheduled Meds:    Current Facility-Administered Medications:  acetaminophen 650 mg Oral Q6H PRN Kathryn Drake MD   aspirin 81 mg Oral Daily Kathryn Drake MD   atorvastatin 80 mg Oral QPM Kathryn Drake MD   clopidogrel 75 mg Oral Daily Elizabeth Heller, CRNP   docusate sodium 100 mg Oral BID Bernarda Yao PA-C   FLUoxetine 20 mg Oral Daily Elizabeth Heller, CRNP   losartan 50 mg Oral Daily Elizabeth Heller, CANDENP   melatonin 9 mg Oral HS Radha Veloz MD   nicotine 7 mg Transdermal Daily Radha Veloz MD   ondansetron 4 mg Intravenous Q6H PRN Kathryn Drake MD   oxyCODONE 2 5 mg Oral Q4H PRN Kathryn Drake MD   pneumococcal 23-valent polysaccharide vaccine 0 5 mL Subcutaneous Prior to discharge Kathryn Drake MD   polyethylene glycol 17 g Oral Daily PRN Bernarda Yao PA-C   senna 1 tablet Oral HS Bernarda Yao PA-C       Labs:       Results from last 7 days  Lab Units 11/12/18  0550 11/08/18  0649   WBC Thousand/uL 6 27 7 17   HEMOGLOBIN g/dL 12 8 13 3   HEMATOCRIT % 38 9 39 8   PLATELETS Thousands/uL 243 256       Results from last 7 days  Lab Units 11/12/18  0550 11/08/18  0649   POTASSIUM mmol/L 3 6 3 8   CHLORIDE mmol/L 108 107   CO2 mmol/L 28 26   BUN mg/dL 10 10   CREATININE mg/dL 0  84 0 89   CALCIUM mg/dL 8 6 8 9                  Glucose, i-STAT (mg/dl)   Date Value   10/20/2018 132       Labs reviewed    Physical Examination:  Vitals:   Vitals:    11/13/18 1400 11/13/18 2055 11/14/18 0503 11/14/18 0831   BP: 133/73 129/79 147/82 151/70   BP Location: Right arm Right arm Left arm Right arm   Pulse: 56 56 (!) 49 (!) 52   Resp: 18 18 18    Temp: 98 °F (36 7 °C) 98 °F (36 7 °C) 98 2 °F (36 8 °C)    TempSrc: Oral Oral Oral    SpO2: 96% 97% 98%    Weight:   79 2 kg (174 lb 9 7 oz)    Height:         Constitutional:  NAD; pleasant; nontoxic  CV:  +S1, S2;  RRR; no rub/murmur; LOOP incision w/o erythema/drainage  Pulmonary:  BBS without crackles/wheeze/rhonci; resp are unlabored  Abdominal:  soft, +BS, ND/NT; no mass  Skin:  no rashes  Musculoskeletal:  No edema  :  no nichols   Neurological/Psych:  AAO;  left hand grasp 2+/5 = can lift at shoulder and weakly flex at elbow;  LLE HF 4/5; + left facial droop; speech is clear; no depression/anxiety        [ X ] Total time spent: 30 Mins and greater than 50% of this time was spent counseling/coordinating care  ** Please Note: Dragon 360 Dictation voice to text software may have been used in the creation of this document   **

## 2018-11-14 NOTE — PROGRESS NOTES
11/14/18 1415   Pain Assessment   Pain Assessment No/denies pain   Restrictions/Precautions   Precautions Fall Risk   Braces or Orthoses Sling   Cognition   Arousal/Participation Cooperative   Subjective   Subjective pt reported feeling well and ready for therapy    QI: Roll Left and Right   Assistance Needed Supervision   Roll Left and Right CARE Score 4   QI: Sit to Lying   Assistance Needed Supervision   Sit to Lying CARE Score 4   QI: Lying to Sitting on Side of Bed   Assistance Needed Supervision   Lying to Sitting on Side of Bed CARE Score 4   QI: Sit to Stand   Assistance Needed Supervision   Sit to Stand CARE Score 4   QI: Chair/Bed-to-Chair Transfer   Assistance Needed Physical assistance   Assistance Provided by [place] Less than 25%   Chair/Bed-to-Chair Transfer CARE Score 3   Transfer Bed/Chair/Wheelchair   Limitations Noted In Balance; Endurance;LE Strength;UE Strength   Stand Pivot Minimal Assist;Supervision   Sit to Stand Supervision   Stand to Sit Supervision   Supine to Sit Supervision   Sit to Supine Supervision   Bed, Chair, Wheelchair Transfer (FIM) 4 - Patient requires steadying assist or light touching   QI: Walk 10 Feet   Assistance Needed Supervision   Walk 10 Feet CARE Score 4   QI: Walk 50 Feet with Two Turns   Assistance Needed Supervision   Walk 50 Feet with Two Turns CARE Score 4   QI: Walk 150 Feet   Assistance Needed Supervision   Walk 150 Feet CARE Score 4   QI: Walking 10 Feet on Uneven Surfaces   Assistance Needed Incidental touching   Walking 10 Feet on Uneven Surfaces CARE Score 4   Ambulation   Primary Discharge Mode of Locomotion Walk   Walk Assist Level Close Supervision   Gait Pattern Decreased foot clearance;L hemiparesis;L knee hyperextension; Step through; Improper weight shift   Assist Device (none)   Distance Walked (feet) 500 ft  (x4, 600 x4, 800x4, 1000 x1)   Limitations Noted In Balance; Endurance; Heel Strike   Findings occasional dec L foot clearance but pt able to self correct   Walking (FIM) 5 - Patient requires supervision/monitoring AND distance 150 feet or more, no rest   Wheelchair mobility   QI: Does the patient use a wheelchair? 0  No   QI: 4 Steps   Assistance Needed Supervision; Adaptive equipment   4 Steps CARE Score 4   QI: 12 Steps   Assistance Needed Supervision; Adaptive equipment   12 Steps CARE Score 4   Stairs   Type Stairs   # of Steps 40  (x5)   Assist Devices Single Rail   Findings reciprocal pattern CS   Stairs (FIM) 5 - Patient requires supervision/monitoring AND goes up and down full flight (12- 14 stairs)   Therapeutic Interventions   Neuromuscular Re-Education supine on mat: rolling small and medium therapy ball in towards him with both feet on ball and then LLE only on smaller ball  in hooklying, AAROM for LUE and both hands holding onto yellow theraband and pulling down from behind him directly in front x2 and on the angle towards the opposite hip and pulling up from when band was by his feet, 2nd person to A  hooklying marching with abs contracted for lumbar stabilization, and then alternating arm/leg flexion in this position with AAROM for LUE  AAROM as needed adn then manual resistance for LUE flexion to about 110 degrees and resistance for ext back down to neutral  standing: forward and backward walking with 5lb ankle weight on LLE  side stepping and diagonal stepping forward /backward/ monster walks with yellow theraband around his knees  pushing a cart throughout the hallway without any weight on it and then with 25lbs of ankle weights on the top to mimic grocery shopping  standing with RLE on red balance disc and LLE on floor, ball toss with RUE on rebounder and then wtih LLE on green balance disc  Assessment   Treatment Assessment Pt cont to make good progres towards LTGs and Khalida level   Pt cont to have occasional LOB to L side, needing Darin to correct, however these episodes are much less frequent and only occured x2 today during normal walking activities vs  more challenging NMR interventions  Pt cont to demonstrate good safety awareness and demonstrates good insight into when he has a LOB or catches his toes on the stairs and is able to self correct  Pt cont to have some knee hyperextension LLE, more so when fatigued, and limited use of LUE, which also contributes to dec ambulatory and dynamic standing balance  pt will cont to benefit from skilled PT to further progress functional mobility, safety and I and progress to Khalida/I level for safe dc home  Barriers to Discharge Decreased caregiver support   PT Barriers   Physical Impairment Decreased strength;Decreased range of motion; Impaired balance;Decreased endurance   Functional Limitation Car transfers;Stair negotiation;Standing;Transfers; Walking   Plan   Treatment/Interventions Functional transfer training;LE strengthening/ROM; Elevations; Therapeutic exercise; Endurance training;Patient/family training;Bed mobility; Equipment eval/education;Gait training   Progress Progressing toward goals   Recommendation   Recommendation Outpatient PT; Home with family support   PT Therapy Minutes   PT Time In 1415   PT Time Out 1555   PT Total Time (minutes) 100   PT Mode of treatment - Individual (minutes) 100   PT Mode of treatment - Concurrent (minutes) 0   PT Mode of treatment - Group (minutes) 0   PT Mode of treatment - Co-treat (minutes) 0   PT Mode of Teatment - Total time(minutes) 100 minutes

## 2018-11-14 NOTE — PROGRESS NOTES
Physical Medicine and Rehabilitation Progress Note  Heide Coe 58 y o  male MRN: [id number]  Unit/Bed#: -01 Encounter: [id number]    HPI: Patient is 57 yo male with right cerebral infarcts likely embolic in nature     Chief Complaint/Subjective: D/W patient potential dc date and patient is agreeable     ROS:  negative unless noted above     Assessment/Plan:      Bradycardia   Assessment & Plan    · Consistently asymptomatic  · HR currently 58 with BP of 147/89  · d/w IM and are monitoring at this time      DM (diabetes mellitus) ([facility] )   Assessment & Plan    · Not on any meds at home  · Per IM ISS or accuchecks no longer needed based on blood sugars      Gout   Assessment & Plan    · Left ankle  · Per patient was Indocin 50mg TID PTA however was not able to tolerate due to diarrhea and has not been taking for 2 months   · XR of left ankle and foot negative   · s/p colchicine and steroid taper per IM  · Resolved per patient   · Of note per patient this is only the 2nd gout flare patient has ever had     Hyperlipidemia   Assessment & Plan    · Home zocor 20 mg changed to lipitor 80 mg      Anxiety   Assessment & Plan    · No record of any benzodiazepines (or any other controlled medications) found in the PAPDMP database going back 1 year  · Neuropsychology following       Hypertension   Assessment & Plan    · IM to clarify home meds as records are not clear ( olmesartan vs valsartan ?, HCTZ 25 mg vs 50 mg ? norvasc ?)  · Currently on cozaar per IM      Dysphagia   Assessment & Plan    · SLP following  · Modified diet per SLP recs      * Right sided cerebral hemisphere cerebrovascular accident (CVA) ([facility] )   Assessment & Plan    · On DAPT for 90 days, with aspirin/plavix, followed by monotherapy with ASA  · Possibly cardioembolic in etiology   Consulted EP for eval/placement of loop recorder however they recommend JAYE first which was negative for thrombus in [place] however did reveal aortic atheroma, will d/w neuro (Dr Leroy Castaneda) if they feel the atheroma is the etiology of the stroke and did not feel that atheroma was likely severe enough to change from  to [facility] even if this was the potential etiology so management recommendations per neuro was unchanged and patient had LOOP placed   · Fluoxetine 20mg post stroke for motor recovery         Scheduled Meds:    Current Facility-Administered Medications:  acetaminophen 650 mg Oral Q6H PRN Kevon Zhang MD   aspirin 81 mg Oral Daily Kevon Zhang MD   atorvastatin 80 mg Oral QPM Kevon Zhang MD   clopidogrel 75 mg Oral Daily Elizabeth Heller, CANDENP   docusate sodium 100 mg Oral BID Bernarda Yao PA-C   FLUoxetine 20 mg Oral Daily Elizabeth Heller, CRNP   losartan 50 mg Oral Daily Elizabeth Arron, CANDENP   melatonin 9 mg Oral HS Tom Manifold, MD   nicotine 7 mg Transdermal Daily Tom MD Marcos   ondansetron 4 mg Intravenous Q6H PRN Kevon Zhang MD   oxyCODONE 2 5 mg Oral Q4H PRN Kevon Zhang MD   pneumococcal 23-valent polysaccharide vaccine 0 5 mL Subcutaneous Prior to discharge Kevon Zhang MD   polyethylene glycol 17 g Oral Daily PRN Bernarda Yao PA-C   senna 1 tablet Oral HS Bernarda Yao PA-C        Incidental findings  1) thyroid nodules: per radiology report recs non-emergent OP thyroid U/S, OP FU with PCP to arrange U/S  2) subpleural bullae: OP FU with PCP with further testing/treatment and/or specialist referral at PCP's discretion     Note: patient noted to have petechial hemorrhage on imaging however has been cleared for DAPT and lovenox (for DVT ppx) by neuro  DVT ppx: SCDs, ambulating adequate distances   Dispo: 11/21    Objective:    Functional Update:  Mobility: CG  Transfers: sup  ADLs: min-sup        Physical Exam:    Vitals:    11/14/18 1325   BP: 147/89   Pulse: 58   Resp: 18   Temp: 97 5 °F (36 4 °C)   SpO2: 96%       General: alert, no apparent distress, cooperative and comfortable  HEENT:  Head: Normocephalic, no lesions, without obvious abnormality  CARDIAC:  +S1/2  LUNGS:  respirations unlabored  ABDOMEN:  soft   EXTREMITIES:  no signficant LE edema  NEURO:   awake, alert, appropriately answering questions, 2/5 LUE (stable), facial asymmetry & tongue deviation remain   PSYCH:  mood/affect currently stable, denies depression       Diagnostic Studies: reviewed, no new imaging  No orders to display       Laboratory:      Results from last 7 days  Lab Units 11/12/18  0550 11/08/18  0649   HEMOGLOBIN g/dL 12 8 13 3   HEMATOCRIT % 38 9 39 8   WBC Thousand/uL 6 27 7 17       Results from last 7 days  Lab Units 11/12/18  0550 11/08/18  0649   BUN mg/dL 10 10   POTASSIUM mmol/L 3 6 3 8   CHLORIDE mmol/L 108 107   CREATININE mg/dL 0 84 0 89            Patient Active Problem List   Diagnosis    Right sided cerebral hemisphere cerebrovascular accident (CVA) ([facility]    Dysphagia    Hypertension    Anxiety    Hyperlipidemia    Gout    DM (diabetes mellitus) ([facility]    Bradycardia           Total time spent: At least 35 minutes, with more than 50% spent counseling/coordinating care  In addition, this patient was discussed by the interdisciplinary team in weekly case conference today  The care of the patient was extensively discussed with all care providers and an appropriate rehabilitation plan was formulated unique for this patient  Barriers were identified preventing progression of therapy and appropriate interventions were discussed with each discipline  Please see the team note for input from all disciplines regarding barriers, intervention, and discharge planning

## 2018-11-14 NOTE — TEAM CONFERENCE
Acute RehabilitationTeam Conference Note  Date: 11/14/2018   Time: 11:20 AM       Patient Name:  Dana Green       Medical Record Number: [id number]   YOB: 1955  Sex: Male          Room/Bed:  [facility]-01  Payor Info:  Payor: Jodie Nolan / Plan: [id number] / Product Type: Blue Fee for Service /      Admitting Diagnosis: Stroke ([place] [address] ) [I63 9]   Admit Date/Time:  10/25/2018 12:41 PM  Admission Comments: No comment available     Primary Diagnosis:  Right sided cerebral hemisphere cerebrovascular accident (CVA) ([facility] )  Principal Problem: Right sided cerebral hemisphere cerebrovascular accident (CVA) ([facility] )    Patient Active Problem List    Diagnosis Date Noted    Bradycardia 11/09/2018    DM (diabetes mellitus) ([facility] ) 10/25/2018    Gout 10/23/2018    Hyperlipidemia 10/22/2018    Right sided cerebral hemisphere cerebrovascular accident (CVA) ([place] [address] ) 10/21/2018    Dysphagia 10/21/2018    Hypertension 10/21/2018    Anxiety 10/21/2018       Physical Therapy:    Weight Bearing Status: Full Weight Bearing  Transfers: Supervision  Bed Mobility: Supervision  Amulation Distance (ft): 1000 feet  Ambulation: Contact Guard  Assistive Device for Ambulation: Single [employer]  Number of Stairs: 40  Assistive Device for Stairs: Right Hand Rail  Stair Assistance: Contact Guard  Ramp: Contact Guard  Discharge Recommendations: Home with:  [address] [facility] with[de-identified] Family Support, Outpatient Physical Therapy    11/13/2018 JM, PTA  Pt has made increases with strength in LUE and LLE but continues to have some weakness in LLE that allows hyperextension and quick release on stairs at times; LUE has made improvements with functional activity, difficulty with seperating shoulder movements and hiking of the shoulder via his trap; balance has improved but is limited with retro amb and occasionally has a slight LOB when walking forward but is able to correct himself with no assistance; pt would benefit from continued skilled PT in acute rehab to increase strength in hemiparetic side, endurance, balance, and functional mobility to work towards LTG or PLOF as much as possible, and to increase normalized use of LLE and LUE for functional mobility and activity;; continue POC as per PT    Pt presents with gradual improvement towards LTGs in regards to strength,activity tolerance, balance and safety  Pt cont to present with fall risk components due to continued L side weakness and dec motor control, however pt is progressing with walking and stairs  Pt will need continued skilled PT to make further gains towards PLOF; currently pt needs Ax1-2 for safe walking due to fatigue and balance  Occupational Therapy:  Eating: Supervision  Grooming: Supervision  Bathing: Supervision  Bathing: Supervision  Upper Body Dressing: Minimal Assistance  Lower Body Dressing: Minimal Assistance  Toileting: Contact Guard  Tub/Shower Transfer: Minimal Assistance  Toilet Transfer: Minimal Assistance, Contact Guard  Cognition: Within Defined Limits  Orientation: Person, Place, Time, Situation  Discharge Recommendations: Home with:  [address] with[de-identified] Outpatient Occupational Therapy       Pt demonstrating excellent progress throughout rehab course  While pt demonstrates progress, pt continues to present with impairments in activity tolerance, endurance, standing balance/tolerance and (L) UE ROM, strength, coordination  Additional functional barriers include fatigue, decreased caregiver support, risk for falls, home environment and impaired righting reactions  Pt demonstrates improvements in ROM of (L) UE with decreased use of compensatory movement patterns  Pt continues to demonstrate progress in ADLS while using SPC  Pt will continue to benefit from skilled OT services to address above mentioned barriers and maximize functional independence in baseline areas of occupation   OT D/C recommendation is for re-team, with goal for pt to achieve mod I for overall ADLS with use of least restrictive device  Plan for eventual outpatient OT services, however at this time pt continues to benefit from inpatient OT services to address the above mentioned deficits which limit pt's independence and safety in ADLS, IADLS, work, social participation, leisure activities  Speech Therapy:  Mode of Communication: Verbal  Speech/Language: Dysarthia (mild, more notable when fatigued)  Cognition: Exceptions to WNL  Cognition: Decreased Executive Functions  Orientation: Person, Place, Time, Situation  Swallowing: Within Defined Limits  Swallowing: Oral Dysphagia, Aspiration Risk  Diet Recommendations: Regular Diet, Thin  Discharge Recommendations: Home with:  [address] with[de-identified] Family Support  Pt is currently being followed for speech/cognitive/dysphagia therapy  On initial evaluation, pt completed the CLQT with scores correlating to overall cognitive linguistic skills that are [facility] at time of assessment in comparison to age matched peers  However, informally assessed, pt demonstrating slower processing and decreased higher level reasoning, which was also reported by pt  Pt also presenting with mild dysarthria characterized by imprecise articulation of sounds  While pt demonstrates overall functional basic level cognitive linguistic skills and verbal expression, pt's functioning is impacted by his level of fatigue  Pt reports that he completed all ADLs independently and had a full time job prior to hospitalization  Pt will benefit from skilled ST intervention targeting higher level, more complex cognitive linguistic skills to maximize overall independence and to work towards returning to his job   Regarding swallow function, pt is presenting with mild oral dysphagia characterized by weaker mastication, L side pocketing and slower a-p transfers, however, pt is demonstrating improvement in independent use of strategies to clear residual  Pt was on a level 2 diet and thin liquids on admission but has recently been advanced to a dysphagia level 3 diet and appears to be tolerating without increased oral/pharyngeal difficulties or overt s/sx aspiration  Pt will benefit from further skilled ST intervention to maximize swallow function to safely achieve baseline diet of regular and thin liquids  Update week of 11/6/2018: Pt was previously being followed for dysphagia therapy, however, pt has now progressed to tolerating a regular diet and thin liquids without overt s/sx aspiration and without increased oropharyngeal difficulties  Pt demonstrating good carryover of swallow strategies to include liquid wash, lingual sweeps and finger sweeps to clear min to trace amts of L side pocketing  Improvement in pacing and bite size also noted  Further skilled ST intervention is not warranted to continue at this time for dysphagia therapy  Regarding cognition, pt has made progress towards goals and is functioning at overall mod I level, noting problem solving to fluctuate to from supervision to mod I level  Pt inconsistently demonstrates slower processing, decreased abstract thinking and decreased attention  Due to the above mentioned deficits, as well as pt's desire to continue skilled ST intervention (pt reports continued cognitive deficits), pt will benefit from further skilled ST intervention to maximize higher level, more abstract cognitive linguistic skills at this time  Update week of 11/13/2018: Pt continues to be seen for ST with sessions targeting higher level cognitive linguistic skills, along with higher level language skills  Pt continues to report and demonstrate difficulty with higher level processing and reasoning related to executive functions, as well a more complex language tasks related to expression and comprehension, specifically with written information   Pt demonstrating good insight into deficits and their impact on his ability to return to work due to the nature of his job which requires abstract thinking with higher level complexity tasks  While pt is demonstrating overall functional basic level cognitive linguistic and language skills, pt will continue to benefit from further skilled ST intervention to maximize higher level cognitive linguistic/language skills to achieve mod I to in independent level in these areas  Nursing Notes:  Appetite: Good  Diet Type: Diabetic                      Diet Patient/Family Education Complete: Yes    Type of Wound (LDA): Incision           Incision 10/30/18 Chest Mid (Active)   Incision Description SOFÍA 11/14/2018 12:00 AM   [place]-wound Assessment SOFÍA 11/14/2018 12:00 AM   Closure None 11/13/2018  3:15 PM   Drainage Amount None 11/13/2018  3:15 PM   Dressing Open to air 11/14/2018  8:30 AM   Wound packed? No 11/11/2018  9:00 AM   Patient Tolerance Tolerated well 11/11/2018  3:15 PM   Dressing Status Clean;Dry; Intact 11/4/2018 10:46 PM           Type of Wound Patient/Family Education: Yes  Bladder: 5 - Supervision     Bladder Patient/Family Education: Yes  Bowel: 7 - Complete [place]     Bowel Patient/Family Education: Yes  Pain Location: Ankle  Pain Orientation: Left  Pain Score: 0                       Hospital Pain Intervention(s): Ambulation/increased activity, Rest (AFO while walking)  Pain Patient/Family Education: Yes  Medication Management/Safety  Injectable:  (heparin)  Safe Administration: Yes  Medication Patient/Family Education Complete: Yes    Admitted to [facility] s/p new onset left-sided diminished sensation, weakness, difficulty speaking, and facial droop  He has a PMH significant for HTN, HLD, and tobacco abuse  CTH was negative for hemorrhage  NIHSS 9  He received IV Labetalol for /110 and tPA  Echo showed EF 60% with no RWMA  A1C 6 6  CTA showed 50% stenosis of the proximal R ICA, and 30-40% stenosis of the proximal L ICA  MRI Brain showed multifocal cortical infarctions in the distribution of the R MCA   Concern was for thromboembolic etiology due to carotid disease  Ultimately, Neurology recommended Plavix load (10/24), and then DAPT for 90 days followed by monotherapy with ASA  Cardiology was consulted who recommended holter monitor to evaluate for underlying afib  They also started him on Fluoxetine 20mg post stroke day 5 for motor function recovery  have a history of gout  Venous doppler and X-ray were negative for clot or acute injury  Pt underwent JAYE and loop recorder placement on 10/30  continent of bowel and bladder  HTN managed with Losartan  Nicotine patch intact  This week we will continue to labs and vitals, we will increase safety awareness and keep pt free from injury  We will prevent skin breakdown with turning and repositioning and weight shifts  Pt will continue to manage dm with diet  Case Management:     Discharge Planning  Living Arrangements: Children  Support Systems: Children, Family members  Assistance Needed: tbd  Type of Current Residence: Private residence  Current Home Care Services: No  Pt is making good progress and is expecting to return home  Pt has been educated on potential recommendations for contd therapy services  contd stay review is due Wednesday  Following to assist w/dc planning needs  Is the patient actively participating in therapies? yes  List any modifications to the treatment plan:     Barriers Interventions   Left side weakness, balance, righting reactions Physical therapy exercises   Coordination, strength Therapy exercises, family training   Alone during the day Family training, mod I goal for toileting             Is the patient making expected progress toward goals?  yes  List any update or changes to goals:     Medical Goals: Patient will be medically stable for discharge to [facility] upon completion of rehab program and Patient will be able to manage medical conditions and comorbid conditions with medications and follow up upon completion of rehab program    Weekly Team Goals:   Rehab Team Goals  ADL Team Goal: Patient will require supervision with ADLs with least restrictive device upon completion of rehab program  Bowel/Bladder Team Goal: Patient will return to premorbid level for bladder/bowel management upon completion of rehab program  Transfer Team Goal: Patient will be independent with transfers with least restrictive device upon completion of rehab program  Locomotion Team Goal: Patient will be independent with locomotion with least restrictive device upon completion of rehab program  Cognitive Team Goal: Patient will be independent for basic and complex tasks upon completion of rehab program    Discussion: pt is making good progress but continues with balance, coordination and strength  Family training to occur tomorrow with pts  Brother  Pt is currently supervision to min a due to balance in both ambulation and adls  Recommendations are for contd outpt pt ot and slp services  Anticipated Discharge Date:  11/21/18  [facility] Team Members Present: The following team members are supervising care for this patient and were present during this Weekly Team Conference      Physician: Dr Kimberli Biswas MD  : Zula Skiff, MSW  Registered Nurse: Drea Gotti RN  Physical Therapist: Davion Bass DPT  Occupational Therapist: Anne Camacho MS, OTR/L, CBIS  Speech Therapist: Miranda Nettles MS, CCC-SLP  Other:

## 2018-11-14 NOTE — PROGRESS NOTES
11/14/18 0831   Pain Assessment   Pain Assessment No/denies pain   Pain Score No Pain   Restrictions/Precautions   Precautions Fall Risk   Weight Bearing Restrictions No   ROM Restrictions No   Braces or Orthoses Sling   QI: Sit to Stand   Assistance Needed Supervision   Assistance Provided by [facility] No physical assistance   Comment [facility]   Sit to Stand CARE Score 4   QI: Chair/Bed-to-Chair Transfer   Assistance Needed Supervision   Assistance Provided by [facility] No physical assistance   Comment [facility]   Chair/Bed-to-Chair Transfer CARE Score 4   Transfer Bed/Chair/Wheelchair   Positioning Concerns Hemiplegia   Limitations Noted In Balance; Endurance; Coordination;UE Strength;LE Strength   Adaptive Equipment [place], Chair, Wheelchair Transfer (FIM) 5 - Patient requires supervision/monitoring   ROM - Left Upper Extremities    L Shoulder AAROM; Flexion; Extension;ABduction   L Elbow AROM;AAROM;Elbow flexion;Elbow extension   L Wrist AAROM;PROM;Wrist flexion;Wrist extension   L Hand AAROM; Thumb; Index finger; Long finger;Ring finger;Little finger   L Position Seated   LUE ROM Comment Pt completes above mentioned exercises with support at wrist and elbow provided for maintaining proper form and reducing compensation  Vibration applied to extensors/flexors during wrist/digit flexion/ext exercises  Neuromuscular Education   Vibration Vibration applied to L extensors/flexors to promote increase ROM  Pt responds well to vibration  Functional Movement Patterns Pt participated in [address] assisted movement therapy in order to promote proximal shoulder strength  Pt positioned on chair with trunk and shoulder straps in place to reduce compensation  Pt utilizes arm trough for LUE support  Pt tolerates 10 reps x 4 on follow A mode for FWD thrust  Pt then engaged in FWD reach mode for 5 reps x 1 on initiated mode  Pt overall tolerates session well with 30 second rest breaks between to manage fatigue      Cognition   Overall Cognitive Status WFL   Arousal/Participation Cooperative; Alert   Attention Within functional limits   Orientation Level Oriented X4   Memory Within functional limits   Following Commands Follows multistep commands with increased time or repetition   Activity Tolerance   Activity Tolerance Patient tolerated treatment well   Assessment   Treatment Assessment Pt participated in skilled OT services with focus on LUE neuro re-ed, and functional transfers  Pt continues to make G progress with functional mobility  Pt is also making progress with LUE ROM  Pt continues to present with LUE weakness distal > proximal   Pt will continue to benefit from skilled OT services with focus on LUE neuro re-ed, functional mobility during ADL/IADL tasks, and higher level balance  Prognosis Good   Problem List Decreased strength;Decreased endurance;Decreased range of motion; Impaired balance;Decreased mobility   Plan   Treatment/Interventions ADL retraining;Functional transfer training; Therapeutic exercise; Endurance training;Patient/family training;Equipment eval/education; Compensatory technique education   Progress Progressing toward goals   Recommendation   OT Discharge Recommendation Home with family support   OT Therapy Minutes   OT Time In 0830   OT Time Out 1000   OT Total Time (minutes) 90   OT Mode of treatment - Individual (minutes) 90   OT Mode of treatment - Concurrent (minutes) 0   OT Mode of treatment - Group (minutes) 0   OT Mode of treatment - Co-treat (minutes) 0   OT Mode of Teatment - Total time(minutes) 90 minutes   Therapy Time missed   Time missed?  No

## 2018-11-15 LAB
ANION GAP SERPL CALCULATED.3IONS-SCNC: 4 MMOL/L (ref 4–13)
BASOPHILS # BLD AUTO: 0.05 THOUSANDS/ΜL (ref 0–0.1)
BASOPHILS NFR BLD AUTO: 1 % (ref 0–1)
BUN SERPL-MCNC: 11 MG/DL (ref 5–25)
CALCIUM SERPL-MCNC: 8.3 MG/DL (ref 8.3–10.1)
CHLORIDE SERPL-SCNC: 105 MMOL/L (ref 100–108)
CO2 SERPL-SCNC: 30 MMOL/L (ref 21–32)
CREAT SERPL-MCNC: 0.95 MG/DL (ref 0.6–1.3)
EOSINOPHIL # BLD AUTO: 0.32 THOUSAND/ΜL (ref 0–0.61)
EOSINOPHIL NFR BLD AUTO: 5 % (ref 0–6)
ERYTHROCYTE [DISTWIDTH] IN BLOOD BY AUTOMATED COUNT: 14.7 % (ref 11.6–15.1)
GFR SERPL CREATININE-BSD FRML MDRD: 84 ML/MIN/1.73SQ M
GLUCOSE SERPL-MCNC: 71 MG/DL (ref 65–140)
HCT VFR BLD AUTO: 39.1 % (ref 36.5–49.3)
HGB BLD-MCNC: 12.4 G/DL (ref 12–17)
IMM GRANULOCYTES # BLD AUTO: 0.02 THOUSAND/UL (ref 0–0.2)
IMM GRANULOCYTES NFR BLD AUTO: 0 % (ref 0–2)
LYMPHOCYTES # BLD AUTO: 1.83 THOUSANDS/ΜL (ref 0.6–4.47)
LYMPHOCYTES NFR BLD AUTO: 28 % (ref 14–44)
MCH RBC QN AUTO: 28.9 PG (ref 26.8–34.3)
MCHC RBC AUTO-ENTMCNC: 31.7 G/DL (ref 31.4–37.4)
MCV RBC AUTO: 91 FL (ref 82–98)
MONOCYTES # BLD AUTO: 0.69 THOUSAND/ΜL (ref 0.17–1.22)
MONOCYTES NFR BLD AUTO: 11 % (ref 4–12)
NEUTROPHILS # BLD AUTO: 3.64 THOUSANDS/ΜL (ref 1.85–7.62)
NEUTS SEG NFR BLD AUTO: 55 % (ref 43–75)
NRBC BLD AUTO-RTO: 0 /100 WBCS
PLATELET # BLD AUTO: 235 THOUSANDS/UL (ref 149–390)
PMV BLD AUTO: 10.1 FL (ref 8.9–12.7)
POTASSIUM SERPL-SCNC: 3.9 MMOL/L (ref 3.5–5.3)
RBC # BLD AUTO: 4.29 MILLION/UL (ref 3.88–5.62)
SODIUM SERPL-SCNC: 139 MMOL/L (ref 136–145)
WBC # BLD AUTO: 6.55 THOUSAND/UL (ref 4.31–10.16)

## 2018-11-15 PROCEDURE — 97535 SELF CARE MNGMENT TRAINING: CPT

## 2018-11-15 PROCEDURE — 80048 BASIC METABOLIC PNL TOTAL CA: CPT | Performed by: NURSE PRACTITIONER

## 2018-11-15 PROCEDURE — 97112 NEUROMUSCULAR REEDUCATION: CPT

## 2018-11-15 PROCEDURE — 97530 THERAPEUTIC ACTIVITIES: CPT

## 2018-11-15 PROCEDURE — 97116 GAIT TRAINING THERAPY: CPT

## 2018-11-15 PROCEDURE — 85025 COMPLETE CBC W/AUTO DIFF WBC: CPT | Performed by: NURSE PRACTITIONER

## 2018-11-15 PROCEDURE — 99232 SBSQ HOSP IP/OBS MODERATE 35: CPT | Performed by: PHYSICAL MEDICINE & REHABILITATION

## 2018-11-15 RX ADMIN — ASPIRIN 81 MG: 81 TABLET, COATED ORAL at 09:27

## 2018-11-15 RX ADMIN — MELATONIN 9 MG: at 21:00

## 2018-11-15 RX ADMIN — SENNOSIDES 8.6 MG: 8.6 TABLET, FILM COATED ORAL at 21:00

## 2018-11-15 RX ADMIN — LOSARTAN POTASSIUM 50 MG: 50 TABLET, FILM COATED ORAL at 09:27

## 2018-11-15 RX ADMIN — ATORVASTATIN CALCIUM 80 MG: 80 TABLET, FILM COATED ORAL at 17:38

## 2018-11-15 RX ADMIN — DOCUSATE SODIUM 100 MG: 100 CAPSULE, LIQUID FILLED ORAL at 17:38

## 2018-11-15 RX ADMIN — NICOTINE 7 MG: 7 PATCH, EXTENDED RELEASE TRANSDERMAL at 09:28

## 2018-11-15 RX ADMIN — DOCUSATE SODIUM 100 MG: 100 CAPSULE, LIQUID FILLED ORAL at 09:27

## 2018-11-15 RX ADMIN — CLOPIDOGREL 75 MG: 75 TABLET, FILM COATED ORAL at 09:27

## 2018-11-15 RX ADMIN — FLUOXETINE 20 MG: 20 CAPSULE ORAL at 09:27

## 2018-11-15 NOTE — PROGRESS NOTES
Occupational Therapy Treatment Note       11/15/18 6320   Pain Assessment   Pain Assessment No/denies pain   Restrictions/Precautions   Precautions Fall Risk   Braces or Orthoses Sling  (givmohr )   Lifestyle   Autonomy "I'd like to be able to help feed my dog"    QI: Shower/Bathe Self   Assistance Needed Supervision   Assistance Provided by [place] No physical assistance   Shower/Bathe Self CARE Score 4   Bathing   Assessed Bath Style Shower   Anticipated D/C Bath Style Shower   Able to Gurpreet Girish Yes   Able to [employer] Temperature Yes   Able to Wash/Rinse/Dry (body part) Left Arm;Right Arm;L Upper Leg;R Upper Leg;L Lower Leg/Foot;R Lower Leg/Foot;Chest;Buttocks; Abdomen;Perineal Area   Limitations Noted in Balance   Positioning Seated;Standing   Adaptive Equipment Shower Seat;Shower Bars;Hand Held Shower   Findings  Pt demonstrates good progress in bathing routine, able to complete at supervision level with (L) UE support on grab bar to assist in steadying for stance  OTR/L educated pt's brother on providing pt with close supervision while in stance to complete bathing of buttock/perineal area  Pt completes rest of shower routine while seated at mod I level for increased time and verbalizes when he is ready to  order for family to provide supervision  Pt's brother Teetee Evangelista demonstrates carryover of OT education and provides pt with close supervision while pt is in stance 2* fall risk  Bathing (FIM) 5 - Patient requires supervision/monitoring but completes 10/10 parts   Tub/Shower Transfer   Limitations Noted In Balance;LE Strength;UE Strength   Adaptive Equipment Grab Bars;Seat with Back   Assessed Shower   Findings pt completes shower transfer while stepping over small bolster to simulate lip of shower  Pt uses grab bar support using (R) UE  OTR/L educated pt's brother on providing pt with supervision 2* fall risk, pt's brother demonstrates good carryover      Shower Transfer (FIM) 5 - Patient requires supervision/monitoring   QI: Upper Body Dressing   Assistance Needed Supervision   Assistance Provided by [place] No physical assistance   Upper Body Dressing CARE Score 4   QI: Lower Body Dressing   Assistance Needed Supervision   Assistance Provided by [place] No physical assistance   Lower Body Dressing CARE Score 4   QI: Putting On/Taking Off Footwear   Assistance Needed Physical assistance   Assistance Provided by [place] 25%-49%   Comment assist to tie shoes, provided pt with elastic shoe laces to be donned during tomorrow's therapy session    Putting On/Taking Off Footwear CARE Score 3   QI: Picking Up Object   Assistance Needed Incidental touching   Assistance Provided by [place] Less than 25%   Comment pt engaged in item retrieval from the floor, including cane, with contact guard assist  Plan to continue to practice item retrieval from floor  OTR/L educated pt on widening base of support when retrieving items from floor  Picking Up Object CARE Score 3   Dressing/Undressing Clothing   Remove UB Clothes Pullover Shirt   Remove LB Clothes Shorts;Socks; [address] Clothes Pullover Shirt   Don LB Clothes Pants;Socks; Shoes   Limitations Noted In Balance;Strength;ROM   Positioning Supported Sit;Standing   Findings Pt completed dressing at supervision level, pt's brother demonstrated ability to supervise pt during dressing routine with good carryover of education on appropriate positioning   Pt required assistance to tie shoes, provided pt with elastic shoe laces to be laced to increase pt's independence in LB dressing    UB Dressing (FIM) 5 - Patient requires supervision/monitoring   LB Dressing (FIM) 4 - Patient requires steadying assist or light touching   QI: Roll Left and Right   Assistance Needed Supervision   Assistance Provided by [place] No physical assistance   Roll Left and Right CARE Score 4   QI: Sit to [address] Provided by [place] No physical assistance   Comment HOB flat with no bed rails    Sit to Lying CARE Score 4   QI: Lying to Sitting on Side of Bed   Assistance Needed Supervision   Assistance Provided by [place] No physical assistance   Comment HOB flat with no bed rail    Lying to Sitting on Side of Bed CARE Score 4   QI: Sit to Stand   Assistance Needed Supervision   Assistance Provided by [place] No physical assistance   Comment SPC   Sit to Stand CARE Score 4   QI: Chair/Bed-to-Chair Transfer   Assistance Needed Physical assistance   Assistance Provided by [place] 25%-49%   Comment SPC    Chair/Bed-to-Chair Transfer CARE Score 3   Transfer Bed/Chair/Wheelchair   Positioning Concerns Hemiplegia   Limitations Noted In Balance;LE Strength;UE Strength; Endurance   Adaptive Equipment Cane   Stand Pivot Supervision   Sit to Stand Supervision   Stand to Sit Supervision   Supine to Sit Supervision   Sit to Supine Supervision   Findings pt noted with 1x LOB requiring min assist to correct when completing functional mobility into standard kitchen chair with arm rests  Pt completes rest of functional mobility/transfers at supervision level using SPC  OTR/L educated pt's brother Lopez Newberry on standing to pt's (L) and appropriate hand placement to provide pt with supervision during functional mobility  Pt's brother demonstrates good carryover  Bed, Chair, Wheelchair Transfer (FIM) 4 - Patient requires steadying assist or light touching   QI: Toilet Transfer   Assistance Needed Supervision   Assistance Provided by [place] No physical assistance   Toilet Transfer CARE Score 4   Toilet Transfer   Surface Assessed Standard Toilet   Transfer Technique Standard   Limitations Noted In Balance   Findings pt completes toilet transfer at supervision level with no LOB noted  Pt able to complete with (R) UE on grab bar, simulating vanity at home  Pt then completed toilet transfer using SPC and no grab bar, completes 3x with supervision      Toilet Transfer (FIM) 5 - Patient requires supervision/monitoring   Kitchen Mobility   Kitchen-Mobility Level Cane   Kitchen Mobility Comments pt required min assist initially to complete transfers on/off kitchen chair with armrests  OTR/L recommending pt keep kitchen chairs slightly to (L) of table so that pt can utilize (R) UE support on tabletop to assist in sit <> stand transfer  Pt completed 3x with chair slightly turned to (L) at supervision level  Cognition   Overall Cognitive Status WFL   Comments in discussion of outpatient therapy appointments OTR/L recommended pt utilize device such as agenda planner to keep track of doctor/therapy appointments, pt in agreement  Activity Tolerance   Activity Tolerance Patient tolerated treatment well   Assessment   Treatment Assessment Pt participated in skilled OT tx session focused on ADL routine and family training with pt's brother Kimberlee Hewitt  Pt's brother demonstrates good carryover of providing pt with supervision during shower transfer, bathing in stance, dressing, functional mobility/transfers  Pt wishes to purchase shower chair with back through [facility], has grab bar and hand held shower at home  Pt noted with 1x significant LOB to (L) during functional mobility/transfers at kitchen table, requiring min assist to correct  OTR/L explained goal of pt completing household functional mobility/transfers, toileting, and toilet transfer at mod I level using SPC  Pt in agreement  Pt reports he was ordered a Givmohr sling through [employer]  From OT standpoint, continue OT plan of care with focus on item retrieval from floor (including [facility]), simulation of pet care tasks, kitchen mobility/transfers, functional mobility/transfers in ADL suite, (L) UE neuromuscular re-education using vibration  Plan for pt to D/C next Wednesday with outpatient OT, PT, ST, family support  OT  OT submitted shower chair with back order to cm     Prognosis Good   Problem List Decreased strength;Decreased range of motion;Decreased endurance; Impaired balance;Decreased mobility   Plan   Treatment/Interventions ADL retraining;Functional transfer training; Therapeutic exercise; Endurance training;Patient/family training;Equipment eval/education; Bed mobility; Compensatory technique education   Progress Progressing toward goals   Recommendation   OT Discharge Recommendation Outpatient OT   Equipment Recommended (shower chair with back )   OT Equipment ordered [id number]   OT Therapy Minutes   OT Time In 1250   OT Time Out 1405   OT Total Time (minutes) 75   OT Mode of treatment - Individual (minutes) 75   OT Mode of treatment - Concurrent (minutes) 0   OT Mode of treatment - Group (minutes) 0   OT Mode of treatment - Co-treat (minutes) 0   OT Mode of Teatment - Total time(minutes) 75 minutes   Therapy Time missed   Time missed?  No       Yuridia Parisi MS, OTR/L , CBIS

## 2018-11-15 NOTE — PROGRESS NOTES
Internal Medicine Progress Note  Patient: Heide Coe  Age/sex: 58 y o  male  Medical Record #: [id number]      ASSESSMENT/PLAN:  Heide Coe is seen and examined and management for following issues:    Multiple embolic R MCA CVA:   continue current medical management; Cont ASA/Plavix for 90 days then Aspirin and statin therapy only; EP did JAYE/LOOP implant 10/31/18  LOOP recorder implant 10/30:  Site CDI     HTN:  stable; no changes today        DM II:  Diet controlled = stopped Accuchecks/coverage since BSs had been ok      Anxiety: stable; Cont Fluoxetine 20mg daily         Subjective: No overnight issues  He denies any complaints    Reviewed lab work both hemoglobin and BMP within normal limits    Scheduled Meds:    Current Facility-Administered Medications:  acetaminophen 650 mg Oral Q6H PRN Tomy Brian MD   aspirin 81 mg Oral Daily Tomy Brian MD   atorvastatin 80 mg Oral QPM Tomy Brian MD   clopidogrel 75 mg Oral Daily Elizabeth Heller, CANDENP   docusate sodium 100 mg Oral BID Bernarda Yao PA-C   FLUoxetine 20 mg Oral Daily Elizabeth Heller, CRNP   losartan 50 mg Oral Daily Elizabeth Heller, CRNP   melatonin 9 mg Oral HS Evelio Ballard MD   nicotine 7 mg Transdermal Daily Evelio Ballard MD   ondansetron 4 mg Intravenous Q6H PRN Tomy Brian MD   oxyCODONE 2 5 mg Oral Q4H PRN Tmoy Brian MD   pneumococcal 23-valent polysaccharide vaccine 0 5 mL Subcutaneous Prior to discharge Tomy Brian MD   polyethylene glycol 17 g Oral Daily PRN Bernarda Yao PA-C   senna 1 tablet Oral HS Bernarda Yao PA-C       Labs:       Results from last 7 days  Lab Units 11/15/18  0514 11/12/18  0550   WBC Thousand/uL 6 55 6 27   HEMOGLOBIN g/dL 12 4 12 8   HEMATOCRIT % 39 1 38 9   PLATELETS Thousands/uL 235 243       Results from last 7 days  Lab Units 11/15/18  0514 11/12/18  0550   POTASSIUM mmol/L 3 9 3 6   CHLORIDE mmol/L 105 108   CO2 mmol/L 30 28   BUN mg/dL 11 10   CREATININE mg/dL 0 95 0  84   CALCIUM mg/dL 8 3 8 6                  Glucose, i-STAT (mg/dl)   Date Value   10/20/2018 132       Labs reviewed    Physical Examination:  Vitals:   Vitals:    11/14/18 1325 11/14/18 2042 11/15/18 0508 11/15/18 0900   BP: 147/89 153/82 142/76 127/82   BP Location: Right arm Right arm Right arm Right arm   Pulse: 58 59 (!) 49 62   Resp: 18 18 18    Temp: 97 5 °F (36 4 °C) 97 6 °F (36 4 °C) 97 7 °F (36 5 °C)    TempSrc: Oral Oral Oral    SpO2: 96% 92% 97%    Weight:       Height:         Constitutional:  NAD; pleasant; nontoxic  CV:  +S1, S2;  RRR; no rub/murmur; LOOP incision w/o erythema/drainage  Pulmonary:  BBS without crackles/wheeze/rhonci; resp are unlabored  Abdominal:  soft, +BS, ND/NT; no mass  Skin:  no rashes  Musculoskeletal:  No edema  :  no nichols   Neurological/Psych:  AAO;  left hand grasp 2+/5 = can lift at shoulder and weakly flex at elbow;  LLE HF 4/5; + left facial droop; speech is clear; no depression/anxiety        [ X ] Total time spent: 30 Mins and greater than 50% of this time was spent counseling/coordinating care  ** Please Note: Dragon 360 Dictation voice to text software may have been used in the creation of this document   **

## 2018-11-15 NOTE — PROGRESS NOTES
11/15/18 0930   Pain Assessment   Pain Assessment No/denies pain   Restrictions/Precautions   Precautions Fall Risk   Cognition   Arousal/Participation Cooperative   Subjective   Subjective pt reported feeling well and ready for therapy    QI: Roll Left and Right   Assistance Needed Supervision   Roll Left and Right CARE Score 4   QI: Sit to Lying   Assistance Needed Supervision   Sit to Lying CARE Score 4   QI: Lying to Sitting on Side of Bed   Assistance Needed Supervision   Lying to Sitting on Side of Bed CARE Score 4   QI: Sit to Stand   Assistance Needed Supervision   Sit to Stand CARE Score 4   QI: Chair/Bed-to-Chair Transfer   Assistance Needed Physical assistance   Assistance Provided by [place] Less than 25%   Chair/Bed-to-Chair Transfer CARE Score 3   Transfer Bed/Chair/Wheelchair   Limitations Noted In Balance; Endurance;LE Strength;UE Strength   Stand Pivot Minimal Assist;Supervision   Sit to Stand Supervision   Stand to Sit Supervision   Supine to Sit Supervision   Sit to Supine Supervision   Bed, Chair, Wheelchair Transfer (FIM) 4 - Patient requires steadying assist or light touching   QI: Walk 10 Feet   Assistance Needed Supervision   Walk 10 Feet CARE Score 4   QI: Walk 50 Feet with Two Turns   Assistance Needed Supervision   Walk 50 Feet with Two Turns CARE Score 4   QI: Walk 150 Feet   Assistance Needed Supervision   Walk 150 Feet CARE Score 4   Ambulation   Does the patient walk? 2  Yes   Primary Discharge Mode of Locomotion Walk   Walk Assist Level Supervision;Close Supervision   Gait Pattern Decreased foot clearance;L hemiparesis;L knee hyperextension; Step through; Improper weight shift   Assist Device (none)   Distance Walked (feet) 350 ft  (x5, 700 x5)   Limitations Noted In Heel Strike   Walking (FIM) 5 - [place] sets up equipment or applies device such as orthosis/prosthesis AND distance 150 feet or more, no rest   QI: 4 Steps   Assistance Needed Supervision; Adaptive equipment   4 Steps CARE Score 4   QI: 12 Steps   Assistance Needed Supervision; Adaptive equipment   12 Steps CARE Score 4   Stairs   Type Stairs   # of Steps 40   Weight Bearing Precautions Fall Risk   Assist Devices Single Rail   Findings reciprocal   Stairs (FIM) 5 - Patient requires supervision/monitoring AND goes up and down full flight (12- 14 stairs)   Therapeutic Interventions   Neuromuscular Re-Education both legs on blue theraband ball, rolling ball in and then out and then bucks on ball and then both legs out straight on ball and lifting hips up  PNF D1 and D2 patterns LUE  with AAROM as needed and manual resistance able  also AAROM for LUE flex/ext supine  walking with black theraband around pt's waiste and as pt walking forward, pulling back on band and 2nd person to guard pt  also did this for sideways walking both directions  pt carried a few items of laundry over LUE when flexed at the elbow and held against his torso for support  pt carried grocery bag adn took items out of OT cabinet and putt hem back in wtih RUE  and held bag in LUE, needing some assistance  repeats of walking/stairs    5lb ankle weight LLE and 3lb ankle weight RLE walking forwards and backwards    Assessment   Treatment Assessment Pt cont to make progress towards LTGs in regards to strength, activity tolerance, balance and L side strength and muscle activation  Pt will cont to benefit from skilled PT to further progress in these areas to prepare for safe dc home  Barriers to Discharge Inaccessible home environment   PT Barriers   Physical Impairment Decreased strength;Decreased range of motion;Decreased endurance; Impaired balance;Decreased mobility   Functional Limitation Car transfers;Stair negotiation;Standing;Transfers; Walking   Plan   Treatment/Interventions Functional transfer training;LE strengthening/ROM; Elevations; Therapeutic exercise; Endurance training;Patient/family training;Equipment eval/education; Bed mobility;Gait training   Progress Progressing toward goals   Recommendation   Recommendation Outpatient PT; Home with family support   PT Therapy Minutes   PT Time In 0930   PT Time Out 1100   PT Total Time (minutes) 90   PT Mode of treatment - Individual (minutes) 90   PT Mode of treatment - Concurrent (minutes) 0   PT Mode of treatment - Group (minutes) 0   PT Mode of treatment - Co-treat (minutes) 0   PT Mode of Teatment - Total time(minutes) 90 minutes   Therapy Time missed   Time missed?  No

## 2018-11-15 NOTE — PROGRESS NOTES
Physical Medicine and Rehabilitation Progress Note  Severiano Finner 58 y o  male MRN: [id number]  Unit/Bed#: -01 Encounter: [id number]    HPI: Patient is 59 yo male with right cerebral infarcts likely embolic in nature     Chief Complaint/Subjective: Patient denies any events overnight     ROS:  negative unless noted above     Assessment/Plan:      Bradycardia   Assessment & Plan    · Consistently asymptomatic  · HR currently WNL at 61 with BP of 143/74  · d/w IM and are monitoring at this time      DM (diabetes mellitus) ([facility] )   Assessment & Plan    · Not on any meds at home  · Per IM ISS or accuchecks no longer needed based on blood sugars      Gout   Assessment & Plan    · Left ankle  · Per patient was Indocin 50mg TID PTA however was not able to tolerate due to diarrhea and has not been taking for 2 months   · XR of left ankle and foot negative   · s/p colchicine and steroid taper per IM  · Resolved per patient   · Of note per patient this is only the 2nd gout flare patient has ever had     Hyperlipidemia   Assessment & Plan    · Home zocor 20 mg changed to lipitor 80 mg      Anxiety   Assessment & Plan    · No record of any benzodiazepines (or any other controlled medications) found in the [facility] database going back 1 year  · Neuropsychology following       Hypertension   Assessment & Plan    · IM to clarify home meds as records are not clear ( olmesartan vs valsartan ?, HCTZ 25 mg vs 50 mg ? norvasc ?)  · Currently on cozaar per IM      Dysphagia   Assessment & Plan    · Resolved and cleared for regular consistency diet with thin liquids by ST which patient is tolerating w/o difficulty, no s/s of aspiration      * Right sided cerebral hemisphere cerebrovascular accident (CVA) ([facility] )   Assessment & Plan    · On DAPT for 90 days, with aspirin/plavix, followed by monotherapy with ASA  · Possibly cardioembolic in etiology   Consulted EP for eval/placement of loop recorder however they recommend JAYE first which was negative for thrombus in [place] however did reveal aortic atheroma, will d/w neuro (Dr Alondra Anderson) if they feel the atheroma is the etiology of the stroke and did not feel that atheroma was likely severe enough to change from  to [facility] [place] even if this was the potential etiology so management recommendations per neuro was unchanged and patient had LOOP placed   · Fluoxetine 20mg post stroke for motor recovery         Scheduled Meds:    Current Facility-Administered Medications:  acetaminophen 650 mg Oral Q6H PRN Jerzy Norris MD   aspirin 81 mg Oral Daily Jerzy Norris MD   atorvastatin 80 mg Oral QPM Jerzy Norris MD   clopidogrel 75 mg Oral Daily Elizabeth Heller, CRNP   docusate sodium 100 mg Oral BID Bernarda Yao PA-C   FLUoxetine 20 mg Oral Daily Elizabeth Heller, CRNP   losartan 50 mg Oral Daily Elizabeth Heller, CRNP   melatonin 9 mg Oral HS Camilla No MD   nicotine 7 mg Transdermal Daily Camilla No MD   ondansetron 4 mg Intravenous Q6H PRN Jerzy Norris MD   oxyCODONE 2 5 mg Oral Q4H PRN Jerzy Norris MD   pneumococcal 23-valent polysaccharide vaccine 0 5 mL Subcutaneous Prior to discharge Jerzy Norris MD   polyethylene glycol 17 g Oral Daily PRN Bernarda Yao PA-C   senna 1 tablet Oral HS Bernarda Yao PA-C        Incidental findings  1) thyroid nodules: per radiology report recs non-emergent OP thyroid U/S, OP FU with PCP to arrange U/S  2) subpleural bullae: OP FU with PCP with further testing/treatment and/or specialist referral at PCP's discretion     Note: patient noted to have petechial hemorrhage on imaging however has been cleared for DAPT and lovenox (for DVT ppx) by neuro  DVT ppx: SCDs, ambulating adequate distances   Dispo: 11/21    Objective:    Functional Update:  Mobility: CG  Transfers: sup  ADLs: min-sup        Physical Exam:    Vitals:    11/15/18 1349   BP: 143/74   Pulse: 61   Resp: 20   Temp: 97 5 °F (36 4 °C)   SpO2: 97%       General: alert, no apparent distress, cooperative and comfortable  HEENT:  Head: Normocephalic, no lesions, without obvious abnormality  CARDIAC:  +S1/2  LUNGS:  respirations unlabored  ABDOMEN:  soft   EXTREMITIES:  no signficant LE edema  NEURO:   awake, alert, appropriately answering questions, 2/5 LUE (stable), facial asymmetry & tongue deviation remain   PSYCH:  mood/affect currently stable, denies depression       Diagnostic Studies: reviewed, no new imaging  No orders to display       Laboratory:      Results from last 7 days  Lab Units 11/15/18  0514 11/12/18  0550   HEMOGLOBIN g/dL 12 4 12 8   HEMATOCRIT % 39 1 38 9   WBC Thousand/uL 6 55 6 27       Results from last 7 days  Lab Units 11/15/18  0514 11/12/18  0550   BUN mg/dL 11 10   POTASSIUM mmol/L 3 9 3 6   CHLORIDE mmol/L 105 108   CREATININE mg/dL 0 95 0 84            Patient Active Problem List   Diagnosis    Right sided cerebral hemisphere cerebrovascular accident (CVA) [facility] )    Dysphagia    Hypertension    Anxiety    Hyperlipidemia    Gout    DM (diabetes mellitus) ([facility] )    Bradycardia           Total visit time:  At least 25 minutes, with more than 50% spent counseling/coordinating care

## 2018-11-15 NOTE — PROGRESS NOTES
Occupational Therapy Treatment Note       11/15/18 1500   Pain Assessment   Pain Assessment No/denies pain   Restrictions/Precautions   Precautions Fall Risk   Braces or Orthoses Sling  (givmohr )   Lifestyle   Autonomy "I'm really glad we got to do that'    ROM - Left Upper Extremities    L Elbow AAROM;Elbow flexion;Elbow extension   LUE ROM Comment (L) elbow flexion/extension with minimal resistance applied 10x while pt seated  Neuromuscular Education   Vibration Pt engaged in neuromuscular re-education of (L) UE usng high frequency vibration applied to digit/wrist extensors in gravity eliminated position  Pt presents with improved wrist extension from prior sessions, however requires assistance to obtain neutral position from full wrist flexion  Pt noted with compensatory movement patterns at (L) shoulder, provided pt with support under (L) arm/forearm to decrease  Pt presented with improved wrist/digit flexion without vibration, able to complete full wrist flexion with gravity eliminated actively  Pt noted with improved extension when vibration provded at (L) digit 1, able to achieve 3/4 range  Pt completed wrist and digit flexion/extension 15x  Cognition   Overall Cognitive Status WFL   Activity Tolerance   Activity Tolerance Patient tolerated treatment well   Assessment   Treatment Assessment Pt participated in skilled 15 minute OT tx session focused on AAROM/AROM of (L) elbow, wrist, and digits  Utilized high frequency vibration to promote wrist/digit extension  See above for further details on functional performance  Continue OT plan of care with focus on item retrieval from floor (including [facility]), simulation of pet care tasks, kitchen mobility/transfers, functional mobility/transfers in ADL suite, (L) UE neuromuscular re-education using vibration  Plan   Treatment/Interventions ADL retraining;Functional transfer training; Therapeutic exercise; Endurance training;Patient/family training;Equipment eval/education; Bed mobility; Compensatory technique education   Progress Progressing toward goals   Recommendation   OT Discharge Recommendation Outpatient OT   OT Therapy Minutes   OT Time In 1500   OT Time Out 1515   OT Total Time (minutes) 15   OT Mode of treatment - Individual (minutes) 15   OT Mode of treatment - Concurrent (minutes) 0   OT Mode of treatment - Group (minutes) 0   OT Mode of treatment - Co-treat (minutes) 0   OT Mode of Teatment - Total time(minutes) 15 minutes   Therapy Time missed   Time missed?  No       Yuridia Parisi MS, OTR/L , CBIS

## 2018-11-16 PROCEDURE — 97530 THERAPEUTIC ACTIVITIES: CPT

## 2018-11-16 PROCEDURE — 97127 HB COGNITIVE SKILLS DEVELOPMENT, EACH 15 MUNUTES: CPT

## 2018-11-16 PROCEDURE — 99232 SBSQ HOSP IP/OBS MODERATE 35: CPT | Performed by: PHYSICAL MEDICINE & REHABILITATION

## 2018-11-16 PROCEDURE — 97112 NEUROMUSCULAR REEDUCATION: CPT

## 2018-11-16 PROCEDURE — G0515 COGNITIVE SKILLS DEVELOPMENT: HCPCS

## 2018-11-16 PROCEDURE — 97535 SELF CARE MNGMENT TRAINING: CPT

## 2018-11-16 PROCEDURE — 97116 GAIT TRAINING THERAPY: CPT

## 2018-11-16 RX ADMIN — ATORVASTATIN CALCIUM 80 MG: 80 TABLET, FILM COATED ORAL at 18:10

## 2018-11-16 RX ADMIN — CLOPIDOGREL 75 MG: 75 TABLET, FILM COATED ORAL at 08:10

## 2018-11-16 RX ADMIN — NICOTINE 7 MG: 7 PATCH, EXTENDED RELEASE TRANSDERMAL at 08:13

## 2018-11-16 RX ADMIN — ASPIRIN 81 MG: 81 TABLET, COATED ORAL at 08:09

## 2018-11-16 RX ADMIN — MELATONIN 9 MG: at 21:17

## 2018-11-16 RX ADMIN — LOSARTAN POTASSIUM 50 MG: 50 TABLET, FILM COATED ORAL at 08:10

## 2018-11-16 RX ADMIN — DOCUSATE SODIUM 100 MG: 100 CAPSULE, LIQUID FILLED ORAL at 08:11

## 2018-11-16 RX ADMIN — FLUOXETINE 20 MG: 20 CAPSULE ORAL at 08:09

## 2018-11-16 NOTE — PROGRESS NOTES
11/16/18 1330   Pain Assessment   Pain Assessment No/denies pain   Restrictions/Precautions   Precautions Fall Risk   Cognition   Arousal/Participation Cooperative   Subjective   Subjective pt reported feeling he is getting stronger and he feels better    QI: Roll Left and Right   Assistance Needed Supervision   Roll Left and Right CARE Score 4   QI: Sit to Lying   Assistance Needed Supervision   Sit to Lying CARE Score 4   QI: Lying to Sitting on Side of Bed   Assistance Needed Supervision   Lying to Sitting on Side of Bed CARE Score 4   QI: Sit to Stand   Assistance Needed Supervision   Sit to Stand CARE Score 4   QI: Chair/Bed-to-Chair Transfer   Assistance Needed Incidental touching;Supervision   Chair/Bed-to-Chair Transfer CARE Score 4   Transfer Bed/Chair/Wheelchair   Limitations Noted In Balance; Endurance;LE Strength;UE Strength   Stand Pivot Contact Guard;Supervision   Sit to Stand Supervision   Stand to Sit Supervision   Supine to Sit Supervision   Sit to Supine Supervision   Bed, Chair, Wheelchair Transfer (FIM) 4 - Patient completes 75% of all tasks   QI: Walk 10 Feet   Assistance Needed Supervision   Walk 10 Feet CARE Score 4   QI: Walk 50 Feet with Two Turns   Assistance Needed Supervision   Walk 50 Feet with Two Turns CARE Score 4   QI: Walk 150 Feet   Assistance Needed Supervision   Walk 150 Feet CARE Score 4   QI: Walking 10 Feet on Uneven Surfaces   Assistance Needed Supervision   Walking 10 Feet on Uneven Surfaces CARE Score 4   Ambulation   Primary Discharge Mode of Locomotion Walk   Walk Assist Level Supervision   Gait Pattern Decreased foot clearance;L hemiparesis;L knee hyperextension; Step through; Improper weight shift   Assist Device (none)   Distance Walked (feet) 350 ft  (x8 500 x2 700 x2)   Limitations Noted In Balance; Endurance; Heel Strike;Swing;Strength;Speed   Walking (FIM) 5 - Patient requires supervision/monitoring AND distance 150 feet or more, no rest   QI: 4 Steps   Assistance Needed Supervision; Adaptive equipment   4 Steps CARE Score 4   QI: 12 Steps   Assistance Needed Supervision; Adaptive equipment   12 Steps CARE Score 4   Stairs   Type Stairs   # of Steps 40   Weight Bearing Precautions Fall Risk   Assist Devices Single Rail   Findings CS reciprocal pattern   Stairs (FIM) 5 - Patient requires supervision/monitoring AND goes up and down full flight (12- 14 stairs)   Therapeutic Interventions   Neuromuscular Re-Education supine on mat, PNF D1 and D2 for LLE and lifting LLE off the side of the mat and then back on with manual resistance for lifting and then for ext  manual resistance for ext with PNF patterns  sitting edge of mat, PNF D1 and D2 for LUE with AAROM for end ranges and manual resistance for mid range movements  standing, pushing 5lb weighted dowel against 2 yellow theraband for chest press, pulling back with black theraband around dowel for rows, and then arms in front up to 90 deg shoulder flexion and elbow ext and pulling down on dowel with black theraband  single leg squats LLE with RLE out in front with only heel on ground, guarding on L side and CGA for minor LOB  pushing cart with 30 lbs of ankle weights on top with dycem to improve L  strength  AAROM for weighted dowel exercises to keep L hand  on and no assistance needed for LUE for pushing car (350'x2)  walking with 5lb ankle weights LLE and 4lb RLE walking and then with theraband around waist and pulling back on it (black theraband)  sideways and backwards walking with ankle weights on  Assessment   Treatment Assessment Pt cont to make progress in LLE strength, activity tolerance, balacne and righting reactions  Pt was able to consistently walk at S level today with less LOB and w/out catching toes at all on stairs   Pt cont to demonstrate progress with LUE with improvements in PNF pattern movements as well as closed chain exercises, such as rows and chest press with weighted dowels and therabands for resistance  Pt will cont to benefit from skilled PT to further progress in these areas  Discussed with [facility] OT to look at progressing pt to [place] in his room on Monday  Family/Caregiver Present pt's friend Miguel Angel Munoz who will be with him at times at home walked with him on L side    Barriers to Discharge Decreased caregiver support   PT Barriers   Physical Impairment Decreased strength;Decreased range of motion;Decreased endurance; Impaired balance;Decreased mobility   Functional Limitation Car transfers;Stair negotiation;Standing;Transfers; Walking   Plan   Treatment/Interventions Functional transfer training;LE strengthening/ROM; Elevations; Therapeutic exercise; Endurance training;Patient/family training;Equipment eval/education; Bed mobility;Gait training   Progress Progressing toward goals   Recommendation   Recommendation Outpatient PT   PT Therapy Minutes   PT Time In 1330   PT Time Out 1510   PT Total Time (minutes) 100   PT Mode of treatment - Individual (minutes) 100   PT Mode of treatment - Concurrent (minutes) 0   PT Mode of treatment - Group (minutes) 0   PT Mode of treatment - Co-treat (minutes) 0   PT Mode of Teatment - Total time(minutes) 100 minutes   Therapy Time missed   Time missed?  No

## 2018-11-16 NOTE — PROGRESS NOTES
Internal Medicine Progress Note  Patient: Susan Calles  Age/sex: 58 y o  male  Medical Record #: [id number]      ASSESSMENT/PLAN:  Susan Calles is seen and examined and management for following issues:    Multiple embolic R MCA CVA:   continue current medical management; Cont ASA/Plavix for 90 days then Aspirin and statin therapy only; EP did JAYE/LOOP implant 10/31/18  LOOP recorder implant 10/30:  Site CDI     HTN:  stable; no changes today        DM II:  Diet controlled = stopped Accuchecks/coverage since BSs had been ok      Anxiety: stable; Cont Fluoxetine 20mg daily         Subjective:  Patient states he is doing well  He is anxious about his upcoming discharge  He denies any other complaints      Scheduled Meds:    Current Facility-Administered Medications:  acetaminophen 650 mg Oral Q6H PRN Johny Stewart MD   aspirin 81 mg Oral Daily Johny Stewart MD   atorvastatin 80 mg Oral QPM Johny Stewart MD   clopidogrel 75 mg Oral Daily Elizabeth Heller, CANDENP   docusate sodium 100 mg Oral BID Bernarda Yao PA-C   FLUoxetine 20 mg Oral Daily Elizabeth Heller, CRNP   losartan 50 mg Oral Daily Elizabeth Heller, CRNP   melatonin 9 mg Oral HS Starla Hickman MD   nicotine 7 mg Transdermal Daily Starla Hickman MD   ondansetron 4 mg Intravenous Q6H PRN Johny Stewart MD   oxyCODONE 2 5 mg Oral Q4H PRN Johny Stewart MD   pneumococcal 23-valent polysaccharide vaccine 0 5 mL Subcutaneous Prior to discharge Johny Stewart MD   polyethylene glycol 17 g Oral Daily PRN Bernarda Yao PA-C   senna 1 tablet Oral HS Bernarda Yao PA-C       Labs:       Results from last 7 days  Lab Units 11/15/18  0514 11/12/18  0550   WBC Thousand/uL 6 55 6 27   HEMOGLOBIN g/dL 12 4 12 8   HEMATOCRIT % 39 1 38 9   PLATELETS Thousands/uL 235 243       Results from last 7 days  Lab Units 11/15/18  0514 11/12/18  0550   POTASSIUM mmol/L 3 9 3 6   CHLORIDE mmol/L 105 108   CO2 mmol/L 30 28   BUN mg/dL 11 10   CREATININE mg/dL 0 95 0  84   CALCIUM mg/dL 8 3 8 6                  Glucose, i-STAT (mg/dl)   Date Value   10/20/2018 132       Labs reviewed    Physical Examination:  Vitals:   Vitals:    11/15/18 1349 11/15/18 2046 11/15/18 2302 11/16/18 0509   BP: 143/74 170/79 147/60 132/77   BP Location: Right arm Right arm Right arm Right arm   Pulse: 61 55  (!) 47   Resp: 20 18 18   Temp: 97 5 °F (36 4 °C) 98 °F (36 7 °C)  97 9 °F (36 6 °C)   TempSrc: Oral Oral  Oral   SpO2: 97% 95%  95%   Weight:       Height:         Constitutional:  NAD; pleasant; nontoxic  CV:  +S1, S2;  RRR; no rub/murmur; LOOP incision w/o erythema/drainage  Pulmonary:  BBS without crackles/wheeze/rhonci; resp are unlabored  Abdominal:  soft, +BS, ND/NT; no mass  Skin:  no rashes  Musculoskeletal:  No edema  :  no nichols   Neurological/Psych:  AAO;  left hand grasp 2+/5 = can lift at shoulder and weakly flex at elbow;  LLE HF 4/5; + left facial droop; speech is clear; no depression/anxiety        [ X ] Total time spent: 30 Mins and greater than 50% of this time was spent counseling/coordinating care  ** Please Note: Dragon 360 Dictation voice to text software may have been used in the creation of this document   **

## 2018-11-16 NOTE — PROGRESS NOTES
Occupational Therapy Treatment Note     11/16/18 2998   Pain Assessment   Pain Assessment No/denies pain   Restrictions/Precautions   Precautions Fall Risk   Lifestyle   Autonomy "I feel like I had a lot of purpose today"    Eating Assessment   Findings OTR/L provided pt with red built up foam to be used in (L) UE while holdng fork and using (R) UE to cut food  Pt able to setup (L) UE on tabletop and complete thumb extension to 1/2 range without assistance to hold built up utensil  Pt to trial during meals today and OT to observe during future tx sessions  QI: Putting On/Taking Off Footwear   Assistance Needed Independent; Adaptive equipment   Assistance Provided by [place] No physical assistance   Comment seated with elastic shoe laces, pt able to don/doff at mod I level    Putting On/Taking Off Footwear CARE Score 6   QI: [address] Provided by [place] No physical assistance   Comment pt engaged in repetive item retrieval from floor, including cane  Pt able to complete with supervision level   Pt demonstrated good carryover of widening base of support to complete item retrieval  Pt able to complete 5x with and without initial UE support on countertop, demonstrating progress from yesterday's session    Picking Up Object CARE Score 4   Dressing/Undressing Clothing   Remove LB Clothes Shoes   Don LB Clothes Shoes   Findings don/doff shoes while seated with elastic shoe laces    LB Dressing (FIM) 6 - Patient requires assistive device/extra time/safety concerns but completes independently   QI: Roll Left and Right   Assistance Needed Independent   Comment increased time    Roll Left and Right CARE Score 6   QI: Sit to [address] Provided by [place] No physical assistance   Comment increased time    Sit to Lying CARE Score 6   QI: Lying to Sitting on Side of Bed   Assistance Needed Independent   Assistance Provided by [place] No physical assistance   Comment increased time    Lying to Sitting on Side of Bed CARE Score 6   QI: Sit to Stand   Assistance Needed Supervision   Assistance Provided by [place] No physical assistance   Sit to Stand CARE Score 4   QI: Chair/Bed-to-Chair Transfer   Assistance Needed Supervision   Assistance Provided by [place] No physical assistance   Comment [facility]   Chair/Bed-to-Chair Transfer CARE Score 4   Transfer Bed/Chair/Wheelchair   Positioning Concerns Hemiplegia   Limitations Noted In Balance;LE Strength;UE Strength   Adaptive Equipment Cane   Sit to Stand Supervision   Stand to Sit Supervision   Supine to Sit Other  (mod I for increased time )   Sit to Supine Other  (mod I for increased time )   Findings Pt engaged in functional mobility/transfers at supervision level using SPC  Pt self reports, and is noted, with minimal LOB when completing (L) turns 2* decreased (L) LE strength  Pt completed chair to chair transfers while in kitchen 4x in narrow space to (L) side, where pt was required to increase lifting of (L) LE to complete without LOB, which pt was able to perform  Pt completed functional mobility in ADL suite using SPC, reports suite is similar setup to his apartment at home  Pt able to complete functional mobility/transfers from bathroom, bedroom, and kitchen with supervision and no LOB with SPC  Pt able to complete bed mobility at mod I level for increased time from standard bed  Pt demonstrating progress in complting supine to sit transfer from side lying on (L), able to initiate elbow extension to boost into sitting position from side lying  Bed, Chair, Wheelchair Transfer (FIM) 6 - Patient requires assistive device/extra time/safety concerns but completes independently   Toileting   Able to [address] down yes, up yes     Toileting (FIM) 5 - Patient requires supervision/monitoring   QI: Toilet Transfer   Assistance Needed Supervision   Assistance Provided by [place] No physical assistance Toilet Transfer CARE Score 4   Toilet Transfer   Surface Assessed Standard Toilet   Transfer Technique Standard   Findings pt demonstrating progress in toilet transfers, able to complete sit <> stand with SPC, then progressed to without SPC, using (R) UE support on (R) LE to assist in boosting/lowering  Toilet Transfer (FIM) 5 - Patient requires supervision/monitoring   Kitchen Mobility   Kitchen-Mobility Level Cane   Kitchen Activity Retrieve items;Transport items   Kitchen Mobility Comments Pt complete kitchen mobility in ADL suite at supervision level using SPC/countertop for (R) UE support  Pt able to retrieve items from shoulder and knee height cabinets, and transport while sliding across countertop with supervision and no LOB noted  Educated pt on using drinks with caps, containers, snack bags, for item transportation if alone  Trialed using grocery bag on (L) forearm to carry small kitchen items while pt wearing Givmohr sling  Pt able to achieve elbow flexion, noted with compensatory shoulder internal rotation and flexion  Pt able to transport small items using bag from kitchen to kitchen table with supervision  Commmunity Re-entry   Community Re-entry Level Cane   Community Re-entry Level of Assistance Close supervision   Community Re-entry Pt engaged in community re-integration tasks at [employer], and pharmacy, including: accessing elevator, functional mobility while simulating shopping tasks in narrow aisles, item retrieval from low shelves at store  Pt completes with close supervision in narrow spaces, requires increased time and attention when completing functional mobility in narrow spaces, especially when turning to (L)  Pt excited to participate in community re-integration tasks      Cognition   Overall Cognitive Status WFL   Additional Activities   Additional Activities Comments Pt engaged in pet care tasks - obtaining small bucket of water and transport onto ground simulating providing water to pt's dog  Pt able to complete with supervision and no LOB noted  OT educated pt on recommendation for pt to have supervision for pet care tasks and assistance for walking dog, pt reports family will be assisting but he would like to practice pet care activities to increase independence  Activity Tolerance   Activity Tolerance Patient tolerated treatment well   Assessment   Treatment Assessment Pt participated in skilled OT tx session focused on community re-integration, pet care, kitchen mobility, functional mobility/transfers  See above for further details on functional performance  Pt demonstrating good progress in functional tasks, able to complete bed mobility at mod I level, requires supervision for IADL tasks, however noted with no LOB today  Engaged in repetitive training on don/doffng givmohr sling, pt able to complete with only min assist when dons (L) hand into sling first, then brings around elbow (without velcro strap) and thread (R) UE into sling  Pt doffs sling at mod I level, overall requires min assist for sling management  Continue OT plan of care with focus on IADL tasks, sling management, standing balance/tolerance, feeding, (L) UE neuromuscular re-education using vibration for wrist/digit extension, item retrieval from floor  Plan for OT to continue focus on [facility], pt benefits from using ADL suite simulating home environement  If pt has achieved mod I level for functional mobility/transfers, toileting, toilet transfers, and grooming, plan to progress pt to "distant supervision" level Monday  Prognosis Good   Problem List Decreased strength;Decreased range of motion;Decreased endurance; Impaired balance;Decreased mobility; Decreased coordination   Plan   Treatment/Interventions ADL retraining;Functional transfer training; Therapeutic exercise; Endurance training;Patient/family training;Equipment eval/education; Compensatory technique education   Progress Progressing toward goals   Recommendation OT Discharge Recommendation Outpatient OT   OT Therapy Minutes   OT Time In 0830   OT Time Out 1000   OT Total Time (minutes) 90   OT Mode of treatment - Individual (minutes) 90   OT Mode of treatment - Concurrent (minutes) 0   OT Mode of treatment - Group (minutes) 0   OT Mode of treatment - Co-treat (minutes) 0   OT Mode of Teatment - Total time(minutes) 90 minutes   Therapy Time missed   Time missed?  No       Yuridia Parisi MS, OTR/L , CBIS

## 2018-11-16 NOTE — SOCIAL WORK
In preparation for dc cm received an order from therapy for a shower seat w/back  Order place with [facility] to be delivered to pts room prior to dc  Following for additional dc needs

## 2018-11-16 NOTE — PROGRESS NOTES
Physical Medicine and Rehabilitation Progress Note  Jeremy Chan 58 y o  male MRN: [id number]  Unit/Bed#: -01 Encounter: [id number]    HPI: Patient is 59 yo male with right cerebral infarcts likely embolic in nature     Chief Complaint/Subjective: Patient without complaint currently     ROS:  negative unless noted above     Assessment/Plan:      Bradycardia   Assessment & Plan    · Consistently asymptomatic  · HR currently WNL at 63 with BP of 142/69  · d/w IM and are monitoring at this time      DM (diabetes mellitus) ([facility] )   Assessment & Plan    · Not on any meds at home  · Per IM ISS or accuchecks no longer needed based on blood sugars      Gout   Assessment & Plan    · Left ankle  · Per patient was Indocin 50mg TID PTA however was not able to tolerate due to diarrhea and has not been taking for 2 months   · XR of left ankle and foot negative   · s/p colchicine and steroid taper per IM  · Resolved per patient   · Of note per patient this is only the 2nd gout flare patient has ever had     Hyperlipidemia   Assessment & Plan    · Home zocor 20 mg changed to lipitor 80 mg      Anxiety   Assessment & Plan    · No record of any benzodiazepines (or any other controlled medications) found in the [employer] database going back 1 year  · Neuropsychology following       Hypertension   Assessment & Plan    · IM to clarify home meds as records are not clear ( olmesartan vs valsartan ?, HCTZ 25 mg vs 50 mg ? norvasc ?)  · Currently on cozaar per IM      Dysphagia   Assessment & Plan    · Resolved and cleared for regular consistency diet with thin liquids by ST which patient is tolerating w/o difficulty, no s/s of aspiration      * Right sided cerebral hemisphere cerebrovascular accident (CVA) ([facility] )   Assessment & Plan    · On DAPT for 90 days, with aspirin/plavix, followed by monotherapy with ASA  · Possibly cardioembolic in etiology   Consulted EP for eval/placement of loop recorder however they recommend JAYE first which was negative for thrombus in [facility] however did reveal aortic atheroma, will d/w neuro (Dr Jacob Stanford) if they feel the atheroma is the etiology of the stroke and did not feel that atheroma was likely severe enough to change from  to [facility] even if this was the potential etiology so management recommendations per neuro was unchanged and patient had LOOP placed   · Fluoxetine 20mg post stroke for motor recovery         Scheduled Meds:    Current Facility-Administered Medications:  acetaminophen 650 mg Oral Q6H PRN Aleks Machado MD   aspirin 81 mg Oral Daily Aleks Machado MD   atorvastatin 80 mg Oral QPM Aleks Machado MD   clopidogrel 75 mg Oral Daily Elizabeth Heller, CRNP   docusate sodium 100 mg Oral BID Bernarda Yao PA-C   FLUoxetine 20 mg Oral Daily Elizabeth Heller, CRNP   losartan 50 mg Oral Daily Elizabeth Heller, CRNP   melatonin 9 mg Oral HS Daiana Benedict MD   nicotine 7 mg Transdermal Daily Daiana Benedict MD   ondansetron 4 mg Intravenous Q6H PRN Aleks Machado MD   oxyCODONE 2 5 mg Oral Q4H PRN Aleks Machado MD   pneumococcal 23-valent polysaccharide vaccine 0 5 mL Subcutaneous Prior to discharge Aleks Machado MD   polyethylene glycol 17 g Oral Daily PRN Bernarda Yao PA-C   senna 1 tablet Oral HS Bernarda Yao PA-C        Incidental findings  1) thyroid nodules: per radiology report recs non-emergent OP thyroid U/S, OP FU with PCP to arrange U/S  2) subpleural bullae: OP FU with PCP with further testing/treatment and/or specialist referral at PCP's discretion     Note: patient noted to have petechial hemorrhage on imaging however has been cleared for DAPT and lovenox (for DVT ppx) by neuro  DVT ppx: SCDs, ambulating adequate distances   Dispo: 11/21    Objective:    Functional Update:  Mobility: CG  Transfers: sup  ADLs: min-sup        Physical Exam:    T: 97 9  HR: 63  BP: 142/69  RR: 16 (not 18)  POx: 95%      General: alert, no apparent distress, cooperative and comfortable  HEENT:  Head: Normocephalic, no lesions, without obvious abnormality  CARDIAC:  +S1/2  LUNGS:  respirations unlabored  ABDOMEN:  soft   EXTREMITIES:  no signficant LE edema  NEURO:   mental status, speech normal, alert and oriented x3  PSYCH:  mood/affect currently stable, denies depression       Diagnostic Studies: reviewed, no new imaging  No orders to display       Laboratory:      Results from last 7 days  Lab Units 11/15/18  0514 11/12/18  0550   HEMOGLOBIN g/dL 12 4 12 8   HEMATOCRIT % 39 1 38 9   WBC Thousand/uL 6 55 6 27       Results from last 7 days  Lab Units 11/15/18  0514 11/12/18  0550   BUN mg/dL 11 10   POTASSIUM mmol/L 3 9 3 6   CHLORIDE mmol/L 105 108   CREATININE mg/dL 0 95 0 84            Patient Active Problem List   Diagnosis    Right sided cerebral hemisphere cerebrovascular accident (CVA) ([facility] )    Dysphagia    Hypertension    Anxiety    Hyperlipidemia    Gout    DM (diabetes mellitus) ([facility] )    Bradycardia           Total visit time:  At least 25 minutes, with more than 50% spent counseling/coordinating care

## 2018-11-16 NOTE — PROGRESS NOTES
11/16/18 1000   Pain Assessment   Pain Assessment No/denies pain   Pain Score No Pain   Restrictions/Precautions   Precautions Fall Risk   Memory Skills   Memory (FIM) 6 - Recognizes with extra time   Social Interaction (FIM) 7 - Interacts appropriately without assistive device, medication or helper   Speech/Language/Cognition Assessmetn   Treatment Assessment Pt participated in skilled ST session w/ focus on more complex cognitive and language skills as pt continues to desire to return to work following d/c  When presented w/ a calendar deduction task targeting attention, command following, reasoning and working memory, pt presented w/ various clues and required to eliminate responses in order to determine single remaining date  In initial trial, pt completed at supervision level, requiring total of 2 verbal cues in order to follow directions  Pt then completed second trial at mod I level where he did at times require increased time for processing and to ensure accuracy of his responses, however, was able to complete independently  Additionally, pt presented w/ a mock lunch/dinner menu which also targeted attention to details and reasoning through recognizing details, as pt is noted to at times rush through tasks or demonstrate decreased attention to info  Pt demonstrated ability to locate ~95% of errors independently, while also demonstrating use of verbal reasoning skills to state why info was errored and to provide correct info which may be related to spelling, semantics, or costs  When provided w/ indirect verbal cues, pt w/ prompt ability to ID remaining errors  At end of session, discussed pt's d/c planning to include taking his time w/ household tasks, providing himself w/ rest breaks, and re-acclimating to his apartment/home   During this time, pt demonstrated adequate insight and awareness of impact of stroke deficits on his prior home routines and spontaneously verbalized different solutions or modifications to his routines  Pt continues to make progress towards goals and is improving in independence w/ higher level thinking, however, remains below baseline, benefiting from further skilled ST intervention to achieve highest level of independence related to cognitive linguistic skills w/ pt stated goal to return to work  SLP Therapy Minutes   SLP Time In 1000   SLP Time Out 2554   SLP Total Time (minutes) 45   SLP Mode of treatment - Individual (minutes) 45   SLP Mode of treatment - Concurrent (minutes) 0   SLP Mode of treatment - Group (minutes) 0   SLP Mode of treatment - Co-treat (minutes) 0   SLP Mode of Teatment - Total time(minutes) 45 minutes   Therapy Time missed   Time missed? No   Daily FIM Score   Problem solving (FIM) 5 - Solves complex problems But requires cues from helper   Comprehension (FIM) 6 - Understands complex/abstract but requires more time   Expression (FIM) 7 - Expresses complex/abstract ideas in a reasonable time w/o devices or helper

## 2018-11-17 PROCEDURE — 99232 SBSQ HOSP IP/OBS MODERATE 35: CPT | Performed by: PHYSICAL MEDICINE & REHABILITATION

## 2018-11-17 PROCEDURE — 97116 GAIT TRAINING THERAPY: CPT

## 2018-11-17 PROCEDURE — 97112 NEUROMUSCULAR REEDUCATION: CPT

## 2018-11-17 PROCEDURE — 97530 THERAPEUTIC ACTIVITIES: CPT

## 2018-11-17 PROCEDURE — 97535 SELF CARE MNGMENT TRAINING: CPT

## 2018-11-17 PROCEDURE — 97127 HB COGNITIVE SKILLS DEVELOPMENT, EACH 15 MUNUTES: CPT

## 2018-11-17 PROCEDURE — G0515 COGNITIVE SKILLS DEVELOPMENT: HCPCS

## 2018-11-17 RX ADMIN — LOSARTAN POTASSIUM 50 MG: 50 TABLET, FILM COATED ORAL at 08:48

## 2018-11-17 RX ADMIN — CLOPIDOGREL 75 MG: 75 TABLET, FILM COATED ORAL at 08:48

## 2018-11-17 RX ADMIN — DOCUSATE SODIUM 100 MG: 100 CAPSULE, LIQUID FILLED ORAL at 08:48

## 2018-11-17 RX ADMIN — NICOTINE 7 MG: 7 PATCH, EXTENDED RELEASE TRANSDERMAL at 08:47

## 2018-11-17 RX ADMIN — ATORVASTATIN CALCIUM 80 MG: 80 TABLET, FILM COATED ORAL at 16:59

## 2018-11-17 RX ADMIN — ASPIRIN 81 MG: 81 TABLET, COATED ORAL at 08:48

## 2018-11-17 RX ADMIN — MELATONIN 9 MG: at 21:32

## 2018-11-17 RX ADMIN — FLUOXETINE 20 MG: 20 CAPSULE ORAL at 08:48

## 2018-11-17 NOTE — PROGRESS NOTES
11/17/18 1500   Pain Assessment   Pain Assessment 0-10   Pain Score No Pain   Restrictions/Precautions   Precautions Fall Risk   Braces or Orthoses Sling   Cognition   Overall Cognitive Status WFL   Arousal/Participation Cooperative; Alert   Attention Within functional limits   Orientation Level Oriented X4   Memory Within functional limits   Following Commands Follows multistep commands with increased time or repetition   Subjective   Subjective reports he wants to try the stairs with the weights   QI: Sit to Stand   Assistance Needed Supervision   Sit to Stand CARE Score 4   QI: Chair/Bed-to-Chair Transfer   Assistance Needed Supervision   Chair/Bed-to-Chair Transfer CARE Score 4   Transfer Bed/Chair/Wheelchair   Limitations Noted In Balance;UE Strength;LE Strength   Stand Pivot Supervision   Sit to Stand Supervision   Stand to Sit Supervision   Bed, Chair, Wheelchair Transfer (FIM) 5 - Patient requires supervision/monitoring   QI: Walk 10 Feet   Assistance Needed Supervision   Walk 10 Feet CARE Score 4   QI: Walk 50 Feet with Two Turns   Assistance Needed Supervision   Walk 50 Feet with Two Turns CARE Score 4   QI: Walk 150 Feet   Assistance Needed Supervision   Walk 150 Feet CARE Score 4   Ambulation   Does the patient walk? 2  Yes   Primary Discharge Mode of Locomotion Walk   Walk Assist Level Supervision   Gait Pattern Inconsistant Soraida; Step through; Improper weight shift   Distance Walked (feet) 1000 ft  (x4)   Limitations Noted In Balance;Strength   Walking (FIM) 5 - Patient requires supervision/monitoring AND distance 150 feet or more, no rest   Wheelchair mobility   QI: Does the patient use a wheelchair? 0   No   Therapeutic Interventions   Neuromuscular Re-Education stair exercise with 4# CW on RLE and 5# CW on LLE, no UE support; amb with increased speed for turns and object/personnel avoidance   Assessment   Treatment Assessment increased speed when turning and increased balance on stairs with no UE support for foot from floor to top with ankle weights, decreased balance when trying to get LLE to clear step, shifts weight too soon and foot is not stable and needs to grab the handrail to stabilize; as time goes on, balance is better but the LLE occasionally catches with 5# CW on and he needs to occasionally grab the rail, more troble removing foot from stair than getting it on stair; continue POC as per PT   Problem List Decreased strength;Decreased range of motion;Decreased endurance; Impaired balance;Decreased mobility; Decreased coordination   Barriers to Discharge Decreased caregiver support   PT Barriers   Functional Limitation Walking;Transfers;Stair negotiation;Standing;Car transfers   Plan   Treatment/Interventions Functional transfer training;LE strengthening/ROM; Elevations; Therapeutic exercise; Endurance training;Patient/family training;Equipment eval/education; Bed mobility;Gait training   Progress Progressing toward goals   Recommendation   Recommendation Outpatient PT; Home with family support   PT Therapy Minutes   PT Time In 1500   PT Time Out 1530   PT Total Time (minutes) 30   PT Mode of treatment - Individual (minutes) 0   PT Mode of treatment - Concurrent (minutes) 30   PT Mode of treatment - Group (minutes) 0   PT Mode of treatment - Co-treat (minutes) 0   PT Mode of Teatment - Total time(minutes) 30 minutes   Therapy Time missed   Time missed?  No

## 2018-11-17 NOTE — PROGRESS NOTES
11/17/18 1000   Pain Assessment   Pain Assessment No/denies pain   Pain Score No Pain   Restrictions/Precautions   Precautions Fall Risk   QI: Sit to [address] Provided by [place] No physical assistance   Sit to Stand CARE Score 4   QI: Chair/Bed-to-Chair Transfer   Assistance Needed Supervision   Assistance Provided by [place] No physical assistance   Chair/Bed-to-Chair Transfer CARE Score 4   Transfer Bed/Chair/Wheelchair   Sit to Stand Supervision   Stand to Sit Supervision   Bed, Chair, Wheelchair Transfer (FIM) 5 - Patient requires supervision/monitoring  (with SPC)   Neuromuscular Education   Weight Bearing Technique Yes   LUE Weight Bearing Extended arm standing   Response to Weight Bearing Technique (Fair response with bicep/tricep/wrist activation noted)   Functional Movement Patterns crossing midline and circular movement pattern at table top    Comments Fair response noted with LUE during NM re-education intervention in preparation for grasp/release activity of common household/self-care items  Assessment   Treatment Assessment Pt participated in 90 min OT session with focus on NM re-education  Pt seated in bedside recliner upon therapist entering room  Pt donned giv-more sling for transfers at min A level with assistance needed for donning on RUE however able to pull over head and tj LUE into sling  Pt participated in sit to stand transfer at Sup level with SPC  Pt participated in functional mobility from room to therapy gym at Sup level  Pt seated in bedside chair at table top at Sup level demonstarting good safety throughout transitional movement  Pt participated in [place] re-education activities seated at table top with focus on LUE AAROM  Pt focused on AAROM on wrist flex/ext, hand flex/ext, and forearm supination/pronation with education provided on participation of exercises in room for improved AROM   Pt able to demonstrate 100% return and carryover at this time  Pt participated in towel gliding exercises at table top with faciliated pattern movements of crossing midline, forward reach, and circular patterns with pt able to participate activily 100% of the time with 6# wrist weights donned for faciliation of weight bearing through hand for improved participation in reaching for items at table top  Pt participated in standing weight bearing task through LUE extended arm for improved facilitation of muscle contraction for increased functional use of LUE during balance stabilization in ADL tasks  Pt participated in grasp/release task with LUE hand seated at table top picking and placing 10/10 common household items for increased participation in ADL tasks at d/c with education provided on stabilizing items with LUE for increased ease of self-care tasks  Pt participated in functional mobiltiy back to room at Sup level with SPC and returned to sitting in bedside recliner with call bell in reach and lunch tray set-up  Pt would benefit from continued OT services to progress pt to least restrictive level for safe d/c to home with family support      OT Therapy Minutes   OT Time In 1000   OT Time Out 1130   OT Total Time (minutes) 90   OT Mode of treatment - Individual (minutes) 90   OT Mode of treatment - Concurrent (minutes) 0   OT Mode of treatment - Group (minutes) 0   OT Mode of treatment - Co-treat (minutes) 0   OT Mode of Teatment - Total time(minutes) 90 minutes

## 2018-11-17 NOTE — PROGRESS NOTES
11/17/18 1230   Pain Assessment   Pain Assessment 0-10   Pain Score No Pain   Restrictions/Precautions   Precautions Fall Risk   Braces or Orthoses Sling   Cognition   Overall Cognitive Status WFL   Arousal/Participation Cooperative; Alert   Attention Within functional limits   Orientation Level Oriented X4   Memory Within functional limits   Following Commands Follows multistep commands with increased time or repetition   Subjective   Subjective reports he had a good workout   QI: Sit to Stand   Assistance Needed Supervision   Sit to Stand CARE Score 4   QI: Chair/Bed-to-Chair Transfer   Assistance Needed Supervision   Chair/Bed-to-Chair Transfer CARE Score 4   Transfer Bed/Chair/Wheelchair   Limitations Noted In Balance; Endurance;UE Strength;LE Strength   Stand Pivot Supervision   Sit to Stand Supervision   Stand to W  R  Teresa, Chair, Wheelchair Transfer (FIM) 5 - Patient requires supervision/monitoring   QI: Walk 10 Feet   Assistance Needed Supervision   Walk 10 Feet CARE Score 4   QI: Walk 50 Feet with Two Turns   Assistance Needed Supervision   Walk 50 Feet with Two Turns CARE Score 4   QI: Walk 150 Feet   Assistance Needed Supervision   Walk 150 Feet CARE Score 4   Ambulation   Does the patient walk? 2  Yes   Primary Discharge Mode of Locomotion Walk   Walk Assist Level Supervision   Gait Pattern Inconsistant Soraida;Decreased foot clearance; Step through; Improper weight shift   Distance Walked (feet) 350 ft   Limitations Noted In Balance; Endurance; Heel Strike;Strength;Swing   Walking (FIM) 5 - Patient requires supervision/monitoring AND distance 150 feet or more, no rest   Wheelchair mobility   QI: Does the patient use a wheelchair? 0   No   QI: 4 Steps   Assistance Needed Supervision   4 Steps CARE Score 4   QI: 12 Steps   Assistance Needed Supervision   12 Steps CARE Score 4   Stairs   Type Stairs   # of Steps 30   Weight Bearing Precautions Fall Risk   Assist Devices Single Rail   Findings reciprocal up and down with 100% foot clearance   Stairs (FIM) 5 - Patient requires supervision/monitoring AND goes up and down full flight (12- 14 stairs)   Therapeutic Interventions   Neuromuscular Re-Education PNF for LUE flexion and ext 50x each pattern with manual resistance; manually resisted rowing and chest press 30x2 each motion; pushing a weighted cart with 54# of weight on top 350', dysom under LUE on cart; pushed brother Nolan Delvalle in [facility] with dysom on L handle and assist from therapist to keep fingers flexed, 350' no LOB; amb with weights on ankles 4# RLE and 5# '; ankle weights and black TB for resistance at waist to increase forward flexion to activate glutes for driving forward; ankle weights removed and process was repeated with more resistance on band; each trial was 350'   Assessment   Treatment Assessment continues to increase strength in LUE and LLE; ext of elbow is > than flexion which is also true for rows; decreased foot clearance when fatigued but does not lose his balance, able to correct; when walking with TB around his waist, min A provided for LOB by rehab aide; clearance for BLE when on stairs is 100% consistently showing improvement for strength and coordination; continue to increase strength and balance for safe and functional mobility so that he may continue to progress to LTG/ PLOF: continue POC as per PT   Family/Caregiver Present brother- Nolan Delvalle   Problem List Decreased strength;Decreased range of motion;Decreased endurance; Impaired balance;Decreased mobility; Decreased coordination   Barriers to Discharge Decreased caregiver support   PT Barriers   Functional Limitation Walking;Transfers;Stair negotiation;Standing;Car transfers   Plan   Treatment/Interventions Functional transfer training;LE strengthening/ROM; Elevations; Therapeutic exercise; Endurance training;Patient/family training;Equipment eval/education; Bed mobility;Gait training   Progress Progressing toward goals Recommendation   Recommendation Outpatient PT; Home with family support   PT Therapy Minutes   PT Time In 1230   PT Time Out 1330   PT Total Time (minutes) 60   PT Mode of treatment - Individual (minutes) 60   PT Mode of treatment - Concurrent (minutes) 0   PT Mode of treatment - Group (minutes) 0   PT Mode of treatment - Co-treat (minutes) 0   PT Mode of Teatment - Total time(minutes) 60 minutes   Therapy Time missed   Time missed?  No

## 2018-11-17 NOTE — PROGRESS NOTES
Internal Medicine Progress Note  Patient: Cheyanne Atwood  Age/sex: 58 y o  male  Medical Record #: [id number]      ASSESSMENT/PLAN:  Cheyanne Atwood is seen and examined and management for following issues:    Multiple embolic R MCA CVA:   continue current medical management; Cont ASA/Plavix for 90 days then Aspirin and statin therapy only; EP did JAYE/LOOP implant 10/31/18  LOOP recorder implant 10/30:  Site CDI     HTN:  stable; no changes today        DM II:  Diet controlled = stopped Accuchecks/coverage since BSs had been ok      Anxiety: stable; Cont Fluoxetine 20mg daily         Subjective:  Patient states he is doing well  He denies any complaints      Scheduled Meds:    Current Facility-Administered Medications:  acetaminophen 650 mg Oral Q6H PRN Radha Gaxiola MD   aspirin 81 mg Oral Daily Radha Gaxiola MD   atorvastatin 80 mg Oral QPM Radha Gaxiola MD   clopidogrel 75 mg Oral Daily Elizabeth Heller, CANDENP   docusate sodium 100 mg Oral BID Bernarda Yao PA-C   FLUoxetine 20 mg Oral Daily Elizabeth Heller, CRNP   losartan 50 mg Oral Daily Elizabeth Heller, CANDENP   melatonin 9 mg Oral HS Juan Antonio Denney MD   nicotine 7 mg Transdermal Daily Juan Antonio Denney MD   ondansetron 4 mg Intravenous Q6H PRN Radha Gaxiola MD   oxyCODONE 2 5 mg Oral Q4H PRN Radha Gaxiola MD   pneumococcal 23-valent polysaccharide vaccine 0 5 mL Subcutaneous Prior to discharge Radha Gaxiola MD   polyethylene glycol 17 g Oral Daily PRN Bernarda Yao PA-C   senna 1 tablet Oral HS Bernarda Yao PA-C       Labs:       Results from last 7 days  Lab Units 11/15/18  0514 11/12/18  0550   WBC Thousand/uL 6 55 6 27   HEMOGLOBIN g/dL 12 4 12 8   HEMATOCRIT % 39 1 38 9   PLATELETS Thousands/uL 235 243       Results from last 7 days  Lab Units 11/15/18  0514 11/12/18  0550   POTASSIUM mmol/L 3 9 3 6   CHLORIDE mmol/L 105 108   CO2 mmol/L 30 28   BUN mg/dL 11 10   CREATININE mg/dL 0 95 0 84   CALCIUM mg/dL 8 3 8 6 Glucose, i-STAT (mg/dl)   Date Value   10/20/2018 132       Labs reviewed    Physical Examination:  Vitals:   Vitals:    11/16/18 0700 11/16/18 1303 11/16/18 2019 11/17/18 0540   BP: 142/69 140/72 130/58 118/60   BP Location: Right arm Right arm Right arm Right arm   Pulse: 63 58 58 (!) 52   Resp:  20 20 20   Temp:  97 9 °F (36 6 °C) 97 7 °F (36 5 °C) 97 7 °F (36 5 °C)   TempSrc:  Oral Oral Oral   SpO2:  97% 94% 95%   Weight:       Height:         Constitutional:  NAD; pleasant; nontoxic  CV:  +S1, S2;  RRR; no rub/murmur; LOOP incision w/o erythema/drainage  Pulmonary:  BBS without crackles/wheeze/rhonci; resp are unlabored  Abdominal:  soft, +BS, ND/NT; no mass  Skin:  no rashes  Musculoskeletal:  No edema  :  no nichols   Neurological/Psych:  AAO;  left hand grasp 2+/5 = can lift at shoulder and weakly flex at elbow;  LLE HF 4/5; + left facial droop; speech is clear; no depression/anxiety        [ X ] Total time spent: 30 Mins and greater than 50% of this time was spent counseling/coordinating care  ** Please Note: Dragon 360 Dictation voice to text software may have been used in the creation of this document   **

## 2018-11-17 NOTE — PROGRESS NOTES
11/17/18 0859   Pain Assessment   Pain Assessment No/denies pain   Pain Score No Pain   Restrictions/Precautions   Precautions Fall Risk   Memory Skills   Memory (FIM) 6 - Recognizes with extra time   Social Interaction (FIM) 7 - Interacts appropriately without assistive device, medication or helper   Speech/Language/Cognition Assessmetn   Treatment Assessment Pt seen for skilled speech therapy session focusing on higher level cognitive lingusitic skills  Pt alert and interactive- seen OOB upright in chair in room  Engaged in rapport building conversation- pt able to recall events of his hospitalization and events up to date  Discussed d/c plan and pt is anxious to go home  Pt completed the following skilled therapy tasks- word problem time mangement- Provided fucntional word problems, pt was able to complete with supervision to Mod I assistance  Pt required one verbal cue for thought organization of a complex problem but required no repetition of directions and able to complete appropriately  Pt conts to improve with skilled speech therapy tasks and will cont to benefit to maxmize his overall cognitive linguistic communication abilities  SLP Therapy Minutes   SLP Time In 0900   SLP Time Out 0930   SLP Total Time (minutes) 30   SLP Mode of treatment - Individual (minutes) 30   SLP Mode of treatment - Concurrent (minutes) 0   SLP Mode of treatment - Group (minutes) 0   SLP Mode of treatment - Co-treat (minutes) 0   SLP Mode of Teatment - Total time(minutes) 30 minutes   Therapy Time missed   Time missed? No   Daily FIM Score   Problem solving (FIM) 6 - Solves complex problems BUT requires extra time   Comprehension (FIM) 6 - Has only MILD difficulty with complex/abstract info   Expression (FIM) 7 - Expresses complex/abstract ideas in a reasonable time w/o devices or helper

## 2018-11-17 NOTE — PROGRESS NOTES
Physical Medicine and Rehabilitation Progress Note  Tory Cho 58 y o  male MRN: [id number]  Unit/Bed#: -01 Encounter: [id number]    Chief Complaint: f/u stroke    Etiology: Impairment of mobility, safety and Activities of Daily Living (ADLs) due to Stroke:  01 1  Left Body Involvement (Right Brain)    Interval History:   No acute events overnight  Patient seen examined at bedside  He denies any chest pain shortness of breath  No new numbness or tingling, no new weakness  Reports being encouraged by his progress in therapies  Assessment/Plan - Continue plan of care as per primary team, unless otherwise stated below:      · Bradycardia - continue to monitor, patient currently asymptomatic  · BP stable - no changes to current regimen  · Mood - stable, continue fluoxetine    Harshil Queen MD MPH  Physical Medicine and Rehabilitation    Review of Systems:  12 point ROS performed, negative except as listed above  Laboratory: reviewed     Results from last 7 days  Lab Units 11/15/18  0514 11/12/18  0550   HEMOGLOBIN g/dL 12 4 12 8   HEMATOCRIT % 39 1 38 9   WBC Thousand/uL 6 55 6 27       Results from last 7 days  Lab Units 11/15/18  0514 11/12/18  0550   BUN mg/dL 11 10   SODIUM mmol/L 139 140   POTASSIUM mmol/L 3 9 3 6   CHLORIDE mmol/L 105 108   CREATININE mg/dL 0 95 0 84            Physical Exam:  Temp:  [97 7 °F (36 5 °C)-97 9 °F (36 6 °C)] 97 7 °F (36 5 °C)  HR:  [52-58] 52  Resp:  [20] 20  BP: (118-140)/(58-72) 118/60  Oxygen Therapy  SpO2: 95 %    General: alert, no apparent distress, cooperative and comfortable  HEENT: Ears: Normal TM's bilaterally  Normal external ears  Nose: Normal external nose, mucus membranes and septum    Neck / Thyroid: no asymmetry, masses, or scars, trachea midline  CARDIAC: regular rate and rhythm, S1, S2 normal  LUNGS: No labored breathing, no gross abnormality on palpation of anterior chest  ABDOMEN: no masses, no gross hepatomegaly, no gross splenomegaly  EXTREMITIES: extremities normal, warm and well-perfused; no cyanosis, clubbing, or edema and no abnormal tone appreciated  PSYCH: AAOx3, Mood and affect currently stable

## 2018-11-18 PROCEDURE — 97535 SELF CARE MNGMENT TRAINING: CPT

## 2018-11-18 PROCEDURE — 97530 THERAPEUTIC ACTIVITIES: CPT

## 2018-11-18 PROCEDURE — 97112 NEUROMUSCULAR REEDUCATION: CPT

## 2018-11-18 PROCEDURE — 99232 SBSQ HOSP IP/OBS MODERATE 35: CPT | Performed by: PHYSICAL MEDICINE & REHABILITATION

## 2018-11-18 PROCEDURE — 97116 GAIT TRAINING THERAPY: CPT

## 2018-11-18 RX ADMIN — ASPIRIN 81 MG: 81 TABLET, COATED ORAL at 08:26

## 2018-11-18 RX ADMIN — FLUOXETINE 20 MG: 20 CAPSULE ORAL at 08:26

## 2018-11-18 RX ADMIN — NICOTINE 7 MG: 7 PATCH, EXTENDED RELEASE TRANSDERMAL at 08:24

## 2018-11-18 RX ADMIN — ATORVASTATIN CALCIUM 80 MG: 80 TABLET, FILM COATED ORAL at 16:56

## 2018-11-18 RX ADMIN — LOSARTAN POTASSIUM 50 MG: 50 TABLET, FILM COATED ORAL at 08:26

## 2018-11-18 RX ADMIN — CLOPIDOGREL 75 MG: 75 TABLET, FILM COATED ORAL at 08:26

## 2018-11-18 RX ADMIN — MELATONIN 9 MG: at 20:18

## 2018-11-18 RX ADMIN — SENNOSIDES 8.6 MG: 8.6 TABLET, FILM COATED ORAL at 21:32

## 2018-11-18 NOTE — PROGRESS NOTES
Internal Medicine Progress Note  Patient: Jocelyne Bobby  Age/sex: 58 y o  male  Medical Record #: [id number]      ASSESSMENT/PLAN:  Jocelyne Bobby is seen and examined and management for following issues:    Multiple embolic R MCA CVA:   continue current medical management; Cont ASA/Plavix for 90 days then Aspirin and statin therapy only; EP did JAYE/LOOP implant 10/31/18  LOOP recorder implant 10/30:  Site CDI     HTN:  stable; no changes today        DM II:  Diet controlled = stopped Accuchecks/coverage since BSs had been ok      Anxiety: stable; Cont Fluoxetine 20mg daily         Subjective:  Patient states he is doing well  He denies any complaints      Scheduled Meds:    Current Facility-Administered Medications:  acetaminophen 650 mg Oral Q6H PRN Yusuf Rivas MD   aspirin 81 mg Oral Daily Yusuf Rivas MD   atorvastatin 80 mg Oral QPM Yusuf Rivas MD   clopidogrel 75 mg Oral Daily Elizabeth Heller, CANDENP   docusate sodium 100 mg Oral BID Bernarda Yao PA-C   FLUoxetine 20 mg Oral Daily Elizabeth Heller, CANDENP   losartan 50 mg Oral Daily Elizabeth Heller, CANDENP   melatonin 9 mg Oral HS Luc Queen MD   nicotine 7 mg Transdermal Daily Luc Queen MD   ondansetron 4 mg Intravenous Q6H PRN Yusuf Rivas MD   oxyCODONE 2 5 mg Oral Q4H PRN Yusuf Rivas MD   pneumococcal 23-valent polysaccharide vaccine 0 5 mL Subcutaneous Prior to discharge Yusuf Rivas MD   polyethylene glycol 17 g Oral Daily PRN Bernarda Yao PA-C   senna 1 tablet Oral HS Bernarda Yao PA-C       Labs:       Results from last 7 days  Lab Units 11/15/18  0514 11/12/18  0550   WBC Thousand/uL 6 55 6 27   HEMOGLOBIN g/dL 12 4 12 8   HEMATOCRIT % 39 1 38 9   PLATELETS Thousands/uL 235 243       Results from last 7 days  Lab Units 11/15/18  0514 11/12/18  0550   POTASSIUM mmol/L 3 9 3 6   CHLORIDE mmol/L 105 108   CO2 mmol/L 30 28   BUN mg/dL 11 10   CREATININE mg/dL 0 95 0 84   CALCIUM mg/dL 8 3 8 6                  Glucose, i-STAT (mg/dl)   Date Value   10/20/2018 132       Labs reviewed    Physical Examination:  Vitals:   Vitals:    11/17/18 0540 11/17/18 1302 11/17/18 2035 11/18/18 0511   BP: 118/60 147/77 125/69 118/65   BP Location: Right arm Right arm Right arm Right arm   Pulse: (!) 52 60 55 (!) 53   Resp: 20 20 18 18   Temp: 97 7 °F (36 5 °C) 97 6 °F (36 4 °C) 97 8 °F (36 6 °C) 97 8 °F (36 6 °C)   TempSrc: Oral Oral Oral Oral   SpO2: 95% 97% 94% 95%   Weight:       Height:         Constitutional:  NAD; pleasant; nontoxic  CV:  +S1, S2;  RRR; no rub/murmur; LOOP incision w/o erythema/drainage  Pulmonary:  BBS without crackles/wheeze/rhonci; resp are unlabored  Abdominal:  soft, +BS, ND/NT; no mass  Skin:  no rashes  Musculoskeletal:  No edema  :  no nichols   Neurological/Psych:  AAO;  left hand grasp 2+/5 = can lift at shoulder and weakly flex at elbow;  LLE HF 4/5; + left facial droop; speech is clear; no depression/anxiety        [ X ] Total time spent: 30 Mins and greater than 50% of this time was spent counseling/coordinating care  ** Please Note: Dragon 360 Dictation voice to text software may have been used in the creation of this document   **

## 2018-11-18 NOTE — PROGRESS NOTES
11/18/18 1400   Pain Assessment   Pain Assessment 0-10   Pain Score No Pain   Restrictions/Precautions   Precautions Fall Risk   Braces or Orthoses Sling   General   Change In Medical/Functional Status progressed to DS with SPC   Cognition   Overall Cognitive Status [facility]   Arousal/Participation Cooperative; Alert   Attention Within functional limits   Orientation Level Oriented X4   Memory Within functional limits   Following Commands Follows multistep commands with increased time or repetition   Subjective   Subjective reports he likes the rows and chest press   QI: Roll Left and Right   Assistance Needed Supervision   Comment DS   Roll Left and Right CARE Score 4   QI: Sit to Lying   Assistance Needed Supervision   Comment DS   Sit to Lying CARE Score 4   QI: Lying to Sitting on Side of Bed   Assistance Needed Supervision   Comment DS   Lying to Sitting on Side of Bed CARE Score 4   QI: Sit to Stand   Assistance Needed Supervision   Comment DS   Sit to Stand CARE Score 4   QI: Chair/Bed-to-Chair Transfer   Assistance Needed Supervision   Comment DS   Chair/Bed-to-Chair Transfer CARE Score 4   Transfer Bed/Chair/Wheelchair   Limitations Noted In Balance; Coordination;UE Strength;LE Strength   Stand Pivot Supervision   Sit to Stand Supervision   Stand to Sit Supervision   Supine to Sit Supervision   Sit to Supine Supervision   Findings DS with SPC   Bed, Chair, Wheelchair Transfer (FIM) 5 - Patient requires supervision/monitoring   QI: Walk 10 Feet   Assistance Needed Supervision   Comment DS   Walk 10 Feet CARE Score 4   QI: Walk 50 Feet with Two Turns   Assistance Needed Supervision   Walk 50 Feet with Two Turns CARE Score 4   QI: Walk 150 Feet   Assistance Needed Supervision   Walk 150 Feet CARE Score 4   Ambulation   Does the patient walk? 2  Yes   Primary Discharge Mode of Locomotion Walk   Walk Assist Level Supervision   Gait Pattern Inconsistant Soraida; Step through; Improper weight shift;Decreased L stance Distance Walked (feet) 1000 ft  (x2)   Limitations Noted In Balance; Endurance;Strength;Swing   Walking (FIM) 5 - Patient requires supervision/monitoring AND distance 150 feet or more, no rest   Wheelchair mobility   QI: Does the patient use a wheelchair? 0   No   QI: 4 Steps   Assistance Needed Supervision   4 Steps CARE Score 4   QI: 12 Steps   Assistance Needed Supervision   12 Steps CARE Score 4   Stairs   Type Stairs   # of Steps (180 down and 80 up)   Weight Bearing Precautions Fall Risk   Assist Devices Single Rail   Findings reciprocal down and up; stairs down to G from 9 and then back up to 4 and needed to then take the elevator   Stairs (FIM) 5 - Patient requires supervision/monitoring AND goes up and down full flight (12- 14 stairs)   QI: Picking Up Object   Assistance Needed Supervision   Comment CS   Picking Up Object CARE Score 4   QI: Toilet Transfer   Assistance Needed Supervision   Comment DS   Toilet Transfer CARE Score 4   Toilet Transfer   Surface Assessed Standard Toilet   Toilet Transfer (FIM) 5 - Patient requires supervision/monitoring   Therapeutic Interventions   Neuromuscular Re-Education washing hands with assist on LUE and approximation of shoulder; PNF for LUE all patterns 50x each with resistance; foot off mat and back on with resistance at toes and knee 25x2; rebounder on red and green with red ball 20x each; amb with black TB around waist and CG from other therapist with moderate/max resistance with VC to keep chest up and shoulders back to drive hips forward 981'; chest press and rows with 2# dowel and LUE wrapped with ACE wrap and manually resisted 25x3, 15 and then 10 with focus on speed and increased resistance; pushups on mat in high position with hands even and then with RUE on foam to increase WB on LUE 25x each way; amb with 5# on RLE and 4# on LLE with focus on increaseed speed and not slowing down to make turns 350';   Assessment   Treatment Assessment continues to increase strength in LUE and LLE, LLE has more strength gains than LUE; LUE has more ext strength as compared to flexion strength; during exercises, his body compensates and he is able to correct with VC but this then decreases his strength due to focusing on the weaker muscles, watch for compensation during exercises; good endurance for stairs from NW9 down to ground and then up to CW4 with single rail, good balance, and good foot clearance consistently; DS in room with [facility] and he was able to do floor recovery with OT in an earlier session; pt would benefit from more walking on uneven surfaces and on inclines in all directions; one sig LOB when trying to step on foam disk sideways on the slanted portion of the gym requiring mod A to recover despite appropriate righting reactions; continue to increase LUE and LLE strength and coordination for functional mobility and activity and to get to highest level of function; improve static balance and dynamic balance; continue POC as per PT   Problem List Decreased strength;Decreased range of motion;Decreased endurance; Impaired balance;Decreased mobility; Decreased coordination   Barriers to Discharge Decreased caregiver support   PT Barriers   Functional Limitation Walking;Transfers;Stair negotiation;Standing;Car transfers   Plan   Treatment/Interventions LE strengthening/ROM; Elevations; Therapeutic exercise; Endurance training;Patient/family training;Equipment eval/education; Bed mobility;Gait training   Progress Progressing toward goals   Recommendation   Recommendation Outpatient PT; Home with family support   Equipment Recommended (cane for mod I when dc)   PT Therapy Minutes   PT Time In 1400   PT Time Out 1530   PT Total Time (minutes) 90   PT Mode of treatment - Individual (minutes) 90   PT Mode of treatment - Concurrent (minutes) 0   PT Mode of treatment - Group (minutes) 0   PT Mode of treatment - Co-treat (minutes) 0   PT Mode of Teatment - Total time(minutes) 90 minutes Therapy Time missed   Time missed?  No

## 2018-11-18 NOTE — PROGRESS NOTES
Physical Medicine and Rehabilitation Progress Note  Lizz Garcia 58 y o  male MRN: [id number]  Unit/Bed#: -01 Encounter: [id number]    Chief Complaint: f/u stroke    Etiology: Impairment of mobility, safety and Activities of Daily Living (ADLs) due to Stroke:  01 1  Left Body Involvement (Right Brain)    Interval History:   No acute events overnight  Patient seen examined in his room  Brother at bedside, update provided  Patient has a chest pain shortness of breath, no new weakness, new numbness or tingling  Assessment/Plan - Continue plan of care as per primary team, unless otherwise stated below:      · Bradycardia - continue to monitor, patient currently asymptomatic  · BP stable - no changes to current regimen  · Mood - stable, continue fluoxetine (will aid in stroke motor recovery as well)    Alonzo Fagan MD MPH  Physical Medicine and Rehabilitation    Review of Systems:  12 point ROS performed, negative except as listed above  Laboratory: reviewed     Results from last 7 days  Lab Units 11/15/18  0514 11/12/18  0550   HEMOGLOBIN g/dL 12 4 12 8   HEMATOCRIT % 39 1 38 9   WBC Thousand/uL 6 55 6 27       Results from last 7 days  Lab Units 11/15/18  0514 11/12/18  0550   BUN mg/dL 11 10   SODIUM mmol/L 139 140   POTASSIUM mmol/L 3 9 3 6   CHLORIDE mmol/L 105 108   CREATININE mg/dL 0 95 0 84            Physical Exam:  Temp:  [97 6 °F (36 4 °C)-97 8 °F (36 6 °C)] 97 8 °F (36 6 °C)  HR:  [53-60] 53  Resp:  [18-20] 18  BP: (118-147)/(65-77) 118/65  Oxygen Therapy  SpO2: 95 %    General: alert, no apparent distress, cooperative and comfortable  HEENT: Ears: Normal TM's bilaterally  Normal external ears  Nose: Normal external nose, mucus membranes and septum    Neck / Thyroid: no asymmetry, masses, or scars, trachea midline  CARDIAC: regular rate and rhythm, S1, S2 normal  LUNGS: No labored breathing, no gross abnormality on palpation of anterior chest  ABDOMEN: no masses, no gross hepatomegaly, no gross splenomegaly  EXTREMITIES: extremities normal, warm and well-perfused; no cyanosis, clubbing, or edema and no abnormal tone appreciated  PSYCH: AAOx3, Mood and affect currently stable

## 2018-11-18 NOTE — PROGRESS NOTES
11/18/18 1000   Pain Assessment   Pain Score No Pain   Grooming   Able To Comb/Brush Hair;Wash/Dry Face;Brush/Clean Teeth;Wash/Dry Hands   Grooming (FIM) 5 - Patient requires supervision/monitoring  (DSup)   QI: [address] Provided by [place] No physical assistance   Picking Up Object CARE Score 4   QI: Sit to [address] Provided by [place] No physical assistance   Comment (DSup)   Sit to Stand CARE Score 4   QI: Chair/Bed-to-Chair Transfer   Assistance Needed Supervision   Assistance Provided by [place] No physical assistance   Comment (DSup)   Chair/Bed-to-Chair Transfer CARE Score 4   Transfer Bed/Chair/Wheelchair   Sit to Stand Supervision  (DSup)   Stand to Sit Supervision  (DSup)   Bed, Chair, Wheelchair Transfer (FIM) 5 - Patient requires supervision/monitoring   QI: [address] Provided by [place] No physical assistance   Comment DSup    Lionel Kauri 83 Score 4   Toileting   Able to [address] down yes, up yes  Able to Manage Clothing Closures Yes   Toileting (FIM) 5 - Patient requires supervision/monitoring  (DSup )   QI: Toilet Transfer   Assistance Needed Supervision   Assistance Provided by [place] No physical assistance   Comment (DSup )   Toilet Transfer CARE Score 4   Toilet Transfer   Surface Assessed Raised Toilet   Transfer Technique Standard   Adaptive Equipment [facility])   Toilet Transfer (FIM) 5 - Patient requires supervision/monitoring  (DSup )   Assessment   Treatment Assessment Pt participated in skilled OT session with fair+ activity tolerance for up to 90 minutes with focus on ADL training, and therapeutic activities needed for home d/c  Pt participated in ADL training with focus on toileting to progress pt to DSup level with pt performing task at [id number] in room at this time   OTR spoke with Maricel Reyes PT regarding progression of pt to [id number] at this time with [facility] with PT in agreeance  Pt and OTR reviewed DSup for toileting routine in room with 100% return and carryover  Pt participated in therapeutic activities with focus on floor transfers for d/c secondary to pt requesting to get on the floor to pet dog  Pt participated in floor transfers at Sup level with min verbal cues for technique able to carryover techniques trained in 100% of the time  Pt participated in opening doors, and transporting/opening items for grooming tasks with pt completing at DSup level with pt able to carryover techniques  Pt would benefit from continued OT services to progress pt with ADL tasks for safe d/c to home at least restrictive level  In addition pt and OTR went over towel glide exercises at table top with 100% return and carryover noted at this time       OT Therapy Minutes   OT Time In 1000   OT Time Out 1130   OT Total Time (minutes) 90   OT Mode of treatment - Individual (minutes) 90   OT Mode of treatment - Concurrent (minutes) 0   OT Mode of treatment - Group (minutes) 0   OT Mode of treatment - Co-treat (minutes) 0   OT Mode of Teatment - Total time(minutes) 90 minutes

## 2018-11-19 LAB
ANION GAP SERPL CALCULATED.3IONS-SCNC: 4 MMOL/L (ref 4–13)
BASOPHILS # BLD AUTO: 0.03 THOUSANDS/ΜL (ref 0–0.1)
BASOPHILS NFR BLD AUTO: 1 % (ref 0–1)
BUN SERPL-MCNC: 10 MG/DL (ref 5–25)
CALCIUM SERPL-MCNC: 8.5 MG/DL (ref 8.3–10.1)
CHLORIDE SERPL-SCNC: 106 MMOL/L (ref 100–108)
CO2 SERPL-SCNC: 28 MMOL/L (ref 21–32)
CREAT SERPL-MCNC: 0.86 MG/DL (ref 0.6–1.3)
EOSINOPHIL # BLD AUTO: 0.46 THOUSAND/ΜL (ref 0–0.61)
EOSINOPHIL NFR BLD AUTO: 7 % (ref 0–6)
ERYTHROCYTE [DISTWIDTH] IN BLOOD BY AUTOMATED COUNT: 14.6 % (ref 11.6–15.1)
GFR SERPL CREATININE-BSD FRML MDRD: 92 ML/MIN/1.73SQ M
GLUCOSE P FAST SERPL-MCNC: 84 MG/DL (ref 65–99)
GLUCOSE SERPL-MCNC: 84 MG/DL (ref 65–140)
HCT VFR BLD AUTO: 39.3 % (ref 36.5–49.3)
HGB BLD-MCNC: 13 G/DL (ref 12–17)
IMM GRANULOCYTES # BLD AUTO: 0.02 THOUSAND/UL (ref 0–0.2)
IMM GRANULOCYTES NFR BLD AUTO: 0 % (ref 0–2)
LYMPHOCYTES # BLD AUTO: 1.27 THOUSANDS/ΜL (ref 0.6–4.47)
LYMPHOCYTES NFR BLD AUTO: 19 % (ref 14–44)
MCH RBC QN AUTO: 29.6 PG (ref 26.8–34.3)
MCHC RBC AUTO-ENTMCNC: 33.1 G/DL (ref 31.4–37.4)
MCV RBC AUTO: 90 FL (ref 82–98)
MONOCYTES # BLD AUTO: 0.66 THOUSAND/ΜL (ref 0.17–1.22)
MONOCYTES NFR BLD AUTO: 10 % (ref 4–12)
NEUTROPHILS # BLD AUTO: 4.21 THOUSANDS/ΜL (ref 1.85–7.62)
NEUTS SEG NFR BLD AUTO: 63 % (ref 43–75)
NRBC BLD AUTO-RTO: 0 /100 WBCS
PLATELET # BLD AUTO: 216 THOUSANDS/UL (ref 149–390)
PMV BLD AUTO: 10.4 FL (ref 8.9–12.7)
POTASSIUM SERPL-SCNC: 3.6 MMOL/L (ref 3.5–5.3)
RBC # BLD AUTO: 4.39 MILLION/UL (ref 3.88–5.62)
SODIUM SERPL-SCNC: 138 MMOL/L (ref 136–145)
WBC # BLD AUTO: 6.65 THOUSAND/UL (ref 4.31–10.16)

## 2018-11-19 PROCEDURE — 99232 SBSQ HOSP IP/OBS MODERATE 35: CPT | Performed by: PHYSICAL MEDICINE & REHABILITATION

## 2018-11-19 PROCEDURE — 97116 GAIT TRAINING THERAPY: CPT

## 2018-11-19 PROCEDURE — 97127 HB COGNITIVE SKILLS DEVELOPMENT, EACH 15 MUNUTES: CPT

## 2018-11-19 PROCEDURE — 97535 SELF CARE MNGMENT TRAINING: CPT

## 2018-11-19 PROCEDURE — 97112 NEUROMUSCULAR REEDUCATION: CPT

## 2018-11-19 PROCEDURE — 80048 BASIC METABOLIC PNL TOTAL CA: CPT | Performed by: PHYSICIAN ASSISTANT

## 2018-11-19 PROCEDURE — 97530 THERAPEUTIC ACTIVITIES: CPT

## 2018-11-19 PROCEDURE — G0515 COGNITIVE SKILLS DEVELOPMENT: HCPCS

## 2018-11-19 PROCEDURE — 85025 COMPLETE CBC W/AUTO DIFF WBC: CPT | Performed by: PHYSICIAN ASSISTANT

## 2018-11-19 RX ADMIN — LOSARTAN POTASSIUM 50 MG: 50 TABLET, FILM COATED ORAL at 08:48

## 2018-11-19 RX ADMIN — ATORVASTATIN CALCIUM 80 MG: 80 TABLET, FILM COATED ORAL at 17:35

## 2018-11-19 RX ADMIN — DOCUSATE SODIUM 100 MG: 100 CAPSULE, LIQUID FILLED ORAL at 08:48

## 2018-11-19 RX ADMIN — ASPIRIN 81 MG: 81 TABLET, COATED ORAL at 08:48

## 2018-11-19 RX ADMIN — SENNOSIDES 8.6 MG: 8.6 TABLET, FILM COATED ORAL at 21:22

## 2018-11-19 RX ADMIN — FLUOXETINE 20 MG: 20 CAPSULE ORAL at 08:48

## 2018-11-19 RX ADMIN — CLOPIDOGREL 75 MG: 75 TABLET, FILM COATED ORAL at 08:48

## 2018-11-19 RX ADMIN — MELATONIN 9 MG: at 21:22

## 2018-11-19 RX ADMIN — NICOTINE 7 MG: 7 PATCH, EXTENDED RELEASE TRANSDERMAL at 08:48

## 2018-11-19 RX ADMIN — DOCUSATE SODIUM 100 MG: 100 CAPSULE, LIQUID FILLED ORAL at 17:35

## 2018-11-19 NOTE — PROGRESS NOTES
11/19/18 1115   Pain Assessment   Pain Assessment No/denies pain   Pain Score No Pain   Restrictions/Precautions   Precautions Fall Risk   Memory Skills   Memory (FIM) 6 - Recognizes with extra time   Social Interaction (FIM) 7 - Interacts appropriately without assistive device, medication or helper   Speech/Language/Cognition Assessmetn   Treatment Assessment Pt seen for continued skilled ST targeting higher level, more complex cognitive linguistic skills  Began session w/ discussion regarding STM of visitors and AM therapies where pt continues to demonstrate STM recall at mod I level, noting accurate recall of recent events  Pt then presented w/ a higher level language task targeting differentiation of homophones, which pt reports and demonstrates continued difficulty  Pt presented w/ two different sentences w/ blanks in each where pt was required to write the correct homophone in each blank  Pt completed this task where pt was ~85% accurate w/ task, independently  Of note, pt w/ increased insight into deficits, often questioning SLP if responses were correct when he was unsure  Noted improvement w/ task, along w/ promptness in task completion in comparison to initial trial from previous session  Targeting abstract reasoning, pt then provided w/ various scenarios which require solutions where equipment is missing (similar to one of pt's work duties)  Pt demonstrated ability to provide appropriate and safe solutions in 15/15 trials, noting prompt responses and mental flexibility  At end of session, pt initiated convo regarding his new BRP to which he reported a situation last night that he stated he did not take himself to the bathroom because he did not have nonslip socks on and his nonslip socks and shoes were both too far away from him in bed, demonstrating insight info deficits and overall safety   Pt is making good progress towards goals at this time, functioning at overall mod I, intermittently at supervision for problem solving of complex tasks  Pt will benefit from further skilled ST intervention of brief f/u to maximize skills required for higher level cognitive linguistic tasks, specifically skills which pt requires for his ability to return to work  SLP Therapy Minutes   SLP Time In 200   SLP Time Out 1714   SLP Total Time (minutes) 30   SLP Mode of treatment - Individual (minutes) 30   SLP Mode of treatment - Concurrent (minutes) 0   SLP Mode of treatment - Group (minutes) 0   SLP Mode of treatment - Co-treat (minutes) 0   SLP Mode of Teatment - Total time(minutes) 30 minutes   Therapy Time missed   Time missed?  No   Daily FIM Score   Problem solving (FIM) 6 - Solves complex problems BUT requires extra time   Comprehension (FIM) 6 - Understands complex/abstract but requires more time   Expression (FIM) 6 - Expresses complex/abstract but requires:  more time

## 2018-11-19 NOTE — PROGRESS NOTES
11/19/18 1230   Pain Assessment   Pain Assessment No/denies pain   Restrictions/Precautions   Precautions Fall Risk   Cognition   Arousal/Participation Cooperative   Subjective   Subjective pt reported feeling well and that he is getting better    QI: Roll Left and Right   Assistance Needed Independent   Roll Left and Right CARE Score 6   QI: Sit to Lying   Assistance Needed Independent   Sit to Lying CARE Score 6   QI: Lying to Sitting on Side of Bed   Assistance Needed Independent   Lying to Sitting on Side of Bed CARE Score 6   QI: Sit to [address]; Adaptive equipment   Sit to Stand CARE Score 6   QI: Chair/Bed-to-Chair Transfer   Assistance Needed Independent; Adaptive equipment   Chair/Bed-to-Chair Transfer CARE Score 6   Transfer Bed/Chair/Wheelchair   Limitations Noted In Balance; Endurance;LE Strength;UE Strength   Stand Pivot Modified Independent   Sit to Stand Modified Independent   Stand to Sit Modified Independent   Supine to Sit Independent   Sit to Supine Independent   Bed, Chair, Wheelchair Transfer (FIM) 6 - Patient requires assistive device/extra time/safety concerns but completes independently   QI: Walk 10 [address]; Adaptive equipment   Walk 10 Feet CARE Score 6   QI: Walk 50 Feet with Two [address]; Adaptive equipment   Walk 50 Feet with Two Turns CARE Score 6   QI: Walk 150 Feet   Assistance Needed Supervision   Walk 150 Feet CARE Score 4   QI: Walking 10 Feet on Uneven Surfaces   Assistance Needed Supervision   Walking 10 Feet on Uneven Surfaces CARE Score 4   Ambulation   Primary Discharge Mode of Locomotion Walk   Walk Assist Level Modified Independent;Supervision   Gait Pattern L hemiparesis; Step through; Improper weight shift   Assist Device (none, SPC)   Distance Walked (feet) 700 ft  (x8, 350 x4)   Limitations Noted In Balance; Heel Strike;Swing;Strength;Speed   Findings practiced forward, backward, sideways, carrying items in RUE/LUE, with weighted vest on    [place] for short distances with SPC and S for longer distances, also walking over weighted dowels and small bolsters on the floor, no LOB   Walking (FIM) 5 - HOUSEHOLD EXCEPTION: Ambulates 50 feet or more, with or w/o a device, but completely independent   QI: 4 Steps   Assistance Needed Supervision; Adaptive equipment   4 Steps CARE Score 4   QI: 12 Steps   Assistance Needed Supervision; Adaptive equipment   12 Steps CARE Score 4   Stairs   Type Stairs   # of Steps 20   Findings also took elevator down to G floor and then walked up 9 flights at S level   Stairs (FIM) 5 - Patient requires supervision/monitoring AND goes up and down full flight (12- 14 stairs)   Therapeutic Interventions   Neuromuscular Re-Education both hands on yellow ball (theraband around to help keep L hand on), figure 8 motion with arms forward, elbows extended and then squating/shifting weight through BLE to adjust to body position  chopping motion with same ball from low to high both sides, pivoting on back foot and shifting weight, no LOB but occasional support for LUE   use of black theraband over top of door with door closed, pulling down and then back on 5lb weighted dowel for the resisted lat pull downs and extension with BUE to facilitate strengthening LUE in a closed chain position  also did chest press with a lunge, with yellow tehraband and 5lb weighted dowel with support for LUE  (fatigued at this point)  faster walking with weighted vest on and LUE HHA for guidance to inc gait speed  stepping up onto 4" block with other leg moving into a march and then back to the floor, alternating legs each time  Ax2 for guarding each side, occasional LOB that needed Min-ModA to correct, majority of small LOB pt was able to correct with stepping strategies, x2 to fatigue      Assessment   Treatment Assessment Pt cont to make progress towards LTGs in regards to improving strength, balance, righitng reactions and activity tolerance  Pt is able to progress to higher level/dynamic balance activities to challenge and progress righting reactions  Pt demonstrates improvement in consistency of safety with stairs and walking, only having LOB during therapeutic interventions to challenge balance vs  regular walking  Pt will cont to benefit from skilled PT to further progress in all of these areas to work towards PLOF  PT Barriers   Physical Impairment Decreased strength;Decreased range of motion;Decreased endurance; Impaired balance;Decreased mobility   Plan   Treatment/Interventions Functional transfer training;LE strengthening/ROM; Elevations; Therapeutic exercise; Endurance training;Patient/family training;Equipment eval/education; Bed mobility;Gait training   Progress Progressing toward goals   Recommendation   Recommendation Outpatient PT; Home with family support   Equipment Recommended Cane   PT Therapy Minutes   PT Time In 1230   PT Time Out 1400   PT Total Time (minutes) 90   PT Mode of treatment - Individual (minutes) 90   PT Mode of treatment - Concurrent (minutes) 0   PT Mode of treatment - Group (minutes) 0   PT Mode of treatment - Co-treat (minutes) 0   PT Mode of Teatment - Total time(minutes) 90 minutes   Therapy Time missed   Time missed?  No

## 2018-11-19 NOTE — PROGRESS NOTES
Occupational Therapy Treatment Note       11/19/18 0700   Pain Assessment   Pain Assessment No/denies pain   Restrictions/Precautions   Precautions Fall Risk   Lifestyle   Autonomy "I was nervous but now I am excited to go home"    QI: [place]; Adaptive equipment   Assistance Provided by [place] No physical assistance   Comment using (R) UE to cut food as compensatory method  Pt able to use (L) hand with fork with red built up utensil with increased time and dropped (L) fork multiple occassions  pt will continue to benefit from (L) UE neuromuscular re-education to increase independence in feeding    Eating CARE Score 6   Eating Assessment   Findings see above  seated for breakfast    Eating (FIM) 6 - Patient requires increased time or safety concern   QI: Oral Hygiene   Assistance Needed Independent; Adaptive equipment   Assistance Provided by [place] No physical assistance   Oral Hygiene CARE Score 6   Grooming   Able To Brush/Clean Teeth;Wash/Dry Hands; Wash/Dry Face; Shave   Limitation Noted In Safety;Strength   Findings in stance pt completed grooming with increased time, demonstrating improved AROM (L) UE, able to  onto sink with (L) UE    Grooming (FIM) 6 - Patient requires assistive device/extra time/safety concerns but completes independently   QI: Shower/Bathe [address] [facility] Provided by [place] No physical assistance   Shower/Bathe Self CARE Score 4   Bathing   Assessed Bath Style Shower   Anticipated D/C Bath Style Shower   Able to [place] Yes   Able to [employer] Temperature Yes   Able to Wash/Rinse/Dry (body part) Left Arm;Right Arm;L Upper Leg;R Upper Leg;L Lower Leg/Foot;R Lower Leg/Foot;Chest;Abdomen;Perineal Area; Buttocks   Limitations Noted in Balance   Positioning Seated;Standing   Adaptive Equipment Shower Bars;Hand Held Shower; Shower Seat   Findings  mod I while seated using compensatory techiques with good carryover, requires supervision in stance 2* fall risk  Pt with increased AROM (L) UE, able to use grab bar in stance with (L) UE to stabilize self    Bathing (FIM) 5 - Patient requires supervision/monitoring but completes 10/10 parts   Tub/Shower Transfer   Limitations Noted In Balance; Safety;UE Strength;LE Strength   Adaptive Equipment Grab Bars;Seat with Back   Assessed Shower   Findings pt initially required min assist to transfer out of shower, OTR/L educated pt to ensure dry surface prior to transfering to decrease fall risk  Pt then completed 2x with supervision using UE support on grab bar simulating stepping over shower lip    Shower Transfer (FIM) 5 - Patient requires supervision/monitoring   QI: Upper Body Dressing   Assistance Needed Independent; Adaptive equipment   Assistance Provided by [place] No physical assistance   Comment seated with increased time    Upper Body Dressing CARE Score 6   QI: Lower Body Dressing   Assistance Needed Adaptive equipment; Independent   Assistance Provided by [place] No physical assistance   Comment increased time    Lower Body Dressing CARE Score 6   QI: Putting On/Taking Off [address]; Adaptive equipment   Assistance Provided by [place] No physical assistance   Comment elastic shoe laces while seated    Putting On/Taking Off Footwear CARE Score 6   QI: Picking Up Object   Assistance Needed Supervision   Assistance Provided by [place] No physical assistance   Picking Up Object CARE Score 4   Dressing/Undressing Clothing   Remove UB Clothes Pullover Shirt   Remove LB Clothes Pants; [address] Clothes Pullover Shirt   Don LB Clothes Pants;Socks; Shoes   Limitations Noted In Balance; Endurance; Safety;Strength; Coordination   Positioning Supported Sit;Standing   Findings pt completes dressing routine at mod I level using elastic shoe laces    UB Dressing (FIM) 6 - Patient requires assistive device/extra time/safety concerns but completes independently   LB Dressing (FIM) 6 - Patient requires assistive device/extra time/safety concerns but completes independently   QI: Roll Left and Right   Assistance Needed Independent   Assistance Provided by [place] No physical assistance   Roll Left and Right CARE Score 6   QI: Lying to Sitting on Side of Bed   Assistance Needed Independent   Assistance Provided by [place] No physical assistance   Comment increased time    Lying to Sitting on Side of Bed CARE Score 6   QI: Sit to [address]; Adaptive equipment   Assistance Provided by [place] No physical assistance   Comment SPC   Sit to Stand CARE Score 6   QI: Chair/Bed-to-Chair Transfer   Assistance Needed Independent; Adaptive equipment   Assistance Provided by [place] No physical assistance   Comment [facility]   Chair/Bed-to-Chair Transfer CARE Score 6   Transfer Bed/Chair/Wheelchair   Positioning Concerns Hemiplegia   Limitations Noted In Balance; Coordination;UE Strength;LE Strength   Adaptive Equipment Cane   Sit to Stand Other  (mod I)   Stand to Sit Other  (mod I)   Supine to Sit Other  (independent )   Findings functional mobility within room/bathroom at mod I level using SPC within room/bathrom  OTR/L educated pt on moving tray table out of way prior to transfer and need to wear hospital socks or sneakers prior to transfer  Bed, Chair, Wheelchair Transfer (FIM) 6 - Patient requires assistive device/extra time/safety concerns but completes independently   QI: [address]; Adaptive equipment   Assistance Provided by [place] No physical assistance   [address] Score 6   Toileting   Able to [address] down yes, up yes  Manage Hygiene Bladder   Limitations Noted In Balance; Safety;UE Strength;LE Strength   Toileting (FIM) 6 - Patient requires assistive device/extra time/safety concerns but completes independently   QI: Toilet Transfer   Assistance Needed Independent; Adaptive equipment   Assistance Provided by [place] No physical assistance   Toilet Transfer CARE Score 6   Toilet Transfer   Surface Assessed Standard Toilet   Transfer Technique Standard   Limitations Noted In Balance;UE Strength;LE Strength   Adaptive Equipment Grab Bar   Toilet Transfer (FIM) 6 - Patient requires assistive device/extra time/safety concerns but completes independently   ROM - Left Upper Extremities    LUE ROM Comment Began educating pt on self AROM (L) UE HEP - OT to continue to educated pt on provide during tomorrow's tx session  Neuromuscular Education   Comments Pt presents with improved (L) UE distalROM, able to complete AROM wrist from flexion to neurtral in gravity eliminated plane 10x without assistance  OTR/L applied vibration and AAROM to assist in extending wrist past neurtral  Pt presents with improved (L) thumb extension, able to extend thumb fully 5x, then required assistance 2* fatigue  Cognition   Overall Cognitive Status WFL   Activity Tolerance   Activity Tolerance Patient tolerated treatment well   Assessment   Treatment Assessment Pt participated in skilled OT tx session focused on ADL routine, neuromuscular re-education (L) UE, standing balance/tolerance  See above for further details on functional performance  Pt progressing in ADL routine, able to complete bed mobility, sit <> stand transfer, functional mobility with SPC, toileting, toilet tranfers, grooming, at mod I level using SPC - progressed to distant supervision level with staff  Pt demonstrating progress in ADL routine, completes dressing at mod I level  Pt also demonstrating improvement in (L) UE distal ROM, see above for details  Plan for pt to D/C home this Wednesday with outpatient OT, SPC, shower chair with back  Continue OT plan of care with focus on D/C ADL, kitchen mobility, item retrieval from floor, UE ROM HEP  Prognosis Good   Problem List Decreased strength;Decreased range of motion;Decreased endurance; Impaired balance;Decreased mobility; Decreased coordination   Plan   Treatment/Interventions ADL retraining;Functional transfer training; Therapeutic exercise; Endurance training;Patient/family training;Equipment eval/education; Bed mobility; Compensatory technique education   Progress Progressing toward goals   Recommendation   OT Discharge Recommendation Outpatient OT  (home with family support )   OT Therapy Minutes   OT Time In 0700   OT Time Out 0835   OT Total Time (minutes) 95   OT Mode of treatment - Individual (minutes) 95   OT Mode of treatment - Concurrent (minutes) 0   OT Mode of treatment - Group (minutes) 0   OT Mode of treatment - Co-treat (minutes) 0   OT Mode of Teatment - Total time(minutes) 95 minutes   Therapy Time missed   Time missed?  No       Yuridia Parisi MS, OTR/L , CBIS

## 2018-11-19 NOTE — PROGRESS NOTES
11/19/18 1430   Pain Assessment   Pain Assessment 0-10   Pain Score No Pain   Restrictions/Precautions   Precautions Fall Risk   Braces or Orthoses Splint   QI: Sit to [address] Provided by [place] No physical assistance   Comment DS w/ SPC   Sit to Stand CARE Score 4   QI: Chair/Bed-to-Chair Transfer   Assistance Needed Supervision   Assistance Provided by [place] No physical assistance   Comment DS w/ SPC   Chair/Bed-to-Chair Transfer CARE Score 4   Transfer Bed/Chair/Wheelchair   Positioning Concerns Hemiplegia   Limitations Noted In Balance;LE Strength   Adaptive Equipment [employer] Pivot Supervision   Sit to Stand Supervision   Stand to Sit Supervision   Findings Pt DS w/ SPC, givmohr sling donned during long distance ambulation  Pt noted w/ 1x anterior LOB sit>stand and required [place]em[place] to correct  OT provided edu for pt to stand first, steady self, and then advance LE for ambulation to inc pt safety, pt receptive to feedback however required occassional cue t/o session to incorporate during all other sit>stand transfers  Bed, Chair, Wheelchair Transfer (FIM) 5 - Patient requires supervision/monitoring   Meal Prep   Meal Prep Level Other (Comment)  (none/cane)   Meal Prep Level of Assistance Distant supervision   Meal Preparation Pt completed light meal prep w/ SPC at DS to prep bowl of cereal  Pt able to safely retrieve all items needed from overhead cabinets, drawer, and refrigerator  Milk placed further back on 2nd shelf of refrigerator to inc difficulty, pt still able to complete w/ no LOB noted  Pt able to transport bowl w/ RUE and no use of SPC short distance counter to table 5x w/ no LOB noted  OT recommend pt transport light items short distance without cane for kitchen mobility only 2* LUE weakness  PT [place] December agreeable to non use of SPC for short distance item transportation in kitchen     Kitchen Mobility   Kitchen-Mobility Level Cane   Kitchen Activity Retrieve items;Transport items   Kitchen Mobility Comments Pt retrieved 8 cones from various high/low surfaces  Cones placed on the floor and in overhead/low cabinets  Pt able to complete squat to retrieve cones from the floor at DS level w/ no LOB noted  Pt then placed cones in L hand w/ R hand and carried cone to table to stack on top of each other  Pt noted w/ dec  strength in L hand and frequently dropped cones  Pt able to safely retrieve each dropped cone and determined to maintain sustained grasp w/ L hand around cones  Pt required active assist from OT to dec compensatory strategy during LUE fxnl reaching to place cone  Pt demo diffculty w/ grasp/release pattern of L hand and noted w/ shoulder abduction/elbow flexion when attempting fxnl reach w/ LUE  Pt requires inc concentration and noted w/ fatigue  Pt is able to correctly place all cones w/ POC at elbow/forearm from OT and at times pt able to assist w/ R hand over L wrist to inc success w/ activity  Pt states "my brain feels exhausted more than my arm"  OT provided edu on fatigue w/ stroke and how it fluctuates, pt demo G comprehension and states "that makes sense"  Cognition   Overall Cognitive Status WFL   Arousal/Participation Alert; Cooperative   Attention Within functional limits   Orientation Level Oriented X4   Memory Within functional limits   Following Commands Follows multistep commands with increased time or repetition   Activity Tolerance   Activity Tolerance Patient tolerated treatment well   Assessment   Treatment Assessment Pt participated in skilled OT Tx session w/ focus on fxnl transfers, kitchen mobility, light meal prep, fxnl reaching w/ LUE, and item retrieval from the floor  See above note for activity detail  Pt completed all activities at DS level  Pt demo G safety awareness and insight into deficits  Pt would cont to benefit from outpatient services to maximize potential after D/C, pt agreeable   Cont OT POC w/ focus on dynamic standing balance, fxnl reaching w/ LUE, and LUE neuro re-ed to inc pt indep w/ ADLs/IADLs and all fxnl transfers  Pt reports supportive family at home/nearby  Prognosis Good   Problem List Decreased strength;Decreased range of motion;Decreased endurance; Impaired balance;Decreased mobility; Decreased coordination   Barriers to Discharge Decreased caregiver support   Plan   Treatment/Interventions ADL retraining;Functional transfer training;LE strengthening/ROM; Therapeutic exercise; Endurance training   Progress Progressing toward goals   Recommendation   OT Discharge Recommendation Outpatient OT   OT Therapy Minutes   OT Time In 1430   OT Time Out 1500   OT Total Time (minutes) 30   OT Mode of treatment - Individual (minutes) 30   OT Mode of treatment - Concurrent (minutes) 0   OT Mode of treatment - Group (minutes) 0   OT Mode of treatment - Co-treat (minutes) 0   OT Mode of Teatment - Total time(minutes) 30 minutes   Therapy Time missed   Time missed?  No

## 2018-11-19 NOTE — PROGRESS NOTES
Physical Medicine and Rehabilitation Progress Note  Lizz Garcia 58 y o  male MRN: [id number]  Unit/Bed#: -01 Encounter: [id number]    HPI: Patient is 57 yo male with right cerebral infarcts likely embolic in nature     Chief Complaint/Subjective: Patient denies any events over the weekend     ROS:  negative unless noted above     Assessment/Plan:      Bradycardia   Assessment & Plan    · Consistently asymptomatic  · HR currently 52 with BP of 129/71  · IM aware and as patient is asymptomatic and fluctuates between bradycardia and low normal with a normal BP they are not planning any further testing/treatment at this time      DM (diabetes mellitus) ([facility] )   Assessment & Plan    · Not on any meds at home  · Per IM ISS or accuchecks no longer needed based on blood sugars      Gout   Assessment & Plan    · Left ankle  · Per patient was Indocin 50mg TID PTA however was not able to tolerate due to diarrhea and has not been taking for 2 months   · XR of left ankle and foot negative   · s/p colchicine and steroid taper per IM  · Resolved per patient   · Of note per patient this is only the 2nd gout flare patient has ever had     Hyperlipidemia   Assessment & Plan    · Home zocor 20 mg changed to lipitor 80 mg      Anxiety   Assessment & Plan    · No record of any benzodiazepines (or any other controlled medications) found in the [place]APD[place] database going back 1 year  · Neuropsychology following       Hypertension   Assessment & Plan    · IM to clarify home meds as records are not clear ( olmesartan vs valsartan ?, HCTZ 25 mg vs 50 mg ? norvasc ?)  · Currently on cozaar per IM      * Right sided cerebral hemisphere cerebrovascular accident (CVA) ([facility] )   Assessment & Plan    · On DAPT for 90 days, with aspirin/plavix, followed by monotherapy with ASA  · Possibly cardioembolic in etiology   Consulted EP for eval/placement of loop recorder however they recommend JAYE first which was negative for thrombus in [place]HELLE however did reveal aortic atheroma, will d/w neuro (Dr Heber Emery) if they feel the atheroma is the etiology of the stroke and did not feel that atheroma was likely severe enough to change from  to [facility] even if this was the potential etiology so management recommendations per neuro was unchanged and patient had LOOP placed   · Fluoxetine 20mg post stroke for motor recovery         Scheduled Meds:    Current Facility-Administered Medications:  acetaminophen 650 mg Oral Q6H PRN Omar Urrutia, MD   aspirin 81 mg Oral Daily Omar Urrutia, MD   atorvastatin 80 mg Oral QPM Omar Urrutia, MD   clopidogrel 75 mg Oral Daily Elizabeth Heller, CRNP   docusate sodium 100 mg Oral BID Bernarda Yao PA-C   FLUoxetine 20 mg Oral Daily Elizabeth Heller, CRNP   losartan 50 mg Oral Daily Elizabeth Heller, CRNP   melatonin 9 mg Oral HS Lia Roche MD   nicotine 7 mg Transdermal Daily Lia Roche MD   ondansetron 4 mg Intravenous Q6H PRN Omar Urrutia, MD   oxyCODONE 2 5 mg Oral Q4H PRN Omar Urrutia, MD   pneumococcal 23-valent polysaccharide vaccine 0 5 mL Subcutaneous Prior to discharge Omar Urrutia MD   polyethylene glycol 17 g Oral Daily PRN Bernarda Yao PA-C   senna 1 tablet Oral HS Bernarda Yao PA-C        Incidental findings  1) thyroid nodules: per radiology report recs non-emergent OP thyroid U/S, OP FU with PCP to arrange U/S  2) subpleural bullae: OP FU with PCP with further testing/treatment and/or specialist referral at PCP's discretion     Note: patient noted to have petechial hemorrhage on imaging however has been cleared for DAPT and lovenox (for DVT ppx) by neuro  DVT ppx: SCDs, ambulating adequate distances   Dispo: 11/21    Objective:    Functional Update:  Mobility: CG  Transfers: sup  ADLs: min-sup        Physical Exam:          General: alert, no apparent distress, cooperative and comfortable  HEENT:  Head: Normocephalic, no lesions, without obvious abnormality    CARDIAC:  +S1/2  LUNGS:  respirations unlabored  ABDOMEN:  soft   EXTREMITIES:  no signficant LE edema  NEURO:   mental status, speech normal, alert and oriented x3  PSYCH:  mood/affect currently stable, denies depression       Diagnostic Studies: reviewed, no new imaging  No orders to display       Laboratory:      Results from last 7 days  Lab Units 11/19/18  0539 11/15/18  0514   HEMOGLOBIN g/dL 13 0 12 4   HEMATOCRIT % 39 3 39 1   WBC Thousand/uL 6 65 6 55       Results from last 7 days  Lab Units 11/19/18  0539 11/15/18  0514   BUN mg/dL 10 11   POTASSIUM mmol/L 3 6 3 9   CHLORIDE mmol/L 106 105   CREATININE mg/dL 0 86 0 95            Patient Active Problem List   Diagnosis    Right sided cerebral hemisphere cerebrovascular accident (CVA) [facility]    Hypertension    Anxiety    Hyperlipidemia    Gout    DM (diabetes mellitus) ([facility] )    Bradycardia           Total visit time:  At least 25 minutes, with more than 50% spent counseling/coordinating care

## 2018-11-19 NOTE — PROGRESS NOTES
Internal Medicine Progress Note  Patient: Marie Pugh  Age/sex: 58 y o  male  Medical Record #: [id number]      ASSESSMENT/PLAN:  Marie Pugh is seen and examined and management for following issues:    Multiple embolic R MCA CVA:   continue current medical management; Cont ASA/Plavix for 90 days then Aspirin and statin therapy only; EP did JAYE/LOOP implant 10/31/18  LOOP recorder implant 10/30:  Site CDI     HTN:  stable; no changes today        DM II:  Diet controlled = stopped Accuchecks/coverage since BSs had been ok      Anxiety: stable; Cont Fluoxetine 20mg daily         Subjective:  Patient states he is doing well  He denies any complaints      Scheduled Meds:    Current Facility-Administered Medications:  acetaminophen 650 mg Oral Q6H PRN Angel Cohn MD   aspirin 81 mg Oral Daily Angel Cohn MD   atorvastatin 80 mg Oral QPM Angel Cohn MD   clopidogrel 75 mg Oral Daily Elizabeth Heller, CANDENP   docusate sodium 100 mg Oral BID Bernarda Yao PA-C   FLUoxetine 20 mg Oral Daily Elizabeth Heller, CRNP   losartan 50 mg Oral Daily Elizabeth Heller, CRNP   melatonin 9 mg Oral HS Dorothe Blizzard, MD   nicotine 7 mg Transdermal Daily Dorothe Blizzard, MD   ondansetron 4 mg Intravenous Q6H PRN Angel Cohn MD   oxyCODONE 2 5 mg Oral Q4H PRN Angel Cohn MD   pneumococcal 23-valent polysaccharide vaccine 0 5 mL Subcutaneous Prior to discharge Angel Cohn MD   polyethylene glycol 17 g Oral Daily PRN Bernarda Yao PA-C   senna 1 tablet Oral HS Bernarda Yao PA-C       Labs:       Results from last 7 days  Lab Units 11/19/18  0539 11/15/18  0514   WBC Thousand/uL 6 65 6 55   HEMOGLOBIN g/dL 13 0 12 4   HEMATOCRIT % 39 3 39 1   PLATELETS Thousands/uL 216 235       Results from last 7 days  Lab Units 11/19/18  0539 11/15/18  0514   POTASSIUM mmol/L 3 6 3 9   CHLORIDE mmol/L 106 105   CO2 mmol/L 28 30   BUN mg/dL 10 11   CREATININE mg/dL 0 86 0 95   CALCIUM mg/dL 8 5 8 3                  Glucose, i-STAT (mg/dl)   Date Value   10/20/2018 132       Labs reviewed    Physical Examination:  Vitals:   Vitals:    11/18/18 0511 11/18/18 1318 11/18/18 2015 11/19/18 0535   BP: 118/65 150/68 159/74 129/71   BP Location: Right arm Right arm Right arm Right arm   Pulse: (!) 53 62 57 (!) 52   Resp: 18 20 20 20   Temp: 97 8 °F (36 6 °C) 97 7 °F (36 5 °C) 98 °F (36 7 °C) 97 6 °F (36 4 °C)   TempSrc: Oral Oral Oral Oral   SpO2: 95% 95% 97% 92%   Weight:       Height:         Constitutional:  NAD; pleasant; nontoxic  CV:  +S1, S2;  RRR; no rub/murmur; LOOP incision w/o erythema/drainage  Pulmonary:  BBS without crackles/wheeze/rhonci; resp are unlabored  Abdominal:  soft, +BS, ND/NT; no mass  Skin:  no rashes  Musculoskeletal:  No edema  :  no nichols   Neurological/Psych:  AAO;  left hand grasp 3+/5 = can lift at shoulder and weakly flex at elbow;  LLE HF 4/5; + left facial droop; speech is clear; no depression/anxiety        [ X ] Total time spent: 30 Mins and greater than 50% of this time was spent counseling/coordinating care  ** Please Note: Dragon 360 Dictation voice to text software may have been used in the creation of this document   **

## 2018-11-20 PROCEDURE — G0515 COGNITIVE SKILLS DEVELOPMENT: HCPCS

## 2018-11-20 PROCEDURE — 99232 SBSQ HOSP IP/OBS MODERATE 35: CPT | Performed by: PHYSICAL MEDICINE & REHABILITATION

## 2018-11-20 PROCEDURE — 97110 THERAPEUTIC EXERCISES: CPT

## 2018-11-20 PROCEDURE — 97530 THERAPEUTIC ACTIVITIES: CPT

## 2018-11-20 PROCEDURE — 97112 NEUROMUSCULAR REEDUCATION: CPT

## 2018-11-20 PROCEDURE — 97535 SELF CARE MNGMENT TRAINING: CPT

## 2018-11-20 PROCEDURE — 97127 HB COGNITIVE SKILLS DEVELOPMENT, EACH 15 MUNUTES: CPT

## 2018-11-20 RX ORDER — ASPIRIN 81 MG/1
81 TABLET ORAL DAILY
Qty: 30 TABLET | Refills: 0 | Status: SHIPPED | OUTPATIENT
Start: 2018-11-22 | End: 2018-12-14 | Stop reason: SDUPTHER

## 2018-11-20 RX ORDER — ATORVASTATIN CALCIUM 80 MG/1
80 TABLET, FILM COATED ORAL EVERY EVENING
Qty: 30 TABLET | Refills: 0 | Status: SHIPPED | OUTPATIENT
Start: 2018-11-21 | End: 2018-12-14 | Stop reason: SDUPTHER

## 2018-11-20 RX ORDER — LOSARTAN POTASSIUM 50 MG/1
50 TABLET ORAL DAILY
Qty: 30 TABLET | Refills: 0 | Status: SHIPPED | OUTPATIENT
Start: 2018-11-22 | End: 2018-12-14 | Stop reason: SDUPTHER

## 2018-11-20 RX ORDER — FLUOXETINE HYDROCHLORIDE 20 MG/1
20 CAPSULE ORAL DAILY
Qty: 30 CAPSULE | Refills: 0 | Status: SHIPPED | OUTPATIENT
Start: 2018-11-22 | End: 2018-12-14 | Stop reason: SDUPTHER

## 2018-11-20 RX ORDER — CLOPIDOGREL BISULFATE 75 MG/1
75 TABLET ORAL DAILY
Qty: 64 TABLET | Refills: 0 | Status: SHIPPED | OUTPATIENT
Start: 2018-11-22 | End: 2018-12-14 | Stop reason: SDUPTHER

## 2018-11-20 RX ADMIN — CLOPIDOGREL 75 MG: 75 TABLET, FILM COATED ORAL at 08:55

## 2018-11-20 RX ADMIN — MELATONIN 9 MG: at 22:12

## 2018-11-20 RX ADMIN — ATORVASTATIN CALCIUM 80 MG: 80 TABLET, FILM COATED ORAL at 18:14

## 2018-11-20 RX ADMIN — NICOTINE 7 MG: 7 PATCH, EXTENDED RELEASE TRANSDERMAL at 08:56

## 2018-11-20 RX ADMIN — ASPIRIN 81 MG: 81 TABLET, COATED ORAL at 08:55

## 2018-11-20 RX ADMIN — FLUOXETINE 20 MG: 20 CAPSULE ORAL at 08:55

## 2018-11-20 RX ADMIN — LOSARTAN POTASSIUM 50 MG: 50 TABLET, FILM COATED ORAL at 08:55

## 2018-11-20 NOTE — CONSULTS
Consultation/Progress note - Elsi Organ 58 y o  male MRN: [id number]    Unit/Bed#: -01 Encounter: [id number]      Assessment/Plan     Assessment:  Pt participated in psychology services to address coping skills, adjustment, and motivation in rehab  Pt presented in euthymic mood, full range affect  Discussed progress on rehab goals; pt will be dc tomorrow  Pt expressed his thanks to all treatment team members who have helped him with stroke recovery; reflected on pt experience from admission and positive changes pt has made; discussed positive changes moving forward  Reviewed cognitive-behavioral coping skills to help pt manage mood and maintain motivation  Provided supportive counseling  Pt was engaged and cooperative; he has achieved rehab goals and is looking forward to future goals at home  Dx: F43 23 Adjustment disorder with depressed and anxious mood  Code: 25486      Plan:  Continue psychology services while pt is at [facility]; pt given info for outpatient counseling if needed         Consults    Review of Systems    Historical Information   Past Medical History:   Diagnosis Date    Hypertension     Migraine      Past Surgical History:   Procedure Laterality Date    APPENDECTOMY      KNEE SURGERY      NECK SURGERY      TONSILLECTOMY       Social History   History   Alcohol Use    Yes     Comment: socially      History   Drug Use No     History   Smoking Status    Current Every Day Smoker    Packs/day: 1 00    Types: Cigarettes, Cigars   Smokeless Tobacco    Never Used   Meds/Allergies   current meds:   Current Facility-Administered Medications   Medication Dose Route Frequency    aspirin (ECOTRIN LOW STRENGTH) EC tablet 81 mg  81 mg Oral Daily    atorvastatin (LIPITOR) tablet 80 mg  80 mg Oral QPM    clopidogrel (PLAVIX) tablet 75 mg  75 mg Oral Daily    FLUoxetine (PROzac) capsule 20 mg  20 mg Oral Daily    losartan (COZAAR) tablet 50 mg  50 mg Oral Daily    melatonin tablet 9 mg  9 mg Oral HS    nicotine (NICODERM CQ) 7 mg/24hr TD 24 hr patch 7 mg  7 mg Transdermal Daily    pneumococcal 23-valent polysaccharide vaccine (PNEUMOVAX-23) injection 0 5 mL  0 5 mL Subcutaneous Prior to discharge     No Known Allergies    Objective   Vitals: Blood pressure 128/66, pulse (!) 46, temperature 97 6 °F (36 4 °C), temperature source Oral, resp  rate 18, height 5' 9" (1 753 m), weight 79 2 kg (174 lb 9 7 oz), SpO2 97 %      Intake/Output Summary (Last 24 hours) at 11/20/18 1318  Last data filed at 11/20/18 1230   Gross per 24 hour   Intake              220 ml   Output                0 ml   Net              220 ml     Invasive Devices          No matching active lines, drains, or airways          Physical Exam

## 2018-11-20 NOTE — CONSULTS
Consultation/Progress note - Patience Driver 58 y o  male MRN: [id number]    Unit/Bed#: -01 Encounter: [id number]      Assessment/Plan     Assessment:  Pt participated in psychology services to address coping skills, adjustment, and motivation in rehab  Pt presented in euthymic mood, full range affect  Pt brother was present for consult  Discussed progress on rehab goals; discussed improvements in pt mood since last week  Discussed family relationships  Reviewed cognitive-behavioral coping skills to help pt manage mood and maintain motivation  Provided supportive counseling  Pt was engaged and cooperative; he has a great deal of support from his family and this has been beneficial to pt recovery and mood  Pt is motivated in rehab sessions and is making progress on addressing goals  Dx: F43 23 Adjustment disorder with depressed and anxious mood  Code: 84995      Plan:  Continue psychology services while pt is at [facility]       Consults    Review of Systems    Historical Information   Past Medical History:   Diagnosis Date    Hypertension     Migraine      Past Surgical History:   Procedure Laterality Date    APPENDECTOMY      KNEE SURGERY      NECK SURGERY      TONSILLECTOMY       Social History   History   Alcohol Use    Yes     Comment: socially      History   Drug Use No     History   Smoking Status    Current Every Day Smoker    Packs/day: 1 00    Types: Cigarettes, Cigars   Smokeless Tobacco    Never Used   Meds/Allergies   current meds:   Current Facility-Administered Medications   Medication Dose Route Frequency    aspirin (ECOTRIN LOW STRENGTH) EC tablet 81 mg  81 mg Oral Daily    atorvastatin (LIPITOR) tablet 80 mg  80 mg Oral QPM    clopidogrel (PLAVIX) tablet 75 mg  75 mg Oral Daily    FLUoxetine (PROzac) capsule 20 mg  20 mg Oral Daily    losartan (COZAAR) tablet 50 mg  50 mg Oral Daily    melatonin tablet 9 mg  9 mg Oral HS    pneumococcal 23-valent polysaccharide vaccine (PNEUMOVAX-23) injection 0 5 mL  0 5 mL Subcutaneous Prior to discharge     No Known Allergies    Objective   Vitals: Blood pressure 138/87, pulse 63, temperature 97 8 °F (36 6 °C), temperature source Oral, resp  rate 18, height 5' 9" (1 753 m), weight 79 2 kg (174 lb 9 7 oz), SpO2 94 %      Intake/Output Summary (Last 24 hours) at 11/20/18 1445  Last data filed at 11/20/18 1230   Gross per 24 hour   Intake              100 ml   Output                0 ml   Net              100 ml     Invasive Devices          No matching active lines, drains, or airways          Physical Exam

## 2018-11-20 NOTE — PROGRESS NOTES
11/20/18 1015   Pain Assessment   Pain Assessment No/denies pain   Restrictions/Precautions   Precautions Fall Risk   Braces or Orthoses (giv fina sling with mobility)   General   Change In Medical/Functional Status BRPs with SPC   Cognition   Overall Cognitive Status WFL   Subjective   Subjective no complaints from pt, pt ready for therapy   QI: Sit to [address]   Sit to Stand CARE Score 6   QI: Chair/Bed-to-Chair Transfer   Assistance Needed Independent   Chair/Bed-to-Chair Transfer CARE Score 6   Transfer Bed/Chair/Wheelchair   Limitations Noted In Balance; Endurance;UE Strength;LE Strength   Adaptive Equipment None   All Transfer Independent   Bed, Chair, Wheelchair Transfer (FIM) 7 - No helper, safely, timely and completes all tasks independently   QI: Car Transfer   Assistance Needed Independent   Car Transfer CARE Score 6   QI: Walk 10 Feet   Assistance Needed Independent   Walk 10 Feet CARE Score 6   QI: Walk 50 Feet with Two Turns   Assistance Needed Independent   Walk 50 Feet with Two Turns CARE Score 6   QI: Walk 150 Feet   Assistance Needed Independent   Walk 150 Feet CARE Score 6   Ambulation   Does the patient walk? 2  Yes   Primary Discharge Mode of Locomotion Walk   Walk Assist Level Independent   Gait Pattern L hemiparesis   Distance Walked (feet) 700 ft   Limitations Noted In Balance; Sequencing   Walking (FIM) 7 - No helper, safely, timely and completes all tasks independently AND distance 150 feet or more, no rest   QI: Wheel 50 Feet with Two Turns   Reason if not Attempted Activity not applicable   Wheel 50 Feet with Two Turns CARE Score 9   QI: Wheel 150 Feet   Reason if not Attempted Activity not applicable   Wheel 160 Feet CARE Score 9   Wheelchair mobility   QI: Does the patient use a wheelchair? 0  No   QI: 1 Step (Curb)   Assistance Needed Nicho Lozada / Larry Chua; Adaptive equipment   1 Step (Curb) CARE Score 5   QI: 4 Steps   Assistance Needed Set-up / clean-up; Adaptive equipment   4 Steps CARE Score 5   QI: 12 Steps   Assistance Needed Sonam Aldana / Davion Joe; Adaptive equipment   12 Steps CARE Score 5   Stairs   Type Stairs   # of Steps 24   Weight Bearing Precautions Fall Risk   Assist Devices Single Rail   Findings pt taken down to 4th floor and ascended up to 9th floor   Stairs (FIM) 5 - Patient requires supervision/monitoring AND goes up and down full flight (12- 14 stairs)   Therapeutic Interventions   Strengthening UE exs with 5# bar and black tband over door lat pull downs, shoulder ext, shoulder retraction, 2 sets until fatigued   Neuromuscular Re-Education step ups on 6" step with 5# AW, R hand on handrail for support, repeatedly 2 sets until fatigued   Assessment   Treatment Assessment pt cont to focus on L side coordination and strenghtening to progress with functional mobility  pt tends to circumduct LLE with increase fatigue  pt will get L foot caught occasionally but demonstrates good righting reactions to self correct  pt did not [facility] with [facility] today nad pt safely Ind for d/c   pt to cont with outpt to cont to make progress  Problem List Decreased strength;Decreased range of motion;Decreased endurance; Impaired balance;Decreased mobility; Decreased coordination   Barriers to Discharge Decreased caregiver support   PT Barriers   Physical Impairment Decreased strength;Decreased range of motion;Decreased endurance; Impaired balance;Decreased mobility   Functional Limitation Walking;Transfers;Stair negotiation;Standing;Car transfers   Plan   Treatment/Interventions Functional transfer training;LE strengthening/ROM; Gait training   Progress Progressing toward goals   Recommendation   Recommendation Outpatient PT; Home with family support   PT Therapy Minutes   PT Time In 36   PT Time Out 1100   PT Total Time (minutes) 45   PT Mode of treatment - Individual (minutes) 45   PT Mode of treatment - Concurrent (minutes) 0   PT Mode of treatment - Group (minutes) 0 PT Mode of treatment - Co-treat (minutes) 0   PT Mode of Teatment - Total time(minutes) 45 minutes   Therapy Time missed   Time missed?  No

## 2018-11-20 NOTE — PROGRESS NOTES
Physical Medicine and Rehabilitation Progress Note  Prudence Ice 58 y o  male MRN: [id number]  Unit/Bed#: -01 Encounter: [id number]    HPI: Patient is 57 yo male with right cerebral infarcts likely embolic in nature     Chief Complaint/Subjective: Patient states he feels well and is looking forward to dc tomorrow     ROS:  negative unless noted above     Assessment/Plan:      Bradycardia   Assessment & Plan    · Consistently asymptomatic  · IM aware and as patient is asymptomatic and fluctuates between bradycardia and low normal with a normal BP they are not planning any further testing/treatment at this time   · OP FU with PCP with further testing/treatment and/or specialist referral at PCP's discretion        [facility]   Assessment & Plan    · Not on any meds at home  · Per IM ISS or accuchecks no longer needed based on blood sugars   · Diet controlled   · OP FU with PCP with further testing/treatment and/or specialist referral at PCP's discretion        Gout   Assessment & Plan    · Left ankle  · Per patient was Indocin 50mg TID PTA however was not able to tolerate due to diarrhea and has not been taking for 2 months   · XR of left ankle and foot negative   · s/p colchicine and steroid taper per IM  · Resolved per patient   · Of note per patient this is only the 2nd gout flare patient has ever had     Hyperlipidemia   Assessment & Plan    · Home zocor 20 mg changed to lipitor 80 mg      Anxiety   Assessment & Plan    · No record of any benzodiazepines (or any other controlled medications) found in the [employer] database going back 1 year  · No complaint of anxiety during admission  · On prozac for stroke motor recovery      Hypertension   Assessment & Plan    · IM to clarify home meds as records are not clear ( olmesartan vs valsartan ?, HCTZ 25 mg vs 50 mg ? norvasc ?)  · Currently on cozaar per IM      * Right sided cerebral hemisphere cerebrovascular accident (CVA) ([facility] )   Assessment & Plan    · On DAPT for 90 days, with aspirin/plavix, followed by monotherapy with ASA  · Possibly cardioembolic in etiology   Consulted EP for eval/placement of loop recorder however they recommend JAYE first which was negative for thrombus in MICHELLE however did reveal aortic atheroma, will d/w neuro (Dr Katarina Jeong) if they feel the atheroma is the etiology of the stroke and did not feel that atheroma was likely severe enough to change from  to [facility] even if this was the potential etiology so management recommendations per neuro was unchanged and patient had LOOP placed   · Fluoxetine 20mg post stroke for motor recovery         Scheduled Meds:    Current Facility-Administered Medications:  acetaminophen 650 mg Oral Q6H PRN Duyen Mack, MD   aspirin 81 mg Oral Daily Duyen Martina, MD   atorvastatin 80 mg Oral QPM Duyen Span, MD   clopidogrel 75 mg Oral Daily Elizabeth Heller, CRNP   docusate sodium 100 mg Oral BID Bernarda Yao PA-C   FLUoxetine 20 mg Oral Daily Elizabeth Heller, CRNP   losartan 50 mg Oral Daily Elizabeth Heller, CRNP   melatonin 9 mg Oral HS Chiquita Keller MD   nicotine 7 mg Transdermal Daily Chiquita Keller MD   ondansetron 4 mg Intravenous Q6H PRN Duyen Mack MD   oxyCODONE 2 5 mg Oral Q4H PRN Duyen Mack MD   pneumococcal 23-valent polysaccharide vaccine 0 5 mL Subcutaneous Prior to discharge Duyen Mack MD   polyethylene glycol 17 g Oral Daily PRN Bernarda Yao PA-C   senna 1 tablet Oral HS Bernarda Yao PA-C        Incidental findings  1) thyroid nodules: per radiology report recs non-emergent OP thyroid U/S, OP FU with PCP to arrange U/S  2) subpleural bullae: OP FU with PCP with further testing/treatment and/or specialist referral at PCP's discretion  3) mild mucosal thickening of maxillary sinuses and retention cysts: asymptomatic, OP FU with PCP with further testing/treatment and/or specialist referral at PCP's discretion        Note: patient noted to have petechial hemorrhage on imaging however has been cleared for DAPT and lovenox (for DVT ppx) by neuro  DVT ppx: SCDs, ambulating adequate distances   Dispo: 11/21    Objective:    Functional Update:  Mobility: mod I   Transfers: mod I   ADLs: mod I         Physical Exam:    Vitals:    11/20/18 1351   BP: 138/87   Pulse: 63   Resp: 18   Temp: 97 8 °F (36 6 °C)   SpO2: 94%           General: alert, no apparent distress, cooperative and comfortable  HEENT:  Head: Normocephalic, no lesions, without obvious abnormality  CARDIAC:  +S1/2  LUNGS:  respirations unlabored  ABDOMEN:  soft   EXTREMITIES:  no signficant LE edema  NEURO:   mental status, speech normal, alert and oriented x3  PSYCH:  mood/affect currently stable, denies depression       Diagnostic Studies: reviewed, no new imaging  No orders to display       Laboratory:      Results from last 7 days  Lab Units 11/19/18  0539 11/15/18  0514   HEMOGLOBIN g/dL 13 0 12 4   HEMATOCRIT % 39 3 39 1   WBC Thousand/uL 6 65 6 55       Results from last 7 days  Lab Units 11/19/18  0539 11/15/18  0514   BUN mg/dL 10 11   POTASSIUM mmol/L 3 6 3 9   CHLORIDE mmol/L 106 105   CREATININE mg/dL 0 86 0 95            Patient Active Problem List   Diagnosis    Right sided cerebral hemisphere cerebrovascular accident ([facility]) [facility]    Hypertension    Anxiety    Hyperlipidemia    Gout    DM (diabetes mellitus) ([facility] )    Bradycardia           Total visit time:  At least 25 minutes, with more than 50% spent counseling/coordinating care

## 2018-11-20 NOTE — PROGRESS NOTES
Occupational Therapy Treatment Note       11/20/18 0700   Pain Assessment   Pain Assessment No/denies pain   Restrictions/Precautions   Precautions Fall Risk   Lifestyle   Autonomy "I am really excited to go home"    QI: [address] Provided by [place] No physical assistance   Eating CARE Score 6   Eating Assessment   Findings increased time, use of (R) UE to compensate for deficits in (L) UE  Eating (FIM) 6 - Patient requires increased time or safety concern   QI: [address] Provided by [place] No physical assistance   Oral Hygiene CARE Score 6   Grooming   Able To Comb/Brush Hair;Wash/Dry Face;Brush/Clean Teeth;Wash/Dry Hands   Limitation Noted In Safety   Findings in stance at sink with (L) UE support on sink for steadying    Grooming (FIM) 6 - Patient requires assistive device/extra time/safety concerns but completes independently   QI: Shower/Bathe Self   Assistance Needed Adaptive equipment;Supervision   Assistance Provided by [place] No physical assistance   Shower/Bathe Self CARE Score 4   Bathing   Assessed Bath Style Shower   Anticipated D/C Bath Style Shower   Able to Luke Girish Yes   Able to [employer] Temperature Yes   Able to Wash/Rinse/Dry (body part) Left Arm;Right Arm;L Upper Leg;R Upper Leg;R Lower Leg/Foot;L Lower Leg/Foot;Chest;Abdomen;Perineal Area; Buttocks   Limitations Noted in Balance   Positioning Seated;Standing   Adaptive Equipment Shower Bars; Shower Seat;[employer]  supervision in stance with (L) UE support on grab bar to provide increased steadying assist for buttock/perineal area, pt completes rest of bathing routine while seated at mod I level    Bathing (FIM) 5 - Patient requires supervision/monitoring but completes 10/10 parts   Tub/Shower Transfer   Limitations Noted In Balance; Safety;UE Strength;LE Strength   Adaptive Equipment Grab Bars;Seat with Back   Assessed Shower Findings SPC and grab bar    Shower Transfer (FIM) 5 - Patient requires supervision/monitoring   QI: Upper Body Dressing   Assistance Needed Independent; Adaptive equipment   Assistance Provided by [place] No physical assistance   Comment use of SPC to retrieve clothing    Upper Body Dressing CARE Score 6   QI: Lower Body [address] Provided by [place] No physical assistance   Comment increased time in stance to pull pants over hips  Educated pt on wearing clothing that does not require ties/fasteners to increase independence  Pt in agreement, requires assistance to manage tie of pants    Lower Body Dressing CARE Score 4   QI: Putting On/Taking Off [address]; Adaptive equipment   Assistance Provided by [place] No physical assistance   Putting On/Taking Off Footwear CARE Score 6   QI: [facility] Provided by [place] No physical assistance   Picking Up Object CARE Score 6   Dressing/Undressing Clothing   Able to  [address]; [address] Clothes Pullover Shirt   Don LB Clothes Pants;Socks; Shoes   Limitations Noted In Balance; Safety;Timeliness;ROM;Strength   Adaptive Equipment Elastic Laces   Positioning Supported Sit;Standing   UB Dressing (FIM) 6 - Patient requires assistive device/extra time/safety concerns but completes independently   LB Dressing (FIM) 5 - [place] sets up supplies or applies device   QI: Roll Left and Right   Assistance Needed Independent   Assistance Provided by [place] No physical assistance   Roll Left and Right CARE Score 6   QI: Sit to [address] Provided by [place] No physical assistance   Sit to Lying CARE Score 6   QI: Lying to Sitting on Side of Bed   Assistance Needed Independent   Assistance Provided by [place] No physical assistance   Lying to Sitting on Side of Bed CARE Score 6   QI: Sit to Stand Assistance Needed Independent; Adaptive equipment   Assistance Provided by [place] No physical assistance   Sit to Stand CARE Score 6   QI: Chair/Bed-to-Chair Transfer   Assistance Needed Independent; Adaptive equipment   Assistance Provided by [place] No physical assistance   Chair/Bed-to-Chair Transfer CARE Score 6   Transfer Bed/Chair/Wheelchair   Limitations Noted In Balance;UE Strength;LE Strength   Adaptive Equipment Cane   Sit to Stand Other  (mod I)   Stand to Sit Other  (mod I)   Supine to Sit (mod I increased time )   Sit to Supine (mod I increased time )   Findings functional mobility within room/bathroom at mod I level using SPC    Bed, Chair, Wheelchair Transfer (FIM) 6 - Patient requires assistive device/extra time/safety concerns but completes independently   QI: [address]; Adaptive equipment   Assistance Provided by [place] No physical assistance   Lion[address] Score 6   Toileting   Able to [address] down yes, up yes  Toileting (FIM) 6 - Patient requires assistive device/extra time/safety concerns but completes independently   QI: Toilet Transfer   Assistance Needed Independent; Adaptive equipment   Assistance Provided by [place] No physical assistance   Toilet Transfer CARE Score 6   Toilet Transfer   Surface Assessed Standard Toilet   Transfer Technique Standard   Limitations Noted In [address] Bar   Findings grab bar, pt has vanity to use at home    Toilet Transfer (FIM) 6 - Patient requires assistive device/extra time/safety concerns but completes independently   [address] Re-entry Level of Assistance Close supervision   Community Re-entry pt engaged in community re-entry, including navigating ramp and accessing elevator at supervision level with use of SPC      Exercise Tools   Other Exercise Tool 1 Provided pt with UE self AAROM handout from Occupational Therapy Toolkit for shoulder, elbow, wrist, and digits, to increase pt's (L) UE ROM for improved functional use during ADL/IADL/leisure activities  OTR/L is recommending pt have supervision with this program as pt is required to sit at table and move closer/further from table in chair pending exercise, which increases risk of falls  Pt in agreement to perform with supervision of family and have them assist in moving chair as needed  Cognition   Overall Cognitive Status WFL   Additional Activities   Additional Activities Comments pt engaged in leisure activity/IADL pet care with use of therapy dog  Pt requires min assist to transfer onto floor using SPC, then mod assist to transfer off of floor using mat, simulating couch  Pt in agreement that instead of transfering onto floor he will sit on couch to play with dog and grandchildren  Activity Tolerance   Activity Tolerance Patient tolerated treatment well   Assessment   Treatment Assessment Pt participated in skilled OT tx session focused on ADL routine, self AAROM (L) UE, functional transfers/mobility  See above for further details on functional performance  Pt has made excellent progress throughout rehab stay, able to complete functional mobility/transfers within room/bathroom at mod I level using SPC  Pt will have supervision from family for bathing and shower transfers, was ordered for shower chair  Pt's brother previously completed family training  Pt ordered for givmohr sling for continued therapy session and long distance mobility, OTR/L educated pt on not wearing sling while home alone during day so pt is able to use (L) UE as functional assist for steadying at table, counter, sink as pt has demonstrated improved ROM and  strength for flexion  Pt to D/C home with family support and continued outpatient OT services for neuromuscular rehabilitation of (L) UE  Provided pt with (L) UE self AAROM HEP, OT recommending supervision for which pt is in agreement to  Prognosis Excellent   Problem List Decreased strength;Decreased range of motion;Decreased endurance; Impaired balance;Decreased mobility; Impaired tone   Plan   Treatment/Interventions ADL retraining;Functional transfer training; Therapeutic exercise; Endurance training;Patient/family training;Equipment eval/education; Compensatory technique education   Progress Progressing toward goals   Recommendation   OT Discharge Recommendation Outpatient OT   OT Therapy Minutes   OT Time In 0700   OT Time Out 0830   OT Total Time (minutes) 90   OT Mode of treatment - Individual (minutes) 90   OT Mode of treatment - Concurrent (minutes) 0   OT Mode of treatment - Group (minutes) 0   OT Mode of treatment - Co-treat (minutes) 0   OT Mode of Teatment - Total time(minutes) 90 minutes   Therapy Time missed   Time missed?  No       Yuridia Parisi MS, OTR/L , CBIS

## 2018-11-20 NOTE — CONSULTS
Consultation - Neuropsychology    Kalee Session 58 y o  male MRN: [id number]  Unit/Bed#: -01 Encounter: [id number]    Assessment/Plan     Assessment:  Pt has a noted history of anxiety; he is prescribed medication to help alleviate symptoms  Psychosocial stressors include: recent CVA and related symptoms; work stress; loss of independence  Pt reported feelings of depression and anxiety related to adjustment to stroke deficits; pt was very active and travelled for his job prior to stroke  Pt reported "this is a wake up call" for "things to change" in pt life, and to improve stress management  Family and social support includes: pt significant other; pt has close relationship to his children; many friends, close relationship with his brother; pt has many visitors  Pt reported motivation to participate in rehab program and work on rehab goals  Dx: F43 23 Adjustment disorder with depressed and anxious mood  Code: [id number]    Plan:   Pt will be afforded rehab psychology services while at [facility] to help pt cope with illness  History of Present Illness   Physician Requesting Consult: Mireya Delgado MD  Reason for Consult / Principal Problem: coping, adjustment  Patient is a 58 y o  male   Primary complaints include: anxiety, concern about health problems, fearfulness, feeling depressed, poor concentration, stressed at work and tearfulness  Psychosocial Stressors: health      Inpatient consult to Neuropsychology  Consult performed by: Kellie Freire ordered by: Aime [place]          Psychiatric Review Of Systems:  sleep: yes  appetite changes: no  weight changes: no  energy/anergy: yes  interest/pleasure/anhedonia: no  somatic symptoms: yes  anxiety/panic: yes  tyson: no  guilty/hopeless: yes  self injurious behavior/risky behavior: no    Historical Information   Past Psychiatric History:   None    Substance Abuse History:  Use of Alcohol: occasional, social use how often socially  and 0 out of 4 on CAGE    Smoking history: current everyday cigarette/cigar smoker; 1 ppd; pt is ready to quit, counseling given  Family Psychiatric History: none  Social History  Education: high school diploma/GED  Marital history: ; has significant other  Living arrangement, social support: The patient lives in home with self; son lives next door     Occupational History: full time;  at [employer] Relationships: good support system, good relationship with spouse or significant other, gets along well with co-workers and good relationship with children  Other Pertinent History: pt travels frequently for his job, mostly to [place]  Traumatic History:   Abuse: none  Other Traumatic Events: death of parents; death of sister; current health issues       Past Medical History:   Diagnosis Date    Hypertension     Migraine        Medical Review Of Systems:  Review of Systems    Meds/Allergies   current meds:   Current Facility-Administered Medications   Medication Dose Route Frequency    acetaminophen (TYLENOL) tablet 650 mg  650 mg Oral Q6H PRN    aspirin (ECOTRIN LOW STRENGTH) EC tablet 81 mg  81 mg Oral Daily    atorvastatin (LIPITOR) tablet 80 mg  80 mg Oral QPM    clopidogrel (PLAVIX) tablet 75 mg  75 mg Oral Daily    docusate sodium (COLACE) capsule 100 mg  100 mg Oral BID    FLUoxetine (PROzac) capsule 20 mg  20 mg Oral Daily    losartan (COZAAR) tablet 50 mg  50 mg Oral Daily    melatonin tablet 9 mg  9 mg Oral HS    nicotine (NICODERM CQ) 7 mg/24hr TD 24 hr patch 7 mg  7 mg Transdermal Daily    ondansetron (ZOFRAN) injection 4 mg  4 mg Intravenous Q6H PRN    oxyCODONE (ROXICODONE) IR tablet 2 5 mg  2 5 mg Oral Q4H PRN    pneumococcal 23-valent polysaccharide vaccine (PNEUMOVAX-23) injection 0 5 mL  0 5 mL Subcutaneous Prior to discharge    polyethylene glycol (MIRALAX) packet 17 g  17 g Oral Daily PRN    senna (SENOKOT) tablet 8 6 mg  1 tablet Oral HS     No Known Allergies    Objective   Vital signs in last 24 hours:  Temp:  [97 6 °F (36 4 °C)-98 5 °F (36 9 °C)] 97 6 °F (36 4 °C)  HR:  [46-70] 46  Resp:  [18-20] 18  BP: (121-144)/(66-73) 128/66      Intake/Output Summary (Last 24 hours) at 11/20/18 1140  Last data filed at 11/19/18 1430   Gross per 24 hour   Intake              240 ml   Output                0 ml   Net              240 ml       Mental Status Evaluation:  Appearance:  younger than stated age and mild facial droop   Behavior:  normal   Speech:  normal pitch and normal volume   Mood:  anxious and depressed   Affect:  normal   Language:  WNL   Thought Process:  goal directed and logical   Thought Content:  normal   Perceptual Disturbances: None   Risk Potential: none   Sensorium:  person, place, time/date and situation   Cognition:  grossly intact   Consciousness:  alert and awake    Attention: attention span and concentration were age appropriate   Intellect: within normal limits   Fund of Knowledge: vocabulary: WNL   Insight:  good   Judgment: good   Muscle Strength and Tone: see PT eval   Gait/Station: see PT eval   Motor Activity: no abnormal movements

## 2018-11-20 NOTE — PROGRESS NOTES
11/20/18 1335   Pain Assessment   Pain Assessment No/denies pain   Pain Score No Pain   Restrictions/Precautions   Precautions Fall Risk   Memory Skills   Memory (FIM) 6 - Recognizes with extra time   Social Interaction (FIM) 7 - Interacts appropriately without assistive device, medication or helper   Speech/Language/Cognition Assessmetn   Treatment Assessment Pt seen for ongoing skilled ST targeting higher level cognitive linguistic and language tasks  Began session reviewing plans for d/c w/ SLP recs for continued skilled ST outpatient services to further maximize higher level skills in order for pt to return to work  Pt was agreeable to this recommendation  Additionally, pt and SLP reviewed pt's progress throughout rehab stay and discussed areas to be further targeted in outpatient, as well as different strategies to utilize to improve attention, comprehension and expression skills related to work info (i e, email correspondence w/ coworkers)  Pt verbalized ways to incorporate and use strategies into daily routines  When engaged in more structured tasks, pt presented w/ deduction puzzles which pt had not attempted since earlier in rehab stay  Overall, pt completed puzzles at mod I level, benefiting from mildly increased time but was able to use reasoning and deduction skills, while also demonstrating reading comprehension of info (completed two different trials)  Lastly, pt presented w/ a calendar and was auditorily presented w/ different appointments and items to schedule  Pt w/ functional skills to write/schedule info, but did require intermittent repetition of info to improve working memory of info  However, once pt showed info, [facility] and able to [facility] appropriately  Of note, pt also identified scheduling conflicts and demonstrated the ability to correct conflicts by re-scheduling items w/o assist  Overall, pt demonstrated cognitive linguistic skills at mod I level this session   However, pt will continue to benefit from skilled ST intervention to maximize higher level cognitive linguistic and language (written) skills to achieve greater independence and in order to return to work, as pt states continued goal     SLP Therapy Minutes   SLP Time In 1335   SLP Time Out 1435   SLP Total Time (minutes) 60   SLP Mode of treatment - Individual (minutes) 60   SLP Mode of treatment - Concurrent (minutes) 0   SLP Mode of treatment - Group (minutes) 0   SLP Mode of treatment - Co-treat (minutes) 0   SLP Mode of Teatment - Total time(minutes) 60 minutes   Therapy Time missed   Time missed?  No   Daily FIM Score   Problem solving (FIM) 6 - Solves complex problems BUT requires extra time   Comprehension (FIM) 6 - Understands complex/abstract but requires  glasses for visual comp   Expression (FIM) 6 - Has only MILD difficulty with complex/abstract info

## 2018-11-20 NOTE — PROGRESS NOTES
11/20/18 1100   Pain Assessment   Pain Assessment No/denies pain   Restrictions/Precautions   Precautions Fall Risk   Cognition   Arousal/Participation Cooperative   Subjective   Subjective pt reported feeling well and ready for therapy    QI: Roll Left and Right   Assistance Needed Independent   Roll Left and Right CARE Score 6   QI: Sit to Lying   Assistance Needed Independent   Sit to Lying CARE Score 6   QI: Lying to Sitting on Side of Bed   Assistance Needed Independent   Lying to Sitting on Side of Bed CARE Score 6   QI: Sit to [address]; Adaptive equipment   Sit to Stand CARE Score 6   QI: Chair/Bed-to-Chair Transfer   Assistance Needed Independent; Adaptive equipment   Chair/Bed-to-Chair Transfer CARE Score 6   Transfer Bed/Chair/Wheelchair   Limitations Noted In Balance; Endurance;LE Strength;UE Strength   Stand Pivot Modified Independent   Sit to Stand Modified Independent   Stand to Sit Modified Independent   Supine to Sit Independent   Sit to Supine Independent   Car Transfer Modified Independent   Bed, Chair, Wheelchair Transfer (FIM) 6 - Patient requires assistive device/extra time/safety concerns but completes independently   QI: Car Transfer   Assistance Needed Independent; Adaptive equipment   Car Transfer CARE Score 6   QI: Walk 10 Feet   Assistance Needed Independent; Adaptive equipment   Walk 10 Feet CARE Score 6   QI: Walk 50 Feet with Two [address]; Adaptive equipment   Walk 50 Feet with Two Turns CARE Score 6   QI: Walk 150 Feet   Assistance Needed Independent; Adaptive equipment   Walk 150 Feet CARE Score 6   QI: Walking 10 Feet on Uneven Surfaces   Assistance Needed Independent; Adaptive equipment   Walking 10 Feet on Uneven Surfaces CARE Score 6   Ambulation   Does the patient walk? 2  Yes   Primary Discharge Mode of Locomotion Walk   Walk Assist Level Modified Independent   Gait Pattern L knee hyperextension; Step through; Improper weight shift   Assist Device [employer] Walked (feet) 400 ft  (x2)   Walking (FIM) 6 - Patient requires assistive device/extra time/safety concerns but completes independently AND distance 150 feet or more, no rest   QI: Picking Up Object   Assistance Needed Independent; Adaptive equipment   Picking Up Object CARE Score 6   Therapeutic Interventions   Neuromuscular Re-Education quadruped rocking forward and back, kneeling to high kneel with holding ball out in front of him  toe taps alternating onto 4" block and then stepping up and marching with other leg, and then the marching leg back to the floor  Pt cont to have some mistakes where he put his foot down to catch his balance;  pt cont to need some A for balance but less physical A from staff and more self correction on his own  petting therapy dog Keshia Herndon with LUE  Assessment   Treatment Assessment Pt cont to make good progress towards PLOF  Pt was able to safely ambulate with SPC at [place] level, although he does have some knee hyperextension LLE  Pt cont to make progress with dynamnic strength and balance, however will cont to benefit from outpt PT to progress in these areas as well  Pt able to dc home tomorrow with family support  PT Barriers   Physical Impairment Decreased strength;Decreased range of motion;Decreased endurance;Decreased mobility; Impaired balance   Functional Limitation Car transfers;Stair negotiation;Standing;Transfers; Walking   Plan   Treatment/Interventions Functional transfer training;LE strengthening/ROM; Elevations; Therapeutic exercise; Endurance training;Patient/family training;Equipment eval/education; Bed mobility;Gait training   Progress Progressing toward goals   Recommendation   Recommendation Outpatient PT; Home with family support   PT Therapy Minutes   PT Time In 1100   PT Time Out 1130   PT Total Time (minutes) 30   PT Mode of treatment - Individual (minutes) 30   PT Mode of treatment - Concurrent (minutes) 0   PT Mode of treatment - Group (minutes) 0   PT Mode of treatment - Co-treat (minutes) 0   PT Mode of Teatment - Total time(minutes) 30 minutes   Therapy Time missed   Time missed?  No   DGI 23/24 and self selected gait speed 1 36 m/s and faster walking speed 1 67 m/s

## 2018-11-20 NOTE — CONSULTS
Consultation/Progress note - Felipe [place] 58 y o  male MRN: [id number]    Unit/Bed#: -01 Encounter: [id number]      Assessment/Plan     Assessment:  Pt participated in psychology services to address coping skills, adjustment, and motivation in rehab  Pt presented in euthymic mood, full range affect  Discussed progress on rehab goals; pt has tentative dc date for next week; reflected on progress made and continued goals for this week  Reviewed cognitive-behavioral coping skills to help pt manage mood and maintain motivation  Pt mood has improved greatly since initial consult and he utilizes effective coping skills  Provided supportive counseling  Pt was engaged and cooperative; he continues to be motivated in rehab sessions  Dx: F43 23 Adjustment disorder with depressed and anxious mood  Code: [id number]      Plan:  Continue psychology services while pt is at [facility]         Consults    Review of Systems    Historical Information   Past Medical History:   Diagnosis Date    Hypertension     Migraine      Past Surgical History:   Procedure Laterality Date    APPENDECTOMY      KNEE SURGERY      NECK SURGERY      TONSILLECTOMY       Social History   History   Alcohol Use    Yes     Comment: socially      History   Drug Use No     History   Smoking Status    Current Every Day Smoker    Packs/day: 1 00    Types: Cigarettes, Cigars   Smokeless Tobacco    Never Used   Meds/Allergies   current meds:   Current Facility-Administered Medications   Medication Dose Route Frequency    aspirin (ECOTRIN LOW STRENGTH) EC tablet 81 mg  81 mg Oral Daily    atorvastatin (LIPITOR) tablet 80 mg  80 mg Oral QPM    clopidogrel (PLAVIX) tablet 75 mg  75 mg Oral Daily    FLUoxetine (PROzac) capsule 20 mg  20 mg Oral Daily    losartan (COZAAR) tablet 50 mg  50 mg Oral Daily    melatonin tablet 9 mg  9 mg Oral HS    pneumococcal 23-valent polysaccharide vaccine (PNEUMOVAX-23) injection 0 5 mL  0 5 mL Subcutaneous Prior to discharge     No Known Allergies    Objective   Vitals: Blood pressure 138/87, pulse 63, temperature 97 8 °F (36 6 °C), temperature source Oral, resp  rate 18, height 5' 9" (1 753 m), weight 79 2 kg (174 lb 9 7 oz), SpO2 94 %      Intake/Output Summary (Last 24 hours) at 11/20/18 1451  Last data filed at 11/20/18 1230   Gross per 24 hour   Intake              100 ml   Output                0 ml   Net              100 ml     Invasive Devices          No matching active lines, drains, or airways          Physical Exam

## 2018-11-20 NOTE — PROGRESS NOTES
Internal Medicine Progress Note  Patient: Parag Kennedy  Age/sex: 58 y o  male  Medical Record #: [id number]      ASSESSMENT/PLAN:  Parag Kennedy is seen and examined and management for following issues:    Multiple embolic R MCA CVA:   continue current medical management; Cont ASA/Plavix for 90 days then Aspirin and statin therapy only; EP did JAYE/LOOP implant 10/31/18  LOOP recorder implant 10/30:  Site CDI     HTN:  stable; no changes today        DM II:  Diet controlled = stopped Accuchecks/coverage since BSs had been ok      Anxiety: stable; Cont Fluoxetine 20mg daily         Subjective:  Patient states he is doing well  He denies any complaints      Scheduled Meds:    Current Facility-Administered Medications:  acetaminophen 650 mg Oral Q6H PRN Kevon Zhang MD   aspirin 81 mg Oral Daily Kevon Zhang MD   atorvastatin 80 mg Oral QPM Kevon Zhang MD   clopidogrel 75 mg Oral Daily Elizabeth Heller, PATEL   docusate sodium 100 mg Oral BID Bernarda Yao PA-C   FLUoxetine 20 mg Oral Daily Elizabeth Heller, CRNP   losartan 50 mg Oral Daily Elizabeth Heller, CANDENP   melatonin 9 mg Oral HS Tom Rodríguez MD   nicotine 7 mg Transdermal Daily Tom Rodríguez MD   ondansetron 4 mg Intravenous Q6H PRN Kevon Zhang MD   oxyCODONE 2 5 mg Oral Q4H PRN Kevon Zhang MD   pneumococcal 23-valent polysaccharide vaccine 0 5 mL Subcutaneous Prior to discharge Kevon Zhang MD   polyethylene glycol 17 g Oral Daily PRN Bernarda Yao PA-C   senna 1 tablet Oral HS Bernarda Yao PA-C       Labs:       Results from last 7 days  Lab Units 11/19/18  0539 11/15/18  0514   WBC Thousand/uL 6 65 6 55   HEMOGLOBIN g/dL 13 0 12 4   HEMATOCRIT % 39 3 39 1   PLATELETS Thousands/uL 216 235       Results from last 7 days  Lab Units 11/19/18  0539 11/15/18  0514   POTASSIUM mmol/L 3 6 3 9   CHLORIDE mmol/L 106 105   CO2 mmol/L 28 30   BUN mg/dL 10 11   CREATININE mg/dL 0 86 0 95   CALCIUM mg/dL 8 5 8 3                  Glucose, i-STAT (mg/dl)   Date Value   10/20/2018 132       Labs reviewed    Physical Examination:  Vitals:   Vitals:    11/19/18 0535 11/19/18 1419 11/19/18 2030 11/20/18 0535   BP: 129/71 121/71 144/73 128/66   BP Location: Right arm Right arm Right arm Right arm   Pulse: (!) 52 70 (!) 54 (!) 46   Resp: 20 20 18 18   Temp: 97 6 °F (36 4 °C) 98 5 °F (36 9 °C) 97 8 °F (36 6 °C) 97 6 °F (36 4 °C)   TempSrc: Oral Oral Oral Oral   SpO2: 92% 96% 95% 97%   Weight:       Height:         Constitutional:  NAD; pleasant; nontoxic  CV:  +S1, S2;  RRR; no rub/murmur; LOOP incision w/o erythema/drainage  Pulmonary:  BBS without crackles/wheeze/rhonci; resp are unlabored  Abdominal:  soft, +BS, ND/NT; no mass  Skin:  no rashes  Musculoskeletal:  No edema  :  no nichols   Neurological/Psych:  AAO;  left hand grasp 3+/5 = can lift at shoulder and weakly flex at elbow;  LLE HF 4/5; + left facial droop; speech is clear; no depression/anxiety        [ X ] Total time spent: 30 Mins and greater than 50% of this time was spent counseling/coordinating care  ** Please Note: Dragon 360 Dictation voice to text software may have been used in the creation of this document   **

## 2018-11-20 NOTE — INCIDENTAL FINDINGS
1) thyroid nodules: Please follow up with your family doctor to arrange a follow up ultrasound    2) subpleural bullae: Please follow up with your family doctor for further testing/treatment if any and/or specialist referral if any as they deem necessary     3) mild mucosal thickening of maxillary sinuses and retention cysts: Please follow up with your family doctor for further testing/treatment if any and/or specialist referral if any as they deem necessary     4) slowed heart rate: Please follow up with your family doctor for further testing/treatment if any and/or specialist referral if any as they deem necessary     5) diabetes: continue your diabetic diet as instructed and please follow up with your family doctor for further testing/treatment if any and/or specialist referral if any as they deem necessary

## 2018-11-21 VITALS
WEIGHT: 175.04 LBS | SYSTOLIC BLOOD PRESSURE: 136 MMHG | HEIGHT: 69 IN | HEART RATE: 50 BPM | BODY MASS INDEX: 25.93 KG/M2 | OXYGEN SATURATION: 97 % | TEMPERATURE: 98.3 F | DIASTOLIC BLOOD PRESSURE: 65 MMHG | RESPIRATION RATE: 16 BRPM

## 2018-11-21 PROCEDURE — 99239 HOSP IP/OBS DSCHRG MGMT >30: CPT | Performed by: PHYSICAL MEDICINE & REHABILITATION

## 2018-11-21 RX ADMIN — FLUOXETINE 20 MG: 20 CAPSULE ORAL at 08:48

## 2018-11-21 RX ADMIN — LOSARTAN POTASSIUM 50 MG: 50 TABLET, FILM COATED ORAL at 08:48

## 2018-11-21 RX ADMIN — CLOPIDOGREL 75 MG: 75 TABLET, FILM COATED ORAL at 08:48

## 2018-11-21 RX ADMIN — ASPIRIN 81 MG: 81 TABLET, COATED ORAL at 08:48

## 2018-11-21 NOTE — DISCHARGE INSTRUCTIONS
Keep loop recorder incision dry for one week  Do not use lotions/powders/creams on incision  Remove outer bandage 48 hours after procedure - if present, leave underlying steri-strips in place, they will either fall off on their own or will be removed at 2 week follow up appointment  Please call the office [phone] if you notice redness, swelling, bleeding, or drainage from incision or if you develop fevers  Cardiac Loop Recorder Insertion      WHAT YOU SHOULD KNOW:    A cardiac loop recorder is a device used to diagnose heart rhythm problems, such as a fast or irregular heartbeat  It is implanted in your left chest, just under the skin  The device records a pattern of your heart's rhythm, called an EKG  Your device records automatic EKGs, depending on how your caregiver programs it  You may also receive a handheld controller  You press a button on the controller when you have symptoms, such as dizziness, lightheadedness, or palpitations  The device will record an EKG at that moment  The recording can help your caregiver see if your symptoms may be caused by heart rhythm problems  Your caregiver will remove the device after it has collected enough data  You may need the device for up to 3 years  The procedure to remove the device is similar to the procedure used to implant it       AFTER YOU LEAVE:    Follow up with your cardiologist as directed: You will need to return in 1 to 2 weeks  Your cardiologist will check your incision  He may also program your device settings again  He will retrieve data from the device every 1 to 3 months with a monitor held over your skin  You may be able to transmit data from your device from home as well  You will do this by calling a number provided by your cardiologist, or as they have instructed you  Ask for information about this process  Write down your questions so you remember to ask them during your visits       Wound care: Keep loop recorder incision dry for one week  Do not use lotions/powders/creams on incision  Remove outer bandage 48 hours after procedure - leave underlying steri-strips in place, they will either fall off on their own or will be removed at 2 week follow up appointment  Please call the office if you notice redness, swelling, bleeding, or drainage from incision or if you develop fevers  After that first week, carefully wash your incision with soap and water  Keep the area clean and dry until it heals       Return to activity: If you received anesthesia, you will not be able to drive for 24 hours  Otherwise, most people can return to normal activities soon after the procedure  Your cardiologist may want to know if your work involves electrical current or high-voltage equipment  Ask about other electrical items that could interfere with your cardiac loop recorder       Contact your cardiologist if:   · You have a fever or chills  · Your wound is red, swollen, or draining pus  · You have questions or concerns about your condition or care  Seek care immediately or call 911 if:   · You feel weak, dizzy, or faint  · You lose consciousness  © 2014 [address] [employer] is for End User's use only and may not be sold, redistributed or otherwise used for commercial purposes  All illustrations and images included in CareNotes® are the copyrighted property of A D [employer] M [employer]  or [employer]  The above information is an  only  It is not intended as medical advice for individual conditions or treatments  Talk to your doctor, nurse or pharmacist before following any medical regimen to see if it is safe and effective for you  General discharge instructions    Please note you are restricted from driving/operating a motorized vehicle/operating heavy machinery/etc until you are cleared through a formal driving evaluation   This service is offered through [facility] driving evaluation program on [address] however this evaluation can be done at a site of your choosing  Please contact your family doctor for a referral when your family doctor clears you to perform this evaluation once your neurologic/physical deficits have stabilized       Please see your doctors listed in the follow up providers section of your discharge paperwork, and take the discharge paperwork with you to your appointments    Please note changes may have been made to your medications please refer to your discharge paperwork for your current medications and take this list with you to all your doctors appointments for your doctors to review    Please do not resume a home medication unless the medication reconciliation sheet indicates to do so, please do not assume that a medication that you were given a prescription for is the same as a medication you have at home based on both medications having the same name as dosages and frequency may have changed      Please check your blood pressure prior to taking your blood pressure medications and 1 hour after, please contact your family doctor immediately for a blood pressure below 100/60 and do not take your blood pressure medications until speaking with them, please contact your family doctor immediately for blood pressure greater than 160/100         Please note the following incidental findings were found during your recent hospitalization please discuss them with your doctors so that they may arrange any tests/referrals as they deem necessary:     1) thyroid nodules: Please follow up with your family doctor to arrange a follow up ultrasound    2) subpleural bullae: Please follow up with your family doctor for further testing/treatment if any and/or specialist referral if any as they deem necessary     3) mild mucosal thickening of maxillary sinuses and retention cysts: Please follow up with your family doctor for further testing/treatment if any and/or specialist referral if any as they deem necessary     4) slowed heart rate: Please follow up with your family doctor for further testing/treatment if any and/or specialist referral if any as they deem necessary     5) diabetes: continue your diabetic diet as instructed and please follow up with your family doctor for further testing/treatment if any and/or specialist referral if any as they deem necessary         Please avoid NSAID (including but not limited to advil, aleve, motrin, naproxen, ibuprofen, mobic, meloxicam, diclofenac etc) medications, anti platelet medications (besides the aspirin and plavix/clopidogrel that you are currently on), and any prescription blood thinners due to your already being on plavix (clopidogrel) and aspirin and when combined with these other medications they can increase your risk of bleeding; you may be cleared to use these medications in the future but do not do so unless advised to do so by your doctors      Please complete your course of plavix (clopidogrel) for a total of 90 days (you have been provided a prescription for the remainder of your 90 day course)        Please note a summary of your hospital stay with relevant information for your doctors has been sent to them, please confirm with your doctors at your follow up visits that they have received this summary and have them contact [address] [place] [address] if they have not received them along with any other medical records they may require     As we discussed since you are unsure of your blood pressure medications that you take at home continue to take the cozaar (losartan) that you have been prescribed and do not resume any blood pressure medications you were taking prior to admission until after discussing with your family doctor first

## 2018-11-21 NOTE — CASE MANAGEMENT
Team dc summary - pt made great progress and returned home with family and contd outpt pt ot and slp services  appmts scheduled for pt and placed on dc instructions  Pt received a shower seat through [facility] [employer]  Son present for dc instructions and aware of pts functional ability

## 2018-11-21 NOTE — SPEECH THERAPY NOTE
[facility] Speech Therapy Discharge Summary    Pt discharged home on 11/21/2018 with support from his family, making good progress during acute rehab stay  At time of discharge, pt achieved mod I level for comprehension, expression, problem solving and memory and independent level for social interaction  Pt has demonstrated improvement in mental flexibility and more complex reasoning/problem solving, however, does still benefit from increased time for processing and preparation/organization of ideas  Related to language, pt is demonstrating verbal expression skills at mod I level, demonstrating the ability to verbalize complex information without assistance  However, pt continues to present with difficulties in higher level written expression skills in relation to spelling and syntax to which demonstrates insight and reports frustration  Pt is highly motivated to improve cognitive functioning on order to achieve baseline skills as pt desires to return to work, employed as a  for a [employer]  Additionally, pt was on a dysphagia level 2 diet and thin liquids upon admission, but has progressed to tolerating a regular diet and thin liquids without overt s/s aspiration or oral/pharyngeal difficulties  Pt demonstrating carryover of safe swallow strategies to include feeding to his R side, pacing, lingual sweeps to clear pocketing and pacing  Skilled ST intervention is no longer warranted for dysphagia therapy  However   overall, pt is functioning at mod I level for cognitive linguistic skills but will benefit from further skilled ST through outpatient services to continue to improve higher level cognitive linguistic skills and language skills as pt desires to return to work

## 2018-11-21 NOTE — DISCHARGE SUMMARY
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     Bradycardia   Assessment & Plan     · Consistently asymptomatic  · IM aware and as patient is asymptomatic and fluctuates between bradycardia and low normal with a normal BP they are not planning any further testing/treatment at this time   · OP FU with PCP with further testing/treatment and/or specialist referral at PCP's discretion          DM (diabetes mellitus) ([facility] [address] )   Assessment & Plan     · Not on any meds at home  · Per IM ISS or accuchecks no longer needed based on blood sugars   · Diet controlled   · OP FU with PCP with further testing/treatment and/or specialist referral at PCP's discretion          Gout   Assessment & Plan     · Left ankle  · Per patient was Indocin 50mg TID PTA however was not able to tolerate due to diarrhea and has not been taking for 2 months   · XR of left ankle and foot negative   · s/p colchicine and steroid taper per IM  · Resolved per patient   · Of note per patient this is only the 2nd gout flare patient has ever had      Hyperlipidemia   Assessment & Plan     · Home zocor 20 mg changed to lipitor 80 mg       Anxiety   Assessment & Plan     · No record of any benzodiazepines (or any other controlled medications) found in the [place] database going back 1 year  · No complaint of anxiety during admission  · On prozac for stroke motor recovery       Hypertension   Assessment & Plan     · Currently on cozaar per IM   · discussed with patient that since he is unsure of his blood pressure medications at home to continue to take the cozaar (losartan) that he has been prescribed and not to resume any blood pressure medications he was taking prior to admission until after discussing with his family doctor first      * Right sided cerebral hemisphere cerebrovascular accident (CVA) ([facility] [address] )   Assessment & Plan     · On DAPT for 90 days, with aspirin/plavix, followed by monotherapy with ASA  · Possibly cardioembolic in etiology  Consulted EP for eval/placement of loop recorder however they recommend JAYE first which was negative for thrombus in MICHELLE however did reveal aortic atheroma, will d/w neuro (Dr Mikaela Stacy) if they feel the atheroma is the etiology of the stroke and did not feel that atheroma was likely severe enough to change from  to [facility] even if this was the potential etiology so management recommendations per neuro was unchanged and patient had LOOP placed   · Fluoxetine 20mg post stroke for motor recovery                 Incidental findings  1) thyroid nodules: per radiology report recs non-emergent OP thyroid U/S, OP FU with PCP to arrange U/S  2) subpleural bullae: OP FU with PCP with further testing/treatment and/or specialist referral at PCP's discretion  3) mild mucosal thickening of maxillary sinuses and retention cysts: asymptomatic, OP FU with PCP with further testing/treatment and/or specialist referral at PCP's discretion         Note: patient noted to have petechial hemorrhage on imaging however has been cleared for DAPT and lovenox (for DVT ppx) by neuro          Functional Status Upon Discharge: Mod I amb/tx/ADLs     Condition at Discharge: stable    Discharge instructions/Information to patient and family:   See after visit summary for information provided to patient and family  Provisions for Follow-Up Care:  See after visit summary for information related to follow-up care and any pertinent home health orders  Disposition: home     Planned Readmission: no        Discharge Medications:  See after visit summary for reconciled discharge medications provided to patient and family      * Greater than 30 min was spent in preparation for patient discharge including discussion with patient, patient's nurse, therapy staff, and case management

## 2018-11-21 NOTE — OCCUPATIONAL THERAPY NOTE
Occupational Therapy Discharge Summary:     Pt has made excellent progress throughout rehab stay, able to complete functional mobility/transfers within room/bathroom at mod I level using SPC  Pt will have supervision from family for bathing and shower transfers, was ordered for shower chair  Pt's brother completed family training  Pt ordered for givmohr sling for continued therapy session and long distance mobility, OTR/L educated pt on not wearing sling while home alone during day so pt is able to use (L) UE as functional assist for steadying at table, counter, sink as pt has demonstrated improved ROM and  strength for flexion  Pt to D/C home with family support and continued outpatient OT services for neuromuscular rehabilitation of (L) UE  Provided pt with (L) UE self AAROM HEP, OT recommending supervision for which pt is in agreement to  Pt ordered for shower chair, which will be delivered to his home through [facility]  Pt to [facility] independently          Quin Lira MS, OTR/L , CBIS

## 2018-11-21 NOTE — PROGRESS NOTES
Physical Medicine and Rehabilitation Progress Note  Severiano Finner 58 y o  male MRN: [id number]  Unit/Bed#: -01 Encounter: [id number]    HPI: Patient is 59 yo male with right cerebral infarcts likely embolic in nature     Chief Complaint/Subjective: Patient states he feels well and is looking forward to dc today     ROS:  negative unless noted above     Assessment/Plan:      Bradycardia   Assessment & Plan    · Consistently asymptomatic  · IM aware and as patient is asymptomatic and fluctuates between bradycardia and low normal with a normal BP they are not planning any further testing/treatment at this time   · OP FU with PCP with further testing/treatment and/or specialist referral at PCP's discretion        DM (diabetes mellitus) ([facility] [address] )   Assessment & Plan    · Not on any meds at home  · Per IM ISS or accuchecks no longer needed based on blood sugars   · Diet controlled   · OP FU with PCP with further testing/treatment and/or specialist referral at PCP's discretion        Gout   Assessment & Plan    · Left ankle  · Per patient was Indocin 50mg TID PTA however was not able to tolerate due to diarrhea and has not been taking for 2 months   · XR of left ankle and foot negative   · s/p colchicine and steroid taper per IM  · Resolved per patient   · Of note per patient this is only the 2nd gout flare patient has ever had     Hyperlipidemia   Assessment & Plan    · Home zocor 20 mg changed to lipitor 80 mg      Anxiety   Assessment & Plan    · No record of any benzodiazepines (or any other controlled medications) found in the [place]DMP database going back 1 year  · No complaint of anxiety during admission  · On prozac for stroke motor recovery      Hypertension   Assessment & Plan    · Currently on cozaar per IM   · discussed with patient that since he is unsure of his blood pressure medications at home to continue to take the cozaar (losartan) that he has been prescribed and not to resume any blood pressure medications he was taking prior to admission until after discussing with his family doctor first     * Right sided cerebral hemisphere cerebrovascular accident (CVA) ([address] )   Assessment & Plan    · On DAPT for 90 days, with aspirin/plavix, followed by monotherapy with ASA  · Possibly cardioembolic in etiology   Consulted EP for eval/placement of loop recorder however they recommend JAYE first which was negative for thrombus in MICHELLE however did reveal aortic atheroma, will d/w neuro (Dr Rick Mack) if they feel the atheroma is the etiology of the stroke and did not feel that atheroma was likely severe enough to change from  to [facility] even if this was the potential etiology so management recommendations per neuro was unchanged and patient had LOOP placed   · Fluoxetine 20mg post stroke for motor recovery         Scheduled Meds:    Current Facility-Administered Medications:  aspirin 81 mg Oral Daily Mandeep Cao MD   atorvastatin 80 mg Oral QPM Mandeep Cao MD   clopidogrel 75 mg Oral Daily Elizabeth Heller, CRNP   FLUoxetine 20 mg Oral Daily Elizabeth Heller, CRNP   losartan 50 mg Oral Daily Elizabeth Heller, CRNP   melatonin 9 mg Oral HS Wilbert Torres MD   pneumococcal 23-valent polysaccharide vaccine 0 5 mL Subcutaneous Prior to discharge Mandeep Cao MD        Incidental findings  1) thyroid nodules: per radiology report recs non-emergent OP thyroid U/S, OP FU with PCP to arrange U/S  2) subpleural bullae: OP FU with PCP with further testing/treatment and/or specialist referral at PCP's discretion  3) mild mucosal thickening of maxillary sinuses and retention cysts: asymptomatic, OP FU with PCP with further testing/treatment and/or specialist referral at PCP's discretion        Note: patient noted to have petechial hemorrhage on imaging however has been cleared for DAPT and lovenox (for DVT ppx) by neuro  DVT ppx: SCDs, ambulating adequate distances   Dispo: 11/21    Objective:    Functional Update:  Mobility: mod I Transfers: mod I   ADLs: mod I         Physical Exam:            General: alert, no apparent distress, cooperative and comfortable  HEENT:  Head: Normocephalic, no lesions, without obvious abnormality    CARDIAC:  +S1/2  LUNGS:  respirations unlabored  ABDOMEN:  soft   EXTREMITIES:  no signficant LE edema  NEURO:   mental status, speech normal, alert and oriented x3  PSYCH:  mood/affect currently stable, denies depression       Diagnostic Studies: reviewed, no new imaging  No orders to display       Laboratory:      Results from last 7 days  Lab Units 11/19/18  0539 11/15/18  0514   HEMOGLOBIN g/dL 13 0 12 4   HEMATOCRIT % 39 3 39 1   WBC Thousand/uL 6 65 6 55       Results from last 7 days  Lab Units 11/19/18  0539 11/15/18  0514   BUN mg/dL 10 11   POTASSIUM mmol/L 3 6 3 9   CHLORIDE mmol/L 106 105   CREATININE mg/dL 0 86 0 95            Patient Active Problem List   Diagnosis    Right sided cerebral hemisphere cerebrovascular accident (CVA) ([facility] )    Hypertension    Anxiety    Hyperlipidemia    Gout    DM (diabetes mellitus) ([facility] )    Bradycardia

## 2018-11-21 NOTE — TEAM CONFERENCE
Acute RehabilitationTeam Conference Note  Date: 11/21/2018   Time: 10:25 AM       Patient Name:  Nieves Nettles       Medical Record Number: [id number]   YOB: 1955  Sex: Male          Room/Bed:  [facility]3/[facility] 973-01  Payor Info:  Payor: Teja Ritter / Plan: [id number] / Product Type: Blue Fee for Service /      Admitting Diagnosis: Stroke ([place] [address] ) [I63 9]   Admit Date/Time:  10/25/2018 12:41 PM  Admission Comments: No comment available     Primary Diagnosis:  Right sided cerebral hemisphere cerebrovascular accident (CVA) ([place] [address] )  Principal Problem: Right sided cerebral hemisphere cerebrovascular accident (CVA) [facility])    Patient Active Problem List    Diagnosis Date Noted    Bradycardia 11/09/2018    DM (diabetes mellitus) ([place] [address] ) 10/25/2018    Gout 10/23/2018    Hyperlipidemia 10/22/2018    Right sided cerebral hemisphere cerebrovascular accident (CVA) ([place] [address] ) 10/21/2018    Hypertension 10/21/2018    Anxiety 10/21/2018       Physical Therapy:    Weight Bearing Status: Full Weight Bearing  Transfers: Independent  Bed Mobility: Independent  Amulation Distance (ft): 100 feet  Ambulation: Modified Independent  Assistive Device for Ambulation: Single [employer]  Number of Stairs: 40  Assistive Device for Stairs: Right Hand Rail  Stair Assistance: Supervision  Ramp: Contact Guard  Discharge Recommendations: Home with:  [address] with[de-identified] Family Support, Outpatient Physical Therapy    Pt has met LTGs and is able to d/c home today with continued outpt PT services and family support  Barriers to home resolved       Occupational Therapy:  Eating: Modified Independent  Grooming: Modified Independent  Bathing: Supervision  Bathing: Supervision  Upper Body Dressing: Modified Independent  Lower Body Dressing: Supervision  Toileting: Modified Independent  Tub/Shower Transfer: Supervision  Toilet Transfer: Modified Independent  Cognition: Within Defined Limits  Orientation: Person, Place, Time, Situation  Discharge Recommendations: Home with:  [address] with[de-identified] Outpatient Occupational Therapy, Family Support       Pt made excellent progress throughout rehab stay, plan to D/C 11/21 home with family support and continued outpatient OT for neuromuscular re-education of (L) U to increase pt's independence in ADL, IADL, leisure, work, and social participation  Speech Therapy:  Mode of Communication: Verbal  Speech/Language: Dysarthia (mild, more notable when fatigued)  Cognition: Exceptions to WNL  Cognition: Decreased Executive Functions  Orientation: Person, Place, Time, Situation  Swallowing: Within Defined Limits  Swallowing: Oral Dysphagia, Aspiration Risk  Diet Recommendations: Regular Diet, Thin  Discharge Recommendations: Home with:  [address] with[de-identified] Family Support, Outpatient Speech Therapy  Pt is currently being followed for speech/cognitive/dysphagia therapy  On initial evaluation, pt completed the CLQT with scores correlating to overall cognitive linguistic skills that are [facility] at time of assessment in comparison to age matched peers  However, informally assessed, pt demonstrating slower processing and decreased higher level reasoning, which was also reported by pt  Pt also presenting with mild dysarthria characterized by imprecise articulation of sounds  While pt demonstrates overall functional basic level cognitive linguistic skills and verbal expression, pt's functioning is impacted by his level of fatigue  Pt reports that he completed all ADLs independently and had a full time job prior to hospitalization  Pt will benefit from skilled ST intervention targeting higher level, more complex cognitive linguistic skills to maximize overall independence and to work towards returning to his job   Regarding swallow function, pt is presenting with mild oral dysphagia characterized by weaker mastication, L side pocketing and slower a-p transfers, however, pt is demonstrating improvement in independent use of strategies to clear residual  Pt was on a level 2 diet and thin liquids on admission but has recently been advanced to a dysphagia level 3 diet and appears to be tolerating without increased oral/pharyngeal difficulties or overt s/sx aspiration  Pt will benefit from further skilled ST intervention to maximize swallow function to safely achieve baseline diet of regular and thin liquids  Update week of 11/6/2018: Pt was previously being followed for dysphagia therapy, however, pt has now progressed to tolerating a regular diet and thin liquids without overt s/sx aspiration and without increased oropharyngeal difficulties  Pt demonstrating good carryover of swallow strategies to include liquid wash, lingual sweeps and finger sweeps to clear min to trace amts of L side pocketing  Improvement in pacing and bite size also noted  Further skilled ST intervention is not warranted to continue at this time for dysphagia therapy  Regarding cognition, pt has made progress towards goals and is functioning at overall mod I level, noting problem solving to fluctuate to from supervision to mod I level  Pt inconsistently demonstrates slower processing, decreased abstract thinking and decreased attention  Due to the above mentioned deficits, as well as pt's desire to continue skilled ST intervention (pt reports continued cognitive deficits), pt will benefit from further skilled ST intervention to maximize higher level, more abstract cognitive linguistic skills at this time  Update week of 11/13/2018: Pt continues to be seen for ST with sessions targeting higher level cognitive linguistic skills, along with higher level language skills  Pt continues to report and demonstrate difficulty with higher level processing and reasoning related to executive functions, as well a more complex language tasks related to expression and comprehension, specifically with written information   Pt demonstrating good insight into deficits and their impact on his ability to return to work due to the nature of his job which requires abstract thinking with higher level complexity tasks  While pt is demonstrating overall functional basic level cognitive linguistic and language skills, pt will continue to benefit from further skilled ST intervention to maximize higher level cognitive linguistic/language skills to achieve mod I to in independent level in these areas  Update week of 11/20/2018: Pt is currently being followed for skilled ST services with focus on higher level cognitive and language skills  Pt has made good progress and has achieved all goals related to cognition  Pt has demonstrated improvement in mental flexibility and more complex reasoning/problem solving, however, does still benefit from increased time for processing and preparation/organization of ideas  Related to language, pt is demonstrating verbal expression skills at mod I level, demonstrating the ability to verbalize complex information without assistance  However, pt continues to present with difficulties in higher level written expression skills in relation to spelling and syntax to which demonstrates insight  Pt is highly motivate to improve cognitive functioning on order to achieve baseline skills  Overall, pt is functioning at mod I level for cognitive linguistic skills but will benefit from further skilled ST through outpatient services to continue to improve higher level cognitive linguistic skills and language skills as pt desires to return to work  Nursing Notes:  Appetite: Good  Diet Type: Diabetic                      Diet Patient/Family Education Complete: Yes    Type of Wound (LDA):  Incision                    Type of Wound Patient/Family Education: Yes  Bladder: 5 - Supervision     Bladder Patient/Family Education: Yes  Bowel: 7 - Complete [place]     Bowel Patient/Family Education: Yes  Pain Location: Ankle  Pain Orientation: Left  Pain Score: 0 Hospital Pain Intervention(s): Ambulation/increased activity, Rest (AFO while walking)  Pain Patient/Family Education: Yes  Medication Management/Safety  Injectable:  (heparin)  Safe Administration: Yes  Medication Patient/Family Education Complete: Yes    Admitted to [facility] s/p new onset left-sided diminished sensation, weakness, difficulty speaking, and facial droop  He has a PMH significant for HTN, HLD, and tobacco abuse  CTH was negative for hemorrhage  NIHSS 9  He received IV Labetalol for /110 and tPA  Echo showed EF 60% with no RWMA  A1C 6 6  CTA showed 50% stenosis of the proximal R ICA, and 30-40% stenosis of the proximal L ICA  MRI Brain showed multifocal cortical infarctions in the distribution of the R MCA  Concern was for thromboembolic etiology due to carotid disease  Ultimately, Neurology recommended Plavix load (10/24), and then DAPT for 90 days followed by monotherapy with ASA  Cardiology was consulted who recommended holter monitor to evaluate for underlying afib  They also started him on Fluoxetine 20mg post stroke day 5 for motor function recovery  have a history of gout  Venous doppler and X-ray were negative for clot or acute injury  Pt underwent JAYE and loop recorder placement on 10/30  continent of bowel and bladder  HTN managed with Losartan  Nicotine patch intact  This week we will continue to labs and vitals, we will increase safety awareness and keep pt free from injury  We will prevent skin breakdown with turning and repositioning and weight shifts  Pt will continue to manage dm with diet  11/21- pt D/C [facility]  Case Management:     Discharge Planning  Living Arrangements: Children  Support Systems: Children, Family members  Assistance Needed: tbd  Type of Current Residence: Private residence  Current Home Care Services: No  Pt made great progress and is returning home Wednesday with contd outpt pt ot and slp services      Is the patient actively participating in therapies? yes  List any modifications to the treatment plan:     Barriers Interventions   All functional barriers resolved                      Is the patient making expected progress toward goals? yes  List any update or changes to goals:     Medical Goals: Patient will be medically stable for discharge to [facility] [place] operated by [facility] upon completion of rehab program and Patient will be able to manage medical conditions and comorbid conditions with medications and follow up upon completion of rehab program    Weekly Team Goals:   Rehab Team Goals  ADL Team Goal: Patient will require supervision with ADLs with least restrictive device upon completion of rehab program  Bowel/Bladder Team Goal: Patient will return to premorbid level for bladder/bowel management upon completion of rehab program  Transfer Team Goal: Patient will be independent with transfers with least restrictive device upon completion of rehab program  Locomotion Team Goal: Patient will be independent with locomotion with least restrictive device upon completion of rehab program  Cognitive Team Goal: Patient will be independent for basic and complex tasks upon completion of rehab program    Discussion: pt made great progress and is mod I in all functional mobility  Pt will be continuing with pt ot and slp services as an outpt  Anticipated Discharge Date:  11/21/18  [facility] Team Members Present: The following team members are supervising care for this patient and were present during this Weekly Team Conference      Physician: Dr Gareth Pereyra MD  : Kedar Cervantes MSW  Registered Nurse: Ricky Eaton, RN, BSN, CRRN  Physical Therapist: Gabby Templeton MSPT  Occupational Therapist: Bernardino Brewer MS, OTR/L, CBIS  Speech Therapist: Rojelio Hughes MA, CCC-SLP  Other:

## 2018-11-21 NOTE — NURSING NOTE
Pt d/c'd today s/p cva  Pt mod I with SPC  has no pain , continent of B&b  Skin surfaces intact  Reviewed d/c instructions with pt & son

## 2018-11-24 NOTE — PROGRESS NOTES
Speech-Language Pathology Initial Evaluation    Today's date: 2018  Patients name: Skyler Joseph  : 1955  MRN: [id number]  Safety measures: CVA; fall risk  Referring provider: Alirio June MD    Subjective comments: ***    Patient's goal(s): ***    Reason for referral: Change in cognitive status  Prior functional status: Communication effective and appropriate in all situations  Clinically complex situations: Discharge from SNF or Hospital in the last 30 days    History: Patient is a 58 y o  male who was referred to outpatient skilled Speech Therapy services for a cognitive-linguistic evaluation  Patient originally presented to [facility] with symptoms of L-sided weakness, facial droop, and dysarthria  PMH includes bradycardia, DM, gout, HLD, anxiety, HTN, a recent R-sided CVA  Patient received tPA in ED  Patient was initially on the ICU for neuro checks and monitoring s/p tPA  No further complications during hospital stay apart from a small amount of petechial hemorrhage noted on MRI  (MRI brain revealed moderate amount of multifocal cortical infarctions involving distinct and separate foci in the R middle cerebral territory  Small amount of petechial hemorrhage in the insular cortex in the region of recent infarction ) Patient was discharged to the [facility] on 10/25/2018  Patient was discharged home on 2018  [facility] Speech Therapy Discharge Summary (2018):  "Pt discharged home on 2018 with support from his family, making good progress during acute rehab stay  At time of discharge, pt achieved mod I level for comprehension, expression, problem solving and memory and independent level for social interaction  Pt has demonstrated improvement in mental flexibility and more complex reasoning/problem solving, however, does still benefit from increased time for processing and preparation/organization of ideas   Related to language, pt is demonstrating verbal expression skills at mod I level, demonstrating the ability to verbalize complex information without assistance  However, pt continues to present with difficulties in higher level written expression skills in relation to spelling and syntax to which demonstrates insight and reports frustration  Pt is highly motivated to improve cognitive functioning on order to achieve baseline skills as pt desires to return to work, employed as a  for a [employer]  Additionally, pt was on a dysphagia level 2 diet and thin liquids upon admission, but has progressed to tolerating a regular diet and thin liquids without overt s/s aspiration or oral/pharyngeal difficulties  Pt demonstrating carryover of safe swallow strategies to include feeding to his R side, pacing, lingual sweeps to clear pocketing and pacing  Skilled ST intervention is no longer warranted for dysphagia therapy  However   overall, pt is functioning at mod I level for cognitive linguistic skills but will benefit from further skilled ST through outpatient services to continue to improve higher level cognitive linguistic skills and language skills as pt desires to return to work "    Hearing: {ZDAHAEI:99363}  Vision: {vision:80807}    Home environment/lifestyle: ***  Highest level of education: ***  Vocational status: ***  for [employer] status: Alert  Behavior status: Cooperative  Communication modalities: Verbal  Rehabilitation prognosis: Good rehab potential to reach the established goals    Assessments    ***    Goals  Short-term goals:  1  ***    Long-term goals:  1  ***    Functional Limitations Reporting (G-codes):   ***    Impressions/Recommendations    Impressions: Patient presents with ***    Recommendations:  -Patient would benefit from outpatient skilled Speech Therapy services : Cognitive-Linguistic therapy    -Frequency: 2x weekly  -Duration: 4-6 weeks    -Intervention certification from: 88/72/8629  -Intervention certification to: ***    -Intervention comments: ***    Visit Tracking:  -Referring provider: {Referring provider:[id number]}  -Billing guidelines: {Billing Guidelines:86206}  -Visit #***/***   -{Insurance:[id number]}  {Insurance:[id number]}  (***)  -RE due ***

## 2018-11-26 ENCOUNTER — EVALUATION (OUTPATIENT)
Dept: PHYSICAL THERAPY | Facility: CLINIC | Age: 63
End: 2018-11-26
Payer: COMMERCIAL

## 2018-11-26 ENCOUNTER — EVALUATION (OUTPATIENT)
Dept: SPEECH THERAPY | Facility: CLINIC | Age: 63
End: 2018-11-26
Payer: COMMERCIAL

## 2018-11-26 ENCOUNTER — TELEPHONE (OUTPATIENT)
Dept: NEUROLOGY | Facility: CLINIC | Age: 63
End: 2018-11-26

## 2018-11-26 DIAGNOSIS — I63.9 RIGHT SIDED CEREBRAL HEMISPHERE CEREBROVASCULAR ACCIDENT (CVA) (HCC): ICD-10-CM

## 2018-11-26 DIAGNOSIS — R48.8 OTHER SYMBOLIC DYSFUNCTIONS: Primary | ICD-10-CM

## 2018-11-26 DIAGNOSIS — I63.9 RIGHT SIDED CEREBRAL HEMISPHERE CEREBROVASCULAR ACCIDENT (CVA) (HCC): Primary | ICD-10-CM

## 2018-11-26 PROCEDURE — G8979 MOBILITY GOAL STATUS: HCPCS | Performed by: PHYSICAL THERAPIST

## 2018-11-26 PROCEDURE — G8978 MOBILITY CURRENT STATUS: HCPCS | Performed by: PHYSICAL THERAPIST

## 2018-11-26 PROCEDURE — 97162 PT EVAL MOD COMPLEX 30 MIN: CPT | Performed by: PHYSICAL THERAPIST

## 2018-11-26 PROCEDURE — G9162 LANG EXPRESS CURRENT STATUS: HCPCS | Performed by: SPEECH-LANGUAGE PATHOLOGIST

## 2018-11-26 PROCEDURE — G9163 LANG EXPRESS GOAL STATUS: HCPCS | Performed by: SPEECH-LANGUAGE PATHOLOGIST

## 2018-11-26 PROCEDURE — 96125 COGNITIVE TEST BY HC PRO: CPT | Performed by: SPEECH-LANGUAGE PATHOLOGIST

## 2018-11-26 NOTE — TELEPHONE ENCOUNTER
----- Message from Toan Lopez PA-C sent at 10/21/2018 10:46 AM EDT -----  Regarding: HFU    Diagnosis/Reason for follow-up: stroke  Subspecialty for follow-up: stroke neurology  Recommended timing for HFU: 4 wks  Existing neurologist: JIMBO  Tests/Labs/Imaging ordered: JIMBO  Orders placed electronically: JIMBO    Thank you!

## 2018-11-26 NOTE — PROGRESS NOTES
Occupational Therapy Stroke Evaluation:    Today's Date: 2018  Patient Name: Sebastian Heart  : 1955  MRN: [id number]  Referring Provider: Lucy Rivera MD  Dx: Right sided cerebral hemisphere cerebrovascular accident (CVA) [facility]) [I63 9]    Active Problem List:   Patient Active Problem List   Diagnosis    Right sided cerebral hemisphere cerebrovascular accident (CVA) ([address] )    Hypertension    Anxiety    Hyperlipidemia    Gout    [facility]    Bradycardia     Past Medical Hx:   Past Medical History:   Diagnosis Date    Hypertension     Migraine      Past Surgical Hx:   Past Surgical History:   Procedure Laterality Date    APPENDECTOMY      KNEE SURGERY      NECK SURGERY      TONSILLECTOMY        Pain Levels:   Restin    With Activity:  0    Subjective/Patient Goal: "Get my arm better"    History of Present Illness:  Pt is a pleasant, active, employed full time 58 y o  male seen for OT eval s/p referred to [address] [place] [address] s/p presented to [address] [place] [address] on 10/20/2018 due to left-sided weakness and facial droop  He also had a speech impairment and was found to have an acute stroke  The patient received tPA at 11:00 p m  on the day of admission after neurology assessment  He had no further complications during his hospital stay apart from a small amount of petechial hemorrhage noted on MRI  CT scan showed positive results for small infarctions within the right frontal lobe and right temporal occipital junction, and areas of petechial hemorrhage  Pt discharged to [facility] from 10/25/18 - 18  Dx'd w/ R MCA CVA, HTN, DM, comorbidities as listed above  Lifestyle Performance Model:  Autonomy: Pt was I w/ I/ADLS, drove, & required no use of DME PTA, required use of SPC since CVA has not returned to driving or work, + Give Jessica sling for LUE from [facility]    Reciprocal Relationships: Supportive son lives next door in home attached, also has s/o involved to A as needed, 3 children (2 sons and a dtr) and 3 grandchildren, s/o lives locally, son works FT during am hours  Service to Others: 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 [employer] and travels 4-5 times a year to [place] for few weeks at time [employer], travels internationally frequently  Intrinsic Gratification: Pt reports enjoying his hot elva [employer] truck, his yard and pool  Pt reports having dog, Manuel  Home Setup: Pt lives in a [facility] apartment that is attached to his son's home in [place] w/ 0 MICKEY w/ 4 steps from the kitchen/bedroom to family room, son lives in the house attached next door  Objective  Impairments Section:   UE Strength:   JENNIFER: RUE: 60/200 LUE: 20/200   PINCER: 3 point pinch: RUE 15, LUE: 3   2 point pinch: RUE: 10, LUE: 1   Lateral pincher: RUE: 21, LUE: 7    Coordination:   9 HOLE PEG TEST:     RUE:21 9  seconds, LUE: unable to complete    Range of Motion:  AROM/PROM: LUE hemiparesis w/ ~2/5 MMT proximally ~1/5 MMT distally w/ impaired FMC/FMS/GMC/GMS, LUE pronator drift, RUE wfl/wnl 5/5 MMT t/o, R handed    Sensation:  MYOFILAMENTS:   RUE: 3 61   LUE: 3 61    Visual Perceptual and Functional Cognition:  1  Convergence Insufficiency Symptom Survey (CISS):  PASS    2  Cognitive Checklist:  *Patient indicated that he is experiencing the following symptoms:    · Memory: None reported    · Attention: Focusing or concentrating on a specific task    · Processing: None reported    · Executive Functions: None reported    · Communication: Word finding in conversation and Expressing thoughts and ideas into writing    · Visual: Misspelling while texting/email    · Emotional: Sleep changes    · Increased Sensitivities to: None reported     3   Gerardo Cognitive Assessment Version 8 1 (MoCA V8 1)  Visuospatial/executive functionin/5  Naming:  3/3  Memory: 1st trial:  3/5, 2nd trial:    Attention/concentration:   List of letters:   Serial Seven Subtraction:  3/3 w/ 0 errors  Language/sentence repetition:    Language Fluency:    Abstract/Correlational Thinkin/2  Delayed Recall:    Orientation:     Memory Index Score: 11/15  MoCA V1 8 1 Raw Score:  25/30, MIS:  11/15, indicative of mild neurocognitive impairments  4  Vision Screening Recording Form:   vision screen: + glasses @ all times present for vision screen  near acuity: R 20/20, L 20/20  binocularity far: orthophoria  binocularity near: orthophoria  red green fusion: PLRG @ 3"  near point of convergence: diplopia @ 2" w/ R low exophori  leobardo string: alignment  Pursuits: smooth in all planes  Saccades: accurate in all planes  ocular ROM: intact/full, L visuospatial inattention  visual perceptual midline shift: @ midline and @ horizon    5  Anxiety/Depression:  Pt engaged in the Neuro QOL Anxiety Short Form and Neuro QOL Depression Short Form in order to screen for anxiety and depressive s/s  Pt reported the following:  Anxiety- Short Form:   --used to measure anxious s/s  For scoring purposes, scores of 25+ indicate the threshold for treatment per neurology/SLIM  Score:  Pt most often chose the response rarely when asked about feeling anxious s/s  Depression- Short Form:   --used to measure depressive s/s  For scoring purposes, scores of 25+ indicate the threshold for treatment per neurology/SLIM  Score:  Pt most often chose the response rarely when asked about feeling depressive s/s  Assessment/Plan  Occupational Therapy Skilled Analysis Assessment and Plan of Care:  Pt requires overall mod I for ADLs/self care and mod I for fx'l mobility w/ SPC   Pt is currently demonstrating the following occupational deficits: limited 2* LUE hemiparesis w/ ~2/5 MMT proximally ~1/5 MMT distally w/ impaired FMC/FMS/GMC/GMS, LUE pronator drift, RUE wfl/wnl 5/5 MMT t/o, R handed, decreased endurance/activity tolerance, generalized weakness, deconditioning, SOB, WHYTE, fatigue, fall risk, impaired balance, flat affect, mildly depressed mood/anxiousness, L visuospatial inattention, diplopia @ 2" for near point of convergence w/ low R exophoria  Based on the aforementioned OT evaluation, functional performance deficits, and assessments, pt has been identified as a moderate complexity evaluation  Pt to continue to benefit from outpatient skilled OT services to address the following goals 2x/wk Short Term Goals for 4 weeks (2018), Long Term Goals for 8 weeks (2019), and POC to  w/in 90 days (2019) with special focus on self-care management, pt education,  and VM training, UE strength/coordination as well as motor training to improve above defiicits and enhance overall QOL/function      Goals:  Short Term Goals: (4 weeks)  Tone:  - Pt will demo good carryover of clinic and home tone strengthening strategies to for improved AROM initiation with functional reach, dressing, hygiene  Modalities:  - Pt will tolerate BIONESS for improved motor and sensory performance for overall improved hand to target with 80% accuracy  Sensation:  - Pt will increase proprioception of  L hand to target for improved functional reach vision occluded with ADL tasks  Coordination:  - Pt will increase rate of manipulation for all FM tests for improved functional performance (pinch pad, tripod, and lateral pincer grasps) with salient and fx'l I/ADL/leisure tasks  ROM/Function:  - Pt will increase L  UE to Gross Assist, refined functional assist, and stabilization with <20% cuing for tabletop tasks for improved functional performance of life roles and salient tasks  - Pt will demo with G carryover of Home Exercise Program for improved functional use of  LUE  Long Term Goals: (8 weeks)  Tone:   - Improve hypotonicity of LUE  to WFL/WNL for improved alternate motor patterns, termination on command, automatic grasp/release for improved functional performance t/o I/ADL/leisure task engagement  Coordination:  - Increase automaticity/decrease ataxia/prehension patterns of  LUE to 100% for normalized movement pattern & resumption of B/L integrative I/ADL/leisure task engagement (including fine and gross motor)  ROM/Function:  - Pt will increase L UE strength and  strength to 4+/5, through the use of strengthening exercises w/ home program review s/p skilled education to promote active fx'l movement    INTERVENTION COMMENTS:  Diagnosis: R MCA CVA, HTN, DM  Precautions: HTN, DM  FOTO: 96 with 4% limitation  Insurance: [employer]  [[id number]]  1 of _ visits, PN due 12/28/2018     Thank you for the consult!   Please call if you have any questions: [phone]  Keara Bae, OTYURI, OTR/L, C-GCM, CSRS  Director of Outpatient Neuro Occupational Therapy

## 2018-11-26 NOTE — PHYSICAL THERAPY NOTE
PT D/C SUMMARY    Pt progressed well to Khalida- I level for functional mobility with walking household distances, transfers and bed mobility  Cont to recommend S for stairs and community distances for safety  Pt mad great progress with overall strength, activity tolerance, ambulatory balance and righting reactions and was able to participate in higher level balance activities and community distance ambulation  FT was reviewed with pt's brother and friend regarding walking longer distances and how to guard pt for some HEP exercises  Pt also demonstrated good understanding of what exercises he can perform independently as an HEP at home  Pt able to dc home with family support and cont outpt PT services at this time  Pt also practiced walking with Mariama Carlos Fournier for additional balance support; family was purchasing this for pt

## 2018-11-26 NOTE — PROGRESS NOTES
PT Evaluation     Today's date: 2018  Patient name: Lizz Garcia  : 1955  MRN: [id number]  Referring provider: Angélica Cedillo MD  Dx:   Encounter Diagnosis     ICD-10-CM    1  Right sided cerebral hemisphere cerebrovascular accident (CVA) [facility]) I63 9                   Assessment  Assessment details: Iman Laura presents to PT with CC of difficulty with community walking and LLE weakness following CVA  Results of eval suggest mild strength deficits in LLE, impaired coordination, impaired balance with integration of vestibular feedback, and impaired functional gait  This is resulting in difficulty returning to work as a  and traveling internationally for work, and driving  Pt  Would benefit from skilled PT to address these deficits and facilitate return to work  Prognosis details: Patient highly motivated    Goals  STG's (4 weeks)  Patient will increase distance with 6MWT by at least 120ft to show improvement in endurance  Patient will perform MCTSIB test with EC on foam pad for 30s to show improvement in static balance with vestibular integration  Patient will score 30/30 on FGA to demonstrate improved balance and gait  LTG's (6 weeks)  Patient will be able to ambulate on uneven surfaces and ramps in community safely with no AD, in order to facilitate return to work and travel   Patient will be independent with HEP in order to maintain progress with functional gains  Plan  Plan details: Initiate POC, progress as tolerated     Patient would benefit from: skilled physical therapy  Planned therapy interventions: balance, coordination, gait training, home exercise program, work reintegration, therapeutic exercise, therapeutic activities, strengthening, stretching, patient education and neuromuscular re-education  Frequency: 2x week  Duration in weeks: 6  Plan of Care beginning date: 2018  Plan of Care expiration date: 2019  Treatment plan discussed with: patient        Subjective Evaluation    History of Present Illness  Mechanism of injury: Reports to PT following a CVA occurring on 10/20/18  Went to ED immediately and spent 4 week in the [facility]  Was D/C to home 11/21/18  CC of difficulty moving LUE, is unable to use LUE for any ADL's  Reports weakness of LLE, needing a cane to walk safely at home, but feels he could go with out it at times  Has some difficulty walking on ramps and uneven surfaces in community  Lives in 1 story apartment, no steps to get into home  States he is able to do multiple flights of stairs without significant difficulty holding rail with 1 UE  Denies difficulty getting in or around home  Worked previously as a [employer] , traveled internationally for work  Would like to return to work     Pain  No pain reported          Objective    Flowsheet Rows      Most Recent Value   PT/OT G-Codes   Current Score  52   Projected Score  71   FOTO information reviewed  Yes   Assessment Type  Evaluation   G code set  Mobility: Walking & Moving Around   Mobility: Walking and Moving Around Current Status ()  CK   Mobility: Walking and Moving Around Goal Status ()  CJ         Balance Test    6 Minute Walk Test (ft): 1400ft   2 Minute Walk Test (ft):    Gait Speed (ft/s): 4 8ft/s    5x Sit To Stand (s): 11s no UE's    TUG: 10 9s w SPC,  10 2s w/o SPC     FGA: 25/30    MCTSIB Number of Seconds   Feet Together, Eyes Open 30s   Feet Together, Eyes Closed 30s   Feet Together, Eyes Open Foam 30s   Feet Together, Eyes Closed Foam 10s (needed mod assist to prevent fall)               Coordination Left Right   Heel To Shin Diminished  WNL   Finger To Nose Unable to perform  WNL   Rapid Alternating Movement Unable to perform WNL                 Sensation Left Right   Kinesthesia WNL WNL   Light Touch WNL WNL                 Muscle Tone Left Right   Modified Linsey Scale 1+    Hamstring WNL     Gastroc Hypertonicity     Quad WNL Manual Muscle Testing - Hip Left Right   Flexion 4  5   Extension     Abduction 5 5   External Rotation 5 5     Manual Muscle Testing - Knee Left Right   Flexion 4 5   Extension 4 5     Manual Muscle Testing - Ankle Left Right   Doriflexion 5 5   Plantarflexion 5 5        Transfers    Sit To Stand Independent                     Gait Assessment: Occasional toe drag on L side, poor control of knee ext  During terminal swing phase on L         Precautions: HTN, taking blood thinner Rx    Daily Treatment Diary     Manual                                                                                   Exercise Diary              Treamill with incline             Tandem walking             Static balance              Lunges              Step ups                                                                                                                                                                                                                     Modalities

## 2018-11-26 NOTE — PROGRESS NOTES
Speech-Language Pathology Initial Evaluation    Today's date: 2018  Patients name: Kian Dubon  : 1955  MRN: [id number]  Safety measures: CVA; fall risk  Referring provider: Musa Fontenot MD    Subjective comments: "I'm good "    Patient's goal(s): "to get back to be able to do my job"    Reason for referral: Change in cognitive status  Prior functional status: Communication effective and appropriate in all situations  Clinically complex situations: Discharge from SNF or Hospital in the last 30 days    History: Patient is a 58 y o  male who was referred to outpatient skilled Speech Therapy services for a cognitive-linguistic evaluation  Patient originally presented to [facility] with symptoms of L-sided weakness, facial droop, and dysarthria  PMH includes bradycardia, DM, gout, HLD, anxiety, HTN, a recent R-sided CVA  Patient received tPA in ED  Patient was initially on the ICU for neuro checks and monitoring s/p tPA  No further complications during hospital stay apart from a small amount of petechial hemorrhage noted on MRI  (MRI brain revealed moderate amount of multifocal cortical infarctions involving distinct and separate foci in the R middle cerebral territory  Small amount of petechial hemorrhage in the insular cortex in the region of recent infarction ) Patient was discharged to the [facility] on 10/25/2018  Patient was discharged home on 2018  [facility] Speech Therapy Discharge Summary (2018):  "Pt discharged home on 2018 with support from his family, making good progress during acute rehab stay  At time of discharge, pt achieved mod I level for comprehension, expression, problem solving and memory and independent level for social interaction  Pt has demonstrated improvement in mental flexibility and more complex reasoning/problem solving, however, does still benefit from increased time for processing and preparation/organization of ideas   Related to language, pt is demonstrating verbal expression skills at mod I level, demonstrating the ability to verbalize complex information without assistance  However, pt continues to present with difficulties in higher level written expression skills in relation to spelling and syntax to which demonstrates insight and reports frustration  Pt is highly motivated to improve cognitive functioning on order to achieve baseline skills as pt desires to return to work, employed as a  for a [employer]  Additionally, pt was on a dysphagia level 2 diet and thin liquids upon admission, but has progressed to tolerating a regular diet and thin liquids without overt s/s aspiration or oral/pharyngeal difficulties  Pt demonstrating carryover of safe swallow strategies to include feeding to his R side, pacing, lingual sweeps to clear pocketing and pacing  Skilled ST intervention is no longer warranted for dysphagia therapy  However  overall, pt is functioning at mod I level for cognitive linguistic skills but will benefit from further skilled ST through outpatient services to continue to improve higher level cognitive linguistic skills and language skills as pt desires to return to work "    Patient reported no difficulty managing his medications with plastic pill   Patient stated that he completes finance management tasks online without difficulty  Patient reported difficulty in the following areas: reading comprehension ("I miss the ends of sentences "), word finding deficits ("I know what I want to say, but can't think of the word " "I see words differently " (e g , to/too, blew/blue)), reduced attention to L side, reduced STM      Hearing: Patient reported some loss of hearing (L>R)  Vision: WFL with glasses    Home environment/lifestyle: Lives independently in a suite attached to son's house  Highest level of education: High school with some technical school classes  Vocational status:  for a [employer] (Per patient report, he travels to [place] 5-6x/year for work  Patient reported, "I make sure what leaves the country is correct " Patient has been working in his current profession for ~30 years  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, scheduling, etc )     Mental status: Alert  Behavior status: Cooperative  Communication modalities: Verbal  Rehabilitation prognosis: Good rehab potential to reach the established goals    Assessments    The Repeatable Battery for the Assessment of Neuropsychological Status (RBANS) is a brief, individually-administered assessment which measures attention, language, visuospatial/constructional abilities, and immediate & delayed memory  The RBANS is intended for use with adolescents to adults, ages 15 to 80 years  The following results were obtained during the administration of the assessment  Form: C    Cognitive Domain/Subtest: Index Score: Percentile Rank: Classification:   IMMEDIATE MEMORY 83 13%ile Low Average        1  List Learning (26/40)        2  Story Memory (12/24)       VISUOSPATIAL/  CONSTRUCTIONAL 87 19%ile Low Average        3  Figure Copy (18/20)        4  Line Orientation (14/20)       LANGUAGE 92 30%ile Average        5  Picture Naming (9/10)        6  Semantic Fluency (82/71)       ATTENTION 122 93%ile Superior        7  Digit Span (14/16)        8  Coding (50/89)       DELAYED MEMORY 75 5%ile Borderline        9  List Recall (1/10)        10  List Recognition (17/20)        11  Story Recall (7/12)        12  Figure Recall (8/20)         Sum of Index Scores:  459   Total Score:  88   Percentile: 21%ile   Classification: Low Average     *Patient named 13 concrete category members (animals) in 60 sec (norm=15+)  -- BELOW AVERAGE    *Patient named 9 abstract category members (words beginning with letter 'm') in 60 sec (norm=10+)   -- BELOW AVERAGE      The Test of Adult Language  Fourth Edition (TOAL-4) is a standardized, norm-referenced assessment measuring important communicative abilities in spoken and written language  The test in normed on individuals 12:0-24:11  Due to these age restrictions, these standardized scores should not be compared to standard or percentile rank  Objective measures were taken to complete a diagnostic impression and prognosis for this pt  The TOAL-4 has 6 subtests; their results are reported as percentile ranks and scaled scores  The results of the TOAL-4 are generally used for three purposes; (a) to identify adolescents and adults who score significantly below their peers and therefore might need help improving their language proficiency, (b) to determine areas of relative strength and weakness among language abilities, and (c) to serve as a research tool in studies investigating language problems in adolescents and adults  Due to time constraints, only portions of the standardized assessment were administered on this date of service  Subtest: Raw Score: Percentile:  Scaled Score:   1  Word Opposites 11/34 2%tile 4   3  Spoken Analogies 15/26 25%tile 8   4  Word Similarities 15/40 25%tile 8       Goals  Short-term goals:  1  Patient will be educated on the use of internal and external memory aids and compensatory strategies with 80% accuracy to facilitate increased recall of routine, personal information, and recent events, to be achieved in 4-6 weeks  2  Patient will complete auditory immediate and short term memory tasks to 80% accuracy to facilitate increased ability to retell narratives and recall information within functional living environment, to be achieved in 4-6 weeks  3  Patient will facilitate planning by completing thought organization tasks (e g , sequencing, deduction puzzles, etc ) with 90% accuracy to facilitate increased executive functioning, working memory, problem solving, and processing skills, to be achieved in 4-6 weeks      4  Patient will be educated on word finding strategies (i e , circumlocution) for improved generative naming and verbal expression skills  5  Patient will name up to 3 synonyms for a given word with 80% accuracy to build expressive vocabulary for conversation, to be achieved in 4-6 weeks  6  Patient will name an appropriate antonym for a given word with 80% accuracy to build expressive vocabulary for conversation, to be achieved in 4-6 weeks  7  Patient will complete word generation tasks (e g , analogies, category matrices, word definitions, synonyms/antonyms, idioms, etc ) with 80% accuracy using word finding strategies to facilitate improved word retrieval skills, to be achieved in 4-6 weeks  Long-term goals:  1  Patient will demonstrate cognitive-communication skills consistent with age and education given use of compensatory strategies when needed to resume baseline activities and responsibilities in home, community, and work settings by discharge  2  Patient will complete cognitive-linguistic therapy that addresses patients specific deficits in processing speed, short-term working memory, attention to detail, monitoring, sequencing, and organization skills, with instruction, to alleviate effects of executive functioning disorder deficits by discharge  3  Patient will demonstrate adequate verbal expression during conversation without breakdowns or word finding deficits by discharge      Functional Limitations Reporting (G-codes):   Flowsheet Rows      Most Recent Value   SLP G-Codes   Assessment Type  Evaluation   Functional Limitations  Spoken language expressive   Spoken Language Expression Current Status ()  CJ   Spoken Language Expression Goal Status ()  [facility]        Impressions/Recommendations    Impressions: Patient presents with mild-moderate cognitive-linguistic deficits s/p CVA c/b reduced immediate & short-term memory, auditory processing, visuospatial/constructional, verbal fluency, and higher-level language skills  During conversational speech, patient was observed to have reduced language processing with notable anomia  Recommend continued informal assessment of reading comprehension and graphic expression during diagnostic therapy sessions  Patient demonstrates insight into deficits and is motivated to continue with Speech Therapy in the outpatient setting  Patient has good family support  Recommendations:  -Patient would benefit from outpatient skilled Speech Therapy services : Cognitive-Linguistic therapy    -Frequency: 2x weekly  -Duration: 4-6 weeks    -Intervention certification from: 86/22/4569  -Intervention certification to: 71/39/8492    -Intervention comments:   Initial evaluation/administration of standardized test with patient (6pm-7pm)  Scoring and interpretation of standardized test for the development of POC (7pm-8pm)    Stand test with pt (6-7) / Scoring & write-up (7-8)    Visit Tracking:  -Referring provider: Epic  -Billing guidelines: CMS  -Visit #1/1  (Auth required after 1st visit)  -[employer]  (20 visits per benefit year; auth required)  -RE due 01/07/2019

## 2018-11-28 ENCOUNTER — EVALUATION (OUTPATIENT)
Dept: OCCUPATIONAL THERAPY | Facility: CLINIC | Age: 63
End: 2018-11-28
Payer: COMMERCIAL

## 2018-11-28 DIAGNOSIS — I63.9 RIGHT SIDED CEREBRAL HEMISPHERE CEREBROVASCULAR ACCIDENT (CVA) (HCC): Primary | ICD-10-CM

## 2018-11-28 PROCEDURE — G8985 CARRY GOAL STATUS: HCPCS

## 2018-11-28 PROCEDURE — G8984 CARRY CURRENT STATUS: HCPCS

## 2018-11-28 PROCEDURE — 97166 OT EVAL MOD COMPLEX 45 MIN: CPT

## 2018-12-03 NOTE — PROGRESS NOTES
Occupational Therapy Daily Note:     Today's date: 2018  Patient name: Pankaj Jimenez  : 1955  MRN: [id number]  Referring provider: Kin Arnold MD  Dx:   Encounter Diagnosis   Name Primary?  Right sided cerebral hemisphere cerebrovascular accident (CVA) ([facility] [address] ) Yes       Subjective: "This sling is much better"  Objective: Pt seen for OT treatment session focusing on proximal LUE strengthening, neuromuscular reeducation, tone reduction, and FMS/FMC  Pt engaged in UEB w/ coban wrap around L hand for sustained grasp prograde/retrograde x 5 min each  Tolerated NMES to L wrist extensors,  trap, and deltoid w/ simultaneous MH to L shoulder  In supine, 1# therex bar for shoulder flex, chest press  Pt given HEP via theraputty for FMC/FMS dexterity and distal strengthening w/ review of handout, given yellow theraputty, acknowledged reception of such  Pt also measured and fitted for neoprene LUE sling for proprioceptive input and glenohumeral approximation w/ report of comfort post fitting, provided handout and educated on wearing schedule  Assessment: Pt tolerated treatment well  Demo decreased  strength, proximal control of LUE, endurance, and activity tolerance  Plan: Continued skilled OT per POC with focus on LUE neuromuscular re-education, GMC/GMS/FMS/FMC, fx'l dexterity, automacity, fx'l I/ADL/leisure task re-engagement  INTERVENTION COMMENTS:  Diagnosis: R MCA CVA, HTN, DM  Precautions: HTN, DM  FOTO: 96 with 4% limitation  Insurance: [employer]  [[id number]]  2 of _ visits, PN due 2018      Patient treated by SAUMYA Edwards, under direct supervision of FRANCISCO Rodrigez OTR/L  Thank you for the consult!   Please call if you have any questions: [phone]  SERA Thomas, OTR/L, C-GCRAFAEL, CSRS  Director of Outpatient Neuro Occupational Therapy

## 2018-12-04 ENCOUNTER — OFFICE VISIT (OUTPATIENT)
Dept: SPEECH THERAPY | Facility: CLINIC | Age: 63
End: 2018-12-04
Payer: COMMERCIAL

## 2018-12-04 ENCOUNTER — OFFICE VISIT (OUTPATIENT)
Dept: OCCUPATIONAL THERAPY | Facility: CLINIC | Age: 63
End: 2018-12-04
Payer: COMMERCIAL

## 2018-12-04 ENCOUNTER — OFFICE VISIT (OUTPATIENT)
Dept: PHYSICAL THERAPY | Facility: CLINIC | Age: 63
End: 2018-12-04
Payer: COMMERCIAL

## 2018-12-04 DIAGNOSIS — I63.9 RIGHT SIDED CEREBRAL HEMISPHERE CEREBROVASCULAR ACCIDENT (CVA) (HCC): Primary | ICD-10-CM

## 2018-12-04 DIAGNOSIS — R48.8 OTHER SYMBOLIC DYSFUNCTIONS: Primary | ICD-10-CM

## 2018-12-04 DIAGNOSIS — I63.9 RIGHT SIDED CEREBRAL HEMISPHERE CEREBROVASCULAR ACCIDENT (CVA) (HCC): ICD-10-CM

## 2018-12-04 PROCEDURE — 97112 NEUROMUSCULAR REEDUCATION: CPT

## 2018-12-04 PROCEDURE — 97110 THERAPEUTIC EXERCISES: CPT

## 2018-12-04 PROCEDURE — 97150 GROUP THERAPEUTIC PROCEDURES: CPT

## 2018-12-04 PROCEDURE — 92507 TX SP LANG VOICE COMM INDIV: CPT

## 2018-12-04 NOTE — PROGRESS NOTES
Daily Note     Today's date: 2018  Patient name: Heide Coe  : 1955  MRN: [id number]  Referring provider: Jatinder Vazquez MD  Dx:   Encounter Diagnosis     ICD-10-CM    1  Right sided cerebral hemisphere cerebrovascular accident (CVA) ([facility] [address] ) I63 9                   Subjective: Patient noted that he was able to walk today without compliants earlier in the morning outside  Objective: See treatment diary below      Assessment:  Patient was able to perform all exercises with no complaints  Patient was able to walk on treadmill increased speed to 2 0 with increased foot drag decreased speed to 1 8 and patient was able to perform with decreased foot drag  Patient would benefit from continued PT      Plan: Continue per plan of care            Precautions: HTN, taking blood thinner Rx     Daily Treatment Diary      Manual                                                                                                                                                      Exercise Diary                        Treamill with incline  10 min 1 5% 1  8mph                     Tandem walking  6 laps short //                     Static balance   30"x3 FT EC   FT EO 30"x2  tandem 30"x2 ea                       Foam FT EO 30"x3            Lunges   10xea                     Step ups   6inch 15xea fwd lat                      side stepping   6 laps short //                       sit to stands   2x10                      step taps   6 inch 20x                     Hurdles fwd/lat  Lat( hurdles flipped upside down)  6 laps ea                      Step taps   6inch x20                                                                                                                                                                                                                                                                           Modalities                                                                                                   4:00-4:25 treated 1:1   4:25-4:50 self directed exercise not billed  4:50-5:00 group

## 2018-12-04 NOTE — PROGRESS NOTES
Daily Speech Treatment Note    Today's date: 2018  Patients name: Kalee Session  : 1955  MRN: [id number]  Safety measures: CVA, fall risk  Referring provider: Marie Gaytan MD    Primary Diagnosis/Billing code: O03 5  Secondary Diagnosis/ Billing code: I63 9    Visit Tracking:  -Referring provider: Epic  -Billing guidelines: CMS  -Visit #1/  (Auth required after 1st visit)  -[employer]  (20 visits per benefit year; auth required)  -RE due 2019  Subjective/Behavioral:  -"My psychologist told me something, that is really sticking with me  Tanesha Blend Tanesha Blend Tanesha Blend I have to feel to heal"    Objective/Assessment:  -Reviewed testing results and goals in plan care with patient  Patient is in agreement at this time  Short-term goals:  1  Patient will be educated on the use of internal and external memory aids and compensatory strategies with 80% accuracy to facilitate increased recall of routine, personal information, and recent events, to be achieved in 4-6 weeks  2  Patient will complete auditory immediate and short term memory tasks to 80% accuracy to facilitate increased ability to retell narratives and recall information within functional living environment, to be achieved in 4-6 weeks  To target immediate memory; patient participated in a mental manipulation task  Words were read aloud by clinician, and patient was asked to recall words in a specific order denoted by SLP  Patient completed recall of words in word order (field of 3) in /10 opp;     Patient completed recall of words in alphabetical order (field of 3) in / opp;     3  Patient will facilitate planning by completing thought organization tasks (e g , sequencing, deduction puzzles, etc ) with 90% accuracy to facilitate increased executive functioning, working memory, problem solving, and processing skills, to be achieved in 4-6 weeks  Anagrams:  To target auditory attention and mental manipulation during a word finding activity, patient was asked to listen to a word, and rearrange the letters within the word to create a new word (i e , late = tale)  Task completed over 13 trials  Completed 8/13 independently, increased to 11/13 with use of written support  Patient expressed "why are these so hard!" during task  I explained that we are targeting multiple cognitive abilities, requiring working memory and word finding  4  Patient will be educated on word finding strategies (i e , circumlocution) for improved generative naming and verbal expression skills  5  Patient will name up to 3 synonyms for a given word with 80% accuracy to build expressive vocabulary for conversation, to be achieved in 4-6 weeks  6  Patient will name an appropriate antonym for a given word with 80% accuracy to build expressive vocabulary for conversation, to be achieved in 4-6 weeks  7  Patient will complete word generation tasks (e g , analogies, category matrices, word definitions, synonyms/antonyms, idioms, etc ) with 80% accuracy using word finding strategies to facilitate improved word retrieval skills, to be achieved in 4-6 weeks  To target generative naming with initial letter and category provided, patient completed category members acitvity (i e , SPORT, begins with /g/)  Patient appropriately named 12/16 words; increasing to 16/16 with verbal cues  Plan:  -Patient was provided with home exercises/activities to target goals in plan of care at the end of today's session   -Continue with current plan of care

## 2018-12-06 ENCOUNTER — OFFICE VISIT (OUTPATIENT)
Dept: PHYSICAL THERAPY | Facility: CLINIC | Age: 63
End: 2018-12-06
Payer: COMMERCIAL

## 2018-12-06 ENCOUNTER — OFFICE VISIT (OUTPATIENT)
Dept: SPEECH THERAPY | Facility: CLINIC | Age: 63
End: 2018-12-06
Payer: COMMERCIAL

## 2018-12-06 ENCOUNTER — OFFICE VISIT (OUTPATIENT)
Dept: OCCUPATIONAL THERAPY | Facility: CLINIC | Age: 63
End: 2018-12-06
Payer: COMMERCIAL

## 2018-12-06 DIAGNOSIS — I63.9 RIGHT SIDED CEREBRAL HEMISPHERE CEREBROVASCULAR ACCIDENT (CVA) (HCC): Primary | ICD-10-CM

## 2018-12-06 DIAGNOSIS — I63.9 RIGHT SIDED CEREBRAL HEMISPHERE CEREBROVASCULAR ACCIDENT (CVA) (HCC): ICD-10-CM

## 2018-12-06 DIAGNOSIS — R48.8 OTHER SYMBOLIC DYSFUNCTIONS: Primary | ICD-10-CM

## 2018-12-06 PROCEDURE — 97112 NEUROMUSCULAR REEDUCATION: CPT

## 2018-12-06 PROCEDURE — 97140 MANUAL THERAPY 1/> REGIONS: CPT

## 2018-12-06 PROCEDURE — 92507 TX SP LANG VOICE COMM INDIV: CPT

## 2018-12-06 PROCEDURE — 97110 THERAPEUTIC EXERCISES: CPT

## 2018-12-06 NOTE — PROGRESS NOTES
Daily Note     Today's date: 2018  Patient name: Lashonda Wong  : 1955  MRN: [id number]  Referring provider: Lorenzo Iverson MD  Dx:   Encounter Diagnosis     ICD-10-CM    1  Right sided cerebral hemisphere cerebrovascular accident (CVA) ([facility] [address] ) I63 9          Subjective: Patient asked at end of treatment if he still should use his SPC  Discussed with patient to continued use SPC and will have patient walk with and without NV  Patient noted that he walked 4 blocks tuesday and wednesday  Objective: See treatment diary below      Assessment: Patient was challenged with performing side stepping over hurdles today due to dragging L foot more today with lat hurdles  Verbally updated patient's HEP to include tandem ambulation, standing FT EO, and side stepping  Patient perform exercises in large // today  Patient would benefit from continued PT      Plan: Continue per plan of care          Precautions: HTN, taking blood thinner Rx     Daily Treatment Diary      Manual                                                                                                                                                      Exercise Diary                      Treamill with incline  10 min 1 5% 1  8mph  10 min (2% 4 min) 1  6mph                   Tandem walking  6 laps short //  3 laps  2 laps foam                   Static balance   30"x3 FT EC   FT EO 30"x2  tandem 30"x2 ea     foam 30"x3 FT EC   tandem 30"x3 ea level                   Foam FT EO 30"x3                     Lunges   10xea  20xea                   Step ups   6inch 15xea fwd lat   6inch 2x10 xea fwd lat                    side stepping   6 laps short //   3 laps foam                    sit to stands   2x10  2 x10                     step taps   6 inch 20x  6inch 20x                   Hurdles fwd/lat  Lat( hurdles flipped upside down)  6 laps ea   3 laps ea                    Step taps   6inch x20                      marches ambulation    3 laps                    ambulation with and without SPC                                                                                                                                                                                                                              Modalities

## 2018-12-06 NOTE — PROGRESS NOTES
Daily Speech Treatment Note    Today's date: 2018  Patients name: Severiano Finner  : 1955  MRN: [id number]  Safety measures: CVA, fall risk  Referring provider: Ginny Larios MD    Primary Diagnosis/Billing code: K98 0  Secondary Diagnosis/ Billing code: I63 9    Visit Tracking:  -Referring provider: Epic  -Billing guidelines: CMS  -Visit #2/12  (Auth required after 1st visit)   -[employer]  (20 visits per benefit year; auth required)  -RE due 2019  Subjective/Behavioral:  "I feel like I am getting a cold"    Objective/Assessment:  -Reviewed patient's home exercises/activities completed since last appointment  Category members;      *Patient was noted to make mild paraphasic errors in his expressive speech today (i e  Malcom Rayray for /beaker/ during 'anomia' game; as well as writing /shrone/ when meaning /throne/)    Short-term goals:  1  Patient will be educated on the use of internal and external memory aids and compensatory strategies with 80% accuracy to facilitate increased recall of routine, personal information, and recent events, to be achieved in 4-6 weeks  2  Patient will complete auditory immediate and short term memory tasks to 80% accuracy to facilitate increased ability to retell narratives and recall information within functional living environment, to be achieved in 4-6 weeks  3  Patient will facilitate planning by completing thought organization tasks (e g , sequencing, deduction puzzles, etc ) with 90% accuracy to facilitate increased executive functioning, working memory, problem solving, and processing skills, to be achieved in 4-6 weeks  To target immediate memory; patient participated in a mental manipulation task  Words were read aloud by clinician, and patient was asked to recall words in a specific order denoted by SLP       Patient completed recall of words in reverse order (field of 3) in 10/10 opp;     Patient completed recall of words in word order (field of 3) in 7/8 opp;     Patient completed recall of words in alphabetical order (field of 3) in 8/10 opp;       4  Patient will be educated on word finding strategies (i e , circumlocution) for improved generative naming and verbal expression skills  5  Patient will name up to 3 synonyms for a given word with 80% accuracy to build expressive vocabulary for conversation, to be achieved in 4-6 weeks  6  Patient will name an appropriate antonym for a given word with 80% accuracy to build expressive vocabulary for conversation, to be achieved in 4-6 weeks  To target semantic association and lexicon building, patient asked to name an opposite/antonym and synonym of an abstract word (i e , rigid)  Opposite naming completed 18/22 opp, increased to 22/22 with verbal cues  Synonym naming completed 18/22 opp, increased to 22/22 with verbal cues  7  Patient will complete word generation tasks (e g , analogies, category matrices, word definitions, synonyms/antonyms, idioms, etc ) with 80% accuracy using word finding strategies to facilitate improved word retrieval skills, to be achieved in 4-6 weeks  To target word finding and attention; patient completed the card game "Anomia" where they are asked to name people/places/things based on category presented on card (i e , artificial sweetener)  Target of game was for speed of naming and processing  Pt completed level 4 with average of 9 8 cards min (goal is 10+)  Pt skipped cards x 9  To target word finding and attention; patient completed the worksheet "Completing Words"  Patient independently named 7/20 words; increasing to 20/20 with verbal cues  Plan:  -Patient was provided with home exercises/activities to target goals in plan of care at the end of today's session   -Continue with current plan of care

## 2018-12-06 NOTE — PROGRESS NOTES
Daily Note     Today's date: 2018  Patient name: Shea Hanson  : 1955  MRN: [id number]  Referring provider: Jessica Soares MD  Dx:   Encounter Diagnosis   Name Primary?  Right sided cerebral hemisphere cerebrovascular accident (CVA) ([facility] ) Yes                  Subjective: "My hand feels alive for the first time "      Objective: See treatment below  Provided patient with MH and NMES for 10 minutes for increasing wrist extension  IASTM questionnaire completed and able to use in hand only  Transitioned to supine for neuro re-ed for proximal strengthening with ceiling punch, flexion and shoulder adduction  Pulsing with use of 2# weighted bar for dynamic stabilization  Sidelying with use of air cast and BB for flexion/extension and focus on grasp and release  Seated weight bearing into elbow and blue phsyioball for tone reduction  Assessment: Tolerated treatment well  Plan: Continued skilled OT per POC      INTERVENTION COMMENTS:  Diagnosis: Right sided cerebral hemisphere cerebrovascular accident (CVA) ([facility] ) [I63 9]  Precautions: HTN, DM, IASTM to HAND ONLY  FOTO:  3 of 8 visits, PN due

## 2018-12-11 ENCOUNTER — OFFICE VISIT (OUTPATIENT)
Dept: SPEECH THERAPY | Facility: CLINIC | Age: 63
End: 2018-12-11
Payer: COMMERCIAL

## 2018-12-11 ENCOUNTER — OFFICE VISIT (OUTPATIENT)
Dept: PHYSICAL THERAPY | Facility: CLINIC | Age: 63
End: 2018-12-11
Payer: COMMERCIAL

## 2018-12-11 ENCOUNTER — OFFICE VISIT (OUTPATIENT)
Dept: OCCUPATIONAL THERAPY | Facility: CLINIC | Age: 63
End: 2018-12-11
Payer: COMMERCIAL

## 2018-12-11 DIAGNOSIS — I63.9 RIGHT SIDED CEREBRAL HEMISPHERE CEREBROVASCULAR ACCIDENT (CVA) (HCC): ICD-10-CM

## 2018-12-11 DIAGNOSIS — I63.9 RIGHT SIDED CEREBRAL HEMISPHERE CEREBROVASCULAR ACCIDENT (CVA) (HCC): Primary | ICD-10-CM

## 2018-12-11 DIAGNOSIS — R48.8 OTHER SYMBOLIC DYSFUNCTIONS: Primary | ICD-10-CM

## 2018-12-11 PROCEDURE — 92507 TX SP LANG VOICE COMM INDIV: CPT

## 2018-12-11 PROCEDURE — 97112 NEUROMUSCULAR REEDUCATION: CPT

## 2018-12-11 PROCEDURE — 97110 THERAPEUTIC EXERCISES: CPT

## 2018-12-11 PROCEDURE — 97140 MANUAL THERAPY 1/> REGIONS: CPT

## 2018-12-11 NOTE — PROGRESS NOTES
Occupational Therapy Daily Note:    Today's date: 2018  Patient name: Gricelda Lawton  : 1955  MRN: [id number]  Referring provider: Per Acuña MD  Dx:   Encounter Diagnosis   Name Primary?  Right sided cerebral hemisphere cerebrovascular accident (CVA) ([facility]) Yes     Subjective: "that was a good workout  I like feeling accomplished "    Objective: See treatment below  MH with NMES to extensors for 10 minutes  IASTM to volar and dorsal side of left hand for sensory re-ed  UEB 2 minutes prograde and retrograde with NMES to triceps  Prone exercises for posterior strengthening with abduction, extension, and  pulls  Quadruped with weight shifting front/back and side to side  Modified push-ups for trunk control and posterior strength        Assessment: Tolerated treatment well       Plan: Continued skilled OT per POC      INTERVENTION COMMENTS:  Diagnosis: Right sided cerebral hemisphere cerebrovascular accident (CVA) ([facility] ) [I63 9]  Precautions: HTN, DM, IASTM to HAND ONLY  FOTO:  4 of 8 visits, PN due

## 2018-12-11 NOTE — PROGRESS NOTES
Daily Speech Treatment Note    Today's date: 2018  Patients name: Mikayla Redd  : 1955  MRN: [id number]  Safety measures: CVA, fall risk  Referring provider: Dayla Romberg, MD    Primary Diagnosis/Billing code: T52 7  Secondary Diagnosis/ Billing code: I63 9    Visit Tracking:  -Referring provider: Epic  -Billing guidelines: CMS  -Visit #3/12  (Auth required after 1st visit)   -[employer]  (20 visits per benefit year; auth required)  -RE due 2019  Subjective/Behavioral:  "I'm OK"    Objective/Assessment:   Patient forget HEP at home    Short-term goals:  1  Patient will be educated on the use of internal and external memory aids and compensatory strategies with 80% accuracy to facilitate increased recall of routine, personal information, and recent events, to be achieved in 4-6 weeks  2  Patient will complete auditory immediate and short term memory tasks to 80% accuracy to facilitate increased ability to retell narratives and recall information within functional living environment, to be achieved in 4-6 weeks  3  Patient will facilitate planning by completing thought organization tasks (e g , sequencing, deduction puzzles, etc ) with 90% accuracy to facilitate increased executive functioning, working memory, problem solving, and processing skills, to be achieved in 4-6 weeks  Anagrams: To target auditory attention and mental manipulation during a word finding activity, patient was asked to listen to a word, and rearrange the letters within the word to create a new word (i e , late = tale)  Task completed over 5 trials  Completed 2/5 independently, increased to 5/5 with use of written support  Rush Hour: To target problem solving and thought organization; patient completed levels 2,5,11  to 100% acc  4  Patient will be educated on word finding strategies (i e , circumlocution) for improved generative naming and verbal expression skills      To target circumlocution and overall language skills; patient participated in the game "taboo" with clinician  Patient is presented with a word (i e , yellow), and must provide verbal clues to the clinician, without using "taboo" or restricted words (i e , color, bus, sun, etc ) Patient completed description of 16/16 words, and guessing of 14/16 words described by clinician  Patient noted to require additional time for execution of clues and word finding  5  Patient will name up to 3 synonyms for a given word with 80% accuracy to build expressive vocabulary for conversation, to be achieved in 4-6 weeks  6  Patient will name an appropriate antonym for a given word with 80% accuracy to build expressive vocabulary for conversation, to be achieved in 4-6 weeks  7  Patient will complete word generation tasks (e g , analogies, category matrices, word definitions, synonyms/antonyms, idioms, etc ) with 80% accuracy using word finding strategies to facilitate improved word retrieval skills, to be achieved in 4-6 weeks  To target naming skills, patient was asked to provide a word that best fit the definition, with the initial letter provided (i e , a liquid measure equaling four quarts__/g/___gallon)  Patient completed naming in 34/40 opp, increasing to 40/40 with verbal cues provided  Plan:  -Patient was provided with home exercises/activities to target goals in plan of care at the end of today's session   -Continue with current plan of care

## 2018-12-11 NOTE — PROGRESS NOTES
Daily Note     Today's date: 2018  Patient name: Jocelyne Bobby  : 1955  MRN: [id number]  Referring provider: Cornelius Cortes MD  Dx:   Encounter Diagnosis     ICD-10-CM    1  Right sided cerebral hemisphere cerebrovascular accident (CVA) ([facility] ) I63 9                   Subjective: Patient noted that he was went for a walk with no issues  Patient noted good compliance with HEP  Objective: See treatment diary below      Assessment:  Added cone taps instead of step taps and added foam with step ups consider increasing step height to 8 inch NV no foam  Also patient was reba to perform lateral hurdles without flipping hurdles over  Patient performed 6inch step with foam on floor with minimal difficulty  Patient was able to progress exercises today  Updated HEP for patient reviewed and gave patient handout  Patient would benefit from continued PT      Plan: Continue per plan of care  Precautions: HTN, taking blood thinner Rx     Daily Treatment Diary      Manual                                                                                                                                                      Exercise Diary                    Treamill with incline  10 min 1 5% 1  8mph  10 min (2% 4 min) 1  6mph  10 min 2% 1 8                 Tandem walking  6 laps short //  3 laps  2 laps foam   3 laps  2 laps foam                 Static balance   30"x3 FT EC   FT EO 30"x2  tandem 30"x2 ea     foam 30"x3 FT EC   tandem 30"x3 ea level  Foam EC FT 30"x3   Tandem level EC 30"x2ea                 Foam FT EO 30"x3                     Lunges   10xea  20xea  walking lunges                  Step ups   6inch 15xea fwd lat   6inch 2x10 xea fwd lat  6inch 2x10 xea fwd lat  Foam                   side stepping   6 laps short //   3 laps foam  3 laps foam                  sit to stands   2x10  2 x10   3x10                  step taps   6 inch 20x  6inch 20x  cones x30  standing on foam               Hurdles fwd/lat  Lat( hurdles flipped upside down)  6 laps ea   3 laps ea    3 laps ea  Fwd/lat hurdles regular                 Step taps   6inch x20                      marches ambulation    3 laps   3 laps                                           backwards ambulation                       Foam HT HN      EC foam 30"x2ea                                                                                                                                                                       Modalities

## 2018-12-13 ENCOUNTER — OFFICE VISIT (OUTPATIENT)
Dept: OCCUPATIONAL THERAPY | Facility: CLINIC | Age: 63
End: 2018-12-13
Payer: COMMERCIAL

## 2018-12-13 ENCOUNTER — OFFICE VISIT (OUTPATIENT)
Dept: SPEECH THERAPY | Facility: CLINIC | Age: 63
End: 2018-12-13
Payer: COMMERCIAL

## 2018-12-13 ENCOUNTER — OFFICE VISIT (OUTPATIENT)
Dept: PHYSICAL THERAPY | Facility: CLINIC | Age: 63
End: 2018-12-13
Payer: COMMERCIAL

## 2018-12-13 DIAGNOSIS — I63.9 RIGHT SIDED CEREBRAL HEMISPHERE CEREBROVASCULAR ACCIDENT (CVA) (HCC): Primary | ICD-10-CM

## 2018-12-13 DIAGNOSIS — I63.9 RIGHT SIDED CEREBRAL HEMISPHERE CEREBROVASCULAR ACCIDENT (CVA) (HCC): ICD-10-CM

## 2018-12-13 DIAGNOSIS — R48.8 OTHER SYMBOLIC DYSFUNCTIONS: Primary | ICD-10-CM

## 2018-12-13 PROCEDURE — 92507 TX SP LANG VOICE COMM INDIV: CPT

## 2018-12-13 PROCEDURE — 97112 NEUROMUSCULAR REEDUCATION: CPT

## 2018-12-13 PROCEDURE — 97116 GAIT TRAINING THERAPY: CPT | Performed by: PHYSICAL THERAPIST

## 2018-12-13 PROCEDURE — 97112 NEUROMUSCULAR REEDUCATION: CPT | Performed by: PHYSICAL THERAPIST

## 2018-12-13 PROCEDURE — 97140 MANUAL THERAPY 1/> REGIONS: CPT

## 2018-12-13 NOTE — PROGRESS NOTES
Daily Speech Treatment Note    Today's date: 2018  Patients name: Anabelle Monet  : 1955  MRN: [id number]  Safety measures: CVA, fall risk  Referring provider: Ne Chaves MD    Primary Diagnosis/Billing code: C60 4  Secondary Diagnosis/ Billing code: I63 9    Visit Tracking:  -Referring provider: Epic  -Billing guidelines: CMS  -Visit #4/12  (Auth required after 1st visit)   -[employer]  (20 visits per benefit year; auth required)  -RE due 2019  Subjective/Behavioral:  "I am doing okay "     Objective/Assessment:   Patient brought in completed HEP to 90%  Short-term goals:  1  Patient will be educated on the use of internal and external memory aids and compensatory strategies with 80% accuracy to facilitate increased recall of routine, personal information, and recent events, to be achieved in 4-6 weeks  Pt provided a Memory Strategies handout  Pt appreciative  2  Patient will complete auditory immediate and short term memory tasks to 80% accuracy to facilitate increased ability to retell narratives and recall information within functional living environment, to be achieved in 4-6 weeks  To target immediate memory; patient participated in a mental manipulation task  Words were read aloud by clinician, and patient was asked to recall words in a specific order denoted by SLP  Patient completed recall of words in word order (field of 4) in  opp; increasing to   Pt encouraged to first use "repeat and rehearse" strategy; pt used and found helpful  Word/Mental Picture Associations: To target working memory, patient was trained to associate paired words  Verbal format: "When I say ____, you will say ____"  Task completed over 2 trials (total 20 words)  Immediate recall of word lists completed in   opp  Delayed recall of word lists completed in  opp  Pt educated on use of visualization strategy and association   Pt expressed verbal understanding and explained how he used these strategies successfully  3  Patient will facilitate planning by completing thought organization tasks (e g , sequencing, deduction puzzles, etc ) with 90% accuracy to facilitate increased executive functioning, working memory, problem solving, and processing skills, to be achieved in 4-6 weeks  4  Patient will be educated on word finding strategies (i e , circumlocution) for improved generative naming and verbal expression skills  Pt given an initial letter and category to name to target word finding skills  Task completed with 29/32 opp indep; increasing to 32/32 with min verbal cues  5  Patient will name up to 3 synonyms for a given word with 80% accuracy to build expressive vocabulary for conversation, to be achieved in 4-6 weeks  6  Patient will name an appropriate antonym for a given word with 80% accuracy to build expressive vocabulary for conversation, to be achieved in 4-6 weeks  7  Patient will complete word generation tasks (e g , analogies, category matrices, word definitions, synonyms/antonyms, idioms, etc ) with 80% accuracy using word finding strategies to facilitate improved word retrieval skills, to be achieved in 4-6 weeks  Plan:  -Patient was provided with home exercises/activities to target goals in plan of care at the end of today's session   -Continue with current plan of care

## 2018-12-13 NOTE — PROGRESS NOTES
Daily Note     Today's date: 2018  Patient name: Carmelita Haile  : 1955  MRN: [id number]  Referring provider: oNé Mar MD  Dx:   Encounter Diagnosis   Name Primary?  Right sided cerebral hemisphere cerebrovascular accident (CVA) ([facility] ) Yes       Start Time: 1700  Stop Time: 1800  Total time in clinic (min): 60 minutes    Subjective: My hand was moving yesterday  Objective: See treatment below   with NMES to flexors and [facility] joint for 10 minutes with focus on making full fist  IASTM to palm and digits  In prone, provided NMES to trap and forearm extensors for posterior strengthening I's, and  pulls  Side lying manual techniques for scap depression and retraction Supine neuro re-ed with 1# weighted bar  Assessment: Tolerated treatment well  Slight swelling in [facility]; provided large thumb hole in neoprene  Tone noted inupper trap  Plan: Continued skilled OT per POC      INTERVENTION COMMENTS:  Diagnosis: Right sided cerebral hemisphere cerebrovascular accident (CVA) ([facility] ) [I63 9]  Precautions: HTN, DM, IASTM to HAND ONLY  FOTO:  5 of 8 visits, PN due

## 2018-12-13 NOTE — PROGRESS NOTES
Daily Note     Today's date: 2018  Patient name: Lashonda Wong  : 1955  MRN: [id number]  Referring provider: Lorenzo Iverson MD  Dx:   Encounter Diagnosis     ICD-10-CM    1  Right sided cerebral hemisphere cerebrovascular accident (CVA) ([address] ) I63 9                   Subjective: No new complaints  Objective: See treatment diary below    Precautions: HTN, taking blood thinner Rx     Daily Treatment Diary      Manual                                                                                                                                                      Exercise Diary                  Treamill with incline  10 min 1 5% 1  8mph  10 min (2% 4 min) 1  6mph  10 min 2% 1 8  3 min, no incline, 1 8 mph VC for lighter steps               Tandem walking  6 laps short //  3 laps  2 laps foam   3 laps  2 laps foam  no foam fw/bw 3 laps               Static balance   30"x3 FT EC   FT EO 30"x2  tandem 30"x2 ea     foam 30"x3 FT EC   tandem 30"x3 ea level  Foam EC FT 30"x3   Tandem level EC 30"x2ea                Foam FT EO 30"x3                     Lunges   10xea  20xea  walking lunges   walking 3 laps unilateral // bars               Step ups   6inch 15xea fwd lat   6inch 2x10 xea fwd lat  6inch 2x10 xea fwd lat  Foam   8" from foam, 20x                side stepping   6 laps short //   3 laps foam  3 laps foam  3 laps foam    sidestepping squats with GTB around ankles 3 laps                sit to stands   2x10  2 x10   3x10  30x, 4" step under R LE                step taps   6 inch 20x  6inch 20x  cones x30  standing on foam [place] taps - airex 30x               Hurdles fwd/lat  Lat( hurdles flipped upside down)  6 laps ea   3 laps ea    3 laps ea  Fwd/lat hurdles regular  3 laps fw/lat               Step taps   6inch x20                      marches ambulation    3 laps   3 laps                   Squats       R LE on 6" step, 30x                backwards ambulation                     Foam HT HN      EC foam 30"x2ea  FAEC w/ HT/HN - airex 30" x 2                SLS       30" x 3                TB walkouts       PTB around hips - fw/bw, sidestepping B/L 10x ea                                                                                                                      Modalities                                                                                                          Assessment: Tolerated treatment well  Challenged with sidestepping over hurdles to R due L LE weakness  Increased L genu recurvatum noted during L stance phase of gait when pt fatigued  Ended session with TM, however pt was very fatigued at the end, and he demo inconsistent L steps,  LE dragging behind, and significant increase instability  Stopped TM at 3 min and required mod A from therapist to prevent a fall anteriorly  Patient would benefit from continued PT      Plan: Progress treatment as tolerated

## 2018-12-14 ENCOUNTER — OFFICE VISIT (OUTPATIENT)
Dept: NEUROLOGY | Facility: CLINIC | Age: 63
End: 2018-12-14
Payer: COMMERCIAL

## 2018-12-14 VITALS
SYSTOLIC BLOOD PRESSURE: 170 MMHG | DIASTOLIC BLOOD PRESSURE: 78 MMHG | WEIGHT: 191 LBS | HEIGHT: 69 IN | BODY MASS INDEX: 28.29 KG/M2 | HEART RATE: 60 BPM

## 2018-12-14 DIAGNOSIS — I10 ESSENTIAL HYPERTENSION: ICD-10-CM

## 2018-12-14 DIAGNOSIS — F41.9 ANXIETY: Chronic | ICD-10-CM

## 2018-12-14 DIAGNOSIS — I63.9 RIGHT SIDED CEREBRAL HEMISPHERE CEREBROVASCULAR ACCIDENT (CVA) (HCC): Primary | ICD-10-CM

## 2018-12-14 DIAGNOSIS — G47.9 DIFFICULTY SLEEPING: ICD-10-CM

## 2018-12-14 DIAGNOSIS — M25.512 ACUTE PAIN OF LEFT SHOULDER: ICD-10-CM

## 2018-12-14 DIAGNOSIS — E78.2 MIXED HYPERLIPIDEMIA: ICD-10-CM

## 2018-12-14 DIAGNOSIS — I63.9 RIGHT SIDED CEREBRAL HEMISPHERE CEREBROVASCULAR ACCIDENT (CVA) (HCC): ICD-10-CM

## 2018-12-14 DIAGNOSIS — G81.94 LEFT HEMIPARESIS (HCC): ICD-10-CM

## 2018-12-14 PROCEDURE — 99215 OFFICE O/P EST HI 40 MIN: CPT | Performed by: PHYSICAL MEDICINE & REHABILITATION

## 2018-12-14 PROCEDURE — 99214 OFFICE O/P EST MOD 30 MIN: CPT | Performed by: NURSE PRACTITIONER

## 2018-12-14 RX ORDER — LOSARTAN POTASSIUM 50 MG/1
50 TABLET ORAL DAILY
Qty: 90 TABLET | Refills: 0 | Status: SHIPPED | OUTPATIENT
Start: 2018-12-14 | End: 2019-03-12 | Stop reason: SDUPTHER

## 2018-12-14 RX ORDER — CHOLECALCIFEROL (VITAMIN D3) 125 MCG
5 CAPSULE ORAL
Refills: 0
Start: 2018-12-14 | End: 2019-02-05

## 2018-12-14 RX ORDER — CLOPIDOGREL BISULFATE 75 MG/1
75 TABLET ORAL DAILY
Qty: 30 TABLET | Refills: 0 | Status: SHIPPED | OUTPATIENT
Start: 2018-12-14 | End: 2019-03-19

## 2018-12-14 RX ORDER — ATORVASTATIN CALCIUM 80 MG/1
80 TABLET, FILM COATED ORAL EVERY EVENING
Qty: 90 TABLET | Refills: 3 | Status: SHIPPED | OUTPATIENT
Start: 2018-12-14

## 2018-12-14 RX ORDER — ASPIRIN 81 MG/1
81 TABLET ORAL DAILY
Qty: 90 TABLET | Refills: 3 | Status: SHIPPED | OUTPATIENT
Start: 2018-12-14 | End: 2019-12-17 | Stop reason: SDUPTHER

## 2018-12-14 RX ORDER — VALSARTAN AND HYDROCHLOROTHIAZIDE 320; 25 MG/1; MG/1
1 TABLET, FILM COATED ORAL DAILY
Refills: 1 | COMMUNITY
Start: 2018-09-17 | End: 2018-12-14

## 2018-12-14 RX ORDER — FLUOXETINE HYDROCHLORIDE 20 MG/1
20 CAPSULE ORAL DAILY
Qty: 90 CAPSULE | Refills: 3 | Status: SHIPPED | OUTPATIENT
Start: 2018-12-14 | End: 2019-02-05

## 2018-12-14 NOTE — LETTER
December 14, 2018     Patient: Lizz aGrcia   YOB: 1955   Date of Visit: 12/14/2018       To Whom it May Concern:    Lizz Garcia is under my professional care  He was seen in my office on 12/14/2018  He may return to work with limitations after the new year 1/1/18  He will start a graduated return to work schedule with half days on Mon/Fri, to allow for Therapy and rest the remainder of the week  No heavy lifting or operating heavy equipment  Limit high stakes work  Will continue to follow his progress and advance his participation as appropriate       If you have any questions or concerns, please don't hesitate to call           Sincerely,          Edgar Ashton MD        CC: No Recipients

## 2018-12-14 NOTE — PROGRESS NOTES
Physical Medicine & Rehabilitation New Patient Evaluation  Marie Pugh 58 y o  male      ASSESSMENT/PLAN:   Mr  Julia Marcus is a 58year old man with PMH of HTN, HLD, migraines and tobacco abuse with recent L MCA CVA, likely cardioembolic in etiology  He is on DAPT, and has a loop recorder in place, with follow-up with Cardiology planned for January  He has residual cognitive deficits and L sided weakness with some mild distal spasticity  He also has difficulty sleeping and pain in his left shoulder  On exam, the pain on his left shoulder is most likely myofascial in nature  It improves with pressure to tender/trigger points, and with manual manipulation as well as heat  He will continue to receive these modalities in therapy  We discussed the possibility of trigger point injections in the future if the pain does not resolve  There does not seem to be a significant component of glenohumeral pain, subluxation, AC joint, or impingement contributing to his pain  He can take tylenol as needed for discomfort  For his difficulty sleeping he was on melatonin which helped significantly as an inpatient, however 9mg nightly made him feel groggy  He has not been taking it at home  He is going to try 5mg nightly at home and see if that helps - taken 1-2 hours prior to his intended sleep  There is a component of restlessness and discomfort from his shoulder that also contributes to his difficulty sleeping  He has mild finger flexion spasticity, that should improve as he regains more voluntary function  He ranges his fingers regularly, and is declining a resting hand splint at this time  He has no other DME needs at this time  On ambulation he has genu recurvatum on the left  He is working on strengthening his quads and is not using a brace   If this becomes persistent may benefit from something like a swedish knee cage down the line to prevent worsening and subsequent pain, but at this time would focus on strengthening his knee stabilizers  In terms of his function, he should be able to drive short distances on back roads with supervision  I encouraged him to have his car evaluated and on road evaluation possibly at [facility] to make any adaptive changes (like a spinner knob, given his L arm weakness, and weakness in pronation)  They understand that this evaluation would need to be paid for out of pocket, and are willing to consider it  From a cognitive standpoint, he is slightly slowed in terms of processing for more complex tasks, but he has good attention, and is not distractable  He has some mild issues with recall  He has no new visual impairments/visual field impairments on my exam that would otherwise limit his driving  In regards to his work, we discussed starting a return to work schedule in the [employer]  His job consists mostly of desk work, computer work, advising  He does not do any manual labor  His job is willing to accommodate him  I provided him a letter to return to work after the new year on a limited basis:  2 half days a week, Mondays and Fridays to allow Wednesdays for a mid-week break, and Tues/Thurs for continued therapy sessions  I addended the letter to add instructions to limit manual labor, allow for extra time to complete projects, avoid high stakes work, and to craft deadlines that accommodate extra processing time needed  He will work locally for now and avoid travel while he accommodates to this new schedule  He should follow-up after 2 weeks of this schedule to see how he is progressing and managing work  He will follow-up in approximately 5-6 weeks       Diagnoses and all orders for this visit:    Right sided cerebral hemisphere cerebrovascular accident (CVA) ([facility] [address] )    Difficulty sleeping  -     Melatonin 5 MG TABS; Take 1 tablet (5 mg total) by mouth daily at bedtime    Acute pain of left shoulder    Left hemiparesis (HCC)      *I have spent 40 minutes with Patient and family today in which greater than 50% of this time was spent in counseling/coordination of care regarding Prognosis, Risks and benefits of tx options, Intructions for management, Patient and family education and Impressions  HPI:   Radha Arzola 58 y o  male who presented to the [address] [place] [address] on 10/20/18 with new onset left-sided diminished sensation, weakness, difficulty speaking, and facial droop  He has a PMH significant for HTN, HLD, mgraines, and tobacco abuse  CTH was negative for hemorrhage  NIHSS 9  He received IV Labetalol for /110 and tPA  Echo showed EF 60% with no RWMA  A1C 6 6  CTA showed 50% stenosis of the proximal R ICA, and 30-40% stenosis of the proximal L ICA  MRI Brain showed multifocal cortical infarctions in the distribution of the R MCA  Concern was for thromboembolic etiology due to carotid disease  Ultimately, Neurology recommended Plavix load (10/24), and then DAPT for 90 days followed by monotherapy with ASA  Cardiology was consulted who recommended holter monitor to evaluate for underlying afib  They also started him on Fluoxetine 20mg post stroke day 5 for motor function recovery  His course was c/b by left lower extremity ankle pain, which began in his toe  Of note, he does have a history of gout  Venous doppler and X-ray were negative for clot or acute injury  He was determined to be stable for transport to the [facility] on 10/25/18  His course was uneventful and he was discharged on 11/21/18  He was treated for gout during his stay with colchicine and a steroid taper  For his CVA, EP recommended loop recorder after a JAYE was negative for thrombus, but positive for aortic atheroma  Neurology felt the atheroma was not severe enough to pursue anticoagulation  Loop recorder was placed  He was discharged home at [place] level with ambulation, transfers, and ADLs  Old records were reviewed personally        Imaging: I personally reviewed pertinent imaging as noted above       SUBJECTIVE:  Patient presents today in the office with chief complaint of difficulty with processing, improving mobility/ADLs, difficulty sleeping, and L shoulder pain after his stroke  He feels he has progressed significantly since returning home, and has been functioning independently without many difficulties  He is eager to go back to work, and has been driving short 1-2 mile distances supervised  He denies any difficulty with that  In regards to his L shoulder pain, it worsens depending on how much they work his arm in therapy  He describes it as a mild and irritating soreness around the superior part of his scapula and upper trapezius  He says that pressure at certain points helps break the pain  He is not taking any medication  He demonstrated pendulums and stretches (scapular retraction) exercises that he does that help improve the pain  He denies association with overhead activity  It is not associated with any particular time of day  He denies new weakness, numbness, tingling associated with the pain  He denies neck pain  It does not radiate  In terms of difficulty sleeping, he associates it with discomfort and restlessness  He did not have this issue as much in the hospital, and reports that melatonin seemed to help, but made him too groggy at 9mg nightly  He wants to try taking 5mg at night to see if that will help him sleep  He has good sleep hygiene  Finally, he feels ready to go back to work in the [employer]  He helps make budgets for projects, helps with design of swimsuits, and works in an advisor type role  It involves mostly computer work/desk work  He used to travel internationally, but understands that will be limited for now, and is willing to work locally  He does not do a lot of manual labor  His job has been very accommodating and they are willing to work with him on a back to work schedule       In terms of his function, his largely independent with mobility and ADLs, and rarely uses his cane  He still receives some assistance for his iADLs  He was completely independent prior to his stroke  He is hoping to get back to that level of function  He walks around a football field size track 4x daily  He has not had any falls  Expanded Social History:  Patient lives with his son in a home with a MIL suite  There are no steps to enter and everything is on one floor except for the den with the TV which has 3-4 steps  He has no difficulty managing these steps independently        Review of Systems   As per HPI, otherwise a 10 point ROS was negative  OBJECTIVE:   There were no vitals taken for this visit      Physical Exam    Gen: No acute distress, Well-nourished, well-appearing  HEENT: Moist mucus membranes, Normocephalic/Atraumatic  PERRL  Wearing bifocals  EOMI  Cardiovascular: Regular rate, rhythm, S1/S2  Distal pulses palpable  Heme/Extr: No edema/clubbing/cyanosis  Pulmonary: Non-labored breathing  Lungs CTAB  : No nichols  GI: Soft, non-tender, non-distended  BS+  MSK: PROM is WFL in all extremities  No effusions or deformities  Bulk is symmetric  See below for MMT scores  Observed ambulating, he has genu recurvatum in midstance on the left side  Otherwise he is clearing and advancing  Integumentary: Skin is warm, dry  No rashes or ulcers  Neuro: AAOx3, CN 2-12 intact except for a central CN7 deficit, tongue deviation to the left, and shoulder shrug weakness  Sensation intact to light touch throughout  Speech is intact - improved  Good repetition, fluency and comprehension  He has increased processing time required, but was able to do sequencing, serial 7's, months of the year backward accurately with extra time  Recall/STM is still impaired, requiring multiple choice and semantic cues for 2/3  Appropriate to questioning  Tone is normal     DTRs:  His reflexes are 1+ throughout, but symmetric  Yoko present on the left  Coord:  FTN on the right intact       MMT: Strength:   Right  Left  Site  Right  Left  Site    5 4-  S Ab: Shoulder Abductors  5  5  HF: Hip Flexors    5 5  EF: Elbow Flexors  5  5 KF: Knee Flexors    5  4+  EE: Elbow Extensors  5  5  KE: Knee Extensors    5  1  WE: Wrist Extensors  5  5  DR: Dorsi Flexors    5  4  FF: Finger Flexors  5  5  PF: Plantar Flexors    5   1 HI: Hand Intrinsics  - -  EHL: Extensor Hallucis Longus   Psych: Normal mood and affect  Labs:   Lab Results   Component Value Date    WBC 6 65 11/19/2018    HGB 13 0 11/19/2018    HCT 39 3 11/19/2018    MCV 90 11/19/2018     11/19/2018     Lab Results   Component Value Date    GLUCOSE 132 10/20/2018    CALCIUM 8 5 11/19/2018    K 3 6 11/19/2018    CO2 28 11/19/2018     11/19/2018    BUN 10 11/19/2018    CREATININE 0 86 11/19/2018         Past Medical History:   Diagnosis Date    Hypertension     Migraine        Patient Active Problem List    Diagnosis Date Noted    Right sided cerebral hemisphere cerebrovascular accident (CVA) ([facility] [address] ) 10/21/2018     Priority: High    Left hemiparesis ([facility] [address] ) 12/14/2018    Bradycardia 11/09/2018    DM (diabetes mellitus) ([facility] [address] ) 10/25/2018    Gout 10/23/2018    Hyperlipidemia 10/22/2018    Hypertension 10/21/2018    Anxiety 10/21/2018       Past Surgical History:   Procedure Laterality Date    APPENDECTOMY      KNEE SURGERY      NECK SURGERY      TONSILLECTOMY         Family History   Problem Relation Age of Onset    Stroke Mother     Heart disease Father     ALS Father        Social History  See HPI/Subj for social history       No Known Allergies      Current Outpatient Prescriptions:     aspirin (ECOTRIN LOW STRENGTH) 81 mg EC tablet, Take 1 tablet (81 mg total) by mouth daily, Disp: 90 tablet, Rfl: 3    atorvastatin (LIPITOR) 80 mg tablet, Take 1 tablet (80 mg total) by mouth every evening, Disp: 90 tablet, Rfl: 3    clopidogrel (PLAVIX) 75 mg tablet, Take 1 tablet (75 mg total) by mouth daily for 64 doses, Disp: 30 tablet, Rfl: 0    FLUoxetine (PROzac) 20 mg capsule, Take 1 capsule (20 mg total) by mouth daily, Disp: 90 capsule, Rfl: 3    losartan (COZAAR) 50 mg tablet, Take 1 tablet (50 mg total) by mouth daily, Disp: 90 tablet, Rfl: 0    Melatonin 5 MG TABS, Take 1 tablet (5 mg total) by mouth daily at bedtime, Disp: , Rfl: 0

## 2018-12-14 NOTE — PATIENT INSTRUCTIONS
Continue with good control of your secondary stroke risk factors including blood pressure, cholesterol, and blood sugar  I will defer monitoring and management of these issues to your primary care physician  Continue your aspirin, plavix, statin, and blood pressure medications as prescribed  You will continue on aspirin and plavix for 90 days from the start date, which was October 24  On/around January 24, 2019, you will stop taking plavix and continue on aspirin only  Follow-up with cardiology as scheduled  Ok to try and use over the counter melatonin (will likely find 1-10mg doses available and any one of these is ok to use)  Any new stroke-like symptoms such as facial drooping, slurred speech/talking non-sense/unable to speak, persistent dizziness, painless loss of vision, sudden and severe head pain, or new numbness/tingling/weakness on one side of your body you should call 911 or go to the nearest emergency room as soon as possible

## 2018-12-14 NOTE — ASSESSMENT & PLAN NOTE
Likely embolic in nature but question cardioembolic versus aortic atheroma versus R ICA plaque/atherosclerosis  He currently has a loop recorder in place and will be following with cardiology in the near future  In the interim, he should continue on DAPT until late January (1/24/19) at which time he can stop taking plavix and continue on aspirin alone  We discussed that should his loop recorder uncover any arrhythmia, this would change his management from aspirin to full anticoagulation  He will continue to follow with cardiology in this regard  He will continue on his lipitor, fluoxetine, and appropriate blood pressure medications  He quit smoking and has no plans/desire to start again, which is great  He was encouraged to maintain good control of secondary stroke risk factors, including blood pressure, cholesterol, and blood sugar  I will defer monitoring and management of these issues to his PCP  He is overall doing well in his recovery, although he does have some residual LUE weakness, which he continues to work with therapy for  He inquired about returning to work, which I think is reasonable (he works for a [employer] company), but I would 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

## 2018-12-14 NOTE — PATIENT INSTRUCTIONS
1  Will try melatonin at a lower dose (5-6mg) at 8pm for sleep as this helped during his inpatient stay  2  Ok to drive short distances on back roads, with supervision  We discussed having his car seen by occupational therapy and on road driving evaluation at [facility] for adaptive equipment due to his left arm weakness  May benefit from a spinner knob for his driving wheel  3  Given instructions for back to work to start 1/2 day a week 2 days a week on Mon/Fri in the [address] [place] [address]  No heavy lifting or operating heavy machinery  Would recommend avoiding deadlines, high stakes work  Would allow for extra time to complete projects  Will follow-up 2 weeks after starting at work  4  Continue stretching and strengthening left shoulder girdle  Can apply heat prior to stretching  Tylenol as needed for pain

## 2018-12-14 NOTE — PROGRESS NOTES
Patient ID: Soha Flanagan is a 58 y o  male  Assessment/Plan:    Right sided cerebral hemisphere cerebrovascular accident (CVA) ([address] )  Likely embolic in nature but question cardioembolic versus aortic atheroma versus R ICA plaque/atherosclerosis  He currently has a loop recorder in place and will be following with cardiology in the near future  In the interim, he should continue on DAPT until late January (1/24/19) at which time he can stop taking plavix and continue on aspirin alone  We discussed that should his loop recorder uncover any arrhythmia, this would change his management from aspirin to full anticoagulation  He will continue to follow with cardiology in this regard  He will continue on his lipitor, fluoxetine, and appropriate blood pressure medications  He quit smoking and has no plans/desire to start again, which is great  He was encouraged to maintain good control of secondary stroke risk factors, including blood pressure, cholesterol, and blood sugar  I will defer monitoring and management of these issues to his PCP  He is overall doing well in his recovery, although he does have some residual LUE weakness, which he continues to work with therapy for  He inquired about returning to work, which I think is reasonable (he works for a [employer] company), but I would 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            Subjective:    Soha Flanagan is a 58 y o  right handed male who has a history of hypertension, hyperlipidemia, migraine, tobacco use and regular alcohol use who presented to the ED on 10/20/18 with stroke-like symptoms  The patient was apparently at a family birthday party when he developed an abrupt onset of right-sided headache, dysarthria, left facial droop and left upper extremity weakness  EMS apparently reported that the patient's left upper extremity was flaccid upon their arrival   Time of onset was noted to be approximately 2100  Stroke alert was called  NIHSS 3 documented by in neurology phone note and ED note  BP as per stroke alert note was noted to be 197/110  CT head negative for acute abnormality  CTA head and neck revealed 1) approximately 50% stenosis in the right proximal ICA, 2) approximately 30-40% stenosis of the left proximal ICA, 3) Opacified diminutive basilar artery  He received IV labetalol for an elevated blood pressure and later received tPA as well  Subsequent evaluation by a a critical care nurse practitioner revealed an NIHSS of 9  After tPA, the patient's symptoms were reported to have improved somewhat though had not completely resolved  On 10/21, he was noted to have increased upper extremity weakness  MRI was done, noting 1  Moderate amount of multifocal cortical infarctions involving distinct and separate foci in the right middle cerebral territory  2   Small amount of petechial hemorrhage in the insular cortex in the region of recent infarction     It was decided to hold aspirin given the petechial hemorrhage  Concern was noted for his carotid stenosis as the source of his stroke  He was started on fluoxitine 20mg daily to help with post-stroke motor recovery (FLAME trial)  - Echocardiogram reviewed, EF 60%, no regional wall motion abnormalities  - Hemoglobin A1c reviewed, 6 6    - Fasting lipid panel reviewed, total cholesterol 161, triglycerides 216, HDL 35, LDL 83       JAYE was done, noting EF of 65%, mildly dilated atria, no PFO, and mild to moderately severe atheroma of the intrathoracic aorta  He did have a loop recorder placed  Dr Jackelyn Mathews noted "R MCA infarctions, etiology thromboembolic 2/2 large vessel atherosclerosis in R ICA vs  cardiac embolism  Per CUS, <50% stenosis bilaterally however with irregular plaque in R ICA  Also see potential thrombus formation overlying plaque on CTA, which raises risk of thromboembolic disease  Would load with Plavix 300mg and continue on DAPT (ASA 81 and Plavix 75) for 90 days followed by aspirin monotherapy "    Mr Jeffry Garcia presents for follow-up of his recent stroke, accompanied by his son-in-law  He is overall doing well  He continues on plavix, aspirin, lipitor, prozac, and his BP medications  He denies bruising or bleeding on the DAPT  He further denies dizziness, headaches, speech changes, numbness/tingling, dysphagia, or new focal weakness  He has had a few falls, which he states are due to poor balance which is made worse by being tired  He continues with outpatient therapy and is making gains in his strength and function of his LUE  The following portions of the patient's history were reviewed and updated as appropriate: past family history, past social history and past surgical history  Objective:    Blood pressure 170/78, pulse 60, height 5' 9" (1 753 m), weight 86 6 kg (191 lb)  Physical Exam   Constitutional: He appears well-developed and well-nourished  HENT:   Head: Normocephalic  Eyes: Pupils are equal, round, and reactive to light  Lids are normal    Psychiatric: He has a normal mood and affect  His speech is normal and behavior is normal    Vitals reviewed  Neurological Exam  Mental Status  Awake, alert and oriented to person, place and time  Speech is normal  Language is fluent with no aphasia  Attention and concentration are normal  Fund of knowledge is appropriate for level of education      Cranial Nerves  CN II: Visual acuity is normal  Visual fields full to confrontation  CN III, IV, VI: Extraocular movements intact bilaterally  Normal lids and orbits bilaterally  Pupils equal round and reactive to light bilaterally  CN V: Facial sensation is normal   CN VII: Full and symmetric facial movement  CN VIII: Hearing is normal   CN IX, X: Palate elevates symmetrically  Normal gag reflex  CN XI: Shoulder shrug strength is normal   CN XII: Tongue midline without atrophy or fasciculations  Motor   Strength is 5/5 in all four extremities except as noted  LUE 3+/5  Coordination  Right: Finger-to-nose normal  Heel-to-shin normal   Left: Finger-to-nose abnormality: Moderate dysmetria on LUE FTN due to hemiparesis  Heel-to-shin normal     Gait  Casual gait is normal including stance, stride, and arm swing  Using a single point cane  ROS:    Review of Systems   Constitutional: Negative for appetite change and fever  HENT: Positive for hearing loss  Negative for tinnitus, trouble swallowing and voice change  Eyes: Negative  Negative for photophobia and pain  Respiratory: Negative  Negative for shortness of breath  Cardiovascular: Negative  Negative for palpitations  Gastrointestinal: Negative  Negative for nausea and vomiting  Endocrine: Negative  Negative for cold intolerance and heat intolerance  Genitourinary: Negative  Negative for dysuria, frequency and urgency  Musculoskeletal: Positive for gait problem  Negative for myalgias and neck pain  Skin: Negative  Negative for rash  Neurological: Positive for weakness (left side weakness)  Negative for dizziness, tremors, seizures, syncope, facial asymmetry, speech difficulty, light-headedness, numbness and headaches  Hematological: Negative  Does not bruise/bleed easily  Psychiatric/Behavioral: Positive for sleep disturbance  Negative for confusion and hallucinations

## 2018-12-18 ENCOUNTER — OFFICE VISIT (OUTPATIENT)
Dept: SPEECH THERAPY | Facility: CLINIC | Age: 63
End: 2018-12-18
Payer: COMMERCIAL

## 2018-12-18 ENCOUNTER — OFFICE VISIT (OUTPATIENT)
Dept: OCCUPATIONAL THERAPY | Facility: CLINIC | Age: 63
End: 2018-12-18
Payer: COMMERCIAL

## 2018-12-18 ENCOUNTER — OFFICE VISIT (OUTPATIENT)
Dept: PHYSICAL THERAPY | Facility: CLINIC | Age: 63
End: 2018-12-18
Payer: COMMERCIAL

## 2018-12-18 DIAGNOSIS — R48.8 OTHER SYMBOLIC DYSFUNCTIONS: Primary | ICD-10-CM

## 2018-12-18 DIAGNOSIS — I63.9 RIGHT SIDED CEREBRAL HEMISPHERE CEREBROVASCULAR ACCIDENT (CVA) (HCC): Primary | ICD-10-CM

## 2018-12-18 DIAGNOSIS — I63.9 RIGHT SIDED CEREBRAL HEMISPHERE CEREBROVASCULAR ACCIDENT (CVA) (HCC): ICD-10-CM

## 2018-12-18 PROCEDURE — 97112 NEUROMUSCULAR REEDUCATION: CPT | Performed by: PHYSICAL THERAPIST

## 2018-12-18 PROCEDURE — 97112 NEUROMUSCULAR REEDUCATION: CPT

## 2018-12-18 PROCEDURE — 92507 TX SP LANG VOICE COMM INDIV: CPT

## 2018-12-18 PROCEDURE — 97116 GAIT TRAINING THERAPY: CPT | Performed by: PHYSICAL THERAPIST

## 2018-12-18 PROCEDURE — 97535 SELF CARE MNGMENT TRAINING: CPT

## 2018-12-18 NOTE — PROGRESS NOTES
Daily Speech Treatment Note    Today's date: 2018  Patients name: lEsi Adrian  : 1955  MRN: [id number]  Safety measures: CVA, fall risk  Referring provider: Nereida Swenson MD    Primary Diagnosis/Billing code: O69 6  Secondary Diagnosis/ Billing code: I63 9    Visit Tracking:  -Referring provider: Epic  -Billing guidelines: CMS  -Visit #/  (Auth required after 1st visit)   -[employer]  (20 visits per benefit year; auth required)  -RE due 2019  Subjective/Behavioral:  "Misbah busy"    Objective/Assessment:   Patient brought in completed HEP to 90%  Short-term goals:  1  Patient will be educated on the use of internal and external memory aids and compensatory strategies with 80% accuracy to facilitate increased recall of routine, personal information, and recent events, to be achieved in 4-6 weeks  2  Patient will complete auditory immediate and short term memory tasks to 80% accuracy to facilitate increased ability to retell narratives and recall information within functional living environment, to be achieved in 4-6 weeks  To target immediate memory; patient participated in a mental manipulation task  Words were read aloud by clinician, and patient was asked to recall words in a specific order denoted by SLP  Patient completed recall of words in reverse order (field of 4) in 8/10 opp; increasing to 10/10  Patient completed recall of words in alphabetical order (field of 4) in 2/5 opp; increasing to 5/5  Pt encouraged to first use "repeat and rehearse" strategy; pt used and found helpful  3  Patient will facilitate planning by completing thought organization tasks (e g , sequencing, deduction puzzles, etc ) with 90% accuracy to facilitate increased executive functioning, working memory, problem solving, and processing skills, to be achieved in 4-6 weeks       4  Patient will be educated on word finding strategies (i e , circumlocution) for improved generative naming and verbal expression skills  5  Patient will name up to 3 synonyms for a given word with 80% accuracy to build expressive vocabulary for conversation, to be achieved in 4-6 weeks  Synonym Finder: To target word generation, lexicon building and executive functioning, patient completed the following task  Patient was asked to describe themselves in 5 words (i e , funny, outgoing, tall)  Then, using the book "synonym finder", they were to look up each word and select 3 synonyms to expand their descriptors  Patient was noted to keep attention to task without difficulties, despite treatment door remaining open  To target semantic association and lexicon building; patient was asked to name 3 synonyms for a given word (i e , beautiful)  Patient reporting task as difficult and challenging, requiring verbal cues at times  Pt completed naming at least 3 independently in 2/9 opp  Named at least 2 in 9/9 opp  Utilized 'synonym finder' book to aide in finding additional synonyms for lexicon building  6  Patient will name an appropriate antonym for a given word with 80% accuracy to build expressive vocabulary for conversation, to be achieved in 4-6 weeks  7  Patient will complete word generation tasks (e g , analogies, category matrices, word definitions, synonyms/antonyms, idioms, etc ) with 80% accuracy using word finding strategies to facilitate improved word retrieval skills, to be achieved in 4-6 weeks  Plan:  -Patient was provided with home exercises/activities to target goals in plan of care at the end of today's session   -Continue with current plan of care

## 2018-12-18 NOTE — PROGRESS NOTES
Daily Note     Today's date: 2018  Patient name: Omero Norwood  : 1955  MRN: [id number]  Referring provider: Dillon Sabillon MD  Dx:   Encounter Diagnosis     ICD-10-CM    1  Right sided cerebral hemisphere cerebrovascular accident (CVA) ([address] ) I63 9                   Subjective: States that he is going back to work in new year about 2 days per week for half days  Objective: See treatment diary below    Precautions: HTN, taking blood thinner Rx     Daily Treatment Diary      Manual                                                                                                                                                      Exercise Diary                Treamill with incline  10 min 1 5% 1  8mph  10 min (2% 4 min) 1  6mph  10 min 2% 1 8  3 min, no incline, 1 8 mph VC for lighter steps  10 min 2% 1  8mph             Tandem walking  6 laps short //  3 laps  2 laps foam   3 laps  2 laps foam  no foam fw/bw 3 laps  no foam fw/bw 3 laps              Static balance   30"x3 FT EC   FT EO 30"x2  tandem 30"x2 ea     foam 30"x3 FT EC   tandem 30"x3 ea level  Foam EC FT 30"x3   Tandem level EC 30"x2ea                 Foam FT EO 30"x3                     Lunges   10xea  20xea  walking lunges   walking 3 laps unilateral // bars walking 3 laps unilateral // bars             Step ups   6inch 15xea fwd lat   6inch 2x10 xea fwd lat  6inch 2x10 xea fwd lat  Foam   8" from foam, 20x  step up and overs 6", 20x              side stepping   6 laps short //   3 laps foam  3 laps foam  3 laps foam     sidestepping squats with GTB around ankles 3 laps                sit to stands   2x10  2 x10   3x10  30x, 4" step under R LE                step taps   6 inch 20x  6inch 20x  cones x30  standing on foam Cone taps - airex 30x               Hurdles fwd/lat  Lat( hurdles flipped upside down)  6 laps ea   3 laps ea    3 laps ea  Fwd/lat hurdles regular  3 laps fw/lat               Step taps   6inch x20                      marches ambulation    3 laps   3 laps                   Squats       R LE on 6" step, 30x  R LE on 6" step, 30x              backwards ambulation                       Foam HT HN      EC foam 30"x2ea  FAEC w/ HT/HN - airex 30" x 2  FTEC w/ HT/HN - airex 30" x 2              SLS       30" x 3  L SLS, R foot roll ball fw/bw              TB walkouts       PTB around hips - fw/bw, sidestepping B/L 10x ea  PTB around hips - fw/bw, sidestepping B/L 10x ea              Step downs L LE          6" 2 x 15 fw/lat             Ambulation in hallway         No  ft x 4                                                                    Modalities                                                                                                             Assessment: Tolerated treatment well  Initiated step downs to focus on eccentric quad control  Continues to demo occassional L genu recurvatum due to weak knee stabilizers  Overall demo improved gait mechanics during ambulation in the hallway with verbal cues for heel to toe sequence and faster gait speed  Patient would benefit from continued PT      Plan: Progress treatment as tolerated  Continue with eccentric quad control

## 2018-12-19 NOTE — PROGRESS NOTES
Daily Note     Today's date: 2018  Patient name: Kian Dubon  : 1955  MRN: [id number]  Referring provider: Musa Fontenot MD  Dx:   Encounter Diagnosis   Name Primary?  Right sided cerebral hemisphere cerebrovascular accident (CVA) ([facility] ) Yes                  Subjective: "I need a break "      Objective: See treatment below  Mh with NMES to flexors for increasing full fist  In prone completed I's and  pulls with NMES to extensors for increasing digit extension  Quadruped BB with forward/backward crawl for proprioception  Manual technique to trap to decrease tone  Assessment: Tolerated treatment well  Fatigue noted at end of sesison  Plan: Continued skilled OT per POC      INTERVENTION COMMENTS:  Diagnosis: Right sided cerebral hemisphere cerebrovascular accident (CVA) ([facility] ) [I63 9]  Precautions: HTN, DM, IASTM to HAND ONLY  FOTO:  6 of 8 visits, PN due

## 2018-12-20 ENCOUNTER — OFFICE VISIT (OUTPATIENT)
Dept: OCCUPATIONAL THERAPY | Facility: CLINIC | Age: 63
End: 2018-12-20
Payer: COMMERCIAL

## 2018-12-20 ENCOUNTER — OFFICE VISIT (OUTPATIENT)
Dept: PHYSICAL THERAPY | Facility: CLINIC | Age: 63
End: 2018-12-20
Payer: COMMERCIAL

## 2018-12-20 ENCOUNTER — OFFICE VISIT (OUTPATIENT)
Dept: SPEECH THERAPY | Facility: CLINIC | Age: 63
End: 2018-12-20
Payer: COMMERCIAL

## 2018-12-20 DIAGNOSIS — R48.8 OTHER SYMBOLIC DYSFUNCTIONS: Primary | ICD-10-CM

## 2018-12-20 DIAGNOSIS — I63.9 RIGHT SIDED CEREBRAL HEMISPHERE CEREBROVASCULAR ACCIDENT (CVA) (HCC): Primary | ICD-10-CM

## 2018-12-20 DIAGNOSIS — I63.9 RIGHT SIDED CEREBRAL HEMISPHERE CEREBROVASCULAR ACCIDENT (CVA) (HCC): ICD-10-CM

## 2018-12-20 PROCEDURE — 97110 THERAPEUTIC EXERCISES: CPT

## 2018-12-20 PROCEDURE — 97112 NEUROMUSCULAR REEDUCATION: CPT

## 2018-12-20 PROCEDURE — 92507 TX SP LANG VOICE COMM INDIV: CPT

## 2018-12-20 PROCEDURE — 97530 THERAPEUTIC ACTIVITIES: CPT

## 2018-12-20 PROCEDURE — G8985 CARRY GOAL STATUS: HCPCS

## 2018-12-20 PROCEDURE — G8984 CARRY CURRENT STATUS: HCPCS

## 2018-12-20 PROCEDURE — 97116 GAIT TRAINING THERAPY: CPT

## 2018-12-20 NOTE — PROGRESS NOTES
Daily Note     Today's date: 2018  Patient name: Marie Pugh  : 1955  MRN: [id number]  Referring provider: Bambi Khoury MD  Dx:   Encounter Diagnosis   Name Primary?  Right sided cerebral hemisphere cerebrovascular accident (CVA) ([facility] ) Yes                  Subjective: "I feel like I'm about 50% back to myself "      Objective: See treatment below  UE Strength:  Gross Grasp: R- 60/200  L-15/200  2 point pinch: R- 20 L-8  Pincer: R- 17   L-6  Lateral: R-17   L-7    Myofiliaments:  R-3 61  L-3 61    9 Hole Peg Test:  R-20 13 sec  L-unable to place in hole, but able to grasp peg    Cognitive Checklist:  Difficulties with- Remembering names, losing train of thought, responding to questions in timely manor, and sleep changes  Anxiety Short Form: 4/40  Depression Short Form: 0/40    MOCA 8 2:   Raw Score: 22/30  MIS:7/15    Provided MH with NMES for 10 minutes followed by IASTM to right hand for sensory re-ed and tone reduction  Assessment: Tolerated treatment well  Patient reported feeling fatigue this session  Plan: Continued skilled OT per POC      INTERVENTION COMMENTS:  Diagnosis: Right sided cerebral hemisphere cerebrovascular accident (CVA) ([facility] ) [I63 9]  Precautions: HTN, DM, IASTM to HAND ONLY  FOTO:  7 of 8 visits, PN due

## 2018-12-20 NOTE — LETTER
2018    Nichole Gerard MD  [address] [facility]    Patient: Milind Sandoval   YOB: 1955   Date of Visit: 2018     Encounter Diagnosis     ICD-10-CM    1  Right sided cerebral hemisphere cerebrovascular accident (CVA) [facility]) I63 9        Dear Dr Green :    Please review the attached Plan of Care from [facility] recent visit  Please verify that you agree therapy should continue by signing the attached document and sending it back to our office  If you have any questions or concerns, please don't hesitate to call  Sincerely,    Michaela Worthy OT      Referring Provider:     I certify that I have read the below Plan of Care and certify the need for these services furnished under this plan of treatment while under my care  Nichole Gerard MD  [address] [facility] [address] [place]: [phone]        Daily Note     Today's date: 2018  Patient name: Milind Sandoval  : 1955  MRN: [id number]  Referring provider: Dai Ochoa MD  Dx:   Encounter Diagnosis   Name Primary?  Right sided cerebral hemisphere cerebrovascular accident (CVA) ([facility] ) Yes                  Subjective: "I feel like I'm about 50% back to myself "      Objective: See treatment below  UE Strength:  Gross Grasp: R- 60/200  L-15/200  2 point pinch: R- 20 L-8  Pincer: R- 17   L-6  Lateral: R-17   L-7    Myofiliaments:  R-3 61  L-3 61    9 Hole Peg Test:  R-20 13 sec  L-unable to place in hole, but able to grasp peg    Cognitive Checklist:  Difficulties with- Remembering names, losing train of thought, responding to questions in timely manor, and sleep changes  Anxiety Short Form: 40  Depression Short Form: 0/40    MOCA 8 2:   Raw Score: 22/30  MIS:7/15    Provided MH with NMES for 10 minutes followed by IASTM to right hand for sensory re-ed and tone reduction  Assessment: Tolerated treatment well  Patient reported feeling fatigue this session  Plan: Continued skilled OT per POC  INTERVENTION COMMENTS:  Diagnosis: Right sided cerebral hemisphere cerebrovascular accident (CVA) ([facility] ) [I63 9]  Precautions: HTN, DM, IASTM to HAND ONLY  FOTO:  7 of 8 visits, PN due     Attestation signed by Aniyah Odonnell OT at 2018  4:23 PM:  All documentation reviewed and approved per KARLEE HALL [facility] OTD, OTR/L, C-GCM, CSRS  Occupational Therapy Stroke Progress Note/Status Update: Today's Date: 2018  Patient Name: Elsi Organ  : 1955  MRN: [id number]  Referring Provider: Nereida Swenson MD  Dx: Right sided cerebral hemisphere cerebrovascular accident (CVA) [facility]) [I63 9]    Active Problem List:   Patient Active Problem List   Diagnosis    Right sided cerebral hemisphere cerebrovascular accident (CVA) ([facility] )    Hypertension    Anxiety    Hyperlipidemia    Gout    DM (diabetes mellitus) ([facility] )    Bradycardia    Left hemiparesis ([facility] )    Left shoulder pain     Past Medical Hx:   Past Medical History:   Diagnosis Date    Hypertension     Migraine      Past Surgical Hx:   Past Surgical History:   Procedure Laterality Date    APPENDECTOMY      KNEE SURGERY      NECK SURGERY      TONSILLECTOMY        Pain Levels:   Restin     With Activity:  0     Subjective/Patient Goal: "Get my arm better"     History of Present Illness:  Pt is a pleasant, active, employed full time 58 y o  male seen for OT eval s/p referred to [address] [place] [address] s/p presented to [address] [place] [address] on 10/20/2018 due to left-sided weakness and facial droop   He also had a speech impairment and was found to have an acute stroke  The patient received tPA at 11:00 p m  on the day of admission after neurology assessment  He had no further complications during his hospital stay apart from a small amount of petechial hemorrhage noted on MRI   CT scan showed positive results for small infarctions within the right frontal lobe and right temporal occipital junction, and areas of petechial hemorrhage  Pt discharged to [facility] from 10/25/18 - 11/21/18  Dx'd w/ R MCA CVA, HTN, DM, comorbidities as listed above      Lifestyle Performance Model:  Autonomy: Pt was I w/ I/ADLS, drove, & required no use of DME PTA, required use of SPC since CVA has not returned to driving or work, + Give Jessica sling for LUE from [facility]  Reciprocal Relationships: Supportive son lives next door in home attached, also has s/o involved to A as needed, 3 children (2 sons and a dtr) and 3 grandchildren, s/o lives locally, son works FT during am hours  Service to Others: 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 and travels 4-5 times a year to [place] for few weeks at time [employer], travels internationally frequently  Intrinsic Gratification: Pt reports enjoying his hot elva [employer] truck, his yard and pool  Pt reports having dog, Manuel  Home Setup: Pt lives in a [facility] apartment that is attached to his son's home in [place] w/ 0 MICKEY w/ 4 steps from the kitchen/bedroom to family room, son lives in the house attached next door  Objective  Impairments Section:   UE Strength:   JENNIFER: RUE: 60/200 LUE: 15/200   PINCER: 3 point pinch: RUE 17, LUE: 6   2 point pinch: RUE: 20, LUE: 8   Lateral pincher: RUE: 17, LUE: 7    Coordination:   9 HOLE PEG TEST:     RUE: 20 13 seconds, LUE:  Unable to complete though able to grasp peg    Range of Motion:  AROM/PROM: LUE hemiparesis w/ ~2/5 MMT proximally ~1/5 MMT distally w/ impaired FMC/FMS/GMC/GMS, LUE pronator drift, RUE wfl/wnl 5/5 MMT t/o, R handed     Sensation:  MYOFILAMENTS:              RUE: 3 61              LUE: 3 61    Visual Perceptual and Functional Cognition:  1   Concussion Cognitive Checklist:  *Patient indicated that he is experiencing difficulties in the following areas:    · Memory: Remembering peoples' names    · Attention: Losing your train of thought    · Processing: Responding to questions in a timely manner     · Executive Functions: None reported    · Communication: None reported    · Visual: None reported    · Emotional: Sleep changes    · Increased Sensitivities to: None reported    2  Gerardo Cognitive Assessment Version 8 2 (MoCA V8 2): MoCA V1 8 2 Raw Score:  22/30, MIS:  7/15, indicative of mild neurocognitive impairments  Assessment/Plan  Occupational Therapy Skilled Analysis Assessment and Plan of Care:  Pt requires overall mod I for ADLs/self care and mod I for fx'l mobility w/ SPC  Pt is currently demonstrating the following occupational deficits: limited 2* LUE hemiparesis w/ ~2/5 MMT proximally ~1/5 MMT distally w/ impaired FMC/FMS/GMC/GMS, LUE pronator drift, RUE wfl/wnl 5/5 MMT t/o, R handed, decreased endurance/activity tolerance, generalized weakness, deconditioning, SOB, WHYTE, fatigue, fall risk, impaired balance, flat affect, mildly depressed mood/anxiousness, L visuospatial inattention, diplopia @ 2" for near point of convergence w/ low R exophoria  Based on the aforementioned OT evaluation, functional performance deficits, and assessments, pt has been identified as a moderate complexity evaluation  Pt to continue to benefit from outpatient skilled OT services to address the following goals 2x/wk Short Term Goals for 4 weeks (2018), Long Term Goals for 8 weeks (2019), and POC to  w/in 90 days (2019) with special focus on self-care management, pt education,  and VM training, UE strength/coordination as well as motor training to improve above defiicits and enhance overall QOL/function  Pt seen for OT re-eval/progress note/status update   Pt continues to demonstrate  limited 2* LUE hemiparesis w/ ~2/5 MMT proximally ~1/5 MMT distally w/ impaired FMC/FMS/GMC/GMS, LUE pronator drift, RUE wfl/wnl 5/5 MMT t/o, R handed, decreased endurance/activity tolerance, generalized weakness, deconditioning, SOB, WHYTE, fatigue, fall risk, impaired balance, flat affect, mildly depressed mood/anxiousness, L visuospatial inattention,  Continue to recommend ongoing treatment to address the following goals 2x/wk for 4 more weeks to address UE proximal stabilization distal control, fx'l dexterity, in hand manipulation, FMC/GMC/GMS/GMS, prehension and opposition patterns for fine distal grasp      Goals:  Short Term Goals: (4 weeks)  Tone:  · Pt will demo good carryover of clinic and home tone strengthening strategies to for improved AROM initiation with functional reach, dressing, hygiene-PARTIALLY MET  Modalities:  · Pt will tolerate BIONESS for improved motor and sensory performance for overall improved hand to target with 80% accuracy-PARTIALLY MET  Sensation:  · Pt will increase proprioception of  L hand to target for improved functional reach vision occluded with ADL tasks-PARTIALLY MET  Coordination:  · Pt will increase rate of manipulation for all FM tests for improved functional performance (pinch pad, tripod, and lateral pincer grasps) with salient and fx'l I/ADL/leisure tasks-PARTIALLY MET  ROM/Function:  · Pt will increase L  UE to Gross Assist, refined functional assist, and stabilization with <20% cuing for tabletop tasks for improved functional performance of life roles and salient tasks-PARTIALLY MET  · Pt will demo with G carryover of Home Exercise Program for improved functional use of  LUE -PARTIALLY MET  Long Term Goals: (8 weeks)  Tone:   · Improve hypotonicity of LUE  to WFL/WNL for improved alternate motor patterns, termination on command, automatic grasp/release for improved functional performance t/o I/ADL/leisure task engagement-PARTIALLY MET  Coordination:  · Increase automaticity/decrease ataxia/prehension patterns of  LUE to 100% for normalized movement pattern & resumption of B/L integrative I/ADL/leisure task engagement (including fine and gross motor)  -PARTIALLY MET  ROM/Function:  · Pt will increase L UE strength and  strength to 4+/5, through the use of strengthening exercises w/ home program review s/p skilled education to promote active fx'l movement-PARTIALLY MET     INTERVENTION COMMENTS:  Diagnosis: R MCA CVA, HTN, DM  Precautions: HTN, DM  FOTO:  70 with 30% limitation  [facility]: [facility] [[id number]]  1 of _ visits, PN due 1/20/2019     Thank you for the consult!   Please call if you have any questions: [phone]  [employer], OTD, OTR/L, C-GCM, CSRS  Director of Outpatient Neuro Occupational Therapy

## 2018-12-20 NOTE — PROGRESS NOTES
Daily Note     Today's date: 2018  Patient name: Alissa Underwood  : 1955  MRN: [id number]  Referring provider: Mendel Blamer, MD  Dx:   Encounter Diagnosis     ICD-10-CM    1  Right sided cerebral hemisphere cerebrovascular accident (CVA) ([facility] ) I63 9                   Subjective: Patient noted no new complaints  Objective: See treatment diary below      Assessment: Tolerated treatment well  Patient needed VC for eccentric control of  LLE with step downs fwd and lat  Increased treadmill to 1 9mph  Patient presented with LOB while performing SL ball roll with R LE, self correct and correct with assist from therapist   Patient would benefit from continued PT      Plan: Continue per plan of care  Precautions: HTN, taking blood thinner Rx     Daily Treatment Diary      Manual                                                                                                                                                      Exercise Diary              Treamill with incline  10 min 1 5% 1  8mph  10 min (2% 4 min) 1  6mph  10 min 2% 1 8  3 min, no incline, 1 8 mph VC for lighter steps  10 min 2% 1  8mph             Tandem walking  6 laps short //  3 laps  2 laps foam   3 laps  2 laps foam  no foam fw/bw 3 laps  no foam fw/bw 3 laps              Static balance   30"x3 FT EC   FT EO 30"x2  tandem 30"x2 ea     foam 30"x3 FT EC   tandem 30"x3 ea level  Foam EC FT 30"x3   Tandem level EC 30"x2ea                 Foam FT EO 30"x3                     Lunges   10xea  20xea  walking lunges   walking 3 laps unilateral // bars   walking 3 laps unilateral // bars           Step ups   6inch 15xea fwd lat   6inch 2x10 xea fwd lat  6inch 2x10 xea fwd lat  Foam   8" from foam, 20x   step up and overs 6", 20x            side stepping   6 laps short //   3 laps foam  3 laps foam  3 laps foam     sidestepping squats with GTB around ankles 3 laps   3 laps foam     sidestepping squats with GTB around ankles 3 laps            sit to stands   2x10  2 x10   3x10  30x, 4" step under R LE               step taps   6 inch 20x  6inch 20x  cones x30  standing on foam Cone taps - airex 30x    Cone taps - airex 30x           Hurdles fwd/lat  Lat( hurdles flipped upside down)  6 laps ea   3 laps ea    3 laps ea  Fwd/lat hurdles regular  3 laps fw/lat               Step taps   6inch x20                      marches ambulation    3 laps   3 laps                   Squats       R LE on 6" step, 30x  R LE on 6" step, 30x              backwards ambulation                       Foam HT HN      EC foam 30"x2ea  FAEC w/ HT/HN - airex 30" x 2  FTEC w/ HT/HN - airex 30" x 2   FTEC w/ HT/HN - airex 30" x 2            SLS       30" x 3  L SLS, R foot roll ball fw/bw   L SLS, R foot roll ball fw/bw            TB walkouts       PTB around hips - fw/bw, sidestepping B/L 10x ea  PTB around hips - fw/bw, sidestepping B/L 10x ea   PTB around hips - fw/bw, sidestepping B/L 10x ea            Step downs L LE          6" 2 x 15 fw/lat  6" 2 x 15 fw/lat           Ambulation in hallway         No  ft x 4  No  ft x 4                                                                  Modalities

## 2018-12-20 NOTE — PROGRESS NOTES
Daily Speech Treatment Note    Today's date: 2018  Patients name: Tory Cho  : 1955  MRN: [id number]  Safety measures: CVA, fall risk  Referring provider: Socorro Magallon MD    Primary Diagnosis/Billing code: L64 1  Secondary Diagnosis/ Billing code: I63 9    Visit Tracking:  -Referring provider: Epic  -Billing guidelines: CMS  -Visit #6  (Auth required after 1st visit)   -[employer]  (20 visits per benefit year; auth required)  -RE due 2019  Subjective/Behavioral:  "I did a lot today "    Objective/Assessment:   Patient brought in HEP with only 60% completed  Recommended he finish it for next time  Short-term goals:  1  Patient will be educated on the use of internal and external memory aids and compensatory strategies with 80% accuracy to facilitate increased recall of routine, personal information, and recent events, to be achieved in 4-6 weeks  2  Patient will complete auditory immediate and short term memory tasks to 80% accuracy to facilitate increased ability to retell narratives and recall information within functional living environment, to be achieved in 4-6 weeks  To target immediate memory; patient participated in a mental manipulation task  Words were read aloud by clinician, and patient was asked to recall words in a specific order denoted by SLP  Patient completed recall of words in reverse order (field of 4) in 9/10 opp; increasing to 10/10 with repetitions  Patient completed recall of words in alphabetical order (field of 4) in 1/5 opp; increasing to 3/5  Pt encouraged to first use "repeat and rehearse" strategy; pt used and found helpful  3  Patient will facilitate planning by completing thought organization tasks (e g , sequencing, deduction puzzles, etc ) with 90% accuracy to facilitate increased executive functioning, working memory, problem solving, and processing skills, to be achieved in 4-6 weeks  Anagrams:  To target auditory attention and mental manipulation during a word finding activity, patient was asked to listen to a word, and rearrange the letters within the word to create a new word (i e , late = tale)  Task completed over 5 trials  Completed 2/5 independently, increased to 5/5 with use of written support  Attention/Processing skills: Pt provided with a grid and written verbal directions to follow in order to emely grid appropriately (first place X in 1NE, 2SW, 9W, etc )  Pt provided initially with min-mod verbal cues for explanation  Pt able to complete to 90% acc indep; increasing to 100% acc with min verbal cues  Good attention to task; however, no other auditory distractions were provided  4  Patient will be educated on word finding strategies (i e , circumlocution) for improved generative naming and verbal expression skills  To target circumlocution and overall language skills; patient participated in the game "taboo" with clinician  Patient is presented with a word (i e , yellow), and must provide verbal clues to the clinician, without using "taboo" or restricted words (i e , color, bus, sun, etc ) Patient completed description of 6/6 words, and guessing of 6/6 words described by clinician  Pt providing clues in appropriate time manner  5  Patient will name up to 3 synonyms for a given word with 80% accuracy to build expressive vocabulary for conversation, to be achieved in 4-6 weeks  6  Patient will name an appropriate antonym for a given word with 80% accuracy to build expressive vocabulary for conversation, to be achieved in 4-6 weeks  To target semantic association and lexicon building, patient asked to name an opposite/antonym of an abstract word (i e , miserly)  Task completed in 10/15 opp overall, increased to 13/15 with verbal cues  Pt appearing to be come very frustrated with this task today, requiring add processing time       7  Patient will complete word generation tasks (e g , analogies, category matrices, word definitions, synonyms/antonyms, idioms, etc ) with 80% accuracy using word finding strategies to facilitate improved word retrieval skills, to be achieved in 4-6 weeks  Plan:  -Patient was provided with home exercises/activities to target goals in plan of care at the end of today's session   -Continue with current plan of care

## 2018-12-21 NOTE — PROGRESS NOTES
Occupational Therapy Stroke Progress Note/Status Update: Today's Date: 2018  Patient Name: Gricelda Lawton  : 1955  MRN: [id number]  Referring Provider: Per Acuña MD  Dx: Right sided cerebral hemisphere cerebrovascular accident (CVA) [facility]) [I63 9]    Active Problem List:   Patient Active Problem List   Diagnosis    Right sided cerebral hemisphere cerebrovascular accident (CVA) ([facility] [address] )    Hypertension    Anxiety    Hyperlipidemia    Gout    DM [facility] mellitus[facility] ([facility] )    Bradycardia    Left hemiparesis ([facility] )    Left shoulder pain     Past Medical Hx:   Past Medical History:   Diagnosis Date    Hypertension     Migraine      Past Surgical Hx:   Past Surgical History:   Procedure Laterality Date    APPENDECTOMY      KNEE SURGERY      NECK SURGERY      TONSILLECTOMY        Pain Levels:   Restin     With Activity:  0     Subjective/Patient Goal: "Get my arm better"     History of Present Illness:  Pt is a pleasant, active, employed full time 58 y o  male seen for OT eval s/p referred to [address] [place] [address] s/p presented to [facility] on 10/20/2018 due to left-sided weakness and facial droop   He also had a speech impairment and was found to have an acute stroke  The patient received tPA at 11:00 p m  on the day of admission after neurology assessment  He had no further complications during his hospital stay apart from a small amount of petechial hemorrhage noted on MRI  CT scan showed positive results for small infarctions within the right frontal lobe and right temporal occipital junction, and areas of petechial hemorrhage  Pt discharged to [facility] from 10/25/18 - 18  Dx'd w/ R MCA CVA, HTN, DM, comorbidities as listed above      Lifestyle Performance Model:  Autonomy: Pt was I w/ I/ADLS, drove, & required no use of DME PTA, required use of SPC since CVA has not returned to driving or work, + Give Jessica sling for LUE from [facility]    Reciprocal Relationships: Supportive son lives next door in home attached, also has s/o involved to A as needed, 3 children (2 sons and a dtr) and 3 grandchildren, s/o lives locally, son works FT during am hours  Service to Others: 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 apartment that is attached to his son's home in [facility] w/ 0 MICKEY w/ 4 steps from the kitchen/bedroom to family room, son lives in the house attached next door  Objective  Impairments Section:   UE Strength:   JENNIFER: RUE: 60/200 LUE: 15/200   PINCER: 3 point pinch: RUE 17, LUE: 6   2 point pinch: RUE: 20, LUE: 8   Lateral pincher: RUE: 17, LUE: 7    Coordination:   9 HOLE PEG TEST:     RUE: 20 13 seconds, LUE:  Unable to complete though able to grasp peg    Range of Motion:  AROM/PROM: LUE hemiparesis w/ ~2/5 MMT proximally ~1/5 MMT distally w/ impaired FMC/FMS/GMC/GMS, LUE pronator drift, RUE wfl/wnl 5/5 MMT t/o, R handed     Sensation:  MYOFILAMENTS:              RUE: 3 61              LUE: 3 61    Visual Perceptual and Functional Cognition:  1  Concussion Cognitive Checklist:  *Patient indicated that he is experiencing difficulties in the following areas:    · Memory: Remembering peoples' names    · Attention: Losing your train of thought    · Processing: Responding to questions in a timely manner     · Executive Functions: None reported    · Communication: None reported    · Visual: None reported    · Emotional: Sleep changes    · Increased Sensitivities to: None reported    2  Gerardo Cognitive Assessment Version 8 2 (MoCA V8 2): MoCA V1 8 2 Raw Score:  22/30, MIS:  7/15, indicative of mild neurocognitive impairments      Assessment/Plan  Occupational Therapy Skilled Analysis Assessment and Plan of Care:  Pt requires overall mod I for ADLs/self care and mod I for fx'l mobility w/ SPC  Pt is currently demonstrating the following occupational deficits: limited 2* LUE hemiparesis w/ ~2/5 MMT proximally ~1/5 MMT distally w/ impaired FMC/FMS/GMC/GMS, LUE pronator drift, RUE wfl/wnl 5/5 MMT t/o, R handed, decreased endurance/activity tolerance, generalized weakness, deconditioning, SOB, WHYTE, fatigue, fall risk, impaired balance, flat affect, mildly depressed mood/anxiousness, L visuospatial inattention, diplopia @ 2" for near point of convergence w/ low R exophoria  Based on the aforementioned OT evaluation, functional performance deficits, and assessments, pt has been identified as a moderate complexity evaluation  Pt to continue to benefit from outpatient skilled OT services to address the following goals 2x/wk Short Term Goals for 4 weeks (2018), Long Term Goals for 8 weeks (2019), and POC to  w/in 90 days (2019) with special focus on self-care management, pt education,  and VM training, UE strength/coordination as well as motor training to improve above defiicits and enhance overall QOL/function  Pt seen for OT re-eval/progress note/status update  Pt continues to demonstrate  limited 2* LUE hemiparesis w/ ~2/5 MMT proximally ~1/5 MMT distally w/ impaired FMC/FMS/GMC/GMS, LUE pronator drift, RUE wfl/wnl 5/5 MMT t/o, R handed, decreased endurance/activity tolerance, generalized weakness, deconditioning, SOB, WHYTE, fatigue, fall risk, impaired balance, flat affect, mildly depressed mood/anxiousness, L visuospatial inattention,   Continue to recommend ongoing treatment to address the following goals 2x/wk for 4 more weeks to address UE proximal stabilization distal control, fx'l dexterity, in hand manipulation, FMC/GMC/GMS/GMS, prehension and opposition patterns for fine distal grasp      Goals:  Short Term Goals: (4 weeks)  Tone:  · Pt will demo good carryover of clinic and home tone strengthening strategies to for improved AROM initiation with functional reach, dressing, hygiene-PARTIALLY MET  Modalities:  · Pt will tolerate BIONESS for improved motor and sensory performance for overall improved hand to target with 80% accuracy-PARTIALLY MET  Sensation:  · Pt will increase proprioception of  L hand to target for improved functional reach vision occluded with ADL tasks-PARTIALLY MET  Coordination:  · Pt will increase rate of manipulation for all FM tests for improved functional performance (pinch pad, tripod, and lateral pincer grasps) with salient and fx'l I/ADL/leisure tasks-PARTIALLY MET  ROM/Function:  · Pt will increase L  UE to Gross Assist, refined functional assist, and stabilization with <20% cuing for tabletop tasks for improved functional performance of life roles and salient tasks-PARTIALLY MET  · Pt will demo with G carryover of Home Exercise Program for improved functional use of  LUE-PARTIALLY MET  Long Term Goals: (8 weeks)  Tone:   · Improve hypotonicity of LUE  to WFL/WNL for improved alternate motor patterns, termination on command, automatic grasp/release for improved functional performance t/o I/ADL/leisure task engagement-PARTIALLY MET  Coordination:  · Increase automaticity/decrease ataxia/prehension patterns of  LUE to 100% for normalized movement pattern & resumption of B/L integrative I/ADL/leisure task engagement (including fine and gross motor)  -PARTIALLY MET  ROM/Function:  · Pt will increase L UE strength and  strength to 4+/5, through the use of strengthening exercises w/ home program review s/p skilled education to promote active fx'l movement-PARTIALLY MET     INTERVENTION COMMENTS:  Diagnosis: R MCA CVA, HTN, DM  Precautions: HTN, DM  FOTO: 70 with 30% limitation  Insurance: [facility] [[id number]]  1 of _ visits, PN due 1/20/2019     Thank you for the consult!   Please call if you have any questions: [phone]  Darryl Lehman, OTD, OTR/L, C-GCM, CSRS  Director of Outpatient Neuro Occupational Therapy

## 2018-12-27 ENCOUNTER — OFFICE VISIT (OUTPATIENT)
Dept: OCCUPATIONAL THERAPY | Facility: CLINIC | Age: 63
End: 2018-12-27
Payer: COMMERCIAL

## 2018-12-27 ENCOUNTER — OFFICE VISIT (OUTPATIENT)
Dept: SPEECH THERAPY | Facility: CLINIC | Age: 63
End: 2018-12-27
Payer: COMMERCIAL

## 2018-12-27 ENCOUNTER — EVALUATION (OUTPATIENT)
Dept: PHYSICAL THERAPY | Facility: CLINIC | Age: 63
End: 2018-12-27
Payer: COMMERCIAL

## 2018-12-27 DIAGNOSIS — R48.8 OTHER SYMBOLIC DYSFUNCTIONS: Primary | ICD-10-CM

## 2018-12-27 DIAGNOSIS — I63.9 RIGHT SIDED CEREBRAL HEMISPHERE CEREBROVASCULAR ACCIDENT (CVA) (HCC): Primary | ICD-10-CM

## 2018-12-27 DIAGNOSIS — I63.9 RIGHT SIDED CEREBRAL HEMISPHERE CEREBROVASCULAR ACCIDENT (CVA) (HCC): ICD-10-CM

## 2018-12-27 PROCEDURE — 97140 MANUAL THERAPY 1/> REGIONS: CPT

## 2018-12-27 PROCEDURE — 97112 NEUROMUSCULAR REEDUCATION: CPT | Performed by: PHYSICAL THERAPIST

## 2018-12-27 PROCEDURE — 92507 TX SP LANG VOICE COMM INDIV: CPT

## 2018-12-27 PROCEDURE — 97116 GAIT TRAINING THERAPY: CPT | Performed by: PHYSICAL THERAPIST

## 2018-12-27 PROCEDURE — 97112 NEUROMUSCULAR REEDUCATION: CPT

## 2018-12-27 NOTE — PROGRESS NOTES
Re-Eval    Today's date: 2018  Patient name: Severiano Finner  : 1955  MRN: [id number]  Referring provider: Ginny Larios MD  Dx:   Encounter Diagnosis     ICD-10-CM    1  Right sided cerebral hemisphere cerebrovascular accident (CVA) ([facility] [address] ) I63 9                   Subjective: Pt states that he feels walking is better, not sure about his balance though - states some days better than others  And he is very easily tired and then balance becomes off  He's been having some HA lately and taking tylenol to get rid of it - over past week, thinks that he is not drinking enough water  Has been walking his dog (vilma) in yard and done well  Objective: See treatment diary below     Goals  STG's (4 weeks)  Patient will increase distance with 6MWT by at least 120ft to show improvement in endurance  Patient will perform MCTSIB test with EC on foam pad for 30s to show improvement in static balance with vestibular integration  - MET  Patient will score 30/30 on FGA to demonstrate improved balance and gait  LTG's (6 weeks)  Patient will be able to ambulate on uneven surfaces and ramps in community safely with no AD, in order to facilitate return to work and travel - MET  Patient will be independent with HEP in order to maintain progress with functional gains   - progressing       Balance Test     6 Minute Walk Test (ft): : 1400 ft no AD, : 1400ft   2 Minute Walk Test (ft):     Gait Speed (ft/s): : SSV 3 8 ft/s, fast gait 5 ft/s, : 4 8ft/s    5x Sit To Stand (s): 11s no UE's    TUG: 10 9s w SPC,  10 2s w/o SPC      FGA: : 30, : 30     MCTSIB Number of Seconds   Feet Together, Eyes Open 30s   Feet Together, Eyes Closed 30s   Feet Together, Eyes Open Foam 30s   Feet Together, Eyes Closed Foam : 30sec, : 10s (needed mod assist to prevent fall)       Precautions: HTN, taking blood thinner Rx     Daily Treatment Diary      Manual                                                                                                                                                      Exercise Diary   12/4 12/6 12/11 12/13 12/18 12/20 12/27   Treamill with incline  10 min 1 5% 1  8mph  10 min (2% 4 min) 1  6mph  10 min 2% 1 8  3 min, no incline, 1 8 mph VC for lighter steps  10 min 2% 1  8mph       Tandem walking  6 laps short //  3 laps  2 laps foam   3 laps  2 laps foam  no foam fw/bw 3 laps  no foam fw/bw 3 laps        Static balance   30"x3 FT EC   FT EO 30"x2  tandem 30"x2 ea     foam 30"x3 FT EC   tandem 30"x3 ea level  Foam EC FT 30"x3   Tandem level EC 30"x2ea           Foam FT EO 30"x3               Lunges   10xea  20xea  walking lunges   walking 3 laps unilateral // bars    walking 3 laps unilateral // bars  walking 3 laps unilateral //    Step ups   6inch 15xea fwd lat   6inch 2x10 xea fwd lat  6inch 2x10 xea fwd lat  Foam   8" from foam, 20x    step up and overs 6", 20x  step up and overs 8", 20x    side stepping   6 laps short //   3 laps foam  3 laps foam  3 laps foam     sidestepping squats with GTB around ankles 3 laps    3 laps foam     sidestepping squats with GTB around ankles 3 laps      sit to stands   2x10  2 x10   3x10  30x, 4" step under R LE          step taps   6 inch 20x  6inch 20x  cones x30  standing on foam Cone taps - airex 30x     Cone taps - airex 30x     Hurdles fwd/lat  Lat( hurdles flipped upside down)  6 laps ea   3 laps ea    3 laps ea  Fwd/lat hurdles regular  3 laps fw/lat         Step taps   6inch x20                marches ambulation    3 laps   3 laps             Squats       R LE on 6" step, 30x  R LE on 6" step, 30x        backwards ambulation                 Foam HT HN      EC foam 30"x2ea  FAEC w/ HT/HN - airex 30" x 2  FTEC w/ HT/HN - airex 30" x 2   FTEC w/ HT/HN - airex 30" x 2      SLS       30" x 3  L SLS, R foot roll ball fw/bw   L SLS, R foot roll ball fw/bw      TB walkouts       PTB around hips - fw/bw, sidestepping B/L 10x ea  PTB around hips - fw/bw, sidestepping B/L 10x ea   PTB around hips - fw/bw, sidestepping B/L 10x ea  PTB around hips - fw/bw, sidestepping B/L 10x ea    Step downs L LE          6" 2 x 15 fw/lat  6" 2 x 15 fw/lat  8" 30x fw/lat   Ambulation in hallway         No  ft x 4  No  ft x 4      Heel raises             SL eccentric L LE, concentric R LE     DL jumps             20x, R UE support          Modalities                                                                                                          Assessment: Re-eval performed this session  Overall had subjective report of improvements in gait and demo improved balance per mCTSIB  Although pt did not demo any changes per FGA, he is not a fall risk and score may be related to interrater reliability  Frequent toe catching noted during ambulation in hallway today but pt was always able to self recover  Overall progressing well and Patient would benefit from continued PT per POC for endurance, higher level balance, fine tuning of gait and continued strengthening of L LE irais to minimize genu recurvatum  Plan: Progress treatment as tolerated  trial ankle ankle weight on L LE during TM next visit as able

## 2018-12-27 NOTE — PROGRESS NOTES
Daily Speech Treatment Note    Today's date: 2018  Patients name: Tory Cho  : 1955  MRN: [id number]  Safety measures: CVA, fall risk  Referring provider: Socorro Magallon MD    Primary Diagnosis/Billing code: Y25 0  Secondary Diagnosis/ Billing code: I63 9    Visit Tracking:  -Referring provider: Epic  -Billing guidelines: CMS  -Visit #7/12  (Auth required after 1st visit)   -[employer]  (20 visits per benefit year; auth required)  -RE due 2019  Subjective/Behavioral:  "I'm alright "     Objective/Assessment:   Patient brought in HEP with only 60% completed for opposites Completed to 100% with mod verbal cues (antonyms-see below)  Short-term goals:  1  Patient will be educated on the use of internal and external memory aids and compensatory strategies with 80% accuracy to facilitate increased recall of routine, personal information, and recent events, to be achieved in 4-6 weeks  2  Patient will complete auditory immediate and short term memory tasks to 80% accuracy to facilitate increased ability to retell narratives and recall information within functional living environment, to be achieved in 4-6 weeks  3  Patient will facilitate planning by completing thought organization tasks (e g , sequencing, deduction puzzles, etc ) with 90% accuracy to facilitate increased executive functioning, working memory, problem solving, and processing skills, to be achieved in 4-6 weeks  4  Patient will be educated on word finding strategies (i e , circumlocution) for improved generative naming and verbal expression skills  5  Patient will name up to 3 synonyms for a given word with 80% accuracy to build expressive vocabulary for conversation, to be achieved in 4-6 weeks  6  Patient will name an appropriate antonym for a given word with 80% accuracy to build expressive vocabulary for conversation, to be achieved in 4-6 weeks    To target semantic association and lexicon building, patient asked to name an opposite/antonym of an abstract word (i e , miserly)  Task completed in 14/21 opp overall, increased to 21/21 with verbal cues  7  Patient will complete word generation tasks (e g , analogies, category matrices, word definitions, synonyms/antonyms, idioms, etc ) with 80% accuracy using word finding strategies to facilitate improved word retrieval skills, to be achieved in 4-6 weeks  To target generative naming skills; patient participated in "scattergories" game where they were asked to provide a word when given a specific letter and category (i e , boys name __/L/ = Dorcarmen Hinders)  Task completed over 3 trials in 5/36 opp  Increased to 17/36 with mod verbal cues  To target word finding and attention; patient completed the card game "Anomia" where they are asked to name people/places/things based on category presented on card (i e , artificial sweetener)  Target of game was for speed of naming and processing  Pt completed level 4 with average of 9 cards min (WNL; goal is 10+)  Pt skipped cards x 9 (decrease for 9 8 words per min last time played)  Plan:  -Patient was provided with home exercises/activities to target goals in plan of care at the end of today's session   -Continue with current plan of care

## 2018-12-27 NOTE — PROGRESS NOTES
Daily Note     Today's date: 2018  Patient name: Sebastian Heart  : 1955  MRN: [id number]  Referring provider: Lucy Rivera MD  Dx:   Encounter Diagnosis   Name Primary?  Right sided cerebral hemisphere cerebrovascular accident (CVA) ([facility] ) Yes                  Subjective: "i have a hard time keeping my arm up for long periods of times "      Objective: See treatment below  NMES to [address] joint and volar side of forearm for increasing digit flexion with AROM to full fist for 10 minutes  IASTM to palm and forearm extensors  In supine, completed 3x10 reps of ceiling punches and 1# free weight  (with WEAVER guarding hand) and NMES to forearm extensors for wrist extension  ABC writing with 1# free weight for dynamic shoulder stabilization  Utilized BITs and [employer] for increasing functional ROM-also utilized built-up stylus for patient to use with left hand  Assessment: Tolerated treatment well  Poor accuracy during BITs task  Fatigue in shoulder noted during supine exercises, but patient is now able to maintain and hold for up to 20 seconds  Expressed difficulty with zippering jacket and recommended placing key ring on zipper to make larger         Plan: Continued skilled OT per POC,    INTERVENTION COMMENTS:  Diagnosis: Right sided cerebral hemisphere cerebrovascular accident (CVA) ([facility] ) [I63 9]  Precautions: HTN, DM, IASTM to HAND ONLY  FOTO:  8 of 8 visits, PN due

## 2019-01-01 NOTE — PROGRESS NOTES
Daily Speech Treatment Note    Today's date: 2019   Patients name: Milind Sandoval  : 1955  MRN: [id number]  Safety measures: CVA, fall risk  Referring provider: Dai Ochoa MD    Primary Diagnosis/Billing code: D30 4  Secondary Diagnosis/ Billing code: I63 9    Visit Tracking:  -Referring provider: Epic  -Billing guidelines: CMS  -Visit #  (Auth required after 1st visit)   -[employer]  (20 visits per benefit year; auth required)  -RE due 2019  Subjective/Behavioral:  -"I have a headache " (pain: 3-4/10)    Objective/Assessment:   -Patient did not return HEP folder  Short-term goals:  1  Patient will be educated on the use of internal and external memory aids and compensatory strategies with 80% accuracy to facilitate increased recall of routine, personal information, and recent events, to be achieved in 4-6 weeks  2  Patient will complete auditory immediate and short term memory tasks to 80% accuracy to facilitate increased ability to retell narratives and recall information within functional living environment, to be achieved in 4-6 weeks  3  Patient will facilitate planning by completing thought organization tasks (e g , sequencing, deduction puzzles, etc ) with 90% accuracy to facilitate increased executive functioning, working memory, problem solving, and processing skills, to be achieved in 4-6 weeks  -Reasoning/working memory/thought organization (anagrams): Patient was presented with a target word and asked to rearrange letters to create another word (e g , late = TALE ; item = TIME)  Initially, no visual cue was provided, and patient was not permitted to write word on paper  Task completed in 0/6 opp (0% acc), increasing to 5/6 opp (83% acc) with the utilization of written support and mod verbal cues from clinician  Patient with increased frustration during this task; therefore, it was stopped      4  Patient will be educated on word finding strategies (i e , circumlocution) for improved generative naming and verbal expression skills  5  Patient will name up to 3 synonyms for a given word with 80% accuracy to build expressive vocabulary for conversation, to be achieved in 4-6 weeks   -Word finding: Patient presented with a list of words and asked to generate 3 synonyms for each word  Task completed in 46/66 opp (70%) independently, increasing to 64/66 opp (97% acc) with mod semantic cues  6  Patient will name an appropriate antonym for a given word with 80% accuracy to build expressive vocabulary for conversation, to be achieved in 4-6 weeks  7  Patient will complete word generation tasks (e g , analogies, category matrices, word definitions, synonyms/antonyms, idioms, etc ) with 80% accuracy using word finding strategies to facilitate improved word retrieval skills, to be achieved in 4-6 weeks  Plan:  -Patient was provided with home exercises/activities to target goals in plan of care at the end of today's session   -Continue with current plan of care

## 2019-01-03 RX ORDER — VALSARTAN AND HYDROCHLOROTHIAZIDE 320; 25 MG/1; MG/1
1 TABLET, FILM COATED ORAL DAILY
Refills: 1 | COMMUNITY
Start: 2018-12-14 | End: 2019-01-09 | Stop reason: ALTCHOICE

## 2019-01-04 ENCOUNTER — OFFICE VISIT (OUTPATIENT)
Dept: OCCUPATIONAL THERAPY | Facility: CLINIC | Age: 64
End: 2019-01-04
Payer: COMMERCIAL

## 2019-01-04 ENCOUNTER — OFFICE VISIT (OUTPATIENT)
Dept: SPEECH THERAPY | Facility: CLINIC | Age: 64
End: 2019-01-04
Payer: COMMERCIAL

## 2019-01-04 ENCOUNTER — OFFICE VISIT (OUTPATIENT)
Dept: PHYSICAL THERAPY | Facility: CLINIC | Age: 64
End: 2019-01-04
Payer: COMMERCIAL

## 2019-01-04 DIAGNOSIS — I63.9 RIGHT SIDED CEREBRAL HEMISPHERE CEREBROVASCULAR ACCIDENT (CVA) (HCC): Primary | ICD-10-CM

## 2019-01-04 DIAGNOSIS — I63.9 RIGHT SIDED CEREBRAL HEMISPHERE CEREBROVASCULAR ACCIDENT (CVA) (HCC): ICD-10-CM

## 2019-01-04 DIAGNOSIS — R48.8 OTHER SYMBOLIC DYSFUNCTIONS: Primary | ICD-10-CM

## 2019-01-04 PROCEDURE — 97112 NEUROMUSCULAR REEDUCATION: CPT | Performed by: PHYSICAL THERAPIST

## 2019-01-04 PROCEDURE — 97112 NEUROMUSCULAR REEDUCATION: CPT

## 2019-01-04 PROCEDURE — 97150 GROUP THERAPEUTIC PROCEDURES: CPT

## 2019-01-04 PROCEDURE — 92507 TX SP LANG VOICE COMM INDIV: CPT | Performed by: SPEECH-LANGUAGE PATHOLOGIST

## 2019-01-04 PROCEDURE — 97110 THERAPEUTIC EXERCISES: CPT | Performed by: PHYSICAL THERAPIST

## 2019-01-04 PROCEDURE — 97116 GAIT TRAINING THERAPY: CPT | Performed by: PHYSICAL THERAPIST

## 2019-01-04 NOTE — PROGRESS NOTES
Re-Eval    Today's date: 2019  Patient name: Felipe Márquez  : 1955  MRN: [id number]  Referring provider: Vance Boast, MD  Dx:   No diagnosis found  Subjective: Pt reports no new changes since last visit     Objective: See treatment diary below        Precautions: HTN, taking blood thinner Rx     Daily Treatment Diary      Manual                                                                                                                                                      Exercise Diary     Treamill with incline 10 min 3%           Tandem walking           Static balance            Foam FT EO           Lunges  4 laps        walking 3 laps unilateral //    Step ups  12" fwd 15x ea; 8" lat 15xea       step up and overs 8", 20x    side stepping  With ptb around waste 50 laps           sit to stands  3x10           step taps            Hurdles fwd/lat  Lat( hurdles flipped upside down)           Step taps             marches ambulation 4 laps no ue           Squats            backwards ambulation With mtb 10 laps          Foam HT HN            SLS 30 sec x 4 ea           TB walkouts Fwd/bkwd       PTB around hips - fw/bw, sidestepping B/L 10x ea    Step downs L LE Heel dips - 20x ea LE       8" 30x fw/lat   Ambulation in hallway            Heel raises 30x      SL eccentric L LE, concentric R LE     DL jumps  30x  SL 30x           20x, R UE support          Modalities                                                                                                          Assessment: Pt had more difficulty by the end of hte session with strength and steadiness in LE  Trialed heel dips for TKE  Plan next session to continue  Plan: Progress treatment as tolerated  trial ankle ankle weight on L LE during TM next visit as able

## 2019-01-04 NOTE — PROGRESS NOTES
Daily Note     Today's date: 2019  Patient name: Yaneth Varghese  : 1955  MRN: [id number]  Referring provider: León Joshi MD  Dx:   Encounter Diagnosis   Name Primary?  Right sided cerebral hemisphere cerebrovascular accident (CVA) ([facility] ) Yes                  Subjective: "My fingers are so stiff and swollen"  Objective: See treatment diary below  Pt participated in skilled OT focusing on neuro re-ed and tome reduction to LUE  NMES to shoulder simutanously with MH to forearm and edema massage to hand and STM to forearm for tone reduction  Supine on mat pt completed punch ups w/place and hold for about 30 sec with OH reach for BB retrieval, LUE extension >100* to release BB focusing on full UE extension w/digit and wrist extension  K tape to shoulder for stabilization and stability  Assessment: Tolerated treatment well  Educated pt on proper positioning of L forearm and hand for edema mngt  Pt able to tj neoprene and jacket Indep x1, min assist to stabilize fabric while pt pulled zipper due to decreased pincer grasp, WEAVER educated pt to trial stabilizing zipper closure and fabric using right hand and pulling zipper w/left  Pt educated on skin integrity and safe removal of K tape    Patient would benefit from continued OT      Plan: Continued skilled OT per POC     INTERVENTION COMMENTS:  Diagnosis: Right sided cerebral hemisphere cerebrovascular accident (CVA) ([facility] ) [I63 9]  Precautions: HTN, DM, IASTM to HAND ONLY  FOTO:  1 of 8 visits, PN due :  [id number]:1 Neuro  [id number]  [id number]

## 2019-01-06 NOTE — PROGRESS NOTES
Speech-Language Pathology Re-Evaluation    Today's date: 2019  Patients name: Heide Coe  : 1955  MRN: [id number]  Safety measures: CVA; fall risk  Referring provider: Jhoan Fermin MD    Subjective comments: "I'm good "    Patient's goal(s): "to get back to be able to do my job"    Assessments    The Repeatable Battery for the Assessment of Neuropsychological Status (RBANS) is a brief, individually-administered assessment which measures attention, language, visuospatial/constructional abilities, and immediate & delayed memory  The RBANS is intended for use with adolescents to adults, ages 15 to 80 years  The following results were obtained during the administration of the assessment  Form: B    Cognitive Domain/Subtest: Index Score: Percentile Rank: Classification: IE: Status:   IMMEDIATE MEMORY 103 58%ile Average 83 IMPROVEMENT        1  List Learning ()          2  Story Memory ()           VISUOSPATIAL/  CONSTRUCTIONAL 100 50%ile Average 87 IMPROVEMENT        3  Figure Copy ()          4  Line Orientation ()           LANGUAGE 90 25%ile Average 92 DECLINE        5  Picture Naming (10/10)          6  Semantic Fluency ()           ATTENTION 115 84%ile High Average 122 DECLINE        7  Digit Span ()          8  Coding ()           DELAYED MEMORY 100 50%ile Average 75 IMPROVEMENT        9  List Recall (5/10)          10  List Recognition ()          11  Story Recall (10/12)          12  Figure Recall ()           Sum of Index Scores:  508  459 IMPROVEMENT   Total Score:  101      Percentile: 53%ile      Classification: Average          IE indicates the scores from the initial evaluation (2018)  Form: C      *Patient named 11 concrete category members (animals) in 60 sec (norm=15+)  -- BELOW AVERAGE    *Patient named 6 abstract category members (words beginning with letter 'm') in 60 sec (norm=10+)   -- AVERAGE      The Test of Adult Language - Fourth Edition (TOAL-4) is a standardized, norm-referenced assessment measuring important communicative abilities in spoken and written language  The test in normed on individuals 12:0-24:11  Due to these age restrictions, these standardized scores should not be compared to standard or percentile rank  Objective measures were taken to complete a diagnostic impression and prognosis for this pt  The TOAL-4 has 6 subtests; their results are reported as percentile ranks and scaled scores  The results of the TOAL-4 are generally used for three purposes; (a) to identify adolescents and adults who score significantly below their peers and therefore might need help improving their language proficiency, (b) to determine areas of relative strength and weakness among language abilities, and (c) to serve as a research tool in studies investigating language problems in adolescents and adults  Due to time constraints, only portions of the standardized assessment were administered on this date of service  Subtest: Raw Score: Percentile:  Scaled Score: IE: Status:   1  Word Opposites 14/34 5%tile 5 11 IMPROVEMENT   3  Spoken Analogies 16/26 37%tile 9 15 IMPROVEMENT   4  Word Similarities 15/40 25%tile 8 15 NO CHANGE     IE indicates the scores from the initial evaluation (11/26/2018)  Goals    Short-term goals:  1  Patient will be educated on the use of internal and external memory aids and compensatory strategies with 80% accuracy to facilitate increased recall of routine, personal information, and recent events, to be achieved in 4-6 weeks  -- MET     2  Patient will complete auditory immediate and short term memory tasks to 80% accuracy to facilitate increased ability to retell narratives and recall information within functional living environment, to be achieved in 4-6 weeks   -- MET     3  Patient will facilitate planning by completing thought organization tasks (e g , sequencing, deduction puzzles, etc ) with 90% accuracy to facilitate increased executive functioning, working memory, problem solving, and processing skills, to be achieved in 4-6 weeks  -- PARTIALLY MET     4  Patient will be educated on word finding strategies (i e , circumlocution) for improved generative naming and verbal expression skills  -- MET     5  Patient will name up to 3 synonyms for a given word with 80% accuracy to build expressive vocabulary for conversation, to be achieved in 4-6 weeks  -- PARTIALLY MET     6  Patient will name an appropriate antonym for a given word with 80% accuracy to build expressive vocabulary for conversation, to be achieved in 4-6 weeks  -- PARTIALLY MET     7  Patient will complete word generation tasks (e g , analogies, category matrices, word definitions, synonyms/antonyms, idioms, etc ) with 80% accuracy using word finding strategies to facilitate improved word retrieval skills, to be achieved in 4-6 weeks  -- PARTIALLY MET     Long-term goals:  1  Patient will demonstrate cognitive-communication skills consistent with age and education given use of compensatory strategies when needed to resume baseline activities and responsibilities in home, community, and work settings by discharge  -- PARTIALLY MET     2  Patient will complete cognitive-linguistic therapy that addresses patients specific deficits in processing speed, short-term working memory, attention to detail, monitoring, sequencing, and organization skills, with instruction, to alleviate effects of executive functioning disorder deficits by discharge  -- PARTIALLY MET     3  Patient will demonstrate adequate verbal expression during conversation without breakdowns or word finding deficits by discharge   -- PARTIALLY MET    Functional Limitations Reporting (G-codes):   Flowsheet Rows      Most Recent Value   SLP G-Codes   FOTO information reviewed  N/A   Assessment Type  Re-evaluation   Functional Limitations  Spoken language expressive   Spoken Language Expression Current Status ()  CJ   Spoken Language Expression Goal Status ()  [facility]        Impressions/Recommendations    Impressions: Patient presents with mild-moderate (improving) cognitive-linguistic deficits s/p CVA c/b reduced auditory processing speed, verbal fluency, and higher-level language skills  During conversational speech, patient was observed to have reduced language processing with notable anomia  Patient with mild paraphasic errors in his expressive (e g , saying "beaken" for "beaker" , writing "shrone" for "throne")  Patient is motivated during all therapy sessions and attempts assigned HEP  Patient with good family support  Patient is in agreement with continuing to target cognitive-linguistic goals in Speech Therapy POC  Recommendations:  -Patient would benefit from outpatient skilled Speech Therapy services : Cognitive-Linguistic therapy    -Frequency: 1-2x weekly  -Duration: 4-6 weeks    -Intervention certification from: 8/4/0472  -Intervention certification to: 39/84/8078    -Intervention comments:   Re-evaluation/administration of standardized test with patient (1:00pm-2:00pm)  Scoring and interpretation of standardized test for the development of POC (2:00pm-3:00pm)    Visit Tracking:   -Referring provider: Epic  -Billing guidelines: CMS  -Visit #10/12  (**auth expires 01/24/2019**)  -[employer]  (20 visits per benefit year per discipline; auth required after 1st visit)  -RE due 02/19/2019

## 2019-01-08 ENCOUNTER — OFFICE VISIT (OUTPATIENT)
Dept: PHYSICAL THERAPY | Facility: CLINIC | Age: 64
End: 2019-01-08
Payer: COMMERCIAL

## 2019-01-08 ENCOUNTER — OFFICE VISIT (OUTPATIENT)
Dept: OCCUPATIONAL THERAPY | Facility: CLINIC | Age: 64
End: 2019-01-08
Payer: COMMERCIAL

## 2019-01-08 ENCOUNTER — EVALUATION (OUTPATIENT)
Dept: SPEECH THERAPY | Facility: CLINIC | Age: 64
End: 2019-01-08
Payer: COMMERCIAL

## 2019-01-08 DIAGNOSIS — I63.9 RIGHT SIDED CEREBRAL HEMISPHERE CEREBROVASCULAR ACCIDENT (CVA) (HCC): Primary | ICD-10-CM

## 2019-01-08 DIAGNOSIS — R48.8 OTHER SYMBOLIC DYSFUNCTIONS: Primary | ICD-10-CM

## 2019-01-08 DIAGNOSIS — I63.9 RIGHT SIDED CEREBRAL HEMISPHERE CEREBROVASCULAR ACCIDENT (CVA) (HCC): ICD-10-CM

## 2019-01-08 PROCEDURE — 97112 NEUROMUSCULAR REEDUCATION: CPT

## 2019-01-08 PROCEDURE — G9162 LANG EXPRESS CURRENT STATUS: HCPCS | Performed by: SPEECH-LANGUAGE PATHOLOGIST

## 2019-01-08 PROCEDURE — 97110 THERAPEUTIC EXERCISES: CPT | Performed by: PHYSICAL THERAPIST

## 2019-01-08 PROCEDURE — L3808 WHFO, RIGID W/O JOINTS: HCPCS

## 2019-01-08 PROCEDURE — 96125 COGNITIVE TEST BY HC PRO: CPT | Performed by: SPEECH-LANGUAGE PATHOLOGIST

## 2019-01-08 PROCEDURE — 97112 NEUROMUSCULAR REEDUCATION: CPT | Performed by: PHYSICAL THERAPIST

## 2019-01-08 PROCEDURE — 97116 GAIT TRAINING THERAPY: CPT | Performed by: PHYSICAL THERAPIST

## 2019-01-08 PROCEDURE — G9163 LANG EXPRESS GOAL STATUS: HCPCS | Performed by: SPEECH-LANGUAGE PATHOLOGIST

## 2019-01-08 NOTE — PROGRESS NOTES
Daily Note     Today's date: 2019  Patient name: Vitaly Calvert  : 1955  MRN: [id number]  Referring provider: Willard Goodell, MD  Dx:   Encounter Diagnosis   Name Primary?  Right sided cerebral hemisphere cerebrovascular accident (CVA) ([facility] ) Yes                  Subjective: "This is great  Thanks "      Objective: See treatment below  MH with NMES to forearm flexors provided for neuro re-ed for 10 minutes  Fabricated resting hand splint for patient to decrease wrist drop  Educated on splint wearing schedule to wear for 1 hour today (patient wore during PT session), checked for redness (none observed), increase to 2 hours tomorrow and perform skin check; continue adding 1 hour until reaching 8 hours and then wear only over night  Assessment: Tolerated treatment well  Plan: Continued skilled OT per POC      INTERVENTION COMMENTS:  Diagnosis: Right sided cerebral hemisphere cerebrovascular accident (CVA) ([facility] ) [I63 9]  Precautions: HTN, DM, IASTM to HAND ONLY  FOTO:  2 of 8 visits, PN due

## 2019-01-08 NOTE — PATIENT INSTRUCTIONS
Splint Wearing Schedule:  Wear splint for 1 hour today and check for any redness  Tomorrow, increase wear time to 2 hours and continue to add 1 hour each day; check for redness  Once tolerating 8 hours without redness or pain, wear for 8 hours overnight  Redness = 10-15 minutes without disappearing  Bring back to clinic for modifications

## 2019-01-08 NOTE — PROGRESS NOTES
Daily Note    Today's date: 2019  Patient name: Pankaj Jimenez  : 1955  MRN: [id number]  Referring provider: Kin Arnold MD  Dx:   Encounter Diagnosis     ICD-10-CM    1  Right sided cerebral hemisphere cerebrovascular accident (CVA) ([facility] ) I63 9                   Subjective: Pt reports no new changes since last visit     Objective: See treatment diary below        Precautions: HTN, taking blood thinner Rx     Daily Treatment Diary      Manual                                                                                                                                                      Exercise Diary     Treamill with incline 10 min 3%  10 min 1  6mph  4% incline    2# on L ankle         Tandem walking           Static balance            Foam FT EO           Lunges  4 laps  5 laps,in bars, 2# L ankle      walking 3 laps unilateral //    Step ups  12" fwd 15x ea; 8" lat 15xea 12" fwd 15x2 with LLE      step up and overs 8", 20x    side stepping  With ptb around waste 50 laps           sit to stands  3x10           step taps            Hurdles fwd/lat  Lat( hurdles flipped upside down)           Step taps             marches ambulation 4 laps no ue           Squats            backwards ambulation With mtb 10 laps          Foam HT HN            SLS 30 sec x 4 ea 3sx2 on L, keeping slight bend in knee          TB walkouts Fwd/bkwd Fwd/bkwd and lat 10 laps       PTB around hips - fw/bw, sidestepping B/L 10x ea    Step downs L LE Heel dips - 20x ea LE On 6in step   R Heel dip  20x2  10x1      8" 30x fw/lat   Ambulation in hallway           Manual purterbations   4 min in //bars        Rocker board  1 min x2 fwrd/back         Heel raises 30x 20x on edge of step     SL eccentric L LE, concentric R LE     DL jumps  30x  SL 30x  DL 20x         20x, R UE support          Modalities                                                                                                          Assessment: Performed well during session today  Added ankle weight to TM which pt responded well to and was able to maintain adequete foot clearance  Also tolerated addition of Rockerboard and perturbations well with some instability  Focused this session on having pt avoid knee recurvatum and maintain knee in slight bend  Would benefit from continued PT  Plan: Progress treatment as tolerated

## 2019-01-09 ENCOUNTER — OFFICE VISIT (OUTPATIENT)
Dept: CARDIOLOGY CLINIC | Facility: CLINIC | Age: 64
End: 2019-01-09
Payer: COMMERCIAL

## 2019-01-09 VITALS
WEIGHT: 188.7 LBS | DIASTOLIC BLOOD PRESSURE: 70 MMHG | BODY MASS INDEX: 27.95 KG/M2 | HEART RATE: 56 BPM | HEIGHT: 69 IN | SYSTOLIC BLOOD PRESSURE: 110 MMHG

## 2019-01-09 DIAGNOSIS — I63.9 RIGHT SIDED CEREBRAL HEMISPHERE CEREBROVASCULAR ACCIDENT (CVA) (HCC): Primary | ICD-10-CM

## 2019-01-09 DIAGNOSIS — Z45.09 ENCOUNTER FOR LOOP RECORDER CHECK: ICD-10-CM

## 2019-01-09 PROCEDURE — 99024 POSTOP FOLLOW-UP VISIT: CPT | Performed by: INTERNAL MEDICINE

## 2019-01-09 PROCEDURE — 93000 ELECTROCARDIOGRAM COMPLETE: CPT | Performed by: INTERNAL MEDICINE

## 2019-01-09 NOTE — PROGRESS NOTES
HEART  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 and is on plavix/asa/statin  Going through rehab, left arm weakness  2) HTN well controlled      PLAN:  1  F/u loop remotely  Continue asa/plavix/statin          Orders Placed This Encounter   Procedures    POCT ECG     Medications Discontinued During This Encounter   Medication Reason    valsartan-hydrochlorothiazide (DIOVAN-HCT) 320-25 MG per tablet Discontinued by another clinician             HPI/Subjective:   59 yo male  1) Recovering from CVA Oct 2018, had loop placed  No Afib thus far  He had large burden atheroma on aortic arch and is on plavix/asa/statin  Going through rehab, left arm weakness  2) HTN well controlled    Please note HPI is listed by problem with with update following it, it is copied again in the assessment above and reflects medical decision making as well  Complete 12 point ROS reviewed and otherwise non pertinent or negative except as per HPI pertinent positives in Cardiovascular and Respiratory emphasized  Please see paper chart for outpatient clinic patients where the patient completed the 12 point ROS survey  Past Medical History:   Diagnosis Date    Hypertension     Migraine        No Known Allergies  I reviewed the Home Medication list and Allergies in the chart     Scheduled Meds:  Current Outpatient Prescriptions   Medication Sig Dispense Refill    aspirin (ECOTRIN LOW STRENGTH) 81 mg EC tablet Take 1 tablet (81 mg total) by mouth daily 90 tablet 3    atorvastatin (LIPITOR) 80 mg tablet Take 1 tablet (80 mg total) by mouth every evening 90 tablet 3    clopidogrel (PLAVIX) 75 mg tablet Take 1 tablet (75 mg total) by mouth daily for 64 doses 30 tablet 0    FLUoxetine (PROzac) 20 mg capsule Take 1 capsule (20 mg total) by mouth daily 90 capsule 3    losartan (COZAAR) 50 mg tablet Take 1 tablet (50 mg total) by mouth daily 90 tablet 0    Melatonin 5 MG TABS Take 1 tablet (5 mg total) by mouth daily at bedtime  0     No current facility-administered medications for this visit  PRN Meds:         Family History   Problem Relation Age of Onset    Stroke Mother     Heart disease Father     ALS Father        Social History     Social History    Marital status:      Spouse name: N/A    Number of children: N/A    Years of education: N/A     Occupational History    Not on file  Social History Main Topics    Smoking status: Former Smoker     Packs/day: 1 00     Types: Cigarettes, Cigars    Smokeless tobacco: Never Used      Comment: stop on 10/24/2018    Alcohol use Yes      Comment: socially     Drug use: No    Sexual activity: Not on file     Other Topics Concern    Not on file     Social History Narrative    No narrative on file         OBJECTIVE:    /70 (BP Location: Right arm, Patient Position: Sitting, Cuff Size: Large)   Pulse 56   Ht 5' 9" (1 753 m)   Wt 85 6 kg (188 lb 11 2 oz)   BMI 27 87 kg/m²   Vitals:    01/09/19 1405   Weight: 85 6 kg (188 lb 11 2 oz)     GEN: No acute distress, Alert and oriented, well appearing  HEENT:Head, neck, ears, oral pharynx: Mucus membranes moist, oral pharynx clear, nares clear   External ears normal  EYES: Pupils equal, sclera anicteric, midline, normal conjuctiva  NECK: No JVD, supple, no obvious masses or thryomegaly or goiter  CARDIOVASCULAR:  RRR, No murmur, rub, gallops S1,S2  LUNGS: Clear To auscultation bilaterally, normal effort, no rales, rhonchi, crackles  ABDOMEN:  nondistended,  without obvious organomegaly or ascites  EXTREMITIES/VASCULAR:  No edema  Radial pulses intact, pedal pulses difficult to palpate, warm an well perfused  PSYCH: Normal Affect, no overt suicidal ideation, linear speech pattern without evidence of psychosis  NEURO: left UE weakness  GAIT:  Ambulates normally without difficulty  HEME: No bleeding, bruising, petechia, purpura  SKIN: No significant rashes, warm, no diaphoresis or pallor  Lab Results:       LABS:      Chemistry        Component Value Date/Time    K 3 6 2018 0539     2018 0539    CO2 28 2018 0539    CO2 30 10/20/2018 2200    BUN 10 2018 0539    CREATININE 0 86 2018 0539        Component Value Date/Time    CALCIUM 8 5 2018 0539    ALKPHOS 47 10/21/2018 0438    AST 20 10/21/2018 0438    ALT 34 10/21/2018 0438            No results found for: CHOL  Lab Results   Component Value Date    HDL 35 (L) 10/21/2018     Lab Results   Component Value Date    LDLCALC 83 10/21/2018     Lab Results   Component Value Date    TRIG 216 (H) 10/21/2018     No results found for: CHOLHDL    IMAGING: No results found  Cardiac testing:   Results for orders placed during the hospital encounter of 10/20/18   Echo complete with contrast if indicated    Narrative [facility], [address] [facility]  [phone]    Transthoracic Echocardiogram  2D, M-mode, Doppler, and Color Doppler    Study date:  21-Oct-2018    Patient: Quirino Nash  MR number: [id number]  Account number: [de-identified]  : 1955  Age: 58 years  Gender: Male  Status: Inpatient  Location: Bedside  Height: 69 in  Weight: 199 lb  BP: 120/ 67 mmHg    Indications: Stroke      Diagnoses: I63 9 - Cerebral infarction, unspecified    Sonographer:  Jess Murcia RDCS  Primary Physician:  Phi Sneed MD  Referring Physician:  PATEL Lange  Group:  [facility]  Interpreting Physician:  Colleen Beltran MD    SUMMARY    LEFT VENTRICLE:  Systolic function was normal  Ejection fraction was estimated to be 60 %  There were no regional wall motion abnormalities  Wall thickness was mildly increased  HISTORY: PRIOR HISTORY: Smoker  Hypertension  Anxiety  PROCEDURE: The procedure was performed at the bedside  This was a routine study  The transthoracic approach was used  The study included complete 2D imaging, M-mode, complete spectral Doppler, and color Doppler  The heart rate was 63 bpm,  at the start of the study  Echocardiographic views were limited due to lung interference  Image quality was adequate  LEFT VENTRICLE: Size was normal  Systolic function was normal  Ejection fraction was estimated to be 60 %  There were no regional wall motion abnormalities  Wall thickness was mildly increased  DOPPLER: Left ventricular diastolic function  parameters were normal     RIGHT VENTRICLE: The size was normal  Systolic function was normal  Wall thickness was normal     LEFT ATRIUM: Size was normal     RIGHT ATRIUM: Size was normal     MITRAL VALVE: Valve structure was normal  There was normal leaflet separation  DOPPLER: The transmitral velocity was within the normal range  There was no evidence for stenosis  There was no significant regurgitation  AORTIC VALVE: The valve was trileaflet  Leaflets exhibited normal thickness and normal cuspal separation  DOPPLER: Transaortic velocity was within the normal range  There was no evidence for stenosis  There was no significant  regurgitation  TRICUSPID VALVE: The valve structure was normal  There was normal leaflet separation  DOPPLER: The transtricuspid velocity was within the normal range  There was no evidence for stenosis  There was no significant regurgitation      PULMONIC VALVE: Leaflets exhibited normal thickness, no calcification, and normal cuspal separation  DOPPLER: The transpulmonic velocity was within the normal range  There was no significant regurgitation  PERICARDIUM: There was no pericardial effusion  The pericardium was normal in appearance  AORTA: The root exhibited normal size  SYSTEMIC VEINS: IVC: The inferior vena cava was normal in size      SYSTEM MEASUREMENT TABLES    2D  %FS: 32 87 %  AV Diam: 3 82 cm  EDV(Teich): 120 34 ml  EF(Teich): 61 08 %  ESV(Teich): 46 84 ml  IVSd: 1 29 cm  LA Area: 16 18 cm2  LA Diam: 3 35 cm  LVEDV MOD A4C: 69 46 ml  LVEF MOD A4C: 49 %  LVESV MOD A4C: 35 42 ml  LVIDd: 5 04 cm  LVIDs: 3 38 cm  LVLd A4C: 7 63 cm  LVLs A4C: 5 81 cm  LVPWd: 1 31 cm  RA Area: 18 33 cm2  RVIDd: 3 2 cm  SV MOD A4C: 34 04 ml  SV(Teich): 73 5 ml    MM  TAPSE: 2 36 cm    PW  E': 0 09 m/s  E/E': 8 05  MV A Pranay: 0 78 m/s  MV Dec [place]: 4 36 m/s2  MV DecT: 169 76 ms  MV E Pranay: 0 74 m/s  MV E/A Ratio: 0 95  MV PHT: 49 23 ms  MVA By PHT: 4 47 cm2    [facility] Accredited Echocardiography Laboratory    Prepared and electronically signed by    Jerad Coburn MD  Signed 21-Oct-2018 23:07:32       Results for orders placed during the hospital encounter of 10/25/18   JAYE    Narrative [facility] [address]  [facility] [place], [address] [facility]  [phone]    Transesophageal Echocardiogram  2D, Doppler, and Color Doppler    Study date:  30-Oct-2018    Patient: Joseph Vicente  MR number: [id number]  Account number: [de-identified]  : 1955  Age: 58 years  Gender: Male  Status: Inpatient  Location: Echo lab  Height: 69 in  Weight: 183 lb  BP: 169/ 84 mmHg    Indications: CVA    Diagnoses: I48 1 - Atrial flutter    Sonographer:  Carmel Sandifer, RCS  Primary Physician:  Olaf Dash MD  Referring Physician:  Jeannine Cardozo MD  Group:  [facility]  Interpreting Physician:  Jeannine Cardozo MD    SUMMARY    LEFT VENTRICLE:  Systolic function was normal  Ejection fraction was estimated to be 65 %  There were no regional wall motion abnormalities  Wall thickness was increased  LEFT ATRIUM:  The atrium was mildly dilated  LEFT ATRIAL APPENDAGE:  No thrombus was identified  ATRIAL SEPTUM:  No defect or patent foramen ovale was identified  Contrast injection was performed  There was no right-to-left shunt, with provocative maneuvers to increase right atrial pressure  MITRAL VALVE:  There was trace regurgitation  TRICUSPID VALVE:  There was mild regurgitation  Pulmonary artery systolic pressure was within the normal range  AORTA:  There was mild-moderately severe atheroma in the visualized portions of the intrathoracic aorta  HISTORY: PRIOR HISTORY: Smoker, HTN    PROCEDURE: The procedure was performed in the echo lab  This was a routine study  The risks and alternatives of the procedure were explained to the patient and informed consent was obtained  The transesophageal approach was used  The study  included complete 2D imaging, complete spectral Doppler, and color Doppler  The heart rate was 50 bpm, at the start of the study  An adult omniplane probe was inserted by the attending cardiologist  Intubated with ease  One intubation  attempt(s)  There was no blood detected on the probe  Intravenous contrast (agitated saline) was administered  Image quality was adequate  MEDICATIONS: Anesthesia  LEFT VENTRICLE: Size was normal  Systolic function was normal  Ejection fraction was estimated to be 65 %  There were no regional wall motion abnormalities  Wall thickness was increased  RIGHT VENTRICLE: The size was normal  Systolic function was normal     LEFT ATRIUM: The atrium was mildly dilated  APPENDAGE: The size was normal  No thrombus was identified  ATRIAL SEPTUM: No defect or patent foramen ovale was identified  Contrast injection was performed   There was no right-to-left shunt, with provocative maneuvers to increase right atrial pressure  RIGHT ATRIUM: Size was normal     MITRAL VALVE: Valve structure was normal  There was mild calcification  There was normal leaflet separation  DOPPLER: The transmitral velocity was within the normal range  There was no evidence for stenosis  There was trace regurgitation  AORTIC VALVE: The valve was trileaflet  Leaflets exhibited normal thickness and normal cuspal separation  DOPPLER: Transaortic velocity was within the normal range  There was no evidence for stenosis  There was no significant  regurgitation  TRICUSPID VALVE: The valve structure was normal  There was normal leaflet separation  DOPPLER: There was mild regurgitation  Pulmonary artery systolic pressure was within the normal range  PULMONIC VALVE: Leaflets exhibited normal thickness, no calcification, and normal cuspal separation  DOPPLER: The transpulmonic velocity was within the normal range  There was mild regurgitation  PERICARDIUM: There was no pericardial effusion  AORTA: The root exhibited normal size  The ascending aorta was normal in size  There was mild-moderately severe atheroma in the visualized portions of the intrathoracic aorta  [address] Accredited Echocardiography Laboratory    Prepared and electronically signed by    Candida Marroquin MD  Signed 30-Oct-2018 09:28:00       No results found for this or any previous visit  No results found for this or any previous visit  I reviewed and interpreted the following LABS/EKG/TELE/IMAGING and below is summary of my interpretation (if data available):     Interrogation of Pacemaker/Defibrillator/Loop (prior recorded events), etc: no events:

## 2019-01-10 ENCOUNTER — OFFICE VISIT (OUTPATIENT)
Dept: PHYSICAL THERAPY | Facility: CLINIC | Age: 64
End: 2019-01-10
Payer: COMMERCIAL

## 2019-01-10 ENCOUNTER — OFFICE VISIT (OUTPATIENT)
Dept: OCCUPATIONAL THERAPY | Facility: CLINIC | Age: 64
End: 2019-01-10
Payer: COMMERCIAL

## 2019-01-10 ENCOUNTER — OFFICE VISIT (OUTPATIENT)
Dept: SPEECH THERAPY | Facility: CLINIC | Age: 64
End: 2019-01-10
Payer: COMMERCIAL

## 2019-01-10 DIAGNOSIS — I63.9 RIGHT SIDED CEREBRAL HEMISPHERE CEREBROVASCULAR ACCIDENT (CVA) (HCC): ICD-10-CM

## 2019-01-10 DIAGNOSIS — R48.8 OTHER SYMBOLIC DYSFUNCTIONS: Primary | ICD-10-CM

## 2019-01-10 DIAGNOSIS — I63.9 RIGHT SIDED CEREBRAL HEMISPHERE CEREBROVASCULAR ACCIDENT (CVA) (HCC): Primary | ICD-10-CM

## 2019-01-10 PROCEDURE — 97116 GAIT TRAINING THERAPY: CPT | Performed by: PHYSICAL THERAPIST

## 2019-01-10 PROCEDURE — 97112 NEUROMUSCULAR REEDUCATION: CPT | Performed by: PHYSICAL THERAPIST

## 2019-01-10 PROCEDURE — 97140 MANUAL THERAPY 1/> REGIONS: CPT

## 2019-01-10 PROCEDURE — 97112 NEUROMUSCULAR REEDUCATION: CPT

## 2019-01-10 PROCEDURE — 92507 TX SP LANG VOICE COMM INDIV: CPT | Performed by: SPEECH-LANGUAGE PATHOLOGIST

## 2019-01-10 NOTE — PROGRESS NOTES
Daily Note     Today's date: 1/10/2019  Patient name: Angeles Sibley  : 1955  MRN: [id number]  Referring provider: Toshia Ramirez MD  Dx:   Encounter Diagnosis   Name Primary?  Right sided cerebral hemisphere cerebrovascular accident (CVA) ([facility] ) Yes                  Subjective: " My fingers are a little sore today"      Objective: See treatment below  UEB prograde and retrograde at 1 3 resistance 10min for tone reduction and utilized coban for sustained grasp  Following UED provided MH, NMES to wrist extensors and IASTM to LUE  Pt in supine performing wrist extension exercise with mod A to initiate further extension to tolerable point  2# weighted bar in supine 3x10 elbow extension and shoulder flexion for strengthening and proximal stability  Assessment: Tolerated treatment well  Pt reported no issues with splint provided in previous visit and is up to wearing splint 4 hours on this day  WEAVER recommended neoprine not be used till following visit due to decrease in sublux  Plan: Continued skilled OT per POC       INTERVENTION COMMENTS:  Diagnosis: Right sided cerebral hemisphere cerebrovascular accident (CVA) ([facility] ) [I63 9]  Precautions: HTN, DM, IASTM to HAND ONLY  FOTO:  3 of 8 visits, PN due

## 2019-01-10 NOTE — PROGRESS NOTES
Daily Note    Today's date: 1/10/2019  Patient name: Angeles Sibley  : 1955  MRN: [id number]  Referring provider: Toshia Ramirez MD  Dx:   Encounter Diagnosis     ICD-10-CM    1  Right sided cerebral hemisphere cerebrovascular accident (CVA) ([facility] [address] ) I63 9                   Subjective: Pt reports no new changes since last visit     Objective: See treatment diary below        Precautions: HTN, taking blood thinner Rx     Daily Treatment Diary      Manual                                                                                                                                                      Exercise Diary  1/2 1/8 1/10     12/27   Treamill with incline 10 min 3%  10 min 1  6mph  4% incline    2# on L ankle 10 min 1  6mph  5% incline    2# on L ankle        Tandem walking           Static balance            Foam FT EO           Lunges  4 laps  5 laps,in bars, 2# L ankle 6 laps in bars, 2# L ankle, half done going down to half knee       walking 3 laps unilateral //    Step ups  12" fwd 15x ea; 8" lat 15xea 12" fwd 15x2 with LLE 12" fwd 15x2 alternating LE's    8in lateral step ups, LLE 15x     step up and overs 8", 20x    side stepping  With ptb around waste 50 laps           sit to stands  3x10           step taps            Hurdles fwd/lat  Lat( hurdles flipped upside down)           Step taps             marches ambulation 4 laps no ue           Squats            backwards ambulation With mtb 10 laps          Foam HT HN            SLS 30 sec x 4 ea 3sx2 on L, keeping slight bend in knee 30sx3 on L, keeping slight bend in knee         TB walkouts Fwd/bkwd Fwd/bkwd and lat 10 laps  Fwd/bkwd and lat 10 laps      PTB around hips - fw/bw, sidestepping B/L 10x ea    Step downs L LE Heel dips - 20x ea LE On 6in step  R Heel dip  20x2  10x1 On 6in step   R Heel dip  20x2  10x1     8" 30x fw/lat   Ambulation in hallway           Manual purterbations   4 min in //bars        Rocker board  1 min x2 fwrd/back 1 min x2 fwrd/back        Heel raises 30x 20x on edge of step 20x on edge of step    SL eccentric L LE, concentric R LE     DL jumps  30x  SL 30x  DL 20x  DL 10x2       20x, R UE support          Modalities                                                                                                          Assessment: Performed well during session today  Tolerated progression of TM incline well, trialed not using hands on TM but was unable due to instability  Rest breaks needed occasionally due to fatigue especially after rockerboard  Would benefit from continued PT  Plan: Progress treatment as tolerated

## 2019-01-10 NOTE — PROGRESS NOTES
Daily Speech Treatment Note    Today's date: 1/10/2019 (***update)  Patients name: Soha Flanagan  : 1955  MRN: [id number]  Safety measures: CVA, fall risk  Referring provider: Fransisco Hilario MD    Primary Diagnosis/Billing code: C68 6  Secondary Diagnosis/ Billing code: I63 9    Visit Tracking:  -Referring provider: Epic  -Billing guidelines: CMS  -Visit #11/12  (**auth expires 2019**) (***update)  -[employer]  (20 visits per benefit year per discipline; auth required after 1st visit)  -RE due 2019  Subjective/Behavioral:  -***    Objective/Assessment:  -Reviewed testing results and goals in plan care with patient  Patient is in agreement at this time  Short-term goals:   3  Patient will facilitate planning by completing thought organization tasks (e g , sequencing, deduction puzzles, etc ) with 90% accuracy to facilitate increased executive functioning, working memory, problem solving, and processing skills, to be achieved in 4-6 weeks  -- PARTIALLY MET     5  Patient will name up to 3 synonyms for a given word with 80% accuracy to build expressive vocabulary for conversation, to be achieved in 4-6 weeks  -- PARTIALLY MET     6  Patient will name an appropriate antonym for a given word with 80% accuracy to build expressive vocabulary for conversation, to be achieved in 4-6 weeks  -- PARTIALLY MET     7  Patient will complete word generation tasks (e g , analogies, category matrices, word definitions, synonyms/antonyms, idioms, etc ) with 80% accuracy using word finding strategies to facilitate improved word retrieval skills, to be achieved in 4-6 weeks  -- PARTIALLY MET  -Word finding: Patient played Anomia card game to target speed of naming/processing based upon category labels (e g , flower, pop band, candy bar)  Level 5 cards utilized  Patient named an average of 11 4 cards/min (norm 10+)   Clinician educated patient on strategies to assist with thought organization and word retrieval speed, as well as circumlocution  Reviewed 13 skipped cards at end of trials     -Word deduction: Patient was provided with a definition and asked to name what was being described  It should be noted that patient was provided with letters (MON, RE, and MAN) and fill-in-the-blank spaces to assist with word retrieval  Task completed in 47/60 opp (78%) independently, increasing to 56/60 opp (93% acc) with min-mod semantic cues  Plan:  -Patient was provided with home exercises/activities to target goals in plan of care at the end of today's session   -Continue with current plan of care

## 2019-01-10 NOTE — PROGRESS NOTES
Daily Speech Treatment Note    Today's date: 1/10/2019  Patients name: Martha Witt  : 1955  MRN: [id number]  Safety measures: CVA, fall risk  Referring provider: Kayden Warner MD    Primary Diagnosis/Billing code: P85 9  Secondary Diagnosis/ Billing code: I63 9    Visit Tracking:  -Referring provider: Epic  -Billing guidelines: CMS  -Visit #11/12  (**auth expires 2019**)  -[employer]  (20 visits per benefit year per discipline; auth required after 1st visit)  -RE due 2019  Subjective/Behavioral:  -"I'm fine "    Objective/Assessment:  -Reviewed testing results and goals in plan care with patient  Patient is in agreement at this time  Short-term goals:   3  Patient will facilitate planning by completing thought organization tasks (e g , sequencing, deduction puzzles, etc ) with 90% accuracy to facilitate increased executive functioning, working memory, problem solving, and processing skills, to be achieved in 4-6 weeks  -- PARTIALLY MET     5  Patient will name up to 3 synonyms for a given word with 80% accuracy to build expressive vocabulary for conversation, to be achieved in 4-6 weeks  -- PARTIALLY MET     6  Patient will name an appropriate antonym for a given word with 80% accuracy to build expressive vocabulary for conversation, to be achieved in 4-6 weeks  -- PARTIALLY MET     7  Patient will complete word generation tasks (e g , analogies, category matrices, word definitions, synonyms/antonyms, idioms, etc ) with 80% accuracy using word finding strategies to facilitate improved word retrieval skills, to be achieved in 4-6 weeks  -- PARTIALLY MET  -Word finding: Patient played Anomia card game to target speed of naming/processing based upon category labels (e g , flower, pop band, candy bar)  Level 5 cards utilized  Patient named an average of 11 4 cards/min (norm 10+)   Clinician educated patient on strategies to assist with thought organization and word retrieval speed, as well as circumlocution  Reviewed 13 skipped cards at end of trials     -Word deduction: Patient was provided with a definition and asked to name what was being described  It should be noted that patient was provided with letters (MON, RE, and MAN) and fill-in-the-blank spaces to assist with word retrieval  Task completed in 47/60 opp (78%) independently, increasing to 56/60 opp (93% acc) with min-mod semantic cues  Plan:  -Patient was provided with home exercises/activities to target goals in plan of care at the end of today's session   -Continue with current plan of care

## 2019-01-13 ENCOUNTER — APPOINTMENT (EMERGENCY)
Dept: CT IMAGING | Facility: HOSPITAL | Age: 64
End: 2019-01-13
Payer: COMMERCIAL

## 2019-01-13 ENCOUNTER — HOSPITAL ENCOUNTER (EMERGENCY)
Facility: HOSPITAL | Age: 64
Discharge: HOME | End: 2019-01-13
Attending: EMERGENCY MEDICINE
Payer: COMMERCIAL

## 2019-01-13 VITALS
DIASTOLIC BLOOD PRESSURE: 91 MMHG | HEIGHT: 68 IN | OXYGEN SATURATION: 99 % | WEIGHT: 184.53 LBS | BODY MASS INDEX: 27.97 KG/M2 | SYSTOLIC BLOOD PRESSURE: 178 MMHG | HEART RATE: 50 BPM | TEMPERATURE: 98.3 F | RESPIRATION RATE: 16 BRPM

## 2019-01-13 DIAGNOSIS — R51.9 HEADACHE: ICD-10-CM

## 2019-01-13 DIAGNOSIS — I10 HYPERTENSION: Primary | ICD-10-CM

## 2019-01-13 PROCEDURE — 99284 EMERGENCY DEPT VISIT MOD MDM: CPT

## 2019-01-13 PROCEDURE — 70450 CT HEAD/BRAIN W/O DYE: CPT

## 2019-01-13 RX ORDER — ACETAMINOPHEN 325 MG/1
650 TABLET ORAL ONCE
Status: COMPLETED | OUTPATIENT
Start: 2019-01-13 | End: 2019-01-13

## 2019-01-13 RX ADMIN — ACETAMINOPHEN 650 MG: 325 TABLET, FILM COATED ORAL at 13:16

## 2019-01-13 NOTE — DISCHARGE INSTRUCTIONS
Acute Headache   WHAT YOU NEED TO KNOW:   What is an acute headache? An acute headache is pain or discomfort that starts suddenly and gets worse quickly  You may have an acute headache only when you feel stress or eat certain foods  Other acute headache pain can happen every day, and sometimes several times a day  What are the most common types of acute headache? · Tension headache  is the most common type of headache  These headaches typically occur in the late afternoon and go away by evening  The pain is usually mild or moderate  You may have problems tolerating bright light or loud noise  The pain is usually across the forehead or in the back of the head, often only on one side  These headaches may occur every day  · Migraine headaches  cause moderate or severe pain  The headache generally lasts from 1 to 3 days and tends to come back  Pain is usually on only one side, but it may change sides  Migraines often occur in the temple, the back of the head, or behind the eye  The pain may throb or be sharp and steady  · A migraine with aura  means you see or feel something before a migraine  You may see a small spot surrounded by bright zigzag lines  Other signs or symptoms may follow the aura  · Cluster headache  pain is usually only on one side  It often causes severe pain, and can last for 30 minutes to 2 hours  These headaches may occur 1 or 2 times each day, more often at night  The pain may wake you  What causes acute headaches? The cause of your headache may not be known   The following conditions can trigger a headache:  · Stress or tension, hours or even days after stressful events    · Fatigue, a lack of sleep or changes in your usual sleep pattern, or a nap during the day    · Menstruation, especially after pregnancy, or use of birth control pills or hormone replacement therapy    · Food such as cured meats, artificial sweeteners, alcohol, dark chocolate, and MSG     · Suddenly not having caffeine if you usually have larger amounts    · A medical problem, such as an infection, tooth pain, neck or sinus pain, thyroid problems, or a tumor    · A head injury  How is the type of acute headache diagnosed and treated? Your healthcare provider will ask you to describe your pain and rate it on a scale from 1 to 10  Tell the provider how often you have headaches and how long they last  Also describe any other symptoms you have along with headaches, such as dizziness or blurred vision  · Medicines  may be given to manage or prevent headaches  The medicine will depend on the type of acute headache you have  Do not wait until the pain is severe before you take your medicine  You may be able to take over-the-counter pain medicines as needed  Examples include NSAIDs and acetaminophen  Ask your healthcare provider which medicine is right for you  Ask how much to take and when to take it  Follow directions  These medicines can cause stomach bleeding or kidney or liver damage if not taken correctly  · Biofeedback  may be used to help you manage stress  Electrodes (wires) are placed on your body and attached to a monitor  You will learn how to change stress reactions  For example, you learn to slow your heart rate when you become upset  · Cognitive behavior therapy,  or stress management, may be used with other therapies to prevent headaches  What can I do to manage my symptoms? · Apply heat or ice  on the headache area  Use a heat or ice pack  For an ice pack, you can also put crushed ice in a plastic bag  Cover the pack or bag with a towel before you apply it to your skin  Ice and heat both help decrease pain, and heat also helps decrease muscle spasms  Apply heat for 20 to 30 minutes every 2 hours  Apply ice for 15 to 20 minutes every hour  Apply heat or ice for as long and for as many days as directed  You may alternate heat and ice  · Relax your muscles    Lie down in a comfortable position and close your eyes  Relax your muscles slowly  Start at your toes and work your way up your body  · Keep a record of your headaches  Write down when your headaches start and stop  Include your symptoms and what you were doing when the headache began  Record what you ate or drank for 24 hours before the headache started  Describe the pain and where it hurts  Keep track of what you did to treat your headache and if it worked  What can I do to prevent an acute headache? · Avoid anything that triggers an acute headache  Examples include exposure to chemicals, going to high altitude, or not getting enough sleep  Create a regular sleep routine  Go to sleep at the same time and wake up at the same time each day  Do not use electronic devices before bedtime  These may trigger a headache or prevent you from sleeping well  · Do not smoke  Nicotine and other chemicals in cigarettes and cigars can trigger an acute headache or make it worse  Ask your healthcare provider for information if you currently smoke and need help to quit  E-cigarettes or smokeless tobacco still contain nicotine  Talk to your healthcare provider before you use these products  · Limit alcohol as directed  Alcohol can trigger an acute headache or make it worse  If you have cluster headaches, do not drink alcohol during an episode  For other types of headaches, ask your healthcare provider if it is safe for you to drink alcohol  Ask how much is safe for you to drink, and how often  · Exercise as directed  Exercise can reduce tension and help with headache pain  Aim for 30 minutes of physical activity on most days of the week  Your healthcare provider can help you create an exercise plan  · Eat a variety of healthy foods  Healthy foods include fruits, vegetables, low-fat dairy products, lean meats, fish, whole grains, and cooked beans   Your healthcare provider or dietitian can help you create meals plans if you need to avoid foods that trigger headaches  When should I seek immediate care? · You have severe pain  · You have numbness or weakness on one side of your face or body  · You have a headache that occurs after a blow to the head, a fall, or other trauma  · You have a headache, are forgetful or confused, or have trouble speaking  · You have a headache, stiff neck, and a fever  When should I contact my healthcare provider? · You have a constant headache and are vomiting  · You have a headache each day that does not get better, even after treatment  · You have changes in your headaches, or new symptoms that occur when you have a headache  · You have questions or concerns about your condition or care  CARE AGREEMENT:   You have the right to help plan your care  Learn about your health condition and how it may be treated  Discuss treatment options with your caregivers to decide what care you want to receive  You always have the right to refuse treatment  The above information is an  only  It is not intended as medical advice for individual conditions or treatments  Talk to your doctor, nurse or pharmacist before following any medical regimen to see if it is safe and effective for you  © 2017 [facility] Information is for End User's use only and may not be sold, redistributed or otherwise used for commercial purposes  All illustrations and images included in CareNotes® are the copyrighted property of [employer]  or [employer]  Chronic Hypertension, Ambulatory Care   GENERAL INFORMATION:   Chronic hypertension  is a long-term condition in which your blood pressure (BP) is higher than normal  Your BP is the force of your blood moving against the walls of your arteries  Hypertension is a BP of 140/90 or higher     Common symptoms include the following:   · Headache     · Blurred vision    · Chest pain     · Dizziness or weakness     · Trouble breathing · Nosebleeds  Seek immediate care for the following symptoms:   · Severe headache or vision loss    · Weakness in an arm or leg    · Confusion or difficulty speaking    · Discomfort in your chest that feels like squeezing, pressure, fullness, or pain    · Suddenly feeling lightheaded or trouble breathing    · Pain or discomfort in your back, neck, jaw, stomach, or arm  Treatment for chronic hypertension  may include medicine to lower your BP  You may also need to make lifestyle changes  Take your medicine exactly as directed  Manage chronic hypertension:   · Take your BP at home  Sit and rest for 5 minutes before you take your BP  Extend your arm and support it on a flat surface  Your arm should be at the same level as your heart  Follow the directions that came with your BP monitor  If possible, take at least 2 BP readings each time  Take your BP at least twice a day at the same times each day, such as morning and evening  Keep a log of your BP readings and bring it to your follow-up visits  · Eat less sodium (salt)  Do not add sodium to your food  Limit foods that are high in sodium, such as canned foods, potato chips, and cold cuts  Your healthcare provider may suggest that you follow the [address] [place] [address]  The plan is low in sodium, unhealthy fats, and total fat  It is high in potassium, calcium, and fiber  · Exercise regularly  Exercise at least 30 minutes per day, on most days of the week  This will help decrease your BP  Ask your healthcare provider about the best exercise plan for you  · Limit alcohol  Women should limit alcohol to 1 drink a day  Men should limit alcohol to 2 drinks a day  A drink of alcohol is 12 ounces of beer, 5 ounces of wine, or 1½ ounces of liquor  · Do not smoke  If you smoke, it is never too late to quit  Smoking can increase your BP  Smoking also worsens other health conditions you may have that can increase your risk for hypertension   Ask your healthcare provider for information if you need help quitting  Follow up with your healthcare provider as directed: You will need to return to have your BP checked and to have other lab tests done  Write down your questions so you remember to ask them during your visits  CARE AGREEMENT:   You have the right to help plan your care  Learn about your health condition and how it may be treated  Discuss treatment options with your caregivers to decide what care you want to receive  You always have the right to refuse treatment  The above information is an  only  It is not intended as medical advice for individual conditions or treatments  Talk to your doctor, nurse or pharmacist before following any medical regimen to see if it is safe and effective for you  © 2014 [address] is for End User's use only and may not be sold, redistributed or otherwise used for commercial purposes  All illustrations and images included in CareNotes® are the copyrighted property of A [employer] A M [employer] Inc  or [employer]

## 2019-01-13 NOTE — ED PROVIDER NOTES
History  Chief Complaint   Patient presents with    Hypertension     pt had bp checked at pcp office on Friday and was instructed to take 2 tabs of bp meds instead of 1   has had mild headache this morning and checked bp at home  192 100  pt has hx of cva in october 2018 with residual left sided weakness  discharged from [facility] weeks ago     Patient presents to the emergency department for evaluation of high blood pressure and mild headache  Patient has chronic hypertension and just had his medications manipulated on Friday  He took his blood pressure at home today and it was 190/100  He had mild headache he admittedly panicked and came to the emergency department  Denies visual complaints and denies nausea vomiting  Denies trauma fall or injury  Denies chest pain sob  He also has recent history of stroke but denies new stroke symptoms and denies extremity numbness tingling or weakness  Prior to Admission Medications   Prescriptions Last Dose Informant Patient Reported? Taking?    FLUoxetine (PROzac) 20 mg capsule 1/13/2019 at Unknown time Self No Yes   Sig: Take 1 capsule (20 mg total) by mouth daily   Melatonin 5 MG TABS Past Week at Unknown time Self No Yes   Sig: Take 1 tablet (5 mg total) by mouth daily at bedtime   aspirin (ECOTRIN LOW STRENGTH) 81 mg EC tablet 1/13/2019 at Unknown time Self No Yes   Sig: Take 1 tablet (81 mg total) by mouth daily   atorvastatin (LIPITOR) 80 mg tablet 1/12/2019 at Unknown time Self No Yes   Sig: Take 1 tablet (80 mg total) by mouth every evening   clopidogrel (PLAVIX) 75 mg tablet 1/13/2019 at Unknown time Self No Yes   Sig: Take 1 tablet (75 mg total) by mouth daily for 64 doses   losartan (COZAAR) 50 mg tablet 1/13/2019 at Unknown time Self No Yes   Sig: Take 1 tablet (50 mg total) by mouth daily   Patient taking differently: Take 100 mg by mouth daily        Facility-Administered Medications: None       Past Medical History:   Diagnosis Date    Hypertension     Migraine     Stroke [facility])        Past Surgical History:   Procedure Laterality Date    APPENDECTOMY      KNEE SURGERY      NECK SURGERY      TONSILLECTOMY         Family History   Problem Relation Age of Onset    Stroke Mother     Heart disease Father     ALS Father      I have reviewed and agree with the history as documented  Social History   Substance Use Topics    Smoking status: Former Smoker     Packs/day: 1 00     Types: Cigarettes, Cigars    Smokeless tobacco: Never Used      Comment: stop on 10/24/2018    Alcohol use Yes      Comment: socially         Review of Systems   Constitutional: Negative  Negative for activity change, appetite change, chills, diaphoresis, fatigue and fever  HENT: Negative  Negative for congestion, rhinorrhea, sinus pain, trouble swallowing and voice change  Eyes: Negative  Negative for photophobia and visual disturbance  Respiratory: Negative  Negative for cough, chest tightness, shortness of breath, wheezing and stridor  Cardiovascular: Negative  Negative for chest pain, palpitations and leg swelling  Gastrointestinal: Negative  Negative for abdominal pain, anal bleeding, blood in stool, constipation, nausea and rectal pain  Endocrine: Negative  Genitourinary: Negative  Negative for dysuria, flank pain, frequency, hematuria and urgency  Musculoskeletal: Negative  Negative for back pain, myalgias and neck pain  Skin: Negative  Allergic/Immunologic: Negative  Neurological: Positive for headaches  Negative for dizziness, tremors, seizures, syncope, facial asymmetry, speech difficulty, weakness, light-headedness and numbness  Hematological: Negative  Does not bruise/bleed easily  Psychiatric/Behavioral: Negative  Negative for confusion  Physical Exam  Physical Exam   Constitutional: He is oriented to person, place, and time  He appears well-developed and well-nourished  Nontoxic appearance    No respiratory distress  Patient looks comfortable sitting upright on the stretcher  Can engage in normal conversation and answer simple questions appropriately  HENT:   Head: Normocephalic and atraumatic  Right Ear: External ear normal    Left Ear: External ear normal    Mouth/Throat: Oropharynx is clear and moist    No head scalp or facial trauma   Eyes: Pupils are equal, round, and reactive to light  Conjunctivae and EOM are normal  Right eye exhibits no discharge  Left eye exhibits no discharge  No scleral icterus  Neck: Normal range of motion  Neck supple  Cardiovascular: Normal rate, regular rhythm, normal heart sounds and intact distal pulses  Pulmonary/Chest: Effort normal and breath sounds normal  No respiratory distress  He has no wheezes  He has no rales  He exhibits no tenderness  Abdominal: Soft  Bowel sounds are normal  He exhibits no distension and no mass  There is no tenderness  There is no rebound and no guarding  No hernia  Musculoskeletal: Normal range of motion  He exhibits no edema, tenderness or deformity  Neurological: He is alert and oriented to person, place, and time  He has normal reflexes  He displays normal reflexes  No cranial nerve deficit or sensory deficit  He exhibits normal muscle tone  Coordination normal    Skin: Skin is warm and dry  No rash noted  No erythema  No pallor  Psychiatric: He has a normal mood and affect  His behavior is normal  Judgment and thought content normal    Nursing note and vitals reviewed        Vital Signs  ED Triage Vitals [01/13/19 1130]   Temperature Pulse Respirations Blood Pressure SpO2   98 3 °F (36 8 °C) (!) 53 12 (!) 226/100 99 %      Temp Source Heart Rate Source Patient Position - Orthostatic VS BP Location FiO2 (%)   Oral Monitor Sitting Right arm --      Pain Score       3           Vitals:    01/13/19 1200 01/13/19 1230 01/13/19 1245 01/13/19 1315   BP: (!) 181/91 (!) 180/93 (!) 177/88 (!) 178/91   Pulse: (!) 52 (!) 50 (!) 50 (!) 50 Patient Position - Orthostatic VS:   Sitting        Visual Acuity      ED Medications  Medications   acetaminophen (TYLENOL) tablet 650 mg (650 mg Oral Given 1/13/19 1316)       Diagnostic Studies  Results Reviewed     None                 CT head without contrast   Final Result by Remberto Winchester DO (01/13 1201)   Mild cerebral atrophy with chronic small vessel ischemic white matter disease and chronic right frontotemporal infarctions  No acute intracranial abnormality  Workstation performed: [id number]                    Procedures  Procedures       Phone Contacts  ED Phone Contact    ED Course  ED Course as of Jan 13 1320   Sun Jan 13, 2019   1305 Patient is stable for discharge  His blood pressure came down without intervention  Head CT unremarkable  Tylenol given  Patient has follow-up appointment with his private physician on Tuesday  [facility]  CritCare Time    Disposition  Final diagnoses:   Hypertension   Headache     Time reflects when diagnosis was documented in both MDM as applicable and the Disposition within this note     Time User Action Codes Description Comment    1/13/2019  1:06 PM Kimberlee Morocho Add [I10] Hypertension     1/13/2019  1:06 PM Lulu Obrien 55 [R51] Headache       ED Disposition     ED Disposition Condition Comment    [facility]      Condition at discharge: Stable        Follow-up Information     Follow up With Specialties Details Why [facility] physician   Keep already scheduled appointment for Tuesday           Discharge Medication List as of 1/13/2019  1:06 PM      CONTINUE these medications which have NOT CHANGED    Details   aspirin (ECOTRIN LOW STRENGTH) 81 mg EC tablet Take 1 tablet (81 mg total) by mouth daily, Starting Fri 12/14/2018, Normal      atorvastatin (LIPITOR) 80 mg tablet Take 1 tablet (80 mg total) by mouth every evening, Starting Fri 12/14/2018, Normal clopidogrel (PLAVIX) 75 mg tablet Take 1 tablet (75 mg total) by mouth daily for 64 doses, Starting Fri 12/14/2018, Until Sat 2/16/2019, Normal      FLUoxetine (PROzac) 20 mg capsule Take 1 capsule (20 mg total) by mouth daily, Starting Fri 12/14/2018, Normal      losartan (COZAAR) 50 mg tablet Take 1 tablet (50 mg total) by mouth daily, Starting Fri 12/14/2018, Normal      Melatonin 5 MG TABS Take 1 tablet (5 mg total) by mouth daily at bedtime, Starting Fri 12/14/2018, No Print           No discharge procedures on file      ED Provider  Electronically Signed by           Dano Hanson MD  01/13/19 1583

## 2019-01-15 ENCOUNTER — OFFICE VISIT (OUTPATIENT)
Dept: NEUROLOGY | Facility: CLINIC | Age: 64
End: 2019-01-15
Payer: COMMERCIAL

## 2019-01-15 ENCOUNTER — APPOINTMENT (OUTPATIENT)
Dept: OCCUPATIONAL THERAPY | Facility: CLINIC | Age: 64
End: 2019-01-15
Payer: COMMERCIAL

## 2019-01-15 ENCOUNTER — APPOINTMENT (OUTPATIENT)
Dept: SPEECH THERAPY | Facility: CLINIC | Age: 64
End: 2019-01-15
Payer: COMMERCIAL

## 2019-01-15 ENCOUNTER — APPOINTMENT (OUTPATIENT)
Dept: PHYSICAL THERAPY | Facility: CLINIC | Age: 64
End: 2019-01-15
Payer: COMMERCIAL

## 2019-01-15 VITALS
WEIGHT: 188 LBS | DIASTOLIC BLOOD PRESSURE: 85 MMHG | HEART RATE: 60 BPM | SYSTOLIC BLOOD PRESSURE: 191 MMHG | HEIGHT: 68 IN | BODY MASS INDEX: 28.49 KG/M2

## 2019-01-15 DIAGNOSIS — I10 ESSENTIAL HYPERTENSION: Primary | Chronic | ICD-10-CM

## 2019-01-15 DIAGNOSIS — I63.9 RIGHT SIDED CEREBRAL HEMISPHERE CEREBROVASCULAR ACCIDENT (CVA) (HCC): ICD-10-CM

## 2019-01-15 DIAGNOSIS — M25.512 ACUTE PAIN OF LEFT SHOULDER: ICD-10-CM

## 2019-01-15 DIAGNOSIS — F41.9 ANXIETY: Chronic | ICD-10-CM

## 2019-01-15 PROCEDURE — 99214 OFFICE O/P EST MOD 30 MIN: CPT | Performed by: PHYSICAL MEDICINE & REHABILITATION

## 2019-01-15 NOTE — PROGRESS NOTES
Physical Medicine & Rehabilitation Follow Up Clinic Note  Pankaj Jimenez 58 y o  male      ASSESSMENT/PLAN:   Multifocal infarction right MCA distribution, possible cardioembolic  - loop recorder - last seen by cardiology 1/9/19, no signs of afib thus far  - continue ASA, clopidogrel, atorvastatin - plan to discontinue plavix on 1/24/19 per neurology  - continue PT/OT/SLP  - driving eval - patient was recommended  eval at last visit, has not seen, but has been driving >60 miles regularly without event in the last 3 weeks; at this time, based on current exam and functional progress with therapies, do not see a reason why he cannot drive safely    Return to work  - currently working half days M/F - increase to half days M/T/Th/Fri   - work letter provided to patient  - follow up in 4 weeks, anticipate increase to 30 hours/week if no issues    HTN - possibly stress/anxiety related  - recently admitted to ED on 1/13 for SBP 190s  - losartan recently increased from 50mg to 100mg daily  - SBP to 190s today, asymptomatic    - discussed signs of CVA and that he should present to ED immediately if any signs noted   - instructed to repeat BP measurement at home, if persistently elevated, call PCP      L shoulder pain - likely myofascial   - continue stretching program with PT  - continue shoulder stabilization exercises    Sleep - no issues on current regimen  - melatonin 5mg QHS    Anxiety - work-related  - fluoxetine was initially started based on FLAME trial; has been >8 weeks, consider weaning at next visit as no premorbid history of anxiety/depression    Spasticity  - continue stretching program  - wrist/hand splint QHS    Bruce Gonzalez was seen today for cerebrovascular accident      Diagnoses and all orders for this visit:    Essential hypertension    Right sided cerebral hemisphere cerebrovascular accident (CVA) ([facility] [address] )    Anxiety    Acute pain of left shoulder           *I have spent 35 minutes with Patient today in which greater than 50% of this time was spent in counseling/coordination of care regarding Intructions for management, Importance of tx compliance and Impressions  SUBJECTIVE:  Patient presents today in the office for f/u CVA  He reports significant anxiety starting last Friday, as he was informed by employer that his health insurance would be terminated if he could not work at least 30 hours per week  He had elevated SBP to 190s and was admitted to ED on 1/13, he thinks related to anxiety  HPI:   Lizz Garcia 58 y o  male, who  has a past medical history of Hypertension; Migraine; and Stroke ([facility] [address] )  Old records were reviewed personally  He sustained multifocal cortical infarctions in R MCA distribution on 10/20/18  At that tie, he was placed on dual antiplatelet therapy, to be followed by monotherapy with ASA after 90 days  He was eventually discharged home at [facility] I functional level with ambulation, transfers, and ADLs  He was last seen by Dr Chip Vasquez on 12/14/18  At that time, return to work, sleep, shoulder pain, and return to driving were addressed  He is currently working 2 half days per week on M/F  He reports no issues with current work regimen  However, he does endorse significant anxiety recently, as he was informed by employer they would not provide health insurance unless he works 30 hours/week  Imaging: I personally reviewed pertinent imaging      Expanded Social History:  Lives with son in [facility] [address], [address]  1st floor accommodations       Function:   Current Level of Function:   Mod I mobility, ADLs    No fever, chills, chest pain, SOB, cough, nausea, vomiting, diarrhea, constipation, abdominal pain, dysuria, bowel/bladder incontinence, new weakness or changes in sensation   Complete ROS obtained and otherwise negative       OBJECTIVE:   BP (!) 191/85 (BP Location: Left arm, Patient Position: Sitting, Cuff Size: Large)   Pulse 60   Ht 5' 8" (1 727 m)   Wt 85 3 kg (188 lb)   BMI 28 59 kg/m²    GEN: NAD, sitting comfortably in chair  Head: NCAT, no gross lesions  Eyes: PERRL, EOMI  Throat: clear, no thrush, MMM  Pulm: CTAB, no rales/wheezes  CV: RRR, normal s1/s2  Abd: soft, NTND  Ext: no pedal edema bilaterally, distal extremities warm and well perfused  Psych: normal affect, no agitation  Skin: no observable rashes  Neuro:  A+Ox3, fluent speech, follows commands    Neuro:  CNII-XII grossly intact  +graham on left  Difficulty performing heel-shin on left    Minimal spasticity left finger flexors/wrist flexors    Motor Exam:   Right Left Site Right Left Site   5 4* S Ab:  Shoulder Abductors 5 5 HF:  Hip Flexors   5 4 EF:  Elbow Flexors 5 5 KE:  Knee Extensors   5 4 EE:  Elbow Extensors 5 5 DR:  Dorsi Flexors   5 4 WE:  Wrist Extensors 5 5 EHL: Ext Garcia Longus   5 4 FF:  Finger Flexors 5 5 PF:  Plantar Flexors   5 1 HI:  Hand Intrinsics      *with limited ROM    Labs:   Lab Results   Component Value Date    WBC 6 65 11/19/2018    HGB 13 0 11/19/2018    HCT 39 3 11/19/2018    MCV 90 11/19/2018     11/19/2018     Lab Results   Component Value Date    GLUCOSE 132 10/20/2018    CALCIUM 8 5 11/19/2018    K 3 6 11/19/2018    CO2 28 11/19/2018     11/19/2018    BUN 10 11/19/2018    CREATININE 0 86 11/19/2018         Past Medical History:   Diagnosis Date    Hypertension     Migraine     Stroke [facility])        Patient Active Problem List    Diagnosis Date Noted    Encounter for loop recorder check 01/09/2019    Left hemiparesis ([facility] [address] ) 12/14/2018    Left shoulder pain 12/14/2018    Bradycardia 11/09/2018    DM (diabetes mellitus) ([address] ) 10/25/2018    Gout 10/23/2018    Hyperlipidemia 10/22/2018    Right sided cerebral hemisphere cerebrovascular accident (CVA) ([facility] [address] ) 10/21/2018    Hypertension 10/21/2018    Anxiety 10/21/2018       Past Surgical History:   Procedure Laterality Date    APPENDECTOMY      KNEE SURGERY      NECK SURGERY      TONSILLECTOMY         Family History   Problem Relation Age of Onset    Stroke Mother     Heart disease Father     ALS Father        Social History   No current tobacco/alcohol    No Known Allergies      Current Outpatient Prescriptions:     aspirin (ECOTRIN LOW STRENGTH) 81 mg EC tablet, Take 1 tablet (81 mg total) by mouth daily, Disp: 90 tablet, Rfl: 3    atorvastatin (LIPITOR) 80 mg tablet, Take 1 tablet (80 mg total) by mouth every evening, Disp: 90 tablet, Rfl: 3    clopidogrel (PLAVIX) 75 mg tablet, Take 1 tablet (75 mg total) by mouth daily for 64 doses, Disp: 30 tablet, Rfl: 0    FLUoxetine (PROzac) 20 mg capsule, Take 1 capsule (20 mg total) by mouth daily, Disp: 90 capsule, Rfl: 3    losartan (COZAAR) 50 mg tablet, Take 1 tablet (50 mg total) by mouth daily (Patient taking differently: Take 100 mg by mouth daily  ), Disp: 90 tablet, Rfl: 0    Melatonin 5 MG TABS, Take 1 tablet (5 mg total) by mouth daily at bedtime, Disp: , Rfl: 0

## 2019-01-16 NOTE — PROGRESS NOTES
Daily Speech Treatment Note    Today's date: 2019  Patients name: Severiano Finner  : 1955  MRN: [id number]  Safety measures: CVA, fall risk  Referring provider: Ginny Larios MD    Primary Diagnosis/Billing code: K29 9  Secondary Diagnosis/ Billing code: I63 9    Visit Tracking:  -Referring provider: Epic  -Billing guidelines: CMS  -Visit #  (**auth expires 2019**)  -[employer]  (20 visits per benefit year per discipline; auth required after 1st visit)  -RE due 2019  Subjective/Behavioral:  -Patient reported that he went to ER on  due to concerns re: high BP  Patient was not admitted  Patient has f/u with neurologist and PCP since last   BP medication adjustments have been made since last session  Patient to f/u with PCP next Wednesday  Objective/Assessment:  -Reviewed patient's home exercises/activities completed since last appointment  Patient returned word deduction by first letter with 100% acc  Short-term goals:   3  Patient will facilitate planning by completing thought organization tasks (e g , sequencing, deduction puzzles, etc ) with 90% accuracy to facilitate increased executive functioning, working memory, problem solving, and processing skills, to be achieved in 4-6 weeks  -- PARTIALLY MET   -Attention/reasoning/working memory (Compass Activity; Day 3): Patient was presented with a series of numbers/directional terms (e g , 4NW, 11S, 2NE)  Patient shaded in grid blocks based upon directional terms to discover a secret word  Task completed with 3 errors  Patient was instructed to keep pencil point on grid space so as to not lose spot in activity  Patient had to restart sheet from the beginning after losing his place     -Attention/reasoning/working memory (Compass Activity; Day 4): Patient was presented with a series of numbers/directional terms (e g , 4NW, 11S, 2NE)  Patient shaded in grid blocks based upon directional terms to discover a secret word   Task completed with 2 errors  5  Patient will name up to 3 synonyms for a given word with 80% accuracy to build expressive vocabulary for conversation, to be achieved in 4-6 weeks  -- PARTIALLY MET     6  Patient will name an appropriate antonym for a given word with 80% accuracy to build expressive vocabulary for conversation, to be achieved in 4-6 weeks  -- PARTIALLY MET     7  Patient will complete word generation tasks (e g , analogies, category matrices, word definitions, synonyms/antonyms, idioms, etc ) with 80% accuracy using word finding strategies to facilitate improved word retrieval skills, to be achieved in 4-6 weeks  -- PARTIALLY MET  -Higher-level word deduction: Patient was provided with a definition and asked to name what was being described  It should be noted that patient was provided with first letter to assist with word retrieval  Task completed in 53/60 opp (88%) independently, increasing to 60/60 opp (100% acc) with min semantic cues and additional processing time      -Word finding/thought organization: Patient presented with incomplete words and asked to fill-in-the-blanks with 2 letters to form words  Completion of missing beginning 2 letters task completed in 7/10 opp (70%) independently, increasing to 100% acc with mod semantic and phonemic cues  Patient benefited from additional time to complete this task  Plan:  -Patient was provided with home exercises/activities to target goals in plan of care at the end of today's session   -Continue with current plan of care

## 2019-01-17 ENCOUNTER — OFFICE VISIT (OUTPATIENT)
Dept: OCCUPATIONAL THERAPY | Facility: CLINIC | Age: 64
End: 2019-01-17
Payer: COMMERCIAL

## 2019-01-17 ENCOUNTER — OFFICE VISIT (OUTPATIENT)
Dept: PHYSICAL THERAPY | Facility: CLINIC | Age: 64
End: 2019-01-17
Payer: COMMERCIAL

## 2019-01-17 ENCOUNTER — OFFICE VISIT (OUTPATIENT)
Dept: SPEECH THERAPY | Facility: CLINIC | Age: 64
End: 2019-01-17
Payer: COMMERCIAL

## 2019-01-17 DIAGNOSIS — I63.9 RIGHT SIDED CEREBRAL HEMISPHERE CEREBROVASCULAR ACCIDENT (CVA) (HCC): Primary | ICD-10-CM

## 2019-01-17 DIAGNOSIS — I63.9 RIGHT SIDED CEREBRAL HEMISPHERE CEREBROVASCULAR ACCIDENT (CVA) (HCC): ICD-10-CM

## 2019-01-17 DIAGNOSIS — R48.8 OTHER SYMBOLIC DYSFUNCTIONS: Primary | ICD-10-CM

## 2019-01-17 PROCEDURE — 92507 TX SP LANG VOICE COMM INDIV: CPT | Performed by: SPEECH-LANGUAGE PATHOLOGIST

## 2019-01-17 PROCEDURE — 97112 NEUROMUSCULAR REEDUCATION: CPT

## 2019-01-17 PROCEDURE — 97140 MANUAL THERAPY 1/> REGIONS: CPT

## 2019-01-17 PROCEDURE — 97530 THERAPEUTIC ACTIVITIES: CPT

## 2019-01-17 PROCEDURE — 97110 THERAPEUTIC EXERCISES: CPT

## 2019-01-17 NOTE — PROGRESS NOTES
Daily Note    Today's date: 2019  Patient name: Tory Cho  : 1955  MRN: [id number]  Referring provider: Socorro Magallon MD  Dx:   Encounter Diagnosis     ICD-10-CM    1  Right sided cerebral hemisphere cerebrovascular accident (CVA) ([facility] [address] ) I63 9          Subjective: Pt expresses that he wises to RTW  Expresses that he is stressed about getting back to work  Neurology f/u on   Seeing family doctor next week  Patient states that he has been having BP fluctuations, MD aware  Objective: See treatment diary below        Precautions: HTN, taking blood thinner Rx     Daily Treatment Diary      Manual                                                                                                                                                      Exercise Diary  1/2 1/8 1/10 1/17      Treamill with incline 10 min 3%  10 min 1  6mph  4% incline    2# on L ankle 10 min 1  6mph  5% incline    2# on L ankle 10 min 1  6mph  4% incline    2# on L ankle      Tandem walking          Static balance           Foam FT EO          Lunges  4 laps  5 laps,in bars, 2# L ankle 6 laps in bars, 2# L ankle, half done going down to half knee  3 laps, // bars      Step ups  12" fwd 15x ea; 8" lat 15xea 12" fwd 15x2 with LLE 12" fwd 15x2 alternating LE's    8in lateral step ups, LLE 15x 12" fwd 15x2 alternating LE's    8in lateral step ups, LLE 15x          side stepping  With ptb around waste 50 laps          sit to stands  3x10          step taps           Hurdles fwd/lat  Lat( hurdles flipped upside down)          Step taps            marches ambulation 4 laps no ue          Squats           backwards ambulation With mtb 10 laps         Foam HT HN           SLS 30 sec x 4 ea 3sx2 on L, keeping slight bend in knee 30sx3 on L, keeping slight bend in knee 30''x3       TB walkouts Fwd/bkwd Fwd/bkwd and lat 10 laps  Fwd/bkwd and lat 10 laps         Step downs L LE Heel dips - 20x ea LE On 6in step   R Heel dip  20x2  10x1 On 6in step  R Heel dip  20x2  10x1 6'', 10x ea      Ambulation in hallway          Manual purterbations   4 min in //bars        Rocker board  1 min x2 fwrd/back 1 min x2 fwrd/back 1 min ea       Heel raises 30x 20x on edge of step 20x on edge of step Off step, up w/ 2, down w/ 1, 2x10      Sustained heel raise    Foam, w/ ball toss, 2x10       DL jumps  30x  SL 30x  DL 20x  DL 10x2  fwd/lateral, 3 laps along mirror               Modalities                                                                                                   **2# ankle weight utilized entire session  Assessment:   2# ankle weight utilized entire session  Demonstrates weakness and challenge with push off on L side  Able to demo improved push off with rest and cueing  Challenged to maintain balance with SLS on L side  Progressed heel raises off edge, up with both legs, down with L only  Patient demonstrate good strength with eccentrics  Plan: Progress treatment as tolerated

## 2019-01-17 NOTE — PROGRESS NOTES
Daily Note     Today's date: 2019  Patient name: Pankaj Jimenez  : 1955  MRN: [id number]  Referring provider: Kin Arnold MD  Dx:   Encounter Diagnosis     ICD-10-CM    1  Right sided cerebral hemisphere cerebrovascular accident (CVA) ([facility] ) I63 9                   Subjective: " My knuckles are really sore"       Objective: See treatment diary below  UEB retrograde for min G grasp with LUE on bike handles followed by NMES and MH applied to L GH and hand to reduce tone  Engaged in reaching activity with saebo (MAS) reaching to low level retrieve peg, high level to board to increase hand to target and gross assist  Bean bag toss across midline to increase rate of manipulation  Paraffin dip performed at end of session followed by PROM of digits for tone reduction  Assessment: Tolerated treatment well  Patient would benefit from continued OT  During peg board task pt continued to drop pegs and was unable to complete the entire board due to decreased finger extension  Improvement shown when given bean bags and performing crossing midline task  Plan: Continue per plan of care  INTERVENTION COMMENTS:  Diagnosis: Right sided cerebral hemisphere cerebrovascular accident (CVA) ([facility] ) [I63 9]  Precautions: HTN, DM, IASTM to HAND ONLY  FOTO:  4 of 8 visits, PN due     Carlos Force STEVE/s under direct supervision of Nena CERDA,CORDELLVT

## 2019-01-18 NOTE — PROGRESS NOTES
Daily Speech Treatment Note    Today's date: 2019   Patients name: Eldridge Essex  : 1955  MRN: [id number]  Safety measures: CVA, fall risk  Referring provider: Vladimir Thomas MD    Primary Diagnosis/Billing code: W88 6  Secondary Diagnosis/ Billing code: I63 9    Visit Tracking:  -Referring provider: Epic  -Billing guidelines: CMS  -Visit #13/12  (**auth expires 2019**)   -[employer]  (20 visits per benefit year per discipline; auth required after 1st visit)  -RE due 2019  Subjective/Behavioral:  -"Here's my homework "    Objective/Assessment:  -Patient achieved 100% acc on NSEW Day #5 grid reasoning puzzle and 100% acc on word completion tasks for HEP  Short-term goals:   3  Patient will facilitate planning by completing thought organization tasks (e g , sequencing, deduction puzzles, etc ) with 90% accuracy to facilitate increased executive functioning, working memory, problem solving, and processing skills, to be achieved in 4-6 weeks  -- PARTIALLY MET  DISTRACTOR TASK:  -Reasoning/working memory/thought organization (anagrams with visual support): Patient was presented with a target word and asked to rearrange letters to create another word (e g , late = TALE ; item = TIME)  Task completed in  opp (61% acc), increasing to  opp (89% acc) with min phonemic cues from clinician     -Word/mental picture association (paired items coding): Patient was asked to code words based on associations (e g , When I say CUP, you say SAUCER  )  In order to code and memorize a list of 10 words, patient was presented with 3-4 words at a time and asked to code the words in the order  On an immediate recall task, patient recalled 10/10 words  Then, another activity (see above) was used to distract patient for 10 minutes   On a 10-min delayed recall task, patient recalled 10/10 words using the strategy of word/mental picture associations      -Word/mental picture association (agent/action coding): Patient was asked to code words based on associations (e g , When I say LUNCH, you say EATING  )  In order to code and memorize a list of 10 words, patient was presented with 3-4 words at a time and asked to code the words in the order  On an immediate recall task, patient recalled 10/10 words  Then, another activity (see above) was used to distract patient for 10 minutes  On a 10-min delayed recall task, patient recalled 10/10 words using the strategy of word/mental picture associations  5  Patient will name up to 3 synonyms for a given word with 80% accuracy to build expressive vocabulary for conversation, to be achieved in 4-6 weeks  -- PARTIALLY MET     6  Patient will name an appropriate antonym for a given word with 80% accuracy to build expressive vocabulary for conversation, to be achieved in 4-6 weeks  -- PARTIALLY MET     7  Patient will complete word generation tasks (e g , analogies, category matrices, word definitions, synonyms/antonyms, idioms, etc ) with 80% accuracy using word finding strategies to facilitate improved word retrieval skills, to be achieved in 4-6 weeks  -- PARTIALLY MET  -Word deduction: Patient was provided with definition clues and asked to name what rhyming pair of words was being described (e g , a chubby feline = FAT CAT)  Task completed in 6/10 opp (60%) independently, increasing to 10/10 opp (100% acc) with min-mod semantic cues  Plan:  -Patient was provided with home exercises/activities to target goals in plan of care at the end of today's session   -Continue with current plan of care

## 2019-01-21 NOTE — PROGRESS NOTES
Occupational Therapy Stroke Progress Note/Status Update: Today's Date: 2019  Patient Name: Sebastian Heart  : 1955  MRN: [id number]  Referring Provider: Lucy Rivera MD  Dx: Right sided cerebral hemisphere cerebrovascular accident (CVA) [facility]) [I63 9]    Active Problem List:   Patient Active Problem List   Diagnosis    Right sided cerebral hemisphere cerebrovascular accident (CVA) ([facility] )    Hypertension    Anxiety    Hyperlipidemia    Gout    DM (diabetes mellitus) ([facility] )    Bradycardia    Left hemiparesis ([facility] )    Left shoulder pain    Encounter for loop recorder check     Past Medical Hx:   Past Medical History:   Diagnosis Date    Hypertension     Migraine     Stroke [facility])      Past Surgical Hx:   Past Surgical History:   Procedure Laterality Date    APPENDECTOMY      KNEE SURGERY      NECK SURGERY      TONSILLECTOMY        Pain Levels:   Restin    With Activity:  0    Subjective/Patient Goal: "To keep working on this arm"    History of Present Illness:  Pt is a pleasant, active, employed full time 58 y o  male seen for OT eval s/p referred to [address] [place] [address] s/p presented to [address] [place] [address] on 10/20/2018 due to left-sided weakness and facial droop   He also had a speech impairment and was found to have an acute stroke  The patient received tPA at 11:00 p m  on the day of admission after neurology assessment  He had no further complications during his hospital stay apart from a small amount of petechial hemorrhage noted on MRI  CT scan showed positive results for small infarctions within the right frontal lobe and right temporal occipital junction, and areas of petechial hemorrhage  Pt discharged to [facility] from 10/25/18 - 18    Dx'd w/ R MCA CVA, HTN, DM, comorbidities as listed above      Lifestyle Performance Model:  Autonomy: Pt was I w/ I/ADLS, drove, & required no use of DME PTA, required use of SPC since CVA has not returned to driving or work, + Give Jessica sling for LUE from [facility]  Reciprocal Relationships: Supportive son lives next door in home attached, also has s/o involved to A as needed, 3 children (2 sons and a dtr) and 3 grandchildren, s/o lives locally, son works FT during am hours  Service to Others: 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 [address] [employer] and travels 4-5 times a year to [place] for few weeks at time idemama, travels internationally frequently  Intrinsic Gratification: Pt reports enjoying his hot [employer] truck, his yard and pool  Pt reports having dog, Mnauel  Home Setup: Pt lives in a [place] apartment that is attached to his son's home in [place] w/ 0 MICKEY w/ 4 steps from the kitchen/bedroom to family room, son lives in the house attached next door  Objective  Impairments Section:   UE Strength:   JENNIFER: RUE: 80/200 LUE: 15/200   PINCER: 3 point pinch: RUE 20, LUE: 5   2 point pinch: RUE: 17, LUE: 6   Lateral pincher: RUE: 20, LUE: 5    Coordination:   9 HOLE PEG TEST:     RUE:21 9  seconds, LUE: unable to complete    Range of Motion:  AROM/PROM:    Sensation:  MYOFILAMENTS:   RUE: 3 61   LUE: 3 61    Visual Perceptual and Functional Cognition:  1  Gerardo Cognitive Assessment Version 8 2 (MoCA V8 2): Pt scored overall 25/30 indicative of MCI, now scored the following:  Deferred 2* time constraints  2  Concussion Cognitive Checklist:  *Patient indicated that she is experiencing difficulties in the following areas:    · Memory: None reported    · Attention: None reported    · Processing: None reported    · Executive Functions: None reported    · Communication: None reported    · Visual: None reported    · Emotional: Sleep changes    · Increased Sensitivities to: None reported    3  Vision Screening Recording Form: Deferred 2* @ baseline and OT not addressing  4  Anxiety/Depression:  Pt engaged in the Neuro QOL Anxiety Short Form and Neuro QOL Depression Short Form in order to screen for anxiety and depressive s/s   Pt reported the following:  Anxiety- Short Form:   --used to measure anxious s/s  For scoring purposes, scores of 25+ indicate the threshold for treatment per neurology/SLIM  Score:13/40  Pt most often chose the response never when asked about feeling anxious s/s  Depression- Short Form:   --used to measure depressive s/s  For scoring purposes, scores of 25+ indicate the threshold for treatment per neurology/SLIM  Score:8/40  Pt most often chose the response never when asked about feeling depressive s/s  Assessment/Plan  Occupational Therapy Skilled Analysis Assessment and Plan of Care:  Pt requires overall mod I for ADLs/self care and mod I for fx'l mobility w/ SPC  Pt is currently demonstrating the following occupational deficits: limited 2* LUE hemiparesis w/ ~2/5 MMT proximally ~1/5 MMT distally w/ impaired FMC/FMS/GMC/GMS, LUE pronator drift, RUE wfl/wnl 5/5 MMT t/o, R handed, decreased endurance/activity tolerance, generalized weakness, deconditioning, SOB, WHYTE, fatigue, fall risk, impaired balance, flat affect, mildly depressed mood/anxiousness, L visuospatial inattention, diplopia @ 2" for near point of convergence w/ low R exophoria  Based on the aforementioned OT evaluation, functional performance deficits, and assessments, pt has been identified as a moderate complexity evaluation  Pt to continue to benefit from outpatient skilled OT services to address the following goals 2x/wk Short Term Goals for 4 weeks (2018), Long Term Goals for 8 weeks (2019), and POC to  w/in 90 days (2019) with special focus on self-care management, pt education,  and VM training, UE strength/coordination as well as motor training to improve above defiicits and enhance overall QOL/function  Pt seen for OT re-eval/progress note/status update   Pt is currently demonstrating the following occupational deficits: limited 2* LUE hemiparesis w/ ~2/5 MMT proximally ~1/5 MMT distally w/ impaired FMC/FMS/GMC/GMS, LUE pronator drift, RUE wfl/wnl 5/5 MMT t/o, R handed, decreased endurance/activity tolerance, generalized weakness, deconditioning, SOB, WHYTE, fatigue, fall risk, impaired balance  Continue to recommend ongoing treatment to address the following goals 2x/wk for 4 more weeks to address LUE strengthening, distal peripheral edema control, FMC/GMS/FMS/GMC, distal dexterity, in hand manipulation, gross motor stabilization      Goals:  Short Term Goals: (4 weeks)  Tone:  · Pt will demo good carryover of clinic and home tone strengthening strategies to for improved AROM initiation with functional reach, dressing, hygiene-PARTIALLY MET  Modalities:  · Pt will tolerate BIONESS for improved motor and sensory performance for overall improved hand to target with 80% accuracy-PARTIALLY MET  Sensation:  · Pt will increase proprioception of  L hand to target for improved functional reach vision occluded with ADL tasks-PARTIALLY MET  Coordination:  · Pt will increase rate of manipulation for all FM tests for improved functional performance (pinch pad, tripod, and lateral pincer grasps) with salient and fx'l I/ADL/leisure tasks-PARTIALLY MET  ROM/Function:  · Pt will increase L  UE to Gross Assist, refined functional assist, and stabilization with <20% cuing for tabletop tasks for improved functional performance of life roles and salient tasks-PARTIALLY MET  · Pt will demo with G carryover of Home Exercise Program for improved functional use of  LUE-PARTIALLY MET  Long Term Goals: (8 weeks)  Tone:   · Improve hypotonicity of LUE  to WFL/WNL for improved alternate motor patterns, termination on command, automatic grasp/release for improved functional performance t/o I/ADL/leisure task engagement-PARTIALLY MET  Coordination:  · Increase automaticity/decrease ataxia/prehension patterns of  LUE to 100% for normalized movement pattern & resumption of B/L integrative I/ADL/leisure task engagement (including fine and gross motor)  -PARTIALLY MET  ROM/Function:  · Pt will increase L UE strength and  strength to 4+/5, through the use of strengthening exercises w/ home program review s/p skilled education to promote active fx'l movement-PARTIALLY MET     INTERVENTION COMMENTS:  Diagnosis: R MCA CVA, HTN, DM  Precautions: HTN, DM, 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  FOTO: 85 with 15% limitation  Insurance: [facility] [[id number]]  5 of 8 visits, PN due 2/22/2019     Thank you for the consult!   Please call if you have any questions: [phone]  Bhavesh Cam, OTD, OTR/L, C-GCM, CSRS  Director of Outpatient Neuro Occupational Therapy

## 2019-01-22 ENCOUNTER — OFFICE VISIT (OUTPATIENT)
Dept: SPEECH THERAPY | Facility: CLINIC | Age: 64
End: 2019-01-22
Payer: COMMERCIAL

## 2019-01-22 ENCOUNTER — EVALUATION (OUTPATIENT)
Dept: OCCUPATIONAL THERAPY | Facility: CLINIC | Age: 64
End: 2019-01-22
Payer: COMMERCIAL

## 2019-01-22 ENCOUNTER — OFFICE VISIT (OUTPATIENT)
Dept: PHYSICAL THERAPY | Facility: CLINIC | Age: 64
End: 2019-01-22
Payer: COMMERCIAL

## 2019-01-22 DIAGNOSIS — I63.9 RIGHT SIDED CEREBRAL HEMISPHERE CEREBROVASCULAR ACCIDENT (CVA) (HCC): Primary | ICD-10-CM

## 2019-01-22 DIAGNOSIS — I63.9 RIGHT SIDED CEREBRAL HEMISPHERE CEREBROVASCULAR ACCIDENT (CVA) (HCC): ICD-10-CM

## 2019-01-22 DIAGNOSIS — R48.8 OTHER SYMBOLIC DYSFUNCTIONS: Primary | ICD-10-CM

## 2019-01-22 PROCEDURE — 97112 NEUROMUSCULAR REEDUCATION: CPT | Performed by: PHYSICAL THERAPIST

## 2019-01-22 PROCEDURE — G8978 MOBILITY CURRENT STATUS: HCPCS | Performed by: PHYSICAL THERAPIST

## 2019-01-22 PROCEDURE — G8984 CARRY CURRENT STATUS: HCPCS

## 2019-01-22 PROCEDURE — G8985 CARRY GOAL STATUS: HCPCS

## 2019-01-22 PROCEDURE — 97110 THERAPEUTIC EXERCISES: CPT

## 2019-01-22 PROCEDURE — 97116 GAIT TRAINING THERAPY: CPT | Performed by: PHYSICAL THERAPIST

## 2019-01-22 PROCEDURE — 97112 NEUROMUSCULAR REEDUCATION: CPT

## 2019-01-22 PROCEDURE — 92507 TX SP LANG VOICE COMM INDIV: CPT | Performed by: SPEECH-LANGUAGE PATHOLOGIST

## 2019-01-22 PROCEDURE — G8979 MOBILITY GOAL STATUS: HCPCS | Performed by: PHYSICAL THERAPIST

## 2019-01-22 NOTE — PROGRESS NOTES
Re-Eval    Today's date: 2019  Patient name: Sebastian Heart  : 1955  MRN: [id number]  Referring provider: Lucy Rivera MD  Dx:   Encounter Diagnosis     ICD-10-CM    1  Right sided cerebral hemisphere cerebrovascular accident (CVA) ([facility] ) I63 9                   Subjective: Feels he is continuing to progress with his balance and less L knee buckling  Able to walk w/o an AD in community and home  Is able to walk his dog in the yard, is not sure how he would do if his dog pulled on the leash  Feels unsteady walking down steps and still feels his walking endurance and balance is not where it needs to be  Doesn't feel ready for traveling just yet  Objective: See treatment diary below     Goals  STG's (4 weeks)  Patient will increase distance with 6MWT by at least 120ft to show improvement in endurance  MET  Patient will perform MCTSIB test with EC on foam pad for 30s to show improvement in static balance with vestibular integration  - MET  Patient will score 30/30 on FGA to demonstrate improved balance and gait  NOT MET    LTG's (6 weeks)  Patient will be able to ambulate on uneven surfaces and ramps in community safely with no AD, in order to facilitate return to work and travel - MET  Patient will be independent with HEP in order to maintain progress with functional gains  - Continued progression needed  Added goals: Pt will present with no toe catching and minimal to no L knee recurvatum during gait at normal and increased speed  Pt will improve with SLS stance time by at least 5 seconds BL to show improved static balance          Balance Test     6 Minute Walk Test (ft): : 62: 1400 ft no AD, : 1400ft   2 Minute Walk Test (ft):     Gait Speed (ft/s): : SSV 3 8 ft/s, fast gait 5 ft/s, : 4 8ft/s    5x Sit To Stand (s): 11s no UE's        TUG: 10 9s w SPC,  10 2s w/o SPC          FGA: On : : , :     SLS: 3 seconds both sides     MCTSIB Number of Seconds   Feet Together, Eyes Open 30s   Feet Together, Eyes Closed 30s   Feet Together, Eyes Open Foam 30s   Feet Together, Eyes Closed Foam 1/22: 30s  12/27: 30sec, 11/26: 10s (needed mod assist to prevent fall)        Gait Assessment: occasional toe catching and circumduction on L due to decreased DF particularly when increasing gait speed  Continued L knee recurvatum during terminal stance and poor deceleration of terminal knee extension during terminal swing phase  Precautions: HTN, taking blood thinner Rx     Daily Treatment Diary      Manual                                                                                                                                                      Exercise Diary  1/2 1/8 1/10 1/17 1/22     Treamill with incline 10 min 3%  10 min 1  6mph  4% incline    2# on L ankle 10 min 1  6mph  5% incline    2# on L ankle 10 min 1  6mph  4% incline    2# on L ankle 10 min 1  8mph  6% incline    2# on L ankle     Tandem walking           Static balance           Foam FT EO          Lunges  4 laps  5 laps,in bars, 2# L ankle 6 laps in bars, 2# L ankle, half done going down to half knee  3 laps, // bars      Step ups  12" fwd 15x ea; 8" lat 15xea 12" fwd 15x2 with LLE 12" fwd 15x2 alternating LE's    8in lateral step ups, LLE 15x 12" fwd 15x2 alternating LE's    8in lateral step ups, LLE 15x          side stepping  With ptb around waste 50 laps          sit to stands  3x10          step taps           Hurdles fwd/lat  Lat( hurdles flipped upside down)          Step taps            marches ambulation 4 laps no ue          Squats           backwards ambulation With mtb 10 laps         Foam HT HN           SLS 30 sec x 4 ea 3sx2 on L, keeping slight bend in knee 30sx3 on L, keeping slight bend in knee 30''x3 30sx3 ea side       TB walkouts Fwd/bkwd Fwd/bkwd and lat 10 laps  Fwd/bkwd and lat 10 laps         Step downs L LE Heel dips - 20x ea LE On 6in step  R Heel dip  20x2  10x1 On 6in step  R Heel dip  20x2  10x1 6'', 10x ea 6'', 10x2 LLE     Ambulation in hallway          Manual purterbations   4 min in //bars        Rocker board  1 min x2 fwrd/back 1 min x2 fwrd/back 1 min ea       Heel raises 30x 20x on edge of step 20x on edge of step Off step, up w/ 2, down w/ 1, 2x10      Sustained heel raise    Foam, w/ ball toss, 2x10      Eccentric hamstring strengthening     Purple TB around ankle, slowly extending knee  10x2      DL jumps  30x  SL 30x  DL 20x  DL 10x2  fwd/lateral, 3 laps along mirror               Modalities                                                                                                         Assessment: Progress update today indicates pt has continued to improved  Demonstrated significantly improved speed and endurance based on 6MWT today  Also demonstrated some improvement on FGA today, but has yet to reach set goal  Continues to present with gait deficits and instability with walking particularly when trying to increase gait speed and when fatigued  Also presents with decreased static balance based on SLS test  Pt would benefit from continued PT to decrease gait deficits and improve high level balance in order to reduce difficulty with longer distance ambulation and stairs  Plan: Continue PT for additional 4 weeks  Progress treatment as tolerated

## 2019-01-24 ENCOUNTER — APPOINTMENT (OUTPATIENT)
Dept: SPEECH THERAPY | Facility: CLINIC | Age: 64
End: 2019-01-24
Payer: COMMERCIAL

## 2019-01-24 ENCOUNTER — APPOINTMENT (OUTPATIENT)
Dept: OCCUPATIONAL THERAPY | Facility: CLINIC | Age: 64
End: 2019-01-24
Payer: COMMERCIAL

## 2019-01-24 ENCOUNTER — APPOINTMENT (OUTPATIENT)
Dept: PHYSICAL THERAPY | Facility: CLINIC | Age: 64
End: 2019-01-24
Payer: COMMERCIAL

## 2019-01-24 NOTE — PROGRESS NOTES
Daily Speech Treatment Note    Today's date: 2019 (***update)  Patients name: Dana Green  : 1955  MRN: [id number]  Safety measures: CVA, fall risk  Referring provider: Moises Bueno MD    Primary Diagnosis/Billing code: D48 5  Secondary Diagnosis/ Billing code: I63 9    Visit Tracking:  -Referring provider: Epic  -Billing guidelines: CMS  -Visit #/12  (**auth expires 2019**) (***update)  -[employer]  (20 visits per benefit year per discipline; auth required after 1st visit)  -RE due 2019  Subjective/Behavioral:  -***    Objective/Assessment:  -***    Short-term goals:   3  Patient will facilitate planning by completing thought organization tasks (e g , sequencing, deduction puzzles, etc ) with 90% accuracy to facilitate increased executive functioning, working memory, problem solving, and processing skills, to be achieved in 4-6 weeks  -- PARTIALLY MET  DISTRACTOR TASK:  -Reasoning/working memory/thought organization (anagrams with visual support): Patient was presented with a target word and asked to rearrange letters to create another word (e g , late = TALE ; item = TIME)  Task completed in  opp (61% acc), increasing to  opp (89% acc) with min phonemic cues from clinician     -Word/mental picture association (paired items coding): Patient was asked to code words based on associations (e g , When I say CUP, you say SAUCER  )  In order to code and memorize a list of 10 words, patient was presented with 3-4 words at a time and asked to code the words in the order  On an immediate recall task, patient recalled 10/10 words  Then, another activity (see above) was used to distract patient for 10 minutes  On a 10-min delayed recall task, patient recalled 10/10 words using the strategy of word/mental picture associations      -Word/mental picture association (agent/action coding): Patient was asked to code words based on associations (e g , When I say LUNCH, you say EATING  )   In order to code and memorize a list of 10 words, patient was presented with 3-4 words at a time and asked to code the words in the order  On an immediate recall task, patient recalled 10/10 words  Then, another activity (see above) was used to distract patient for 10 minutes  On a 10-min delayed recall task, patient recalled 10/10 words using the strategy of word/mental picture associations  5  Patient will name up to 3 synonyms for a given word with 80% accuracy to build expressive vocabulary for conversation, to be achieved in 4-6 weeks  -- PARTIALLY MET     6  Patient will name an appropriate antonym for a given word with 80% accuracy to build expressive vocabulary for conversation, to be achieved in 4-6 weeks  -- PARTIALLY MET     7  Patient will complete word generation tasks (e g , analogies, category matrices, word definitions, synonyms/antonyms, idioms, etc ) with 80% accuracy using word finding strategies to facilitate improved word retrieval skills, to be achieved in 4-6 weeks  -- PARTIALLY MET  -Word deduction: Patient was provided with definition clues and asked to name what rhyming pair of words was being described (e g , a chubby feline = FAT CAT)  Task completed in 6/10 opp (60%) independently, increasing to 10/10 opp (100% acc) with min-mod semantic cues  Plan:  -Patient was provided with home exercises/activities to target goals in plan of care at the end of today's session   -Continue with current plan of care

## 2019-01-28 NOTE — PROGRESS NOTES
Daily Speech Treatment Note    Today's date: 2019 (***update)  Patients name: Kian Dubon  : 1955  MRN: [id number]  Safety measures: CVA, fall risk  Referring provider: Musa Fontenot MD    Primary Diagnosis/Billing code: A97 6  Secondary Diagnosis/ Billing code: I63 9    Visit Tracking:  -Referring provider: Epic  -Billing guidelines: CMS  -Visit #/12  (**auth expires 2019**) (***update)  -[employer]  (20 visits per benefit year per discipline; auth required after 1st visit)  -RE due 2019  Subjective/Behavioral:  -***    Objective/Assessment:  -***    Short-term goals:   3  Patient will facilitate planning by completing thought organization tasks (e g , sequencing, deduction puzzles, etc ) with 90% accuracy to facilitate increased executive functioning, working memory, problem solving, and processing skills, to be achieved in 4-6 weeks  -- PARTIALLY MET  DISTRACTOR TASK:  -Reasoning/working memory/thought organization (anagrams with visual support): Patient was presented with a target word and asked to rearrange letters to create another word (e g , late = TALE ; item = TIME)  Task completed in  opp (61% acc), increasing to  opp (89% acc) with min phonemic cues from clinician     -Word/mental picture association (paired items coding): Patient was asked to code words based on associations (e g , When I say CUP, you say SAUCER  )  In order to code and memorize a list of 10 words, patient was presented with 3-4 words at a time and asked to code the words in the order  On an immediate recall task, patient recalled 10/10 words  Then, another activity (see above) was used to distract patient for 10 minutes  On a 10-min delayed recall task, patient recalled 10/10 words using the strategy of word/mental picture associations      -Word/mental picture association (agent/action coding): Patient was asked to code words based on associations (e g , When I say LUNCH, you say EATING  )   In order to code and memorize a list of 10 words, patient was presented with 3-4 words at a time and asked to code the words in the order  On an immediate recall task, patient recalled 10/10 words  Then, another activity (see above) was used to distract patient for 10 minutes  On a 10-min delayed recall task, patient recalled 10/10 words using the strategy of word/mental picture associations  5  Patient will name up to 3 synonyms for a given word with 80% accuracy to build expressive vocabulary for conversation, to be achieved in 4-6 weeks  -- PARTIALLY MET     6  Patient will name an appropriate antonym for a given word with 80% accuracy to build expressive vocabulary for conversation, to be achieved in 4-6 weeks  -- PARTIALLY MET     7  Patient will complete word generation tasks (e g , analogies, category matrices, word definitions, synonyms/antonyms, idioms, etc ) with 80% accuracy using word finding strategies to facilitate improved word retrieval skills, to be achieved in 4-6 weeks  -- PARTIALLY MET  -Word deduction: Patient was provided with definition clues and asked to name what rhyming pair of words was being described (e g , a chubby feline = FAT CAT)  Task completed in 6/10 opp (60%) independently, increasing to 10/10 opp (100% acc) with min-mod semantic cues  Plan:  -Patient was provided with home exercises/activities to target goals in plan of care at the end of today's session   -Continue with current plan of care

## 2019-01-29 ENCOUNTER — APPOINTMENT (OUTPATIENT)
Dept: OCCUPATIONAL THERAPY | Facility: CLINIC | Age: 64
End: 2019-01-29
Payer: COMMERCIAL

## 2019-01-29 ENCOUNTER — APPOINTMENT (OUTPATIENT)
Dept: PHYSICAL THERAPY | Facility: CLINIC | Age: 64
End: 2019-01-29
Payer: COMMERCIAL

## 2019-01-29 ENCOUNTER — APPOINTMENT (OUTPATIENT)
Dept: SPEECH THERAPY | Facility: CLINIC | Age: 64
End: 2019-01-29
Payer: COMMERCIAL

## 2019-01-30 NOTE — PROGRESS NOTES
Daily Note     Today's date: 2019  Patient name: Kian Dubon  : 1955  MRN: [id number]  Referring provider: Musa Fontenot MD  Dx:   Encounter Diagnosis   Name Primary?  Right sided cerebral hemisphere cerebrovascular accident (CVA) ([facility] ) Yes       Start Time: 1500  Stop Time: 1600  Total time in clinic (min): 60 minutes    Patient was treated by KARTIK Moreno and was under direct supervision of MEG Gaffney/L, LSVT  Subjective: " My knuckles are really sore"      Objective: See treatment below  UEB with 1 7 resistance for 5 min for tone reduction followed by MH and bioness panels A and A  placed on L hand for sensory and motor re-ed  1# weight initiating 3x10 wrist flex/ext for distal strengthening  Block letter match task incorporating L hand grasp/ release and reaching across midline for improved coordination/manipulation  L hand dipped in paraffin in a fist for pain reduction  WEAVER provided patient with edema glove with fabricated velcro straps to attach to glove pulling patient into digit flexion  Requested for patient to bring splint in to next session to modify  Assessment: Tolerated treatment well  Improvement shown with grasp and release task from previous session with greater digit extension shown  Plan: Continued skilled OT per POC       INTERVENTION COMMENTS:  Diagnosis: R MCA CVA, HTN, DM  Precautions: HTN, DM, 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  FOTO: 85 with 15% limitation  [facility][id number][facility]  6 of 8 visits, PN due 2019

## 2019-01-31 ENCOUNTER — OFFICE VISIT (OUTPATIENT)
Dept: OCCUPATIONAL THERAPY | Facility: CLINIC | Age: 64
End: 2019-01-31
Payer: COMMERCIAL

## 2019-01-31 ENCOUNTER — OFFICE VISIT (OUTPATIENT)
Dept: SPEECH THERAPY | Facility: CLINIC | Age: 64
End: 2019-01-31
Payer: COMMERCIAL

## 2019-01-31 ENCOUNTER — OFFICE VISIT (OUTPATIENT)
Dept: PHYSICAL THERAPY | Facility: CLINIC | Age: 64
End: 2019-01-31
Payer: COMMERCIAL

## 2019-01-31 DIAGNOSIS — I63.9 RIGHT SIDED CEREBRAL HEMISPHERE CEREBROVASCULAR ACCIDENT (CVA) (HCC): ICD-10-CM

## 2019-01-31 DIAGNOSIS — I63.9 RIGHT SIDED CEREBRAL HEMISPHERE CEREBROVASCULAR ACCIDENT (CVA) (HCC): Primary | ICD-10-CM

## 2019-01-31 DIAGNOSIS — R48.8 OTHER SYMBOLIC DYSFUNCTIONS: Primary | ICD-10-CM

## 2019-01-31 PROCEDURE — 92507 TX SP LANG VOICE COMM INDIV: CPT | Performed by: SPEECH-LANGUAGE PATHOLOGIST

## 2019-01-31 PROCEDURE — 97112 NEUROMUSCULAR REEDUCATION: CPT

## 2019-01-31 PROCEDURE — 97112 NEUROMUSCULAR REEDUCATION: CPT | Performed by: PHYSICAL THERAPIST

## 2019-01-31 PROCEDURE — 97116 GAIT TRAINING THERAPY: CPT | Performed by: PHYSICAL THERAPIST

## 2019-01-31 PROCEDURE — 97535 SELF CARE MNGMENT TRAINING: CPT

## 2019-01-31 NOTE — PROGRESS NOTES
Daily Speech Treatment Note    Today's date: 2019   Patients name: Lizz Garcia  : 1955  MRN: [id number]  Safety measures: CVA, fall risk  Referring provider: Angélica Cedillo MD    Primary Diagnosis/Billing code: Y15 3  Secondary Diagnosis/ Billing code: I63 9    Visit Tracking:  -Referring provider: Epic  -Billing guidelines: CMS  -Visit #14/12  (**auth expires 2019**)  -[employer]  (20 visits per benefit year per discipline; auth required after 1st visit)  -RE due 2019  Subjective/Behavioral:  -"I have an interview at the \[facility]\"" " (Patient reported that he is being interviewed on "Talk with Your Doctors" this evening at [facility] )    Objective/Assessment:  -Patient forgot HEP worksheets at home  Short-term goals:   3  Patient will facilitate planning by completing thought organization tasks (e g , sequencing, deduction puzzles, etc ) with 90% accuracy to facilitate increased executive functioning, working memory, problem solving, and processing skills, to be achieved in 4-6 weeks  -- PARTIALLY MET    5  Patient will name up to 3 synonyms for a given word with 80% accuracy to build expressive vocabulary for conversation, to be achieved in 4-6 weeks  -- PARTIALLY MET     6  Patient will name an appropriate antonym for a given word with 80% accuracy to build expressive vocabulary for conversation, to be achieved in 4-6 weeks  -- PARTIALLY MET     7  Patient will complete word generation tasks (e g , analogies, category matrices, word definitions, synonyms/antonyms, idioms, etc ) with 80% accuracy using word finding strategies to facilitate improved word retrieval skills, to be achieved in 4-6 weeks  -- PARTIALLY MET  -Word deduction: Patient was provided with definition clues and asked to name what rhyming pair of words was being described (e g , a chubby feline = FAT CAT)   Task completed in 8/ opp (40%) independently, increasing to / opp (100% acc) with min-mod semantic cues  -Higher-level word finding: Patient was presented with a single letter of the alphabet and asked to generate a single word for 12 separate categories beginning with that letter provided a time constraint of 3 minutes (Scattergories game) (e g , letter [place] - fruit=LEMON, mans name=SOILA, city=[place])  Patient completed over 2 trials in 12/24 opp independently, increasing to 20/24 opp with mod semantic cues  Plan:  -Patient was provided with home exercises/activities to target goals in plan of care at the end of today's session   -Continue with current plan of care

## 2019-01-31 NOTE — PROGRESS NOTES
Daily Note    Today's date: 2019  Patient name: Skyler Joseph  : 1955  MRN: [id number]  Referring provider: Alirio June MD  Dx:   Encounter Diagnosis     ICD-10-CM    1  Right sided cerebral hemisphere cerebrovascular accident (CVA) ([facility] [address] ) I63 9          Subjective: Reports continued weakness and decreased endurance    Objective: See treatment diary below        Precautions: HTN, taking blood thinner Rx     Daily Treatment Diary      Manual                                                                                                                                                           Exercise Diary  1/2 1/8 1/10 1/17 1/22 1/31    Treamill with incline 10 min 3%  10 min 1  6mph  4% incline    2# on L ankle 10 min 1  6mph  5% incline    2# on L ankle 10 min 1  6mph  4% incline    2# on L ankle 10 min 1  8mph  6% incline    2# on L ankle 12 min 1  8mph  6% incline    2# on L ankle    Tandem walking           Static balance           Foam FT EO          Lunges  4 laps  5 laps,in bars, 2# L ankle 6 laps in bars, 2# L ankle, half done going down to half knee  3 laps, // bars  In persaud with knee marching   2 laps    Step ups  12" fwd 15x ea; 8" lat 15xea 12" fwd 15x2 with LLE 12" fwd 15x2 alternating LE's    8in lateral step ups, LLE 15x 12" fwd 15x2 alternating LE's    8in lateral step ups, LLE 15x          side stepping  With ptb around waste 50 laps          sit to stands  3x10          step taps                     Step taps            marches ambulation 4 laps no ue          Squats      2x10     backwards ambulation With mtb 10 laps         Foam HT HN           SLS 30 sec x 4 ea 3sx2 on L, keeping slight bend in knee 30sx3 on L, keeping slight bend in knee 30''x3 EO 30sx1 ea side   EC 30"x1 ea EO 30sx1 ea side   EC 30"x1 ea     TB walkouts Fwd/bkwd Fwd/bkwd and lat 10 laps  Fwd/bkwd and lat 10 laps         Step downs L LE Heel dips - 20x ea LE On 6in step  R Heel dip  20x2  10x1 On 6in step   R Heel dip  20x2  10x1 6'', 10x ea 6'', 10x2 LLE 6'', 3x10   L LE    Ambulation in hallway          Manual purterbations   4 min in //bars        Rocker board  1 min x2 fwrd/back 1 min x2 fwrd/back 1 min ea  Weight shfit  A/P 1 min  M/L 1 min       Heel raises 30x 20x on edge of step 20x on edge of step Off step, up w/ 2, down w/ 1, 2x10  Off step, up w/ 2, down w/ 1, 2x10    Sustained heel raise    Foam, w/ ball toss, 2x10      Eccentric hamstring strengthening     Purple TB around ankle, slowly extending knee  10x2      DL jumps  30x  SL 30x  DL 20x  DL 10x2  fwd/lateral, 3 laps along mirror    Fwd at rail x 3 laps              Modalities                                                                                                      Assessment:   2# ankle weight utilized entire session  Challenged, but able to tolerate  session, fatigued at the end with increased antalgic gait pattern and left toe drag  Plan: Progress treatment as tolerated

## 2019-02-04 NOTE — PROGRESS NOTES
1. Have you been to the ER, urgent care clinic since your last visit? Hospitalized since your last visit? No    2. Have you seen or consulted any other health care providers outside of the [address] [place] [address] since your last visit? Include any pap smears or colon screening. No     Pt is here for   Chief Complaint   Patient presents with    Medication Refill     order spending     Pt states pain level is a 9 back and right knee. ..  Pt states last had something for pain yesterday Daily Speech Treatment Note    Today's date: 19  Patients name: Yaneth Varghese  : 1955  MRN: [id number]  Safety measures: CVA, fall risk  Referring provider: León Joshi MD    Primary Diagnosis/Billing code: R17 4  Secondary Diagnosis/ Billing code: I63 9    Visit Tracking:  -Referring provider: Epic  -Billing guidelines: CMS  -Visit #15/12  (**auth expires 2019**)   -[employer]  (20 visits per benefit year per discipline; auth required after 1st visit)  -RE due 2019  Subjective/Behavioral:  "I am doing well "      Objective/Assessment:    Short-term goals:   3  Patient will facilitate planning by completing thought organization tasks (e g , sequencing, deduction puzzles, etc ) with 90% accuracy to facilitate increased executive functioning, working memory, problem solving, and processing skills, to be achieved in 4-6 weeks  To target working memory, the patient completed "Memory and Mental Manipulation-Ranking" [facility] 10: Page 179)  Patient was instructed to listen to a list of four words and repeat them in a given order  Patient completed task in /10 opp (50%), increasing to 10/10 opp (100%) given one repetition  To target working memory and problem solving, the patient completed a deduction puzzle (Workbook for [employer]; Page 303)  Patient completed task in  opp (100%) independently  5  Patient will name up to 3 synonyms for a given word with 80% accuracy to build expressive vocabulary for conversation, to be achieved in 4-6 weeks  To target expressive vocabulary, the patient played "Synonym Jeopardy" (5X5 grid)  Patient completed task in  opp (84%), increasing to  opp (25%) given semanticl cues       It should be noted that semantic cues were only given when the client had to generate synonyms on his own       6  Patient will name an appropriate antonym for a given word with 80% accuracy to build expressive vocabulary for conversation, to be achieved in 4-6 weeks  To target expressive vocabulary, the patient played "Antonym Danieldy" (5X5 grid)  Patient completed task in 21/25 opp (84%), increasing to 25/25 opp (100%) given semantic cues  It should be noted that semantic cues were only given when the client had to generate antonyms on his own       7  Patient will complete word generation tasks (e g , analogies, category matrices, word definitions, synonyms/antonyms, idioms, etc ) with 80% accuracy using word finding strategies to facilitate improved word retrieval skills, to be achieved in 4-6 weeks  To target word retrieval skills, the patient completed "Add to the Category-Abstract" [facility] 2: Page 190)  Patient was instructed to add an item to a list that fit the category (e g  Building, elephant, dinosaur = Truck)  Patient completed task in 24/25 opp (96%) independently, increasing to 25/25 opp (25%) given semantic cues  To target word retrieval skills, the patient identified at least two meanings for a given target word (e g  Bank = Place you keep money, river bank) in 20/20 opp (100%) independently  Plan:  -Patient was provided with home exercises/activities to target goals in plan of care at the end of today's session   -Continue with current plan of care  Patient was treated by Lorna Urias, graduate SLP student, and was under the direct supervision of RAFAEL Pace , CCC-SLP

## 2019-02-05 ENCOUNTER — OFFICE VISIT (OUTPATIENT)
Dept: SPEECH THERAPY | Facility: CLINIC | Age: 64
End: 2019-02-05
Payer: COMMERCIAL

## 2019-02-05 ENCOUNTER — EVALUATION (OUTPATIENT)
Dept: OCCUPATIONAL THERAPY | Facility: CLINIC | Age: 64
End: 2019-02-05
Payer: COMMERCIAL

## 2019-02-05 ENCOUNTER — OFFICE VISIT (OUTPATIENT)
Dept: PHYSICAL THERAPY | Facility: CLINIC | Age: 64
End: 2019-02-05
Payer: COMMERCIAL

## 2019-02-05 ENCOUNTER — OFFICE VISIT (OUTPATIENT)
Dept: NEUROLOGY | Facility: CLINIC | Age: 64
End: 2019-02-05
Payer: COMMERCIAL

## 2019-02-05 VITALS
WEIGHT: 185 LBS | HEIGHT: 68 IN | SYSTOLIC BLOOD PRESSURE: 130 MMHG | BODY MASS INDEX: 28.04 KG/M2 | DIASTOLIC BLOOD PRESSURE: 78 MMHG | HEART RATE: 69 BPM

## 2019-02-05 DIAGNOSIS — I63.9 RIGHT SIDED CEREBRAL HEMISPHERE CEREBROVASCULAR ACCIDENT (CVA) (HCC): ICD-10-CM

## 2019-02-05 DIAGNOSIS — I63.9 RIGHT SIDED CEREBRAL HEMISPHERE CEREBROVASCULAR ACCIDENT (CVA) (HCC): Primary | ICD-10-CM

## 2019-02-05 DIAGNOSIS — M25.512 CHRONIC LEFT SHOULDER PAIN: ICD-10-CM

## 2019-02-05 DIAGNOSIS — G89.29 CHRONIC LEFT SHOULDER PAIN: ICD-10-CM

## 2019-02-05 DIAGNOSIS — F41.9 ANXIETY: Chronic | ICD-10-CM

## 2019-02-05 DIAGNOSIS — R48.8 OTHER SYMBOLIC DYSFUNCTIONS: Primary | ICD-10-CM

## 2019-02-05 DIAGNOSIS — I10 ESSENTIAL HYPERTENSION: ICD-10-CM

## 2019-02-05 PROCEDURE — 97530 THERAPEUTIC ACTIVITIES: CPT

## 2019-02-05 PROCEDURE — 97112 NEUROMUSCULAR REEDUCATION: CPT | Performed by: PHYSICAL THERAPY ASSISTANT

## 2019-02-05 PROCEDURE — 97112 NEUROMUSCULAR REEDUCATION: CPT

## 2019-02-05 PROCEDURE — 99214 OFFICE O/P EST MOD 30 MIN: CPT | Performed by: PHYSICAL MEDICINE & REHABILITATION

## 2019-02-05 PROCEDURE — 97530 THERAPEUTIC ACTIVITIES: CPT | Performed by: PHYSICAL THERAPY ASSISTANT

## 2019-02-05 PROCEDURE — 92507 TX SP LANG VOICE COMM INDIV: CPT

## 2019-02-05 RX ORDER — AMLODIPINE BESYLATE 5 MG/1
5 TABLET ORAL DAILY
Refills: 5 | COMMUNITY
Start: 2019-01-15

## 2019-02-05 RX ORDER — FLUOXETINE 10 MG/1
10 CAPSULE ORAL DAILY
Qty: 14 CAPSULE | Refills: 0 | Status: SHIPPED | OUTPATIENT
Start: 2019-02-05 | End: 2019-03-19

## 2019-02-05 NOTE — PROGRESS NOTES
Daily Note     Today's date: 2019  Patient name: Tory Cho  : 1955  MRN: [id number]  Referring provider: Socorro Magallon MD  Dx:   Encounter Diagnosis   Name Primary?  Right sided cerebral hemisphere cerebrovascular accident (CVA) ([facility] ) Yes                  Subjective: "That feels a lot better "      Objective: See treatment below  Impairments Section:   UE Strength:   JENNIFER: RUE: 61/200 LUE: 10/200   PINCER: 3 point pinch: RUE 20, LUE: 10   2 point pinch: RUE: 20, LUE: 7   Lateral pincher: RUE: 20, LUE: 8    Coordination:   9 HOLE PEG TEST:     RUE: 20  seconds, LUE: 1 min 52  Seconds and was able to place 1 peg in hole this day    BIONESSon neuro mod and MH to shoulder for 10 minutes  With BIONESS set to open hand, engaged patient in large amplitude movements to increase functional ROM and midline crossing  Due to increase in pain in left shoulder, transitioned patient to supine and provided manual Post-isometric relaxation to left upper trap with incorporated ocular movements  Also provided strain counterstrain techniques to decreased pec tightness  Completed 10 minutes retrograde at 1 3 resisitance on UEB for posterior strengthening  Assessment: Tolerated treatment well  After PIR techniques, significant improvement in shoulder ROM and pain decreased from a 6/10 to a 3/10  Plan: Continued skilled OT per POC      INTERVENTION COMMENTS:  Diagnosis: Right sided cerebral hemisphere cerebrovascular accident (CVA) ([facility] ) [I63 9]  Precautions: HTN, DM, 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  FOTO:  7 of 8 visits, PN due

## 2019-02-05 NOTE — PROGRESS NOTES
Daily Note    Today's date: 2019  Patient name: Vitaly Calvert  : 1955  MRN: [id number]  Referring provider: Willard Goodell, MD  Dx:   Encounter Diagnosis     ICD-10-CM    1  Right sided cerebral hemisphere cerebrovascular accident (CVA) ([facility] [address] ) I63 9          Subjective: No new complaints  Objective: See treatment diary below        Precautions: HTN, taking blood thinner Rx     Daily Treatment Diary      Manual                                                                                                                                                           Exercise Diary  1/2 1/8 1/10 1/17 1/22 1/31 2/5   Treamill with incline 10 min 3%  10 min 1  6mph  4% incline    2# on L ankle 10 min 1  6mph  5% incline    2# on L ankle 10 min 1  6mph  4% incline    2# on L ankle 10 min 1  8mph  6% incline    2# on L ankle 12 min 1  8mph  6% incline    2# on L ankle 12'  1 8 MPH  6% incline    2# on L ankle   Tandem walking           Static balance           Foam FT EO          Lunges  4 laps  5 laps,in bars, 2# L ankle 6 laps in bars, 2# L ankle, half done going down to half knee    3 laps, // bars  In persaud with knee marching   2 laps In persaud  50" x 2   Step ups  12" fwd 15x ea; 8" lat 15xea 12" fwd 15x2 with LLE 12" fwd 15x2 alternating LE's    8in lateral step ups, LLE 15x 12" fwd 15x2 alternating LE's    8in lateral step ups, LLE 15x        12" fwd   2 x 15  Alternating LE    8" lateral  2x15  LLE    side stepping  With ptb around waste 50 laps      GTB  20' x 6    sit to stands  3x10          step taps                     Step taps            marches ambulation 4 laps no ue          Squats      2x10 2x10    backwards ambulation With mtb 10 laps         Foam HT HN           SLS 30 sec x 4 ea 3sx2 on L, keeping slight bend in knee 30sx3 on L, keeping slight bend in knee 30''x3 EO 30sx1 ea side   EC 30"x1 ea EO 30sx1 ea side   EC 30"x1 ea EO 30" x 1each    EC 30" x 1 each    TB walkouts Fwd/bkwd Fwd/bkwd and lat 10 laps  Fwd/bkwd and lat 10 laps             Step downs L LE Heel dips - 20x ea LE On 6in step  R Heel dip  20x2  10x1 On 6in step  R Heel dip  20x2  10x1 6'', 10x ea 6'', 10x2 LLE 6'', 3x10   L LE 8" 3 x 10 LLE   Ambulation in hallway          Manual purterbations   4 min in //bars        Rocker board  1 min x2 fwrd/back 1 min x2 fwrd/back 1 min ea  Weight shfit  A/P 1 min  M/L 1 min   Weight shift  AP 1' taps/1' bal  ML 1' taps/1' bal    Heel raises 30x 20x on edge of step 20x on edge of step Off step, up w/ 2, down w/ 1, 2x10  Off step, up w/ 2, down w/ 1, 2x10    Sustained heel raise    Foam, w/ ball toss, 2x10      Eccentric hamstring strengthening     Purple TB around ankle, slowly extending knee  10x2      DL jumps  30x  SL 30x  DL 20x  DL 10x2  fwd/lateral, 3 laps along mirror    Fwd at rail x 3 laps              Modalities                                                                                                      Assessment:   Good tolerance to TE as noted     Pt demonstrating fatigue with TE and would benefit from continued PT  Plan: Progress treatment as tolerated

## 2019-02-05 NOTE — PATIENT INSTRUCTIONS
We discussed weaning fluoxetine to 10mg daily, then stopping it after 14 days  We discussed stopping melatonin and monitoring your sleep  A work letter for 30 hours/week has been provided  Please call your neurologist to clarify if you should continue taking plavix  Please continue to track your BP daily at home, and notify your PCP if it is persistently elevated

## 2019-02-05 NOTE — PROGRESS NOTES
Physical Medicine & Rehabilitation Follow Up Clinic Note  Parag Kennedy 58 y o  male      ASSESSMENT/PLAN:   Multifocal infarction right MCA distribution, possible cardioembolic  - loop recorder - last seen by cardiology 1/9/19, no signs of afib thus far - next appointment 2/15  - continue ASA, clopidogrel, atorvastatin    - per neurology note 12/14/18, plan to stop plavix on 1/24; however, there is some confusion as patient was informed to take it for another 3 months   - patient will call neurology clinic to clarify  - continue PT/OT/SLP    Return to work  - currently working half days M/T/Th/Fri, increase to 30 hours/week to allow for flexible scheduling for therapies   - work letter provided to patient  - follow up in 4-6 weeks, anticipate increase to full time if no issues   - can increase to full time before then if discharged from therapies    HTN   - amlodipine 7 5mg daily  - losartan 50mg BID  - SBP stable, reported to be 130-150s at home   - continue daily monitoring at home      L shoulder pain - likely myofascial, improving  - decreased ROM with shoulder abduction/forward flexion, appropriate stretches demonstrated in office    - monitor for progression, adhesive capsulitis  - continue stretching program with PT  - continue shoulder stabilization exercises    Sleep - no issues on current regimen  - discontinue melatonin 5mg QHS, monitor sleep pattern    Anxiety - work-related  - fluoxetine 20mg, has been taking for more than 12 weeks per FLAME trial   - wean to 10mg daily x14 days, then stop   - monitor mood    Spasticity  - continue stretching program  - wrist/hand splint QHS      *I have spent 35 minutes with patient today in which greater than 50% of time was spent face to face for counseling/coordination of care regarding instructions for management, treatment plan, and impressions  SUBJECTIVE:  Parag Kennedy presents today for follow up CVA   He reports improvement in anxiety as he no longer needs to worry about health insurance coverage through employer  He would like to increase work hours to 30 hours per week  He reports no issues at work with current schedule, half days M/T/Th/F      HPI:   Tory Cho 58 y o  male, who  has a past medical history of Hypertension; Migraine; and Stroke ([facility] [address] )  Old records were reviewed personally  He sustained multifocal cortical infarctions in R MCA distribution on 10/20/18  At that tie, he was placed on dual antiplatelet therapy, to be followed by monotherapy with ASA after 90 days  He was eventually discharged home at [facility] I functional level with ambulation, transfers, and ADLs  He was last seen in clinic 1/15/19  At that visit, his BP was noted to be significantly elevated, and his BP meds were adjusted subsequently by PCP  His work restriction was increased to 4 half days per week, and he has noted no trouble with increased work load  He continues to see PT/OT/SLP as outpatient  His anxiety is significantly improved, as he was previously worried about health insurance coverage through employer  Imaging: I personally reviewed pertinent imaging  No new imaging    Expanded Social History:  Lives with son in [facility] [address] [facility]  1st floor accommodations       Function:  Mod I ambulation, ADLs    ROS:  No fever, chills, chest pain, SOB, cough, nausea, vomiting, diarrhea, constipation, abdominal pain, dysuria, bowel/bladder incontinence, new weakness or changes in sensation   Complete ROS obtained and otherwise negative        OBJECTIVE:   /78 (BP Location: Right arm, Patient Position: Sitting, Cuff Size: Large)   Pulse 69   Ht 5' 8" (1 727 m)   Wt 83 9 kg (185 lb)   BMI 28 13 kg/m²      GEN: NAD, sitting comfortably in chair  Head: NCAT, no gross lesions  Eyes: PERRL, EOMI, wearing glasses  Throat: clear, no thrush, MMM  Pulm: CTAB, no rales/wheezes  CV: RRR, normal s1/s2  Abd: soft, NTND  Ext: no pedal edema bilaterally, distal extremities warm and well perfused  Psych: normal affect, no agitation  Skin: no observable rashes  Neuro:  A+Ox3, fluent speech, follows commands   Negative graham bilateral  CN II-XII intact  Difficulty performing finger-nose and heel-shin on left    Motor Exam:   Right Left Site Right Left Site   5 4* S Ab:  Shoulder Abductors 5 5 HF:  Hip Flexors   5 4 EF:  Elbow Flexors 5 5 KE:  Knee Extensors   5 4 EE:  Elbow Extensors 5 5 DR:  Dorsi Flexors   5 4 WE:  Wrist Extensors 5 5 EHL: Ext Garcia Longus   5 4 FF:  Finger Flexors 5 5 PF:  Plantar Flexors   5 1 HI:  Hand Intrinsics      *with limited ROM    Left shoulder passive abduction to 135  No tenderness over upper traps, biceps long head, subacromial space  No significant subluxation noted    Labs:   Lab Results   Component Value Date    WBC 6 65 11/19/2018    HGB 13 0 11/19/2018    HCT 39 3 11/19/2018    MCV 90 11/19/2018     11/19/2018     Lab Results   Component Value Date    GLUCOSE 132 10/20/2018    CALCIUM 8 5 11/19/2018    K 3 6 11/19/2018    CO2 28 11/19/2018     11/19/2018    BUN 10 11/19/2018    CREATININE 0 86 11/19/2018         Past Medical History:   Diagnosis Date    Hypertension     Migraine     Stroke [facility])        Patient Active Problem List    Diagnosis Date Noted    Encounter for loop recorder check 01/09/2019    Left hemiparesis ([facility] [address] ) 12/14/2018    Left shoulder pain 12/14/2018    Bradycardia 11/09/2018    DM (diabetes mellitus) ([facility] [address] ) 10/25/2018    Gout 10/23/2018    Hyperlipidemia 10/22/2018    Right sided cerebral hemisphere cerebrovascular accident (CVA) ([facility] [address] ) 10/21/2018    Hypertension 10/21/2018    Anxiety 10/21/2018       Past Surgical History:   Procedure Laterality Date    APPENDECTOMY      KNEE SURGERY      NECK SURGERY      TONSILLECTOMY         Family History   Problem Relation Age of Onset    Stroke Mother     Heart disease Father     ALS Father        Social History   No current tobacco/alcohol    No Known Allergies      Current Outpatient Prescriptions:     amLODIPine (NORVASC) 5 mg tablet, Take 5 mg by mouth daily Taking 1 1/2 daily daily , Disp: , Rfl: 5    aspirin (ECOTRIN LOW STRENGTH) 81 mg EC tablet, Take 1 tablet (81 mg total) by mouth daily, Disp: 90 tablet, Rfl: 3    atorvastatin (LIPITOR) 80 mg tablet, Take 1 tablet (80 mg total) by mouth every evening, Disp: 90 tablet, Rfl: 3    losartan (COZAAR) 50 mg tablet, Take 1 tablet (50 mg total) by mouth daily (Patient taking differently: Take 100 mg by mouth daily  ), Disp: 90 tablet, Rfl: 0    clopidogrel (PLAVIX) 75 mg tablet, Take 1 tablet (75 mg total) by mouth daily for 64 doses, Disp: 30 tablet, Rfl: 0    FLUoxetine (PROzac) 10 mg capsule, Take 1 capsule (10 mg total) by mouth daily, Disp: 14 capsule, Rfl: 0

## 2019-02-07 ENCOUNTER — OFFICE VISIT (OUTPATIENT)
Dept: PHYSICAL THERAPY | Facility: CLINIC | Age: 64
End: 2019-02-07
Payer: COMMERCIAL

## 2019-02-07 ENCOUNTER — OFFICE VISIT (OUTPATIENT)
Dept: SPEECH THERAPY | Facility: CLINIC | Age: 64
End: 2019-02-07
Payer: COMMERCIAL

## 2019-02-07 ENCOUNTER — OFFICE VISIT (OUTPATIENT)
Dept: OCCUPATIONAL THERAPY | Facility: CLINIC | Age: 64
End: 2019-02-07
Payer: COMMERCIAL

## 2019-02-07 DIAGNOSIS — I63.9 RIGHT SIDED CEREBRAL HEMISPHERE CEREBROVASCULAR ACCIDENT (CVA) (HCC): ICD-10-CM

## 2019-02-07 DIAGNOSIS — I63.9 RIGHT SIDED CEREBRAL HEMISPHERE CEREBROVASCULAR ACCIDENT (CVA) (HCC): Primary | ICD-10-CM

## 2019-02-07 DIAGNOSIS — R48.8 OTHER SYMBOLIC DYSFUNCTIONS: Primary | ICD-10-CM

## 2019-02-07 PROCEDURE — 97150 GROUP THERAPEUTIC PROCEDURES: CPT

## 2019-02-07 PROCEDURE — 97112 NEUROMUSCULAR REEDUCATION: CPT

## 2019-02-07 PROCEDURE — 92507 TX SP LANG VOICE COMM INDIV: CPT

## 2019-02-07 NOTE — PROGRESS NOTES
Daily Note     Today's date: 2019  Patient name: Radha Arzola  : 1955  MRN: [id number]  Referring provider: Dahlia Rosario MD  Dx:   Encounter Diagnosis   Name Primary?  Right sided cerebral hemisphere cerebrovascular accident (CVA) ([facility] ) Yes                  Subjective: "My fingers are so sore "      Objective: See treatment below  BIONESS and MH for 10 minutes on neuro mod- utilized vibration to reduce tone on upper trap  Supine post-isometric relaxation techniques to upper trap and pec  Passive stretch with strain counterstrain to increase ROM  MH with hand on small ball and vibration to decrease hand pain  Tendon glides to increase ROM in all digits  Block task for in-hand manipulation  Assessment: Tolerated treatment well  Plan: Continued skilled OT per POC      INTERVENTION COMMENTS:  Diagnosis: Right sided cerebral hemisphere cerebrovascular accident (CVA) ([facility] ) [I63 9]  Precautions: HTN, DM, 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

## 2019-02-07 NOTE — PROGRESS NOTES
Daily Speech Treatment Note    Today's date: 19  Patients name: Jeremy Chan  : 1955  MRN: [id number]  Safety measures: CVA, fall risk  Referring provider: Praveen Troy MD    Primary Diagnosis/Billing code: Y77 [id number]  Secondary Diagnosis/ Billing code: I63 9    Visit Tracking:  -Referring provider: Epic  -Billing guidelines: CMS  -Visit #16/12  (**patient is pending auth**)   -[employer]  (20 visits per benefit year per discipline; auth required after 1st visit)  -RE due 2019  Subjective/Behavioral:  "I'm doing well" "The word finding is the most difficult for me"    Objective/Assessment:    Patient completed 80% of homework sheets indicating he had a difficult time with some words  Short-term goals:   3  Patient will facilitate planning by completing thought organization tasks (e g , sequencing, deduction puzzles, etc ) with 90% accuracy to facilitate increased executive functioning, working memory, problem solving, and processing skills, to be achieved in 4-6 weeks  Patient was given a list of numbers and words with missing letters  He was asked to convert the numbers into lei numerals and utilize the subsequently letter combinations to complete the words  Patient required min verbal cues  Patient correctly converted 10/12 numbers independently improving to 12/12 given min verbal cues  Patient correctly completed 3/12 words- unable to complete due to time constraints- patient also reported increased difficulty with word completion  We discussed stratgeies for thought organization and he was asked to take it home to complete  5  Patient will name up to 3 synonyms for a given word with 80% accuracy to build expressive vocabulary for conversation, to be achieved in 4-6 weeks       6  Patient will name an appropriate antonym for a given word with 80% accuracy to build expressive vocabulary for conversation, to be achieved in 4-6 weeks        7  Patient will complete word generation tasks (e g , analogies, category matrices, word definitions, synonyms/antonyms, idioms, etc ) with 80% accuracy using word finding strategies to facilitate improved word retrieval skills, to be achieved in 4-6 weeks  Patient was asked to complete HEP "Scattergories page " Initially he was not able to provide any further words aside from the ones he had provided at home  After discussing beneficial strategies patient was able to complete to 100% with min verbal cues  Pt was provided with a 4x4 grid of initial letters and category labels and asked to complete  Pt independently provided 16/16 on first grid  And 15/16 on the  2nd grid improving to 16/16 given min verbal cues  He reported use of strategies we discussed were beneficial in completing this task  The game Taboo was utilized to target word-finding/circumlocution  Pt was provided  with a word ( along with x5 terms he was not able to say) and asked to describe the word without using the given words  Pt completed requiring min verbal cues throughout  He was noted to frequenlty utilize the words he was not able to say  Continued discussion of beneficial strategies for word retrieval after each trial       Plan:  -Patient was provided with home exercises/activities to target goals in plan of care at the end of today's session   -Continue with current plan of care      RAFAEL Ibarra , [address] [place] [address] Language Pathologist   [id number]

## 2019-02-07 NOTE — PROGRESS NOTES
Daily Note    Today's date: 2019  Patient name: Shea Hanson  : 1955  MRN: [id number]  Referring provider: Jessica Soares MD  Dx:   Encounter Diagnosis     ICD-10-CM    1  Right sided cerebral hemisphere cerebrovascular accident (CVA) ([facility] [address] ) I63 9          Subjective: States that he has RTW, states working 30 hour weeks  Objective: See treatment diary below        Precautions: HTN, taking blood thinner Rx     Daily Treatment Diary      Manual                                                                                                                                                           Exercise Diary           Treamill with incline 10 min, 6% inclince, 1 6 mph,                                        Lunges  Walking, 3 laps         Step ups  From blk disks, 12'' w/ 5# DB, 2X15                                                 Step taps  Cone taps,5 cones, 5x  ea          marches ambulation           Squats Bosu, blk side up, 3x10          backwards ambulation                     SLS           TB walkouts           Step downs L LE                              Rocker board Taps, EC 2x15  Balance, EO,  30''x3          Heel raises Off step, Up 2, down w/ L, 2x15         Sustained heel raise          Eccentric hamstring strengthening           DL jumps W/ ladder, fwd/lateral 3 laps                  Modalities                                                                                                      Assessment:   Self directed 200-225, group 225-235, 1:1 235-300  Patient demo improved balance overall today with less instability  Demo improved gastroc strength with push off  Demo more L foot catching as he fatigues, but improving endurance  Plan: Progress treatment as tolerated  Progress update NV

## 2019-02-12 ENCOUNTER — APPOINTMENT (OUTPATIENT)
Dept: OCCUPATIONAL THERAPY | Facility: CLINIC | Age: 64
End: 2019-02-12
Payer: COMMERCIAL

## 2019-02-12 ENCOUNTER — APPOINTMENT (OUTPATIENT)
Dept: PHYSICAL THERAPY | Facility: CLINIC | Age: 64
End: 2019-02-12
Payer: COMMERCIAL

## 2019-02-12 ENCOUNTER — APPOINTMENT (OUTPATIENT)
Dept: SPEECH THERAPY | Facility: CLINIC | Age: 64
End: 2019-02-12
Payer: COMMERCIAL

## 2019-02-13 ENCOUNTER — OFFICE VISIT (OUTPATIENT)
Dept: OCCUPATIONAL THERAPY | Facility: CLINIC | Age: 64
End: 2019-02-13
Payer: COMMERCIAL

## 2019-02-13 ENCOUNTER — EVALUATION (OUTPATIENT)
Dept: PHYSICAL THERAPY | Facility: CLINIC | Age: 64
End: 2019-02-13
Payer: COMMERCIAL

## 2019-02-13 ENCOUNTER — OFFICE VISIT (OUTPATIENT)
Dept: SPEECH THERAPY | Facility: CLINIC | Age: 64
End: 2019-02-13
Payer: COMMERCIAL

## 2019-02-13 DIAGNOSIS — I63.9 RIGHT SIDED CEREBRAL HEMISPHERE CEREBROVASCULAR ACCIDENT (CVA) (HCC): ICD-10-CM

## 2019-02-13 DIAGNOSIS — I63.9 RIGHT SIDED CEREBRAL HEMISPHERE CEREBROVASCULAR ACCIDENT (CVA) (HCC): Primary | ICD-10-CM

## 2019-02-13 DIAGNOSIS — R48.8 OTHER SYMBOLIC DYSFUNCTIONS: Primary | ICD-10-CM

## 2019-02-13 PROCEDURE — 92507 TX SP LANG VOICE COMM INDIV: CPT

## 2019-02-13 PROCEDURE — 97150 GROUP THERAPEUTIC PROCEDURES: CPT

## 2019-02-13 PROCEDURE — 97110 THERAPEUTIC EXERCISES: CPT | Performed by: PHYSICAL THERAPIST

## 2019-02-13 PROCEDURE — 97116 GAIT TRAINING THERAPY: CPT | Performed by: PHYSICAL THERAPIST

## 2019-02-13 PROCEDURE — 97112 NEUROMUSCULAR REEDUCATION: CPT

## 2019-02-13 PROCEDURE — 97112 NEUROMUSCULAR REEDUCATION: CPT | Performed by: PHYSICAL THERAPIST

## 2019-02-13 NOTE — PROGRESS NOTES
Daily Note    Today's date: 2019  Patient name: Sebastian Heart  : 1955  MRN: [id number]  Referring provider: Lucy Rivera MD  Dx:   Encounter Diagnosis     ICD-10-CM    1  Right sided cerebral hemisphere cerebrovascular accident (CVA) ([facility] [address] ) I63 9          Subjective: States that he has RTW, states working 30 hour weeks  Objective: See treatment diary below     SLS EO firm R: 12 sec; L: 7 sec    FGA:      Precautions: HTN, taking blood thinner Rx     Daily Treatment Diary      Manual                                                                                                                                                           Exercise Diary          Treamill with incline 10 min, 6% inclince, 1 6 mph,          Recumbent bike  LLE only L2   x5min                            Lunges  Walking, 3 laps Walking, 3 laps        Step ups  From blk disks, 12'' w/ 5# DB, 2X15 8" plus foam with op knee flexion LLE only  2x15                                                Step taps  Cone taps,5 cones, 5x  ea          marches ambulation           Squats Bosu, blk side up, 3x10 Squat jumps  2x15         backwards ambulation  In persaud EC  2x40'                   SLS  Firm EO R & L  2x30s ea         TB walkouts           Step downs L LE          Tandem stance  On foam EC  2x30s ea                  Rocker board Taps, EC 2x15  Balance, EO,  30''x3 EC x1min ea         Heel raises Off step, Up 2, down w/ L, 2x15         Sustained heel raise          Eccentric hamstring strengthening           DL jumps W/ ladder, fwd/lateral 3 laps                  Modalities                                                                                                      Assessment: Progress update completed today with pt meeting most goals  Still has some balance deficits as seen with difficulty with SLS and tandem gait  Discussed pt continuing with these exercises on his own at home    Pt has progressed well as seen by gait speed and FGA being WNL  Pt has also returned to work  Genu recurvatum and toe catching on L have significantly improved per pt report  Pt does not require PT services at this time  Will return for one more visit tomorrow that was already scheduled to participate in a video session with PT  Plan: Discharge tomorrow after 1 more visit

## 2019-02-13 NOTE — PROGRESS NOTES
Daily Speech Treatment Note    Today's date: 19  Patients name: Felipe Márquez  : 1955  MRN: [id number]  Safety measures: CVA, fall risk  Referring provider: Vance Boast, MD    Primary Diagnosis/Billing code: F49 4  Secondary Diagnosis/ Billing code: I63 9    Visit Tracking:  -Referring provider: Epic  -Billing guidelines: CMS  -Visit #17/12  (**patient is pending auth**)   -[employer]  (20 visits per benefit year per discipline; auth required after 1st visit)  -RE due 2019  Subjective/Behavioral:  "I'm done with PT, but I hope I have the discipline to keep up with the exercises at home " Pt d/c from PT  Pt encouraged to keep a to-do list for a visual  Pt appreciative  Pt reports he keeps a "whiteboard-like" square on his wall that he will add this to his to-do list       Objective/Assessment:    Patient completed 80% of homework sheets indicating he had a difficult time with some words  Short-term goals:   3  Patient will facilitate planning by completing thought organization tasks (e g , sequencing, deduction puzzles, etc ) with 90% accuracy to facilitate increased executive functioning, working memory, problem solving, and processing skills, to be achieved in 4-6 weeks  5  Patient will name up to 3 synonyms for a given word with 80% accuracy to build expressive vocabulary for conversation, to be achieved in 4-6 weeks  Pt played "Synonyms Bingo" where he would need to determine the synonym for the word called, to see if it was on his [employer]  Pt able to identify appropriate synonym in 31/40 indep; increasing to 40/40 with min verbal semantic cues       6  Patient will name an appropriate antonym for a given word with 80% accuracy to build expressive vocabulary for conversation, to be achieved in 4-6 weeks        7  Patient will complete word generation tasks (e g , analogies, category matrices, word definitions, synonyms/antonyms, idioms, etc ) with 80% accuracy using word finding strategies to facilitate improved word retrieval skills, to be achieved in 4-6 weeks  Completing Words-End: Pt provided with the beginning of the word and asked to provide 2 letters to complete the word (e g  "promi _ _" s/e--> promise)  Task completed in 32/40 opp indep; increasing to 40/40 with min verbal semantic cues  Naming from Given Word Ending: Pt provided a clue and was asked to write each word that is being described that ends with -ine (e g  What the sun does --> shine)  Task completed in 8/10 indep; increasing to 10/10 with min verbal semantic cues  Plan:  -Patient was provided with home exercises/activities to target goals in plan of care at the end of today's session   -Continue with current plan of care

## 2019-02-13 NOTE — PROGRESS NOTES
Daily Note     Today's date: 2019  Patient name: Jocelyne Bobby  : 1955  MRN: [id number]  Referring provider: Cornelius Cortes MD  Dx:   Encounter Diagnosis   Name Primary?  Right sided cerebral hemisphere cerebrovascular accident (CVA) ([facility] ) Yes                  Subjective: "My shoulder feels sore today "      Objective: See treatment below  UEB 5min retrograde against increased L2 0 resistance for proximal strengthening and endurance  MH and BIONESS to LUE for 10min on neuromod setting for motor and sensory re-ed and tone reduction  [id number] each seated BUE gh joint flexion using 4# yellow theraball and chest press using 1# green theraball for proximal strengthening  [id number] L wrist flex/ext using 1# free weight for distal strengthening  Pipe Tree activity with Saebo MAS donned on LUE and requiring LUE functional reach to B/L sides and crossing midline to retrieve pipes, and B/L integration to assemble tree, with focus on hand to target accuracy and prehension patterns  Assessment: Tolerated treatment well  Pt c/o soreness in L shoulder throughout session  Pt able to achieve AROM LUE to approx  90 degrees during seated TherEx, requiring frequent rest breaks  Pt quickly fatigued during wrist flex/ext with 1# free weight  Mod droppage of pipes with improvement in hand to target accuracy once Saebo MAS donned  Plan: Continue skilled OT per POC        INTERVENTION COMMENTS:  Diagnosis: Right sided cerebral hemisphere cerebrovascular accident (CVA) ([facility] ) [I63 9]  Precautions: HTN, DM, 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  FOTO:  1 of 8 visits, PN due     Grouped:  3-3:25  1:1  3:25-3:35  Group  3:35-4  Unsupervised

## 2019-02-14 ENCOUNTER — EVALUATION (OUTPATIENT)
Dept: SPEECH THERAPY | Facility: CLINIC | Age: 64
End: 2019-02-14
Payer: COMMERCIAL

## 2019-02-14 ENCOUNTER — OFFICE VISIT (OUTPATIENT)
Dept: PHYSICAL THERAPY | Facility: CLINIC | Age: 64
End: 2019-02-14
Payer: COMMERCIAL

## 2019-02-14 ENCOUNTER — TELEPHONE (OUTPATIENT)
Dept: NEUROLOGY | Facility: CLINIC | Age: 64
End: 2019-02-14

## 2019-02-14 ENCOUNTER — OFFICE VISIT (OUTPATIENT)
Dept: OCCUPATIONAL THERAPY | Facility: CLINIC | Age: 64
End: 2019-02-14
Payer: COMMERCIAL

## 2019-02-14 DIAGNOSIS — I63.9 RIGHT SIDED CEREBRAL HEMISPHERE CEREBROVASCULAR ACCIDENT (CVA) (HCC): ICD-10-CM

## 2019-02-14 DIAGNOSIS — I63.9 RIGHT SIDED CEREBRAL HEMISPHERE CEREBROVASCULAR ACCIDENT (CVA) (HCC): Primary | ICD-10-CM

## 2019-02-14 DIAGNOSIS — R48.8 OTHER SYMBOLIC DYSFUNCTIONS: Primary | ICD-10-CM

## 2019-02-14 PROCEDURE — 97140 MANUAL THERAPY 1/> REGIONS: CPT

## 2019-02-14 PROCEDURE — 97112 NEUROMUSCULAR REEDUCATION: CPT

## 2019-02-14 PROCEDURE — 97112 NEUROMUSCULAR REEDUCATION: CPT | Performed by: PHYSICAL THERAPIST

## 2019-02-14 PROCEDURE — 96125 COGNITIVE TEST BY HC PRO: CPT

## 2019-02-14 PROCEDURE — 97110 THERAPEUTIC EXERCISES: CPT

## 2019-02-14 NOTE — PROGRESS NOTES
Speech-Language Pathology Re-Evaluation    Today's date: 3/7/2019  Patients name: Corrinne Ina  : 1955  MRN: [id number]  Safety measures: CVA, fall risk   Referring provider: Mandy Balderrama MD    Subjective comments: "I think overall I'm doing better than when I first came here  I do find myself still doing it but catching it, but sometimes I don't get the meaning of the whole e-mail "     Patient's goal(s): "to continue getting better"     Assessments    The Repeatable Battery for the Assessment of Neuropsychological Status (RBANS) is a brief, individually-administered assessment which measures attention, language, visuospatial/constructional abilities, and immediate & delayed memory  The RBANS is intended for use with adolescents to adults, ages 15 to 80 years  The following results were obtained during the administration of the assessment  Form: A     Cognitive Domain/Subtest: Index Score: Percentile Rank: Classification: RE: Status:   IMMEDIATE MEMORY 78 7%ile Borderline 103 DECLINE        1  List Learning ()          2  Story Memory (15/24)           VISUOSPATIAL/  CONSTRUCTIONAL 81 10%ile Low Average 100 DECLINE        3  Figure Copy ()          4  Line Orientation (15/20)           LANGUAGE 96 39%ile Average 90 IMPROVEMENT        5  Picture Naming (10/10)          6  Semantic Fluency ()           ATTENTION 106 66%ile Average 115 DECLINE        7  Digit Span ()          8  Coding ()           DELAYED MEMORY 81 10%ile Low Average 100 DECLINE        9  List Recall (1/10)          10  List Recognition ()          11  Story Recall ()          12  Figure Recall ()           Sum of Index Scores:  442  508 DECLINE   Total Score:  84      Percentile: 14%ile      Classification: Low Average          RE indicates the scores from the re-evaluation (2019)   Form: B      The Test of Adult Language - Fourth Edition (TOAL-4) is a standardized, norm-referenced assessment measuring important communicative abilities in spoken and written language  The test in normed on individuals 12:0-24:11  The TOAL-4 has 6 subtests; their results are reported as percentile ranks and scaled scores  The results of the TOAL-4 are generally used for three purposes; (a) to identify adolescents and adults who score significantly below their peers and therefore might need help improving their language proficiency, (b) to determine areas of relative strength and weakness among language abilities, and (c) to serve as a research tool in studies investigating language problems in adolescents and adults  Due to time constraints, only portions of the standardized assessment were administered on this date of service  Subtest: Raw Score: Percentile:  Scaled Score: Descriptive Rating: RE: Status:   1  Word Opposites 17/34 16%tile 7 Below Average 11 DECLINE   3  Spoken Analogies 4/26 <1%tile 1 Very Poor 15 DECLINE   4  Word Similarities 12/40 16%tile 7 Below Average 15 DECLINE     RE indicates the scores from the re-evaluation (1/08/2019)  Due to these age restrictions, these standardized scores should not be compared to standard or percentile rank  Objective measures were taken to complete a diagnostic impression and prognosis for this pt  Goals    Short-term goals:  3  Patient will facilitate planning by completing thought organization tasks (e g , sequencing, deduction puzzles, etc ) with 90% accuracy to facilitate increased executive functioning, working memory, problem solving, and processing skills, to be achieved in 4-6 weeks  -- PARTIALLY MET     5  Patient will name up to 3 synonyms for a given word with 80% accuracy to build expressive vocabulary for conversation, to be achieved in 4-6 weeks  -- PARTIALLY MET     6  Patient will name an appropriate antonym for a given word with 80% accuracy to build expressive vocabulary for conversation, to be achieved in 4-6 weeks   -- PARTIALLY MET     7  Patient will complete word generation tasks (e g , analogies, category matrices, word definitions, synonyms/antonyms, idioms, etc ) with 80% accuracy using word finding strategies to facilitate improved word retrieval skills, to be achieved in 4-6 weeks  -- PARTIALLY MET     Long-term goals:  1  Patient will demonstrate cognitive-communication skills consistent with age and education given use of compensatory strategies when needed to resume baseline activities and responsibilities in home, community, and work settings by discharge  -- PARTIALLY MET     2  Patient will complete cognitive-linguistic therapy that addresses patients specific deficits in processing speed, short-term working memory, attention to detail, monitoring, sequencing, and organization skills, with instruction, to alleviate effects of executive functioning disorder deficits by discharge  -- PARTIALLY MET     3  Patient will demonstrate adequate verbal expression during conversation without breakdowns or word finding deficits by discharge  -- PARTIALLY MET      Impressions/Recommendations    Impressions: Patient presents with mild-moderate (improving) cognitive-linguistic deficits s/p CVA c/b reduced auditory processing speed, word finding difficulties in conversation, and higher-level language skills  He also demonstrates mild anomia through conversation  It should be noted that patient has began to go back to work  Prior to today's testing and therapy, he worked 4 hours  This may be directly correlated with patient's decline in scores  This being said, patient does appear to remain motivated to continue ST indicated by completion of all HEP and consistent attendance to therapy  He has been d/c from PT, but will continue with OT  Patient is in agreement to continue ST 2x a week to target his cognitive-linguistic goals           Recommendations:  -Patient would benefit from outpatient skilled Speech Therapy services : Cognitive-Linguistic therapy    -Frequency: 2x weekly  -Duration: 4-6 weeks    -Intervention certification from: 8/9/6548  -Intervention certification to: 4/88/1507     -Intervention comments: Initial evaluation/administration of standardized test with patient (3:00-4:00pm)   Scoring and interpretation of standardized test for the development of POC (6:00-7:00pm)      Visit Tracking:   -Referring provider: Epic  -Billing guidelines: CMS  -Visit #18/12  (**patient is pending auth**)   -[employer]   (20 visits per benefit year per discipline; auth required after 1st visit)  -RE due 3/28/2019

## 2019-02-14 NOTE — PROGRESS NOTES
Daily Note     Today's date: 2019  Patient name: Jocelyne Bobby  : 1955  MRN: [id number]  Referring provider: Cornelius Cortes MD  Dx:   Encounter Diagnosis   Name Primary?  Right sided cerebral hemisphere cerebrovascular accident (CVA) ([facility] ) Yes                      Subjective: "I couldn't do this a week ago "      Objective: See treatment below  MH and NMES for 10 minutes to increase functional use of extremity  IASTM to palm and digits  OTR assessed patient for capitate misalignment due to swelling in hand, but none noted  UEB prograde and retrograde for proximal ROM and strength  Provided ktape to hand for edema control  Dexterity task utilizing 3-jaw chary  Assessment: Tolerated treatment well  Monitor hand in swelling        Plan: Continued skilled OT per POC     INTERVENTION COMMENTS:  Diagnosis: Right sided cerebral hemisphere cerebrovascular accident (CVA) ([facility] ) [I63 9]  Precautions: HTN, DM, 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  FOTO:  2 of 8 visits, PN due

## 2019-02-14 NOTE — TELEPHONE ENCOUNTER
Thanks, I wrote a new return to work letter  Please mail/fax a copy to patient   Thanks - Tasia Hanna

## 2019-02-14 NOTE — PROGRESS NOTES
Discharge    Today's date: 2019  Patient name: Severiano Finner  : 1955  MRN: [id number]  Referring provider: Ginny Larios MD  Dx:   Encounter Diagnosis     ICD-10-CM    1  Right sided cerebral hemisphere cerebrovascular accident (CVA) ([facility] [address] ) I63 9                   Subjective: No new complaints         Objective: See treatment diary below     Precautions: HTN, taking blood thinner Rx     Daily Treatment Diary      Manual                                                                                                                                                            Exercise Diary             Treamill with incline 10 min, 6% inclince, 1 6 mph,    5 min, 7% inclince, 1 6 mph,    Jogging 2 min, 0% incline, 2 2 mph           Recumbent bike   LLE only L2   x5min                                                 Lunges  Walking, 3 laps Walking, 3 laps             Step ups  From blk disks, 12'' w/ 5# DB, 2X15 8" plus foam with op knee flexion LLE only  2x15                                                                                     Step taps  Cone taps,5 cones, 5x  ea                marches ambulation                  Squats Bosu, blk side up, 3x10 Squat jumps  2x15              backwards ambulation   In persaud EC  2x40'                                SLS   Firm EO R & L  2x30s ea  HEP            TB walkouts     fast, 10x fw only (HEP fw/bw)            Step downs L LE                 Tandem stance   On foam EC  2x30s ea  Tandem gait HEP                             Rocker board Taps, EC 2x15  Balance, EO,  30''x3 EC x1min ea              Heel raises Off step, Up 2, down w/ L, 2x15               Jogging     Hallway 300 ft           Eccentric hamstring strengthening                  DL jumps W/ ladder, fwd/lateral 3 laps  W/ ladder fw/lat 3 laps                     Modalities                                                                                                                        Assessment: First 20 min of session spent on filming for video  Then for remainder of session, reviewed HEP with pt which pt reported good understanding  Also trialed jogging on TM and pt demo frequent L foot catching and poor endurance  Overall did better with jogging in hallway with self selected pace  Pt will be discharged at this time with updated HEP  Plan: Discharge

## 2019-02-14 NOTE — TELEPHONE ENCOUNTER
Pt has finished up w/PT  Pt asking for letter to be released to go back to work FT/40hrs     Pt has 2 or 3weeks left w/speech & OT  Please fax to [phone]    Agreeable to work note?   Please advise

## 2019-02-15 ENCOUNTER — REMOTE DEVICE CLINIC VISIT (OUTPATIENT)
Dept: CARDIOLOGY CLINIC | Facility: CLINIC | Age: 64
End: 2019-02-15
Payer: COMMERCIAL

## 2019-02-15 ENCOUNTER — APPOINTMENT (OUTPATIENT)
Dept: PHYSICAL THERAPY | Facility: CLINIC | Age: 64
End: 2019-02-15
Payer: COMMERCIAL

## 2019-02-15 ENCOUNTER — APPOINTMENT (OUTPATIENT)
Dept: OCCUPATIONAL THERAPY | Facility: CLINIC | Age: 64
End: 2019-02-15
Payer: COMMERCIAL

## 2019-02-15 ENCOUNTER — APPOINTMENT (OUTPATIENT)
Dept: SPEECH THERAPY | Facility: CLINIC | Age: 64
End: 2019-02-15
Payer: COMMERCIAL

## 2019-02-15 DIAGNOSIS — Z95.818 PRESENCE OF OTHER CARDIAC IMPLANTS AND GRAFTS: ICD-10-CM

## 2019-02-15 DIAGNOSIS — I63.9 CRYPTOGENIC STROKE (HCC): Primary | ICD-10-CM

## 2019-02-15 PROCEDURE — 93299 PR REM INTERROG ICPMS/SCRMS <30 D TECH REVIEW: CPT | Performed by: INTERNAL MEDICINE

## 2019-02-15 PROCEDURE — 93298 REM INTERROG DEV EVAL SCRMS: CPT | Performed by: INTERNAL MEDICINE

## 2019-02-15 NOTE — TELEPHONE ENCOUNTER
Pt called and states that he can't print note  He would like to  a copy at [facility]  Letter printed and placed at

## 2019-02-18 NOTE — PROGRESS NOTES
Results for orders placed or performed in visit on 02/15/19   Cardiac EP device report    Narrative    CARELINK TRANSMISSION: BATTERY STATUS "OK"  NO DEVICE DETECTED & NO PT ACTIVATED EPISODES  PRESENTING RHYTHM NSR W/ PAC  NORMAL DEVICE FUNCTION   GV

## 2019-02-19 ENCOUNTER — OFFICE VISIT (OUTPATIENT)
Dept: OCCUPATIONAL THERAPY | Facility: CLINIC | Age: 64
End: 2019-02-19
Payer: COMMERCIAL

## 2019-02-19 ENCOUNTER — OFFICE VISIT (OUTPATIENT)
Dept: SPEECH THERAPY | Facility: CLINIC | Age: 64
End: 2019-02-19
Payer: COMMERCIAL

## 2019-02-19 ENCOUNTER — APPOINTMENT (OUTPATIENT)
Dept: PHYSICAL THERAPY | Facility: CLINIC | Age: 64
End: 2019-02-19
Payer: COMMERCIAL

## 2019-02-19 DIAGNOSIS — I63.9 RIGHT SIDED CEREBRAL HEMISPHERE CEREBROVASCULAR ACCIDENT (CVA) (HCC): ICD-10-CM

## 2019-02-19 DIAGNOSIS — R48.8 OTHER SYMBOLIC DYSFUNCTIONS: Primary | ICD-10-CM

## 2019-02-19 DIAGNOSIS — I63.9 RIGHT SIDED CEREBRAL HEMISPHERE CEREBROVASCULAR ACCIDENT (CVA) (HCC): Primary | ICD-10-CM

## 2019-02-19 PROCEDURE — 97112 NEUROMUSCULAR REEDUCATION: CPT

## 2019-02-19 PROCEDURE — 92507 TX SP LANG VOICE COMM INDIV: CPT

## 2019-02-19 PROCEDURE — 97140 MANUAL THERAPY 1/> REGIONS: CPT

## 2019-02-19 NOTE — PROGRESS NOTES
Daily Speech Treatment Note    Today's date: 2019  Patients name: Cheyanne Atwood  : 1955  MRN: [id number]  Safety measures: CVA, fall risk   Referring provider: Dayday Rausch MD    Primary Diagnosis/Billing code: F74 9  Secondary Diagnosis/ Billing code: I63 9    Visit Tracking:  -Referring provider: Epic  -Billing guidelines: CMS  -Visit #  Mancel Le is still pending auth**)  -[employer]  (20 visits per benefit year per discipline; auth required after 1st visit)  -RE due 3/28/2019     Subjective/Behavioral:  -"Thanks for getting me in earlier "    Objective/Assessment:  -Patient forgot HEP folder  Short-term goals:  3  Patient will facilitate planning by completing thought organization tasks (e g , sequencing, deduction puzzles, etc ) with 90% accuracy to facilitate increased executive functioning, working memory, problem solving, and processing skills, to be achieved in 4-6 weeks  -- PARTIALLY MET  -Word finding/thought organization: Patient presented with incomplete words and asked to fill-in-the-blanks with 2 letters to form words  Completion of missing middle 2 letters task completed in 37/40 opp (93%) independently  Completion of missing end 2 letters task completed in 36/40 opp (90%) independently  Patient achieved 100% acc with min semantic cues  5  Patient will name up to 3 synonyms for a given word with 80% accuracy to build expressive vocabulary for conversation, to be achieved in 4-6 weeks  -- PARTIALLY MET     6  Patient will name an appropriate antonym for a given word with 80% accuracy to build expressive vocabulary for conversation, to be achieved in 4-6 weeks  -- PARTIALLY MET     7  Patient will complete word generation tasks (e g , analogies, category matrices, word definitions, synonyms/antonyms, idioms, etc ) with 80% accuracy using word finding strategies to facilitate improved word retrieval skills, to be achieved in 4-6 weeks   -- PARTIALLY MET  -Word deduction: Patient was provided with written clue and asked to name what was being described  Instructions explicitly stated that the last letter of each word would be the first letter of the next word  Task completed in 18/20 opp (90%) independently, increasing to 20/20 opp (100% acc) with min semantic cues  Plan:  -Patient was provided with home exercises/activities to target goals in plan of care at the end of today's session   -Continue with current plan of care

## 2019-02-19 NOTE — PROGRESS NOTES
Daily Speech Treatment Note    Today's date: 19  Patients name: Martha Witt  : 1955  MRN: [id number]  Safety measures: CVA, fall risk  Referring provider: Kayden Warner MD    Primary Diagnosis/Billing code: I84 7  Secondary Diagnosis/ Billing code: I63 9    Visit Tracking:  -Referring provider: Epic  -Billing guidelines: CMS  -Visit #18  (**patient is pending auth**)   -[employer]  (20 visits per benefit year per discipline; auth required after 1st visit)  -RE due 2019  Subjective/Behavioral:  "I went back to work full time this Monday, started 40 hours" Pt is excited about returing to work however reports he accidentally went to doctors appointment a month early  instead of 3/19, pt reports using calendar but wrote the date wrong  Objective/Assessment:    Patient reports he completed homework however did not bring it today  Short-term goals:   3  Patient will facilitate planning by completing thought organization tasks (e g , sequencing, deduction puzzles, etc ) with 90% accuracy to facilitate increased executive functioning, working memory, problem solving, and processing skills, to be achieved in 4-6 weeks  Pt completed mental manipulation, working memory tasks with moderate to max cues initially later able to fade to mild cues for improved accuracy    5  Patient will name up to 3 synonyms for a given word with 80% accuracy to build expressive vocabulary for conversation, to be achieved in 4-6 weeks  6  Patient will name an appropriate antonym for a given word with 80% accuracy to build expressive vocabulary for conversation, to be achieved in 4-6 weeks        7  Patient will complete word generation tasks (e g , analogies, category matrices, word definitions, synonyms/antonyms, idioms, etc ) with 80% accuracy using word finding strategies to facilitate improved word retrieval skills, to be achieved in 4-6 weeks      Completing Words-End: Pt provided with the beginning of the word and asked to provide  letters to complete the word (e g  "dis _ _" s/e--> discuss)  Task completed in 15/20 opp indep; increasing to 20/20 with min verbal semantic cues  Pt completed word generation tasks at moderate complexity level increasing to moderate to max complexity level (ie  Category + letter category+ vowel/number of letter stipulations)  Pt with mild cues initially with moderate complexity, later requiring mild to moderate verbal semantic, descriptive cues for improved accuracy     Plan:  -Patient was provided with home exercises/activities to target goals in plan of care at the end of today's session   -Continue with current plan of care

## 2019-02-19 NOTE — PROGRESS NOTES
Daily Note     Today's date: 2019  Patient name: Cheyanne Atwood  : 1955  MRN: [id number]  Referring provider: Dayday Rausch MD  Dx:   Encounter Diagnosis   Name Primary?  Right sided cerebral hemisphere cerebrovascular accident (CVA) ([facility] ) Yes                  Subjective: "The taping really helped the swelling go down in my hand "      Objective: See treatment below  Removed Ktape from L hand/forearm applied previous session for edema reduction  Vibration through [address] to L scap/trap area for tone reduction, while NMES to L wrist ext for 10min for motor/sensory re-ed  Pt engaged in seated symbol copy task requiring LUE functional reach to L side to retrieve small foam symbol blocks utilizing pincer grasp, for improved hand to target accuracy and prehension patterns  Provided PROM composite and isolated digit flexion to facilitate range of motion and increase circulation/decrease edema  Assessment: Tolerated treatment well  Min droppage of blocks with hand to target accuracy and shoulder stability improving with mass practice  Pt reports benefiting from [address] for edema reduction, requesting re-taping next session  Plan: Continue skilled OT per POC        INTERVENTION COMMENTS:  Diagnosis: Right sided cerebral hemisphere cerebrovascular accident (CVA) ([facility] ) [I63 9]  Precautions: HTN, DM, 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  FOTO:  3 of 8 visits, PN due

## 2019-02-21 ENCOUNTER — OFFICE VISIT (OUTPATIENT)
Dept: SPEECH THERAPY | Facility: CLINIC | Age: 64
End: 2019-02-21
Payer: COMMERCIAL

## 2019-02-21 ENCOUNTER — OFFICE VISIT (OUTPATIENT)
Dept: OCCUPATIONAL THERAPY | Facility: CLINIC | Age: 64
End: 2019-02-21
Payer: COMMERCIAL

## 2019-02-21 ENCOUNTER — APPOINTMENT (OUTPATIENT)
Dept: PHYSICAL THERAPY | Facility: CLINIC | Age: 64
End: 2019-02-21
Payer: COMMERCIAL

## 2019-02-21 DIAGNOSIS — I63.9 RIGHT SIDED CEREBRAL HEMISPHERE CEREBROVASCULAR ACCIDENT (CVA) (HCC): ICD-10-CM

## 2019-02-21 DIAGNOSIS — R48.8 OTHER SYMBOLIC DYSFUNCTIONS: Primary | ICD-10-CM

## 2019-02-21 DIAGNOSIS — I63.9 RIGHT SIDED CEREBRAL HEMISPHERE CEREBROVASCULAR ACCIDENT (CVA) (HCC): Primary | ICD-10-CM

## 2019-02-21 PROCEDURE — 97535 SELF CARE MNGMENT TRAINING: CPT

## 2019-02-21 PROCEDURE — 92507 TX SP LANG VOICE COMM INDIV: CPT | Performed by: SPEECH-LANGUAGE PATHOLOGIST

## 2019-02-21 PROCEDURE — 97112 NEUROMUSCULAR REEDUCATION: CPT

## 2019-02-21 NOTE — PROGRESS NOTES
Daily Note     Today's date: 2019  Patient name: Lizz Garcia  : 1955  MRN: [id number]  Referring provider: Angélica Cedillo MD  Dx:   Encounter Diagnosis   Name Primary?  Right sided cerebral hemisphere cerebrovascular accident (CVA) [facility]) Yes                  Patient was treated by KARTIK Jerez and was under direct supervision of MEG Gaffney/L, LSVT  Subjective: "My fingers are pretty swollen today"      Objective: See treatment below  MH and NMES on LUE while in LPS for tone reduction and sensory/motor re-ed  Pegboard task at mirror then table top to increase LUE stabilization and functional reach  Block threading task to increase prehension patterns and FM manipulation increase B/L integration  K tape applied to forearm and digits to reduce edema  Assessment: Tolerated treatment well  Due to difficulty placing pegs onto board while on mirror switched to table with improvement shown  Pt able to successfully reach for peg on left side cross midline and apply pressure to successfully place them on board  G transition shown while threading blocks through lace using LUE to retrieve block then thread all the way down string      Plan: Continued skilled OT per POC       INTERVENTION COMMENTS:  Diagnosis: Right sided cerebral hemisphere cerebrovascular accident (CVA) ([facility] ) [I63 9]  Precautions: HTN, DM, 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  FOTO:  4 of 8 visits, PN due

## 2019-02-22 NOTE — PROGRESS NOTES
Patient has been showing improvement in therapy, but still presenting with decreased motor control and muscular strength  Patient would greatly benefit from a home NMES unit to prevent muscle atrophy and improve motor control with carryover at home

## 2019-02-25 NOTE — PROGRESS NOTES
Daily Speech Treatment Note    Today's date: 2019  Patients name: Parag Kennedy  : 1955  MRN: [id number]  Safety measures: CVA, fall risk   Referring provider: Tami Ziegler MD    Primary Diagnosis/Billing code: A01 0  Secondary Diagnosis/ Billing code: I63 9    Visit Tracking:  -Referring provider: Epic  -Billing guidelines: CMS  -Visit #  Marilee Cerda is still pending auth**)  -[employer]  (20 visits per benefit year per discipline; auth required after 1st visit)  -RE due 3/28/2019     Subjective/Behavioral:  -"I'm good "    Objective/Assessment:  -Patient forgot HEP folder  Short-term goals:  3  Patient will facilitate planning by completing thought organization tasks (e g , sequencing, deduction puzzles, etc ) with 90% accuracy to facilitate increased executive functioning, working memory, problem solving, and processing skills, to be achieved in 4-6 weeks  -- PARTIALLY MET  To target executive functioning, patient completed a categorizing activity  Patient was instructed to listen to a list of 5-6 words and group them according to categories  Patient completed task in 5/10 opp (50%) independently, increasing to 10/10 opp (100%) given one repetition  5  Patient will name up to 3 synonyms for a given word with 80% accuracy to build expressive vocabulary for conversation, to be achieved in 4-6 weeks  -- PARTIALLY MET  To target expressive vocabulary, patient named two synonyms for a given target word  Patient completed task in 36/44 opp (82%) independently, increasing to 42/44 opp (95%) given semantic cues, increasing to 44/44 opp (100%) given phonemic cues MARCIE PEREZ [facility] 8: Page 50)     6  Patient will name an appropriate antonym for a given word with 80% accuracy to build expressive vocabulary for conversation, to be achieved in 4-6 weeks   -- PARTIALLY MET     7  Patient will complete word generation tasks (e g , analogies, category matrices, word definitions, synonyms/antonyms, idioms, etc ) with 80% accuracy using word finding strategies to facilitate improved word retrieval skills, to be achieved in 4-6 weeks  -- PARTIALLY MET  To target word retrieval skills, the patient completed the last word in analogies  Patient completed task in 19/20 opp (95%) independently, increasing to  opp (100%) given min-mod verbal cues  To target word retrieval skills, the patient completed the second half of analogies (e g  Make up your own)  Patient completed task in 18/20 opp (90%) independently, increasing to  opp (100%) given min-mod verbal cues  To target word retrieval skills, the patient played a word chaining game  A topic was given (e g  Foods), the patient was instructed to think of an item and create a word chain based off the last letter of the previous item (e g  Strawberry, Yam, Mushroom)  The patient was given two minutes to generate words  Minimal verbal cues were given when patient could not generate another word in the chain  Food: 6 words  Cars: 7 words  House Items: 6 words  Candy: 5 words  Clothin words     Plan:  -Patient was provided with home exercises/activities to target goals in plan of care at the end of today's session   -Continue with current plan of care  Patient was treated by Dread Rosado, graduate SLP student, and was under the direct supervision of RAFAEL Kearney , CCC-SLP

## 2019-02-26 ENCOUNTER — APPOINTMENT (OUTPATIENT)
Dept: PHYSICAL THERAPY | Facility: CLINIC | Age: 64
End: 2019-02-26
Payer: COMMERCIAL

## 2019-02-26 ENCOUNTER — OFFICE VISIT (OUTPATIENT)
Dept: SPEECH THERAPY | Facility: CLINIC | Age: 64
End: 2019-02-26
Payer: COMMERCIAL

## 2019-02-26 ENCOUNTER — OFFICE VISIT (OUTPATIENT)
Dept: OCCUPATIONAL THERAPY | Facility: CLINIC | Age: 64
End: 2019-02-26
Payer: COMMERCIAL

## 2019-02-26 DIAGNOSIS — I63.9 RIGHT SIDED CEREBRAL HEMISPHERE CEREBROVASCULAR ACCIDENT (CVA) (HCC): Primary | ICD-10-CM

## 2019-02-26 DIAGNOSIS — R48.8 OTHER SYMBOLIC DYSFUNCTIONS: Primary | ICD-10-CM

## 2019-02-26 DIAGNOSIS — I63.9 RIGHT SIDED CEREBRAL HEMISPHERE CEREBROVASCULAR ACCIDENT (CVA) (HCC): ICD-10-CM

## 2019-02-26 PROCEDURE — 97535 SELF CARE MNGMENT TRAINING: CPT

## 2019-02-26 PROCEDURE — 97112 NEUROMUSCULAR REEDUCATION: CPT

## 2019-02-26 PROCEDURE — 92507 TX SP LANG VOICE COMM INDIV: CPT

## 2019-02-26 NOTE — PROGRESS NOTES
Daily Note     Today's date: 2019  Patient name: Alissa Underwood  : 1955  MRN: [id number]  Referring provider: Mendel Blamer, MD  Dx:   Encounter Diagnosis   Name Primary?  Right sided cerebral hemisphere cerebrovascular accident (CVA) ([facility] ) Yes       Start Time: 1600  Stop Time: 1700  Total time in clinic (min): 60 minutes    Patient was treated by Jack Rust and was under direct supervision of MEG Gaffney/L, LSVT  Subjective: " My hands not that swollen today"       Objective: See treatment below  MH and NMES placed on wrist extensors then thenar, hypothenar and between D 2,3 for tone reduction, motor/sensory re-ed  Letter/number patch on clip board with Y resistive clips, number cards placed on mat beside pt and letter cards placed on table for increased functional reach, hand to target, and rate of manipulation while crossing midline  WEAVER educated pt on use on stim unit  Assessment: Tolerated treatment well  Demo difficulty grasping cards from mat with LUE directed to use RUE to assist by clearing path of cards and holding down with R hand and then grasp with L with improvement shown  Plan: Continued skilled OT per POC       INTERVENTION COMMENTS:  Diagnosis: Right sided cerebral hemisphere cerebrovascular accident (CVA) ([facility] ) [I63 9]  Precautions: HTN, DM, 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  FOTO:  5 of 8 visits, PN due

## 2019-02-28 ENCOUNTER — OFFICE VISIT (OUTPATIENT)
Dept: SPEECH THERAPY | Facility: CLINIC | Age: 64
End: 2019-02-28
Payer: COMMERCIAL

## 2019-02-28 ENCOUNTER — OFFICE VISIT (OUTPATIENT)
Dept: OCCUPATIONAL THERAPY | Facility: CLINIC | Age: 64
End: 2019-02-28
Payer: COMMERCIAL

## 2019-02-28 ENCOUNTER — APPOINTMENT (OUTPATIENT)
Dept: PHYSICAL THERAPY | Facility: CLINIC | Age: 64
End: 2019-02-28
Payer: COMMERCIAL

## 2019-02-28 DIAGNOSIS — I63.9 RIGHT SIDED CEREBRAL HEMISPHERE CEREBROVASCULAR ACCIDENT (CVA) (HCC): Primary | ICD-10-CM

## 2019-02-28 DIAGNOSIS — R48.8 OTHER SYMBOLIC DYSFUNCTIONS: Primary | ICD-10-CM

## 2019-02-28 DIAGNOSIS — I63.9 RIGHT SIDED CEREBRAL HEMISPHERE CEREBROVASCULAR ACCIDENT (CVA) (HCC): ICD-10-CM

## 2019-02-28 PROCEDURE — 97112 NEUROMUSCULAR REEDUCATION: CPT

## 2019-02-28 PROCEDURE — 92507 TX SP LANG VOICE COMM INDIV: CPT

## 2019-02-28 PROCEDURE — 97140 MANUAL THERAPY 1/> REGIONS: CPT

## 2019-02-28 NOTE — PROGRESS NOTES
Daily Note     Today's date: 2019  Patient name: Alissa Soares  : 1955  MRN: [id number]  Referring provider: Jerald Bellamy MD  Dx:   Encounter Diagnosis   Name Primary?  Right sided cerebral hemisphere cerebrovascular accident (CVA) ([facility] ) Yes                  Subjective: "My shoulder hurt this morning but it got better the more I moved it around "      Objective: See treatment below  While seated with LUE in LPS for ABD, provided vibration through [address] to middle delt for pain reduction, while NMES to wrist extensors for 10min for motor/sensory re-ed  NMES to thenar/hypothenar areas for 5min to facilitate D4 and D5 opposition, followed by NMES to proximal D2-3 and thenar areas for 5min to facilitate 3-jaw-chary pinch  KTape applied in fan pattern from MCPs to elbow for edema management  In quadruped, weightbearing through LUE for 5min x2 with rest break between while matching foam ABC blocks to letter/number board with RUE  For last 5min, pt switched to WB through RUE and using pincer grasp with LUE to match blocks, for improved prehension patterns  Assessment: Tolerated treatment well  Required bracing of L elbow to maintain elbow ext during quadruped, with rest breaks x2 due to c/o 7/10 pain in L wrist during weightbearing  Educated pt on use of vibration and MH in home environment for tone/pain reduction  Plan: Continue skilled OT per POC        INTERVENTION COMMENTS:  Diagnosis: Right sided cerebral hemisphere cerebrovascular accident (CVA) ([facility] ) [I63 9]  Precautions: HTN, DM, 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  FOTO:  6 of 8 visits, PN due

## 2019-02-28 NOTE — PROGRESS NOTES
Daily Speech Treatment Note    Today's date: 2019  Patients name: Felipe Márquez  : 1955  MRN: [id number]  Safety measures: CVA, fall risk   Referring provider: Vance Boast, MD    Primary Diagnosis/Billing code: F05 1  Secondary Diagnosis/ Billing code: I63 9    Visit Tracking:  -Referring provider: Epic  -Billing guidelines: CMS  -Visit #    Jacinda Hopkins  (20 visits per benefit year per discipline; auth required after 1st visit)  -RE due 3/28/2019     Subjective/Behavioral:  -"I went to [facility] yesterday "     Objective/Assessment:  -Reviewed patient's home exercises/activities completed since last appointment  Pt completed HEP with 100% acc; however, only completed 1/2 of tasks (antonym and synonym naming)  Completed rest of worksheet in session to 100% acc with min verbal semantic cues  Short-term goals:  3  Patient will facilitate planning by completing thought organization tasks (e g , sequencing, deduction puzzles, etc ) with 90% accuracy to facilitate increased executive functioning, working memory, problem solving, and processing skills, to be achieved in 4-6 weeks  -- PARTIALLY MET  - Pt was provided with paragraphs to read, underline important details, and use note-taking strategy  Pt was then asked questions regarding main idea and to assess overall comprehension of readings  Pt answered correctly in 80% opp; increasing to 100% with min verbal semantic cues to recall items in passage  Pt reports he is missing details and main ideas for his work emails and requested for activities regarding this  Pt appreciative      -To target reasoning and executive functioning skills, patient was asked to complete deduction puzzles  Using written clues provided, patient completed the following thought organization task;    (#1: 4x2): min-mod cues to introduce new learning task  Completed to 100% acc with cues  Noted to self-correct and identify 1 mistake along the way to completion       5  Patient will name up to 3 synonyms for a given word with 80% accuracy to build expressive vocabulary for conversation, to be achieved in 4-6 weeks  -- PARTIALLY MET     6  Patient will name an appropriate antonym for a given word with 80% accuracy to build expressive vocabulary for conversation, to be achieved in 4-6 weeks  -- PARTIALLY MET     7  Patient will complete word generation tasks (e g , analogies, category matrices, word definitions, synonyms/antonyms, idioms, etc ) with 80% accuracy using word finding strategies to facilitate improved word retrieval skills, to be achieved in 4-6 weeks  -- PARTIALLY MET  - Word Building: to target word finding and mental manipulation of during a word finding activity, patient was asked to decode clues such as "This letter is in film but not in filing" to form one longer word  Task completed over 2 trials in 11/18 opp independently; increasing to 18/18 with mod verbal cues to help organization of thoughts, as well as repetition of clue  Plan:  -Patient was provided with home exercises/activities to target goals in plan of care at the end of today's session   -Continue with current plan of care

## 2019-03-04 NOTE — PROGRESS NOTES
Daily Speech Treatment Note    Today's date: 3/5/2019   Patients name: Shea Hanson  : 1955  MRN: [id number]  Safety measures: CVA, fall risk   Referring provider: Jessica Soares MD    Primary Diagnosis/Billing code: U92 3  Secondary Diagnosis/ Billing code: I63 9    Visit Tracking:  -Referring provider: Epic  -Billing guidelines: CMS  -Visit #    Veronica Salomon  (20 visits per benefit year per discipline; auth required after 1st visit)  -RE due 3/28/2019     Subjective/Behavioral:  -"It was a busy day "    Objective/Assessment:  -Reviewed patient's home exercises/activities completed since last appointment  Patient forgot homework  Short-term goals:  3  Patient will facilitate planning by completing thought organization tasks (e g , sequencing, deduction puzzles, etc ) with 90% accuracy to facilitate increased executive functioning, working memory, problem solving, and processing skills, to be achieved in 4-6 weeks  -- PARTIALLY MET   A deduction puzzle was started, however patient ran out of time during session  Puzzle was given as homework in addition to other worksheets  5  Patient will name up to 3 synonyms for a given word with 80% accuracy to build expressive vocabulary for conversation, to be achieved in 4-6 weeks  -- PARTIALLY MET     6  Patient will name an appropriate antonym for a given word with 80% accuracy to build expressive vocabulary for conversation, to be achieved in 4-6 weeks  -- PARTIALLY MET      7  Patient will complete word generation tasks (e g , analogies, category matrices, word definitions, synonyms/antonyms, idioms, etc ) with 80% accuracy using word finding strategies to facilitate improved word retrieval skills, to be achieved in 4-6 weeks  -- PARTIALLY MET  To target word finding strategies, patient played "Taboo "  Patient was instructed to describe words to the clinician without using associated words written on the card when given three minutes    Patient completed task over 7 trials with an average of 5 2 cards  After first three trials, patient was given two minutes to complete task  To target word finding strategies, patient completed "Rhyming Word Pairs" worksheet MARCIE PEREZ [facility] 8: Page 133)  Patient completed task in 13/20 opp (65%) independently, increasing to 20/20 opp (100%) given min semantic cues  Patient expressed difficulty with this worksheet, however noted that he liked it because it made him, "think outside the box "    Plan:  -Patient was provided with home exercises/activities to target goals in plan of care at the end of today's session   -Continue with current plan of care  Patient was treated by Jay Rod, graduate SLP student, and was under the direct supervision of RAFAEL Melton , CCC-SLP

## 2019-03-05 ENCOUNTER — OFFICE VISIT (OUTPATIENT)
Dept: OCCUPATIONAL THERAPY | Facility: CLINIC | Age: 64
End: 2019-03-05
Payer: COMMERCIAL

## 2019-03-05 ENCOUNTER — OFFICE VISIT (OUTPATIENT)
Dept: SPEECH THERAPY | Facility: CLINIC | Age: 64
End: 2019-03-05
Payer: COMMERCIAL

## 2019-03-05 DIAGNOSIS — I63.9 RIGHT SIDED CEREBRAL HEMISPHERE CEREBROVASCULAR ACCIDENT (CVA) (HCC): Primary | ICD-10-CM

## 2019-03-05 DIAGNOSIS — R48.8 OTHER SYMBOLIC DYSFUNCTIONS: Primary | ICD-10-CM

## 2019-03-05 DIAGNOSIS — I63.9 RIGHT SIDED CEREBRAL HEMISPHERE CEREBROVASCULAR ACCIDENT (CVA) (HCC): ICD-10-CM

## 2019-03-05 PROCEDURE — 92507 TX SP LANG VOICE COMM INDIV: CPT

## 2019-03-05 PROCEDURE — 97112 NEUROMUSCULAR REEDUCATION: CPT

## 2019-03-05 PROCEDURE — 97535 SELF CARE MNGMENT TRAINING: CPT

## 2019-03-05 NOTE — PROGRESS NOTES
Daily Note     Today's date: 3/5/2019  Patient name: Severiano Finner  : 1955  MRN: [id number]  Referring provider: Ginny Larios MD  Dx:   Encounter Diagnosis   Name Primary?  Right sided cerebral hemisphere cerebrovascular accident (CVA) ([facility] ) Yes                  Subjective: "I was able to use the control for the snow plow yesterday "       Objective: See treatment below  10 minutes retrograde at 1 5 resistance  NMES and FES to palm with focus on increasing opposition and 3-jaw-chary  Mixed motor and PNF patterns with 1# weighted ball  [place] with hand to target with 3-jaw-chary      Assessment: Tolerated treatment well  Plan: Continued skilled OT per POC      INTERVENTION COMMENTS:  Diagnosis: Right sided cerebral hemisphere cerebrovascular accident (CVA) ([facility] ) [I63 9]  Precautions: HTN, DM, 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    FOTO:  7 of 8 visits, PN scheduled 3/8

## 2019-03-06 NOTE — PROGRESS NOTES
Daily Speech Treatment Note    Today's date: 3/7/2019   Patients name: Martha Witt  : 1955  MRN: [id number]  Safety measures: CVA, fall risk   Referring provider: Kayden Warner MD    Primary Diagnosis/Billing code: Z29 2  Secondary Diagnosis/ Billing code: I63 9    Visit Tracking:  -Referring provider: Epic  -Billing guidelines: AMA  -Visit # (24 Total)   -CBC  -RE due 3/28/2019     Subjective/Behavioral:  -"Today is a good day "    Objective/Assessment:  -Reviewed patient's home exercises/activities completed since last appointment  Patient completed deduction puzzles with 100% acc and Letter Placement worksheet with 100% acc  Short-term goals:  3  Patient will facilitate planning by completing thought organization tasks (e g , sequencing, deduction puzzles, etc ) with 90% accuracy to facilitate increased executive functioning, working memory, problem solving, and processing skills, to be achieved in 4-6 weeks  -- PARTIALLY MET   To target executive functioning and problem solving, patient completed an Acrostic Puzzle [facility] 9: Page 109)  Patient completed task in  opp (82%) independently, increasing to  opp (100%) given min semantic cues  5  Patient will name up to 3 synonyms for a given word with 80% accuracy to build expressive vocabulary for conversation, to be achieved in 4-6 weeks  -- PARTIALLY MET  To target expressive vocabulary, the patient named synonyms for complex words  Patient completed task in 15/20 opp (75%) independently, increasing to 20/20 opp (100%) given semantic cues      6  Patient will name an appropriate antonym for a given word with 80% accuracy to build expressive vocabulary for conversation, to be achieved in 4-6 weeks   -- PARTIALLY MET      7  Patient will complete word generation tasks (e g , analogies, category matrices, word definitions, synonyms/antonyms, idioms, etc ) with 80% accuracy using word finding strategies to facilitate improved word retrieval skills, to be achieved in 4-6 weeks  -- PARTIALLY MET  To target word finding skills, the patient played "[employer]" where a category was given with a letter and vowel/consonant/syllable restriction (e g  Things associated with the beach, must contain letter S, cannot have a double vowel)  Patient completed task in 4/8 opp (50%) independently, increasing to 8/8 opp (100%) given min semantic cues  Plan:  -Patient was provided with home exercises/activities to target goals in plan of care at the end of today's session   -Continue with current plan of care  Patient was treated by Sourav Keller, graduate SLP student, and was under the direct supervision of RAFAEL Quach , CCC-SLP

## 2019-03-07 ENCOUNTER — OFFICE VISIT (OUTPATIENT)
Dept: OCCUPATIONAL THERAPY | Facility: CLINIC | Age: 64
End: 2019-03-07
Payer: COMMERCIAL

## 2019-03-07 ENCOUNTER — OFFICE VISIT (OUTPATIENT)
Dept: SPEECH THERAPY | Facility: CLINIC | Age: 64
End: 2019-03-07
Payer: COMMERCIAL

## 2019-03-07 ENCOUNTER — EVALUATION (OUTPATIENT)
Dept: OCCUPATIONAL THERAPY | Facility: CLINIC | Age: 64
End: 2019-03-07
Payer: COMMERCIAL

## 2019-03-07 DIAGNOSIS — R48.8 OTHER SYMBOLIC DYSFUNCTIONS: Primary | ICD-10-CM

## 2019-03-07 DIAGNOSIS — I63.9 RIGHT SIDED CEREBRAL HEMISPHERE CEREBROVASCULAR ACCIDENT (CVA) (HCC): Primary | ICD-10-CM

## 2019-03-07 DIAGNOSIS — I63.9 RIGHT SIDED CEREBRAL HEMISPHERE CEREBROVASCULAR ACCIDENT (CVA) (HCC): ICD-10-CM

## 2019-03-07 PROCEDURE — 97112 NEUROMUSCULAR REEDUCATION: CPT | Performed by: OCCUPATIONAL THERAPIST

## 2019-03-07 PROCEDURE — 92507 TX SP LANG VOICE COMM INDIV: CPT

## 2019-03-07 PROCEDURE — 97535 SELF CARE MNGMENT TRAINING: CPT

## 2019-03-07 NOTE — PROGRESS NOTES
Occupational Therapy Stroke Progress Note/Status Update: Today's Date: 3/7/2019  Patient Name: Nieves Nettles  : 1955  MRN: [id number]  Referring Provider: Cristi Saini MD  Dx: No primary diagnosis found  Active Problem List:   Patient Active Problem List   Diagnosis    Right sided cerebral hemisphere cerebrovascular accident ([facility]A) [facility] )    Hypertension    Anxiety    Hyperlipidemia    Gout    DM (diabetes mellitus) ([facility] )    Bradycardia    [facility]    Left shoulder pain    Encounter for loop recorder check     Past Medical Hx:   Past Medical History:   Diagnosis Date    Hypertension     Migraine     Stroke [facility])      Past Surgical Hx:   Past Surgical History:   Procedure Laterality Date    APPENDECTOMY      KNEE SURGERY      NECK SURGERY      TONSILLECTOMY        Pain Levels:   Restin    With Activity:  0    Subjective/Patient Goal: "To keep working on this arm"    History of Present Illness:  Pt is a pleasant, active, employed full time 58 y o  male seen for OT eval s/p referred to [facility] s/p presented to [address] [place] [address] on 10/20/2018 due to left-sided weakness and facial droop   He also had a speech impairment and was found to have an acute stroke  The patient received tPA at 11:00 p m  on the day of admission after neurology assessment  He had no further complications during his hospital stay apart from a small amount of petechial hemorrhage noted on MRI  CT scan showed positive results for small infarctions within the right frontal lobe and right temporal occipital junction, and areas of petechial hemorrhage  Pt discharged to [facility] from 10/25/18 - 18  Dx'd w/ R MCA CVA, HTN, DM, comorbidities as listed above      Lifestyle Performance Model:  Autonomy: Pt was I w/ I/ADLS, drove, & required no use of DME PTA, required use of SPC since CVA has not returned to driving or work, + Give Jessica sling for LUE from [facility]    Reciprocal Relationships: Supportive son lives next door in home attached, also has s/o involved to A as needed, 3 children (2 sons and a dtr) and 3 grandchildren, s/o lives locally, son works FT during am hours  Service to Others: 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 travels 4-5 times a year to [place] for few weeks at time [employer], travels internationally frequently  Intrinsic Gratification: Pt reports enjoying his hot elva [employer] truck, his yard and pool  Pt reports having dog, Manuel  Home Setup: Pt lives in a [facility] apartment that is attached to his son's home in [place] w/ 0 MICKEY w/ 4 steps from the kitchen/bedroom to family room, son lives in the house attached next door  Objective  Impairments Section:   UE Strength:   JENNIFER: RUE: 80/200 LUE: 15/200    R 89, L 15   PINCER: 3 point pinch: RUE 20, LUE: 5  R 19 5 , L 5 5   2 point pinch: RUE: 17, LUE: 6  R 19, L 5 5   Lateral pincher: RUE: 20, LUE: 5    R 21 5, L 9    Coordination:   9 HOLE PEG TEST:     RUE:21 9  seconds, LUE: unable to complete    R 22 03 seconds, L 4 minutes and 45 seconds    Range of Motion:  L AROM/PROM:  Shoulder elevation: full  Shoulder FF: 3/4  Shoulder ABD: slightly past 1/2  ER: 3/4  IR: near full  Elbow ext: full  Elbow flexion: full  Supination: near full  Pronation: full  Composite: near full  Hook: 1/2  Wrist ext: 1/4  Wrist flexion: near full  Opposition: unable to oppose thumb to D5  Dysdiadochokinesia: bradkinetic      Sensation:  MYOFILAMENTS:   RUE: 3 61   LUE: 3 61    Visual Perceptual and Functional Cognition:  1  Gerardo Cognitive Assessment Version 8 2 (MoCA V8 2): Pt scored overall 25/30 indicative of MCI, now scored the following:  Deferred 2* time constraints      2  Concussion Cognitive Checklist:  *Patient indicated that she is experiencing difficulties in the following areas:    · Memory: None reported    · Attention: None reported    · Processing: None reported    · Executive Functions: None reported    · Communication: None reported    · Visual: None reported    · Emotional: Sleep changes    · Increased Sensitivities to: None reported    3  Vision Screening Recording Form: Deferred 2* @ baseline and OT not addressing  4  Anxiety/Depression:  Pt engaged in the Neuro QOL Anxiety Short Form and Neuro QOL Depression Short Form in order to screen for anxiety and depressive s/s  Pt reported the following:  Anxiety- Short Form:   --used to measure anxious s/s  For scoring purposes, scores of 25+ indicate the threshold for treatment per neurology/SLIM  Score:13/40  Pt most often chose the response never when asked about feeling anxious s/s  Depression- Short Form:   --used to measure depressive s/s  For scoring purposes, scores of 25+ indicate the threshold for treatment per neurology/SLIM  Score:8/40  Pt most often chose the response never when asked about feeling depressive s/s  Assessment/Plan  Occupational Therapy Skilled Analysis Assessment and Plan of Care:  Pt requires overall mod I for ADLs/self care and mod I for fx'l mobility w/ SPC  Pt is currently demonstrating the following occupational deficits: limited 2* LUE hemiparesis w/ ~2/5 MMT proximally ~1/5 MMT distally w/ impaired FMC/FMS/GMC/GMS, LUE pronator drift, RUE wfl/wnl 5/5 MMT t/o, R handed, decreased endurance/activity tolerance, generalized weakness, deconditioning, SOB, WHYTE, fatigue, fall risk, impaired balance, flat affect, mildly depressed mood/anxiousness, L visuospatial inattention, diplopia @ 2" for near point of convergence w/ low R exophoria  Based on the aforementioned OT evaluation, functional performance deficits, and assessments, pt has been identified as a moderate complexity evaluation   Pt to continue to benefit from outpatient skilled OT services to address the following goals 2x/wk Short Term Goals for 4 weeks (2018), Long Term Goals for 8 weeks (2019), and POC to  w/in 90 days (2019) with special focus on self-care management, pt education,  and VM training, UE strength/coordination as well as motor training to improve above defiicits and enhance overall QOL/function  Pt demo with G functional progression towards goals in POC  Pt demo with improvements with muscle endurance, improved stabilization assist to perform ADL closures, FMC/prehension, manipulation of controllers, and improved hand to target abilities  Pt continues to be most limited by edema in L hand limiting full composite grasp, decreased FMC/prehension with increased fatigue levels, and minimal ataxia affecting functional use of LUE    OTR recommending Pt to continue skilled OT services 2x/week for 6-8 more weeks with focus on improving the above deficits and enhance overall functional use of LUE        Goals:  Short Term Goals: (4 weeks)  Tone:  · Pt will demo good carryover of clinic and home tone strengthening strategies to for improved AROM initiation with functional reach, dressing, hygiene- MET  Modalities:  · Pt will tolerate BIONESS for improved motor and sensory performance for overall improved hand to target with 80% accuracy-PARTIALLY MET  Sensation:  · Pt will increase proprioception of  L hand to target for improved functional reach vision occluded with ADL tasks- MET  Coordination:  · Pt will increase rate of manipulation for all FM tests for improved functional performance (pinch pad, tripod, and lateral pincer grasps) with salient and fx'l I/ADL/leisure tasks-PARTIALLY MET  ROM/Function:  · Pt will increase L  UE to Gross Assist, refined functional assist, and stabilization with <20% cuing for tabletop tasks for improved functional performance of life roles and salient tasks-PARTIALLY MET  · Pt will demo with G carryover of Home Exercise Program for improved functional use of  LUE- MET  Long Term Goals: (8 weeks)  Tone:   · Improve hypotonicity of LUE  to WFL/WNL for improved alternate motor patterns, termination on command, automatic grasp/release for improved functional performance t/o I/ADL/leisure task engagement-PARTIALLY MET  Coordination:  · Increase automaticity/decrease ataxia/prehension patterns of  LUE to 100% for normalized movement pattern & resumption of B/L integrative I/ADL/leisure task engagement (including fine and gross motor)  -PARTIALLY MET  ROM/Function:  · Pt will increase L UE strength and  strength to 4+/5, through the use of strengthening exercises w/ home program review s/p skilled education to promote active fx'l movement-PARTIALLY MET     INTERVENTION COMMENTS:  Diagnosis: R MCA CVA, HTN, DM  Precautions: HTN, DM, 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  FOTO: 85 with 15% limitation  Insurance: [place] [phone] [[id number]]  4 of 8 visits, PN due 04/07/2019

## 2019-03-07 NOTE — PROGRESS NOTES
Daily Note     Today's date: 3/7/2019  Patient name: Angeles Sibley  : 1955  MRN: [id number]  Referring provider: Toshia Ramirez MD  Dx:   Encounter Diagnosis   Name Primary?  Right sided cerebral hemisphere cerebrovascular accident (CVA) ([facility] ) Yes                  Subjective: "I feel good today "      Objective: See treatment below  5 minutes prograde and 5 minutes retrograde on UEB at 1 5 resistance  Provided Ktape to left hand for edema management  See Re-eval by OTR for further details  Assessment: Tolerated treatment well  Plan: Continued skilled OT per POC       INTERVENTION COMMENTS:  Diagnosis: Right sided cerebral hemisphere cerebrovascular accident (CVA) ([facility] ) [I63 9]  Precautions: HTN, DM, 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    FOTO:  8 of 8 visits, PN due

## 2019-03-07 NOTE — LETTER
2019    Radha Gomes MD  [facility]    Patient: Niesha Mccord   YOB: 1955   Date of Visit: 3/7/2019     Encounter Diagnosis     ICD-10-CM    1  Right sided cerebral hemisphere cerebrovascular accident (CVA) ([facility] ) I63 9 CANCELED: OT Plan of Care Cert/Re-cert       Dear Dr Neli Howard:    Please review the attached Plan of Care from [facility] recent visit  Please verify that you agree therapy should continue by signing the attached document and sending it back to our office  If you have any questions or concerns, please don't hesitate to call  Sincerely,    Neel Mcdonough OT      Referring Provider:     I certify that I have read the below Plan of Care and certify the need for these services furnished under this plan of treatment while under my care  Radha Gomes MD  [address] [facility] [address]  [address] [place] [address] Avenue: [address] [place] [address]         Occupational Therapy Stroke Progress Note/Status Update: Today's Date: 3/7/2019  Patient Name: Niesha Mccord  : 1955  MRN: [id number]  Referring Provider: Krista Gaucher, MD  Dx: No primary diagnosis found      Active Problem List:   Patient Active Problem List   Diagnosis    Right sided cerebral hemisphere cerebrovascular accident (CVA) ([facility] )    Hypertension    Anxiety    Hyperlipidemia    Gout    DM (diabetes mellitus) ([facility] )    Bradycardia    Left hemiparesis ([facility] )    Left shoulder pain    Encounter for loop recorder check     Past Medical Hx:   Past Medical History:   Diagnosis Date    Hypertension     Migraine     Stroke [facility])      Past Surgical Hx:   Past Surgical History:   Procedure Laterality Date    APPENDECTOMY      KNEE SURGERY      NECK SURGERY      TONSILLECTOMY        Pain Levels:   Restin    With Activity:  0    Subjective/Patient Goal: "To keep working on this arm"    History of Present Illness:  Pt is a pleasant, active, employed full time 58 y o  male seen for OT robert s/p referred to [address] Zuleika Garcia s/p presented to [facility] on 10/20/2018 due to left-sided weakness and facial droop   He also had a speech impairment and was found to have an acute stroke  The patient received tPA at 11:00 p m  on the day of admission after neurology assessment  He had no further complications during his hospital stay apart from a small amount of petechial hemorrhage noted on MRI  CT scan showed positive results for small infarctions within the right frontal lobe and right temporal occipital junction, and areas of petechial hemorrhage  Pt discharged to [facility] from 10/25/18 - 11/21/18  Dx'd w/ R MCA CVA, HTN, DM, comorbidities as listed above      Lifestyle Performance Model:  Autonomy: Pt was I w/ I/ADLS, drove, & required no use of DME PTA, required use of SPC since CVA has not returned to driving or work, + Give Jessica sling for LUE from [facility]  Reciprocal Relationships: Supportive son lives next door in home attached, also has s/o involved to A as needed, 3 children (2 sons and a dtr) and 3 grandchildren, s/o lives locally, son works FT during am hours  Service to Others: 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 times a year to [place] for few weeks at time [email or web address], travels internationally frequently  Intrinsic Gratification: Pt reports enjoying his hot elva [employer] & other 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 bake truck, his yard and pool  Pt reports having dog, Manuel  Home Setup: Pt lives in a [facility] apartment that is attached to his son's home in [place] w/ 0 MICKEY w/ 4 steps from the kitchen/bedroom to family room, son lives in the house attached next door  Objective  Impairments Section:   UE Strength:   JENNIFER: RUE: 80/200 LUE: 15/200    R 89, L 15   PINCER: 3 point pinch: RUE 20, LUE: 5  R 19 5 , L 5 5   2 point pinch: RUE: 17, LUE: 6  R 19, L 5 5   Lateral pincher: RUE: 20, LUE: 5    R 21 5, L 9    Coordination:   9 HOLE PEG TEST:     RUE:21 9  seconds, LUE: unable to complete     R 22 03 seconds, L 4 minutes and 45 seconds    Range of Motion:  L AROM/PROM:  Shoulder elevation: full  Shoulder FF: 3/4  Shoulder ABD: slightly past 1/2  ER: 3/4  IR: near full  Elbow ext: full  Elbow flexion: full  Supination: near full  Pronation: full  Composite: near full  Hook: 1/2  Wrist ext: 1/4  Wrist flexion: near full  Opposition: unable to oppose thumb to D5  Dysdiadochokinesia: bradkinetic      Sensation:  MYOFILAMENTS:   RUE: 3 61   LUE: 3 61    Visual Perceptual and Functional Cognition:  1  Reading Cognitive Assessment Version 8 2 (MoCA V8 2): Pt scored overall 25/30 indicative of MCI, now scored the following:  Deferred 2* time constraints  2  Concussion Cognitive Checklist:  *Patient indicated that she is experiencing difficulties in the following areas:    · Memory: None reported    · Attention: None reported    · Processing: None reported    · Executive Functions: None reported    · Communication: None reported    · Visual: None reported    · Emotional: Sleep changes    · Increased Sensitivities to: None reported    3  Vision Screening Recording Form: Deferred 2* @ baseline and OT not addressing  4  Anxiety/Depression:  Pt engaged in the Neuro QOL Anxiety Short Form and Neuro QOL Depression Short Form in order to screen for anxiety and depressive s/s  Pt reported the following:  Anxiety- Short Form:   --used to measure anxious s/s  For scoring purposes, scores of 25+ indicate the threshold for treatment per neurology/SLIM  Score:13/40  Pt most often chose the response never when asked about feeling anxious s/s  Depression- Short Form:   --used to measure depressive s/s  For scoring purposes, scores of 25+ indicate the threshold for treatment per neurology/SLIM  Score:8/40  Pt most often chose the response never when asked about feeling depressive s/s      Assessment/Plan  Occupational Therapy Skilled Analysis Assessment and Plan of Care:  Pt requires overall mod I for ADLs/self care and mod I for fx'l mobility w/ SPC  Pt is currently demonstrating the following occupational deficits: limited 2* LUE hemiparesis w/ ~2/5 MMT proximally ~1/5 MMT distally w/ impaired FMC/FMS/GMC/GMS, LUE pronator drift, RUE wfl/wnl 5/5 MMT t/o, R handed, decreased endurance/activity tolerance, generalized weakness, deconditioning, SOB, WHYTE, fatigue, fall risk, impaired balance, flat affect, mildly depressed mood/anxiousness, L visuospatial inattention, diplopia @ 2" for near point of convergence w/ low R exophoria  Based on the aforementioned OT evaluation, functional performance deficits, and assessments, pt has been identified as a moderate complexity evaluation  Pt to continue to benefit from outpatient skilled OT services to address the following goals 2x/wk Short Term Goals for 4 weeks (2018), Long Term Goals for 8 weeks (2019), and POC to  w/in 90 days (2019) with special focus on self-care management, pt education,  and VM training, UE strength/coordination as well as motor training to improve above defiicits and enhance overall QOL/function  Pt demo with G functional progression towards goals in POC  Pt demo with improvements with muscle endurance, improved stabilization assist to perform ADL closures, FMC/prehension, manipulation of controllers, and improved hand to target abilities  Pt continues to be most limited by edema in L hand limiting full composite grasp, decreased FMC/prehension with increased fatigue levels, and minimal ataxia affecting functional use of LUE    OTR recommending Pt to continue skilled OT services 2x/week for 6-8 more weeks with focus on improving the above deficits and enhance overall functional use of LUE        Goals:  Short Term Goals: (4 weeks)  Tone:  · Pt will demo good carryover of clinic and home tone strengthening strategies to for improved AROM initiation with functional reach, dressing, hygiene- MET  Modalities:  · Pt will tolerate BIONESS for improved motor and sensory performance for overall improved hand to target with 80% accuracy-PARTIALLY MET  Sensation:  · Pt will increase proprioception of  L hand to target for improved functional reach vision occluded with ADL tasks- MET  Coordination:  · Pt will increase rate of manipulation for all FM tests for improved functional performance (pinch pad, tripod, and lateral pincer grasps) with salient and fx'l I/ADL/leisure tasks-PARTIALLY MET  ROM/Function:  · Pt will increase L  UE to Gross Assist, refined functional assist, and stabilization with <20% cuing for tabletop tasks for improved functional performance of life roles and salient tasks-PARTIALLY MET  · Pt will demo with G carryover of Home Exercise Program for improved functional use of  LUE- MET  Long Term Goals: (8 weeks)  Tone:   · Improve hypotonicity of LUE  to WFL/WNL for improved alternate motor patterns, termination on command, automatic grasp/release for improved functional performance t/o I/ADL/leisure task engagement-PARTIALLY MET  Coordination:  · Increase automaticity/decrease ataxia/prehension patterns of  LUE to 100% for normalized movement pattern & resumption of B/L integrative I/ADL/leisure task engagement (including fine and gross motor)  -PARTIALLY MET  ROM/Function:  · Pt will increase L UE strength and  strength to 4+/5, through the use of strengthening exercises w/ home program review s/p skilled education to promote active fx'l movement-PARTIALLY MET     INTERVENTION COMMENTS:  Diagnosis: R MCA CVA, HTN, DM  Precautions: HTN, DM, 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  FOTO: 85 with 15% limitation  Insurance: [place] [address] [[id number]]  6 of 8 visits, PN due 04/07/2019

## 2019-03-12 ENCOUNTER — OFFICE VISIT (OUTPATIENT)
Dept: OCCUPATIONAL THERAPY | Facility: CLINIC | Age: 64
End: 2019-03-12
Payer: COMMERCIAL

## 2019-03-12 ENCOUNTER — OFFICE VISIT (OUTPATIENT)
Dept: SPEECH THERAPY | Facility: CLINIC | Age: 64
End: 2019-03-12
Payer: COMMERCIAL

## 2019-03-12 DIAGNOSIS — I63.9 RIGHT SIDED CEREBRAL HEMISPHERE CEREBROVASCULAR ACCIDENT (CVA) (HCC): Primary | ICD-10-CM

## 2019-03-12 DIAGNOSIS — I10 ESSENTIAL HYPERTENSION: ICD-10-CM

## 2019-03-12 DIAGNOSIS — I63.9 RIGHT SIDED CEREBRAL HEMISPHERE CEREBROVASCULAR ACCIDENT (CVA) (HCC): ICD-10-CM

## 2019-03-12 PROCEDURE — 97140 MANUAL THERAPY 1/> REGIONS: CPT

## 2019-03-12 PROCEDURE — 92507 TX SP LANG VOICE COMM INDIV: CPT | Performed by: SPEECH-LANGUAGE PATHOLOGIST

## 2019-03-12 PROCEDURE — 97530 THERAPEUTIC ACTIVITIES: CPT

## 2019-03-12 PROCEDURE — 97112 NEUROMUSCULAR REEDUCATION: CPT

## 2019-03-12 RX ORDER — LOSARTAN POTASSIUM 50 MG/1
50 TABLET ORAL DAILY
Qty: 90 TABLET | Refills: 0 | Status: SHIPPED | OUTPATIENT
Start: 2019-03-12

## 2019-03-12 NOTE — PROGRESS NOTES
Daily Speech Treatment Note    Today's date: 3/12/2019   Patients name: Yaneth Varghese  : 1955  MRN: [id number]  Safety measures: CVA, fall risk   Referring provider: León Joshi MD    Primary Diagnosis/Billing code: H40 6  Secondary Diagnosis/ Billing code: I63 9    Visit Tracking:  -Referring provider: Kelsey Palma  -Billing guidelines: AMA  -Visit #3/60 (24 Total)   -CBC  -RE due 3/28/2019     Subjective/Behavioral:  "it's getting better"    Objective/Assessment:  -Reviewed patient's home exercises/activities completed since last appointment  Homework was incomplete (timing issues) but what was completed was accurate  Pt asked to take homework with him to complete it before next session    Short-term goals:  3  Patient will facilitate planning by completing thought organization tasks (e g , sequencing, deduction puzzles, etc ) with 90% accuracy to facilitate increased executive functioning, working memory, problem solving, and processing skills, to be achieved in 4-6 weeks  -- PARTIALLY MET   To target executive functioning and problem solving, patient completed written deduction puzzles (put on your thinking cap)  Increased processing time was required for processing of wordy instructions  Pt completed tasks with 80% accuracy  Accuracy was impacted by pt missing small words  Pt reported similar problems wit he-mails at work  Will attempt to read e-mails aloud in effort to alleviate this issue  5  Patient will name up to 3 synonyms for a given word with 80% accuracy to build expressive vocabulary for conversation, to be achieved in 4-6 weeks  -- PARTIALLY MET  Pt started homework task related to synonyms - completed work was 100% accurate and pt will finish this for next session  6  Patient will name an appropriate antonym for a given word with 80% accuracy to build expressive vocabulary for conversation, to be achieved in 4-6 weeks   -- PARTIALLY MET      7  Patient will complete word generation tasks (e g , analogies, category matrices, word definitions, synonyms/antonyms, idioms, etc ) with 80% accuracy using word finding strategies to facilitate improved word retrieval skills, to be achieved in 4-6 weeks  -- PARTIALLY MET  Pt participated in a homonyms activity and demonstrated ability to generate homonym pairs with 40% accuracy and the other of a pair with 100% accuracy  Plan:  -Patient was provided with home exercises/activities to target goals in plan of care at the end of today's session   -Continue with current plan of care

## 2019-03-12 NOTE — PROGRESS NOTES
Daily Note     Today's date: 3/12/2019  Patient name: Nieves Nettles  : 1955  MRN: [id number]  Referring provider: Cristi Saini MD  Dx:   Encounter Diagnosis   Name Primary?  Right sided cerebral hemisphere cerebrovascular accident (CVA) ([facility] ) Yes                  Subjective: "Look at that!"      Objective: See treatment below  5 minutes prograde and 5 minutes retrograde at 1 8 resistance  Motor TENS for 10 minutes with edema pumping  Utilized mirror for functional motor retraining  BB with shoulder flexion up slant board  Ktape for shoulder retraction  Assessment: Tolerated treatment well  Noted increase in digit extension and open hand following massed practice of mirror therapy  Plan: Continued skilled OT per POC      INTERVENTION COMMENTS:  Diagnosis: Right sided cerebral hemisphere cerebrovascular accident (CVA) ([facility] ) [I63 9]  Precautions: HTN, DM, 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    FOTO:  3 of 8 visits, PN due

## 2019-03-13 NOTE — PROGRESS NOTES
Daily Speech Treatment Note    Today's date: 3/14/2019   Patients name: Carmelita Haile  : 1955  MRN: [id number]  Safety measures: CVA, fall risk   Referring provider: Noé Mar MD    Primary Diagnosis/Billing code: G20 9  Secondary Diagnosis/ Billing code: I63 9    Visit Tracking:  -Referring provider: [employer]  -Billing guidelines: AMA  -Visit # (25 Total)   -CBC  -RE due 3/28/2019     Subjective/Behavioral:  -"OT was great!"    Objective/Assessment:  -Reviewed patient's home exercises/activities completed since last appointment  Patient demonstrated difficulty with previous homeowrk assignment  Short-term goals:  3  Patient will facilitate planning by completing thought organization tasks (e g , sequencing, deduction puzzles, etc ) with 90% accuracy to facilitate increased executive functioning, working memory, problem solving, and processing skills, to be achieved in 4-6 weeks  -- PARTIALLY MET     5  Patient will name up to 3 synonyms for a given word with 80% accuracy to build expressive vocabulary for conversation, to be achieved in 4-6 weeks  -- PARTIALLY MET  To target expressive vocabulary, the patient named synonyms for complex words  Patient completed task in 7/10 opp (70%) independently, increasing to 10/10 opp (100%) given semantic cues  6  Patient will name an appropriate antonym for a given word with 80% accuracy to build expressive vocabulary for conversation, to be achieved in 4-6 weeks  -- PARTIALLY MET   To target expressive vocabulary, the patient named antonyms for complex words  Patient completed task in  opp (82%) independently, increasing to  opp (100%) given semantic cues      7  Patient will complete word generation tasks (e g , analogies, category matrices, word definitions, synonyms/antonyms, idioms, etc ) with 80% accuracy using word finding strategies to facilitate improved word retrieval skills, to be achieved in 4-6 weeks   -- PARTIALLY MET  To target word finding skills, the patient defined common idioms/proverbs  Patient completed task in 19/20 opp (85%) independently, increasing to 20/20 opp (100%) given min semantic cues  To target word finding skills, the patient completed "Riddles" worksheet (Sourcebook for Lang/Cog: Page 68)  Patient completed task in 19/20 opp (95%) independently, increasing to 20/20 opp (100%) given min semantic cues  To target word finding skills, the patient completed "Original Ideas" worksheet (Sourcebook for Lang/Cog: Page 72)  Patient completed task in 18/20 opp (90%) independently, increasing to 20/20 opp (100%) given semantic cues  Plan:  -Patient was provided with home exercises/activities to target goals in plan of care at the end of today's session   -Continue with current plan of care  Patient was treated by Hanna Flores, graduate SLP student, and was under the direct supervision of RAFAEL Calhoun , CCC-SLP

## 2019-03-14 ENCOUNTER — OFFICE VISIT (OUTPATIENT)
Dept: OCCUPATIONAL THERAPY | Facility: CLINIC | Age: 64
End: 2019-03-14
Payer: COMMERCIAL

## 2019-03-14 ENCOUNTER — OFFICE VISIT (OUTPATIENT)
Dept: SPEECH THERAPY | Facility: CLINIC | Age: 64
End: 2019-03-14
Payer: COMMERCIAL

## 2019-03-14 DIAGNOSIS — R48.8 OTHER SYMBOLIC DYSFUNCTIONS: ICD-10-CM

## 2019-03-14 DIAGNOSIS — I63.9 RIGHT SIDED CEREBRAL HEMISPHERE CEREBROVASCULAR ACCIDENT (CVA) (HCC): Primary | ICD-10-CM

## 2019-03-14 PROCEDURE — 92507 TX SP LANG VOICE COMM INDIV: CPT

## 2019-03-14 PROCEDURE — 97112 NEUROMUSCULAR REEDUCATION: CPT

## 2019-03-14 PROCEDURE — 97140 MANUAL THERAPY 1/> REGIONS: CPT

## 2019-03-14 NOTE — PROGRESS NOTES
Daily Note     Today's date: 3/14/2019  Patient name: Carmelita Haile  : 1955  MRN: [id number]  Referring provider: Noé Mar MD  Dx:   Encounter Diagnosis   Name Primary?  Right sided cerebral hemisphere cerebrovascular accident (CVA) ([facility] ) Yes                  Subjective: "I tried the mirror at home for my hand "      Objective: See treatment below  5 minutes prograde and 5 minutes retrograde at 1 8 resistance on UEB  5 minutes of edema pumping with Motor Tens and symmetrical biphasic at 200mA  Continued utilizing mirror for visual feedback and increasing functional use  IASTM  Performed to forearm supinators  Provided ktaping for edema management as well as helical wrapping for supination  Also provided ktape for shoulder retraction  Assessment: Tolerated treatment well  Patient is demonstrating excellent carryover of therapeutic tasks at home, such as edema pumping, NMES, and mirror therapy  Patient demonstrated ability to fully extend digits after interventions this day in left hand  Plan: Continued skilled OT per POC      INTERVENTION COMMENTS:  Diagnosis: Right sided cerebral hemisphere cerebrovascular accident (CVA) ([facility] ) [I63 9]  Precautions: HTN, DM, 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  4 of 8 visits, PN due

## 2019-03-15 RX ORDER — LOSARTAN POTASSIUM 50 MG/1
TABLET ORAL
Qty: 90 TABLET | Refills: 0 | OUTPATIENT
Start: 2019-03-15

## 2019-03-19 ENCOUNTER — OFFICE VISIT (OUTPATIENT)
Dept: NEUROLOGY | Facility: CLINIC | Age: 64
End: 2019-03-19
Payer: COMMERCIAL

## 2019-03-19 ENCOUNTER — OFFICE VISIT (OUTPATIENT)
Dept: OCCUPATIONAL THERAPY | Facility: CLINIC | Age: 64
End: 2019-03-19
Payer: COMMERCIAL

## 2019-03-19 ENCOUNTER — OFFICE VISIT (OUTPATIENT)
Dept: SPEECH THERAPY | Facility: CLINIC | Age: 64
End: 2019-03-19
Payer: COMMERCIAL

## 2019-03-19 VITALS
HEART RATE: 79 BPM | WEIGHT: 188 LBS | SYSTOLIC BLOOD PRESSURE: 126 MMHG | HEIGHT: 68 IN | BODY MASS INDEX: 28.49 KG/M2 | DIASTOLIC BLOOD PRESSURE: 78 MMHG

## 2019-03-19 DIAGNOSIS — I10 ESSENTIAL HYPERTENSION: Chronic | ICD-10-CM

## 2019-03-19 DIAGNOSIS — G81.94 LEFT HEMIPARESIS (HCC): ICD-10-CM

## 2019-03-19 DIAGNOSIS — I63.9 RIGHT SIDED CEREBRAL HEMISPHERE CEREBROVASCULAR ACCIDENT (CVA) (HCC): Primary | ICD-10-CM

## 2019-03-19 DIAGNOSIS — G89.29 CHRONIC LEFT SHOULDER PAIN: ICD-10-CM

## 2019-03-19 DIAGNOSIS — R48.8 OTHER SYMBOLIC DYSFUNCTIONS: ICD-10-CM

## 2019-03-19 DIAGNOSIS — F41.9 ANXIETY: Chronic | ICD-10-CM

## 2019-03-19 DIAGNOSIS — M25.512 CHRONIC LEFT SHOULDER PAIN: ICD-10-CM

## 2019-03-19 PROCEDURE — 92507 TX SP LANG VOICE COMM INDIV: CPT

## 2019-03-19 PROCEDURE — 97140 MANUAL THERAPY 1/> REGIONS: CPT

## 2019-03-19 PROCEDURE — 99214 OFFICE O/P EST MOD 30 MIN: CPT | Performed by: PHYSICAL MEDICINE & REHABILITATION

## 2019-03-19 PROCEDURE — 97112 NEUROMUSCULAR REEDUCATION: CPT

## 2019-03-19 PROCEDURE — 97018 PARAFFIN BATH THERAPY: CPT

## 2019-03-19 NOTE — PROGRESS NOTES
Daily Note     Today's date: 3/19/2019  Patient name: Vitaly Calvert  : 1955  MRN: [id number]  Referring provider: Willard Goodell, MD  Dx:   Encounter Diagnosis   Name Primary?  Right sided cerebral hemisphere cerebrovascular accident (CVA) ([facility] ) Yes                  Subjective: "I dont think I could have done this a week ago "      Objective: See treatment below  5 minutes prograde and 5 minutes retrograde with upgrade to 2 0 resistance  FES to supraspinatus and post delt, tricep, and volar/dorsal palm while in seated and completing 10 reps of chest press and shoulder flexion with 1# weighted bar  Completed same movements with active movement only-no stim  Paraffin dip with MH followed by passive stretch to digit extension and full fist along with retrograde massage  Ktape with basket weave for edema management  Assessment: Tolerated treatment well  Patient demonstrating increase in functional ROM in [address]  Slight pain noted during shoulder flexion  Also presenting with increase in active hand movement this day  Continues to follow through with stim and TENs at home, as well as mirror therapy  Plan: Continued skilled OT per POC      INTERVENTION COMMENTS:  Diagnosis: Right sided cerebral hemisphere cerebrovascular accident (CVA) ([facility] ) [I63 9]  Precautions: HTN, DM, 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    5 of 8 visits, PN due /

## 2019-03-19 NOTE — PROGRESS NOTES
Physical Medicine & Rehabilitation Follow Up Clinic Note  Patience Driver 58 y o  male      ASSESSMENT/PLAN:   Multifocal infarction right MCA distribution, possible cardioembolic  - loop recorder - last seen by cardiology 2/15  - continue ASA, atorvastatin    - plavix discontinued since last visit  - discharged from PT  - continue OT/ST    Return to work  - since last visit, has increased work schedule to full time without difficulties    HTN   - amlodipine 7 5mg daily  - losartan 50mg BID  - SBP stable, 120s today   - continue daily monitoring at home    L shoulder pain - likely myofascial, improving  - decreased ROM with shoulder abduction/forward flexion, appropriate stretches demonstrated in office    - monitor for progression, adhesive capsulitis  - continue home stretching program  - trial kinesio taping with OT  - continue shoulder stabilization exercises    Sleep - reported 4-5 hours per night  - melatonin discontinued  - sleep interrupted by nocturia   - discussed limiting fluid intake in evenings    - consider oxybutynin if above strategy fails     Anxiety - work-related, resolved   - weaned off fluoxetine successfully     Spasticity - minimal  - continue stretching program  - wrist/hand splint QHS    *I have spent 35 minutes with Patient  today in which greater than 50% of this time was spent in counseling/coordination of care regarding Prognosis, Intructions for management, Importance of tx compliance and Impressions  SUBJECTIVE:  Patience Driver presents today for follow up CVA  Since last visit, he has increased his time at work progressively  He is now working full time without difficulties  HPI:   Patience Driver 58 y o  male, who  has a past medical history of Hypertension, Migraine, and Stroke ([facility] )  Old records were reviewed personally  He sustained multifocal cortical infarctions in R MCA distribution on 10/20/18   At that tie, he was placed on dual antiplatelet therapy, to be followed by monotherapy with ASA after 90 days  He was eventually discharged home at [facility] functional level with ambulation, transfers, and ADLs  He was last seen in clinic 2/4/19  He has been discharged from PT since last visit, but continues to see OT/SLP  He has returned to work and is currently working full time without difficulties  He was weaned off fluoxetine in meantime without adverse effects to mood  He is sleeping well, although only 4-5 hours per night  He reports nocturia, wakes up in middle of night to urinate and having difficulty falling back asleep  He reports trying to drink a lot of fluid before bedtime to stay hydrated; we discussed limiting fluids in evening and instead proportioning it to daytime  Left shoulder pain ongoing problem, worse with shoulder abduction/forward flexion movements  Continues to perform daily exercises and stretches for shoulder ROM  He is trying taping with OT, although with minimal effect as it falls off easily  He defers further interventions at this time  Imaging: I personally reviewed pertinent imaging  No new imaging    Expanded Social History:  Lives with son in [facility], [facility]  1st floor accommodations       Function:  Mod I ambulation, ADLs    ROS:  No fever, chills, chest pain, SOB, cough, nausea, vomiting, diarrhea, constipation, abdominal pain, dysuria, bowel/bladder incontinence, new weakness or changes in sensation  +nocturia  +left shoulder pain  Denies depression/anxiety   Complete ROS obtained and otherwise negative          OBJECTIVE:   /78 (BP Location: Left arm, Patient Position: Sitting, Cuff Size: Large)   Pulse 79   Ht 5' 8" (1 727 m)   Wt 85 3 kg (188 lb)   BMI 28 59 kg/m²     GEN: NAD, sitting comfortably in chair  Head: NCAT, no gross lesions  Eyes: PERRL, EOMI, wearing glasses  Throat: clear, no thrush, MMM  Pulm: CTAB, no rales/wheezes  CV: RRR, normal s1/s2  Abd: soft, NTND  Ext: no pedal edema bilaterally, distal extremities warm and well perfused; nonpitting edema left hand  Psych: normal affect, no agitation  Skin: no observable rashes  Neuro:  A+Ox3, fluent speech, follows commands   Negative graham/babinski bilateral  CN II-XII intact  Able to perform tandem gait with mild difficulty     Left shoulder passive abduction to 135, forward flexion near full range  No significant subluxation noted     Motor Exam:   Right Left Site Right Left Site   5 4* S Ab:  Shoulder Abductors 5 5 HF:  Hip Flexors   5 4 EF:  Elbow Flexors 5 5 KE:  Knee Extensors   5 4 EE:  Elbow Extensors 5 5 DR:  Dorsi Flexors   5 4* WE:  Wrist Extensors 5 5 EHL: Ext Garcia Longus   5 4* FF:  Finger Flexors 5 5 PF:  Plantar Flexors   5 1 HI:  Hand Intrinsics      *with limited ROM      Labs:   Lab Results   Component Value Date    WBC 6 65 11/19/2018    HGB 13 0 11/19/2018    HCT 39 3 11/19/2018    MCV 90 11/19/2018     11/19/2018     Lab Results   Component Value Date    GLUCOSE 132 10/20/2018    CALCIUM 8 5 11/19/2018    K 3 6 11/19/2018    CO2 28 11/19/2018     11/19/2018    BUN 10 11/19/2018    CREATININE 0 86 11/19/2018         Past Medical History:   Diagnosis Date    Hypertension     Migraine     Stroke [facility])        Patient Active Problem List    Diagnosis Date Noted    Encounter for loop recorder check 01/09/2019    Left hemiparesis ([facility] ) 12/14/2018    Left shoulder pain 12/14/2018    Bradycardia 11/09/2018    DM (diabetes mellitus) ([facility] ) 10/25/2018    Gout 10/23/2018    Hyperlipidemia 10/22/2018    Right sided cerebral hemisphere cerebrovascular accident (CVA) ([facility] ) 10/21/2018    Hypertension 10/21/2018    Anxiety 10/21/2018       Past Surgical History:   Procedure Laterality Date    APPENDECTOMY      KNEE SURGERY      NECK SURGERY      TONSILLECTOMY         Family History   Problem Relation Age of Onset    Stroke Mother     Heart disease Father     ALS Father        Social History   No current tobacco/alcohol    No Known Allergies      Current Outpatient Medications:     amLODIPine (NORVASC) 5 mg tablet, Take 5 mg by mouth daily Taking 1 1/2 daily daily , Disp: , Rfl: 5    aspirin (ECOTRIN LOW STRENGTH) 81 mg EC tablet, Take 1 tablet (81 mg total) by mouth daily, Disp: 90 tablet, Rfl: 3    atorvastatin (LIPITOR) 80 mg tablet, Take 1 tablet (80 mg total) by mouth every evening, Disp: 90 tablet, Rfl: 3    FLUoxetine (PROzac) 10 mg capsule, Take 1 capsule (10 mg total) by mouth daily, Disp: 14 capsule, Rfl: 0    losartan (COZAAR) 50 mg tablet, Take 1 tablet (50 mg total) by mouth daily, Disp: 90 tablet, Rfl: 0    clopidogrel (PLAVIX) 75 mg tablet, Take 1 tablet (75 mg total) by mouth daily for 64 doses, Disp: 30 tablet, Rfl: 0

## 2019-03-19 NOTE — PROGRESS NOTES
Daily Speech Treatment Note    Today's date: 3/19/2019   Patients name: Yaneth Varghese  : 1955  MRN: [id number]  Safety measures: CVA, fall risk   Referring provider: León Joshi MD    Primary Diagnosis/Billing code: W76 7  Secondary Diagnosis/ Billing code: I63 9    Visit Tracking:  -Referring provider: Epic  -Billing guidelines: AMA  -Visit # (26 Total)   -CBC  -RE due 3/28/2019     Subjective/Behavioral:  -"It has been a busy day "    Objective/Assessment:  -Reviewed patient's home exercises/activities completed since last appointment  Patient forgot homework at home  Short-term goals:  3  Patient will facilitate planning by completing thought organization tasks (e g , sequencing, deduction puzzles, etc ) with 90% accuracy to facilitate increased executive functioning, working memory, problem solving, and processing skills, to be achieved in 4-6 weeks  -- PARTIALLY MET   Patient started [employer], but ran out of time  Patient will completed with additional homework  5  Patient will name up to 3 synonyms for a given word with 80% accuracy to build expressive vocabulary for conversation, to be achieved in 4-6 weeks  -- PARTIALLY MET    6  Patient will name an appropriate antonym for a given word with 80% accuracy to build expressive vocabulary for conversation, to be achieved in 4-6 weeks  -- PARTIALLY MET      7  Patient will complete word generation tasks (e g , analogies, category matrices, word definitions, synonyms/antonyms, idioms, etc ) with 80% accuracy using word finding strategies to facilitate improved word retrieval skills, to be achieved in 4-6 weeks  -- PARTIALLY MET  To target word finding skills, the patient defined multiple meaning words with at least 2 definitions  Patient completed task in in  opp (93%) independently, increasing to  opp (100%) given min verbal cues      To target word finding skills, the patient played "[employer] "  Patient was given 5-7 tiles and was instructed to arrange them into words at least three letters long, patient was given two minute per trial   Patient completed task over 6 trials with an average of 5 6 words per minute  Plan:  -Patient was provided with home exercises/activities to target goals in plan of care at the end of today's session   -Continue with current plan of care  Patient was treated by Jocelyne Dahl, graduate SLP student, and was under the direct supervision of RAFAEL Landaverde , CCC-SLP

## 2019-03-20 NOTE — PROGRESS NOTES
Daily Speech Treatment Note    Today's date: 3/21/2019   Patients name: Angeles Sibley  : 1955  MRN: [id number]  Safety measures: CVA, fall risk   Referring provider: Toshia Ramirez MD    Primary Diagnosis/Billing code: Z33 1  Secondary Diagnosis/ Billing code: I63 9    Visit Tracking:  -Referring provider: Epic  -Billing guidelines: AMA  -Visit # (27 Total)   -CBC  -RE due 3/28/2019     Subjective/Behavioral:  -"Today is one of those days where I just want to stay inside "    Objective/Assessment:  -Reviewed patient's home exercises/activities completed since last appointment  Patient completed word chaining worksheet with 100% acc  Short-term goals:  3  Patient will facilitate planning by completing thought organization tasks (e g , sequencing, deduction puzzles, etc ) with 90% accuracy to facilitate increased executive functioning, working memory, problem solving, and processing skills, to be achieved in 4-6 weeks  -- PARTIALLY MET     5  Patient will name up to 3 synonyms for a given word with 80% accuracy to build expressive vocabulary for conversation, to be achieved in 4-6 weeks  -- PARTIALLY MET    6  Patient will name an appropriate antonym for a given word with 80% accuracy to build expressive vocabulary for conversation, to be achieved in 4-6 weeks  -- PARTIALLY MET      7  Patient will complete word generation tasks (e g , analogies, category matrices, word definitions, synonyms/antonyms, idioms, etc ) with 80% accuracy using word finding strategies to facilitate improved word retrieval skills, to be achieved in 4-6 weeks  -- PARTIALLY MET  To target word finding skills, the patient played "Lingua[employer]yss" where a category was given with a letter and vowel/consonant/syllable restriction (e g  Things associated with the beach, must contain letter S, cannot have a double vowel)  Patient completed task in 4/7 opp (57%) independently, increasing to 7/7 opp (100%) given mod semantic cues      To target word finding skills, the patient described uncommon people/places/things (e g  Gregoria Daphnie) using circumlocution to the clinician and clinician had to guess what the target word was  Patient completed task in 36/36 opp (100%) independently  Patient completed a second list, but was instructed to describe target words using only one word at a time  Patient completed task in 34/36 opp (94%) independently, increasing to 36/36 opp (100%) when able to use full sentences  Plan:  -Patient was provided with home exercises/activities to target goals in plan of care at the end of today's session   -Continue with current plan of care  Patient was treated by Isa Quan, graduate SLP student, and was under the direct supervision of RAFAEL Nelson , CCC-SLP

## 2019-03-21 ENCOUNTER — OFFICE VISIT (OUTPATIENT)
Dept: SPEECH THERAPY | Facility: CLINIC | Age: 64
End: 2019-03-21
Payer: COMMERCIAL

## 2019-03-21 ENCOUNTER — OFFICE VISIT (OUTPATIENT)
Dept: OCCUPATIONAL THERAPY | Facility: CLINIC | Age: 64
End: 2019-03-21
Payer: COMMERCIAL

## 2019-03-21 DIAGNOSIS — I63.9 RIGHT SIDED CEREBRAL HEMISPHERE CEREBROVASCULAR ACCIDENT (CVA) (HCC): Primary | ICD-10-CM

## 2019-03-21 DIAGNOSIS — R48.8 OTHER SYMBOLIC DYSFUNCTIONS: ICD-10-CM

## 2019-03-21 PROCEDURE — 97140 MANUAL THERAPY 1/> REGIONS: CPT

## 2019-03-21 PROCEDURE — 92507 TX SP LANG VOICE COMM INDIV: CPT

## 2019-03-21 PROCEDURE — 97112 NEUROMUSCULAR REEDUCATION: CPT

## 2019-03-21 NOTE — PROGRESS NOTES
Daily Note     Today's date: 3/21/2019  Patient name: Parag Kennedy  : 1955  MRN: [id number]  Referring provider: Tami Ziegler MD  Dx:   Encounter Diagnosis   Name Primary?  Right sided cerebral hemisphere cerebrovascular accident (CVA) ([facility] ) Yes                  Subjective: "I try to use my hand to hold down the shift button       Objective: See treatment below  Parrafin dip for 5 minutes to increase patients ability to perform full fist  IASTM to digits and forearm extensors  5 minutes retrograde on UEB at 2 0 for decreasing shoulder stiffness  Utilized multi-matrix for in-hand manipulation and [address]  Rubber band task to increase pincer and lateral grasp  Trialed BITs tracing for ROM and hand to target accuracy  Assessment: Tolerated treatment well  Plan: Continued skilled OT per POC      INTERVENTION COMMENTS:  Diagnosis: Right sided cerebral hemisphere cerebrovascular accident (CVA) ([facility] ) [I63 9]  Precautions: HTN, DM, 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    6 of 8 visits, PN due

## 2019-03-26 ENCOUNTER — OFFICE VISIT (OUTPATIENT)
Dept: SPEECH THERAPY | Facility: CLINIC | Age: 64
End: 2019-03-26
Payer: COMMERCIAL

## 2019-03-26 ENCOUNTER — EVALUATION (OUTPATIENT)
Dept: OCCUPATIONAL THERAPY | Facility: CLINIC | Age: 64
End: 2019-03-26
Payer: COMMERCIAL

## 2019-03-26 DIAGNOSIS — I63.9 RIGHT SIDED CEREBRAL HEMISPHERE CEREBROVASCULAR ACCIDENT (CVA) (HCC): Primary | ICD-10-CM

## 2019-03-26 DIAGNOSIS — R48.8 OTHER SYMBOLIC DYSFUNCTIONS: ICD-10-CM

## 2019-03-26 PROCEDURE — 92507 TX SP LANG VOICE COMM INDIV: CPT

## 2019-03-26 PROCEDURE — 97530 THERAPEUTIC ACTIVITIES: CPT

## 2019-03-26 PROCEDURE — 97112 NEUROMUSCULAR REEDUCATION: CPT

## 2019-03-26 NOTE — PROGRESS NOTES
Daily Note     Today's date: 3/26/2019  Patient name: Parag Kennedy  : 1955  MRN: [id number]  Referring provider: Tami Ziegler MD  Dx:   Encounter Diagnosis   Name Primary?  Right sided cerebral hemisphere cerebrovascular accident (CVA) ([facility] ) Yes                  Subjective: "I was sick over the weekend "      Objective: See treatment below  Impairments Section:   UE Strength:   JENNIFER: RUE: 82/200 LUE: 5/200   PINCER: 3 point pinch: RUE 20, LUE: 8    2 point pinch: RUE: 20, LUE: 6   Lateral pincher: RUE: 20, LUE: 9    Coordination:   9 HOLE PEG TEST:     RUE: 21  seconds, LUE: 3 minutes 55 Seconds to place 5 pegs        Engaged patient in UEB for 10 minutes at 1 5 resistance  NMES to thenar and hypothenar for 3-jaw-chary and then proceded to trial  strength after-results remained the same as stated above  Assessment: Tolerated treatment fair  Patient demonstrated fatigue throughout the session as he stated he has been sick with the stomach bug  WEAVER to re-test strength next session and discussed with OTR results  Plan: Continued skilled OT per POC      INTERVENTION COMMENTS:  Diagnosis: Right sided cerebral hemisphere cerebrovascular accident (CVA) ([facility] ) [I63 9]  Precautions: HTN, DM, 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    FOTO:  7 of 8 visits, PN due

## 2019-03-26 NOTE — PROGRESS NOTES
Daily Speech Treatment Note    Today's date: 3/26/2019   Patients name: Tory Cho  : 1955  MRN: [id number]  Safety measures: CVA, fall risk   Referring provider: Socorro Magallon MD    Primary Diagnosis/Billing code: G76 2  Secondary Diagnosis/ Billing code: I63 9    Visit Tracking:  -Referring provider: Epic  -Billing guidelines: AMA  -Visit # (28 Total)   -CBC  -RE due 3/28/2019     Subjective/Behavioral:  -"It has been a rough day "  -Patient has been fighting the stomach bug since the weekend  Objective/Assessment:  -Reviewed patient's home exercises/activities completed since last appointment  Patient was unable to finish homework  Short-term goals:  3  Patient will facilitate planning by completing thought organization tasks (e g , sequencing, deduction puzzles, etc ) with 90% accuracy to facilitate increased executive functioning, working memory, problem solving, and processing skills, to be achieved in 4-6 weeks  -- PARTIALLY MET   To target executive functioning, patient completed "Clapboard" puzzle  Patient was given clues and an empty grid and was instructed to fill in the grid based on clues given  Patient completed approx half the puzzle with 60% acc independently, increasing to 95% acc given semantic cues  5  Patient will name up to 3 synonyms for a given word with 80% accuracy to build expressive vocabulary for conversation, to be achieved in 4-6 weeks  -- PARTIALLY MET    6  Patient will name an appropriate antonym for a given word with 80% accuracy to build expressive vocabulary for conversation, to be achieved in 4-6 weeks  -- PARTIALLY MET      7  Patient will complete word generation tasks (e g , analogies, category matrices, word definitions, synonyms/antonyms, idioms, etc ) with 80% accuracy using word finding strategies to facilitate improved word retrieval skills, to be achieved in 4-6 weeks   -- PARTIALLY MET  To target word finding skills, the patient played "Scattergories "  Patient was instructed to generate words in a given category when also given the first letter (Boy's Names: Letter M = Pretty Lucas)  Patient completed task over 3 trials with 28/36 opp (78%) independently, increasing to 36/36 opp (100%) given semantic cues  Plan:  -Patient was provided with home exercises/activities to target goals in plan of care at the end of today's session   -Continue with current plan of care  Patient was treated by Jocelyne Dahl, graduate SLP student, and was under the direct supervision of RAFAEL Landaverde , CCC-SLP

## 2019-03-28 ENCOUNTER — OFFICE VISIT (OUTPATIENT)
Dept: OCCUPATIONAL THERAPY | Facility: CLINIC | Age: 64
End: 2019-03-28
Payer: COMMERCIAL

## 2019-03-28 ENCOUNTER — EVALUATION (OUTPATIENT)
Dept: SPEECH THERAPY | Facility: CLINIC | Age: 64
End: 2019-03-28
Payer: COMMERCIAL

## 2019-03-28 DIAGNOSIS — I63.9 RIGHT SIDED CEREBRAL HEMISPHERE CEREBROVASCULAR ACCIDENT (CVA) (HCC): Primary | ICD-10-CM

## 2019-03-28 DIAGNOSIS — R48.8 OTHER SYMBOLIC DYSFUNCTIONS: ICD-10-CM

## 2019-03-28 PROCEDURE — 96125 COGNITIVE TEST BY HC PRO: CPT

## 2019-03-28 PROCEDURE — 97530 THERAPEUTIC ACTIVITIES: CPT

## 2019-03-28 PROCEDURE — 97112 NEUROMUSCULAR REEDUCATION: CPT

## 2019-03-28 NOTE — PROGRESS NOTES
Occupational Therapy Progress Note/Status Update:  Formal re-assessment completed by SERA WEAVER completed assessment as listed below:    Assessment/Plan  Occupational Therapy Skilled Analysis Assessment and Plan of Care:  Pt requires overall mod I for ADLs/self care and mod I for fx'l mobility w/ SPC  Pt is currently demonstrating the following occupational deficits: limited 2* LUE hemiparesis w/ ~2/5 MMT proximally ~1/5 MMT distally w/ impaired FMC/FMS/GMC/GMS, LUE pronator drift, RUE wfl/wnl 5/5 MMT t/o, R handed, decreased endurance/activity tolerance, generalized weakness, deconditioning, SOB, WHYTE, fatigue, fall risk, impaired balance, flat affect, mildly depressed mood/anxiousness, L visuospatial inattention, diplopia @ 2" for near point of convergence w/ low R exophoria  Based on the aforementioned OT evaluation, functional performance deficits, and assessments, pt has been identified as a moderate complexity evaluation  Pt to continue to benefit from outpatient skilled OT services to address the following goals 2x/wk Short Term Goals for 4 weeks (2018), Long Term Goals for 8 weeks (2019), and POC to  w/in 90 days (2019) with special focus on self-care management, pt education,  and VM training, UE strength/coordination as well as motor training to improve above defiicits and enhance overall QOL/function      Pt seen for OT re-eval/progress note/status update  Pt continues to demonstrate RUE residual hemiparesis w/ impaired FMC/FMS/GMC/GMS, prehension, automaticity, in hand manipulation, finger to palm translation, pronator drift, hypotonicity   Continue to recommend ongoing treatment to address the following goals 2x/wk for 4 more weeks to address RUE strengthening, coordination, B/L integration, endurance, dexterity and fx'l use t/o I/ADL/leisure tasks for 2x/wk to continue for 4 more weeks      Goals:  Short Term Goals: (4 weeks)  Tone:  · Pt will demo good carryover of clinic and home tone strengthening strategies to for improved AROM initiation with functional reach, dressing, hygiene- MET  Modalities:  · Pt will tolerate BIONESS for improved motor and sensory performance for overall improved hand to target with 80% accuracy-PARTIALLY MET  Sensation:  · Pt will increase proprioception of  L hand to target for improved functional reach vision occluded with ADL tasks- MET  Coordination:  · Pt will increase rate of manipulation for all FM tests for improved functional performance (pinch pad, tripod, and lateral pincer grasps) with salient and fx'l I/ADL/leisure tasks-PARTIALLY MET  ROM/Function:  · Pt will increase L  UE to Gross Assist, refined functional assist, and stabilization with <20% cuing for tabletop tasks for improved functional performance of life roles and salient tasks-PARTIALLY MET  · Pt will demo with G carryover of Home Exercise Program for improved functional use of  LUE- MET  Long Term Goals: (8 weeks)  Tone:   · Improve hypotonicity of LUE  to WFL/WNL for improved alternate motor patterns, termination on command, automatic grasp/release for improved functional performance t/o I/ADL/leisure task engagement-PARTIALLY MET  Coordination:  · Increase automaticity/decrease ataxia/prehension patterns of  LUE to 100% for normalized movement pattern & resumption of B/L integrative I/ADL/leisure task engagement (including fine and gross motor)  -PARTIALLY MET  ROM/Function:  · Pt will increase L UE strength and  strength to 4+/5, through the use of strengthening exercises w/ home program review s/p skilled education to promote active fx'l movement-PARTIALLY MET     INTERVENTION COMMENTS:  Diagnosis: R MCA CVA, HTN, DM  Precautions: HTN, DM, 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   Please call if you have any questions: [phone]  Darryl Lehman, OTD, OTR/L, C-GCM, CSRS, CBIS  Director of Outpatient Neuro Occupational Therapy

## 2019-03-28 NOTE — PROGRESS NOTES
Speech-Language Pathology Re-Evaluation    Today's date: 3/28/2019  Patients name: Severiano Finner  : 1955  MRN: [id number]  Safety measures: CVA, Fall Risk  Referring provider: Ginny Larios MD    Subjective comments: "My two friends  this week and I had the stomach bug, I'm not doing too well " 85%     Patient's goal(s): "To get back to the way I was before the stroke- it's my memory and the words, I just can't get them out "     Assessments    The Repeatable Battery for the Assessment of Neuropsychological Status (RBANS) is a brief, individually-administered assessment which measures attention, language, visuospatial/constructional abilities, and immediate & delayed memory  The RBANS is intended for use with adolescents to adults, ages 15 to 80 years  The following results were obtained during the administration of the assessment  Form: D    Cognitive Domain/Subtest: Index Score: Percentile Rank: Classification: RE: Status:   IMMEDIATE MEMORY 97 42%ile Average 78 IMPROVEMENT        1  List Learning ()          2  Story Memory (15/24)           VISUOSPATIAL/  CONSTRUCTIONAL 89 23%ile Low Average 81 IMPROVEMENT        3  Figure Copy ()          4  Line Orientation ()           LANGUAGE 98 45%ile Average 96 IMPROVEMENT        5  Picture Naming (10/10)          6  Semantic Fluency ()           ATTENTION 112 79%ile High Average 106 IMPROVEMENT        7  Digit Span ()          8  Coding ()           DELAYED MEMORY 102 55%ile Average 81 IMPROVEMENT        9  List Recall (6/10)          10  List Recognition ()          11  Story Recall ()          12  Figure Recall ()           Sum of Index Scores:  498  442 IMPROVEMENT   Total Score:  99      Percentile: 47%ile      Classification: Average          RE indicates the scores from the re-evaluation (19)   Form: A    The Test of Adult Language - Fourth Edition (TOAL-4) is a standardized, norm-referenced assessment measuring important communicative abilities in spoken and written language  The test in normed on individuals 12:0-24:11  Although patient is above the standardized age range, assessment was administered to gain additional data on word finding skills  The TOAL-4 has 6 subtests; their results are reported as percentile ranks and scaled scores  The results of the TOAL-4 are generally used for three purposes; (a) to identify adolescents and adults who score significantly below their peers and therefore might need help improving their language proficiency, (b) to determine areas of relative strength and weakness among language abilities, and (c) to serve as a research tool in studies investigating language problems in adolescents and adults  Due to time constraints, only portions of the standardized assessment were administered on this date of service  Subtest: Raw Score: Percentile:  Scaled Score: Descriptive Rating: RE: Status:   1  Word Opposites 18/34 25%tile 8 Average 7 IMPROVEMENT   3  Spoken Analogies 18/26 50%tile 10 Average 1 IMPROVEMENT   4  Word Similarities 12/40 16%tile 7 Below Average 7 NO CHANGE     RE indicates the scores from the re-evaluation (02/14/19)  Due to these age restrictions, these standardized scores should not be compared to standard or percentile rank  Objective measures were taken to complete a diagnostic impression and prognosis for this pt  Goals    Short-term goals:  3  Patient will facilitate planning by completing thought organization tasks (e g , sequencing, deduction puzzles, etc ) with 90% accuracy to facilitate increased executive functioning, working memory, problem solving, and processing skills, to be achieved in 4-6 weeks  -- PARTIALLY MET     5  Patient will name up to 3 synonyms for a given word with 80% accuracy to build expressive vocabulary for conversation, to be achieved in 4-6 weeks   -- PARTIALLY MET     6  Patient will name an appropriate antonym for a given word with 80% accuracy to build expressive vocabulary for conversation, to be achieved in 4-6 weeks  -- PARTIALLY MET     7  Patient will complete word generation tasks (e g , analogies, category matrices, word definitions, synonyms/antonyms, idioms, etc ) with 80% accuracy using word finding strategies to facilitate improved word retrieval skills, to be achieved in 4-6 weeks  -- PARTIALLY MET    NEW GOAL:   8  Patient will complete complex auditory attention processing tasks (e g , sentence unscramble, ranking numbers/words, etc ) to improve working memory with 80% accuracy, to be achieved in 4-6 weeks      Long-term goals:  1  Patient will demonstrate cognitive-communication skills consistent with age and education given use of compensatory strategies when needed to resume baseline activities and responsibilities in home, community, and work settings by discharge  -- PARTIALLY MET     2  Patient will complete cognitive-linguistic therapy that addresses patients specific deficits in processing speed, short-term working memory, attention to detail, monitoring, sequencing, and organization skills, with instruction, to alleviate effects of executive functioning disorder deficits by discharge  -- PARTIALLY MET     3  Patient will demonstrate adequate verbal expression during conversation without breakdowns or word finding deficits by discharge  -- PARTIALLY MET      Impressions/Recommendations    Impressions: Patient continues to present with (improving) mild-moderate cognitive-linguistic deficits s/p CVA c/b reduced auditory processing speed, word finding difficulties in conversation, and higher-level language skills  He also demonstrates mild anomia through conversation  Pt improved from his prior re-evaluation dated 2/14/2019 on the language testing of the TOAL in 2 out of 3 areas  Pt also improved significantly on his cognitive testing of the RBANS in overall scores   Pt appears to continue to be motivated to attend ST  He feels that he still has some progress to be made and would like to continue therapy to get back to his maximum potential  It is recommended he continue ST 2x a week (along with OT 2x a week) to improve his overall cognitive linguistic skills       Recommendations:  -Patient would benefit from outpatient skilled Speech Therapy services : Cognitive-Linguistic therapy    -Frequency: 1-2x weekly  -Duration: 4-6 weeks    -Intervention certification from: 3/27/4519  -Intervention certification to: 8/2/5668     -Intervention comments: Initial evaluation/administration of standardized test with patient (3:00-4:00pm)   Scoring and interpretation of standardized test for the development of POC (6:00-7:00pm)      Visit Tracking:  -Referring provider: Epic  -Billing guidelines: AMA  -Visit #9/60 (29 Total)   -CBC  -RE due 5/9/2019

## 2019-03-28 NOTE — PROGRESS NOTES
Daily Note     Today's date: 3/28/2019  Patient name: Pankaj Jimenez  : 1955  MRN: [id number]  Referring provider: Kin Arnold MD  Dx:   Encounter Diagnosis   Name Primary?  Right sided cerebral hemisphere cerebrovascular accident (CVA) ([facility] ) Yes                  Subjective: "I feel a little stronger since last visit but still weak "      Objective: See treatment below  UEB 5min prograde, 5min retrograde against L2 0 resistance for proximal strengthening/ endurance  FES for 5min to thenar and base of D2-D3 to facilitate 3-jaw-chary pinch, followed by timed trials grasping/releasing 32 small foam cubes from one bowl to another for improved rate of manipulation, results:  Trial 1: 3 min, 17 sec  Trial 2: 1 min, 50 sec  Trial 3: 1 min, 41 sec      Impairments Section:  UE Strength:              JENNIFER: RUE: 67/200, LUE: 15/200 ( L improved)              PINCER: 3 point pinch: RUE 20, LUE: 8 (same)              2 point pinch: RUE: 22, LUE: 9 (L improved)              Lateral pincher: RUE: 19, LUE: 10 (L improved)  Coordination:              9 HOLE PEG TEST:                RUE: 21  seconds, LUE: 2 minutes 52 seconds (L improved)      Assessment: Tolerated treatment well  Pt continues to demo fatigue throughout session as stated still recovering from the stomach bug  Pt continues to demo ataxic movements during timed trials / shoulder flexion requiring assist to stabilize bowls during timed trials  Plan: Continue skilled OT per POC        INTERVENTION COMMENTS:  Diagnosis: Right sided cerebral hemisphere cerebrovascular accident (CVA) ([facility] ) [I63 9]  Precautions: HTN, DM, 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

## 2019-03-28 NOTE — LETTER
2019    Nirali Harris MD  [facility]    Patient: Lashonda Wong   YOB: 1955   Date of Visit: 3/28/2019     Encounter Diagnosis     ICD-10-CM    1  Right sided cerebral hemisphere cerebrovascular accident (CVA) ([facility] ) I63 9 CANCELED: OT Plan of Care Cert/Re-cert       Dear Dr Yosi Cortes:    Please review the attached Plan of Care from [facility] recent visit  Please verify that you agree therapy should continue by signing the attached document and sending it back to our office  If you have any questions or concerns, please don't hesitate to call  Sincerely,    Radha Rodriguez OT      Referring Provider:     I certify that I have read the below Plan of Care and certify the need for these services furnished under this plan of treatment while under my care  Nirali Harris MD  [address] [facility] [address]  [address] [place] [address] Avenue: [address] [place] [address]        Daily Note     Today's date: 3/28/2019  Patient name: Lashonda Wong  : 1955  MRN: [id number]  Referring provider: Lorenzo Iverson MD  Dx:   Encounter Diagnosis   Name Primary?  Right sided cerebral hemisphere cerebrovascular accident (CVA) ([facility] ) Yes                  Subjective: "I feel a little stronger since last visit but still weak "      Objective: See treatment below  UEB 5min prograde, 5min retrograde against L2 0 resistance for proximal strengthening/ endurance   FES for 5min to thenar and base of D2-D3 to facilitate 3-jaw-chary pinch, followed by timed trials grasping/releasing 32 small foam cubes from one bowl to another for improved rate of manipulation, results:  Trial 1: 3 min, 17 sec  Trial 2: 1 min, 50 sec  Trial 3: 1 min, 41 sec      Impairments Section:  UE Strength:              JENNIFER: RUE:  67/200, LUE:  15/200 ( L improved)              PINCER: 3 point pinch: RUE  20, LUE:  8 (same)              2 point pinch: RUE:  22, LUE:  9 (L improved)              Lateral pincher: RUE:  19, LUE: 10 (L improved)  Coordination:              9 HOLE PEG TEST:                RUE: 21  seconds, LUE: 2 minutes 52 s econds (L improved)      Assessment: Tolerated treatment well  Pt continues to demo fatigue throughout session as stated still recovering from the stomach bug  Pt continues to demo ataxic movements during timed trials / shoulder flexion requiring assist to stabilize bowls during timed trials  Plan: Continue skilled OT per POC  INTERVENTION COMMENTS:  Diagnosis: Right sided cerebral hemisphere cerebrovascular accident (CVA) ([facility] ) [I63 9]  Precautions: HTN, DM, 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     Attestation signed by Renaldo Kaufman OT at 3/28/2019  6:29 PM:  All documentation reviewed and approved per KARLEE HALL [facility] OTD, OTR/L, C-GCM, CSRS, CBIS  Occupational Therapy Progress Note/Status Update:  Formal re-assessment completed by SERA WEAVER completed assessment as listed below:    Assessment/Plan  Occupational Therapy Skilled Analysis Assessment and Plan of Care:  Pt requires overall mod I for ADLs/self care and mod I for fx'l mobility w/ SPC  Pt is currently demonstrating the following occupational deficits: limited 2* LUE hemiparesis w/ ~2/5 MMT proximally ~1/5 MMT distally w/ impaired FMC/FMS/GMC/GMS, LUE pronator drift, RUE wfl/wnl 5/5 MMT t/o, R handed, decreased endurance/activity tolerance, generalized weakness, deconditioning, SOB, WHYTE, fatigue, fall risk, impaired balance, flat affect, mildly depressed mood/anxiousness, L visuospatial inattention, diplopia @ 2" for near point of convergence w/ low R exophoria  Based on the aforementioned OT evaluation, functional performance deficits, and assessments, pt has been identified as a moderate complexity evaluation   Pt to continue to benefit from outpatient skilled OT services to address the following goals 2x/wk Short Term Goals for 4 weeks (2018), Long Term Goals for 8 weeks (2019), and POC to  w/in 90 days (2/28/2019) with special focus on self-care management, pt education,  and VM training, UE strength/coordination as well as motor training to improve above defiicits and enhance overall QOL/function      Pt seen for OT re-eval/progress note/status update  Pt continues to demonstrate RUE residual hemiparesis w/ impaired FMC/FMS/GMC/GMS, prehension, automaticity, in hand manipulation, finger to palm translation, pronator drift, hypotonicity   Continue to recommend ongoing treatment to address the following goals 2x/wk for 4 more weeks to address RUE strengthening, coordination, B/L integration, endurance, dexterity and fx'l use t/o I/ADL/leisure tasks for 2x/wk to continue for 4 more weeks      Goals:  Short Term Goals: (4 weeks)  Tone:  · Pt will demo good carryover of clinic and home tone strengthening strategies to for improved AROM initiation with functional reach, dressing, hygiene- MET  Modalities:  · Pt will tolerate BIONESS for improved motor and sensory performance for overall improved hand to target with 80% accuracy-PARTIALLY MET  Sensation:  · Pt will increase proprioception of  L hand to target for improved functional reach vision occluded with ADL tasks- MET  Coordination:  · Pt will increase rate of manipulation for all FM tests for improved functional performance (pinch pad, tripod, and lateral pincer grasps) with salient and fx'l I/ADL/leisure tasks-PARTIALLY MET  ROM/Function:  · Pt will increase L  UE to Gross Assist, refined functional assist, and stabilization with <20% cuing for tabletop tasks for improved functional performance of life roles and salient tasks-PARTIALLY MET  · Pt will demo with G carryover of Home Exercise Program for improved functional use of  LUE- MET  Long Term Goals: (8 weeks)  Tone:   · Improve hypotonicity of LUE  to WFL/WNL for improved alternate motor patterns, termination on command, automatic grasp/release for improved functional performance t/o I/ADL/leisure task engagement-PARTIALLY MET  Coordination:  · Increase automaticity/decrease ataxia/prehension patterns of  LUE to 100% for normalized movement pattern & resumption of B/L integrative I/ADL/leisure task engagement (including fine and gross motor)  -PARTIALLY MET  ROM/Function:  · Pt will increase L UE strength and  strength to 4+/5, through the use of strengthening exercises w/ home program review s/p skilled education to promote active fx'l movement-PARTIALLY MET     INTERVENTION COMMENTS:  Diagnosis: R MCA CVA, HTN, DM  Precautions: HTN, DM, 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   Please call if you have any questions: [phone]  Damon Bernheim, OTYURI, OTR/L, C-GCM, CSRS, CBIS  Director of Outpatient Neuro Occupational Therapy

## 2019-04-02 ENCOUNTER — OFFICE VISIT (OUTPATIENT)
Dept: OCCUPATIONAL THERAPY | Facility: CLINIC | Age: 64
End: 2019-04-02
Payer: COMMERCIAL

## 2019-04-02 DIAGNOSIS — I63.9 RIGHT SIDED CEREBRAL HEMISPHERE CEREBROVASCULAR ACCIDENT (CVA) (HCC): Primary | ICD-10-CM

## 2019-04-02 PROCEDURE — 97112 NEUROMUSCULAR REEDUCATION: CPT

## 2019-04-02 PROCEDURE — 97110 THERAPEUTIC EXERCISES: CPT

## 2019-04-02 PROCEDURE — 97140 MANUAL THERAPY 1/> REGIONS: CPT

## 2019-04-04 ENCOUNTER — OFFICE VISIT (OUTPATIENT)
Dept: OCCUPATIONAL THERAPY | Facility: CLINIC | Age: 64
End: 2019-04-04
Payer: COMMERCIAL

## 2019-04-04 ENCOUNTER — OFFICE VISIT (OUTPATIENT)
Dept: SPEECH THERAPY | Facility: CLINIC | Age: 64
End: 2019-04-04
Payer: COMMERCIAL

## 2019-04-04 DIAGNOSIS — I63.9 RIGHT SIDED CEREBRAL HEMISPHERE CEREBROVASCULAR ACCIDENT (CVA) (HCC): ICD-10-CM

## 2019-04-04 DIAGNOSIS — I63.9 RIGHT SIDED CEREBRAL HEMISPHERE CEREBROVASCULAR ACCIDENT (CVA) (HCC): Primary | ICD-10-CM

## 2019-04-04 DIAGNOSIS — R48.8 OTHER SYMBOLIC DYSFUNCTIONS: Primary | ICD-10-CM

## 2019-04-04 PROCEDURE — 97112 NEUROMUSCULAR REEDUCATION: CPT

## 2019-04-04 PROCEDURE — 97110 THERAPEUTIC EXERCISES: CPT

## 2019-04-04 PROCEDURE — 92507 TX SP LANG VOICE COMM INDIV: CPT | Performed by: SPEECH-LANGUAGE PATHOLOGIST

## 2019-04-09 ENCOUNTER — OFFICE VISIT (OUTPATIENT)
Dept: OCCUPATIONAL THERAPY | Facility: CLINIC | Age: 64
End: 2019-04-09
Payer: COMMERCIAL

## 2019-04-09 ENCOUNTER — OFFICE VISIT (OUTPATIENT)
Dept: SPEECH THERAPY | Facility: CLINIC | Age: 64
End: 2019-04-09
Payer: COMMERCIAL

## 2019-04-09 DIAGNOSIS — I63.9 RIGHT SIDED CEREBRAL HEMISPHERE CEREBROVASCULAR ACCIDENT (CVA) (HCC): ICD-10-CM

## 2019-04-09 DIAGNOSIS — R48.8 OTHER SYMBOLIC DYSFUNCTIONS: Primary | ICD-10-CM

## 2019-04-09 DIAGNOSIS — I63.9 RIGHT SIDED CEREBRAL HEMISPHERE CEREBROVASCULAR ACCIDENT (CVA) (HCC): Primary | ICD-10-CM

## 2019-04-09 PROCEDURE — 97110 THERAPEUTIC EXERCISES: CPT

## 2019-04-09 PROCEDURE — 97112 NEUROMUSCULAR REEDUCATION: CPT

## 2019-04-09 PROCEDURE — 97530 THERAPEUTIC ACTIVITIES: CPT

## 2019-04-09 PROCEDURE — 92507 TX SP LANG VOICE COMM INDIV: CPT

## 2019-04-11 ENCOUNTER — OFFICE VISIT (OUTPATIENT)
Dept: SPEECH THERAPY | Facility: CLINIC | Age: 64
End: 2019-04-11
Payer: COMMERCIAL

## 2019-04-11 ENCOUNTER — OFFICE VISIT (OUTPATIENT)
Dept: NEUROLOGY | Facility: CLINIC | Age: 64
End: 2019-04-11
Payer: COMMERCIAL

## 2019-04-11 ENCOUNTER — OFFICE VISIT (OUTPATIENT)
Dept: OCCUPATIONAL THERAPY | Facility: CLINIC | Age: 64
End: 2019-04-11
Payer: COMMERCIAL

## 2019-04-11 VITALS
SYSTOLIC BLOOD PRESSURE: 142 MMHG | HEIGHT: 68 IN | HEART RATE: 45 BPM | WEIGHT: 187.2 LBS | DIASTOLIC BLOOD PRESSURE: 80 MMHG | BODY MASS INDEX: 28.37 KG/M2

## 2019-04-11 DIAGNOSIS — Z86.73 HISTORY OF STROKE: ICD-10-CM

## 2019-04-11 DIAGNOSIS — G81.94 LEFT HEMIPARESIS (HCC): Primary | ICD-10-CM

## 2019-04-11 DIAGNOSIS — R48.8 OTHER SYMBOLIC DYSFUNCTIONS: Primary | ICD-10-CM

## 2019-04-11 DIAGNOSIS — I10 ESSENTIAL HYPERTENSION: Chronic | ICD-10-CM

## 2019-04-11 DIAGNOSIS — I63.9 RIGHT SIDED CEREBRAL HEMISPHERE CEREBROVASCULAR ACCIDENT (CVA) (HCC): ICD-10-CM

## 2019-04-11 DIAGNOSIS — I63.9 RIGHT SIDED CEREBRAL HEMISPHERE CEREBROVASCULAR ACCIDENT (CVA) (HCC): Primary | ICD-10-CM

## 2019-04-11 DIAGNOSIS — E78.5 HYPERLIPIDEMIA, UNSPECIFIED HYPERLIPIDEMIA TYPE: Chronic | ICD-10-CM

## 2019-04-11 PROCEDURE — 97112 NEUROMUSCULAR REEDUCATION: CPT

## 2019-04-11 PROCEDURE — 92507 TX SP LANG VOICE COMM INDIV: CPT

## 2019-04-11 PROCEDURE — 97140 MANUAL THERAPY 1/> REGIONS: CPT

## 2019-04-11 PROCEDURE — 99214 OFFICE O/P EST MOD 30 MIN: CPT | Performed by: PSYCHIATRY & NEUROLOGY

## 2019-04-11 PROCEDURE — 97110 THERAPEUTIC EXERCISES: CPT

## 2019-04-11 RX ORDER — FLUOXETINE HYDROCHLORIDE 20 MG/1
20 CAPSULE ORAL DAILY
Refills: 3 | COMMUNITY
Start: 2019-03-11 | End: 2019-12-17 | Stop reason: SDUPTHER

## 2019-04-11 RX ORDER — CLOPIDOGREL BISULFATE 75 MG/1
75 TABLET ORAL DAILY
COMMUNITY
End: 2019-04-11

## 2019-04-16 ENCOUNTER — OFFICE VISIT (OUTPATIENT)
Dept: OCCUPATIONAL THERAPY | Facility: CLINIC | Age: 64
End: 2019-04-16
Payer: COMMERCIAL

## 2019-04-16 ENCOUNTER — OFFICE VISIT (OUTPATIENT)
Dept: SPEECH THERAPY | Facility: CLINIC | Age: 64
End: 2019-04-16
Payer: COMMERCIAL

## 2019-04-16 DIAGNOSIS — R48.8 OTHER SYMBOLIC DYSFUNCTIONS: Primary | ICD-10-CM

## 2019-04-16 DIAGNOSIS — I63.9 RIGHT SIDED CEREBRAL HEMISPHERE CEREBROVASCULAR ACCIDENT (CVA) (HCC): ICD-10-CM

## 2019-04-16 DIAGNOSIS — I63.9 RIGHT SIDED CEREBRAL HEMISPHERE CEREBROVASCULAR ACCIDENT (CVA) (HCC): Primary | ICD-10-CM

## 2019-04-16 PROCEDURE — 92507 TX SP LANG VOICE COMM INDIV: CPT | Performed by: SPEECH-LANGUAGE PATHOLOGIST

## 2019-04-16 PROCEDURE — 97112 NEUROMUSCULAR REEDUCATION: CPT

## 2019-04-16 PROCEDURE — 97110 THERAPEUTIC EXERCISES: CPT

## 2019-04-16 PROCEDURE — 97535 SELF CARE MNGMENT TRAINING: CPT

## 2019-04-18 ENCOUNTER — OFFICE VISIT (OUTPATIENT)
Dept: SPEECH THERAPY | Facility: CLINIC | Age: 64
End: 2019-04-18
Payer: COMMERCIAL

## 2019-04-18 ENCOUNTER — OFFICE VISIT (OUTPATIENT)
Dept: OCCUPATIONAL THERAPY | Facility: CLINIC | Age: 64
End: 2019-04-18
Payer: COMMERCIAL

## 2019-04-18 DIAGNOSIS — I63.9 RIGHT SIDED CEREBRAL HEMISPHERE CEREBROVASCULAR ACCIDENT (CVA) (HCC): Primary | ICD-10-CM

## 2019-04-18 DIAGNOSIS — I63.9 RIGHT SIDED CEREBRAL HEMISPHERE CEREBROVASCULAR ACCIDENT (CVA) (HCC): ICD-10-CM

## 2019-04-18 DIAGNOSIS — R48.8 OTHER SYMBOLIC DYSFUNCTIONS: Primary | ICD-10-CM

## 2019-04-18 PROCEDURE — 97140 MANUAL THERAPY 1/> REGIONS: CPT

## 2019-04-18 PROCEDURE — 97530 THERAPEUTIC ACTIVITIES: CPT

## 2019-04-18 PROCEDURE — 92507 TX SP LANG VOICE COMM INDIV: CPT

## 2019-04-18 PROCEDURE — 97112 NEUROMUSCULAR REEDUCATION: CPT

## 2019-04-23 ENCOUNTER — OFFICE VISIT (OUTPATIENT)
Dept: SPEECH THERAPY | Facility: CLINIC | Age: 64
End: 2019-04-23
Payer: COMMERCIAL

## 2019-04-23 ENCOUNTER — OFFICE VISIT (OUTPATIENT)
Dept: OCCUPATIONAL THERAPY | Facility: CLINIC | Age: 64
End: 2019-04-23
Payer: COMMERCIAL

## 2019-04-23 DIAGNOSIS — I63.9 RIGHT SIDED CEREBRAL HEMISPHERE CEREBROVASCULAR ACCIDENT (CVA) (HCC): ICD-10-CM

## 2019-04-23 DIAGNOSIS — I63.9 RIGHT SIDED CEREBRAL HEMISPHERE CEREBROVASCULAR ACCIDENT (CVA) (HCC): Primary | ICD-10-CM

## 2019-04-23 DIAGNOSIS — R48.8 OTHER SYMBOLIC DYSFUNCTIONS: Primary | ICD-10-CM

## 2019-04-23 PROCEDURE — 92507 TX SP LANG VOICE COMM INDIV: CPT

## 2019-04-23 PROCEDURE — 97110 THERAPEUTIC EXERCISES: CPT

## 2019-04-23 PROCEDURE — 97112 NEUROMUSCULAR REEDUCATION: CPT

## 2019-04-23 PROCEDURE — 97140 MANUAL THERAPY 1/> REGIONS: CPT

## 2019-04-26 ENCOUNTER — TRANSCRIBE ORDERS (OUTPATIENT)
Dept: OCCUPATIONAL THERAPY | Facility: CLINIC | Age: 64
End: 2019-04-26

## 2019-04-26 ENCOUNTER — EVALUATION (OUTPATIENT)
Dept: OCCUPATIONAL THERAPY | Facility: CLINIC | Age: 64
End: 2019-04-26
Payer: COMMERCIAL

## 2019-04-26 ENCOUNTER — OFFICE VISIT (OUTPATIENT)
Dept: SPEECH THERAPY | Facility: CLINIC | Age: 64
End: 2019-04-26
Payer: COMMERCIAL

## 2019-04-26 DIAGNOSIS — I63.9 RIGHT SIDED CEREBRAL HEMISPHERE CEREBROVASCULAR ACCIDENT (CVA) (HCC): Primary | ICD-10-CM

## 2019-04-26 DIAGNOSIS — I63.9 RIGHT SIDED CEREBRAL HEMISPHERE CEREBROVASCULAR ACCIDENT (CVA) (HCC): ICD-10-CM

## 2019-04-26 DIAGNOSIS — R48.8 OTHER SYMBOLIC DYSFUNCTIONS: Primary | ICD-10-CM

## 2019-04-26 PROCEDURE — 97530 THERAPEUTIC ACTIVITIES: CPT

## 2019-04-26 PROCEDURE — 92507 TX SP LANG VOICE COMM INDIV: CPT | Performed by: SPEECH-LANGUAGE PATHOLOGIST

## 2019-04-30 ENCOUNTER — OFFICE VISIT (OUTPATIENT)
Dept: SPEECH THERAPY | Facility: CLINIC | Age: 64
End: 2019-04-30
Payer: COMMERCIAL

## 2019-04-30 ENCOUNTER — OFFICE VISIT (OUTPATIENT)
Dept: OCCUPATIONAL THERAPY | Facility: CLINIC | Age: 64
End: 2019-04-30
Payer: COMMERCIAL

## 2019-04-30 DIAGNOSIS — R48.8 OTHER SYMBOLIC DYSFUNCTIONS: Primary | ICD-10-CM

## 2019-04-30 DIAGNOSIS — I63.9 RIGHT SIDED CEREBRAL HEMISPHERE CEREBROVASCULAR ACCIDENT (CVA) (HCC): Primary | ICD-10-CM

## 2019-04-30 DIAGNOSIS — I63.9 RIGHT SIDED CEREBRAL HEMISPHERE CEREBROVASCULAR ACCIDENT (CVA) (HCC): ICD-10-CM

## 2019-04-30 PROCEDURE — 97112 NEUROMUSCULAR REEDUCATION: CPT

## 2019-04-30 PROCEDURE — 92507 TX SP LANG VOICE COMM INDIV: CPT

## 2019-04-30 PROCEDURE — 97530 THERAPEUTIC ACTIVITIES: CPT

## 2019-05-02 ENCOUNTER — TELEPHONE (OUTPATIENT)
Dept: NEUROLOGY | Facility: CLINIC | Age: 64
End: 2019-05-02

## 2019-05-02 ENCOUNTER — OFFICE VISIT (OUTPATIENT)
Dept: SPEECH THERAPY | Facility: CLINIC | Age: 64
End: 2019-05-02
Payer: COMMERCIAL

## 2019-05-02 ENCOUNTER — OFFICE VISIT (OUTPATIENT)
Dept: OCCUPATIONAL THERAPY | Facility: CLINIC | Age: 64
End: 2019-05-02
Payer: COMMERCIAL

## 2019-05-02 DIAGNOSIS — I63.9 RIGHT SIDED CEREBRAL HEMISPHERE CEREBROVASCULAR ACCIDENT (CVA) (HCC): Primary | ICD-10-CM

## 2019-05-02 DIAGNOSIS — I63.9 RIGHT SIDED CEREBRAL HEMISPHERE CEREBROVASCULAR ACCIDENT (CVA) (HCC): ICD-10-CM

## 2019-05-02 DIAGNOSIS — R48.8 OTHER SYMBOLIC DYSFUNCTIONS: Primary | ICD-10-CM

## 2019-05-02 PROCEDURE — 92507 TX SP LANG VOICE COMM INDIV: CPT

## 2019-05-02 PROCEDURE — 97535 SELF CARE MNGMENT TRAINING: CPT

## 2019-05-02 PROCEDURE — 97112 NEUROMUSCULAR REEDUCATION: CPT

## 2019-05-02 PROCEDURE — 97110 THERAPEUTIC EXERCISES: CPT

## 2019-05-07 ENCOUNTER — OFFICE VISIT (OUTPATIENT)
Dept: OCCUPATIONAL THERAPY | Facility: CLINIC | Age: 64
End: 2019-05-07
Payer: COMMERCIAL

## 2019-05-07 ENCOUNTER — OFFICE VISIT (OUTPATIENT)
Dept: SPEECH THERAPY | Facility: CLINIC | Age: 64
End: 2019-05-07
Payer: COMMERCIAL

## 2019-05-07 DIAGNOSIS — I63.9 RIGHT SIDED CEREBRAL HEMISPHERE CEREBROVASCULAR ACCIDENT (CVA) (HCC): ICD-10-CM

## 2019-05-07 DIAGNOSIS — R48.8 OTHER SYMBOLIC DYSFUNCTIONS: Primary | ICD-10-CM

## 2019-05-07 DIAGNOSIS — I63.9 RIGHT SIDED CEREBRAL HEMISPHERE CEREBROVASCULAR ACCIDENT (CVA) (HCC): Primary | ICD-10-CM

## 2019-05-07 PROCEDURE — 97150 GROUP THERAPEUTIC PROCEDURES: CPT

## 2019-05-07 PROCEDURE — 92507 TX SP LANG VOICE COMM INDIV: CPT

## 2019-05-07 PROCEDURE — 97112 NEUROMUSCULAR REEDUCATION: CPT

## 2019-05-09 ENCOUNTER — OFFICE VISIT (OUTPATIENT)
Dept: SPEECH THERAPY | Facility: CLINIC | Age: 64
End: 2019-05-09
Payer: COMMERCIAL

## 2019-05-09 ENCOUNTER — OFFICE VISIT (OUTPATIENT)
Dept: OCCUPATIONAL THERAPY | Facility: CLINIC | Age: 64
End: 2019-05-09
Payer: COMMERCIAL

## 2019-05-09 DIAGNOSIS — I63.9 RIGHT SIDED CEREBRAL HEMISPHERE CEREBROVASCULAR ACCIDENT (CVA) (HCC): ICD-10-CM

## 2019-05-09 DIAGNOSIS — R48.8 OTHER SYMBOLIC DYSFUNCTIONS: Primary | ICD-10-CM

## 2019-05-09 DIAGNOSIS — I63.9 RIGHT SIDED CEREBRAL HEMISPHERE CEREBROVASCULAR ACCIDENT (CVA) (HCC): Primary | ICD-10-CM

## 2019-05-09 PROCEDURE — 92507 TX SP LANG VOICE COMM INDIV: CPT | Performed by: SPEECH-LANGUAGE PATHOLOGIST

## 2019-05-09 PROCEDURE — 97530 THERAPEUTIC ACTIVITIES: CPT

## 2019-05-09 PROCEDURE — 97112 NEUROMUSCULAR REEDUCATION: CPT

## 2019-05-09 PROCEDURE — 97110 THERAPEUTIC EXERCISES: CPT

## 2019-05-14 ENCOUNTER — OFFICE VISIT (OUTPATIENT)
Dept: OCCUPATIONAL THERAPY | Facility: CLINIC | Age: 64
End: 2019-05-14
Payer: COMMERCIAL

## 2019-05-14 DIAGNOSIS — I63.9 RIGHT SIDED CEREBRAL HEMISPHERE CEREBROVASCULAR ACCIDENT (CVA) (HCC): Primary | ICD-10-CM

## 2019-05-14 PROCEDURE — 97112 NEUROMUSCULAR REEDUCATION: CPT

## 2019-05-14 PROCEDURE — 97140 MANUAL THERAPY 1/> REGIONS: CPT

## 2019-05-14 PROCEDURE — 97110 THERAPEUTIC EXERCISES: CPT

## 2019-05-16 ENCOUNTER — OFFICE VISIT (OUTPATIENT)
Dept: OCCUPATIONAL THERAPY | Facility: CLINIC | Age: 64
End: 2019-05-16
Payer: COMMERCIAL

## 2019-05-16 DIAGNOSIS — I63.9 RIGHT SIDED CEREBRAL HEMISPHERE CEREBROVASCULAR ACCIDENT (CVA) (HCC): Primary | ICD-10-CM

## 2019-05-16 PROCEDURE — 97110 THERAPEUTIC EXERCISES: CPT

## 2019-05-16 PROCEDURE — 97112 NEUROMUSCULAR REEDUCATION: CPT

## 2019-05-17 ENCOUNTER — REMOTE DEVICE CLINIC VISIT (OUTPATIENT)
Dept: CARDIOLOGY CLINIC | Facility: CLINIC | Age: 64
End: 2019-05-17
Payer: COMMERCIAL

## 2019-05-17 DIAGNOSIS — I63.9 CRYPTOGENIC STROKE (HCC): ICD-10-CM

## 2019-05-17 DIAGNOSIS — Z45.09 ENCOUNTER FOR LOOP RECORDER CHECK: Primary | ICD-10-CM

## 2019-05-17 PROCEDURE — 93296 REM INTERROG EVL PM/IDS: CPT | Performed by: INTERNAL MEDICINE

## 2019-05-17 PROCEDURE — 93298 REM INTERROG DEV EVAL SCRMS: CPT | Performed by: INTERNAL MEDICINE

## 2019-05-21 ENCOUNTER — APPOINTMENT (OUTPATIENT)
Dept: OCCUPATIONAL THERAPY | Facility: CLINIC | Age: 64
End: 2019-05-21
Payer: COMMERCIAL

## 2019-05-23 ENCOUNTER — OFFICE VISIT (OUTPATIENT)
Dept: OCCUPATIONAL THERAPY | Facility: CLINIC | Age: 64
End: 2019-05-23
Payer: COMMERCIAL

## 2019-05-23 DIAGNOSIS — I63.9 RIGHT SIDED CEREBRAL HEMISPHERE CEREBROVASCULAR ACCIDENT (CVA) (HCC): Primary | ICD-10-CM

## 2019-05-23 PROCEDURE — 97112 NEUROMUSCULAR REEDUCATION: CPT

## 2019-05-23 PROCEDURE — 97110 THERAPEUTIC EXERCISES: CPT

## 2019-05-28 ENCOUNTER — EVALUATION (OUTPATIENT)
Dept: OCCUPATIONAL THERAPY | Facility: CLINIC | Age: 64
End: 2019-05-28
Payer: COMMERCIAL

## 2019-05-28 ENCOUNTER — OFFICE VISIT (OUTPATIENT)
Dept: OCCUPATIONAL THERAPY | Facility: CLINIC | Age: 64
End: 2019-05-28
Payer: COMMERCIAL

## 2019-05-28 DIAGNOSIS — I63.9 RIGHT SIDED CEREBRAL HEMISPHERE CEREBROVASCULAR ACCIDENT (CVA) (HCC): Primary | ICD-10-CM

## 2019-05-28 PROCEDURE — 97530 THERAPEUTIC ACTIVITIES: CPT

## 2019-05-28 PROCEDURE — 97112 NEUROMUSCULAR REEDUCATION: CPT

## 2019-05-30 ENCOUNTER — OFFICE VISIT (OUTPATIENT)
Dept: OCCUPATIONAL THERAPY | Facility: CLINIC | Age: 64
End: 2019-05-30
Payer: COMMERCIAL

## 2019-05-30 DIAGNOSIS — I63.9 RIGHT SIDED CEREBRAL HEMISPHERE CEREBROVASCULAR ACCIDENT (CVA) (HCC): Primary | ICD-10-CM

## 2019-05-30 PROCEDURE — 97140 MANUAL THERAPY 1/> REGIONS: CPT

## 2019-05-30 PROCEDURE — 97112 NEUROMUSCULAR REEDUCATION: CPT

## 2019-06-03 ENCOUNTER — APPOINTMENT (OUTPATIENT)
Dept: OCCUPATIONAL THERAPY | Facility: CLINIC | Age: 64
End: 2019-06-03
Payer: COMMERCIAL

## 2019-06-04 ENCOUNTER — OFFICE VISIT (OUTPATIENT)
Dept: OCCUPATIONAL THERAPY | Facility: CLINIC | Age: 64
End: 2019-06-04
Payer: COMMERCIAL

## 2019-06-04 DIAGNOSIS — I63.9 RIGHT SIDED CEREBRAL HEMISPHERE CEREBROVASCULAR ACCIDENT (CVA) (HCC): Primary | ICD-10-CM

## 2019-06-04 PROCEDURE — 97112 NEUROMUSCULAR REEDUCATION: CPT

## 2019-06-04 PROCEDURE — 97110 THERAPEUTIC EXERCISES: CPT

## 2019-06-07 ENCOUNTER — OFFICE VISIT (OUTPATIENT)
Dept: OCCUPATIONAL THERAPY | Facility: CLINIC | Age: 64
End: 2019-06-07
Payer: COMMERCIAL

## 2019-06-07 DIAGNOSIS — I63.9 RIGHT SIDED CEREBRAL HEMISPHERE CEREBROVASCULAR ACCIDENT (CVA) (HCC): Primary | ICD-10-CM

## 2019-06-07 PROCEDURE — 97110 THERAPEUTIC EXERCISES: CPT

## 2019-06-07 PROCEDURE — 97530 THERAPEUTIC ACTIVITIES: CPT

## 2019-06-11 ENCOUNTER — OFFICE VISIT (OUTPATIENT)
Dept: OCCUPATIONAL THERAPY | Facility: CLINIC | Age: 64
End: 2019-06-11
Payer: COMMERCIAL

## 2019-06-11 DIAGNOSIS — I63.9 RIGHT SIDED CEREBRAL HEMISPHERE CEREBROVASCULAR ACCIDENT (CVA) (HCC): Primary | ICD-10-CM

## 2019-06-11 PROCEDURE — 97140 MANUAL THERAPY 1/> REGIONS: CPT

## 2019-06-11 PROCEDURE — 97110 THERAPEUTIC EXERCISES: CPT

## 2019-06-11 PROCEDURE — 97112 NEUROMUSCULAR REEDUCATION: CPT

## 2019-06-13 ENCOUNTER — OFFICE VISIT (OUTPATIENT)
Dept: OCCUPATIONAL THERAPY | Facility: CLINIC | Age: 64
End: 2019-06-13
Payer: COMMERCIAL

## 2019-06-13 DIAGNOSIS — I63.9 RIGHT SIDED CEREBRAL HEMISPHERE CEREBROVASCULAR ACCIDENT (CVA) (HCC): Primary | ICD-10-CM

## 2019-06-13 PROCEDURE — 97140 MANUAL THERAPY 1/> REGIONS: CPT

## 2019-06-13 PROCEDURE — 97112 NEUROMUSCULAR REEDUCATION: CPT

## 2019-06-18 ENCOUNTER — APPOINTMENT (OUTPATIENT)
Dept: OCCUPATIONAL THERAPY | Facility: CLINIC | Age: 64
End: 2019-06-18
Payer: COMMERCIAL

## 2019-06-21 ENCOUNTER — APPOINTMENT (OUTPATIENT)
Dept: OCCUPATIONAL THERAPY | Facility: CLINIC | Age: 64
End: 2019-06-21
Payer: COMMERCIAL

## 2019-06-25 ENCOUNTER — APPOINTMENT (OUTPATIENT)
Dept: OCCUPATIONAL THERAPY | Facility: CLINIC | Age: 64
End: 2019-06-25
Payer: COMMERCIAL

## 2019-06-27 ENCOUNTER — APPOINTMENT (OUTPATIENT)
Dept: OCCUPATIONAL THERAPY | Facility: CLINIC | Age: 64
End: 2019-06-27
Payer: COMMERCIAL

## 2019-06-28 ENCOUNTER — APPOINTMENT (OUTPATIENT)
Dept: OCCUPATIONAL THERAPY | Facility: CLINIC | Age: 64
End: 2019-06-28
Payer: COMMERCIAL

## 2019-07-01 ENCOUNTER — TRANSCRIBE ORDERS (OUTPATIENT)
Dept: OCCUPATIONAL THERAPY | Facility: CLINIC | Age: 64
End: 2019-07-01

## 2019-07-01 ENCOUNTER — EVALUATION (OUTPATIENT)
Dept: OCCUPATIONAL THERAPY | Facility: CLINIC | Age: 64
End: 2019-07-01
Payer: COMMERCIAL

## 2019-07-01 DIAGNOSIS — I63.9 RIGHT SIDED CEREBRAL HEMISPHERE CEREBROVASCULAR ACCIDENT (CVA) (HCC): Primary | ICD-10-CM

## 2019-07-01 PROCEDURE — 97530 THERAPEUTIC ACTIVITIES: CPT

## 2019-07-01 NOTE — PROGRESS NOTES
Occupational Therapy Stroke Progress Note/Status Update: Today's Date: 2019  Patient Name: Daniel White  : 1955  MRN: [id number]  Referring Provider: Aaron Ferreira MD  Dx: Right sided cerebral hemisphere cerebrovascular accident (CVA) [facility]) [I63 9]    Active Problem List:   Patient Active Problem List   Diagnosis    History of stroke    Hypertension    Anxiety    Hyperlipidemia    Gout    DM [facility]di[facility] mellitus) [facility] )    Bradycardia    Left hemiparesis ([facility] )    Left shoulder pain    Encounter for loop recorder check     Past Medical Hx:   Past Medical History:   Diagnosis Date    Hypertension     Migraine     Stroke [facility])      Past Surgical Hx:   Past Surgical History:   Procedure Laterality Date    APPENDECTOMY      KNEE SURGERY      NECK SURGERY      TONSILLECTOMY          Pain Levels:   Restin    With Activity:  0    Subjective/Patient Goal: "I'm tired today because of the 12 hour difference  I think my left side got weaker after being away so long"    History of Present Illness:  Pt is a pleasant, active, employed full time 58 y  o  male seen for OT robert s/p referred to [address] [place] [address] s/p presented to [facility] 10/20/2018 due to left-sided weakness and facial droop   He also had a speech impairment and was found to have an acute stroke  The patient received tPA at 11:00 p m  on the day of admission after neurology assessment  He had no further complications during his hospital stay apart from a small amount of petechial hemorrhage noted on MRI  CT scan showed positive results for small infarctions within the right frontal lobe and right temporal occipital junction, and areas of petechial hemorrhage   Pt discharged to [address] [place] [address] from 10/25/18 - 18   Dx'd w/ R MCA CVA, HTN, DM, comorbidities as listed above      Lifestyle Performance Model:  Autonomy: Pt was I w/ I/ADLS, drove, & required no use of DME PTA, required use of SPC since CVA has not returned to driving or work, + Give Jessica sling for LUE from [facility]  Reciprocal Relationships: Supportive son lives next door in home attached, also has s/o involved to A as needed, 3 children (2 sons and a dtr) and 3 grandchildren, s/o lives locally, son works FT during am hours  Service to Others: 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 [address] travels 4-5 times a year to [place] for few weeks at time [email or web address], travels internationally frequently  Intrinsic Gratification: Pt reports enjoying his hot elva [employer] truck, his yard and pool  Pt reports having dog, Manuel  Home Setup: Pt lives in a [address] apartment that is attached to his son's home in [place] w/ 0 MICKEY w/ 4 steps from the kitchen/bedroom to family room, son lives in the house attached next door  Objective  Impairments Section:   UE Strength:   JENNIFER: RUE: 80/200 LUE: 25/200   PINCER: 3 point pinch: RUE 20, LUE: 9   2 point pinch: RUE: 17, LUE: 7   Lateral pincher: RUE: 18, LUE: 10    Coordination:   9 HOLE PEG TEST:     RUE: 19 55 seconds, LUE: unable to complete more than 1 peg in 1 56 34 Seconds, pt extremely frustrated and aware he could do better  Pt exhausted from recent trip to [facility], noticed difference  Functional Dexterity Test:   RUE: 27 50 seconds, LUE: 5 56 06 Seconds     Range of Motion:  AROM: R-intact in all planes, LUE limited to 75% AROM shoulder flexion to 100*    PROM: intact in all planes     MMT: RUE 5/5 LUE 3/5    Pt is R hand dominant    Pt noted extreme fatigue 2* recently returning from [place]  Pt aware of decreased strength and coordination  Noticed change since he came back from trip  Sensation:  MYOFILAMENTS:   RUE: 3 61   LUE: 3 61    Assessment/Plan  Occupational Therapy Skilled Analysis Assessment and Plan of Care:  Pt requires overall mod I for ADLs/self care and mod I for fx'l mobility w/ SPC   Pt is currently demonstrating the following occupational deficits: limited 2* LUE hemiparesis w/ ~2/5 MMT proximally ~1/5 MMT distally w/ impaired FMC/FMS/GMC/GMS, LUE pronator drift, RUE wfl/wnl 5/5 MMT t/o, R handed, decreased endurance/activity tolerance, generalized weakness, deconditioning, SOB, WHYTE, fatigue, fall risk, impaired balance, flat affect, mildly depressed mood/anxiousness, L visuospatial inattention, diplopia @ 2" for near point of convergence w/ low R exophoria  Based on the aforementioned OT evaluation, functional performance deficits, and assessments, pt has been identified as a moderate complexity evaluation  Pt to continue to benefit from outpatient skilled OT services to address the following goals 2x/wk Short Term Goals for 4 weeks (2018), Long Term Goals for 8 weeks (2019), and POC to  w/in 90 days (2019) with special focus on self-care management, pt education,  and VM training, UE strength/coordination as well as motor training to improve above defiicits and enhance overall QOL/function      Pt seen for OT re-eval/progress note/status update  Pt continues to demonstrate LUE hypotonic hemiparesis w/ pronator drift, impaired GMC/GMS/FMC/FMS, decreased dexterity and in hand manipulation and automaticity   Continue to recommend ongoing treatment to address the following goals 2x/wk for 4 more weeks to address LUE strengthening, precision, increased FMC/FMS/GMS/GMC, gross motor sustained endurance t/o fx'l I/ADL/leisure tasks     Goals:  Short Term Goals: (4 weeks):  Tone:  · Pt will demo good carryover of clinic and home tone strengthening strategies to for improved AROM initiation with functional reach, dressing, hygiene-MET  Modalities:  · Pt will tolerate BIONESS for improved motor and sensory performance for overall improved hand to target with 80% accuracy-PARTIALLY MET  Sensation:  · Pt will increase proprioception of  L hand to target for improved functional reach vision occluded with ADL tasks-MET  Coordination:  · Pt will increase rate of manipulation for all FM tests for improved functional performance (pinch pad, tripod, and lateral pincer grasps) with salient and fx'l I/ADL/leisure tasks-PARTIALLY MET  ROM/Function:  · Pt will increase L  UE to Gross Assist, refined functional assist, and stabilization with <20% cuing for tabletop tasks for improved functional performance of life roles and salient tasks-PARTIALLY MET  · Pt will demo with G carryover of Home Exercise Program for improved functional use of  LUE-MET  Long Term Goals: (8 weeks)  Tone:   · Improve hypotonicity of LUE  to WFL/WNL for improved alternate motor patterns, termination on command, automatic grasp/release for improved functional performance t/o I/ADL/leisure task engagement-PARTIALLY MET  Coordination:  · Increase automaticity/decrease ataxia/prehension patterns of  LUE to 100% for normalized movement pattern & resumption of B/L integrative I/ADL/leisure task engagement (including fine and gross motor)  -PARTIALLY MET  ROM/Function:  · Pt will increase L UE strength and  strength to 4+/5, through the use of strengthening exercises w/ home program review s/p skilled education to promote active fx'l movement-PARTIALLY MET     INTERVENTION COMMENTS:  Diagnosis: R MCA CVA, HTN, DM  Precautions: HTN, DM, 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  FOTO: 30 w/ 70% limitation; hand function  Insurance: [facility] [[id number]]  6 of 8 visits, PN 8/1/19     Thank you for the consult!   Please call if you have any questions: [phone]  Saul Sandhoff, SERA, OTR/L, C-GCRAFAEL, CSRS  Director of Outpatient Neuro Occupational Therapy

## 2019-07-01 NOTE — LETTER
2019    Bradley Mendoza MD  [facility]    Patient: Adriana Mares   YOB: 1955   Date of Visit: 2019     Encounter Diagnosis     ICD-10-CM    1  Right sided cerebral hemisphere cerebrovascular accident (CVA) [facility]) I63 9        Dear Dr Can Snare:    Please review the attached Plan of Care from [facility] recent visit  Please verify that you agree therapy should continue by signing the attached document and sending it back to our office  If you have any questions or concerns, please don't hesitate to call  Sincerely,    Christiana Cannon      Referring Provider:     I certify that I have read the below Plan of Care and certify the need for these services furnished under this plan of treatment while under my care  Bradley Mendoza MD  [address] [place] [address] [id number] [place] [address] Avenue: [phone]            Occupational Therapy Stroke Progress Note/Status Update: Today's Date: 2019  Patient Name: Adriana Mares  : 1955  MRN: [id number]  Referring Provider: Daya Hanson MD  Dx: Right sided cerebral hemisphere cerebrovascular accident (CVA) [facility]) [I63 9]    Active Problem List:   Patient Active Problem List   Diagnosis    History of stroke    Hypertension    Anxiety    Hyperlipidemia    Gout    DM [facility]diabetes mellitus) [facility] )    Bradycardia    Left hemiparesis ([facility] )    Left shoulder pain    Encounter for loop recorder check     Past Medical Hx:   Past Medical History:   Diagnosis Date    Hypertension     Migraine     Stroke [facility])      Past Surgical Hx:   Past Surgical History:   Procedure Laterality Date    APPENDECTOMY      KNEE SURGERY      NECK SURGERY      TONSILLECTOMY          Pain Levels:   Restin    With Activity:  0    Subjective/Patient Goal: "I'm tired today because of the 12 hour difference   I think my left side got weaker after being away so long"    History of Present Illness:  Pt is a pleasant, active, employed full time 58 y  o  male seen for OT eval s/p referred to [address] [place] s/p presented to [address] 10/20/2018 due to left-sided weakness and facial droop   He also had a speech impairment and was found to have an acute stroke  The patient received tPA at 11:00 p m  on the day of admission after neurology assessment  He had no further complications during his hospital stay apart from a small amount of petechial hemorrhage noted on MRI  CT scan showed positive results for small infarctions within the right frontal lobe and right temporal occipital junction, and areas of petechial hemorrhage  Pt discharged to [facility] from 10/25/18 - 11/21/18   Dx'd w/ R MCA CVA, HTN, DM, comorbidities as listed above      Lifestyle Performance Model:  Autonomy: Pt was I w/ I/ADLS, drove, & required no use of DME PTA, required use of SPC since CVA has not returned to driving or work, + Give Jessica sling for LUE from [facility]  Reciprocal Relationships: Supportive son lives next door in home attached, also has s/o involved to A as needed, 3 children (2 sons and a dtr) and 3 grandchildren, s/o lives locally, son works FT during am hours  Service to Others: 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 [address] 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 times a year to [place] for few weeks at time [employer], travels internationally frequently  Intrinsic Gratification: Pt reports enjoying his hot elva [employer] truck, his yard and pool  Pt reports having dog, Manuel  Home Setup: Pt lives in a [facility] apartment that is attached to his son's home in [place] w/ 0 MICKEY w/ 4 steps from the kitchen/bedroom to family room, son lives in the house attached next door      Objective  Impairments Section:   UE Strength:   JENNIFER: RUE: 80/200 LUE: 25/200   PINCER: 3 point pinch: RUE 20, LUE: 9   2 point pinch: RUE: 17, LUE: 7   Lateral pincher: RUE: 18, LUE: 10    Coordination:   9 HOLE PEG TEST:     RUE: 19 55 seconds, LUE: unable to complete more than 1 peg in 1 56 34 Seconds, pt extremely frustrated and aware he could do better  Pt exhausted from recent trip to [facility], noticed difference  Functional Dexterity Test:   RUE: 27 50 seconds, LUE: 5 56 06 Seconds     Range of Motion:  AROM: R-intact in all planes, LUE limited to 75% AROM shoulder flexion to 100*    PROM: intact in all planes     MMT: RUE 5/5 LUE 3/5    Pt is R hand dominant    Pt noted extreme fatigue 2* recently returning from [place]  Pt aware of decreased strength and coordination  Noticed change since he came back from trip  Sensation:  MYOFILAMENTS:   RUE: 3 61   LUE: 3 61    Assessment/Plan  Occupational Therapy Skilled Analysis Assessment and Plan of Care:  Pt requires overall mod I for ADLs/self care and mod I for fx'l mobility w/ SPC  Pt is currently demonstrating the following occupational deficits: limited 2* LUE hemiparesis w/ ~2/5 MMT proximally ~1/5 MMT distally w/ impaired FMC/FMS/GMC/GMS, LUE pronator drift, RUE wfl/wnl 5/5 MMT t/o, R handed, decreased endurance/activity tolerance, generalized weakness, deconditioning, SOB, WHYTE, fatigue, fall risk, impaired balance, flat affect, mildly depressed mood/anxiousness, L visuospatial inattention, diplopia @ 2" for near point of convergence w/ low R exophoria  Based on the aforementioned OT evaluation, functional performance deficits, and assessments, pt has been identified as a moderate complexity evaluation  Pt to continue to benefit from outpatient skilled OT services to address the following goals 2x/wk Short Term Goals for 4 weeks (2018), Long Term Goals for 8 weeks (2019), and POC to  w/in 90 days (2019) with special focus on self-care management, pt education,  and VM training, UE strength/coordination as well as motor training to improve above defiicits and enhance overall QOL/function      Pt seen for OT re-eval/progress note/status update   Pt continues to demonstrate LUE hypotonic hemiparesis w/ pronator drift, impaired GMC/GMS/FMC/FMS, decreased dexterity and in hand manipulation and automaticity  Continue to recommend ongoing treatment to address the following goals 2x/wk for 4 more weeks to address LUE strengthening, precision, increased FMC/FMS/GMS/GMC, gross motor sustained endurance t/o fx'l I/ADL/leisure tasks     Goals:  Short Term Goals: (4 weeks):  Tone:  · Pt will demo good carryover of clinic and home tone strengthening strategies to for improved AROM initiation with functional reach, dressing, hygiene-MET  Modalities:  · Pt will tolerate BIONESS for improved motor and sensory performance for overall improved hand to target with 80% accuracy-PARTIALLY MET  Sensation:  · Pt will increase proprioception of  L hand to target for improved functional reach vision occluded with ADL tasks-MET  Coordination:  · Pt will increase rate of manipulation for all FM tests for improved functional performance (pinch pad, tripod, and lateral pincer grasps) with salient and fx'l I/ADL/leisure tasks-PARTIALLY MET  ROM/Function:  · Pt will increase L  UE to Gross Assist, refined functional assist, and stabilization with <20% cuing for tabletop tasks for improved functional performance of life roles and salient tasks-PARTIALLY MET  · Pt will demo with G carryover of Home Exercise Program for improved functional use of  LUE-MET  Long Term Goals: (8 weeks)  Tone:   · Improve hypotonicity of LUE  to WFL/WNL for improved alternate motor patterns, termination on command, automatic grasp/release for improved functional performance t/o I/ADL/leisure task engagement-PARTIALLY MET  Coordination:  · Increase automaticity/decrease ataxia/prehension patterns of  LUE to 100% for normalized movement pattern & resumption of B/L integrative I/ADL/leisure task engagement (including fine and gross motor)  -PARTIALLY MET  ROM/Function:  · Pt will increase L UE strength and  strength to 4+/5, through the use of strengthening exercises w/ home program review s/p skilled education to promote active fx'l movement-PARTIALLY MET     INTERVENTION COMMENTS:  Diagnosis: R MCA CVA, HTN, DM  Precautions: HTN, DM, 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  FOTO: 30 w/ 70% limitation; hand function  Insurance: Darryl Bhatti [[id number]]  6 of 8 visits, PN  8/1/19     Thank you for the consult!   Please call if you have any questions: [phone]  Melchor Rucker, OTYURI, OTR/L, C-GCRAFAEL, CSRS  Director of Outpatient Neuro Occupational Therapy

## 2019-07-10 ENCOUNTER — OFFICE VISIT (OUTPATIENT)
Dept: OCCUPATIONAL THERAPY | Facility: CLINIC | Age: 64
End: 2019-07-10
Payer: COMMERCIAL

## 2019-07-10 DIAGNOSIS — I63.9 RIGHT SIDED CEREBRAL HEMISPHERE CEREBROVASCULAR ACCIDENT (CVA) (HCC): Primary | ICD-10-CM

## 2019-07-10 PROCEDURE — 97110 THERAPEUTIC EXERCISES: CPT

## 2019-07-10 PROCEDURE — 97112 NEUROMUSCULAR REEDUCATION: CPT

## 2019-07-10 NOTE — PROGRESS NOTES
Daily Note     Today's date: 7/10/2019  Patient name: Lajean Kehr  : 1955  MRN: [id number]  Referring provider: Nia Short MD  Dx:   Encounter Diagnosis   Name Primary?  Right sided cerebral hemisphere cerebrovascular accident (CVA) ([facility] ) Yes                  Subjective: "I was away for 2 weeks because I went to [place] for work  I just had my re-eval and my strength went down a little and I think it's because I was away "      Objective: See treatment below  UEB 5min prograde, 5min retrograde with downgrade to L3 0 resistance for proximal strengthening/endurance and sustained L gross grasp  Vibration through [address] to L shoulder for 10min for tone/pain reduction, while vibration through cold pack to L hand for edema reduction  IASTM to L palm and digits for tone reduction  PROM to L hand for hook grasp and fist  Seated neuro re-ed [id number] each chest press, FF, and PNF patterns using 3# tbar for proximal strengthening and sustained gross grasp  Velcro board for gross grasp and lateral/key pinch  Assessment: Tolerated treatment well  Pt required decreased resistance on UEB today vs  last session due to c/o general weakness 2* not having therapy for past 2 weeks  Mod difficulty maintaining L gross grasp on tbar during PNF patterns and velcro board, requiring frequent breaks to readjust hand positioning  Plan: Continue skilled OT per POC        INTERVENTION COMMENTS:  Diagnosis: Right sided cerebral hemisphere cerebrovascular accident ([facility]A) ([facility] ) [I63 9]  Precautions: HTN, DM, 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  FOTO: 30 w/ 70% limitation; hand function  Insurance: Mardee Major [[id number]]  2 of 8 visits, PN 19

## 2019-07-12 ENCOUNTER — OFFICE VISIT (OUTPATIENT)
Dept: OCCUPATIONAL THERAPY | Facility: CLINIC | Age: 64
End: 2019-07-12
Payer: COMMERCIAL

## 2019-07-12 DIAGNOSIS — I63.9 RIGHT SIDED CEREBRAL HEMISPHERE CEREBROVASCULAR ACCIDENT (CVA) (HCC): Primary | ICD-10-CM

## 2019-07-12 PROCEDURE — 97110 THERAPEUTIC EXERCISES: CPT

## 2019-07-12 PROCEDURE — 97530 THERAPEUTIC ACTIVITIES: CPT

## 2019-07-12 NOTE — PROGRESS NOTES
Daily Note     Today's date: 2019  Patient name: Daniel White  : 1955  MRN: [id number]  Referring provider: Aaron Ferreira MD  Dx:   Encounter Diagnosis   Name Primary?  Right sided cerebral hemisphere cerebrovascular accident (CVA) ([facility] ) Yes                  Subjective: "I don't know I guess I could have done more exercises when I was away but they only had a treadmill"  Objective: See treatment diary below  UEB 5 min retrograde/5 min prograde on L3 for strengthening and endurance  Pt completed card match in quadriped for WB focusing on LUE strengthening, wrist/digit extension, fm, grasp/release w/stabillization at elbow required by WEAVER  Block pass focusing on IR/ER front to back passing OH and behind back dropping unto container using left hand, min droppage noted  Punch ball using 1# dowel bar in horizontal plane punching ball back to WEAVER w/toss focusing on elbow flex/ext and dynamic stabilization of LUE w/BL integration  Assessment: Tolerated treatment well  Patient would benefit from continued OT   Pt reported fatigue post session  WEAVER educated pt on towel slide for IR/ER, pt with G understanding as evidenced by participation in activity, handout provided for carryover at home       Plan: Continued skilled OT per POC     INTERVENTION COMMENTS:  Diagnosis: Right sided cerebral hemisphere cerebrovascular accident (CVA) ([facility] ) [I63 9]  Precautions: HTN, DM, 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  FOTO: 30 w/ 70% limitation; hand function  Insurance: Emilia Hicks [[id number]]  3 of 8 visits, PN 19

## 2019-07-16 ENCOUNTER — OFFICE VISIT (OUTPATIENT)
Dept: OCCUPATIONAL THERAPY | Facility: CLINIC | Age: 64
End: 2019-07-16
Payer: COMMERCIAL

## 2019-07-16 DIAGNOSIS — I63.9 RIGHT SIDED CEREBRAL HEMISPHERE CEREBROVASCULAR ACCIDENT (CVA) (HCC): Primary | ICD-10-CM

## 2019-07-16 PROCEDURE — 97112 NEUROMUSCULAR REEDUCATION: CPT | Performed by: OCCUPATIONAL THERAPIST

## 2019-07-16 PROCEDURE — 97150 GROUP THERAPEUTIC PROCEDURES: CPT | Performed by: OCCUPATIONAL THERAPIST

## 2019-07-16 NOTE — PROGRESS NOTES
Daily Note     Today's date: 2019  Patient name: Florenec Borjas  : 1955  MRN: [id number]  Referring provider: Caterina Paredes MD  Dx: No diagnosis found  Subjective: "I am a little disappointed on how my strength went down after my trip"  Objective: See treatment description below    UBE x 5 minutes prograde/retrograde seated bilaterally at 3 0 resistance level with focus on improved proximal strength, B/L coordination, and sustained grasp  Supine BB retrieval and drop with shoulder FF and horizontal ADD/ABD with focus on improved motor control, grasp/release, and muscle endurance  Trude Baptise in quadruped beginning with Trude Baptise throughout LUE for block retrieval with RUE and placement in board, followed by functional reach through crossing midline for block retrieval with LUE with focus on proximal strength/stability, grasp/release, and hand to target precision  Assessment: Tolerated treatment well  Patient would benefit from continued OT     Pt demo with improved AROM, though continues to present with decreased proximal strength/stability, decreased motor control with decreased muscle endurance evident, and edema throughout L hand impacting functional grasp release demands  Pt required x3 rest breaks throughout session 2* fatigue throughout LUE  Plan: Continue per plan of care  Continue with focus on proximal strength/stabillity, motor control (eccentric/concentric control), muscle endurance, and grasp/release          INTERVENTION COMMENTS:  Diagnosis: Right sided cerebral hemisphere cerebrovascular accident (CVA) ([facility] ) [I63 9]  Precautions: HTN, DM, 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  FOTO: 30 w/ 70% limitation; hand function  Insurance: Josue Grimm [[id number]]  4 of 8 visits, PN 19    [id number]  [id number] unsupervised  [id number] 1:1  [id number] GRP  [id number] unsupervised

## 2019-07-19 ENCOUNTER — OFFICE VISIT (OUTPATIENT)
Dept: OCCUPATIONAL THERAPY | Facility: CLINIC | Age: 64
End: 2019-07-19
Payer: COMMERCIAL

## 2019-07-19 DIAGNOSIS — I63.9 RIGHT SIDED CEREBRAL HEMISPHERE CEREBROVASCULAR ACCIDENT (CVA) (HCC): Primary | ICD-10-CM

## 2019-07-19 PROCEDURE — 97112 NEUROMUSCULAR REEDUCATION: CPT

## 2019-07-19 PROCEDURE — 97530 THERAPEUTIC ACTIVITIES: CPT

## 2019-07-19 PROCEDURE — 97110 THERAPEUTIC EXERCISES: CPT

## 2019-07-19 NOTE — PROGRESS NOTES
Daily Note     Today's date: 2019  Patient name: Sherron Rodriguez  : 1955  MRN: [id number]  Referring provider: Pavan Eddy MD  Dx:   Encounter Diagnosis   Name Primary?  Right sided cerebral hemisphere cerebrovascular accident (CVA) ([facility] ) Yes                  Subjective: "I was able to do this the other day, I don't know why I can't today " -- pt regarding IR/ER ball pass/ OH reach      Objective: See treatment below  UEB 5min prograde, 5min retrograde against L3 0 resistance for proximal strengthening/endurance and sustained L gross grasp  Seated IR/ER bean bag pass for proprioception of  L hand to target and proximal endurance, retrieving bean bags from L side with L hand, with FES added to triceps for improved elbow ext during OH reach, pt unable to reach behind neck this day but able to complete OH reach with FES  In stance, pt tossed bean bags at various targets on far L/R sides and in front of pt using L gross grasp, for FF, horiz ABD/ADD, and functional grasp  In quadruped for increased proprioceptive input, pt WB through RUE and created designs with Jenga blocks with L hand, for functional reach, prehension patterns, and hand to target  Assessment: Tolerated treatment well  Required frequent rest breaks due to BUE fatigue in quadruped  Pt demo improved OH reach/elbow ext when FES applied during seated IR/ER pass  Pt demo F hand to target accuracy during bean bag toss, improving with mass practice  Plan: Continue skilled OT per POC        INTERVENTION COMMENTS:  Diagnosis: Right sided cerebral hemisphere cerebrovascular accident (CVA) ([facility] ) [I63 9]  Precautions: HTN, DM, 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  FOTO: 30 w/ 70% limitation; hand function  Insurance: Jina Gamble [[id number]]  5 of 8 visits, PN 19

## 2019-07-22 ENCOUNTER — OFFICE VISIT (OUTPATIENT)
Dept: OCCUPATIONAL THERAPY | Facility: CLINIC | Age: 64
End: 2019-07-22
Payer: COMMERCIAL

## 2019-07-22 DIAGNOSIS — I63.9 RIGHT SIDED CEREBRAL HEMISPHERE CEREBROVASCULAR ACCIDENT (CVA) (HCC): Primary | ICD-10-CM

## 2019-07-22 PROCEDURE — 97530 THERAPEUTIC ACTIVITIES: CPT

## 2019-07-22 PROCEDURE — 97112 NEUROMUSCULAR REEDUCATION: CPT

## 2019-07-22 PROCEDURE — 97110 THERAPEUTIC EXERCISES: CPT

## 2019-07-22 NOTE — PROGRESS NOTES
Daily Note     Today's date: 2019  Patient name: Natan Delgado  : 1955  MRN: [id number]  Referring provider: Carley Dubois MD  Dx:   Encounter Diagnosis   Name Primary?  Right sided cerebral hemisphere cerebrovascular accident (CVA) ([facility] ) Yes                  Subjective: "I feel like I'm going backwards "      Objective: See treatment below  5 minutes prograde and 5 minutes retrograde on UEB at 3 0 resistance  In supine on mat, completed 4# weighted bar neuro re-ed for ceiling punch into shoulder flexion and hold for 5 seconds  2x10 pulses for dynamic stabilization  In prone, engaged patient in peg board for hand to target and proximal strengthening/ROM  In seated, completed remainder of peg board with black gripper and hand-over-hand assist from RUE to stabilize LUE  Trialed bounce ball in D4 and D5 to isolate and engage 3-jaw-chary grasp for pegs  Assessment: Tolerated treatment well  Poor proximal stabilization with hand to target tasks  Plan: Continued skilled OT per POC      INTERVENTION COMMENTS:  Diagnosis: Right sided cerebral hemisphere cerebrovascular accident (CVA) ([facility] ) [I63 9]  Precautions: HTN, DM, 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  6 of 8 visits, PN due

## 2019-07-24 ENCOUNTER — OFFICE VISIT (OUTPATIENT)
Dept: OCCUPATIONAL THERAPY | Facility: CLINIC | Age: 64
End: 2019-07-24
Payer: COMMERCIAL

## 2019-07-24 DIAGNOSIS — I63.9 RIGHT SIDED CEREBRAL HEMISPHERE CEREBROVASCULAR ACCIDENT (CVA) (HCC): Primary | ICD-10-CM

## 2019-07-24 PROCEDURE — 97112 NEUROMUSCULAR REEDUCATION: CPT

## 2019-07-24 PROCEDURE — 97530 THERAPEUTIC ACTIVITIES: CPT

## 2019-07-24 PROCEDURE — 97110 THERAPEUTIC EXERCISES: CPT

## 2019-07-24 NOTE — PROGRESS NOTES
Daily Note     Today's date: 2019  Patient name: Adriana Mares  : 1955  MRN: [id number]  Referring provider: Daya Hanson MD  Dx:   Encounter Diagnosis   Name Primary?  Right sided cerebral hemisphere cerebrovascular accident (CVA) ([facility] 75 ) Yes                  Subjective: "My hand is doing better today "      Objective: See treatment below  5 minutes prograde and 5 minutes retrograde on UEB at 3 0 resistance  IASTM for tone reduction to hand  Red flexbar and powerweb for hand strenghening  Pinch Rosebud and gel pad for [facility]  Assessment: Tolerated treatment well  Plan: Continued skilled OT per POC      INTERVENTION COMMENTS:  Diagnosis: Right sided cerebral hemisphere cerebrovascular accident (CVA) ([facility] 75 ) [I63 9]  Precautions: HTN, DM, 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  7 of 8 visits, PN due

## 2019-07-29 ENCOUNTER — OFFICE VISIT (OUTPATIENT)
Dept: OCCUPATIONAL THERAPY | Facility: CLINIC | Age: 64
End: 2019-07-29
Payer: COMMERCIAL

## 2019-07-29 DIAGNOSIS — I63.9 RIGHT SIDED CEREBRAL HEMISPHERE CEREBROVASCULAR ACCIDENT (CVA) (HCC): Primary | ICD-10-CM

## 2019-07-29 PROCEDURE — 97112 NEUROMUSCULAR REEDUCATION: CPT

## 2019-07-29 PROCEDURE — 97530 THERAPEUTIC ACTIVITIES: CPT

## 2019-07-29 PROCEDURE — 97110 THERAPEUTIC EXERCISES: CPT

## 2019-07-29 NOTE — PROGRESS NOTES
Daily Note     Today's date: 2019  Patient name: Quang Mccoy  : 1955  MRN: [id number]  Referring provider: Sheryl Soliman MD  Dx:   Encounter Diagnosis   Name Primary?  Right sided cerebral hemisphere cerebrovascular accident (CVA) ([facility] ) Yes                  Subjective: "Not the screen! I hate that "      Objective: See treatment below  UEB 5min prograde, 5min retrograde against L3 0 resistance for proximal strengthening/endurance and sustained gross grasp  IASTM to L dorsal/volar palm and digits for tone reduction  Supine neuro re-ed [id number] chest press/shoulder flexion combo with 5 sec hold at end range, followed by [id number] pulses, using 4# tbar for proximal strengthening and dynamic stabilization  Transitioned to distal strengthening: [id number] each B/L pronation, supination, and wrist flex/ext using R flexbar and gross grasp using R theraweb  BITS in stance (bell cancellation) for hand to target accuracy and L shoulder flexion/endurance with functional reach  Assessment: Tolerated treatment well  No rest breaks required during UEB  Pt c/o mod pain in L shoulder during supine FF due to c/o feeling tight in shoulder  Mod hand to target errors during BITS with AAROM provided to assist pt with stabilizing L shoulder during shoulder flexion to reach bells at top of screen  Plan: Continue skilled OT per POC        INTERVENTION COMMENTS:  Diagnosis: Right sided cerebral hemisphere cerebrovascular accident (CVA) ([facility] ) [I63 9]  Precautions: HTN, DM, 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  8 of 8 visits, PN due

## 2019-07-31 ENCOUNTER — EVALUATION (OUTPATIENT)
Dept: OCCUPATIONAL THERAPY | Facility: CLINIC | Age: 64
End: 2019-07-31
Payer: COMMERCIAL

## 2019-07-31 DIAGNOSIS — I63.9 RIGHT SIDED CEREBRAL HEMISPHERE CEREBROVASCULAR ACCIDENT (CVA) (HCC): Primary | ICD-10-CM

## 2019-07-31 PROCEDURE — 97112 NEUROMUSCULAR REEDUCATION: CPT

## 2019-07-31 PROCEDURE — 97110 THERAPEUTIC EXERCISES: CPT

## 2019-07-31 NOTE — PROGRESS NOTES
Daily Note     Today's date: 2019  Patient name: Dianna Betancur  : 1955  MRN: [id number]  Referring provider: Billy No MD  Dx:   Encounter Diagnosis   Name Primary?  Right sided cerebral hemisphere cerebrovascular accident (CVA) ([facility] ) Yes                  Subjective: "I think the heat is making my hand a little more swollen "      Objective: See treatment below  5 minutes prograde and 5 minutes retrograde on UEB at 2 5 resistance  Match 4 task for placing small beads on pegs for pincer grasp  Assessment: Tolerated treatment well  See re-eval completed by OTR for details  Patient was able to place 16 beads onto pegs within 15 minutes  50% droppage and noted with frustration at times  Presented with lateral pinch  Plan: Continued skilled OT per POC with focus on  and pinch       INTERVENTION COMMENTS:  Diagnosis: Right sided cerebral hemisphere cerebrovascular accident (CVA) ([facility] ) [I63 9]  Precautions: HTN, DM, 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  1 of ___ visits, PN due

## 2019-07-31 NOTE — PROGRESS NOTES
Occupational Therapy Stroke Progress Note/Status Update: Today's Date: 2019  Patient Name: Kelsey Gillette  : 1955  MRN: [id number]  Referring Provider: Winter Snow MD  Dx: Right sided cerebral hemisphere cerebrovascular accident (CVA) [facility]) [I63 9]    Active Problem List:   Patient Active Problem List   Diagnosis    History of stroke    Hypertension    Anxiety    Hyperlipidemia    Gout    DM [facility]diabetes mellitus) [facility] )    Bradycardia    Left hemiparesis ([facility] )    Left shoulder pain    Encounter for loop recorder check     Past Medical Hx:   Past Medical History:   Diagnosis Date    Hypertension     Migraine     Stroke [facility])      Past Surgical Hx:   Past Surgical History:   Procedure Laterality Date    APPENDECTOMY      KNEE SURGERY      NECK SURGERY      TONSILLECTOMY       Pain Levels:   Restin    With Activity:  0    Subjective/Patient Goal: "It's slowly getting better I think"    History of Present Illness:  Pt is a pleasant, active, employed full time 58 y  o  male seen for OT eval s/p referred to [address] [place] [address] s/p presented to [facility] 10/20/2018 due to left-sided weakness and facial droop   He also had a speech impairment and was found to have an acute stroke  The patient received tPA at 11:00 p m  on the day of admission after neurology assessment  He had no further complications during his hospital stay apart from a small amount of petechial hemorrhage noted on MRI  CT scan showed positive results for small infarctions within the right frontal lobe and right temporal occipital junction, and areas of petechial hemorrhage  Pt discharged to [facility] from 10/25/18 - 18   Dx'd w/ R MCA CVA, HTN, DM, comorbidities as listed above      Lifestyle Performance Model:  Autonomy: Pt was I w/ I/ADLS, drove, & required no use of DME PTA, required use of SPC since CVA has not returned to driving or work, + Give Jessica sling for LUE from [facility]    Reciprocal Relationships: Supportive son lives next door in home attached, also has s/o involved to A as needed, 3 children (2 sons and a dtr) and 3 grandchildren, s/o lives locally, son works FT during am hours  Service to Others: 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 [address] travels 4-5 times a year to [place] for few weeks at time aWhere, travels internationally frequently  Intrinsic Gratification: Pt reports enjoying his hot [employer] truck, his yard and pool  Pt reports having dog, Manuel  Home Setup: Pt lives in a [facility] apartment that is attached to his son's home in [place] w/ 0 MICKEY w/ 4 steps from the kitchen/bedroom to family room, son lives in the house attached next door  Objective  Impairments Section:   UE Strength:   JENNIFER: RUE: 90/200 LUE: 20/200   PINCER: 3 point pinch: RUE 18, LUE: 9   2 point pinch: RUE: 20, LUE: 7   Lateral pincher: RUE: 18, LUE: 9    Coordination:   9 HOLE PEG TEST:     RUE: 18 3 seconds, LUE: 6 pegs in 6 minutes 11 Seconds      Functional Dexterity Test:   RUE: 31 8 seconds, LUE: 3 minutes 2 Seconds     Range of Motion:  AROM: R-intact in all planes, LUE limited to 75% AROM shoulder flexion to 100*     PROM: intact in all planes      MMT: RUE 5/5 LUE 3/5     Pt is R hand dominant     Pt noted extreme fatigue 2* recently returning from [place]  Pt aware of decreased strength and coordination  Noticed change since he came back from trip      Sensation:  MYOFILAMENTS:              RUE: 3 61              LUE: 3 61    Assessment/Plan  Occupational Therapy Skilled Analysis Assessment and Plan of Care:  Pt requires overall mod I for ADLs/self care and mod I for fx'l mobility w/ SPC   Pt is currently demonstrating the following occupational deficits: limited 2* LUE hemiparesis w/ ~2/5 MMT proximally ~1/5 MMT distally w/ impaired FMC/FMS/GMC/GMS, LUE pronator drift, RUE wfl/wnl 5/5 MMT t/o, R handed, decreased endurance/activity tolerance, generalized weakness, deconditioning, SOB, WHYTE, fatigue, fall risk, impaired balance, flat affect, mildly depressed mood/anxiousness, L visuospatial inattention, diplopia @ 2" for near point of convergence w/ low R exophoria  Based on the aforementioned OT evaluation, functional performance deficits, and assessments, pt has been identified as a moderate complexity evaluation  Pt to continue to benefit from outpatient skilled OT services to address the following goals 2x/wk Short Term Goals for 4 weeks (2018), Long Term Goals for 8 weeks (2019), and POC to  w/in 90 days (2019) with special focus on self-care management, pt education,  and VM training, UE strength/coordination as well as motor training to improve above defiicits and enhance overall QOL/function  Pt seen for OT re-eval/progress note/status update  Pt continues to demonstrate LUE hypotonic hemiparesis w/ distal peripheral edema, mild proximal subluxation, distal impaired FMC/FMS, gross grasp, OH reaching, GMC/GMS, in hand manipulation, pincer patterns  Continue to recommend ongoing treatment to address the following goals 2x/wk for 4 more weeks to address pincer patterns, grasp and release, automaticity, in hand manipulation, dexterity, edema control, continued carryover and review of HEP for long term functional progress  Pt appears to demonstrate discouragement w/ lack of obvious progress though encouraged to focus on small achievements to maintain positive outlook and decrease potential for depression and regression  Pt agreeable      Goals:  Short Term Goals: (4 weeks):  Tone:  · Pt will demo good carryover of clinic and home tone strengthening strategies to for improved AROM initiation with functional reach, dressing, hygiene-MET  Modalities:  · Pt will tolerate BIONESS for improved motor and sensory performance for overall improved hand to target with 80% accuracy-PARTIALLY MET  Sensation:  · Pt will increase proprioception of  L hand to target for improved functional reach vision occluded with ADL tasks-MET  Coordination:  · Pt will increase rate of manipulation for all FM tests for improved functional performance (pinch pad, tripod, and lateral pincer grasps) with salient and fx'l I/ADL/leisure tasks-PARTIALLY MET  ROM/Function:  · Pt will increase L  UE to Gross Assist, refined functional assist, and stabilization with <20% cuing for tabletop tasks for improved functional performance of life roles and salient tasks-PARTIALLY MET  · Pt will demo with G carryover of Home Exercise Program for improved functional use of  LUE-MET  Long Term Goals: (8 weeks)  Tone:   · Improve hypotonicity of LUE  to WFL/WNL for improved alternate motor patterns, termination on command, automatic grasp/release for improved functional performance t/o I/ADL/leisure task engagement-PARTIALLY MET  Coordination:  · Increase automaticity/decrease ataxia/prehension patterns of  LUE to 100% for normalized movement pattern & resumption of B/L integrative I/ADL/leisure task engagement (including fine and gross motor)  -PARTIALLY MET  ROM/Function:  · Pt will increase L UE strength and  strength to 4+/5, through the use of strengthening exercises w/ home program review s/p skilled education to promote active fx'l movement-PARTIALLY MET     INTERVENTION COMMENTS:  Diagnosis: R MCA CVA, HTN, DM  Precautions: HTN, DM, 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  FOTO: 40 with 60% limitation; hand function  Insurance: [facility] [[id number]]  1 of _ visits, PN due 8/31/2019     Thank you for the consult!   Please call if you have any questions: [phone]  Iain Cisneros, OTD, OTR/L, C-GCM, CSRS  Director of Outpatient Neuro Occupational Therapy

## 2019-08-05 ENCOUNTER — OFFICE VISIT (OUTPATIENT)
Dept: OCCUPATIONAL THERAPY | Facility: CLINIC | Age: 64
End: 2019-08-05
Payer: COMMERCIAL

## 2019-08-05 DIAGNOSIS — I63.9 RIGHT SIDED CEREBRAL HEMISPHERE CEREBROVASCULAR ACCIDENT (CVA) (HCC): Primary | ICD-10-CM

## 2019-08-05 PROCEDURE — 97110 THERAPEUTIC EXERCISES: CPT

## 2019-08-05 PROCEDURE — 97112 NEUROMUSCULAR REEDUCATION: CPT

## 2019-08-05 PROCEDURE — 97140 MANUAL THERAPY 1/> REGIONS: CPT

## 2019-08-05 NOTE — PROGRESS NOTES
Daily Note     Today's date: 2019  Patient name: Ayo Montejo  : 1955  MRN: [id number]  Referring provider: Sherice Hopkins MD  Dx:   Encounter Diagnosis   Name Primary?  Right sided cerebral hemisphere cerebrovascular accident (CVA) ([facility] ) Yes                  Subjective: "they just wont work " (referring to left fingers)      Objective: See treatment below  5 minutes prograde and 5 minutes retrograde on UEB at 3 0 resistance  With LUE elevated, provided 10 minutes of edema/retrograde massage to reduce swelling in hand  Fitted patient for edema/compression glove (size small) to wear at home to reduce edema  B/L ball toss for automaticity of grasp and release  Becky Samina turning with digits for isolation and coordination  Educated on squeezing ball to fatigue intrinsics to aide in extension of digits  Provided ktape for increasing forearm supination, followed by active supination/pronation activities  Assessment: Tolerated treatment well  Unable to actively supinate LUE  Difficulty with digit manipulation on ball, as well as coordination of tossing small ball  Plan: Continued skilled OT per POC      INTERVENTION COMMENTS:  Diagnosis: Right sided cerebral hemisphere cerebrovascular accident (CVA) ([facility] ) [I63 9]  Precautions: HTN, DM, 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  2 of 8 visits, PN due

## 2019-08-07 ENCOUNTER — OFFICE VISIT (OUTPATIENT)
Dept: OCCUPATIONAL THERAPY | Facility: CLINIC | Age: 64
End: 2019-08-07
Payer: COMMERCIAL

## 2019-08-07 DIAGNOSIS — I63.9 RIGHT SIDED CEREBRAL HEMISPHERE CEREBROVASCULAR ACCIDENT (CVA) (HCC): Primary | ICD-10-CM

## 2019-08-07 PROCEDURE — 97110 THERAPEUTIC EXERCISES: CPT

## 2019-08-07 PROCEDURE — 97112 NEUROMUSCULAR REEDUCATION: CPT

## 2019-08-07 NOTE — PROGRESS NOTES
Daily Note     Today's date: 2019  Patient name: Adriana Mares  : 1955  MRN: [id number]  Referring provider: Daya Hanson MD  Dx:   Encounter Diagnosis   Name Primary?  Right sided cerebral hemisphere cerebrovascular accident (CVA) ([facility] ) Yes                  Subjective: "that was great! I really needed that today "      Objective: See treatment below  Seated bicep curl with 2# weighted bar to increase supination and UB  Strength  Patient was able to complete mixed motor patterns this day with green 1# weighted ball  Engaged patient in theraputty activities with gripping, pulling, and twisting to simulate functional tasks  [place] flicks with focus on digit extension  Provided 3 minutes of passive extension with sandwich splint  5 minutes prograde and 5 minutes retrograde on UEB at 3 0 resistance  Assessment: Tolerated treatment well  Patient was able to sustain  on green weight ball this day  Decreased edema noted as well  Moderate difficulty with digit extension during marble flick  Plan: Continued skilled OT per POC      INTERVENTION COMMENTS:  Diagnosis: Right sided cerebral hemisphere cerebrovascular accident (CVA) ([place] [address] ) [I63 9]  Precautions: HTN, DM, 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  3 of 8 visits, PN due

## 2019-08-12 ENCOUNTER — TELEPHONE (OUTPATIENT)
Dept: NEUROLOGY | Facility: CLINIC | Age: 64
End: 2019-08-12

## 2019-08-12 ENCOUNTER — OFFICE VISIT (OUTPATIENT)
Dept: OCCUPATIONAL THERAPY | Facility: CLINIC | Age: 64
End: 2019-08-12
Payer: COMMERCIAL

## 2019-08-12 DIAGNOSIS — I63.9 RIGHT SIDED CEREBRAL HEMISPHERE CEREBROVASCULAR ACCIDENT (CVA) (HCC): Primary | ICD-10-CM

## 2019-08-12 PROCEDURE — 97110 THERAPEUTIC EXERCISES: CPT

## 2019-08-12 PROCEDURE — 97112 NEUROMUSCULAR REEDUCATION: CPT

## 2019-08-12 NOTE — TELEPHONE ENCOUNTER
Patient reports dull headaches every morning for the past 2-3 weeks  He reports pain in the right side of his head that radiates to his neck  Rates pain a 4/10  States he has been taking tylenol daily and this relieves the pain  Educated patient on medication overuse headaches  Patient also reports slight tingling/itching sensation on the left side of jaw that started over 3 weeks ago  Denies any other symptoms including visual changes, NV, dizziness/vertigo, difficulty speaking or swallowing, or new weakness or numbness in one side of his body  Reviewed stroke symptoms with patient and advised him to go to ER if he experiences this or if symptoms worsen  He verbalizes understanding, states he will not be going at this time and would like Dr Maki Cerda recommendation       Aspirin 81mg daily  Losartan 50mg daily  Amlodipine 7 5 mg daily  Lipitor 80mg daily

## 2019-08-12 NOTE — PROGRESS NOTES
Daily Note     Today's date: 2019  Patient name: Lajean Kehr  : 1955  MRN: [id number]  Referring provider: Nia Short MD  Dx:   Encounter Diagnosis   Name Primary?  Right sided cerebral hemisphere cerebrovascular accident (CVA) ([facility] 75 ) Yes                  Subjective: "All this stuff is really helping my hand  I've been able to do more at home  It's great!"      Objective: See treatment below  Patient arrived a few minutes late to session due to work  5 minutes prograde and 5 minutes retrograde on UEB at 3 0 resistance  Seated shoulder flexion and chest press with 3# weighted bar - 2x10 reps  Wrist flexion/extension with 2# free weight followed by elbow flexion and manual assist for technique  FES to tricep and supinator while utilizing supination roller, as well as scooping task with small pegs  Ktape in helical position for supination  **Patient reported to WEAVER that he has been having some headaches the last few days on the right side of his head  Recommended that patient notify neurology as this is a new symptom for him and discuss further steps with MD      Assessment: Tolerated treatment well  Plan: Continued skilled OT per POC      INTERVENTION COMMENTS:  Diagnosis: Right sided cerebral hemisphere cerebrovascular accident (CVA) ([facility] 75 ) [I63 9]  Precautions: HTN, DM, 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  4 of 8 visits, PN due HTN, DM, 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

## 2019-08-13 NOTE — TELEPHONE ENCOUNTER
Please give Sue Bailey a call  His daily headaches    Are they constant or does he have headache free time every day  Also, any specific triggers  Any recent changes in his medications or daily routine? Any migrainous features (sensitivity to light/sound/odors or nausea)? These medications would not typically cause headache and if they have not been changed lately they are unlikely to contribute  Please see if we can get him into the office to see one of the JOSE A's in the next week  Let me know

## 2019-08-13 NOTE — TELEPHONE ENCOUNTER
Reviewed questions with patient Jeffry Audra had asked: patient reports daily headaches, mostly occurring in the morning  Patient describes them as dull headaches, 3/10, right side of head and wraps around the side to the neck  Patient reports some relief during the day and has "headache free time"  No recent med changes, routine changes, or other identifiable triggers  I personally had a difficult time finding an appt with an AP within a week as patient preferred [facility] or [facility]  Connected patient with scheduling for assistance

## 2019-08-14 ENCOUNTER — OFFICE VISIT (OUTPATIENT)
Dept: OCCUPATIONAL THERAPY | Facility: CLINIC | Age: 64
End: 2019-08-14
Payer: COMMERCIAL

## 2019-08-14 DIAGNOSIS — I63.9 RIGHT SIDED CEREBRAL HEMISPHERE CEREBROVASCULAR ACCIDENT (CVA) (HCC): Primary | ICD-10-CM

## 2019-08-14 PROCEDURE — 97112 NEUROMUSCULAR REEDUCATION: CPT

## 2019-08-14 PROCEDURE — 97110 THERAPEUTIC EXERCISES: CPT

## 2019-08-14 NOTE — TELEPHONE ENCOUNTER
Ok   These sound like tension type headaches, and perhaps musculoskeletal in nature  Good treatments that are easy can include a course of physical therapy (nice b/c it does not require medication), or potentially a muscle relaxer to take a bedtime    Please ask if he would like to start on either course or wait to be seen (perhaps if we do have openings outside of [facility] he might want to travel for this visit just to get in sooner? )    Please let me know  Thanks

## 2019-08-14 NOTE — TELEPHONE ENCOUNTER
pt is agreeable to either PT or medication but he would like to try PT first   he already does OT   please enter referral for PT  In regards to appt, he does not want to go to other locations

## 2019-08-14 NOTE — PROGRESS NOTES
Daily Note     Today's date: 2019  Patient name: Fay Boss  : 1955  MRN: [id number]  Referring provider: Patrick Kemp MD  Dx:   Encounter Diagnosis   Name Primary?  Right sided cerebral hemisphere cerebrovascular accident (CVA) ([facility] 75 ) Yes                  Subjective: "this is so hard "      Objective: See treatment below  5 minutes prograde and 5 minutes retrograde on UEB with upgrade to 3 5 resistance  Seated 3# weighted bar exercises for chest press, shoulder flexion, and PNF patterns for 3x10 reps of each  1# wrist extension/flexion and supination for 3x10 reps  Engaged patient in nuts and bolts task for increasing dexterity and FMC of thumb and index  Provided patient with sandwich splint for home to provided prolonged passive stretch to left digits to aide in tendon elongation and decrease risk for contractures in hand  **Patient did report having another small HA today  He also reported that he was able to speak with neurology and has an appointment next week  Assessment: Tolerated treatment well  Patient is demonstrating good symmetry in shoulders during seated exercises and is able to maintain elbow extension  Moderate difficulty with nut and bolt task while manipulating in finger tips  Plan: Continued skilled OT per POC      INTERVENTION COMMENTS:  Diagnosis: Right sided cerebral hemisphere cerebrovascular accident (CVA) ([facility] ) [I63 9]  Precautions: HTN, DM, 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  5 of 8 visits, PN due

## 2019-08-14 NOTE — TELEPHONE ENCOUNTER
Called patient  No answer  Message left on machine for patient to return call to office  Patient not agreeable to other locations  But was calling to offer PT or muscle relaxer to patient if interested prior to appt scheduled 8/22

## 2019-08-19 ENCOUNTER — APPOINTMENT (OUTPATIENT)
Dept: OCCUPATIONAL THERAPY | Facility: CLINIC | Age: 64
End: 2019-08-19
Payer: COMMERCIAL

## 2019-08-20 ENCOUNTER — OFFICE VISIT (OUTPATIENT)
Dept: OCCUPATIONAL THERAPY | Facility: CLINIC | Age: 64
End: 2019-08-20
Payer: COMMERCIAL

## 2019-08-20 ENCOUNTER — REMOTE DEVICE CLINIC VISIT (OUTPATIENT)
Dept: CARDIOLOGY CLINIC | Facility: CLINIC | Age: 64
End: 2019-08-20
Payer: COMMERCIAL

## 2019-08-20 DIAGNOSIS — Z95.818 PRESENCE OF OTHER CARDIAC IMPLANTS AND GRAFTS: ICD-10-CM

## 2019-08-20 DIAGNOSIS — I63.9 RIGHT SIDED CEREBRAL HEMISPHERE CEREBROVASCULAR ACCIDENT (CVA) (HCC): Primary | ICD-10-CM

## 2019-08-20 DIAGNOSIS — G44.229 CHRONIC TENSION-TYPE HEADACHE, NOT INTRACTABLE: Primary | ICD-10-CM

## 2019-08-20 DIAGNOSIS — Z86.73 PERSONAL HISTORY OF TRANSIENT ISCHEMIC ATTACK: Primary | ICD-10-CM

## 2019-08-20 PROCEDURE — 93299 PR REM INTERROG ICPMS/SCRMS <30 D TECH REVIEW: CPT | Performed by: INTERNAL MEDICINE

## 2019-08-20 PROCEDURE — 97110 THERAPEUTIC EXERCISES: CPT

## 2019-08-20 PROCEDURE — 97140 MANUAL THERAPY 1/> REGIONS: CPT

## 2019-08-20 PROCEDURE — 93298 REM INTERROG DEV EVAL SCRMS: CPT | Performed by: INTERNAL MEDICINE

## 2019-08-20 PROCEDURE — 97112 NEUROMUSCULAR REEDUCATION: CPT

## 2019-08-20 NOTE — PROGRESS NOTES
Daily Note     Today's date: 2019  Patient name: Jordan Zamorano  : 1955  MRN: [id number]  Referring provider: Janak Calvo MD  Dx:   Encounter Diagnosis   Name Primary?  Right sided cerebral hemisphere cerebrovascular accident (CVA) ([facility] ) Yes                  Subjective: "I do shoulder shrugs every day "      Objective: See treatment below  5 minutes prograde and 5 minutes retrograde on UEB at 3 5 resistance  Edema massage to left hand prior to seated exercise  Seated mixed motor patterns with use of green weighted ball for proximal strengthening  Bean bag drop with reaching back for shoulder extension and dropping in front with forearm supination  Trialing use of no neoprene retraction wrap for carryover of independent shoulder retraction  Assessment: Tolerated treatment well  Mild difficulty with mixed motor patters during first trial, but increased with massed practice  Plan: Continued skilled OT per POC      INTERVENTION COMMENTS:  Diagnosis: Right sided cerebral hemisphere cerebrovascular accident (CVA) ([facility] ) [I63 9]  Precautions: HTN, DM, 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  6 of 8 visits, PN due

## 2019-08-20 NOTE — PROGRESS NOTES
MDT-LOOP RECORDER  CARELINK TRANSMISSION: LOOP RECORDER  PRESENTING RHYTHM SB @ 56 BPM  BATTERY STATUS "OK"  NO PATIENT OR DEVICE ACTIVATED EPISODES  NORMAL DEVICE FUNCTION   DL

## 2019-08-21 ENCOUNTER — OFFICE VISIT (OUTPATIENT)
Dept: OCCUPATIONAL THERAPY | Facility: CLINIC | Age: 64
End: 2019-08-21
Payer: COMMERCIAL

## 2019-08-21 DIAGNOSIS — I63.9 RIGHT SIDED CEREBRAL HEMISPHERE CEREBROVASCULAR ACCIDENT (CVA) (HCC): Primary | ICD-10-CM

## 2019-08-21 PROCEDURE — 97110 THERAPEUTIC EXERCISES: CPT

## 2019-08-21 PROCEDURE — 97112 NEUROMUSCULAR REEDUCATION: CPT

## 2019-08-21 PROCEDURE — 97140 MANUAL THERAPY 1/> REGIONS: CPT

## 2019-08-21 NOTE — TELEPHONE ENCOUNTER
Informed patient of PT referral entered in computer  Confirmed appt for tomorrow with Maria Luisa Bolus in [facility]

## 2019-08-21 NOTE — PROGRESS NOTES
Daily Note     Today's date: 2019  Patient name: Oscar Rodriguez  : 1955  MRN: [id number]  Referring provider: Sorin Woods MD  Dx:   Encounter Diagnosis   Name Primary?  Right sided cerebral hemisphere cerebrovascular accident (CVA) ([facility] ) Yes                  Subjective: "I feel good today "      Objective: See treatment below  5 minutes prograde and 5 minutes retrograde on UEB at 3 5 resistance followed by IASTM to palm, digits, and forearm for tone reduction and to increase supination ROM  Seated strengthening with 3# weighted bar for shoulder flexion and PNF patterns for proximal strengthening  Timed trials with 45 seconds over 5 trials-utilized small foam blocks with times as follows:  1 15  2 16  3 15  4 14  5 16    Provided ktaping in helical position to left forearm for increasing supination  1# free weight for supination turns - 10 reps with 10 second hold  Assessment: Tolerated treatment well  Patient following up with MD/PA tomorrow regarding HA  Reporting that these Has are still persistent  Patient was able to sustain  on steering wheel with left hand this day  Plan: Continued skilled OT per POC      INTERVENTION COMMENTS:  Diagnosis: Right sided cerebral hemisphere cerebrovascular accident (CVA) ([facility] ) [I63 9]  Precautions: HTN, DM, 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  7 of 8 visits, PN scheduled

## 2019-08-22 ENCOUNTER — OFFICE VISIT (OUTPATIENT)
Dept: NEUROLOGY | Facility: CLINIC | Age: 64
End: 2019-08-22
Payer: COMMERCIAL

## 2019-08-22 VITALS
HEART RATE: 63 BPM | BODY MASS INDEX: 28.73 KG/M2 | HEIGHT: 68 IN | SYSTOLIC BLOOD PRESSURE: 151 MMHG | DIASTOLIC BLOOD PRESSURE: 75 MMHG | WEIGHT: 189.6 LBS

## 2019-08-22 DIAGNOSIS — Z86.73 HISTORY OF STROKE: Primary | ICD-10-CM

## 2019-08-22 DIAGNOSIS — G44.201 ACUTE INTRACTABLE TENSION-TYPE HEADACHE: ICD-10-CM

## 2019-08-22 PROCEDURE — 99215 OFFICE O/P EST HI 40 MIN: CPT | Performed by: NURSE PRACTITIONER

## 2019-08-22 RX ORDER — CYCLOBENZAPRINE HCL 10 MG
10 TABLET ORAL
Qty: 15 TABLET | Refills: 0 | Status: SHIPPED | OUTPATIENT
Start: 2019-08-22 | End: 2019-09-02 | Stop reason: SDUPTHER

## 2019-08-22 RX ORDER — DEXAMETHASONE 4 MG/1
4 TABLET ORAL
Qty: 3 TABLET | Refills: 0 | Status: SHIPPED | OUTPATIENT
Start: 2019-08-22 | End: 2019-08-25

## 2019-08-22 NOTE — PATIENT INSTRUCTIONS
Decadron 4 mg 1 tablet in the morning for 3 days, refrain from using any NSAIDs during that time  Can try Flexeril 10 mg at bedtime  PT  Continue with aspirin and statin therapy  Can try magnesium oxide 400 mg twice a day, vitamin-B2 200 mg twice a day  Patient to call in approximately 1 weeks time with an update

## 2019-08-22 NOTE — PROGRESS NOTES
Patient ID: Kira Rossi is a 71 y o  male  Assessment/Plan:  Headache  Patient presents today with complaints of headache, most likely tension-type  Patient with potential analgesic rebound component as well, does not appear to be related to blood pressure issues which had been the case in the past   No new signs and symptoms otherwise suggestive of a stroke  --at this time, will attempt to utilize a Decadron 4 mg 1 tablet daily for the next 3 days to hopefully break his current headache cycle, side effects were reviewed  Patient instructed not to utilize any other NSAIDs during this time, as well as to take OTC antacid  --patient given Flexeril 10 mg to utilize at bedtime for the next few days as well  --he is due to initiate physical therapy  --I asked that he call the office next week with an update, she did have ongoing symptoms further Management may be warranted  --suggested he can try magnesium oxide as well as vitamin B2    History of CVA  --continues on aspirin and statin therapy  --BP control  --patient with ongoing OT for left upper extremity  --loop recorder in place, no noted arrhythmias      No problem-specific Assessment & Plan notes found for this encounter  Diagnoses and all orders for this visit:    History of stroke    Acute intractable tension-type headache  -     dexamethasone (DECADRON) 4 mg tablet; Take 1 tablet (4 mg total) by mouth daily with breakfast for 3 days  -     cyclobenzaprine (FLEXERIL) 10 mg tablet; Take 1 tablet (10 mg total) by mouth daily at bedtime as needed for muscle spasms           Subjective:    VIPUL Gonzalez is a 54-year-old gentleman  With past medical history of stroke, hyperlipidemia, hypertension  Who presents today for neurologic evaluation  This visit comes prior to his next scheduled office visit  Patient last seen in April for follow-up of his prior stroke    At that point he was clinically stable, h  His Plavix had been discontinued, he continued on aspirin and statin therapy  He was noted to have some left sided weakness particularly of his upper  Extremity  He was undergoing occupational therapy as well as speech therapy, he was working with PMR  Sonia LogoGardenchristine Unfortunately in the interim, patient call the office on August 12th with complaints of a daily headache occurring over the prior 2-3 weeks  The headache usually begins in the frontal region with radiation onto the right side, into the cervical area  For the most part, he wakens with a headache, does improve as the day goes on  He denied any changes in his vision, no nausea vomiting, no dizziness, no difficulty with speech or swallowing, no new weakness  He did have a headache back in January which was related to hypertension  Patient states he has been taking his blood pressure which has been stable  Denies any palpitations, no associated chest pain  He continues to receive OT for his left upper extremity weakness  No reported falls    The following portions of the patient's history were reviewed and updated as appropriate: allergies, current medications, past family history, past medical history, past social history, past surgical history and problem list          Objective:    Blood pressure 151/75, pulse 63, height 5' 8" (1 727 m), weight 86 kg (189 lb 9 6 oz)  Physical Exam   Constitutional: He appears well-developed  HENT:   Head: Normocephalic  Eyes: Pupils are equal, round, and reactive to light  Neck: Normal range of motion  Cardiovascular: Normal rate and regular rhythm  Pulmonary/Chest: Effort normal    Musculoskeletal:   Mild contracture deformity left upper extremity   Neurological: He has normal reflexes  Skin: Skin is warm  Psychiatric: He has a normal mood and affect  His speech is normal and behavior is normal  Judgment and thought content normal        Neurological Exam  Mental Status  Awake, alert and oriented to person, place and time   Speech is normal  Language is fluent with no aphasia  Cranial Nerves  CN II: Visual fields full to confrontation  CN III, IV, VI: Extraocular movements intact bilaterally  Pupils equal round and reactive to light bilaterally  CN V: Facial sensation is normal   CN VII: Full and symmetric facial movement  CN XI: Shoulder shrug strength is normal     Motor  Normal muscle bulk throughout  Strength is 5/5 in all four extremities except as noted  Right                     Left   Shoulder abduction               5                          4+  Elbow flexion                         5                          4  Finger flexion                         5                          3  Thumb extension                   5                          3    Sensory  Temperature is normal in upper and lower extremities  Vibration is normal in upper and lower extremities  Reflexes  Deep tendon reflexes are 2+ and symmetric in all four extremities with downgoing toes bilaterally  Coordination  Right: Finger-to-nose normal   Left: Unable to appropriately test secondary to hemiparesis  Gait  Casual gait is normal including stance, stride, and arm swing  ROS:    Review of Systems  Constitutional: Negative  Negative for appetite change and fever  HENT: Positive for tinnitus  Negative for hearing loss, trouble swallowing and voice change  Eyes: Negative  Negative for photophobia and pain  Respiratory: Negative  Negative for shortness of breath  Cardiovascular: Negative  Negative for palpitations  Gastrointestinal: Negative  Negative for nausea and vomiting  Endocrine: Negative  Negative for cold intolerance and heat intolerance  Genitourinary: Negative  Negative for dysuria, frequency and urgency  Musculoskeletal: Positive for back pain and neck pain (right side)  Negative for myalgias  Overall Muscle soarness   Skin: Negative  Negative for rash     Neurological: Positive for weakness (left side) and headaches (everyday, dull headaches)  Negative for dizziness, tremors, seizures, syncope, facial asymmetry, speech difficulty, light-headedness and numbness  Psychiatric/Behavioral: Positive for sleep disturbance (wakes up at night )   Negative for confusion and hallucinations       ROS updated and personally reviewed with patient at today's visit

## 2019-08-22 NOTE — PROGRESS NOTES
Review of Systems   Constitutional: Negative  Negative for appetite change and fever  HENT: Positive for tinnitus  Negative for hearing loss, trouble swallowing and voice change  Eyes: Negative  Negative for photophobia and pain  Respiratory: Negative  Negative for shortness of breath  Cardiovascular: Negative  Negative for palpitations  Gastrointestinal: Negative  Negative for nausea and vomiting  Endocrine: Negative  Negative for cold intolerance and heat intolerance  Genitourinary: Negative  Negative for dysuria, frequency and urgency  Musculoskeletal: Positive for back pain and neck pain (right side)  Negative for myalgias  Overall Muscle soarness   Skin: Negative  Negative for rash  Neurological: Positive for weakness (left side) and headaches (everyday, dull headaches)  Negative for dizziness, tremors, seizures, syncope, facial asymmetry, speech difficulty, light-headedness and numbness  Psychiatric/Behavioral: Positive for sleep disturbance (wakes up at night )  Negative for confusion and hallucinations

## 2019-08-26 ENCOUNTER — APPOINTMENT (OUTPATIENT)
Dept: OCCUPATIONAL THERAPY | Facility: CLINIC | Age: 64
End: 2019-08-26
Payer: COMMERCIAL

## 2019-08-28 ENCOUNTER — TRANSCRIBE ORDERS (OUTPATIENT)
Dept: OCCUPATIONAL THERAPY | Facility: CLINIC | Age: 64
End: 2019-08-28

## 2019-08-28 ENCOUNTER — EVALUATION (OUTPATIENT)
Dept: PHYSICAL THERAPY | Facility: CLINIC | Age: 64
End: 2019-08-28
Payer: COMMERCIAL

## 2019-08-28 ENCOUNTER — EVALUATION (OUTPATIENT)
Dept: OCCUPATIONAL THERAPY | Facility: CLINIC | Age: 64
End: 2019-08-28
Payer: COMMERCIAL

## 2019-08-28 VITALS — SYSTOLIC BLOOD PRESSURE: 140 MMHG | DIASTOLIC BLOOD PRESSURE: 71 MMHG | HEART RATE: 109 BPM

## 2019-08-28 DIAGNOSIS — I63.9 RIGHT SIDED CEREBRAL HEMISPHERE CEREBROVASCULAR ACCIDENT (CVA) (HCC): Primary | ICD-10-CM

## 2019-08-28 DIAGNOSIS — Z86.73 HISTORY OF STROKE: ICD-10-CM

## 2019-08-28 DIAGNOSIS — G44.201 ACUTE INTRACTABLE TENSION-TYPE HEADACHE: Primary | ICD-10-CM

## 2019-08-28 PROCEDURE — 97110 THERAPEUTIC EXERCISES: CPT

## 2019-08-28 PROCEDURE — 97162 PT EVAL MOD COMPLEX 30 MIN: CPT | Performed by: PHYSICAL THERAPIST

## 2019-08-28 PROCEDURE — 97530 THERAPEUTIC ACTIVITIES: CPT

## 2019-08-28 PROCEDURE — 97140 MANUAL THERAPY 1/> REGIONS: CPT | Performed by: PHYSICAL THERAPIST

## 2019-08-28 PROCEDURE — 97110 THERAPEUTIC EXERCISES: CPT | Performed by: PHYSICAL THERAPIST

## 2019-08-28 NOTE — PROGRESS NOTES
Occupational Therapy Stroke Progress Note/Status Update: Today's Date: 2019  Patient Name: Estephania Aviles  : 1955  MRN: [id number]  Referring Provider: Oly Ashby MD  Dx: Right sided cerebral hemisphere cerebrovascular accident (CVA) [facility]) [I63 9]    Active Problem List:   Patient Active Problem List   Diagnosis    History of stroke    Hypertension    Anxiety    Hyperlipidemia    Gout    DM (diabetes mellitus) [facility] )    Bradycardia    Left hemiparesis ([facility] )    Left shoulder pain    Encounter for loop recorder check    Acute intractable tension-type headache     Past Medical Hx:   Past Medical History:   Diagnosis Date    Hypertension     Migraine     Stroke [facility])      Past Surgical Hx:   Past Surgical History:   Procedure Laterality Date    APPENDECTOMY      KNEE SURGERY      NECK SURGERY      TONSILLECTOMY        Pain Levels:   Restin    With Activity:  5    Subjective/Patient Goal: "I've been having a lot of headaches"    History of Present Illness:  Pt is a pleasant, active, employed full time 58 y  o  male seen for OT eval s/p referred to [address] [place] [address] s/p presented to [facility] 10/20/2018 due to left-sided weakness and facial droop   He also had a speech impairment and was found to have an acute stroke  The patient received tPA at 11:00 p m  on the day of admission after neurology assessment  He had no further complications during his hospital stay apart from a small amount of petechial hemorrhage noted on MRI  CT scan showed positive results for small infarctions within the right frontal lobe and right temporal occipital junction, and areas of petechial hemorrhage   Pt discharged to [facility] from 10/25/18 - 18   Dx'd w/ R MCA CVA, HTN, DM, comorbidities as listed above      Lifestyle Performance Model:  Autonomy: Pt was I w/ I/ADLS, drove, & required no use of DME PTA, required use of SPC since CVA has not returned to driving or work, + Give Jessica sling for LUE from [facility]  Reciprocal Relationships: Supportive son lives next door in home attached, also has s/o involved to A as needed, 3 children (2 sons and a dtr) and 3 grandchildren, s/o lives locally, son works FT during am hours  Service to Others: 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 [employer] travels 4-5 times a year to [place] for few weeks at time [place], travels internationally frequently  Intrinsic Gratification: Pt reports enjoying his hot elva [employer] truck, his yard and pool  Pt reports having dog, Manuel  Home Setup: Pt lives in a [facility] apartment that is attached to his son's home in [place] w/ 0 MICKEY w/ 4 steps from the kitchen/bedroom to family room, son lives in the house attached next door  Objective  Impairments Section:  UE Strength:              JENNIFER: RUE: 95/200 LUE: 30/200              PINCER: 3 point pinch: RUE 20, LUE: 12              2 point pinch: RUE: 20, LUE: 10              Lateral pincher: RUE: 18, LUE: 9     Coordination:              9 HOLE PEG TEST:                RUE: 18 3 seconds   23 0 seconds, LUE: 6 pegs in 6 minutes 11 Seconds    Amisha Calzada 5 pegs 5 minute 51 seconds                            Functional Dexterity Test:              RUE: 31 8 seconds   22 6 seconds, LUE: 3 minutes 2 Seconds   3 minute 58 seconds     Range of Motion:  AROM: R-intact in all planes, LUE limited to 75% AROM shoulder flexion to 100*     PROM: intact in all planes      MMT: RUE 5/5 LUE 3/5     Pt is R hand dominant     Pt noted extreme fatigue 2* recently returning from [place]  Pt aware of decreased strength and coordination  Noticed change since he came back from trip      Sensation:  MYOFILAMENTS:              RUE: 3 61              LUE: 3 61    Assessment/Plan  Occupational Therapy Skilled Analysis Assessment and Plan of Care:  Pt requires overall mod I for ADLs/self care and mod I for fx'l mobility w/ SPC   Pt is currently demonstrating the following occupational deficits: limited 2* LUE hemiparesis w/ ~2/5 MMT proximally ~1/5 MMT distally w/ impaired FMC/FMS/GMC/GMS, LUE pronator drift, RUE wfl/wnl 5/5 MMT t/o, R handed, decreased endurance/activity tolerance, generalized weakness, deconditioning, SOB, WHYTE, fatigue, fall risk, impaired balance, flat affect, mildly depressed mood/anxiousness, L visuospatial inattention, diplopia @ 2" for near point of convergence w/ low R exophoria  Based on the aforementioned OT evaluation, functional performance deficits, and assessments, pt has been identified as a moderate complexity evaluation  Pt to continue to benefit from outpatient skilled OT services to address the following goals 2x/wk Short Term Goals for 4 weeks (2018), Long Term Goals for 8 weeks (2019), and POC to  w/in 90 days (2019) with special focus on self-care management, pt education,  and VM training, UE strength/coordination as well as motor training to improve above defiicits and enhance overall QOL/function  Pt seen for OT re-eval/progress note/status update  Pt continues to demonstrate LUE distal hemiparesis w/ hypotonicity mild distal peripheral edema, mildly impaired FMC/FMS/GMC/GMS, gross grasp, frequent droppage  At this point, pt has plateaued functionally, has limited visits remaining per calendar year, interested in pursuing PT for HA management and postural needs for remaining annual visit use  Discussed w/ PT, defer to same  Discussed w/ pt also continue w/ home NMES regime and HEP, denies questions, compliant w/ all education  No further skillable outpt OT needs indicated, D/C from OT caseload, D/C OT  Future appts removed   D/C OT      Goals:  Short Term Goals: (4 weeks):  Tone:  · Pt will demo good carryover of clinic and home tone strengthening strategies to for improved AROM initiation with functional reach, dressing, hygiene-MET  Modalities:  · Pt will tolerate BIONESS for improved motor and sensory performance for overall improved hand to target with 80% accuracy-MET  Sensation:  · Pt will increase proprioception of  L hand to target for improved functional reach vision occluded with ADL tasks-MET  Coordination:  · Pt will increase rate of manipulation for all FM tests for improved functional performance (pinch pad, tripod, and lateral pincer grasps) with salient and fx'l I/ADL/leisure tasks-MET  ROM/Function:  · Pt will increase L  UE to Gross Assist, refined functional assist, and stabilization with <20% cuing for tabletop tasks for improved functional performance of life roles and salient tasks-MET  · Pt will demo with G carryover of Home Exercise Program for improved functional use of  LUE-MET  Long Term Goals: (8 weeks)  Tone:   · Improve hypotonicity of LUE  to WFL/WNL for improved alternate motor patterns, termination on command, automatic grasp/release for improved functional performance t/o I/ADL/leisure task engagement-MET  Coordination:  · Increase automaticity/decrease ataxia/prehension patterns of  LUE to 100% for normalized movement pattern & resumption of B/L integrative I/ADL/leisure task engagement (including fine and gross motor)  -PARTIALLY MET  ROM/Function:  · Pt will increase L UE strength and  strength to 4+/5, through the use of strengthening exercises w/ home program review s/p skilled education to promote active fx'l movement-PARTIALLY MET     INTERVENTION COMMENTS:  Diagnosis: R MCA CVA, HTN, DM  Precautions: HTN, DM, 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  FOTO: 30 with 70% limitation; hand function  Insurance: [facility] [[id number]]  8 of 8 visits, PN due N/A 2* D/C from OT caseload     Thank you for the consult!   Please call if you have any questions: [phone]  Kacey Dial OTYURI, OTR/L, C-GCM, CSRS  Director of Outpatient Neuro Occupational Therapy

## 2019-08-28 NOTE — LETTER
2019    Deena Torrez MD  [address] [place] [address]    Patient: Sylvain Villa   YOB: 1955   Date of Visit: 2019     Encounter Diagnosis     ICD-10-CM    1  Right sided cerebral hemisphere cerebrovascular accident (CVA) [facility]) I63 9        Dear Dr Byron Vigil: Thank you for your recent referral of Sylvain Villa  Please review the attached evaluation summary from Rakesh's recent visit  Please verify that you agree with the plan of care by signing the attached order  If you have any questions or concerns, please do not hesitate to call  I sincerely appreciate the opportunity to share in the care of one of your patients and hope to have another opportunity to work with you in the near future  Sincerely,    Arnold Bonner, DOUGLAS      Referring Provider:     I certify that I have read the below Plan of Care and certify the need for these services furnished under this plan of treatment while under my care  Deena Torrez MD  [address] [place] [address] Avenue: [address] [place] [address]            Occupational Therapy Stroke Progress Note/Status Update:     Today's Date: 2019  Patient Name: Sylvain Villa  : 1955  MRN: [id number]  Referring Provider: Carlee Lorenzana MD  Dx: Right sided cerebral hemisphere cerebrovascular accident (CVA) [facility]) [I63 9]    Active Problem List:   Patient Active Problem List   Diagnosis    History of stroke    Hypertension    Anxiety    Hyperlipidemia    Gout    DM [facility]diabetes mellitus) [facility] )    Bradycardia    Left hemiparesis ([facility] )    Left shoulder pain    Encounter for loop recorder check    Acute intractable tension-type headache     Past Medical Hx:   Past Medical History:   Diagnosis Date    Hypertension     Migraine     Stroke [facility])      Past Surgical Hx:   Past Surgical History:   Procedure Laterality Date    APPENDECTOMY      KNEE SURGERY      NECK SURGERY      TONSILLECTOMY        Pain Levels: Restin    With Activity:  5    Subjective/Patient Goal: "I've been having a lot of headaches"    History of Present Illness:  Pt is a pleasant, active, employed full time 58 y  o  male seen for OT negroal [facility]p presented to [facility]/20/2018 due to left-sided weakness and facial droop   He also had a speech impairment and was found to have an acute stroke  The patient received tPA at 11:00 p m  on the day of admission after neurology assessment  He had no further complications during his hospital stay apart from a small amount of petechial hemorrhage noted on MRI  CT scan showed positive results for small infarctions within the right frontal lobe and right temporal occipital junction, and areas of petechial hemorrhage  [facility]   Dx'd w/ R MCA CVA, HTN, DM, comorbidities as listed above      Lifestyle Performance Model:  Autonomy: Pt was I w/ I/ADLS, drove, & required no use of DME PTA, required use of SPC since CVA has not returned to driving or work, + Give Jessica sling for LUE from [facility]  Reciprocal Relationships: Supportive son lives next door in home attached, also has s/o involved to A as needed, 3 children (2 sons and a dtr) and 3 grandchildren, s/o lives locally, son works FT during am hours  Service to Others: 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 [address] travels 4-5 times a year to [place] for few weeks at time [employer], travels internationally frequently  Intrinsic Gratification: Pt reports enjoying his hot elva [employer] truck, his yard and pool  Pt reports having dog, Manuel  Home Setup: Pt lives in a [facility] apartment that is attached to his son's home in [place] w/ 0 MICKEY w/ 4 steps from the kitchen/bedroom to family room, son lives in the house attached next door      Objective  Impairments Section:  UE Strength:              JENNIFER: RUE: 9 5/200 LUE:  30/200              PINCER: 3 point pinch: RUE  20, LUE:  12              2 point pinch: RUE: 20, LUE:  10              Lateral pincher: RUE: 18, LUE: 9     Coordination:              9 HOLE PEG TEST:                RUE: 18 3 seconds    23 0 seconds, LUE: 6 pegs in 6 minutes 11 Seconds   Marcine Grow Marcine Grow Marcine Grow 5 pegs 5 minute 51 seconds                            Functional Dexterity Test:              RUE: 31 8 seconds    22 6 seconds, LUE: 3 minutes 2 Seconds   Marcine Grow Marcine Grow 3 minute 58 seconds     Range of Motion:  AROM: R-intact in all planes, LUE limited to 75% AROM shoulder flexion to 100*     PROM: intact in all planes      MMT: RUE 5/5 LUE 3/5     Pt is R hand dominant     Pt noted extreme fatigue 2* recently returning from [place]  Pt aware of decreased strength and coordination  Noticed change since he came back from trip      Sensation:  MYOFILAMENTS:              RUE: 3 61              LUE: 3 61    Assessment/Plan  Occupational Therapy Skilled Analysis Assessment and Plan of Care:  Pt requires overall mod I for ADLs/self care and mod I for fx'l mobility w/ SPC  Pt is currently demonstrating the following occupational deficits: limited 2* LUE hemiparesis w/ ~2/5 MMT proximally ~1/5 MMT distally w/ impaired FMC/FMS/GMC/GMS, LUE pronator drift, RUE wfl/wnl 5/5 MMT t/o, R handed, decreased endurance/activity tolerance, generalized weakness, deconditioning, SOB, WHYTE, fatigue, fall risk, impaired balance, flat affect, mildly depressed mood/anxiousness, L visuospatial inattention, diplopia @ 2" for near point of convergence w/ low R exophoria  Based on the aforementioned OT evaluation, functional performance deficits, and assessments, pt has been identified as a moderate complexity evaluation   Pt to continue to benefit from outpatient skilled OT services to address the following goals 2x/wk Short Term Goals for 4 weeks (2018), Long Term Goals for 8 weeks (2019), and POC to  w/in 90 days (2019) with special focus on self-care management, pt education,  and VM training, UE strength/coordination as well as motor training to improve above defiicits and enhance overall QOL/function  Pt seen for OT re-eval/progress note/status update  Pt continues to demonstrate LUE distal hemiparesis w/ hypotonicity mild distal peripheral edema, mildly impaired FMC/FMS/GMC/GMS, gross grasp, frequent droppage  At this point, pt has plateaued functionally, has limited visits remaining per calendar year, interested in pursuing PT for HA management and postural needs for remaining annual visit use  Discussed w/ PT, defer to same  Discussed w/ pt also continue w/ home NMES regime and HEP, denies questions, compliant w/ all education  No further skillable outpt OT needs indicated, D/C from OT caseload, D/C OT  Future appts removed   D/C OT      Goals:  Short Term Goals: (4 weeks):  Tone:  · Pt will demo good carryover of clinic and home tone strengthening strategies to for improved AROM initiation with functional reach, dressing, hygiene-MET  Modalities:  · Pt will tolerate BIONESS for improved motor and sensory performance for overall improved hand to target with 80% accuracy-MET  Sensation:  · Pt will increase proprioception of  L hand to target for improved functional reach vision occluded with ADL tasks-MET  Coordination:  · Pt will increase rate of manipulation for all FM tests for improved functional performance (pinch pad, tripod, and lateral pincer grasps) with salient and fx'l I/ADL/leisure tasks-MET  ROM/Function:  · Pt will increase L  UE to Gross Assist, refined functional assist, and stabilization with <20% cuing for tabletop tasks for improved functional performance of life roles and salient tasks-MET  · Pt will demo with G carryover of Home Exercise Program for improved functional use of  LUE-MET  Long Term Goals: (8 weeks)  Tone:   · Improve hypotonicity of LUE  to WFL/WNL for improved alternate motor patterns, termination on command, automatic grasp/release for improved functional performance t/o I/ADL/leisure task engagement-MET  Coordination:  · Increase automaticity/decrease ataxia/prehension patterns of  LUE to 100% for normalized movement pattern & resumption of B/L integrative I/ADL/leisure task engagement (including fine and gross motor)  -PARTIALLY MET  ROM/Function:  · Pt will increase L UE strength and  strength to 4+/5, through the use of strengthening exercises w/ home program review s/p skilled education to promote active fx'l movement-PARTIALLY MET     INTERVENTION COMMENTS:  Diagnosis: R MCA CVA, HTN, DM  Precautions: HTN, DM, 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  FOTO: 30 with 70% limitation; hand function  [place]: [place] [address] [[id number]]  8 of 8 visits, PN due N/A 2* D/C from OT caseload     Thank you for the consult! Please call if you have any questions: [phone]  Diogo Senior, OTD, OTR/L, C-GCRAFAEL, CSRS  Director of Outpatient Neuro Occupational Therapy    Daily Note     Today's date: 2019  Patient name: Caprice Arreola  : 1955  MRN: [id number]  Referring provider: Claudean Likens, MD  Dx:   Encounter Diagnosis   Name Primary?  Right sided cerebral hemisphere cerebrovascular accident [facility] Yes       Start Time: 1645  Stop Time: 1700  Total time in clinic (min): 15 minutes    Subjective: "I think the longer I stretch my wrist the less it hurts"  Objective: See treatment diary below  Pt engaged in skilled OT focusing on endurance, supination/pronation, strengthening and FM coordination, automaticity of grasp/release  UEB for 5 min prograde/5 min retrograde L 3 5 for strengthening and endurance  Block retrieval crossing midline w/forward reach, eye hand coordination and hand to target demands dropping block into target  [place] dump utiilizing pad pinch to retrieve marble and drop marble into cone faciliatating supination to drop marble into bin  WB in quadriped crossing midline for card retrieval to left facilitating increased WB into LUE        Assessment: Tolerated treatment well  Patient would benefit from continued OT  Pt with frequent droppage during marble retrieval, however, pt demo supination to near full end range  Min droppage noted during block retrieval demo-automaticity of grasp/release  Pt required support to olecronon for stabilization of LUE when in quadruped w/rest breaks due to fatigue  Pt reported pain to wrist in extension decreasing w/time in stretch  Plan: Continued skilled OT per POC    See re-eval for details completed by OTR/L       INTERVENTION COMMENTS:  Diagnosis: Right sided cerebral hemisphere cerebrovascular accident (CVA) ([facility] ) [I63 9]  Precautions: HTN, DM, 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  8 of 8 visits, PN scheduled 8/28         Attestation signed by Paul Ybarra OT at 8/28/2019  6:23 PM:  All documentation reviewed and approved per SERA [place], GARRY, OTR/L, C-GCM, CSRS, CBIS

## 2019-08-28 NOTE — PROGRESS NOTES
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  Pt has limited PT visits due to using a lot of visit due to pt have a stroke last year  Pt will benefit from PT services needed to address above impairments needed to reduce complaints and improve mobility of neck  Understanding of Dx/Px/POC: good   Prognosis: good    Goals  STG  1  Pt will reduce complaints of neck pain to a 2/10 within 2 weeks  2  Pt will demonstrate independence with HEP within 2 weeks  3  Pt will reduce complaints of HA to a 3/10 at worst within 2 weeks  LT  Pt will reduce complaints of pain in neck to a worst a 1/10 within 4 weeks  2  Pt will reduce complaints of HA to a 1/10 at worst within 4 weeks  3  Pt will demonstrate independence with HEP within 4 weeks  4  Pt will improve side-bending and rotation AROM measures of the neck to R = L within 4 weeks  Plan  Patient would benefit from: skilled physical therapy  Planned therapy interventions: patient education, manual therapy, therapeutic activities, therapeutic exercise, strengthening, stretching and home exercise program  Frequency: 2x week  Duration in weeks: 4  Plan of Care beginning date: 2019  Plan of Care expiration date: 2019  Treatment plan discussed with: patient        Subjective Evaluation    History of Present Illness  Mechanism of injury: Started with headaches a few weeks ago  Starts in R forehead and goes into R shoulder  Wakes up with worse MURRELL  Doctor gave steroid and muscle relaxer, he's done with steroid, doesn't like side-effects of muscle relaxer, states he feels like a zombie  Wants to be able to move his neck better and get rid of his HAs  Pain  At best pain ratin  At worst pain ratin  Location: right side of neck into shoulder    Patient Goals  Patient goals for therapy: decreased pain and increased motion          Objective     Concurrent Complaints  Positive for headaches  Palpation   Left   Hypotonic in the cervical interspinals, suboccipitals and upper trapezius  Tenderness of the cervical interspinals, suboccipitals and upper trapezius  Right   Hypotonic in the cervical interspinals, suboccipitals and upper trapezius  Tenderness of the cervical interspinals, suboccipitals and upper trapezius  Additional Palpation Details  Worse on right versus left    Active Range of Motion   Cervical/Thoracic Spine       Cervical    Flexion: 50 degrees   Extension: 45 degrees      Left lateral flexion: 45 degrees      Right lateral flexion: 50 degrees      Left rotation: 65 degrees  Right rotation: 80 degrees             Tests     Additional Tests Details  Relief of pain/tension with distraction  Neuro Exam:     Headaches   Patient reports headaches: Yes     Intensity at best: 0/10  Intensity at worst: 5/10  Location: right forehead into right shoulder      Flowsheet Rows      Most Recent Value   PT/OT G-Codes   Current Score  31   Projected Score  0             Short Term Goal Expiration Date:(19)  Long Term Goal Expiration Date: (19)  POC Expiration Date: (19)         Precautions HTN, history of stroke (L antoine)       Manual         SOR x5min with suboccipital mm fasiculations       Cervical distraction Supine  x5min       Contract-relax cervical rotation L 6s holds, 6 sec relax; x5       STM NV       Unweighted cervical retraction NV Exercise Diary  8/28       Wall Ws x20 with assist, reviewed for HEP       Pec stretch X30s, reviewed for HEP       Chin tucks With towel against wall x10, reviewed for HEP                                                                                                                                                   Modalities        Supine MH & Tens to neck NV

## 2019-08-28 NOTE — PROGRESS NOTES
Daily Note     Today's date: 2019  Patient name: Kelsey Gillette  : 1955  MRN: [id number]  Referring provider: Winter Snow MD  Dx:   Encounter Diagnosis   Name Primary?  Right sided cerebral hemisphere cerebrovascular accident (CVA) ([facility] ) Yes       Start Time:   Stop Time: 1700  Total time in clinic (min): 15 minutes    Subjective: "I think the longer I stretch my wrist the less it hurts"  Objective: See treatment diary below  Pt engaged in skilled OT focusing on endurance, supination/pronation, strengthening and FM coordination, automaticity of grasp/release  UEB for 5 min prograde/5 min retrograde L 3 5 for strengthening and endurance  Block retrieval crossing midline w/forward reach, eye hand coordination and hand to target demands dropping block into target  [place] dump utiilizing pad pinch to retrieve marble and drop marble into cone faciliatating supination to drop marble into bin  WB in quadriped crossing midline for card retrieval to left facilitating increased WB into LUE  Assessment: Tolerated treatment well  Patient would benefit from continued OT  Pt with frequent droppage during marble retrieval, however, pt demo supination to near full end range  Min droppage noted during block retrieval demo-automaticity of grasp/release  Pt required support to olecronon for stabilization of LUE when in quadruped w/rest breaks due to fatigue  Pt reported pain to wrist in extension decreasing w/time in stretch  Plan: Continued skilled OT per POC    See re-eval for details completed by OTR/L       INTERVENTION COMMENTS:  Diagnosis: Right sided cerebral hemisphere cerebrovascular accident (CVA) ([facility] ) [I63 9]  Precautions: HTN, DM, 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  8 of 8 visits, PN scheduled

## 2019-09-02 DIAGNOSIS — G44.201 ACUTE INTRACTABLE TENSION-TYPE HEADACHE: ICD-10-CM

## 2019-09-03 ENCOUNTER — APPOINTMENT (OUTPATIENT)
Dept: PHYSICAL THERAPY | Facility: CLINIC | Age: 64
End: 2019-09-03
Payer: COMMERCIAL

## 2019-09-03 NOTE — TELEPHONE ENCOUNTER
Please call pt, last seen 8/22 with new c/o headaches  Was givenDec and Flexeril  See how he is doing, received request to refill the Flexeril which  He just got

## 2019-09-09 ENCOUNTER — OFFICE VISIT (OUTPATIENT)
Dept: PHYSICAL THERAPY | Facility: CLINIC | Age: 64
End: 2019-09-09
Payer: COMMERCIAL

## 2019-09-09 DIAGNOSIS — Z86.73 HISTORY OF STROKE: ICD-10-CM

## 2019-09-09 DIAGNOSIS — G44.201 ACUTE INTRACTABLE TENSION-TYPE HEADACHE: Primary | ICD-10-CM

## 2019-09-09 PROCEDURE — 97112 NEUROMUSCULAR REEDUCATION: CPT

## 2019-09-09 PROCEDURE — 97140 MANUAL THERAPY 1/> REGIONS: CPT

## 2019-09-09 NOTE — PROGRESS NOTES
Daily Note     Today's date: 2019  Patient name: Lonny Wren  : 1955  MRN: [id number]  Referring provider: Nannette Licea MD  Dx:   Encounter Diagnosis     ICD-10-CM    1  Acute intractable tension-type headache G44 201    2  History of stroke Z86 73           Subjective: States the his headaches have been improving  Reports that he is experiencing headaches 2x per week, reports that he takes Tylenol and this will usually take HA away  Does complain neck stiffness  Objective: See treatment diary below  Short Term Goal Expiration Date:(19)  Long Term Goal Expiration Date: (19)  POC Expiration Date: (19)        Precautions HTN, history of stroke (L antoine)        Manual           SOR x5min with suboccipital mm fasiculations  [facility]         Cervical distraction Supine  x5min           Contract-relax cervical rotation L 6s holds, 6 sec relax; x5  6s holds, 6 sec relax; x5         STM NV [facility]         Unweighted cervical retraction NV                 Exercise Diary           Wall Ws x20 with assist, reviewed for HEP  standing: 10x w/ assist  Supine-10x         Pec stretch X30s, reviewed for HEP  wall, 30''x3         Chin tucks With towel against wall x10, reviewed for HEP  With towel against wall x15          supine pec butterfly stretch     30''x3          seated pillow rotation   10''x10          UT stretch   30''x2         LS stretch   30'x2                                                                                                                                                                                                     Modalities             Supine MH & Tens to neck NV  10 min                                            Assessment: Patient tolerated session well overall  Reviewed and focused on neck stretching  Patient demo noted pec tightness and some difficulty with standing wall angels due to  RUE weakness   Review and update HEP NV as patient will be going out of town for work for 2 weeks  Plan: Progress treatment as tolerated  Patient to bring in home TENS unit to review use of neck application       Short Term Goal Expiration Date:(9/13/19)  Long Term Goal Expiration Date: (9/27/19)  POC Expiration Date: (9/27/19)

## 2019-09-12 ENCOUNTER — APPOINTMENT (OUTPATIENT)
Dept: PHYSICAL THERAPY | Facility: CLINIC | Age: 64
End: 2019-09-12
Payer: COMMERCIAL

## 2019-09-16 ENCOUNTER — APPOINTMENT (OUTPATIENT)
Dept: PHYSICAL THERAPY | Facility: CLINIC | Age: 64
End: 2019-09-16
Payer: COMMERCIAL

## 2019-09-26 RX ORDER — CYCLOBENZAPRINE HCL 10 MG
10 TABLET ORAL
Qty: 15 TABLET | Refills: 0 | Status: SHIPPED | OUTPATIENT
Start: 2019-09-26 | End: 2020-05-19

## 2019-09-30 ENCOUNTER — OFFICE VISIT (OUTPATIENT)
Dept: PHYSICAL THERAPY | Facility: CLINIC | Age: 64
End: 2019-09-30
Payer: COMMERCIAL

## 2019-09-30 DIAGNOSIS — G44.201 ACUTE INTRACTABLE TENSION-TYPE HEADACHE: Primary | ICD-10-CM

## 2019-09-30 DIAGNOSIS — Z86.73 HISTORY OF STROKE: ICD-10-CM

## 2019-09-30 NOTE — PROGRESS NOTES
Pt present for session today however stated he does not have any more headaches  Has been compliant with HEP and does not have any questions  Pt d/c'ed at this time  No charge visit

## 2019-10-09 ENCOUNTER — TELEPHONE (OUTPATIENT)
Dept: NEUROLOGY | Facility: CLINIC | Age: 64
End: 2019-10-09

## 2019-10-09 NOTE — TELEPHONE ENCOUNTER
Patient called back, I confirmed change of location of appointment and sent directions to the office

## 2019-10-10 DIAGNOSIS — G44.201 ACUTE INTRACTABLE TENSION-TYPE HEADACHE: ICD-10-CM

## 2019-10-10 RX ORDER — CYCLOBENZAPRINE HCL 10 MG
10 TABLET ORAL
Qty: 15 TABLET | Refills: 0 | OUTPATIENT
Start: 2019-10-10

## 2019-10-10 NOTE — TELEPHONE ENCOUNTER
Patient was recently given a prescription for Flexeril to weeks ago, initially was only to be used as needed, please call patient and clarify how he is using his Flexeril at this time

## 2019-10-10 NOTE — TELEPHONE ENCOUNTER
As per patient he is not taking the Flexeril, pt does not need this refill   You can disregard the request

## 2019-10-23 ENCOUNTER — TRANSCRIBE ORDERS (OUTPATIENT)
Dept: ADMINISTRATIVE | Facility: HOSPITAL | Age: 64
End: 2019-10-23

## 2019-10-23 ENCOUNTER — OFFICE VISIT (OUTPATIENT)
Dept: NEUROLOGY | Facility: CLINIC | Age: 64
End: 2019-10-23
Payer: COMMERCIAL

## 2019-10-23 VITALS
DIASTOLIC BLOOD PRESSURE: 60 MMHG | WEIGHT: 193.6 LBS | HEIGHT: 68 IN | BODY MASS INDEX: 29.34 KG/M2 | HEART RATE: 64 BPM | SYSTOLIC BLOOD PRESSURE: 120 MMHG

## 2019-10-23 DIAGNOSIS — Z86.73 HISTORY OF STROKE: ICD-10-CM

## 2019-10-23 DIAGNOSIS — G81.94 LEFT HEMIPARESIS (HCC): ICD-10-CM

## 2019-10-23 DIAGNOSIS — G44.201 ACUTE INTRACTABLE TENSION-TYPE HEADACHE: ICD-10-CM

## 2019-10-23 DIAGNOSIS — I63.9 CEREBROVASCULAR ACCIDENT (CVA), UNSPECIFIED MECHANISM (HCC): Primary | ICD-10-CM

## 2019-10-23 DIAGNOSIS — I10 ESSENTIAL HYPERTENSION: Primary | Chronic | ICD-10-CM

## 2019-10-23 DIAGNOSIS — R73.03 PREDIABETES: ICD-10-CM

## 2019-10-23 DIAGNOSIS — E78.5 HYPERLIPIDEMIA, UNSPECIFIED HYPERLIPIDEMIA TYPE: Chronic | ICD-10-CM

## 2019-10-23 PROCEDURE — 99215 OFFICE O/P EST HI 40 MIN: CPT | Performed by: PSYCHIATRY & NEUROLOGY

## 2019-10-23 PROCEDURE — 3074F SYST BP LT 130 MM HG: CPT | Performed by: PSYCHIATRY & NEUROLOGY

## 2019-10-23 PROCEDURE — 3078F DIAST BP <80 MM HG: CPT | Performed by: PSYCHIATRY & NEUROLOGY

## 2019-10-23 NOTE — PROGRESS NOTES
Patient ID: Gregory Arguelles is a 71 y o  male  Assessment/Plan:    No problem-specific Assessment & Plan notes found for this encounter  Diagnoses and all orders for this visit:    Type 2 diabetes mellitus without complication, without long-term current use of insulin (HCC)    Essential hypertension    Left hemiparesis (HCC)    Acute intractable tension-type headache    History of stroke    Hyperlipidemia, unspecified hyperlipidemia type       Patient Instructions   Stroke:  Lucila Mortimer presents for follow-up with regard to his prior embolic stroke of undetermined source  Although he does have left atrial enlargement on his echocardiogram he does not have a PFO and at this point based on his implantable loop recorder we have not found any evidence of paroxysmal atrial fibrillation  He takes his medications as prescribed and denies any bleeding or bruising issues  He continues to exercise his left upper and lower extremity, recently joined the [facility], and in January of next year will be returning to physical therapy  He has no current indication for Botox for spasticity  - from a neurologic standpoint he should continue to use his combination of aspirin, statin, and appropriate blood pressure and glycemic control for stroke prevention  -we will defer to the good judgment of his primary care team for monitoring of his cholesterol panel and blood sugar numbers  Notably his most recent hemoglobin A1c was 6 6%  This is in the high prediabetic verses early diabetic range  He should continue to follow-up with his primary care doctor in this regard  - we would suggest checking his blood pressure intermittently and targeting numbers less than 130/80 on a regular basis  -from my standpoint he is clear to travel as he sees fit with regard to his work but should ensure that he takes his medications with him and that he has the card for his implantable loop recorder available    - he does have asymptomatic bilateral carotid artery stenosis and will be having an upcoming Doppler ultrasound for further evaluation  I will plan for him to see us back in the office in 1 year's time but we would be happy to see him sooner if the need should arise  If he has any symptoms concerning for TIA or stroke such as sudden painless loss of vision or double vision, difficulty speaking or swallowing, vertigo/ room spinning that does not quickly resolve, or weakness / numbness affecting 1 side of the face or body which is new he should proceed by ambulance to the nearest emergency room immediately  Subjective:    VIPUL Matthews is a 70 y/o M, presenting to the outpatient office for follow-up  He has history of stroke (ESUS on 10/20/18), hypertension and hyperlipidemia  He denies any other focal neurologic deficits aside from residual left-sided weakness of the upper extremity  Patient denies significant bleeding or bruising on aspirin  He tolerates his medication regimen well including his statin  Previously, he complained of tension-like frontal headache, radiating to the right cervical region, possessing a frequency of approximately 4-5 times per week  He denies associated changes in vision, changes in speech, nausea and vomiting  He was previously prescribed Decadron,  Flexeril 10 mg in addition to magnesium 400 mg and vitamin B2 200 mg, although he admits to using this regimen for 1 day without improvement  He does endorse significant improvement of his headaches with PT; frequency of 1 headache per month  No other complaints at time of exam      Throughout his appointment, we discussed his loop placement and lack of documented atrial fibrillation/arrythmia  We discussed continued adherence to his medication regimen in addition to encouraging physical activity through exercise and pending physical therapy starting January 2020   Additionally, we discussed his recently A1c of 6 6% and management of this high prediabetic state by his primary team  We are amenable to his upcoming business travel to [place] from a medical standpoint and recommend follow-up in 1 year or earlier if warranted by the patient  I personally saw and examined the patient  I agree with the history and physical as documented by Teja Hammonds Robert Licea is doing very well  Notably his hemoglobin A1c of 6 6% is actually from 1 year ago  Upon further review he has had some additional chemistry testing with elevated fasting blood sugars  He should have his hemoglobin A1c repeated which I will order for him at this point in time              Past Medical History:   Diagnosis Date    Hypertension     Migraine     Stroke [facility])        Social History     Socioeconomic History    Marital status:      Spouse name: None    Number of children: None    Years of education: None    Highest education level: None   Occupational History    None   Social Needs    Financial resource strain: None    Food insecurity:     Worry: None     Inability: None    Transportation needs:     Medical: None     Non-medical: None   Tobacco Use    Smoking status: Former Smoker     Packs/day: 1 00     Types: Cigarettes, Cigars    Smokeless tobacco: Never Used    Tobacco comment: stop on 10/24/2018   Substance and Sexual Activity    Alcohol use: Yes     Comment: socially     Drug use: No    Sexual activity: None   Lifestyle    Physical activity:     Days per week: None     Minutes per session: None    Stress: None   Relationships    Social connections:     Talks on phone: None     Gets together: None     Attends Zoroastrianism service: None     Active member of club or organization: None     Attends meetings of clubs or organizations: None     Relationship status: None    Intimate partner violence:     Fear of current or ex partner: None     Emotionally abused: None     Physically abused: None     Forced sexual activity: None   Other Topics Concern    None Social History Narrative    None       Family History   Problem Relation Age of Onset    Stroke Mother     Heart disease Father     ALS Father          Current Outpatient Medications:     amLODIPine (NORVASC) 5 mg tablet, Take 5 mg by mouth daily Taking 1 1/2 daily daily , Disp: , Rfl: 5    aspirin (ECOTRIN LOW STRENGTH) 81 mg EC tablet, Take 1 tablet (81 mg total) by mouth daily, Disp: 90 tablet, Rfl: 3    atorvastatin (LIPITOR) 80 mg tablet, Take 1 tablet (80 mg total) by mouth every evening, Disp: 90 tablet, Rfl: 3    FLUoxetine (PROzac) 20 mg capsule, Take 20 mg by mouth daily, Disp: , Rfl: 3    losartan (COZAAR) 50 mg tablet, Take 1 tablet (50 mg total) by mouth daily, Disp: 90 tablet, Rfl: 0    cyclobenzaprine (FLEXERIL) 10 mg tablet, TAKE 1 TABLET (10 MG TOTAL) BY MOUTH DAILY AT BEDTIME AS NEEDED FOR MUSCLE SPASMS (Patient not taking: Reported on 10/23/2019), Disp: 15 tablet, Rfl: 0    The following portions of the patient's history were reviewed: allergies, current medications, past family history, past medical history, past social history and problem list            Objective:    Blood pressure 120/60, pulse 64, height 5' 8" (1 727 m), weight 87 8 kg (193 lb 9 6 oz)  Physical Exam    Neurological Exam  Alert and in no acute distress  Fluent speech  No dysarthria  No word-finding difficulty  CN 2-12 intact; exception of mild left sided facial asymmetry noted  Motor strength 5/5 including  strength, deltoids, biceps, triceps, iliopsoas, quadriceps, hamstrings and dorsiflexors; exception of the left deltoid 4/5 and left biceps/triceps 4/5  Particularly he has a hard time with pronation and decreased distal hand function  Sensory intact to crude touch and vibration throughout upper/lower extremities bilaterally  DTRs 2+ of the right biceps, brachioradialis, triceps, patella and achilles; 3+ of left biceps, triceps, brachioradialis and patella  Stable gait  No antalgia  ROS:    Review of Systems   Constitutional: Negative  Negative for appetite change and fever  HENT: Positive for tinnitus  Negative for hearing loss, trouble swallowing and voice change  Eyes: Negative  Negative for photophobia and pain  Respiratory: Negative  Negative for shortness of breath  Cardiovascular: Negative  Negative for palpitations  Gastrointestinal: Negative  Negative for nausea and vomiting  Endocrine: Negative  Negative for cold intolerance and heat intolerance  Genitourinary: Positive for frequency  Negative for dysuria and urgency  Musculoskeletal: Positive for back pain (Low back pain)  Negative for myalgias and neck pain (Little bit)  Skin: Negative  Negative for rash  Neurological: Positive for weakness (left arm and left leg) and headaches (Once in awhile)  Negative for dizziness, tremors, seizures, syncope, facial asymmetry, speech difficulty, light-headedness and numbness  Hematological: Bruises/bleeds easily (Bleeds easily)  Psychiatric/Behavioral: Negative  Negative for confusion, hallucinations and sleep disturbance  Reviewed ROS as entered by medical assistant

## 2019-10-24 PROBLEM — R73.03 PREDIABETES: Status: ACTIVE | Noted: 2018-10-25

## 2019-10-25 ENCOUNTER — TELEPHONE (OUTPATIENT)
Dept: NEUROLOGY | Facility: CLINIC | Age: 64
End: 2019-10-25

## 2019-10-25 NOTE — TELEPHONE ENCOUNTER
----- Message from Nona Anderson MD sent at 10/24/2019  5:30 PM EDT -----  Regarding: A1c test    Please feel you give Avinash No a call  After his recent visit I was completing his chart and I noticed that his hemoglobin A1c of 6 6% was actually from 1 year ago  He has had some fasting blood sugars greater than 100 which remains elevated  He should actually have a repeat hemoglobin A1c performed to update his baseline and ensure that he has not crossed from pre diabetes in to mild diabetes  I have entered the appropriate lab study for him  Thanks!

## 2019-10-25 NOTE — TELEPHONE ENCOUNTER
Patient made aware of below  He states that he will not be able to have lab drawn until after he returns from his trip  He will return on 11/22/19 and go to [facility] lab  Mailed lab script to patient's address on file

## 2019-11-25 ENCOUNTER — HOSPITAL ENCOUNTER (OUTPATIENT)
Dept: RADIOLOGY | Facility: MEDICAL CENTER | Age: 64
Discharge: HOME | End: 2019-11-25
Payer: COMMERCIAL

## 2019-11-25 ENCOUNTER — REMOTE DEVICE CLINIC VISIT (OUTPATIENT)
Dept: CARDIOLOGY CLINIC | Facility: CLINIC | Age: 64
End: 2019-11-25
Payer: COMMERCIAL

## 2019-11-25 DIAGNOSIS — I63.9 CEREBROVASCULAR ACCIDENT (CVA), UNSPECIFIED MECHANISM (HCC): ICD-10-CM

## 2019-11-25 DIAGNOSIS — Z95.818 PRESENCE OF OTHER CARDIAC IMPLANTS AND GRAFTS: Primary | ICD-10-CM

## 2019-11-25 PROCEDURE — 93298 REM INTERROG DEV EVAL SCRMS: CPT | Performed by: INTERNAL MEDICINE

## 2019-11-25 PROCEDURE — 93880 EXTRACRANIAL BILAT STUDY: CPT

## 2019-11-25 PROCEDURE — 93299 PR REM INTERROG ICPMS/SCRMS <30 D TECH REVIEW: CPT | Performed by: INTERNAL MEDICINE

## 2019-11-25 PROCEDURE — 93880 EXTRACRANIAL BILAT STUDY: CPT | Performed by: SURGERY

## 2019-11-25 NOTE — PROGRESS NOTES
Results for orders placed or performed in visit on 11/25/19   Cardiac EP device report    Narrative    MDT-LOOP RECORDER  CARELINK TRANSMISSION LOOP: BATTERY STATUS "OK"  PRESENTING RHYTHM: NSR @ 64 BPM   NO PATIENT OR DEVICE ACTIVATED EPISODES  NORMAL DEVICE FUNCTION    [facility]

## 2019-12-17 DIAGNOSIS — I63.9 RIGHT SIDED CEREBRAL HEMISPHERE CEREBROVASCULAR ACCIDENT (CVA) (HCC): ICD-10-CM

## 2019-12-17 NOTE — TELEPHONE ENCOUNTER
Medication refill check list    Correct patient? yes   Correct medication name, dose, and pill size? yes   Correct provider? yes   Last and Next appt  scheduled? Yes, last date 10/23/19 & next date NO FU Scheduled    Right pharmacy listed? yes   Correct quantity for 30 or 90 days? yes   Is the patient out of refills? When was it last prescribed? Yes, last date 03/11/19   Directions match what the patient says they are taking?  yes   Enough refills? (none for controlled substances, 1 year for routine medications) yes       FLUoxetine (PROzac) 20 mg capsule

## 2019-12-18 RX ORDER — FLUOXETINE HYDROCHLORIDE 20 MG/1
20 CAPSULE ORAL DAILY
Qty: 90 CAPSULE | Refills: 3 | Status: SHIPPED | OUTPATIENT
Start: 2019-12-18 | End: 2020-12-18

## 2019-12-18 RX ORDER — ASPIRIN 81 MG/1
81 TABLET ORAL DAILY
Qty: 90 TABLET | Refills: 3 | Status: SHIPPED | OUTPATIENT
Start: 2019-12-18 | End: 2020-05-19

## 2020-01-06 NOTE — PROGRESS NOTES
11/12/18 1500   Pain Assessment   Pain Assessment No/denies pain   Restrictions/Precautions   Precautions Fall Risk   Cognition   Arousal/Participation Cooperative   Subjective   Subjective pt reported feeling well and ready for therapy    QI: Sit to Stand   Assistance Needed Supervision   Sit to Stand CARE Score 4   QI: Chair/Bed-to-Chair Transfer   Assistance Needed Supervision; Incidental touching   Chair/Bed-to-Chair Transfer CARE Score 4   Transfer Bed/Chair/Wheelchair   Limitations Noted In Balance; Endurance;LE Strength;UE Strength   Stand Pivot Supervision;Contact Guard   Sit to Stand Supervision   Stand to СВЕТЛАНА Anthonyey, Chair, Wheelchair Transfer (FIM) 4 - Patient requires steadying assist or light touching   QI: Walk 10 Feet   Assistance Needed Incidental touching;Supervision   Walk 10 Feet CARE Score 4   QI: Walk 50 Feet with Two Turns   Assistance Needed Incidental touching;Supervision   Walk 50 Feet with Two Turns CARE Score 4   QI: Walk 150 Feet   Assistance Needed Incidental touching;Supervision   Walk 150 Feet CARE Score 4   Ambulation   Does the patient walk? 2  Yes   Primary Discharge Mode of Locomotion Walk   Walk Assist Level Close Supervision;Contact Guard   Gait Pattern Decreased foot clearance;L hemiparesis;L knee hyperextension; Improper weight shift; Step through   Assist Device (none)   Distance Walked (feet) 350 ft  (x4 700 x2)   Limitations Noted In Balance; Endurance   Walking (FIM) 4 - Patient requires steadying assist or light touching AND distance 150 feet or more, no rest   QI: 4 Steps   Assistance Needed Adaptive equipment;Supervision   4 Steps CARE Score 4   QI: 12 Steps   Assistance Needed Adaptive equipment;Supervision   12 Steps CARE Score 4   Stairs   Type Stairs   # of Steps 40   Weight Bearing Precautions Fall Risk   Assist Devices Single Rail   Findings reciprocal pattern up and down, caught toes once going up but was able to self correct      Stairs (FIM) 5 - Patient requires supervision/monitoring AND goes up and down full flight (12- 14 stairs)   Therapeutic Interventions   Neuromuscular Re-Education with glove on LUE, used wipes to wipe down elevated high lo mat with distal hand fixed on the mat to promote proximal movement; A for shoulder stability as needed and hand over hand technique as needed to facilitate full elbow extension and shoulder ER/ext  washed hands at sink with AAROM to support LUE as needed  Dangelo from [facility] was here to also look at his walking; cont to not use bracing and will cont to monitor progress of LLE  To cont to add in standing calf raises to dec L knee hyperextension  Step through practice on  steps with RUE on HR as needed   Equipment Use   Big Screen Tools 10 min level 4-5    Assessment   Treatment Assessment Pt cont to make progress with more consistency with FF of stairs and walking longer distances at a S level  Pt cont to benefit from skilled PT to further progress activity tolerance and ambulatory balance/righting reactions as well as LUE function and gross motor movements  PT to cont to foucs on dynamic walking, balance and righitng reactions as well as incorporating LUE into functional tasks  Barriers to Discharge Decreased caregiver support   PT Barriers   Physical Impairment Decreased strength;Decreased range of motion;Decreased endurance; Impaired balance   Functional Limitation Car transfers;Stair negotiation;Standing;Transfers; Walking   Plan   Treatment/Interventions Functional transfer training;LE strengthening/ROM; Elevations; Therapeutic exercise; Endurance training;Patient/family training;Equipment eval/education; Bed mobility;Gait training   Progress Progressing toward goals   Recommendation   Recommendation Outpatient PT; Home with family support   Equipment Recommended Cane   PT Therapy Minutes   PT Time In 1500   PT Time Out 1600   PT Total Time (minutes) 60   PT Mode of treatment - Individual (minutes) 45   PT Mode of treatment - Concurrent (minutes) 15   PT Mode of treatment - Group (minutes) 0   PT Mode of treatment - Co-treat (minutes) 0   PT Mode of Teatment - Total time(minutes) 60 minutes   Therapy Time missed   Time missed?  No  used

## 2020-02-14 ENCOUNTER — REMOTE DEVICE CLINIC VISIT (OUTPATIENT)
Dept: CARDIOLOGY CLINIC | Facility: CLINIC | Age: 65
End: 2020-02-14
Payer: COMMERCIAL

## 2020-02-14 DIAGNOSIS — Z95.818 PRESENCE OF OTHER CARDIAC IMPLANTS AND GRAFTS: Primary | ICD-10-CM

## 2020-02-14 PROCEDURE — G2066 INTER DEVC REMOTE 30D: HCPCS | Performed by: INTERNAL MEDICINE

## 2020-02-14 PROCEDURE — 93298 REM INTERROG DEV EVAL SCRMS: CPT | Performed by: INTERNAL MEDICINE

## 2020-02-14 NOTE — PROGRESS NOTES
Results for orders placed or performed in visit on 02/14/20   Cardiac EP device report    Narrative    MDT-LOOP RECORDER  CARELINK TRANSMISSION (LOOP): BATTERY STATUS "OK"  PRESENTING ECG SHOWS NSR @ 64 BPM  NO PATIENT OR DEVICE ACTIVATED EPISODES  NORMAL DEVICE FUNCTION    EB

## 2020-05-08 ENCOUNTER — TELEPHONE (OUTPATIENT)
Dept: NEUROLOGY | Facility: CLINIC | Age: 65
End: 2020-05-08

## 2020-05-11 ENCOUNTER — TELEPHONE (OUTPATIENT)
Dept: CARDIOLOGY CLINIC | Facility: CLINIC | Age: 65
End: 2020-05-11

## 2020-05-15 ENCOUNTER — REMOTE DEVICE CLINIC VISIT (OUTPATIENT)
Dept: CARDIOLOGY CLINIC | Facility: CLINIC | Age: 65
End: 2020-05-15
Payer: COMMERCIAL

## 2020-05-15 ENCOUNTER — TELEPHONE (OUTPATIENT)
Dept: NEUROLOGY | Facility: CLINIC | Age: 65
End: 2020-05-15

## 2020-05-15 DIAGNOSIS — Z95.818 PRESENCE OF OTHER CARDIAC IMPLANTS AND GRAFTS: Primary | ICD-10-CM

## 2020-05-15 PROCEDURE — 93298 REM INTERROG DEV EVAL SCRMS: CPT | Performed by: INTERNAL MEDICINE

## 2020-05-15 PROCEDURE — G2066 INTER DEVC REMOTE 30D: HCPCS | Performed by: INTERNAL MEDICINE

## 2020-05-19 ENCOUNTER — OFFICE VISIT (OUTPATIENT)
Dept: NEUROLOGY | Facility: CLINIC | Age: 65
End: 2020-05-19
Payer: COMMERCIAL

## 2020-05-19 VITALS
DIASTOLIC BLOOD PRESSURE: 70 MMHG | HEIGHT: 68 IN | RESPIRATION RATE: 14 BRPM | WEIGHT: 185.5 LBS | SYSTOLIC BLOOD PRESSURE: 124 MMHG | BODY MASS INDEX: 28.11 KG/M2

## 2020-05-19 DIAGNOSIS — Z86.73 HISTORY OF STROKE: Primary | ICD-10-CM

## 2020-05-19 DIAGNOSIS — G81.94 LEFT HEMIPARESIS (HCC): ICD-10-CM

## 2020-05-19 DIAGNOSIS — G89.29 CHRONIC LEFT SHOULDER PAIN: ICD-10-CM

## 2020-05-19 DIAGNOSIS — R73.03 PREDIABETES: ICD-10-CM

## 2020-05-19 DIAGNOSIS — G47.19 EXCESSIVE DAYTIME SLEEPINESS: ICD-10-CM

## 2020-05-19 DIAGNOSIS — E78.5 HYPERLIPIDEMIA, UNSPECIFIED HYPERLIPIDEMIA TYPE: Chronic | ICD-10-CM

## 2020-05-19 DIAGNOSIS — I10 ESSENTIAL HYPERTENSION: Chronic | ICD-10-CM

## 2020-05-19 DIAGNOSIS — I48.0 PAROXYSMAL ATRIAL FIBRILLATION (HCC): ICD-10-CM

## 2020-05-19 DIAGNOSIS — M25.512 CHRONIC LEFT SHOULDER PAIN: ICD-10-CM

## 2020-05-19 PROCEDURE — 99215 OFFICE O/P EST HI 40 MIN: CPT | Performed by: PSYCHIATRY & NEUROLOGY

## 2020-05-19 RX ORDER — METHOCARBAMOL 500 MG/1
TABLET, FILM COATED ORAL
Qty: 90 TABLET | Refills: 3 | Status: SHIPPED | OUTPATIENT
Start: 2020-05-19 | End: 2020-10-19

## 2020-05-21 ENCOUNTER — TELEPHONE (OUTPATIENT)
Dept: SLEEP CENTER | Facility: CLINIC | Age: 65
End: 2020-05-21

## 2020-05-26 ENCOUNTER — EVALUATION (OUTPATIENT)
Dept: OCCUPATIONAL THERAPY | Facility: CLINIC | Age: 65
End: 2020-05-26
Payer: COMMERCIAL

## 2020-05-26 ENCOUNTER — TELEPHONE (OUTPATIENT)
Dept: CARDIOLOGY CLINIC | Facility: CLINIC | Age: 65
End: 2020-05-26

## 2020-05-26 DIAGNOSIS — G81.94 LEFT HEMIPARESIS (HCC): Primary | ICD-10-CM

## 2020-05-26 DIAGNOSIS — G89.29 CHRONIC LEFT SHOULDER PAIN: ICD-10-CM

## 2020-05-26 DIAGNOSIS — M25.512 CHRONIC LEFT SHOULDER PAIN: ICD-10-CM

## 2020-05-26 PROCEDURE — 97166 OT EVAL MOD COMPLEX 45 MIN: CPT

## 2020-05-27 ENCOUNTER — OFFICE VISIT (OUTPATIENT)
Dept: CARDIOLOGY CLINIC | Facility: CLINIC | Age: 65
End: 2020-05-27
Payer: COMMERCIAL

## 2020-05-27 VITALS
TEMPERATURE: 96.9 F | HEIGHT: 68 IN | WEIGHT: 185 LBS | BODY MASS INDEX: 28.04 KG/M2 | DIASTOLIC BLOOD PRESSURE: 76 MMHG | SYSTOLIC BLOOD PRESSURE: 120 MMHG | HEART RATE: 53 BPM

## 2020-05-27 DIAGNOSIS — I48.0 PAROXYSMAL ATRIAL FIBRILLATION (HCC): Primary | ICD-10-CM

## 2020-05-27 PROCEDURE — 93000 ELECTROCARDIOGRAM COMPLETE: CPT | Performed by: INTERNAL MEDICINE

## 2020-05-27 PROCEDURE — 99214 OFFICE O/P EST MOD 30 MIN: CPT | Performed by: INTERNAL MEDICINE

## 2020-05-28 ENCOUNTER — OFFICE VISIT (OUTPATIENT)
Dept: OCCUPATIONAL THERAPY | Facility: CLINIC | Age: 65
End: 2020-05-28
Payer: COMMERCIAL

## 2020-05-28 DIAGNOSIS — G89.29 CHRONIC LEFT SHOULDER PAIN: Primary | ICD-10-CM

## 2020-05-28 DIAGNOSIS — M25.512 CHRONIC LEFT SHOULDER PAIN: Primary | ICD-10-CM

## 2020-05-28 DIAGNOSIS — G81.94 LEFT HEMIPARESIS (HCC): ICD-10-CM

## 2020-05-28 PROCEDURE — 97140 MANUAL THERAPY 1/> REGIONS: CPT

## 2020-05-28 PROCEDURE — 97112 NEUROMUSCULAR REEDUCATION: CPT

## 2020-05-28 PROCEDURE — 97110 THERAPEUTIC EXERCISES: CPT

## 2020-06-02 ENCOUNTER — OFFICE VISIT (OUTPATIENT)
Dept: OCCUPATIONAL THERAPY | Facility: CLINIC | Age: 65
End: 2020-06-02
Payer: COMMERCIAL

## 2020-06-02 DIAGNOSIS — G89.29 CHRONIC LEFT SHOULDER PAIN: Primary | ICD-10-CM

## 2020-06-02 DIAGNOSIS — M25.512 CHRONIC LEFT SHOULDER PAIN: Primary | ICD-10-CM

## 2020-06-02 PROCEDURE — 97112 NEUROMUSCULAR REEDUCATION: CPT

## 2020-06-02 PROCEDURE — 97140 MANUAL THERAPY 1/> REGIONS: CPT

## 2020-06-02 PROCEDURE — 97110 THERAPEUTIC EXERCISES: CPT

## 2020-06-04 ENCOUNTER — OFFICE VISIT (OUTPATIENT)
Dept: OCCUPATIONAL THERAPY | Facility: CLINIC | Age: 65
End: 2020-06-04
Payer: COMMERCIAL

## 2020-06-04 DIAGNOSIS — G81.94 LEFT HEMIPARESIS (HCC): Primary | ICD-10-CM

## 2020-06-04 PROCEDURE — 97140 MANUAL THERAPY 1/> REGIONS: CPT

## 2020-06-04 PROCEDURE — 97530 THERAPEUTIC ACTIVITIES: CPT

## 2020-06-04 PROCEDURE — 97112 NEUROMUSCULAR REEDUCATION: CPT

## 2020-06-04 RX ORDER — CYCLOBENZAPRINE HCL 10 MG
TABLET ORAL
Qty: 15 TABLET | Refills: 0 | OUTPATIENT
Start: 2020-06-04

## 2020-06-04 NOTE — TELEPHONE ENCOUNTER
Called and advised pt of all of the below  He verbalized clear understanding  Pt states that he is taking robaxin, not flexeril  He is aware that he cannot use both  [facility] Hai Duncna) made aware

## 2020-06-04 NOTE — TELEPHONE ENCOUNTER
Received request for Flexeril,  Pt given Robaxin at last visit, should not use both, please call pt and see which was latricia beneficial

## 2020-06-05 ENCOUNTER — APPOINTMENT (OUTPATIENT)
Dept: OCCUPATIONAL THERAPY | Facility: CLINIC | Age: 65
End: 2020-06-05
Payer: COMMERCIAL

## 2020-06-08 ENCOUNTER — APPOINTMENT (OUTPATIENT)
Dept: OCCUPATIONAL THERAPY | Facility: CLINIC | Age: 65
End: 2020-06-08
Payer: COMMERCIAL

## 2020-06-10 ENCOUNTER — OFFICE VISIT (OUTPATIENT)
Dept: OCCUPATIONAL THERAPY | Facility: CLINIC | Age: 65
End: 2020-06-10
Payer: COMMERCIAL

## 2020-06-10 DIAGNOSIS — G81.94 LEFT HEMIPARESIS (HCC): Primary | ICD-10-CM

## 2020-06-10 PROCEDURE — 97140 MANUAL THERAPY 1/> REGIONS: CPT

## 2020-06-10 PROCEDURE — 97110 THERAPEUTIC EXERCISES: CPT

## 2020-06-10 PROCEDURE — 97112 NEUROMUSCULAR REEDUCATION: CPT

## 2020-06-11 ENCOUNTER — OFFICE VISIT (OUTPATIENT)
Dept: OCCUPATIONAL THERAPY | Facility: CLINIC | Age: 65
End: 2020-06-11
Payer: COMMERCIAL

## 2020-06-11 DIAGNOSIS — G81.94 LEFT HEMIPARESIS (HCC): Primary | ICD-10-CM

## 2020-06-11 PROCEDURE — 97112 NEUROMUSCULAR REEDUCATION: CPT

## 2020-06-11 PROCEDURE — 97110 THERAPEUTIC EXERCISES: CPT

## 2020-06-11 PROCEDURE — 97530 THERAPEUTIC ACTIVITIES: CPT

## 2020-06-15 ENCOUNTER — APPOINTMENT (OUTPATIENT)
Dept: OCCUPATIONAL THERAPY | Facility: CLINIC | Age: 65
End: 2020-06-15
Payer: COMMERCIAL

## 2020-06-17 ENCOUNTER — TRANSCRIBE ORDERS (OUTPATIENT)
Dept: OCCUPATIONAL THERAPY | Facility: CLINIC | Age: 65
End: 2020-06-17

## 2020-06-17 ENCOUNTER — EVALUATION (OUTPATIENT)
Dept: OCCUPATIONAL THERAPY | Facility: CLINIC | Age: 65
End: 2020-06-17
Payer: COMMERCIAL

## 2020-06-17 DIAGNOSIS — G81.94 LEFT HEMIPARESIS (HCC): Primary | ICD-10-CM

## 2020-06-17 PROCEDURE — 97112 NEUROMUSCULAR REEDUCATION: CPT

## 2020-06-18 ENCOUNTER — OFFICE VISIT (OUTPATIENT)
Dept: OCCUPATIONAL THERAPY | Facility: CLINIC | Age: 65
End: 2020-06-18
Payer: COMMERCIAL

## 2020-06-18 DIAGNOSIS — G81.94 LEFT HEMIPARESIS (HCC): Primary | ICD-10-CM

## 2020-06-18 PROCEDURE — 97140 MANUAL THERAPY 1/> REGIONS: CPT

## 2020-06-18 PROCEDURE — 97110 THERAPEUTIC EXERCISES: CPT

## 2020-06-18 PROCEDURE — 97112 NEUROMUSCULAR REEDUCATION: CPT

## 2020-06-22 ENCOUNTER — APPOINTMENT (OUTPATIENT)
Dept: OCCUPATIONAL THERAPY | Facility: CLINIC | Age: 65
End: 2020-06-22
Payer: COMMERCIAL

## 2020-06-23 ENCOUNTER — HOSPITAL ENCOUNTER (OUTPATIENT)
Dept: SLEEP CENTER | Facility: CLINIC | Age: 65
Discharge: HOME | End: 2020-06-23
Payer: COMMERCIAL

## 2020-06-23 DIAGNOSIS — I48.0 PAROXYSMAL ATRIAL FIBRILLATION (HCC): ICD-10-CM

## 2020-06-23 DIAGNOSIS — G47.19 EXCESSIVE DAYTIME SLEEPINESS: ICD-10-CM

## 2020-06-23 PROCEDURE — G0399 HOME SLEEP TEST/TYPE 3 PORTA: HCPCS

## 2020-06-24 ENCOUNTER — OFFICE VISIT (OUTPATIENT)
Dept: OCCUPATIONAL THERAPY | Facility: CLINIC | Age: 65
End: 2020-06-24
Payer: COMMERCIAL

## 2020-06-24 DIAGNOSIS — G81.94 LEFT HEMIPARESIS (HCC): Primary | ICD-10-CM

## 2020-06-24 PROCEDURE — 97140 MANUAL THERAPY 1/> REGIONS: CPT

## 2020-06-24 PROCEDURE — 97112 NEUROMUSCULAR REEDUCATION: CPT

## 2020-06-24 PROCEDURE — 97110 THERAPEUTIC EXERCISES: CPT

## 2020-06-25 ENCOUNTER — OFFICE VISIT (OUTPATIENT)
Dept: OCCUPATIONAL THERAPY | Facility: CLINIC | Age: 65
End: 2020-06-25
Payer: COMMERCIAL

## 2020-06-25 DIAGNOSIS — G81.94 LEFT HEMIPARESIS (HCC): Primary | ICD-10-CM

## 2020-06-25 PROCEDURE — 97112 NEUROMUSCULAR REEDUCATION: CPT

## 2020-06-25 PROCEDURE — 97110 THERAPEUTIC EXERCISES: CPT

## 2020-06-30 ENCOUNTER — OFFICE VISIT (OUTPATIENT)
Dept: OCCUPATIONAL THERAPY | Facility: CLINIC | Age: 65
End: 2020-06-30
Payer: COMMERCIAL

## 2020-06-30 DIAGNOSIS — G81.94 LEFT HEMIPARESIS (HCC): Primary | ICD-10-CM

## 2020-06-30 PROCEDURE — 97112 NEUROMUSCULAR REEDUCATION: CPT

## 2020-06-30 PROCEDURE — 97140 MANUAL THERAPY 1/> REGIONS: CPT

## 2020-06-30 PROCEDURE — 97110 THERAPEUTIC EXERCISES: CPT

## 2020-07-01 ENCOUNTER — OFFICE VISIT (OUTPATIENT)
Dept: OCCUPATIONAL THERAPY | Facility: CLINIC | Age: 65
End: 2020-07-01
Payer: COMMERCIAL

## 2020-07-01 DIAGNOSIS — G81.94 LEFT HEMIPARESIS (HCC): Primary | ICD-10-CM

## 2020-07-01 PROCEDURE — 97112 NEUROMUSCULAR REEDUCATION: CPT

## 2020-07-01 PROCEDURE — 97530 THERAPEUTIC ACTIVITIES: CPT

## 2020-07-01 PROCEDURE — 97110 THERAPEUTIC EXERCISES: CPT

## 2020-07-01 NOTE — PROGRESS NOTES
Daily Note     Today's date: 2020  Patient name: Martha Witt  : 1955  MRN: [id number]  Referring provider: Marshel Klinefelter, MD  Dx:   Encounter Diagnosis   Name Primary?  Left hemiparesis (HCC) Yes                  Subjective: "I put the stim pads on my rm and turned it up  My wrist and fingers were really going"  Objective: See treatment diary below    Pt participated in skilled OT focusing on motor/neuro re-ed and tone reduction leading to improved AROM and quality of movement to increase indep in ADL/IASL's and workers role  UEB for 5min prograde/5 min retrograde on L3 resistance w/G tolerance to increase in resistance, pt able to sustain grasp 95% of time  FES with pad placement to extensor mass and distal forearm focusing on muscle facilitation for wrist/digit extension simultaneously WB into RUE for block retrieval using LUE placing to right of pt to low surface facilitating WS/WB for tone reduction, pt removed foam blocks to target with focus on open hand grasp/release with frequent droppage noted  Wrist extension to near 3/4 end range, digit extension near full sustained about 85% of time  Pt able to fully extend elbow with max difficulty to extend wrist and open hand for functional movement pattern during block retrieval using LUE  WB in quadriped focusing on tone reduction of wrist and digits w/WS/WB into left side followed by modified push ups  Pt utilized sandwich splint in attempts to sustain digit extension increasing wrist extesnion in WB w/minimal success  Assessment: Tolerated treatment well   Patient would benefit from continued OT      Plan: Continued skilled OT per POC     INTERVENTION COMMENTS:   Diagnosis: Left hemiparesis ([address] ) [G81 94]  Willodean Riedel in L shoulder  FOTO: 5 with 95% limitation in UE fx'n  Insurance: Payor: BLUE CROSS / Plan: [employer] PLAN [id number] / Product Type: Blue Fee for Service /   Product Type: Blue Fee for Service /   4 of 8 visits, PN due 7/19/2020

## 2020-07-06 ENCOUNTER — APPOINTMENT (OUTPATIENT)
Dept: OCCUPATIONAL THERAPY | Facility: CLINIC | Age: 65
End: 2020-07-06
Payer: COMMERCIAL

## 2020-07-06 DIAGNOSIS — G47.33 OSA (OBSTRUCTIVE SLEEP APNEA): Primary | ICD-10-CM

## 2020-07-07 ENCOUNTER — OFFICE VISIT (OUTPATIENT)
Dept: OCCUPATIONAL THERAPY | Facility: CLINIC | Age: 65
End: 2020-07-07
Payer: COMMERCIAL

## 2020-07-07 ENCOUNTER — TELEPHONE (OUTPATIENT)
Dept: NEUROLOGY | Facility: CLINIC | Age: 65
End: 2020-07-07

## 2020-07-07 DIAGNOSIS — G81.94 LEFT HEMIPARESIS (HCC): Primary | ICD-10-CM

## 2020-07-07 PROCEDURE — 97110 THERAPEUTIC EXERCISES: CPT

## 2020-07-07 PROCEDURE — 97140 MANUAL THERAPY 1/> REGIONS: CPT

## 2020-07-07 PROCEDURE — 97112 NEUROMUSCULAR REEDUCATION: CPT

## 2020-07-07 NOTE — TELEPHONE ENCOUNTER
Called and spoke to pt and reviewed the results and recommendations  He is agreeable to seeing a sleep specialist  He is aware I will forward the referral but I also provided him the number to call to schedule as well  He states therapy is going well  He feels he can see improvement especially in his left arm

## 2020-07-07 NOTE — TELEPHONE ENCOUNTER
----- Message from Светлана Waldron MD sent at 7/6/2020  8:39 AM EDT -----  Please give Livan santos a call  His home sleep study was suggestive of mild obstructive sleep apnea  Based on that we really should have him see a sleep specialist at this point to have them review the study to be sure and talk about treatment if indicated  Also, how is he doing with the therapy? He had some backsliding of his physical abilities, has he improved back to his prior baseline? Thanks

## 2020-07-07 NOTE — PROGRESS NOTES
Occupational Therapy Daily Note:    Today's date: 2020  Patient name: Omero Norwood  : 1955  MRN: [id number]  Referring provider: Teja Duggan MD  Dx:   Encounter Diagnosis   Name Primary?  Left hemiparesis ([facility]) Yes       Start Time: 1300  Stop Time: 1400  Total time in clinic (min): 60 minutes    Subjective: "My shoulder blade definitely feels better"    Objective: Pt seen for OT treatment session focusing on  LUE NMR, tone reduction, and fxnl grasp/release to inc indep and engagement in ADL/IADL fxn      Pt performed 5 min x2 (prograde/retrograde movements) on UBE at 2 5 resist focused on LUE proximal stability/mobility, strength/endurance and sustained grasp of L hand  Pt performed with FES applied to digit flexors and and FF of shoulder to promote sustained grasp and to achieve full FF with prograde/retrograde motions  NMES applied to post delt and supraspinatus for subluxation reduction and proximal strengthening followed by active assist grasp/release with saebo barrel with FES applied to wrist/digit extensors to promote item release  Pt able to complete full flexion of digits to grasp with increased difficulties terminating flexors and initiating extensors  NMES for tone reduction to wrist/digit flexors followed by Resistive repetitive flexion to promote flexor muscle fatigue     Assessment: Tolerated treatment well  Pt demo improved range and activation from NMES/FES without volitional attempts to grasp/release as pt has difficulty terminating distal muscle groups to produce desired fxnl release  Pt may benefit from mirror box therapy to provide visual and b/l feedback to inc L UE fxn  Patient would benefit from continued skilled OT  Plan: Continued skilled OT per POC      INTERVENTION COMMENTS:   Diagnosis: Left hemiparesis ([facility]) [G81 94]  Precautions: pain in L shoulder  FOTO: 5 with 95% limitation in UE fx'n  Insurance: Payor: [employer] / Plan: [employer] PLAN 361 / Product Type: Blue Fee for Service /   Product Type: Blue Fee for Service /   5 of 8 visits, PN due 7/19/2020

## 2020-07-08 ENCOUNTER — TELEPHONE (OUTPATIENT)
Dept: SLEEP CENTER | Facility: CLINIC | Age: 65
End: 2020-07-08

## 2020-07-08 ENCOUNTER — OFFICE VISIT (OUTPATIENT)
Dept: OCCUPATIONAL THERAPY | Facility: CLINIC | Age: 65
End: 2020-07-08
Payer: COMMERCIAL

## 2020-07-08 DIAGNOSIS — G81.94 LEFT HEMIPARESIS (HCC): Primary | ICD-10-CM

## 2020-07-08 PROCEDURE — 97112 NEUROMUSCULAR REEDUCATION: CPT

## 2020-07-08 PROCEDURE — 97140 MANUAL THERAPY 1/> REGIONS: CPT

## 2020-07-08 NOTE — PROGRESS NOTES
Daily Note     Today's date: 2020  Patient name: Marie Pugh  : 1955  MRN: [id number]  Referring provider: Khai Curiel MD  Dx:   Encounter Diagnosis   Name Primary?  Left hemiparesis (HCC) Yes                  Subjective: "My shoulder still hurts but its not as painful"  Objective: See treatment diary below    Pt participated in skilled OT focusing on neuro/motor re-ed, tone reduction and endurance to improve functional abilities engaging in ADL/IADL's  UEB for 5 min prograde/5 min retrograde, L2 5 resistance tolerating well demo-sustained grasp  FES to rhomboids, lower traps, and triceps focusing on elbow extension and retraction while at same time performing ball roll in fwd motion right hand over left for stabilization, utilizing LUE to facilitate active movement  Ktape applied to rhomboids, medial border of scap and shoulder region facilitating retraction and pain reduction to improve winging of scap and posture  Prone on mat with GA for LUE extension, FES to forearm extensors and dorsum of hand pt engaged in functional hand movement of grasp/release in forward reach for cone retrieval and placement  Improvement noted in hand/wrist movement w/mass practice although pt required [facility] [facility] assist for release function about 85% of time due to difficulty to terminate flexors  Activity timed to NMES  Assessment: Tolerated treatment well   Patient would benefit from continued OT    Plan: Continued skilled OT per POC    INTERVENTION COMMENTS:   Diagnosis: Left hemiparesis ([facility] [address] ) [G81 94]  Leonard Nunez in L shoulder  FOTO: 5 with 95% limitation in UE fx'n  Insurance: Payor: BLUE CROSS / Plan: [facility] PLAN 361 / Product Type: Blue Fee for Service /   Product Type: Blue Fee for Service /   6 of 8 visits, PN due 2020

## 2020-07-08 NOTE — TELEPHONE ENCOUNTER
----- Message from Jess Larson MD sent at 6/26/2020 10:18 AM EDT -----  Needs treatment  Sleep clinic if okay with referring physician    Thanks

## 2020-07-08 NOTE — TELEPHONE ENCOUNTER
Left message for the patient to call back for sleep study results  Mild PADDY  Patient has referral for consult in sleep medicine    Patient needs to schedule consult with Dr Keke Mena

## 2020-07-13 ENCOUNTER — OFFICE VISIT (OUTPATIENT)
Dept: OCCUPATIONAL THERAPY | Facility: CLINIC | Age: 65
End: 2020-07-13
Payer: COMMERCIAL

## 2020-07-13 DIAGNOSIS — G81.94 LEFT HEMIPARESIS (HCC): Primary | ICD-10-CM

## 2020-07-13 PROCEDURE — 97110 THERAPEUTIC EXERCISES: CPT

## 2020-07-13 PROCEDURE — 97112 NEUROMUSCULAR REEDUCATION: CPT

## 2020-07-13 PROCEDURE — 97530 THERAPEUTIC ACTIVITIES: CPT

## 2020-07-13 NOTE — PROGRESS NOTES
Occupational Therapy Daily Note:    Today's date: 2020  Patient name: Angeles Sibley  : 1955  MRN: [id number]  Referring provider: Paramjit Guerra MD  Dx:   Encounter Diagnosis   Name Primary?  Left hemiparesis (HCC) Yes                  Subjective: "I noticed sometimes at night I can move this hand really well"    Objective: Pt seen for OT treatment session focusing on L UE NMR, tone reduction, proximal strengthening, distal AROM, and pt education  Pt performed 5 min x2 (prograde/retrograde movements) on UBE at 2 5 resist focused on LUE proximal stability/mobility, strength/endurance and sustained grasp of L hand  Pt performed with FES applied to ant/mid delt and and b/l rhomboids to promote FF and protraction/retraction  with prograde/retrograde motions  NMES applied to post delt and supraspinatus for subluxation reduction and proximal strengthening/stability  Pt performed mirror box therapy seated EOB with NMES applied to wrist and digit extensors followed by thumb abd to address AROM and muscle activiation/termination with visual feedback  Pt demo improved ability to tolerate NMES as well as improved initiation and termination of extensors/flexors of distal L UE  Assessment: Tolerated treatment well  Pt is demonstrating improved activation of wrist and digit extensors with improved termination of flexors to initiate extensors,Patient would benefit from continued skilled OT  Plan: Continued skilled OT per POC        INTERVENTION COMMENTS:   Diagnosis: Left hemiparesis ([facility] [address] ) [G81 94]  Precautions: pain in L shoulder  FOTO: 5 with 95% limitation in UE fx'n  Insurance: Payor: BLUE CROSS / Plan: [facility] PLAN 361 / Product Type: Blue Fee for Service /   Product Type: Blue Fee for Service /   7 of 8 visits, PN due 2020

## 2020-07-16 ENCOUNTER — EVALUATION (OUTPATIENT)
Dept: OCCUPATIONAL THERAPY | Facility: CLINIC | Age: 65
End: 2020-07-16
Payer: COMMERCIAL

## 2020-07-16 DIAGNOSIS — G81.94 LEFT HEMIPARESIS (HCC): Primary | ICD-10-CM

## 2020-07-16 PROCEDURE — 97530 THERAPEUTIC ACTIVITIES: CPT

## 2020-07-16 NOTE — PROGRESS NOTES
Occupational Therapy  Progress Note/Status Update #2:    Today's Date: 2020  Patient Name: Eldridge Essex  : 1955  MRN: [id number]  Referring Provider: Cassidy Abdullahi MD  Dx: Left hemiparesis [facility]) [G81 94]    Active Problem List:   Patient Active Problem List   Diagnosis    History of stroke    Hypertension    Anxiety    Hyperlipidemia    Gout    Prediabetes    Bradycardia    Left hemiparesis ([address] )    Left shoulder pain    Encounter for loop recorder check    Acute intractable tension-type headache    Paroxysmal atrial fibrillation (HCC)    PADDY (obstructive sleep apnea)     Past Medical Hx:   Past Medical History:   Diagnosis Date    Hypertension     Migraine     Stroke [facility])      Past Surgical Hx:   Past Surgical History:   Procedure Laterality Date    APPENDECTOMY      KNEE SURGERY      NECK SURGERY      TONSILLECTOMY        Pain Levels:   Restin    With Activity:  6/10 (shoulder blade)    Subjective/Patient Goal: "Im still motivated to keep coming here, im noticing improvement"        Objective  Impairments Section:   UE Strength:   JENNIFER: RUE: 65/200 LUE: 37/200   PINCER: 3 point pinch: RUE 19, LUE: 6   2 point pinch: RUE: 18, LUE: 9   Lateral pincher: RUE: 17, LUE: 12 5    Coordination:   9 HOLE PEG TEST:     Deferred 2* unable to complete 2* spasticity      Functional Dexterity Test:   Deferred 2* unable to complete 2* spasticity     Range of Motion:  AROM: Affected UE left      Shoulder elevation: full  Shoulder FF: near full range  Shoulder ABD: 1/2 range pain free  ER/IR: 1/4 range ER, 1/4 range IR  Elbow ext/flex:1/2 range flex, full extension  Sup/Pron: full pronation, near full supination  Wrist flex/ext: full, able to initiate extension from flexed position   Composite: full  Hook: 1/2 range  Opposition: able to oppose 1st digit and 2nd digit to thumb with manual blocking of other digits  Finger to nose: unable             AROM: Affected UE left  (supine) Shoulder elevation: full  Shoulder FF: 3/4 range with mild pain in [address] joint  Shoulder ABD: 1/2 range with mild pain  ER/IR: full IR near full ER  Elbow ext/flex:full flexion, full extension  Sup/Pron: full pronation, 1/2 supination  Wrist flex/ext: full, able to initiate extension from flexed position  :         5  PROM: Affected UE left      shoulder elevation: full  Shoulder FF: full   Shoulder ABD: 3/4 range  ER/IR: 1/2 IR, near FULL ER  Elbow ext/flex: FULL  Sup/Pron: FULL  Wrist flex/ext: FULL     6  Subluxation: Yes  of <1 finger breadth of left  UE    Pt is R hand dominant    Sensation:  MYOFILAMENTS:   RUE: 3 61   LUE: 3 61    Assessment/Plan  Occupational Therapy Skilled Analysis Assessment and Plan of Care:  Skilled Analysis:  Pt is a 71 y o  male referred to Occupational Therapy s/p Left hemiparesis ([facility] [address] ) [G81 94]  Pt reports that since participation in skilled OT he has noticed improvements in both performance and occupation based factors  Including reduced tone/tightness in distal UE, reduced pain and improved coordination and ROM to perform ADLs (including shaving tasks and IADlLs of dish washing to name a few)  Pt participated in skilled OT evaluation and following formalized testing, pt has presented iwht improvements in FMS, GMS, ROM, strength and pain  Pt able to complete near full FF with no pain reported today supine on mat  Pt continues to present with the following areas of deficit:  forward functional reach, decreased I w/ ADLS/IADLS, strength, A/PROM, impaired fine motor skills, coordination deficits and hypertonicity of  of distal L UE, and reduced proprioception limiting his indep and engagement in ADLs/IADLs  Pt does demo the need for skilled Occupational Therapy services 2x/week for another 4-8 weeks with focus on LUE NMR, increasing indpe in ADL/IADL fxn and for overall tone reduction to inc fxnl reach and use to address the goals as listed below   Plan to complete proximal strengthening  and stabilization of LUE increasing to distal fxnl use and strength  Treatment today  - pt brought in his home NMES unit  OTR modified settings to address L UE proximal strengthening for subluxation reduction and for distal extensor tone reduction   Pt provided with written handouts for carryover with G understanding    Treatment Interventions  Proximal strengthening/stability              WB, use of FES/NMES, quad/prone, scapular exercises  UBE  Tone reduction for wrist/digit extensors  IASTM  Pain management  Subluxation reduction techniques    Goals:  Short Term Goals (4 -6)WKS = LTGs     MOTOR     · Pt will increase LUE prehension patterns for improved tripod with utensil management with <25% droppage-PARTIALLY MET     · Pt will tolerate BIONESS/NMES/IASTM for improved motor and sensory performance for overall improved hand to target with 75% accuracy -NOT MET     · Pt will increase proprioception of LUE hand to target for improved functional reach vision occluded with ADL tasks -PARTIALLY MET     · Pt will increase automaticity of LUE to 50% for improved grasp release of tabletop items for improved functional performance with salient tasks-PARTIALLY MET     · Pt will increase LUE rate of manipulation x 25% for all FM tests for improved functional performance with salient tasks -NOT MET     · Pt will increase L UE strength to 3+/5 and  strength R=L, through the use of strengthening exercises and home program for eventual return to life and work roles and salient tasks-NOTMET     · Pt will increase LUE to functional assist with <20% cuing for tabletop tasks for improved functional performance of life roles and salient tasks -NOTMET     ? Pt will demo with G tolerance to supine, seated, and in stance exercise x 45 minutes with minimal rest breaks required for increased engagement in life roles and weekly exercise regimen -MET     ? Pt will demo with G carryover of Home Exercise Program to improve functional progression towards goals in Plan of care and for improved functional use of LUE - MET     · Pt will demo with decreased  LUE  shoulder sublux to trace for painfree AROM for improved ADL and IADL engagement-PARTIALLY MET     · Pt will demo decreased hypertonicity of L UE by 25% for improved alternate motor patterns, grasp/release, termination on command for improved dressing/hygiene -PARTIALLY MET     · Pt will demo good carryover of clinic and home tone reduction strategies for improved AROM initiation with functional reach with ADL fxn 75% of the time-PARTIALLY MET     · Pt will increase compliance with  WHFO for improved LPS with  improved fit/decreased discomfort  of resting hand  splint with wear time -MET      Intervention comments  Diagnosis: Left hemiparesis ([facility] [address] ) [G81 94]  Colin Croft in L shoulder  FOTO: 5 with 95% limitation in UE fx'n  Insurance: Payor: ROBERTA CROSS / Plan: [facility] PLAN 361 / Product Type: Blue Fee for Service /   Product Type: Blue Fee for Service /   8 of 8 visits, PN due 7/19/2020

## 2020-07-20 ENCOUNTER — OFFICE VISIT (OUTPATIENT)
Dept: OCCUPATIONAL THERAPY | Facility: CLINIC | Age: 65
End: 2020-07-20
Payer: COMMERCIAL

## 2020-07-20 DIAGNOSIS — G81.94 LEFT HEMIPARESIS (HCC): Primary | ICD-10-CM

## 2020-07-20 PROCEDURE — 97110 THERAPEUTIC EXERCISES: CPT

## 2020-07-20 PROCEDURE — 97112 NEUROMUSCULAR REEDUCATION: CPT

## 2020-07-20 NOTE — PROGRESS NOTES
Daily Note     Today's date: 20  Patient name: Susan Calles  : 1955  MRN: [id number]  Referring provider: Piper Haynes MD  Dx: Left hemiparesis [facility]) [G81 94]               Subjective: "It feels good to weight into my hand and bend my wrist "      Objective: See treatment diary below    Pt performed neuro re-ed with FES applied to supraspinatus/posterior delt  For sublux reduction while engaged in seated extended LUE WB  Pt completed dowel retrieval/placement crossing midline with RUE  Task upgraded to WB with physioball completing isometric holds/pulses as well as shoulder FF/extension  Pt transitioned to standing utilizing saebo ranger on vertical surface performing shoulder FF and diagonal movements focusing on improved LUE proximal stability/flexibility, muscle strength/endurance, and distal/proximal tone reduction  Pt completed fxnl grasp/release task utilizing lateral pinch with L hand for dowel retrieval/placement for improved distal motor control, volitional grasp/release hand to target accuracy during fxnl tasks  Pt performed x 5 mins prograde x 5 mins retrograde in seated position on UBE with 3 resistance focused on improving B/L coordination, UE proximal stability/mobility, muscle strength/endurance, and sustained grasp of L hand  Assessment: Tolerated treatment well  Patient would benefit from continued OT    Pt responded well to inc WB with noticeable reduction in tone and ability to sustain digit/wrist extension with aircast donned throughout WB maintaining elbow extension for inc proximal support  Pt demo significant inc in abilities to terminate digit flexors allowing for volitional release during grasp/release task following prolonged WB  OTR providing manual A at wrist to position in extension for improved muscle control and proximal stability to facilitate digit isolation  Plan: Continue per plan of care        Pt would benefit from continued WB strategies prior to grasp/release tasks for tone reduction to improve volitional grasp/release       INTERVENTION COMMENTS:    Pt was seen by [place], OTS under direct supervision of Enma David MS, OTR/L

## 2020-07-22 ENCOUNTER — APPOINTMENT (OUTPATIENT)
Dept: OCCUPATIONAL THERAPY | Facility: CLINIC | Age: 65
End: 2020-07-22
Payer: COMMERCIAL

## 2020-07-23 ENCOUNTER — OFFICE VISIT (OUTPATIENT)
Dept: OCCUPATIONAL THERAPY | Facility: CLINIC | Age: 65
End: 2020-07-23
Payer: COMMERCIAL

## 2020-07-23 ENCOUNTER — OFFICE VISIT (OUTPATIENT)
Dept: SLEEP CENTER | Facility: CLINIC | Age: 65
End: 2020-07-23
Payer: COMMERCIAL

## 2020-07-23 VITALS
DIASTOLIC BLOOD PRESSURE: 80 MMHG | BODY MASS INDEX: 27.74 KG/M2 | WEIGHT: 183 LBS | SYSTOLIC BLOOD PRESSURE: 123 MMHG | TEMPERATURE: 98 F | HEIGHT: 68 IN

## 2020-07-23 DIAGNOSIS — G81.94 LEFT HEMIPARESIS (HCC): Primary | ICD-10-CM

## 2020-07-23 DIAGNOSIS — G47.33 OSA (OBSTRUCTIVE SLEEP APNEA): ICD-10-CM

## 2020-07-23 PROCEDURE — 97112 NEUROMUSCULAR REEDUCATION: CPT

## 2020-07-23 PROCEDURE — 97140 MANUAL THERAPY 1/> REGIONS: CPT

## 2020-07-23 PROCEDURE — 99243 OFF/OP CNSLTJ NEW/EST LOW 30: CPT | Performed by: INTERNAL MEDICINE

## 2020-07-23 NOTE — PROGRESS NOTES
Daily Note     Today's date: 2020  Patient name: Dana Green  : 1955  MRN: [id number]  Referring provider: Sindy Tavarez MD  Dx:   Encounter Diagnosis   Name Primary?  Left hemiparesis (HCC) Yes                  Subjective: "I think I tighten up because Im fatigued"  Objective: See treatment diary below    Pt participated in skilled OT focusing on neuro/motor re-ed and tone reduction improving AROM needed for increased independence in ADL/IADL activities  Session initiated with pt WB into LUE utilizing aircast to sustain UE in extension during open chain activity crossing midline for BB retrieval facilitating digit/wrist extension as preparatory to mirror box therapy with focus on thumb abduction and forearm supination  Utilizing mirror box pt completed full range supination with R forearm 10x's followed by pt initiation of L AROM near 1/2 range supination simultaneously w/R forearm, assist needed to stabilize wrist during movement, x5  Pt initiated thumb abduction using right hand 10x's followed by simultaneously utilizing R/L thumb w/STIM applied, AROM to near 1/2 range in abduction, pt able to sustain near full digit extension during activity  FES with pad placement to triceps facilitating elbow extension utilizing UE ranger demo F proximal stabilization during movement, digits sustained in extension  Assessment: Tolerated treatment well   Patient would benefit from continued OT      Plan: Continued skilled OT per POC     Intervention comments  Diagnosis: Left hemiparesis ([facility] [address] ) [G81 94]  Laura Alberta in L shoulder  FOTO: 5 with 95% limitation in UE fx'n  Insurance: Payor: [employer] CROSS / Plan: [facility] PLAN 361 / Product Type: Blue Fee for Service /   Product Type: Blue Fee for Service /   3 of 8 visits, PN due 2020

## 2020-07-23 NOTE — PROGRESS NOTES
Consultation - [facility] : 1955  MRN: [id number]      Assessment:  The patient's atrial fibrillation may be result of his obstructive sleep apnea  Treatment is therefore necessary  Obstructive sleep apnea may have also contributed directly to his CVA  Plan:  Initiate APAP 4 to 20 cm    Follow up:  Compliance check in two months    History of Present Illness:   76 y o male with history of atrial fibrillation and stroke, was suspected by his neurologist of having obstructive sleep apnea  Home sleep apnea testing did demonstrate mild PADDY with AHI = 5 4        Review of Systems      Genitourinary need to urinate more than twice a night and difficulty with erection   Cardiology ankle/leg swelling   Gastrointestinal none   Neurology frequent headaches, need to move extremities, muscle weakness, numbness/tingling of an extremity and balance problems   Constitutional none   Integumentary itching   Psychiatry anxiety   Musculoskeletal joint pain, muscle aches, back pain, legs twitching/jerking and leg cramps   Pulmonary none   ENT ringing in ears   Endocrine frequent urination   Hematological none         I have reviewed and updated the review of systems as necessary    Historical Information    Past Medical History:  CVA, Atrial fibrillation, Hypertension, Anxiety    Family History: non-contributory    Social History     Socioeconomic History    Marital status:      Spouse name: None    Number of children: None    Years of education: None    Highest education level: None   Occupational History    None   Social Needs    Financial resource strain: None    Food insecurity:     Worry: None     Inability: None    Transportation needs:     Medical: None     Non-medical: None   Tobacco Use    Smoking status: Former Smoker     Packs/day: 1 00     Types: Cigarettes, Cigars    Smokeless tobacco: Never Used    Tobacco comment: stop on 10/24/2018   Substance and Sexual Activity    Alcohol use: Yes     Comment: socially     Drug use: No    Sexual activity: None   Lifestyle    Physical activity:     Days per week: None     Minutes per session: None    Stress: None   Relationships    Social connections:     Talks on phone: None     Gets together: None     Attends Voodoo service: None     Active member of club or organization: None     Attends meetings of clubs or organizations: None     Relationship status: None    Intimate partner violence:     Fear of current or ex partner: None     Emotionally abused: None     Physically abused: None     Forced sexual activity: None   Other Topics Concern    None   Social History Narrative    None         Sleep Schedule: unremarkable        Medications/Allergies:    Current Outpatient Medications:     amLODIPine (NORVASC) 5 mg tablet, Take 5 mg by mouth daily Taking 1 1/2 daily daily , Disp: , Rfl: 5    apixaban (ELIQUIS) 5 mg, Take 1 tablet (5 mg total) by mouth 2 (two) times a day, Disp: 60 tablet, Rfl: 5    atorvastatin (LIPITOR) 80 mg tablet, Take 1 tablet (80 mg total) by mouth every evening, Disp: 90 tablet, Rfl: 3    FLUoxetine (PROzac) 20 mg capsule, Take 1 capsule (20 mg total) by mouth daily, Disp: 90 capsule, Rfl: 3    losartan (COZAAR) 50 mg tablet, Take 1 tablet (50 mg total) by mouth daily, Disp: 90 tablet, Rfl: 0    methocarbamol (ROBAXIN) 500 mg tablet, 1 tab BID X 1 week, then if no better 1 tab TID, Disp: 90 tablet, Rfl: 3        No notes on file                  Objective:    Vital Signs:   Vitals:    07/23/20 1500   BP: 123/80   Temp: 98 °F (36 7 °C)   Weight: 83 kg (183 lb)   Height: 5' 8" (1 727 m)     Neck Circumference: 15      [place] Sleepiness Scale:  Total score: 17    Physical Exam:    General: Alert, appropriate, cooperative, normal build    Head: NC/AT, no retrognathia    Nose: No septal deviation, nares partially obstructed, mucosa normal    Throat: Airway diminished, tongue base thickened, no tonsils visualized    Neck: Normal, no thyromegaly or lymphadenopathy, no JVD    Heart: RR, normal S1 and S2, no murmurs    Chest: Clear bilaterally    Extremity: No clubbing, cyanosis, trace edema on left    Skin: Warm, dry    Neuro: No motor abnormalities, cranial nerves appear intact    Sleep Study Results:   JARRETT = 5 6  PAP Pressure: Auto PAP: 4 to 20 cm  DME Provider: [employer] [facility] [employer]    Counseling / Coordination of Care  A description of the counseling / coordination of care: We discussed the pathophysiology of obstructive sleep apnea as well as the possible treatment options  We also discussed the rationale for positive airway pressure therapy  Board Certified Sleep Specialist    Portions of the record may have been created with voice recognition software  Occasional wrong word or "sound a like" substitutions may have occurred due to the inherent limitations of voice recognition software  Read the chart carefully and recognize, using context, where substitutions have occurred

## 2020-07-27 ENCOUNTER — OFFICE VISIT (OUTPATIENT)
Dept: OCCUPATIONAL THERAPY | Facility: CLINIC | Age: 65
End: 2020-07-27
Payer: COMMERCIAL

## 2020-07-27 DIAGNOSIS — G81.94 LEFT HEMIPARESIS (HCC): Primary | ICD-10-CM

## 2020-07-27 PROCEDURE — 97110 THERAPEUTIC EXERCISES: CPT

## 2020-07-27 PROCEDURE — 97112 NEUROMUSCULAR REEDUCATION: CPT

## 2020-07-27 NOTE — PROGRESS NOTES
Occupational Therapy Daily Note:    Today's date: 2020  Patient name: Felipe Márquez  : 1955  MRN: [id number]  Referring provider: Elroy Cornell MD  Dx:   Encounter Diagnosis   Name Primary?  Left hemiparesis (HCC) Yes                  Subjective: "I was really frustrated with this hand over the weekend"    Objective: Pt seen for OT treatment session focusing on L UE NMR, tone reduction, and pt education  Pt engaged in standing extended arm WB task at table to cross midline for peg placement  Pt req A from OTR to facilitate and maintain digit and wrist extension as well as elbow extension with WB tech  Pt req x 2 rest breaks to manage distal UE fatigue  Pt tolerated with NMES applied to post delt and supraspinatus for subluxation management and to inc proximal support/stability  Pt tolerated 6 min x 2 (prograde/retrograde motions at 2 0 resist) seated on UBE with focus on increasing proximal UE strength, endurance, act tolerance and ROM to inc indep and safety with ADL/IADL fxn and fxnl reaching tasks  Assessment: Tolerated treatment well  Pt tolerated > 25 min of extended arm WB with improved distal UE tone for improved positioning  In addition, pt demo improved ability to maintain grasp on UBE following extended arm WB, demonstrating an improvement from previous OT session  Patient would benefit from continued skilled OT  Plan: Continued skilled OT per POC      INTERVENTION COMMENTS:  Diagnosis: Left hemiparesis ([facility] [address] ) [G81 94]  Precautions: pain in L shoulder  FOTO: 5 with 95% limitation in UE fx'n  Insurance: Payor: ROBERTA CROSS / Plan: [facility] PLAN 361 / Product Type: Blue Fee for Service /   Product Type: Blue Fee for Service /   4 of 8 visits, PN due 2020

## 2020-07-28 NOTE — PROGRESS NOTES
Occupational Therapy Daily Note:    Today's date: 2020  Patient name: Severiano Finner  : 1955  MRN: [id number]  Referring provider: Bennie Vyas MD  Dx: No diagnosis found  Subjective: ***    Objective: Pt seen for OT treatment session focusing on ***    Assessment: Tolerated treatment well  Patient would benefit from continued skilled OT  Plan: Continued skilled OT per POC      INTERVENTION COMMENTS:  Diagnosis: Left hemiparesis ([facility] [address] ) [G81 94]  Kelly Arm in L shoulder  FOTO: 5 with 95% limitation in UE fx'n  Insurance: Payor: [employer] / Plan: [facility] PLAN 361 / Product Type: Blue Fee for Service /   Product Type: Blue Fee for Service /   5 of 8 visits, PN due 2020    Thank you for the consult!   Please call if you have any questions: [phone]  SERA Nguyen, GARRY, OTR/L, C-GCM, CSRS, CBIS  Director of Outpatient Neuro Occupational Therapy

## 2020-07-29 ENCOUNTER — OFFICE VISIT (OUTPATIENT)
Dept: OCCUPATIONAL THERAPY | Facility: CLINIC | Age: 65
End: 2020-07-29
Payer: COMMERCIAL

## 2020-07-29 DIAGNOSIS — G81.94 LEFT HEMIPARESIS (HCC): Primary | ICD-10-CM

## 2020-07-29 PROCEDURE — 97110 THERAPEUTIC EXERCISES: CPT

## 2020-07-29 PROCEDURE — 97112 NEUROMUSCULAR REEDUCATION: CPT

## 2020-07-29 PROCEDURE — 97140 MANUAL THERAPY 1/> REGIONS: CPT

## 2020-07-29 NOTE — PROGRESS NOTES
Daily Note     Today's date: 2020  Patient name: Heide Coe  : 1955  MRN: [id number]  Referring provider: Duyen Ceja MD  Dx:   Encounter Diagnosis   Name Primary?  Left hemiparesis (HCC) Yes                  Subjective: "When ever I think about the motion this hand tightens up"  Objective: See treatment diary below    Pt participated in skilled OT focusing on neuro/motor re-ed and tone reduction to increase AROM and stabilization of LUE  FES to forearm and dorsal aspect of hand while at same time enaging in open chain WB activity seated, facilitating digit/wrist extension  Assistance to sustain digit extension about 60% of time  Facilitation of pad pinch during functional grasp for retrieval of small foam cubes with assist to terminate flexors  UEB for 5 min prograde/5 min retrograde, L3 resistance with sustained grasp for duration of activity  Assessment: Tolerated treatment well   Patient would benefit from continued OT      Plan: Continued skilled OT per POC     INTERVENTION COMMENTS:  Diagnosis: Left hemiparesis ([address] ) [G81 94]  Charley Barakat in L shoulder  FOTO: 5 with 95% limitation in UE fx'n  Insurance: Payor: BLUE CROSS / Plan: [facility] PLAN 361 / Product Type: Blue Fee for Service /   Product Type: Blue Fee for Service /   5 of 8 visits, PN due 2020

## 2020-08-03 ENCOUNTER — OFFICE VISIT (OUTPATIENT)
Dept: OCCUPATIONAL THERAPY | Facility: CLINIC | Age: 65
End: 2020-08-03
Payer: COMMERCIAL

## 2020-08-03 ENCOUNTER — TELEPHONE (OUTPATIENT)
Dept: SLEEP CENTER | Facility: CLINIC | Age: 65
End: 2020-08-03

## 2020-08-03 DIAGNOSIS — G81.94 LEFT HEMIPARESIS (HCC): Primary | ICD-10-CM

## 2020-08-03 PROCEDURE — 97140 MANUAL THERAPY 1/> REGIONS: CPT

## 2020-08-03 PROCEDURE — 97112 NEUROMUSCULAR REEDUCATION: CPT

## 2020-08-03 PROCEDURE — 97110 THERAPEUTIC EXERCISES: CPT

## 2020-08-03 NOTE — PROGRESS NOTES
Occupational Therapy Daily Note:    Today's date: 8/3/2020  Patient name: Soha Flanagan  : 1955  MRN: [id number]  Referring provider: Bg Gipson MD  Dx:   Encounter Diagnosis   Name Primary?  Left hemiparesis (HCC) Yes                  Subjective: "I feel like the stim is helping more"    Objective: Pt seen for OT treatment session focusing on L UE NMR, tone management, proximal strengthening/stability, and pt HEP development    Pt tolerated NMES to proximal UE (post delt and supraspinatus) for subluxation management and proximal strengthening/stabilization  Pt tolerated simultaneously with distal NMES for tone reduction with IASTM to L hand  Pt engaged in extended arm WB into therapy ball for closed chain exercise with WB and hold for 5 seconds for isometric contraction  Pt tolerated 5 min x 2 (prograde/retrograde motions at 1 0 resist) seated on UBE with focus on increasing proximal UE strength, endurance, act tolerance and ROM to inc indep and safety with ADL/IADL fxn and fxnl reaching tasks  Assessment: Tolerated treatment well  Improved proximal endurance and positioning today with continued use of NMES at home environment with recommendation to up to 60 second on time, pt tolerated for NMES application today with no c/o pain  Patient would benefit from continued skilled OT  Plan: Continued skilled OT per POC      INTERVENTION COMMENTS:  Diagnosis: Left hemiparesis ([facility] [address] ) [G81 94]  Precautions: pain in L shoulder  FOTO: 5 with 95% limitation in UE fx'n  Insurance: Payor: BLUE CROSS / Plan: [facility] PLAN 361 / Product Type: Blue Fee for Service /   Product Type: Blue Fee for Service /   5 of 8 visits, PN due 2020

## 2020-08-05 ENCOUNTER — APPOINTMENT (OUTPATIENT)
Dept: OCCUPATIONAL THERAPY | Facility: CLINIC | Age: 65
End: 2020-08-05
Payer: COMMERCIAL

## 2020-08-07 ENCOUNTER — OFFICE VISIT (OUTPATIENT)
Dept: OCCUPATIONAL THERAPY | Facility: CLINIC | Age: 65
End: 2020-08-07
Payer: COMMERCIAL

## 2020-08-07 DIAGNOSIS — G81.94 LEFT HEMIPARESIS (HCC): Primary | ICD-10-CM

## 2020-08-07 PROCEDURE — 97140 MANUAL THERAPY 1/> REGIONS: CPT

## 2020-08-07 PROCEDURE — 97112 NEUROMUSCULAR REEDUCATION: CPT

## 2020-08-07 NOTE — PROGRESS NOTES
Daily Note     Today's date: 2020  Patient name: Yaneth Varghese  : 1955  MRN: [id number]  Referring provider: Sangeetha Rosario MD  Dx:   Encounter Diagnosis   Name Primary?  Left hemiparesis (HCC) Yes                  Subjective: "My hand just tightens up like this at home"  Objective: See treatment diary below    Pt participated in skilled OT focusing on neuro/motor selena, tone reduction, sublux mngt ot increase function in LUE improving safety and independence in ADL/IADL's in home environment and worker role  NMES to post delt/supraspinatous and extensor mas and dorsal aspect of hand for digit extension w/MH followed by IASTM to extensor weinberg for tone reduction to reduce tone and promote digit extension during functional grasp/release  In quadriped, WB into LUE focusing on proximal dynamic stabilization and strength while at same time utilizing skateboard focusing on wrist/digit extension  Followed by functional grasp/release of foam blocks utilizing L hand to target placed on low surface facilitating digit extension w/GA  Assessment: Tolerated treatment well  Pt responds well to STIM and massage techniques for tone reduction, however tone tends to increase in digits when performing WB tech seated and in Quadriped w/difficulty for digit extensor termination during release of object  Pt reported pain reduction in shoulder region with improvement noted in proximal stabilization and tricep activation engaging in functional reach activities   Patient would benefit from continued OT    Plan: Continued skilled OT per POC    INTERVENTION COMMENTS:  Diagnosis: Left hemiparesis ([facility] [address] ) [G81 94]  Marleny Smith in L shoulder  FOTO: 5 with 95% limitation in UE fx'n  Insurance: Payor: BLUE CROSS / Plan: [facility] PLAN [id number] / Product Type: Blue Fee for Service /   Product Type: Blue Fee for Service /   6 of 8 visits, PN due 2020

## 2020-08-10 ENCOUNTER — APPOINTMENT (OUTPATIENT)
Dept: OCCUPATIONAL THERAPY | Facility: CLINIC | Age: 65
End: 2020-08-10
Payer: COMMERCIAL

## 2020-08-12 NOTE — PROGRESS NOTES
Occupational Therapy Daily Note:    Today's date: 2020  Patient name: Niesha Mccord  : 1955  MRN: [id number]  Referring provider: Yanet Dickerson MD  Dx: No diagnosis found  Subjective: ***    Objective: Pt seen for OT treatment session focusing on ***    Assessment: Tolerated treatment {Tolerated treatment :[id number]}  Patient would benefit from continued skilled OT  Plan: Continued skilled OT per POC  INTERVENTION COMMENTS:  Diagnosis: Left hemiparesis ([facility] [address] ) [G81 94]  Precautions: pain in L shoulder  FOTO: 5 with 95% limitation in UE fx'n  Insurance: Payor: [employer] / Plan: [employer] PLAN [id number] / Product Type: Blue Fee for Service /   7 of 8 visits, PN due 2020    Thank you for the consult!   Please call if you have any questions: [phone]  SERA Bolaños, GARRY, OTR/L, C-GCM, CSRS, CBIS  Director of Outpatient Neuro Occupational Therapy

## 2020-08-13 ENCOUNTER — APPOINTMENT (OUTPATIENT)
Dept: OCCUPATIONAL THERAPY | Facility: CLINIC | Age: 65
End: 2020-08-13
Payer: COMMERCIAL

## 2020-08-14 ENCOUNTER — EVALUATION (OUTPATIENT)
Dept: OCCUPATIONAL THERAPY | Facility: CLINIC | Age: 65
End: 2020-08-14
Payer: COMMERCIAL

## 2020-08-14 DIAGNOSIS — G81.94 LEFT HEMIPARESIS (HCC): Primary | ICD-10-CM

## 2020-08-14 PROCEDURE — 97530 THERAPEUTIC ACTIVITIES: CPT

## 2020-08-14 NOTE — PROGRESS NOTES
OCCUPATIONAL THERAPY PROGRESS UPDATE #3:     08/14/20    Carmelita Lovlelcandis  1955  [id number]  Braydon Santiago MD  1  Left hemiparesis ([facility] [address] )    2  Chronic left shoulder pain         Subjective     "I get frustrated with the tightness of my hand"     PATIENT GOAL: "I want to really be able to use this left hand more, and to be able to drive with two hands again"        Assessment/Plan     Skilled Analysis:  Pt is a 71 y o  male referred to Occupational Therapy s/p Left hemiparesis ([facility] [address] ) [G81 94]  Pt participated in skilled OT evaluation and following formalized testing, presents with the following areas of deficit: pain, endurance, activity tolerance, forward functional reach, decreased I w/ ADLS/IADLS, strength, ROM, impaired fine motor skills, coordination deficits and hypertonicity of extensors of distal L UE, proximal pain with OH reach and fxnl range, loss of indep with ADL fxn     Pt does demo the need for skilled Occupational Therapy services 2x/week for 4-8 weeks with focus on LUE NMR, increasing indpe in ADL/IADL fxn and for overall tone reduction to inc fxnl reach and use to address the goals as listed below  Plan to complete proximal strengthening  and stabilization of LUE increasing to distal fxnl use and strength    Pt participated in skilled Occupational Therapy Re-evaluation to further assess fxnl progression towards therapy goals stated in POC  Pt reports and demo improvements in reduction of subluxation, dec pain with fxnl activity, inc posterior/proximal stability and strengthening, inc muscle strength/endurance proximal --> distal, inc PROM/AROM pain free, as well as emerging abilities to initiate refined prehension patterns and volitional grasp/release with L hand   Following formalized testing and clinical observation Pt continues to present with the following deficits: R hemiparesis distal >proximal, fluctuating tone/spasticity, dec motor planning and dec proximal stability limiting fxnl use of RUE with ADL/IADL fxn   Continue to recommend skilled OT services 2x /wk for another 4-6 wks to address      Goals:  Short Term Goals (4 -6)WKS = LTGs     MOTOR     · Pt will increase LUE prehension patterns for improved tripod with utensil management with <25% droppage : NOT MET     · Pt will tolerate BIONESS/NMES/IASTM for improved motor and sensory performance for overall improved hand to target with 75% accuracy : PARTIALLY MET     · Pt will increase proprioception of LUE hand to target for improved functional reach vision occluded with ADL tasks : MET      · Pt will increase automaticity of LUE to 50% for improved grasp release of tabletop items for improved functional performance with salient tasks: NOT MET     · Pt will increase LUE rate of manipulation x 25% for all FM tests for improved functional performance with salient tasks : NO TMET     · Pt will increase L UE strength to 3+/5 and  strength R=L, through the use of strengthening exercises and home program for eventual return to life and work roles and salient tasks: PARTIALLY MET     · Pt will increase LUE to functional assist with <20% cuing for tabletop tasks for improved functional performance of life roles and salient tasks : MET     ? Pt will demo with G tolerance to supine, seated, and in stance exercise x 45 minutes with minimal rest breaks required for increased engagement in life roles and weekly exercise regimen      ? Pt will demo with G carryover of Home Exercise Program to improve functional progression towards goals in Plan of care and for improved functional use of LUE            · Pt will demo with decreased  LUE  shoulder sublux to trace for painfree AROM for improved ADL and IADL engagement     · Pt will demo decreased hypertonicity of L UE by 25% for improved alternate motor patterns, grasp/release, termination on command for improved dressing/hygiene      · Pt will demo good carryover of clinic and home tone reduction strategies for improved AROM initiation with functional reach with ADL fxn 75% of the time     · Pt will increase compliance with  WHFO for improved LPS with  improved fit/decreased discomfort  of resting hand  splint with wear time                                                               Treatment Interventions  Proximal strengthening/stability              WB, use of FES/NMES, quad/prone, scapular exercises  UBE  Tone reduction for wrist/digit extensors  IASTM  Pain management  Subluxation reduction techniques     Pain Levels:      Restin     With Activity:  5     Objective     Impairment Observations:        UE ROM and Strength Testing (2nd position):   Dominant Hand: right        1  Dynamometer (2nd position) and Pinch Meter (in lbs)     R UE Intact                                      Gross Grasp: 40 lbs                                      Pincer: 17 lbs                                      Tripod: 16 lbs                                      Lateral: 16 5 lbs        L UE Impaired  Gross Grasp: 20 lbs              Pincer: 5 lbs              Tripod: 10 lbs              Lateral: 11 5 lbs     2     9-hole Peg Test:      RUE:   20 47 seconds        LUE:    2 min 11 seconds (removal of pegs only)     3  Myofilaments(3 61 WNL):      R UE: 3 61      L UE: 3 61         4  AROM: Affected UE left      Shoulder elevation: near full  Protraction/Retraction: near full  Shoulder FF: <3/4 range  Shoulder ABD: 3/4 with short lever  ER/IR: 1/2 ER, 1/2 IR  Elbow ext/flex: 3/4 flex, full ext  Sup/Pron: full pronation, near full supination   Wrist flex/ext: 3/4 flexion, 1/4 extension when placed in full flexion  Composite: full, able to initiate digit extension following prolonged PROM and demo 1/4 range L thumb ABD  Hook: 1/2 range  Opposition: unable  Finger to nose: unable           5      AROM: Affected UE left  (supine)    Shoulder elevation: full  Shoulder FF: near full with long lever pain free  Shoulder ABD: > 1/2 range with minimal pain   ER/IR: full IR  Elbow ext/flex: full flexion, full extension   Sup/Pron: full pronation, 1/2 supination in gravity eliminated position  Wrist flex/ext: full, able to initiate extension from flexed position           5  PROM: Affected UE left      shoulder elevation: full  Shoulder FF: near full  Shoulder ABD: near full   ER/IR: full   Elbow ext/flex: full  Sup/Pron: full  Wrist flex/ext: full      6   Subluxation: Yes  of <1 finger breadth of left UE      INTERVENTION COMMENTS:  Diagnosis: Left hemiparesis ([facility] [address] ) [G81 94]  Precautions: pain in L shoulder  FOTO: 5 with 95% limitation in UE fx'n  Insurance: Payor: [employer] CROSS / Plan: [facility] PLAN [id number] / Product Type: Blue Fee for Service /   Product Type: Blue Fee for Service /   7 of 8 visits, PN SST 5/59

## 2020-08-17 ENCOUNTER — OFFICE VISIT (OUTPATIENT)
Dept: OCCUPATIONAL THERAPY | Facility: CLINIC | Age: 65
End: 2020-08-17
Payer: COMMERCIAL

## 2020-08-17 DIAGNOSIS — G81.94 LEFT HEMIPARESIS (HCC): Primary | ICD-10-CM

## 2020-08-17 PROCEDURE — 97140 MANUAL THERAPY 1/> REGIONS: CPT

## 2020-08-17 PROCEDURE — 97110 THERAPEUTIC EXERCISES: CPT

## 2020-08-17 PROCEDURE — 97112 NEUROMUSCULAR REEDUCATION: CPT

## 2020-08-17 NOTE — PROGRESS NOTES
Occupational Therapy Daily Note:    Today's date: 2020  Patient name: Jocelyne Bobby  : 1955  MRN: [id number]  Referring provider: Patria Espinoza MD  Dx:   Encounter Diagnosis   Name Primary?  Left hemiparesis (HCC) Yes                  Subjective: " This arm feels okay today"    Objective: Pt seen for OT treatment session focusing on L UE NMR, tone reduction, and proximal UE strengthening  Pt tolerated NMES to distal UE for tone reduction of wrist and digit extensors while simultaneously tolerating IASTM to proximal UE for tone reduction to inc proximal flexibility and ROM  NMES to proximal UE to post delt and supraspinatus for subluxation reduction and proximal strengthening/stability to inc fxnl reach and reduce pain    [place] for PNF patterns to inc proximal UE strength, coordination and endurance  Assessment: Tolerated treatment well  Pt required use of coban wrap assist to maintain gross grasp on Sa[place]o glide with fxnl PNF patterns with manual assist and VC to maintain elbow extension with fxnl reach  Pain reported with >90* of abd, reduced demand to 90* with no pain  Pt is progressing well and Patient would benefit from continued skilled OT      Plan: Continued skilled OT per POC        INTERVENTION COMMENTS:  Diagnosis: Left hemiparesis ([address] ) [G81 94]  Mortimer Killian in L shoulder  FOTO: 5 with 95% limitation in UE fx'n  Insurance: Payor: BLUE CROSS / Plan: [facility] PLAN [id number] / Product Type: Blue Fee for Service /   Product Type: Blue Fee for Service /   8 of 8 visits, PN PXO

## 2020-08-18 ENCOUNTER — REMOTE DEVICE CLINIC VISIT (OUTPATIENT)
Dept: CARDIOLOGY CLINIC | Facility: CLINIC | Age: 65
End: 2020-08-18
Payer: COMMERCIAL

## 2020-08-18 DIAGNOSIS — Z95.818 PRESENCE OF OTHER CARDIAC IMPLANTS AND GRAFTS: Primary | ICD-10-CM

## 2020-08-18 PROCEDURE — G2066 INTER DEVC REMOTE 30D: HCPCS | Performed by: INTERNAL MEDICINE

## 2020-08-18 PROCEDURE — 93298 REM INTERROG DEV EVAL SCRMS: CPT | Performed by: INTERNAL MEDICINE

## 2020-08-18 NOTE — PROGRESS NOTES
MDT-LOOP RECORDER   CARELINK TRANSMISSION: LOOP RECORDER  PRESENTING RHYTHM SB @ 57 BPM  BATTERY STATUS "OK"  NO PATIENT OR DEVICE ACTIVATED EPISODES  NORMAL DEVICE FUNCTION   DL

## 2020-08-19 ENCOUNTER — OFFICE VISIT (OUTPATIENT)
Dept: OCCUPATIONAL THERAPY | Facility: CLINIC | Age: 65
End: 2020-08-19
Payer: COMMERCIAL

## 2020-08-19 DIAGNOSIS — G81.94 LEFT HEMIPARESIS (HCC): Primary | ICD-10-CM

## 2020-08-19 PROCEDURE — 97112 NEUROMUSCULAR REEDUCATION: CPT

## 2020-08-19 PROCEDURE — 97140 MANUAL THERAPY 1/> REGIONS: CPT

## 2020-08-19 PROCEDURE — 97110 THERAPEUTIC EXERCISES: CPT

## 2020-08-19 NOTE — PROGRESS NOTES
Daily Note     Today's date: 2020  Patient name: Alissa Underwood  : 1955  MRN: [id number]  Referring provider: Carolyn So MD  Dx:   Encounter Diagnosis   Name Primary?  Left hemiparesis (HCC) Yes                  Subjective: "I can do some stuff at home"  Objective: See treatment diary below    Pt participated in skilled OT focusing on motor/neuro re-ed, tone reduction to improve function in ADL/IADL's  In unsupported stance, WB on dynamic surface w/RUE, pt engaged in modified closed chain activity for dowel stack activity utilizing RUE crossing midline facilitating increase WB/WS to left    Pt engaged in reciprocal BLUE movement pattern placing rings to low surface on vertical pole w/FES applied to triceps facilitating tricep extension and grasp/release during functional forward reach  K tape applied to wrist for stabilization during functional activity  Supine on mat utilizing 2# tbar in FF, 3x10 to increase proximal strength improving quality of movement pattern during functional reach and to decrease trunk compensation  Assessment: Tolerated treatment well  Pt demo G essentric/concentric control for elbow flexion/extension in stance during WB activity  Significant decrease in proximal and wrist stabilization during task as pt stated "shoulder feeling weak"  Pt completed therex supine demo-ability to sustain isometric control of LUE with hand cobanned for stabilization and sustained grasp   Patient would benefit from continued OT      Plan: Continued skilled OT per POC     INTERVENTION COMMENTS:  Diagnosis: Left hemiparesis ([facility] [address] ) [G81 94]  Venkata Christian in L shoulder  FOTO: 5 with 95% limitation in UE fx'n  Insurance: Payor: ROBERTA DASILVA / Plan: [facility] PLAN 361 / Product Type: Blue Fee for Service /   Product Type: Blue Fee for Service /   1 of 8 visits, PN BNX

## 2020-08-24 ENCOUNTER — APPOINTMENT (OUTPATIENT)
Dept: OCCUPATIONAL THERAPY | Facility: CLINIC | Age: 65
End: 2020-08-24
Payer: COMMERCIAL

## 2020-08-25 ENCOUNTER — APPOINTMENT (OUTPATIENT)
Dept: OCCUPATIONAL THERAPY | Facility: CLINIC | Age: 65
End: 2020-08-25
Payer: COMMERCIAL

## 2020-08-26 ENCOUNTER — OFFICE VISIT (OUTPATIENT)
Dept: OCCUPATIONAL THERAPY | Facility: CLINIC | Age: 65
End: 2020-08-26
Payer: COMMERCIAL

## 2020-08-26 DIAGNOSIS — G81.94 LEFT HEMIPARESIS (HCC): Primary | ICD-10-CM

## 2020-08-26 PROCEDURE — 97140 MANUAL THERAPY 1/> REGIONS: CPT

## 2020-08-26 PROCEDURE — 97112 NEUROMUSCULAR REEDUCATION: CPT

## 2020-08-26 NOTE — PROGRESS NOTES
Occupational Therapy Daily Note:    Today's date: 2020  Patient name: Carmelita Haile  : 1955  MRN: [id number]  Referring provider: Braydon Satniago MD  Dx:   Encounter Diagnosis   Name Primary?  Left hemiparesis (HCC) Yes       Subjective: "Wow that really did loosen up, Im going to try that at home"    Objective: Pt seen for OT treatment session focusing on L UE NMR, tone reduction, proximal strengthening, and pt education  Pt tolerated NMES applied to antagonist of wrist and digit extensors for tone reduction to improve fxnl grasp/release activation while simultaneously tolerating NMES applied to post delt and supraspinatus for proximal UE strengthening and stabilization  Seated WB of L UE with full elbow extension to reach to target oriented on pt's L to inc proximal UE strengthening and stabilization and for reduced extensor tone  Pt require OTR assist at wrist to maintain position and min A at elbow to maintain elbow ext with WB tech  Pt tolerated 5 min x 2 (prograde/retrograde motions at 3 0 resist) seated on UBE with focus on increasing proximal UE strength, endurance, act tolerance and ROM to inc indep and safety with ADL/IADL fxn and fxnl reaching tasks  Assessment: Tolerated treatment well  With NMES application for tone reduction, OTR provided PROM of wrist/digits in full extension through duration of NMES application  Following low load prolonged stretch with simultaneous NMES for tone reduction , pt demo inc ease and ability to tolerate wrist and digit extension with assist and indep  Pt is progressing with this tech, will continue to address  edu pt to complete PROM at home environment with NMES for tone reduction  Patient would benefit from continued skilled OT  Plan: Continued skilled OT per POC      INTERVENTION COMMENTS:  Diagnosis: Left hemiparesis ([facility] [address] ) [G81 94]  Precautions: pain in L shoulder  FOTO: 5 with 95% limitation in UE fx'n  Insurance: BLUE CROSS   of 8 visits, PN due 9/14

## 2020-08-26 NOTE — PROGRESS NOTES
Daily Note     Today's date: 2020  Patient name: Adelfa Lanes  : 1955  MRN: [id number]  Referring provider: Cleve Jin MD  Dx:   Encounter Diagnosis   Name Primary?  Left hemiparesis (HCC) Yes                  Subjective: "My hand was really swollen yesterday and I don't know why"  Objective: See treatment diary below    Pt participated in skoccupational illed OT focusing on motor/neuro re-ed, tone reduction and proximal strengthening increasing AROM to improve performance in home and work environment  WB in quadriped for closed chain activity of rocking fwd/bwds facilitating wrist/digit in full UE extension folllowed by seated WB task with peg retrieval to left facilitating increased WB/WS to left completing activity w/RUE  IASTM to extensor weinberg and ramirez aspect of hand for tone reduction  Ktape applied to wrist for stabilization during block retrieval focusing on supination functional grasp/release to target  Assessment: Tolerated treatment well  Pt responded well to WB activity this session focusing on tone reduction w/G carryover for functional open/closed hand w/tricep extension, near full,  to low surface target w/min droppage   Patient would benefit from continued OT    Plan: Continued skilled OT per POC     INTERVENTION COMMENTS:  Diagnosis: Left hemiparesis ([facility] [address] ) [G81 94]  Elane Banker in L shoulder  FOTO: 5 with 95% limitation in UE fx'n  Insurance: Payor: BLUE CROSS / Plan: [employer] PLAN 361 / Product Type: Blue Fee for Service /   Product Type: Blue Fee for Service /   1 of 8 visits, PN WBY

## 2020-08-27 ENCOUNTER — OFFICE VISIT (OUTPATIENT)
Dept: OCCUPATIONAL THERAPY | Facility: CLINIC | Age: 65
End: 2020-08-27
Payer: COMMERCIAL

## 2020-08-27 DIAGNOSIS — G81.94 LEFT HEMIPARESIS (HCC): Primary | ICD-10-CM

## 2020-08-27 PROCEDURE — 97112 NEUROMUSCULAR REEDUCATION: CPT

## 2020-08-27 PROCEDURE — 97110 THERAPEUTIC EXERCISES: CPT

## 2020-08-27 PROCEDURE — 97530 THERAPEUTIC ACTIVITIES: CPT

## 2020-08-31 ENCOUNTER — OFFICE VISIT (OUTPATIENT)
Dept: OCCUPATIONAL THERAPY | Facility: CLINIC | Age: 65
End: 2020-08-31
Payer: COMMERCIAL

## 2020-08-31 DIAGNOSIS — G81.94 LEFT HEMIPARESIS (HCC): Primary | ICD-10-CM

## 2020-08-31 PROCEDURE — 97140 MANUAL THERAPY 1/> REGIONS: CPT

## 2020-08-31 PROCEDURE — 97112 NEUROMUSCULAR REEDUCATION: CPT

## 2020-08-31 PROCEDURE — 97530 THERAPEUTIC ACTIVITIES: CPT

## 2020-08-31 NOTE — PROGRESS NOTES
Occupational Therapy Daily Note:    Today's date: 2020  Patient name: Corrinne Ina  : 1955  MRN: [id number]  Referring provider: Seble Oakley MD  Dx:   Encounter Diagnosis   Name Primary?  Left hemiparesis (HCC) Yes       Subjective: "this just really sucks"    Objective: Pt seen for OT treatment session focusing on L UE tone reduction, L UE NMR, and pt education for continuation of HEP  Pt tolerated NMES applied to antagonist of wrist and digit extensors and bicep for tone reduction to improve fxnl grasp/release activation and facilitate elbow extension    Seated WB of L UE with full elbow extension to reach to target oriented on pt's L to inc proximal UE strengthening and stabilization and for reduced extensor tone  Pt require OTR assist at wrist to maintain position and min A at elbow to maintain elbow ext with WB tech  NMES applied to post delt and supraspinatus for proximal UE strengthening and stabilization                Assessment: Tolerated treatment well  Continuation of strategy of NMES application for tone reduction, OTR provided PROM of wrist/digits in full extension through duration of NMES  Following low load prolonged stretch with simultaneous NMES for tone reduction , pt again demo inc ease and ability to tolerate wrist and digit extension  Pt expressing frustrations with stroke recovery stating "I just wish I could use this hand"  OT providing emotional support with edu on purpose of OT to reduce tone in attempts to improve fxnl use  Pt would benefit from continued skilled OT  Plan: Continued skilled OT per POC      INTERVENTION COMMENTS:  Diagnosis: Left hemiparesis ([address] ) [G81 94]  Precautions: pain in L shoulder  FOTO: 5 with 95% limitation in UE fx'n  Insurance: BLUE CROSS  3 of 8 visits, PN due

## 2020-09-03 ENCOUNTER — OFFICE VISIT (OUTPATIENT)
Dept: OCCUPATIONAL THERAPY | Facility: CLINIC | Age: 65
End: 2020-09-03
Payer: COMMERCIAL

## 2020-09-03 DIAGNOSIS — G81.94 LEFT HEMIPARESIS (HCC): Primary | ICD-10-CM

## 2020-09-03 PROCEDURE — 97112 NEUROMUSCULAR REEDUCATION: CPT

## 2020-09-03 PROCEDURE — 97140 MANUAL THERAPY 1/> REGIONS: CPT

## 2020-09-03 NOTE — PROGRESS NOTES
Daily Note     Today's date: 9/3/2020  Patient name: Omero Norwood  : 1955  MRN: [id number]  Referring provider: Teja Duggan MD  Dx:   Encounter Diagnosis   Name Primary?  Left hemiparesis ([facility] [address] ) Yes       Start Time: 1500  Stop Time: 1600  Total time in clinic (min): 60 minutes    Subjective: "I stated to STIM my flexors too  It doesn't go as well as here but I make it work"  Objective: See treatment diary below    Pt participated in skilled OT focusing on motor/neuro re-ed and tone reduction improving AROM to increase independence in ADL/IADL's in home environment  STIM pad placement to forearm flexors and biceps facilitating muscle contraction while at same time low load graded LPS provided by therapist to wrist, digits and triceps facilitating LUE in full extension  WB into LUE with wrist/digits sustained in extension with activity placed to right facilitating WS/WB to left followed by left hand block retrieval using tongs donning sandwich splint to left wrist for stabilization to targeted low surface focusing on fxl reach w/grasp/release  Assessment: Tolerated treatment well  G response to STIM w/significant tone reduction noted, pt able to sustain throughout WB activity w/o therapist assist   Gradual increase in tone with grasp/release function using tongs for block retrieval/placement w/droppage noted about 50% of time, pt performed elbow flex/ext during functional reach activity w/o difficulty      would benefit from continued OT      Plan: Continued skilled OT per POC     INTERVENTION COMMENTS:  Diagnosis: Left hemiparesis ([facility] [address] ) [G81 94]  Precautions: pain in L shoulder  FOTO: 5 with 95% limitation in UE fx'n  Insurance: BLUE CROSS  4 of 8 visits, PN due

## 2020-09-09 ENCOUNTER — OFFICE VISIT (OUTPATIENT)
Dept: OCCUPATIONAL THERAPY | Facility: CLINIC | Age: 65
End: 2020-09-09
Payer: COMMERCIAL

## 2020-09-09 DIAGNOSIS — G81.94 LEFT HEMIPARESIS (HCC): Primary | ICD-10-CM

## 2020-09-09 PROCEDURE — 97112 NEUROMUSCULAR REEDUCATION: CPT

## 2020-09-09 PROCEDURE — 97140 MANUAL THERAPY 1/> REGIONS: CPT

## 2020-09-09 NOTE — PROGRESS NOTES
Daily Note     Today's date: 2020  Patient name: Angeles Sibley  : 1955  MRN: [id number]  Referring provider: Paramjit Guerra MD  Dx:   Encounter Diagnosis   Name Primary?  Left hemiparesis (HCC) Yes                  Subjective: "I'm using the STIM at home, sometimes its really loose"  Objective: See treatment diary below    Pt participated in skilled OT focusing on neuro/motor re-ed and tone reduction leading to improved quality of movement to increase independence in ADL/IADL's  FES with pad placement to forearm flexors and biceps with therapist assist for low load LPS in extension for 10 min followed by open chain WB activity on dynamic surface completing activity with RUE to low surface target  [place]ayune assist facilitating BL integration w/functional grasp/release utilizing functional tool with right hand over left to stabilize small drill with L shoulder elevation in abduction during activity  Assessment: Tolerated treatment well  Pt sustained LUE in extension (digit, wrist and elbow) with dynamic movment of card placement using RUE to low surface   Improvement noted in L hand tone during functional task evident by manipulation of drill with min difficulty w/[place]une assist   Patient would benefit from continued OT      Plan: Continued skilled OT per POC     INTERVENTION COMMENTS:  Diagnosis: Left hemiparesis ([address] ) [G81 94]  Precautions: pain in L shoulder  FOTO: 5 with 95% limitation in UE fx'n  Insurance: BLUE CROSS  5 of 8 visits, PN PQY

## 2020-09-11 ENCOUNTER — APPOINTMENT (OUTPATIENT)
Dept: OCCUPATIONAL THERAPY | Facility: CLINIC | Age: 65
End: 2020-09-11
Payer: COMMERCIAL

## 2020-09-14 ENCOUNTER — OFFICE VISIT (OUTPATIENT)
Dept: OCCUPATIONAL THERAPY | Facility: CLINIC | Age: 65
End: 2020-09-14
Payer: COMMERCIAL

## 2020-09-14 DIAGNOSIS — G81.94 LEFT HEMIPARESIS (HCC): Primary | ICD-10-CM

## 2020-09-14 PROCEDURE — 97110 THERAPEUTIC EXERCISES: CPT

## 2020-09-14 PROCEDURE — 97112 NEUROMUSCULAR REEDUCATION: CPT

## 2020-09-14 NOTE — PROGRESS NOTES
Occupational Therapy Daily Note:    Today's date: 2020  Patient name: Milind Sandoval  : 1955  MRN: [id number]  Referring provider: Chriss Rowley MD  Dx:   Encounter Diagnosis   Name Primary?  Left hemiparesis (HCC) Yes     Subjective: "I used to be able to close my hand and I can't now"  Objective: Pt seen for OT treatment session focusing on UEB w/ FES donned to L anterior delt/trap, wrist extensors x5 minute prograde 5 minute retrograde L1 0 resistance  Pt engaged in tripod grasp and release w/ placement of two yellow resistive clips around yellow theraweb required RUE to A LUE for proximal offloading to promote distal grasp/release potential  Pt engaged in grasp and release for lateral pincer grasp of multicolored pom poms for tabletop retrieval and placement into bucket w/ LUE x2 trials  Pt tolerated MH to distal LUE x10 minutes for tone reduction followed by IASTM to distal digits  Assessment: Tolerated treatment well  Patient would benefit from continued skilled OT  Plan: Continued skilled OT per POC  INTERVENTION COMMENTS:  Diagnosis: Left hemiparesis ([address] ) [G81 94]  Precautions: pain in L shoulder  FOTO: 5 with 95% limitation in UE fx'n  Insurance: BLUE CROSS  6 of 8 visits, PN due     Thank you for the consult!   Please call if you have any questions: [phone]  SERA Alvares, GARRY, OTR/L, C-GCM, CSRS, CBIS  Director of Outpatient Neuro Occupational Therapy

## 2020-09-16 NOTE — PROGRESS NOTES
Occupational Therapy Progress Note #4:    Today's Date: 2020  Patient Name: Skyler Joseph   : 1955  MRN: [id number]  Referring Provider: David Lutz MD  Dx: Left hemiparesis [facility]) [G81 94]    Active Problem List:   Patient Active Problem List   Diagnosis    History of stroke    Hypertension    Anxiety    Hyperlipidemia    Gout    Prediabetes    Bradycardia    Left hemiparesis ([facility] [address] [facility]    Left shoulder pain    Encounter for loop recorder check    Acute intractable tension-type headache    Paroxysmal atrial fibrillation ([facility] [address] )    PADDY (obstructive sleep apnea)     Past Medical Hx:   Past Medical History:   Diagnosis Date    Hypertension     Migraine     Stroke [facility])      Past Surgical Hx:   Past Surgical History:   Procedure Laterality Date    APPENDECTOMY      KNEE SURGERY      NECK SURGERY      TONSILLECTOMY          Pain Levels:  Restin    With Activity:  0    Subjective/Patient Goal: "I just don't think it's getting better"    Objective  Impairments Section:  1   Upper Extremity:    KIRT SHEFFIELD  STATUS                 UPPER EXTREMITY FXN Impaired Intact             DECLINE                /Pinch Strength           Dynamometer       - Gross Grasp 25 lbs 40 lbs  IMPROVEMENT    Pinch Meter        - PINCER 8 lbs 15 lbs  IMPROVEMENT     - TRIPOD 12 lbs 15 lbs  IMPROVEMENT     - LATERAL 12 lbs  21 lbs  IMPROVEMENT                AROM (seated)        Elevation/Depression 1/2    IMPROVEMENT    Shoulder Flex/Ext 1/2    IMPROVEMENT    Shoulder Abd 1/2    IMPROVEMENT    Shoulder Add 1/2    IMPROVEMENT    Horizontal Abd 1/2    IMPROVEMENT    Horizontal Add 1/2    IMPROVEMENT    Elbow Flex 3/4    IMPROVEMENT    Elbow Ext 3/4    IMPROVEMENT    Pronation 3/4    IMPROVEMENT    Supination 1/2    IMPROVEMENT    Wrist Flex 1/2    IMPROVEMENT    Wrist Ext 1/2    NO CHANGE    Digit Flex 1/2    NO CHANGE    Digit Ex 1/2   NO CHANGE    Composite Grasp 1/4    NO CHANGE    Hook Grasp 1/4    NO CHANGE Opposition 1/4    NO CHANGE    Subluxation + 1" anterolateral glenohumeral subluxation Trace, intact/full WFL/WNL all planes  NO CHANGE                      PROM (seated position)       Elevation/Depression       Retraction/Protraction       Shoulder Flex/Ext       Shoulder ABd/ADd       Horizontal ABd/Add       Elbow Flex       Elbow Ext       Wrist Flex       Wrist Ext       Digit Flex       Digit Ext          intact/full WFL/WNL all planes  intact/full WFL/WNL all planes    No change                         MMT           Elevation 3/5 5/5  NO CHANGE    Shoulder Flex/Ext 3/5 5/5  NO CHANGE    Shoulder Abd 3/5 5/5  NO CHANGE    Shoulder ADd 3/5 5/5  NO CHANGE    Elbow Flex 3/5 5/5  NO CHANGE    Elbow Ext 3/5 5/5  NO CHANGE    Wrist Flex 3/5 5/5  NO CHANGE    Wrist Ext 3/5 5/5  NO CHANGE    Gross Grasp 3/5 5/5  NO CHANGE           SENSATION       Myofilaments (3 61 Wnl) 3 61 3 61     Sharp Dull  Intact Intact     Proprioception Intact Intact     Hot/Cold Temp Intact Intact            MODIFIED HUNTER SCALE (TONE) 1+ 0  NO CHANGE    No increase in muscle tone (0)  X     Slight Increase in muscle tone with catch and release or min resist at end range (1)       Slight Increase in muscle tone with catch and release, followed by min resistance through remainder of range (1+) X      Increased muscle tone through full range, able to be moved easily (2)       Considerable increase in tone, difficult to move (3)       Rigid in Flexion/Extension (4)         Assessment/Plan  Occupational Therapy Skilled Analysis Assessment and Plan of Care:  Pt seen for OT treatment session pre re-eval for warm up  Pt engaged in UEB 5 minute prograde 5 minute retrograde w/ L1 0 resistance w/ FES donned to anterior delt/trap and wrist extensors, required coband to LUE retrograde 2* hemiparetic gross grasp w/ hand frequently slipping off   Pt tolerated proximal/distal MH t/o duration of remaining NMES time followed by distal IASTM to wrist/digits for tone reduction  Pt seen for OT re-eval/progress note/status update  Pt continues to demonstrate LUE hemiparesis w/ mixed tone proximal hypertonicity distal hypotonicity w/ impaired FMC/FMS/GMC/GMS, distal peripheral edema  At this point, pt has been receiving outpt OT since 5/26/2020, pt has functionally plateaued from an OT standpoint, recommend a break from OT encouraged to continue to perform HEP, NMES unit use, fx'l use t/o I/ADL/leisure/work tasks and to upkeep learned education to prevent functional decline, pt aware and agreeable  Educated if decline occurs pursue new script and return for outpt OT  Offered pt to return for outpt PT, pt reports will think about it and call me if interested to pursue script from neurology  Will d/c from OT at this time, pt agreeable, future appts removed  Goals:  Short Term Goals (4 -6)WKS = LTGs  MOTOR  · Pt will increase LUE prehension patterns for improved tripod with utensil management with <25% droppage : NOT MET  · Pt will tolerate BIONESS/NMES/IASTM for improved motor and sensory performance for overall improved hand to target with 75% accuracy : MET  · Pt will increase proprioception of LUE hand to target for improved functional reach vision occluded with ADL tasks : MET   · Pt will increase automaticity of LUE to 50% for improved grasp release of tabletop items for improved functional performance with salient tasks:  MET  · Pt will increase LUE rate of manipulation x 25% for all FM tests for improved functional performance with salient tasks : NOT MET  · Pt will increase L UE strength to 3+/5 and  strength R=L, through the use of strengthening exercises and home program for eventual return to life and work roles and salient tasks: PARTIALLY MET  · Pt will increase LUE to functional assist with <20% cuing for tabletop tasks for improved functional performance of life roles and salient tasks : MET  ?  Pt will demo with G tolerance to supine, seated, and in stance exercise x 45 minutes with minimal rest breaks required for increased engagement in life roles and weekly exercise regimen   ? Pt will demo with G carryover of Home Exercise Program to improve functional progression towards goals in Plan of care and for improved functional use of LUE PARTIALLY MET  · Pt will demo with decreased  LUE  shoulder sublux to trace for painfree AROM for improved ADL and IADL engagement- PARTIALLY MET  · Pt will demo decreased hypertonicity of L UE by 25% for improved alternate motor patterns, grasp/release, termination on command for improved dressing/hygiene PARTIALLY MET  · Pt will demo good carryover of clinic and home tone reduction strategies for improved AROM initiation with functional reach with ADL fxn 75% of the time  MET  · Pt will intrease compliance with  WHFO for improved LPS with  improved fit/decreased discomfort  of resting hand  splint with wear time NOT MET    INTERVENTION COMMENTS:  Diagnosis: Left hemiparesis ([facility] [address] ) [G81 94]  Precautions: pain in L shoulder  FOTO: 65 with 35% limitation in UE fx'n  Insurance: BLUE CROSS  7 of 8 visits, PN due 10/16/2020    Thank you for the consult!   Please call if you have any questions: [phone]  Jimena Tavera, OTD, OTR/L, C-GCM, CSRS  Director of Outpatient Neuro Occupational Therapy

## 2020-09-17 ENCOUNTER — EVALUATION (OUTPATIENT)
Dept: OCCUPATIONAL THERAPY | Facility: CLINIC | Age: 65
End: 2020-09-17
Payer: COMMERCIAL

## 2020-09-17 DIAGNOSIS — G81.94 LEFT HEMIPARESIS (HCC): Primary | ICD-10-CM

## 2020-09-17 PROCEDURE — 97112 NEUROMUSCULAR REEDUCATION: CPT

## 2020-09-21 ENCOUNTER — APPOINTMENT (OUTPATIENT)
Dept: OCCUPATIONAL THERAPY | Facility: CLINIC | Age: 65
End: 2020-09-21
Payer: COMMERCIAL

## 2020-09-24 ENCOUNTER — APPOINTMENT (OUTPATIENT)
Dept: OCCUPATIONAL THERAPY | Facility: CLINIC | Age: 65
End: 2020-09-24
Payer: COMMERCIAL

## 2020-09-28 ENCOUNTER — APPOINTMENT (OUTPATIENT)
Dept: OCCUPATIONAL THERAPY | Facility: CLINIC | Age: 65
End: 2020-09-28
Payer: COMMERCIAL

## 2020-10-17 DIAGNOSIS — G81.94 LEFT HEMIPARESIS (HCC): ICD-10-CM

## 2020-10-17 DIAGNOSIS — M25.512 CHRONIC LEFT SHOULDER PAIN: ICD-10-CM

## 2020-10-17 DIAGNOSIS — G89.29 CHRONIC LEFT SHOULDER PAIN: ICD-10-CM

## 2020-10-19 RX ORDER — METHOCARBAMOL 500 MG/1
TABLET, FILM COATED ORAL
Qty: 90 TABLET | Refills: 3 | Status: SHIPPED | OUTPATIENT
Start: 2020-10-19 | End: 2020-11-27

## 2020-10-21 ENCOUNTER — TRANSCRIBE ORDERS (OUTPATIENT)
Dept: ADMINISTRATIVE | Facility: HOSPITAL | Age: 65
End: 2020-10-21

## 2020-10-21 DIAGNOSIS — I63.9 CEREBRAL INFARCTION, UNSPECIFIED (HCC): Primary | ICD-10-CM

## 2020-10-28 ENCOUNTER — HOSPITAL ENCOUNTER (OUTPATIENT)
Dept: VASCULAR ULTRASOUND | Facility: HOSPITAL | Age: 65
Discharge: HOME | End: 2020-10-28
Attending: INTERNAL MEDICINE
Payer: COMMERCIAL

## 2020-10-28 DIAGNOSIS — I63.9 CEREBRAL INFARCTION, UNSPECIFIED (HCC): ICD-10-CM

## 2020-10-28 PROCEDURE — 93880 EXTRACRANIAL BILAT STUDY: CPT | Performed by: SURGERY

## 2020-10-28 PROCEDURE — 93880 EXTRACRANIAL BILAT STUDY: CPT

## 2020-11-03 ENCOUNTER — REMOTE DEVICE CLINIC VISIT (OUTPATIENT)
Dept: CARDIOLOGY CLINIC | Facility: CLINIC | Age: 65
End: 2020-11-03
Payer: COMMERCIAL

## 2020-11-03 DIAGNOSIS — Z95.818 PRESENCE OF OTHER CARDIAC IMPLANTS AND GRAFTS: Primary | ICD-10-CM

## 2020-11-03 PROCEDURE — G2066 INTER DEVC REMOTE 30D: HCPCS | Performed by: INTERNAL MEDICINE

## 2020-11-03 PROCEDURE — 93298 REM INTERROG DEV EVAL SCRMS: CPT | Performed by: INTERNAL MEDICINE

## 2020-11-16 DIAGNOSIS — I48.0 PAROXYSMAL ATRIAL FIBRILLATION (HCC): ICD-10-CM

## 2020-11-16 RX ORDER — APIXABAN 5 MG/1
TABLET, FILM COATED ORAL
Qty: 60 TABLET | Refills: 5 | Status: SHIPPED | OUTPATIENT
Start: 2020-11-16

## 2020-11-27 ENCOUNTER — OFFICE VISIT (OUTPATIENT)
Dept: NEUROLOGY | Facility: CLINIC | Age: 65
End: 2020-11-27
Payer: COMMERCIAL

## 2020-11-27 VITALS
WEIGHT: 194.2 LBS | BODY MASS INDEX: 29.43 KG/M2 | SYSTOLIC BLOOD PRESSURE: 151 MMHG | HEART RATE: 57 BPM | DIASTOLIC BLOOD PRESSURE: 83 MMHG | HEIGHT: 68 IN

## 2020-11-27 DIAGNOSIS — G81.94 LEFT HEMIPARESIS (HCC): ICD-10-CM

## 2020-11-27 DIAGNOSIS — I10 ESSENTIAL HYPERTENSION: Chronic | ICD-10-CM

## 2020-11-27 DIAGNOSIS — E78.5 HYPERLIPIDEMIA, UNSPECIFIED HYPERLIPIDEMIA TYPE: Chronic | ICD-10-CM

## 2020-11-27 DIAGNOSIS — I69.398 SPASTICITY AS LATE EFFECT OF CEREBROVASCULAR ACCIDENT (CVA): ICD-10-CM

## 2020-11-27 DIAGNOSIS — Z86.73 HISTORY OF STROKE: Primary | ICD-10-CM

## 2020-11-27 DIAGNOSIS — I48.0 PAROXYSMAL ATRIAL FIBRILLATION (HCC): ICD-10-CM

## 2020-11-27 DIAGNOSIS — R73.03 PREDIABETES: ICD-10-CM

## 2020-11-27 DIAGNOSIS — G47.33 OSA (OBSTRUCTIVE SLEEP APNEA): ICD-10-CM

## 2020-11-27 DIAGNOSIS — R25.2 SPASTICITY AS LATE EFFECT OF CEREBROVASCULAR ACCIDENT (CVA): ICD-10-CM

## 2020-11-27 PROCEDURE — 99214 OFFICE O/P EST MOD 30 MIN: CPT | Performed by: PSYCHIATRY & NEUROLOGY

## 2020-11-27 RX ORDER — METHOCARBAMOL 500 MG/1
500 TABLET, FILM COATED ORAL EVERY 12 HOURS
Qty: 60 TABLET | Refills: 5 | Status: SHIPPED | OUTPATIENT
Start: 2020-11-27 | End: 2021-06-21

## 2020-12-18 DIAGNOSIS — I63.9 RIGHT SIDED CEREBRAL HEMISPHERE CEREBROVASCULAR ACCIDENT (CVA) (HCC): ICD-10-CM

## 2020-12-18 RX ORDER — FLUOXETINE HYDROCHLORIDE 20 MG/1
CAPSULE ORAL
Qty: 90 CAPSULE | Refills: 3 | Status: SHIPPED | OUTPATIENT
Start: 2020-12-18 | End: 2021-12-29

## 2021-02-20 ENCOUNTER — OFFICE VISIT (OUTPATIENT)
Dept: URGENT CARE | Facility: MEDICAL CENTER | Age: 66
End: 2021-02-20
Payer: COMMERCIAL

## 2021-02-20 ENCOUNTER — APPOINTMENT (OUTPATIENT)
Dept: RADIOLOGY | Facility: MEDICAL CENTER | Age: 66
End: 2021-02-20
Payer: COMMERCIAL

## 2021-02-20 VITALS
HEIGHT: 68 IN | WEIGHT: 197 LBS | HEART RATE: 66 BPM | TEMPERATURE: 98.4 F | SYSTOLIC BLOOD PRESSURE: 143 MMHG | OXYGEN SATURATION: 96 % | DIASTOLIC BLOOD PRESSURE: 71 MMHG | BODY MASS INDEX: 29.86 KG/M2 | RESPIRATION RATE: 18 BRPM

## 2021-02-20 DIAGNOSIS — S60.222A CONTUSION OF LEFT HAND, INITIAL ENCOUNTER: Primary | ICD-10-CM

## 2021-02-20 DIAGNOSIS — S60.222A CONTUSION OF LEFT HAND, INITIAL ENCOUNTER: ICD-10-CM

## 2021-02-20 PROCEDURE — 73130 X-RAY EXAM OF HAND: CPT

## 2021-02-20 PROCEDURE — G0382 LEV 3 HOSP TYPE B ED VISIT: HCPCS | Performed by: PHYSICIAN ASSISTANT

## 2021-02-20 PROCEDURE — S9083 URGENT CARE CENTER GLOBAL: HCPCS | Performed by: PHYSICIAN ASSISTANT

## 2021-02-20 NOTE — PROGRESS NOTES
[facility]        NAME: Preston King is a 76 y o  male  : 1955    MRN: [id number]  DATE: 2021  TIME: 12:53 PM    Assessment and Plan   Contusion of left hand, initial encounter [S60 222A]  1  Contusion of left hand, initial encounter  XR hand 3+ vw left         Patient Instructions     Contusion left hand  Rest, ice, elevate  Over the counter tylenol as needed  Follow up with PCP in 3-5 days  Proceed to  ER if symptoms worsen  Chief Complaint     Chief Complaint   Patient presents with    Fall     Pt  had a fall two days ago  He now has pain in his left hand and left hip  History of Present Illness       75 y/o male presents c/o pain to left hand  States he slipped due to snow shoes in store two days ago and landed on left hand  Patient had a stroke with left sided weakness to left side of body      Review of Systems   Review of Systems   Constitutional: Negative  HENT: Negative  Eyes: Negative  Respiratory: Negative  Negative for apnea, cough, choking, chest tightness, shortness of breath, wheezing and stridor  Cardiovascular: Negative  Negative for chest pain           Current Medications       Current Outpatient Medications:     amLODIPine (NORVASC) 5 mg tablet, Take 5 mg by mouth daily Taking 1 1/2 daily daily , Disp: , Rfl: 5    atorvastatin (LIPITOR) 80 mg tablet, Take 1 tablet (80 mg total) by mouth every evening, Disp: 90 tablet, Rfl: 3    Eliquis 5 MG, TAKE 1 TABLET BY MOUTH TWICE A DAY, Disp: 60 tablet, Rfl: 5    FLUoxetine (PROzac) 20 mg capsule, TAKE 1 CAPSULE BY MOUTH EVERY DAY, Disp: 90 capsule, Rfl: 3    losartan (COZAAR) 50 mg tablet, Take 1 tablet (50 mg total) by mouth daily, Disp: 90 tablet, Rfl: 0    methocarbamol (ROBAXIN) 500 mg tablet, Take 1 tablet (500 mg total) by mouth every 12 (twelve) hours, Disp: 60 tablet, Rfl: 5    Current Allergies     Allergies as of 2021    (No Known Allergies)            The following portions of the patient's history were reviewed and updated as appropriate: allergies, current medications, past family history, past medical history, past social history, past surgical history and problem list      Past Medical History:   Diagnosis Date    Hypertension     Migraine     Stroke [facility])        Past Surgical History:   Procedure Laterality Date    APPENDECTOMY      KNEE SURGERY      NECK SURGERY      TONSILLECTOMY         Family History   Problem Relation Age of Onset    Stroke Mother     Heart disease Father     ALS Father          Medications have been verified  Objective   /71   Pulse 66   Temp 98 4 °F (36 9 °C)   Resp 18   Ht 5' 8" (1 727 m)   Wt 89 4 kg (197 lb)   SpO2 96%   BMI 29 95 kg/m²        Physical Exam     Physical Exam  Constitutional:       General: He is not in acute distress  Appearance: He is well-developed  He is not diaphoretic  Neck:      Musculoskeletal: Normal range of motion and neck supple  Cardiovascular:      Rate and Rhythm: Normal rate and regular rhythm  Heart sounds: Normal heart sounds  Pulmonary:      Effort: Pulmonary effort is normal  No respiratory distress  Breath sounds: Normal breath sounds  No wheezing or rales  Chest:      Chest wall: No tenderness  Musculoskeletal:      Left hand: He exhibits tenderness, bony tenderness and swelling  He exhibits normal range of motion, normal two-point discrimination, normal capillary refill, no deformity and no laceration  Normal sensation noted  Lymphadenopathy:      Cervical: No cervical adenopathy

## 2021-02-20 NOTE — PATIENT INSTRUCTIONS
Contusion left hand  Rest, ice, elevate  Over the counter tylenol as needed  Follow up with PCP in 3-5 days  Proceed to  ER if symptoms worsen  Contusion in Adults   WHAT YOU NEED TO KNOW:   A contusion is a bruise that appears on your skin after an injury  A bruise happens when small blood vessels tear but skin does not  Blood leaks into nearby tissue, such as soft tissue or muscle  DISCHARGE INSTRUCTIONS:   Return to the emergency department if:   · You have new trouble moving the injured area  · You have tingling or numbness in or near the injured area  · Your hand or foot below the bruise gets cold or turns pale  Call your doctor if:   · You find a new lump in the injured area  · Your symptoms do not improve with treatment after 4 to 5 days  · You have questions or concerns about your condition or care  Medicines: You may need any of the following:  · NSAIDs  help decrease swelling and pain or fever  This medicine is available with or without a doctor's order  NSAIDs can cause stomach bleeding or kidney problems in certain people  If you take blood thinner medicine, always ask your healthcare provider if NSAIDs are safe for you  Always read the medicine label and follow directions  · Prescription pain medicine  may be given  Ask your healthcare provider how to take this medicine safely  Some prescription pain medicines contain acetaminophen  Do not take other medicines that contain acetaminophen without talking to your healthcare provider  Too much acetaminophen may cause liver damage  Prescription pain medicine may cause constipation  Ask your healthcare provider how to prevent or treat constipation  · Take your medicine as directed  Contact your healthcare provider if you think your medicine is not helping or if you have side effects  Tell him of her if you are allergic to any medicine  Keep a list of the medicines, vitamins, and herbs you take   Include the amounts, and when and why you take them  Bring the list or the pill bottles to follow-up visits  Carry your medicine list with you in case of an emergency  Help a contusion heal:   · Rest the injured area  or use it less than usual  If you bruised your leg or foot, you may need crutches or a cane to help you walk  This will help you keep weight off your injured body part  · Apply ice  to decrease swelling and pain  Ice may also help prevent tissue damage  Use an ice pack, or put crushed ice in a plastic bag  Cover it with a towel and place it on your bruise for 15 to 20 minutes every hour or as directed  · Use compression  to support the area and decrease swelling  Wrap an elastic bandage around the area over the bruised muscle  Make sure the bandage is not too tight  You should be able to fit 1 finger between the bandage and your skin  · Elevate (raise) your injured body part  above the level of your heart to help decrease pain and swelling  Use pillows, blankets, or rolled towels to elevate the area as often as you can  · Do not drink alcohol  as directed  Alcohol may slow healing  · Do not stretch injured muscles  right after your injury  Ask your healthcare provider when and how you may safely stretch after your injury  Gentle stretches can help increase your flexibility  · Do not massage the area or put heating pads  on the bruise right after your injury  Heat and massage may slow healing  Your healthcare provider may tell you to apply heat after several days  At that time, heat will start to help the injury heal     Prevent another contusion:   · Stretch and warm up before you play sports or exercise  · Wear protective gear when you play sports  Examples are shin guards and padding  · If you begin a new physical activity, start slowly to give your body a chance to adjust     Follow up with your doctor as directed:  Write down your questions so you remember to ask them during your visits    © Copyright [employer] [facility] Information is for [employer] use only and may not be sold, redistributed or otherwise used for commercial purposes  All illustrations and images included in CareNotes® are the copyrighted property of A [employer] M [employer]  or Paige Keith   The above information is an  only  It is not intended as medical advice for individual conditions or treatments  Talk to your doctor, nurse or pharmacist before following any medical regimen to see if it is safe and effective for you

## 2021-04-06 ENCOUNTER — TRANSCRIBE ORDERS (OUTPATIENT)
Dept: OCCUPATIONAL THERAPY | Facility: CLINIC | Age: 66
End: 2021-04-06

## 2021-04-06 ENCOUNTER — EVALUATION (OUTPATIENT)
Dept: OCCUPATIONAL THERAPY | Facility: CLINIC | Age: 66
End: 2021-04-06
Payer: COMMERCIAL

## 2021-04-06 DIAGNOSIS — I69.398 SPASTICITY AS LATE EFFECT OF CEREBROVASCULAR ACCIDENT (CVA): Primary | ICD-10-CM

## 2021-04-06 DIAGNOSIS — R25.2 SPASTICITY AS LATE EFFECT OF CEREBROVASCULAR ACCIDENT (CVA): Primary | ICD-10-CM

## 2021-04-06 PROCEDURE — 97530 THERAPEUTIC ACTIVITIES: CPT

## 2021-04-06 PROCEDURE — 97166 OT EVAL MOD COMPLEX 45 MIN: CPT

## 2021-04-06 NOTE — LETTER
April 7, 2021    Shiv Flores MD  [address]    Patient: Anabelle Monet   YOB: 1955   Date of Visit: 4/6/2021     Encounter Diagnosis     ICD-10-CM    1  Spasticity as late effect of cerebrovascular accident (CVA)  I69 [id number]     R25 2        Dear Dr Mata Redd:    Thank you for your recent referral of Anabelle Monet  Please review the attached evaluation summary from Rakesh's recent visit  Please verify that you agree with the plan of care by signing the attached order  If you have any questions or concerns, please do not hesitate to call  I sincerely appreciate the opportunity to share in the care of one of your patients and hope to have another opportunity to work with you in the near future  Sincerely,    Vernona Merlin, OT      Referring Provider:     I certify that I have read the below Plan of Care and certify the need for these services furnished under this plan of treatment while under my care  Shiv Flores MD  [address] [place] [address]  Via Fax: [phone]        OCCUPATIONAL THERAPY INITIAL EVALUATION:    4/7/2021  Anabelle Monet  1955  [id number]  Adeola Verde MD  1  Spasticity as late effect of cerebrovascular accident (CVA)        Subjective    "Im going to get botox"    PATIENT GOAL: "I want to be able to move this wrist and hand more"      Assessment/Plan    Skilled Analysis:  Pt is a 76 y o  male referred to Occupational Therapy s/p Spasticity as late effect of cerebrovascular accident (CVA) Sahil Mayen, R25 2]  Pt participated in skilled OT evaluation and following formalized testing, presents with the following areas of deficit: fxnl ROM, endurance, volitional muscle activation/termination,  FMC/FMS, GMC/GMS, hypertonicity and in hand manipulation/dexterity impacting indep and completion of ADL/IADL, salient, and leisure tasks    Pt does demo the need for skilled Occupational Therapy services 2x/week for 6-8 weeks with focus on UE NMR, UE strengthening, UE endurance, FMC/GMC, FMS/GMS and tone reduction to address the goals as listed below  Pt in agreement with POC, POC to  w/in 90 days  OT Recommending PT evaluation due to balance deficits and h/o falls  Goals:     Motor Short Term Goals (4 -6)WKS      Strength/Endurance    ·  Pt will increase L UE strength to 3/5  through the use of strengthening exercises and home program for eventual return to life and work roles and salient tasks    · Pt will demo with G tolerance to supine, seated, and in stance exercise x 45 minutes with minimal rest breaks required for increased engagement in life roles and weekly exercise regimen     · Pt will demo with G carryover of Home Exercise Program to improve functional progression towards goals in Plan of care and for improved functional use of LUE         FMC/GMC    · Pt will increase L UE prehension patterns for improved utensil and container management  with ability to isolate pincer and lateral pinch grasp >50% of the time       Functional Performance    · Pt will increase LUE to functional assist with for ADL/IADL and tabletop tasks for improved functional performance of life roles and salient tasks     AROM/PROM    · Pt will demo with decreased  LUE  shoulder sublux to trace for increased AROM for improved ADL and IADL engagement    · Pt will be able to perform near full range of proximal L UE to demo increased strength and ROM of affected UE for improved ADLs/IADLs    · Pt will be able to perform >1/2  range of distal L UE to demo increased strength and ROM of affected UE for improved ADLs/IADLs    · Pt will demo decreased hypertonicity of Modified Linsey Scale (MAS) of L UE to 1 for improved alternate motor patterns, grasp/release, and termination on command for improved dressing/hygiene     · Pt will demo good carryover of clinic and home tone reduction strategies for improved AROM initiation with functional reach with ADL fxn      Modalities    · Pt will tolerate BIONESS/NMES/IASTM for improved motor and sensory performance for overall improved strength, tone reduction, and sensation to inc safety and engagement with ADL/IADL fxn    · Pt/family will demo G understanding and carryover of use of home NMES unit as prescribed to inc carryover of tone reduction and strengthening strategies of L UE      Treatment Interventions  Supine, seated, and in stance neuro re-ed  NMES/FES for strengthening  NMES for tone reduction  BIONESS  Fxnl Grasp and Release  FMC/prehension patterns  Fxnl Tool use (tweezers, tongs)  In hand manipulation  [employer] [facility] [place])  Manual tx (IASTM if able, PROM/stretching)  Hand to target tasks  Supine place and hold    HISTORY OF PRESENT ILLNESS:        Pt is a 71 y o  male who was referred to Occupational Therapy s/p  Left hemiparesis ([facility] [address] ) [G81 94]  Pt is an employed 76 y  o  male seen for OT eval s/p [facility] [address] [place] [address] [facility] to [facility]20/2018 due to left-sided weakness and facial droop   He also had a speech impairment and was found to have an acute stroke  The patient received tPA at 11:00 p m  on the day of admission after neurology assessment  He had loop recorder placed at time of admission for CVA  during his hospital stay he was dx with a small amount of petechial hemorrhage noted on MRI  CT scan showed positive results for small infarctions within the right frontal lobe and right temporal occipital junction,Pt [facility] from 10/25/[facility] - 11/21/18  Pt is familiar to OP OT services, pt has received OP OT services for L UE NMR, tone reduction, and strengthening tasks  Pt with most recent dc in 9/2020  Pt has been followed by neurology and recent report stating "He does have some increased discomfort and potentially spasticity on his left hand side as well as some headaches over the course the last few weeks    Notably over that time frame he has been out of his Robaxin" Pt now has received botox for tone reduction and referred again to OP OT services for tone reduction and LUE NMR  Of note, pt has h/o fall in February with no major injuries  Pt continues to work FT, drives, and completes ADLs indep  Pt reports however that he requires increased time to don UB clothing due to hypertonicity   He is unable to functionally grasp/release items for ADLs           PMH:   Past Medical History:   Diagnosis Date    Hypertension     Migraine     Stroke ([facility] [address] )        Pain Levels:     Restin    With Activity:  Low back, at times (0-5)    Objective    Impairment Observations:    Upper Extremity       RUE LUE Comments                 UPPER EXTREMITY FXN Intact Intact Dominant Hand: R                         /Pinch Strength         Dynamometer      - Gross Grasp2 30 lbs 20 lbs subnormal   Pinch Meter       - PINCER 17 lbs 5 lbs     - TRIPOD 15 lbs 10 lbs     - LATERAL 16 lbs  11 lbs              AROM (seated)       Elevation/Depression  3/4 range     Shoulder Flex/Ext  1/2 range     Shoulder Abd  1/2 range     Shoulder Add  1/2 range     Horizontal Abd  1/2 range     Horizontal Add  1/2 range     Elbow Flex  >1/2 range     Elbow Ext  1/2 range     Pronation  Near full     Supination  3/4     Wrist Flex  3/4     Wrist Ext  <1/4 range     Digit Flex  3/4 range     Digit Ex  trace     Composite Grasp  3/4 range     Hook Grasp  Unable to isolate     Opposition  Able to oppose to D1-2     Subluxation  1 finger breadth      Full range all planes                       AROM (supine)         Elevation  Near full     Shoulder Flex/Ext  3/4     Shoulder ABd  3/4     Shoulder ADd  3/4     Horizontal ABd  1/2     Horizontal ADd  1/2     Elbow Flex  3/4     Elbow Ext  1/2     Pronation  1/2     Supination  1/2     Wrist Flex  3/4     Wrist Ext  <1/4                    Full range all planes                          PROM (supine position) Elevation/Depression      Retraction/Protraction      Shoulder Flex/Ext      Shoulder ABd/ADd      Horizontal ABd/Add      Elbow Flex      Elbow Ext      Wrist Flex      Wrist Ext      Digit Flex      Digit Ext          Full range all planes   Full range all planes                            MMT         Elevation 4+/5 2/5    Shoulder Flex/Ext 4+/5 2/5    Shoulder Abd 4+/5 2/5    Shoulder ADd 4+/5 2/5    Elbow Flex 4+/5 2/5    Elbow Ext 4+/5 2/5    Wrist Flex 4+/5 3/5    Wrist Ext 4+/5 1/5    Gross Grasp 4+/5 3/5          SENSATION      Myofilaments (3 61 Wnl) 3 61 3 61    Sharp Dull  Intact Intact    Proprioception Intact Intact    Hot/Cold Temp Intact Intact          MODIFIED HUNTER SCALE (TONE) 0 1+    No increase in muscle tone (0)      Slight Increase in muscle tone with catch and release or min resist at end range (1)      Slight Increase in muscle tone with catch and release, followed by min resistance through remainder of range (1+)      Increased muscle tone through full range, able to be moved easily (2)      Considerable increase in tone, difficult to move (3)      Rigid in Flexion/Extension (4)                                       INTERVENTION COMMENTS:  Diagnosis: Spasticity as late effect of cerebrovascular accident (CVA) [place] Form, R25 2]  Precautions: fall risk  FOTO: to complete  Insurance: Payor: BLUE CROSS / Plan: [facility] PLAN 361 / Product Type: Blue Fee for Service /   1 of 8 visits, PN due 5/7/21

## 2021-04-09 ENCOUNTER — EVALUATION (OUTPATIENT)
Dept: PHYSICAL THERAPY | Facility: CLINIC | Age: 66
End: 2021-04-09
Payer: COMMERCIAL

## 2021-04-09 ENCOUNTER — OFFICE VISIT (OUTPATIENT)
Dept: OCCUPATIONAL THERAPY | Facility: CLINIC | Age: 66
End: 2021-04-09
Payer: COMMERCIAL

## 2021-04-09 DIAGNOSIS — Z86.73 HISTORY OF STROKE: ICD-10-CM

## 2021-04-09 DIAGNOSIS — R25.2 SPASTICITY AS LATE EFFECT OF CEREBROVASCULAR ACCIDENT (CVA): Primary | ICD-10-CM

## 2021-04-09 DIAGNOSIS — G81.94 LEFT HEMIPARESIS (HCC): Primary | ICD-10-CM

## 2021-04-09 DIAGNOSIS — I69.398 SPASTICITY AS LATE EFFECT OF CEREBROVASCULAR ACCIDENT (CVA): Primary | ICD-10-CM

## 2021-04-09 PROCEDURE — 97162 PT EVAL MOD COMPLEX 30 MIN: CPT

## 2021-04-09 PROCEDURE — 97112 NEUROMUSCULAR REEDUCATION: CPT

## 2021-04-09 PROCEDURE — 97110 THERAPEUTIC EXERCISES: CPT

## 2021-04-09 PROCEDURE — 97140 MANUAL THERAPY 1/> REGIONS: CPT

## 2021-04-09 NOTE — PROGRESS NOTES
Occupational Therapy Daily Note:    Today's date: 2021  Patient name: Pankaj Jimenez  : 1955  MRN: [id number]  Referring provider: Capri Trivedi MD  Dx:   Encounter Diagnosis   Name Primary?  Spasticity as late effect of cerebrovascular accident (CVA) Yes                  Subjective: "when I try to grasp I can barely release"    Objective: Pt seen for OT treatment session focusing on L UE NMR, tone reduction, proximal strengthening/endurance, and pt education    Pt set up on BIONESS hand unit for tone reduction and distal strengthening to inc volitional control of grasp/release and for overall tone reduction for improved fxnl use  Pt tolerated 15 min with NMES applied to post delt and supraspinatus for proximal strengthening and stability    Pt tolerated BIONESS for AAROM of wrist and digit extension  PROM tolerated in wrist/digit extension, maintaining for low load prolonged stretch to inc muscle elasticity and ROM     Assessment: Tolerated treatment well  Education provided on home NMES unit with electrode placement  Pt to bring in home unit to ensure appropriate settings  Patient would benefit from continued skilled OT  Plan: Continued skilled OT per POC      INTERVENTION COMMENTS:  Diagnosis: Spasticity as late effect of cerebrovascular accident (CVA) Donny Srpague, R25 2]  Precautions: fall risk  FOTO: to complete  Insurance: Payor: [employer] / Plan: [facility] PLAN [id number] / Product Type: Blue Fee for Service /   2 of 8 visits, PN due 21

## 2021-04-09 NOTE — PROGRESS NOTES
PT Evaluation     Today's date: 2021  Patient name: Radha Arzola  : 1955  MRN: [id number]  Referring provider: Dahlia Rosario MD  Dx:   Encounter Diagnosis     ICD-10-CM    1  Left hemiparesis ([address] )  G81 94    2  History of stroke  Z86 73        Start Time: 1000  Stop Time: 1100  Total time in clinic (min): 60 minutes    Assessment  Assessment details: Radha Arzola is a 76 y o  male who presents to outpatient physical therapy secondary to increased difficulty with ambulation, balance, and endurance following a CVA back in 2018  This decline started about 6 months ago  He has the goals to increase his confidence with walking and to walk better  He primarily only uses a SPC for community distances and no AD for household distances  PT examination findings include: abnormal gait deviations such as L knee hyperextension and L lateral trunk lean in stance, L hip circumduction during swing, intermittent L foot drag and L UE in flexion synergy; decreased gait speed; minor decrease in L LE strength; impaired balance shown by his score on Functional Gait Assessment (FGA); and limited cardiorespiratory endurance demonstrated via his 6 MWT distance  These findings decrease his safety with functional mobility and classify him as an increased falls risk  He was provided an initial written and illustrated HEP to address his LE strength deficits to perform safely at home  He demonstrated proper form of exercises and reported appropriate muscle response during exercises  The patient would benefit from skilled physical therapy 3x/week for 6-8 weeks to provide exercises, neuromuscular reeducation including high intensity gait training, manual techniques, and modalities as deemed necessary in order to help him achieve his goals and maximize his safety with functional mobility       Impairments: abnormal coordination, abnormal gait, activity intolerance, impaired balance, impaired physical strength, lacks appropriate home exercise program and safety issue  Functional limitations: walking; stairsUnderstanding of Dx/Px/POC: good   Prognosis: good    Goals  STGs in 4 weeks  1  Patient will increase his FGA score by at least 4 points for improved balance (MCID 4 pts)  2  Patient will improve his 10 meter walk to at least 1 0 m/s for improved community ambulation  3  Patient will be able to complete 6 MWT without any seated rest breaks to demonstrate improved cardiorespiratory endurance  LTGs in 8 weeks  1  Patient will improve his FGA to at least 23/30 for improved safety and decreased falls risk (cut-off score for falls risk: 23/30)  2  Patient will improve 6 MWT to at least 1000 ft for improved cardiorespiratory endurance  3  Patient will demonstrate independence  with HEP in order to maintain progress of functional mobility independently  4  Patient will be able to walk on uneven ground without any AD for improved ability to walk dog  Plan  Patient would benefit from: skilled physical therapy and PT eval  Planned therapy interventions: joint mobilization, neuromuscular re-education, patient education, strengthening, stretching, therapeutic activities, therapeutic exercise, gait training and home exercise program  Frequency: 3x week  Duration in weeks: 8  Treatment plan discussed with: patient        Subjective Evaluation    History of Present Illness  Mechanism of injury: The patient states that he had a stroke back on 10/20/2018 that affected the L side of his body  He notes that over this past winter he has been less active secondary to weather conditions and pandemic  He says that he is feeling a bit more weaker than he used to be  He notes that he seems to be locking his L knee while walking recently as well as more dragging of his L toes  He reports that when he turns, he finds that his L foot gets stuck unless he is focused on it  He feels that his balance has gotten worse over the past 6 months or so   He says that he has been using a SPC more often than he used to  He primarily uses it for long distances outside of the house  He does not use it in the house much  He admits to a few falls over the winter, more so related to ice and winter conditions  He has had 3 falls this past year  He says that he has been doing some exercises at home, maybe 3 days/week  He notes that his balance is usually worse the longer he is on his feet and towards the end of the day  Also notes difficulty with walking in the yard and when his dogs pulls him  He works as a  for [employer]  He says that at work he is mostly at a desk so when he gets up, he is stiff until he gets moving a bit  He lives in an apartment attached to a house that is 1 floor  He has 3 steps to get to a den with a pellet stove he can use as HR as needed  He tends to go up one step at a time     Pain  No pain reported    Treatments  Previous treatment: physical therapy and occupational therapy  Current treatment: occupational therapy  Patient Goals  Patient goal: walk better and more; build confidence in walking        Objective     Balance Test    6 Minute Walk Test (ft):  4/9: no AD: 850 ft (2 seated rest breaks for about 30 sec ea)   FGA:  4/9: no AD: score 16/30 (increased falls risk)   Gait Speed (m/s):  4/9: no AD: 10m/10 5s =  95 m/s (71% of age group ref)   5x Sit To Stand (s):  4/9: 7 9 (no UE use; knees into back of chair; minor instability in full upright; L LE placed further forward)   TUG (s):  4/9: no AD: 11 8       Coordination Left Right   Heel To Shin  Impaired  WNL       Sensation Left Right   Light Touch  Intact  Intact       Manual Muscle Testing - Hip Left Right   Flexion 3+ 5     Manual Muscle Testing - Knee Left Right   Flexion 4 5   Extension 4 5     Manual Muscle Testing - Ankle Left Right   Doriflexion 3+ 5   Plantarflexion 4 5        Transfers    Sit To Stand Independent         Gait Assessment:    4/9: L knee hyperextension in stance; L LE circumduction during swing; intermittent L toe drag; L UE in flexion synergy; L lateral trunk lean during stance         Precautions: h/o of CVA      Manuals 4/9                                                                Neuro Re-Ed                                                                                                        Ther Ex             Stand hip abd x10 B            Stand hip ext x10 B            Stand march x10 B            HR/TR x10 B                                                                Ther Activity                                       Gait Training                                       Modalities

## 2021-04-12 ENCOUNTER — OFFICE VISIT (OUTPATIENT)
Dept: PHYSICAL THERAPY | Facility: CLINIC | Age: 66
End: 2021-04-12
Payer: COMMERCIAL

## 2021-04-12 ENCOUNTER — OFFICE VISIT (OUTPATIENT)
Dept: OCCUPATIONAL THERAPY | Facility: CLINIC | Age: 66
End: 2021-04-12
Payer: COMMERCIAL

## 2021-04-12 DIAGNOSIS — Z86.73 HISTORY OF STROKE: ICD-10-CM

## 2021-04-12 DIAGNOSIS — R25.2 SPASTICITY AS LATE EFFECT OF CEREBROVASCULAR ACCIDENT (CVA): Primary | ICD-10-CM

## 2021-04-12 DIAGNOSIS — I69.398 SPASTICITY AS LATE EFFECT OF CEREBROVASCULAR ACCIDENT (CVA): Primary | ICD-10-CM

## 2021-04-12 DIAGNOSIS — G81.94 LEFT HEMIPARESIS (HCC): Primary | ICD-10-CM

## 2021-04-12 PROCEDURE — 97110 THERAPEUTIC EXERCISES: CPT

## 2021-04-12 PROCEDURE — 97112 NEUROMUSCULAR REEDUCATION: CPT

## 2021-04-12 PROCEDURE — 97112 NEUROMUSCULAR REEDUCATION: CPT | Performed by: PHYSICAL THERAPIST

## 2021-04-12 PROCEDURE — 97140 MANUAL THERAPY 1/> REGIONS: CPT

## 2021-04-12 PROCEDURE — 97150 GROUP THERAPEUTIC PROCEDURES: CPT | Performed by: PHYSICAL THERAPIST

## 2021-04-12 NOTE — PROGRESS NOTES
Occupational Therapy Daily Note:    Today's date: 2021  Patient name: Sebastian Heart  : 1955  MRN: [id number]  Referring provider: Sary Santos MD  Dx:   Encounter Diagnosis   Name Primary?  Spasticity as late effect of cerebrovascular accident (CVA) Yes                  Subjective: "I have the hardest time letting go"    Objective: Pt seen for OT treatment session focusing on L UE tone reduction, proximal strengthening/endurnace, and pt education    Pt tolerated BIONESS neuromodulation setting L UE to reduce tone to distal UE and allow for improved volitional grasp and release with containers and fxnl tasks  Pt tolerated 15 minutes total    Pt engaged in AAROM and distal strengthening task with use of BIONESS in extensor (open hand) mode  Pt tolerated input to wrist and digit extensors while OTR providing additional ROM to end range for improving muscle elasticity and joint ROM  Pt then completed closed chain strengthening to complete grasp against resistance while BIONESS stim in off mode for tone reduction  Pt completed supine NMR with use of 5# dowel bar to perform FF, chest press, and place and hold 3 x 15 to inc proximal UE strength and stability  Pt required AAROM to maintain wrist extension and tactile cue to maintain full elbow extension with exercises as appropriate  Pt able to tolerate full range without pain, pt demo difficulty stabilizing across multiple joints for completion of place and hold exercises  Pt engaged in seated closed chain WB to extended L UE for tone reduction and inc proximal stability  Pt completed press and hold x 10 holding for 3-5 seconds at end range  Assessment: Tolerated treatment well  Pt is benefiting from neuromodulation setting of BIONESS with improved ROM and tolerance for PROM following application Pt continues to benefit from closed chain/WB tasks and proximal strengthening and stability exercises   Continue to address Patient would benefit from continued skilled OT  Plan: Continued skilled OT per POC      INTERVENTION COMMENTS:  Diagnosis: Spasticity as late effect of cerebrovascular accident (CVA) [place] Form, R25 2]  Precautions: fall risk  FOTO: to complete  Insurance: Payor: BLUE CROSS / Plan: [facility] PLAN [id number] / Product Type: Blue Fee for Service /   3 of 8 visits, PN due 5/7/21

## 2021-04-13 NOTE — PROGRESS NOTES
Daily Note     Today's date: 2021  Patient name: Tory Cho  : 1955  MRN: [id number]  Referring provider: Socorro Magallon MD  Dx:   Encounter Diagnosis     ICD-10-CM    1  Left hemiparesis ([address] )  G81 94    2  History of stroke  Z86 73                   Subjective: Feeling well today, no new changes to report  Did exercises at home over the weekend each day      Objective: See treatment diary below      Assessment: Pt did well with improved gait fludiity when wearing AFO  Increased hyper extension of knee noted  Pt did well with no instances of near loss of balance, overall improved form with moving faster  Plan: Continue per plan of care        Precautions: h/o of CVA      Manuals                                                                Neuro Re-Ed             Treadmill  1 5 mph B LUE 4 min    1 7 mph AFO B UE 4 min x 2                        Step up and overs  8 and 6" 10x solo           backwards  15 ft x 5 5# b/l    5x wo weights           Side step  15 ft x 4 5# bl    5x w/o wts                                     Ther Ex             Stand hip abd x10 B            Stand hip ext x10 B            Stand march x10 B            HR/TR x10 B                                                                Ther Activity                                       Gait Training             Fast walking  With AFO - 160 ft x 2    without AFO - 160 ft x 2 solo step                        Modalities

## 2021-04-14 ENCOUNTER — OFFICE VISIT (OUTPATIENT)
Dept: OCCUPATIONAL THERAPY | Facility: CLINIC | Age: 66
End: 2021-04-14
Payer: COMMERCIAL

## 2021-04-14 ENCOUNTER — OFFICE VISIT (OUTPATIENT)
Dept: PHYSICAL THERAPY | Facility: CLINIC | Age: 66
End: 2021-04-14
Payer: COMMERCIAL

## 2021-04-14 DIAGNOSIS — G81.94 LEFT HEMIPARESIS (HCC): Primary | ICD-10-CM

## 2021-04-14 DIAGNOSIS — G81.94 LEFT HEMIPARESIS (HCC): ICD-10-CM

## 2021-04-14 DIAGNOSIS — R26.9 GAIT ABNORMALITY: ICD-10-CM

## 2021-04-14 DIAGNOSIS — Z86.73 HISTORY OF STROKE: Primary | ICD-10-CM

## 2021-04-14 DIAGNOSIS — Z86.73 HISTORY OF STROKE: ICD-10-CM

## 2021-04-14 DIAGNOSIS — R25.2 SPASTICITY AS LATE EFFECT OF CEREBROVASCULAR ACCIDENT (CVA): Primary | ICD-10-CM

## 2021-04-14 DIAGNOSIS — I69.398 SPASTICITY AS LATE EFFECT OF CEREBROVASCULAR ACCIDENT (CVA): Primary | ICD-10-CM

## 2021-04-14 PROCEDURE — 97110 THERAPEUTIC EXERCISES: CPT

## 2021-04-14 PROCEDURE — 97116 GAIT TRAINING THERAPY: CPT | Performed by: PHYSICAL THERAPIST

## 2021-04-14 PROCEDURE — 97112 NEUROMUSCULAR REEDUCATION: CPT | Performed by: PHYSICAL THERAPIST

## 2021-04-14 PROCEDURE — 97140 MANUAL THERAPY 1/> REGIONS: CPT

## 2021-04-14 PROCEDURE — 97112 NEUROMUSCULAR REEDUCATION: CPT

## 2021-04-14 NOTE — PROGRESS NOTES
Occupational Therapy Daily Note:    Today's date: 2021  Patient name: Jeremy Chan  : 1955  MRN: [id number]  Referring provider: Suze Antony MD  Dx:   Encounter Diagnosis   Name Primary?  Spasticity as late effect of cerebrovascular accident (CVA) Yes                  Subjective: "that feels great to extend my wrist"    Objective: Pt seen for OT treatment session focusing on L UE tone reduction, proximal strengthening/endurnace, and pt education         Pt tolerated 5 min x 2 (prograde/retrograde motions at 1 0 resist) seated on UBE with focus on increasing proximal UE strength, endurance, act tolerance and ROM to inc indep and safety with ADL/IADL fxn and fxnl reaching tasks  Pt engaged in [place] and distal strengthening task with use of BIONESS in extensor (open hand) mode  Pt tolerated input to wrist and digit extensors while OTR providing additional ROM to end range for improving muscle elasticity and joint ROM  Pt then completed closed chain strengthening to complete grasp against resistance while BIONESS stim in off mode for tone reduction  Pt completed supine NMR with use of 5# dowel bar to perform FF, chest press, and place and hold 3 x 15 to inc proximal UE strength and stability  Pt required AAROM to maintain wrist extension and tactile cue to maintain full elbow extension with exercises as appropriate  Pt completed with FES applied to post delt and supraspinatus for inc proximal support  Assessment: Tolerated treatment well  Patient would benefit from continued skilled OT  Plan: Continued skilled OT per POC      INTERVENTION COMMENTS:  Diagnosis: Spasticity as late effect of cerebrovascular accident (CVA) Dalia Barcenas, R25 2]  Precautions: fall risk  FOTO: to complete  Insurance: Payor: ROBERTA DASILVA / Plan: [facility] PLAN 361 / Product Type: Blue Fee for Service /   6 MS 4 XMBAUO, PN due 21

## 2021-04-14 NOTE — PROGRESS NOTES
Daily Note     Today's date: 2021  Patient name: Anabelle Monet  : 1955  MRN: [id number]  Referring provider: Ne Chaves MD  Dx:   Encounter Diagnosis     ICD-10-CM    1  Left hemiparesis ([address] )  G81 94    2  History of stroke  Z86 73                   Subjective: Felt very tired after last session but great the next day  Objective: See treatment diary below      Assessment: Pt had significant knee hyper extension during ambulation this session once he was fatigued  AFO assisted in clearance of LE but due to anterior shell likely exacerbated hyperextension  Will discuss with patient next session and assess need for bracing  Plan: Continue per plan of care        Precautions: h/o of CVA      Manuals                                                               Neuro Re-Ed             Treadmill  1 5 mph B LUE 4 min    1 7 mph AFO B UE 4 min x 2 1 5 mph B LUE 4 min   2 min 30 sec    1 7 mph AFO B UE 4 min           stairs   Up and over 10x          Step up and overs  8 and 6" 10x solo           backwards  15 ft x 5 5# b/l    5x wo weights 15 ft x 5 3# bl          Side step  15 ft x 4 5# bl    5x w/o wts 15 ft x 4 3#          Foam beams   Side step - 4x          hurdles   6x fwd/lat ea          Ther Ex             Stand hip abd x10 B            Stand hip ext x10 B            Stand march x10 B            HR/TR x10 B                                                                Ther Activity                                       Gait Training             Fast walking  With AFO - 160 ft x 2    without AFO - 160 ft x 2 solo step With AFO - 160 ft x 2                       Modalities

## 2021-04-16 ENCOUNTER — OFFICE VISIT (OUTPATIENT)
Dept: PHYSICAL THERAPY | Facility: CLINIC | Age: 66
End: 2021-04-16
Payer: COMMERCIAL

## 2021-04-16 DIAGNOSIS — Z86.73 HISTORY OF STROKE: ICD-10-CM

## 2021-04-16 DIAGNOSIS — G81.94 LEFT HEMIPARESIS (HCC): Primary | ICD-10-CM

## 2021-04-16 PROCEDURE — 97116 GAIT TRAINING THERAPY: CPT

## 2021-04-16 PROCEDURE — 97112 NEUROMUSCULAR REEDUCATION: CPT

## 2021-04-16 PROCEDURE — 97150 GROUP THERAPEUTIC PROCEDURES: CPT

## 2021-04-16 NOTE — PROGRESS NOTES
Daily Note     Today's date: 2021  Patient name: Skyler Joseph  : 1955  MRN: [id number]  Referring provider: Alirio June MD  Dx:   Encounter Diagnosis     ICD-10-CM    1  Left hemiparesis ([address] )  G81 94    2  History of stroke  Z86 73          Subjective: Pt states that he has been walking around more at home and work  He notes that he feels better walking without his cane at home  Objective: See treatment diary below      Assessment: Pt had more difficulty with stepping over hurdles today as his L toes tended to catch the conor due to decreased clearance  With cues to slow down and focus on pulling his toes up, he was better able to clear the hurdles  He needed intermittent cues during side stepping to not let L foot drag, which he was able to correct  During fast walking in hallway with no AFO, he was able to clear his L foot with increased L hip circumduction  Plan to continue to discuss possible bracing options to assist in foot clearance and limit the instances of L knee hyperextension  Plan: Continue per plan of care  Progress treatment as tolerated         Precautions: h/o of CVA      Manuals                                                              Neuro Re-Ed             Treadmill  1 5 mph B LUE 4 min    1 7 mph AFO B UE 4 min x 2 1 5 mph B LUE 4 min   2 min 30 sec    1 7 mph AFO B UE 4 min           stairs   Up and over 10x Up and over x15 1 5# AW (fwd/lat)         Step up and overs  8 and 6" 10x solo           backwards  15 ft x 5 5# b/l    5x wo weights 15 ft x 5 3# bl hallway x4         Side step  15 ft x 4 5# bl    5x w/o wts 15 ft x 4 3# 12 ft x15 1 5# AW         Foam beams   Side step - 4x Side step x4         hurdles   6x fwd/lat ea 8x fwd 1 5# AW         STS    2x20                      Ther Ex             Stand hip abd x10 B            Stand hip ext x10 B            Stand march x10 B            HR/TR x10 B Ther Activity                                       Gait Training             Fast walking  With AFO - 160 ft x 2    without AFO - 160 ft x 2 solo step With AFO - 160 ft x 2 hallway x4 with close supervision                      Modalities

## 2021-04-19 ENCOUNTER — APPOINTMENT (OUTPATIENT)
Dept: PHYSICAL THERAPY | Facility: CLINIC | Age: 66
End: 2021-04-19
Payer: COMMERCIAL

## 2021-04-19 ENCOUNTER — APPOINTMENT (OUTPATIENT)
Dept: OCCUPATIONAL THERAPY | Facility: CLINIC | Age: 66
End: 2021-04-19
Payer: COMMERCIAL

## 2021-04-21 ENCOUNTER — OFFICE VISIT (OUTPATIENT)
Dept: PHYSICAL THERAPY | Facility: CLINIC | Age: 66
End: 2021-04-21
Payer: COMMERCIAL

## 2021-04-21 DIAGNOSIS — G81.94 LEFT HEMIPARESIS (HCC): Primary | ICD-10-CM

## 2021-04-21 DIAGNOSIS — Z86.73 HISTORY OF STROKE: ICD-10-CM

## 2021-04-21 PROCEDURE — 97110 THERAPEUTIC EXERCISES: CPT | Performed by: PHYSICAL THERAPIST

## 2021-04-21 PROCEDURE — 97112 NEUROMUSCULAR REEDUCATION: CPT | Performed by: PHYSICAL THERAPIST

## 2021-04-21 NOTE — PROGRESS NOTES
Daily Note     Today's date: 2021  Patient name: Severiano Finner  : 1955  MRN: [id number]  Referring provider: Ginny Larios MD  Dx:   Encounter Diagnosis     ICD-10-CM    1  Left hemiparesis ([address] )  G81 94    2  History of stroke  Z86 73          Subjective: Pt states that he fell over the weekend after cutting the grass when walking with a leaf blower, caught his toe  Objective: See treatment diary below      Assessment: Discussed with pt getting AFO to wear when he may be walking for long distances and long periods of time  Pt felt agreeable to that  Will contact referring MD for prescription for AFO  Plan: Continue per plan of care  Progress treatment as tolerated         Precautions: h/o of CVA      Manuals                                                             Neuro Re-Ed             Treadmill  1 5 mph B LUE 4 min    1 7 mph AFO B UE 4 min x 2 1 5 mph B LUE 4 min   2 min 30 sec    1 7 mph AFO B UE 4 min   1 5 mph B UE 3#    8 min x 1  9 min x 1        stairs   Up and over 10x Up and over x15 1 5# AW (fwd/lat) Up and over 3# 15x        Step up and overs  8 and 6" 10x solo           backwards  15 ft x 5 5# b/l    5x wo weights 15 ft x 5 3# bl hallway x4 hallway resisted MTB 1 lap        Side step  15 ft x 4 5# bl    5x w/o wts 15 ft x 4 3# 12 ft x15 1 5# AW 15ft x 3 foam beams        Foam beams   Side step - 4x Side step x4         hurdles   6x fwd/lat ea 8x fwd 1 5# AW         STS    2x20 30x        Resisted walking fwd     100ft x 1        Ther Ex             Stand hip abd x10 B            Stand hip ext x10 B            Stand march x10 B            HR/TR x10 B                                                                Ther Activity                                       Gait Training             Fast walking  With AFO - 160 ft x 2    without AFO - 160 ft x 2 solo step With AFO - 160 ft x 2 hallway x4 with close supervision                      Modalities

## 2021-04-22 ENCOUNTER — APPOINTMENT (OUTPATIENT)
Dept: OCCUPATIONAL THERAPY | Facility: CLINIC | Age: 66
End: 2021-04-22
Payer: COMMERCIAL

## 2021-04-23 ENCOUNTER — OFFICE VISIT (OUTPATIENT)
Dept: OCCUPATIONAL THERAPY | Facility: CLINIC | Age: 66
End: 2021-04-23
Payer: COMMERCIAL

## 2021-04-23 ENCOUNTER — OFFICE VISIT (OUTPATIENT)
Dept: PHYSICAL THERAPY | Facility: CLINIC | Age: 66
End: 2021-04-23
Payer: COMMERCIAL

## 2021-04-23 DIAGNOSIS — Z86.73 HISTORY OF STROKE: ICD-10-CM

## 2021-04-23 DIAGNOSIS — I69.398 SPASTICITY AS LATE EFFECT OF CEREBROVASCULAR ACCIDENT (CVA): Primary | ICD-10-CM

## 2021-04-23 DIAGNOSIS — G81.94 LEFT HEMIPARESIS (HCC): Primary | ICD-10-CM

## 2021-04-23 DIAGNOSIS — R25.2 SPASTICITY AS LATE EFFECT OF CEREBROVASCULAR ACCIDENT (CVA): Primary | ICD-10-CM

## 2021-04-23 PROCEDURE — 97112 NEUROMUSCULAR REEDUCATION: CPT

## 2021-04-23 PROCEDURE — 97110 THERAPEUTIC EXERCISES: CPT

## 2021-04-23 PROCEDURE — 97530 THERAPEUTIC ACTIVITIES: CPT

## 2021-04-23 PROCEDURE — 97150 GROUP THERAPEUTIC PROCEDURES: CPT

## 2021-04-23 NOTE — PROGRESS NOTES
Occupational Therapy Daily Note:    Today's date: 2021  Patient name: Lizz Garcia  : 1955  MRN: [id number]  Referring provider: Gaurav Wilkins MD  Dx:   Encounter Diagnosis   Name Primary?  Spasticity as late effect of cerebrovascular accident (CVA) Yes     TIME:  group   0817-3021 one on one             Subjective: " It feels good to move my arm like that"    Objective: Pt seen for OT treatment session focusing on L UE NMR, tone reduction, proximal strengthening/endurance, and pt education  Pt tolerated BIONESS to L UE in neuromodulation setting for distal tone reduction followed byAROM and distal strengthening task with use of BIONESS in extensor (open hand) mode  Pt tolerated input to wrist and digit extensors while OTR providing additional ROM to end range for improving muscle elasticity and joint ROM  Pt then completed closed chain strengthening to complete grasp against resistance while BIONESS stim in off mode for tone reduction  Pt engaged in Akureyri MAS to address proximal UE strengthening, fxnl reach and improving proximal ROM and endurance  Pt completed AAROM protraction/retraction, FF/ext, and abd/add  Assessment: Tolerated treatment well  Patient would benefit from continued skilled OT  Plan: Continued skilled OT per POC      INTERVENTION COMMENTS:  Diagnosis: Spasticity as late effect of cerebrovascular accident (CVA) Soraida Screws, R25 2]  Precautions: fall risk  FOTO: to complete  Insurance: Payor: BLUE CROSS / Plan: [employer] PLAN 361 / Product Type: Blue Fee for Service /   8 FW 1 UNWNXC, PN due 21

## 2021-04-23 NOTE — PROGRESS NOTES
Daily Note     Today's date: 2021  Patient name: Carmelita Haile  : 1955  MRN: [id number]  Referring provider: Noé Mar MD  Dx:   Encounter Diagnosis     ICD-10-CM    1  Left hemiparesis ([address] )  G81 94    2  History of stroke  Z86 73                   Subjective: Patient states that he felt tired after last session  Denies any falls or new complaints since last session  Objective: See treatment diary below      Assessment: Patient demonstrated increased ease of stepping over hurdles at start of session compared to previous sessions  With all exercises, his L foot clearance and coordination was better with full attention on the task  With distractions, his gait quality and L foot clearance decreased  He needed intermittent cues for quad activation during walking marches and cone taps to avoid knee buckling  He was able to correct and maintain slight flexion, limiting knee hyperextension  Not able to tolerate as long duration walking on treadmill compared to last session as he reported fatigue prior to starting on the treadmill  RPE throughout was greater than 7/10  Plan to reach out to MD regarding prescription for AFO  Plan: Continue per plan of care  Progress treatment as tolerated         Precautions: h/o of CVA      Manuals                                                            Neuro Re-Ed             Treadmill  1 5 mph B LUE 4 min    1 7 mph AFO B UE 4 min x 2 1 5 mph B LUE 4 min   2 min 30 sec    1 7 mph AFO B UE 4 min   1 5 mph B UE 3#    8 min x 1  9 min x 1 1 5 mph 3#  BUE  3 min;     R UE  5 min; 2 5 min       stairs   Up and over 10x Up and over x15 1 5# AW (fwd/lat) Up and over 3# 15x Up and over 3# x16       Step up and overs  8 and 6" 10x solo           backwards  15 ft x 5 5# b/l    5x wo weights 15 ft x 5 3# bl hallway x4 hallway resisted MTB 1 lap        Side step  15 ft x 4 5# bl    5x w/o wts 15 ft x 4 3# 12 ft x15 1 5# AW 15ft x 3 foam beams Foam beams 3# x6       Foam beams   Side step - 4x Side step x4         hurdles   6x fwd/lat ea 8x fwd 1 5# AW  x10 fwd/lat       STS    2x20 30x 30x       Resisted walking fwd     100ft x 1        Walking marches      3# 15ft x8       Cone taps      2 cones on foam 3# 2x10 ea       Ther Ex             Stand hip abd x10 B            Stand hip ext x10 B            Stand march x10 B            HR/TR x10 B                                                                Ther Activity                                       Gait Training             Fast walking  With AFO - 160 ft x 2    without AFO - 160 ft x 2 solo step With AFO - 160 ft x 2 hallway x4 with close supervision                      Modalities

## 2021-04-26 ENCOUNTER — APPOINTMENT (OUTPATIENT)
Dept: PHYSICAL THERAPY | Facility: CLINIC | Age: 66
End: 2021-04-26
Payer: COMMERCIAL

## 2021-04-26 ENCOUNTER — APPOINTMENT (OUTPATIENT)
Dept: OCCUPATIONAL THERAPY | Facility: CLINIC | Age: 66
End: 2021-04-26
Payer: COMMERCIAL

## 2021-04-27 DIAGNOSIS — G81.94 LEFT HEMIPARESIS (HCC): Primary | ICD-10-CM

## 2021-04-28 ENCOUNTER — OFFICE VISIT (OUTPATIENT)
Dept: PHYSICAL THERAPY | Facility: CLINIC | Age: 66
End: 2021-04-28
Payer: COMMERCIAL

## 2021-04-28 DIAGNOSIS — G81.94 LEFT HEMIPARESIS (HCC): Primary | ICD-10-CM

## 2021-04-28 PROCEDURE — 97112 NEUROMUSCULAR REEDUCATION: CPT | Performed by: PHYSICAL THERAPIST

## 2021-04-28 PROCEDURE — 97116 GAIT TRAINING THERAPY: CPT | Performed by: PHYSICAL THERAPIST

## 2021-04-28 NOTE — PROGRESS NOTES
Daily Note     Today's date: 2021  Patient name: Radha Arzola  : 1955  MRN: [id number]  Referring provider: Dahlia Rosario MD  Dx:   Encounter Diagnosis     ICD-10-CM    1  Left hemiparesis ([address] )  G81 94                   Subjective: Pt is doing well today  He apologizes for having to cancel appointment on Monday for work  Reports being a little bit tired today form a long few days, but otherwise doing well  Objective: See treatment diary below      Assessment: Pt did well with treatment today with improvements in treadmill walking endurance and outdoor walking on various surfaces  He struggled with foam walking and hurdles due to leg fatigue and feeling weaker today, which was very frustrating to him as he states he normally does much better than this  When walking outside patient demonstrated increased endurance with no rest breaks needed until getting back to the top of hill, while also keeping a consistent gait speed the entire time  He still has strong R knee hyperextension which is more noticeable as LE fatigue sets in       Plan: Continue per POC by completing more tasks in AFO in order to increase wear time and determine effects with knee hyperextension  Precautions: h/o of CVA      Manuals                                                          Neuro Re-Ed             Treadmill  1 5 mph B LUE 4 min    1 7 mph AFO B UE 4 min x 2 1 5 mph B LUE 4 min   2 min 30 sec    1 7 mph AFO B UE 4 min   1 5 mph B UE 3#    8 min x 1  9 min x 1 1 5 mph 3#  BUE  3 min;     R UE  5 min; 2 5 min  1  5mph 2 5# BUE;3:30min x1;  10 mins x1 RUE       stairs   Up and over 10x Up and over x15 1 5# AW (fwd/lat) Up and over 3# 15x Up and over 3# x16       Step up and overs  8 and 6" 10x solo           backwards  15 ft x 5 5# b/l    5x wo weights 15 ft x 5 3# bl hallway x4 hallway resisted MTB 1 lap        Side step  15 ft x 4 5# bl    5x w/o wts 15 ft x 4 3# 12 ft x15 1 5# AW 15ft x 3 foam beams Foam beams 3# x6  foam beams 2 5# x5     Foam beams   Side step - 4x Side step x4         hurdles   6x fwd/lat ea 8x fwd 1 5# AW  x10 fwd/lat  x6 fwd/x3 lat 2 5# AW     STS    2x20 30x 30x       Resisted walking fwd     100ft x 1        Walking marches      3# 15ft x8       Cone taps      2 cones on foam 3# 2x10 ea       Ther Ex             Stand hip abd x10 B            Stand hip ext x10 B            Stand march x10 B            HR/TR x10 B                                                                Ther Activity                                       Gait Training             Fast walking  With AFO - 160 ft x 2    without AFO - 160 ft x 2 solo step With AFO - 160 ft x 2 hallway x4 with close supervision         Outdoors         2 5 # AW, 12 mins up/down side walk hill      Modalities

## 2021-04-29 ENCOUNTER — OFFICE VISIT (OUTPATIENT)
Dept: OCCUPATIONAL THERAPY | Facility: CLINIC | Age: 66
End: 2021-04-29
Payer: COMMERCIAL

## 2021-04-29 DIAGNOSIS — I69.398 SPASTICITY AS LATE EFFECT OF CEREBROVASCULAR ACCIDENT (CVA): Primary | ICD-10-CM

## 2021-04-29 DIAGNOSIS — R25.2 SPASTICITY AS LATE EFFECT OF CEREBROVASCULAR ACCIDENT (CVA): Primary | ICD-10-CM

## 2021-04-29 PROCEDURE — 97110 THERAPEUTIC EXERCISES: CPT

## 2021-04-29 PROCEDURE — 97112 NEUROMUSCULAR REEDUCATION: CPT

## 2021-04-29 PROCEDURE — 97530 THERAPEUTIC ACTIVITIES: CPT

## 2021-04-30 ENCOUNTER — OFFICE VISIT (OUTPATIENT)
Dept: PHYSICAL THERAPY | Facility: CLINIC | Age: 66
End: 2021-04-30
Payer: COMMERCIAL

## 2021-04-30 DIAGNOSIS — Z86.73 HISTORY OF STROKE: ICD-10-CM

## 2021-04-30 DIAGNOSIS — G81.94 LEFT HEMIPARESIS (HCC): Primary | ICD-10-CM

## 2021-04-30 PROCEDURE — 97116 GAIT TRAINING THERAPY: CPT

## 2021-04-30 PROCEDURE — 97112 NEUROMUSCULAR REEDUCATION: CPT

## 2021-04-30 NOTE — PROGRESS NOTES
Daily Note     Today's date: 2021  Patient name: Susan Calles  : 1955  MRN: [id number]  Referring provider: Marianela Ivey MD  Dx:   Encounter Diagnosis     ICD-10-CM    1  Left hemiparesis ([address] )  G81 94    2  History of stroke  Z86 73        Subjective: Pt states that he has a small blister on his R heel which he thinks may be from his sneakers  He says that it is fine and wants to try to treadmill  He has a meeting at work at 11:30 so needs to leave about 10 mins early from today's session  Objective: See treatment diary below      Assessment: Pt is only able to tolerate ambulation on treadmill for a shorter duration when holding on with both UEs, trying to avoid L UE flexion synergy  Once allowed to let go with L UE, he can ambulate for a longer duration  Speed of treadmill was increased with U UE  As he fatigues, he demonstrates increased frequency of decreased L foot clearance as well as increased L knee hyperextension frequency and force  With cues, he is able to temporarily correct L foot clearance  He was provided with the prescription for AFO from his MD and a list of companys to manufacture it  He plans to schedule an appointment with one of the companies to begin the process  He would benefit from continued skilled PT to progress endurance and balance  Plan: Progress treadmill to gradually increased speeds with more outdoor walking     Precautions: h/o of CVA      Manuals                                                        Neuro Re-Ed             Treadmill  1 5 mph B LUE 4 min    1 7 mph AFO B UE 4 min x 2 1 5 mph B LUE 4 min   2 min 30 sec    1 7 mph AFO B UE 4 min   1 5 mph B UE 3#    8 min x 1  9 min x 1 1 5 mph 3#  BUE  3 min;     R UE  5 min; 2 5 min  1  5mph 2 5# BUE;3:30min x1;  10 mins x1 RUE    1 5 mph 2 5# BUE 5 min; 1 7 mph 2 5# RUE x10 min   stairs   Up and over 10x Up and over x15 1 5# AW (fwd/lat) Up and over 3# 15x Up and over 3# x16       Step up and overs  8 and 6" 10x solo           backwards  15 ft x 5 5# b/l    5x wo weights 15 ft x 5 3# bl hallway x4 hallway resisted MTB 1 lap        Side step  15 ft x 4 5# bl    5x w/o wts 15 ft x 4 3# 12 ft x15 1 5# AW 15ft x 3 foam beams Foam beams 3# x6  foam beams 2 5# x5  Foam beams 2 5# x6   Foam beams   Side step - 4x Side step x4         hurdles   6x fwd/lat ea 8x fwd 1 5# AW  x10 fwd/lat  x6 fwd/x3 lat 2 5# AW  x6 fwd/lat 2 5# AW   STS    2x20 30x 30x       Resisted walking fwd     100ft x 1        Walking marches      3# 15ft x8    2 5# 15 ft x8   Cone taps      2 cones on foam 3# 2x10 ea    2 cones on foam 2 5# 2x10 ea   Ther Ex             Stand hip abd x10 B            Stand hip ext x10 B            Stand march x10 B            HR/TR x10 B                                                                Ther Activity                                       Gait Training             Fast walking  With AFO - 160 ft x 2    without AFO - 160 ft x 2 solo step With AFO - 160 ft x 2 hallway x4 with close supervision         Outdoors         2 5 # AW, 12 mins up/down side walk hill      Modalities

## 2021-05-03 ENCOUNTER — OFFICE VISIT (OUTPATIENT)
Dept: OCCUPATIONAL THERAPY | Facility: CLINIC | Age: 66
End: 2021-05-03
Payer: COMMERCIAL

## 2021-05-03 DIAGNOSIS — R25.2 SPASTICITY AS LATE EFFECT OF CEREBROVASCULAR ACCIDENT (CVA): Primary | ICD-10-CM

## 2021-05-03 DIAGNOSIS — I69.398 SPASTICITY AS LATE EFFECT OF CEREBROVASCULAR ACCIDENT (CVA): Primary | ICD-10-CM

## 2021-05-03 PROCEDURE — 97140 MANUAL THERAPY 1/> REGIONS: CPT

## 2021-05-03 PROCEDURE — 97110 THERAPEUTIC EXERCISES: CPT

## 2021-05-03 PROCEDURE — 97112 NEUROMUSCULAR REEDUCATION: CPT

## 2021-05-03 NOTE — PROGRESS NOTES
Occupational Therapy Daily Note:    Today's date: 5/3/2021  Patient name: Jeremy Chan  : 1955  MRN: [id number]  Referring provider: Suze Antony MD  Dx:   Encounter Diagnosis   Name Primary?  Spasticity as late effect of cerebrovascular accident (CVA) Yes                  Subjective: "I was a little swollen this weekend"    Objective: Pt seen for OT treatment session focusing on LUE NMR, tone reduction, proximal strengthening/endurance    Pt tolerated 5 min x 2 (prograde/retrograde motions at 2 0 resist) seated on UBE with focus on increasing proximal UE strength, endurance, act tolerance and ROM to inc indep and safety with ADL/IADL fxn and fxnl reaching tasks  Pt tolerated BIONESS neuromodulation setting L UE to reduce tone to distal UE and allow for improved volitional grasp and release with containers and fxnl tasks  Pt tolerated 15 minutes total    AROM and distal strengthening task with use of BIONESS in extensor (open hand) mode  Pt tolerated input to wrist and digit extensors while OTR providing additional ROM to end range for improving muscle elasticity and joint ROM  IASTM applied to wrist and digit extensors for tone reduction to inc volitional activation of distal musculature  Assessment: Tolerated treatment well  Pt is progressing with distal tone, pt with dec tone and hypertonicity in distal UE at start of tx session with improvement made throughout session  Botox scheduled for next tx session  Patient would benefit from continued skilled OT  Plan: Continued skilled OT per POC      INTERVENTION COMMENTS:  Diagnosis: Spasticity as late effect of cerebrovascular accident (CVA) Dalia Barcenas, R25 2]  Precautions: fall risk  FOTO: to complete  Insurance: Payor: BLUE CROSS / Plan: [facility] PLAN 361 / Product Type: Blue Fee for Service /   1 [place] 1 RPZOUA, PN due 21

## 2021-05-07 ENCOUNTER — EVALUATION (OUTPATIENT)
Dept: OCCUPATIONAL THERAPY | Facility: CLINIC | Age: 66
End: 2021-05-07
Payer: COMMERCIAL

## 2021-05-07 ENCOUNTER — OFFICE VISIT (OUTPATIENT)
Dept: PHYSICAL THERAPY | Facility: CLINIC | Age: 66
End: 2021-05-07
Payer: COMMERCIAL

## 2021-05-07 DIAGNOSIS — I69.398 SPASTICITY AS LATE EFFECT OF CEREBROVASCULAR ACCIDENT (CVA): Primary | ICD-10-CM

## 2021-05-07 DIAGNOSIS — Z86.73 HISTORY OF STROKE: ICD-10-CM

## 2021-05-07 DIAGNOSIS — R25.2 SPASTICITY AS LATE EFFECT OF CEREBROVASCULAR ACCIDENT (CVA): Primary | ICD-10-CM

## 2021-05-07 DIAGNOSIS — G81.94 LEFT HEMIPARESIS (HCC): Primary | ICD-10-CM

## 2021-05-07 PROCEDURE — 97530 THERAPEUTIC ACTIVITIES: CPT

## 2021-05-07 PROCEDURE — 97112 NEUROMUSCULAR REEDUCATION: CPT | Performed by: PHYSICAL THERAPIST

## 2021-05-07 PROCEDURE — 97112 NEUROMUSCULAR REEDUCATION: CPT

## 2021-05-07 PROCEDURE — 97116 GAIT TRAINING THERAPY: CPT | Performed by: PHYSICAL THERAPIST

## 2021-05-07 PROCEDURE — 97140 MANUAL THERAPY 1/> REGIONS: CPT

## 2021-05-07 NOTE — PROGRESS NOTES
OCCUPATIONAL THERAPY PROGRESS UPDATE  2021  Vitaly Calvert  1955  [id number]  Ramez Velarde MD  1  Spasticity as late effect of cerebrovascular accident (CVA)        Subjective    "Im going to get botox"    PATIENT GOAL: "I want to be able to move this wrist and hand more"      Assessment/Plan    Skilled Analysis:  Pt is a 76 y o  male referred to Occupational Therapy s/p Spasticity as late effect of cerebrovascular accident (CVA) Antionette Shah, R25 2]  Pt participated in skilled OT re-evaluation today  Pt reports that since participation in OP OT services, he has noticed a slight improvement in L UE tone/tightness with improved flexibility with LUE  Pt able to initiate greater thumb movement and ROM for fxnl grasp patterns with greater indep  Pt is participating in OP OT with G improvements in tone, flexibility, ROM and proximal strength  Pt is benefiting from neuromodulation with use of BIONESS and proximal strengthening exercises to inc proximal stability with distal control  Following formalized testing,pt demonstrates improvements in FMS, DIGIT ISOLATION, GMS, PROXIMAL STABILITY/ROM  Pt continues to present with the following areas of deficit: fxnl ROM, endurance, volitional muscle activation/termination,  FMC/FMS, GMC/GMS, hypertonicity and in hand manipulation/dexterity , proximal strength/stability impacting indep and completion of ADL/IADL, salient, and leisure tasks  Pt does demo the need for continued skilled Occupational Therapy services 2x/week for another 4 weeks with focus on UE NMR, UE strengthening, UE endurance, FMC/GMC, FMS/GMS and tone reduction to address the goals as listed below  Pt with botox injections scheduled for next week  Pt in agreement with POC, POC to  w/in 90 days  OT       Goals:     Motor Short Term Goals (4 -6)WKS      Strength/Endurance    ·  Pt will increase L UE strength to 3/5  through the use of strengthening exercises and home program for eventual return to life and work roles and salient tasks: PROGRESSING    · Pt will demo with G tolerance to supine, seated, and in stance exercise x 45 minutes with minimal rest breaks required for increased engagement in life roles and weekly exercise regimen : PROGRESSING    · Pt will demo with G carryover of Home Exercise Program to improve functional progression towards goals in Plan of care and for improved functional use of LUE : PROGRESSING        FMC/GMC    · Pt will increase L UE prehension patterns for improved utensil and container management  with ability to isolate pincer and lateral pinch grasp >50% of the time:PROGRESSING       Functional Performance    · Pt will increase LUE to functional assist with for ADL/IADL and tabletop tasks for improved functional performance of life roles and salient tasks: PROGRESSING     AROM/PROM    · Pt will demo with decreased  LUE  shoulder sublux to trace for increased AROM for improved ADL and IADL engagement PROGRESSING    · Pt will be able to perform near full range of proximal L UE to demo increased strength and ROM of affected UE for improved ADLs/IADLs: PROGRESSING    · Pt will be able to perform >1/2  range of distal L UE to demo increased strength and ROM of affected UE for improved ADLs/IADLs PROGRESSING    · Pt will demo decreased hypertonicity of Modified Linsey Scale (MAS) of L UE to 1 for improved alternate motor patterns, grasp/release, and termination on command for improved dressing/hygiene PROGRESSING    · Pt will demo good carryover of clinic and home tone reduction strategies for improved AROM initiation with functional reach with ADL fxn PROGRESSING      Modalities    · Pt will tolerate BIONESS/NMES/IASTM for improved motor and sensory performance for overall improved strength, tone reduction, and sensation to inc safety and engagement with ADL/IADL fxn PROGRESSING    · Pt/family will demo G understanding and carryover of use of home NMES unit as prescribed to inc carryover of tone reduction and strengthening strategies of L UE: PROGRESSING      Treatment Interventions  Supine, seated, and in stance neuro re-ed  NMES/FES for strengthening  NMES for tone reduction  BIONESS  Fxnl Grasp and Release  FMC/prehension patterns  Fxnl Tool use (tweezers, tongs)  In hand manipulation  [employer] [facility] [place])  Manual tx (IASTM if able, PROM/stretching)  Hand to target tasks  Supine place and hold  Pain Levels:     Restin    With Activity:  Low back, at times (0-5)    Objective    Impairment Observations:    Upper Extremity       RUE LUE Comments                 UPPER EXTREMITY FXN Intact  Dominant Hand: R                         /Pinch Strength         Dynamometer      - Gross Grasp2 35 lbs 20 lbs Improved R UE   Pinch Meter       - PINCER 18 lbs 5 lbs     - TRIPOD 15 lbs 9 lbs     - LATERAL 20 lbs  14 lbs Improved b/l UE             AROM (seated)       Elevation/Depression  Near full    Shoulder Flex/Ext  3/4 range    Shoulder Abd  3/4 range     Shoulder Add  3/4 range    Horizontal Abd  3/4 range    Horizontal Add  1/2 range     Elbow Flex  >1/2 range     Elbow Ext  >3/4 range    Pronation  Near full     Supination  3/4     Wrist Flex  3/4     Wrist Ext  <1/4 range     Digit Flex  3/4 range     Digit Ex  trace     Composite Grasp  Full range    Hook Grasp  Unable to isolate     Opposition  Able to oppose to D1-2     Subluxation  1 finger breadth      Full range all planes                       AROM (supine)         Elevation  Near full     Shoulder Flex/Ext  Near full    Shoulder ABd  3/4     Shoulder ADd  full    Horizontal ABd  1/2     Horizontal ADd  1/2     Elbow Flex  Near full    Elbow Ext  full    Pronation  full    Supination  1/2     Wrist Flex  Near full    Wrist Ext  <1/4                    Full range all planes                          PROM (supine position)         Elevation/Depression      Retraction/Protraction      Shoulder Flex/Ext      Shoulder ABd/ADd Horizontal ABd/Add      Elbow Flex      Elbow Ext      Wrist Flex      Wrist Ext      Digit Flex      Digit Ext          Full range all planes   Full range all planes                            MMT         Elevation 4+/5 2/5    Shoulder Flex/Ext 4+/5 2/5    Shoulder Abd 4+/5 2/5    Shoulder ADd 4+/5 2/5    Elbow Flex 4+/5 2/5    Elbow Ext 4+/5 2/5    Wrist Flex 4+/5 3/5    Wrist Ext 4+/5 1/5    Gross Grasp 4+/5 3/5          SENSATION      Myofilaments (3 61 Wnl) 3 61 3 61    Sharp Dull  Intact Intact    Proprioception Intact Intact    Hot/Cold Temp Intact Intact          MODIFIED HUNTER SCALE (TONE) 0 1+, 1    No increase in muscle tone (0)      Slight Increase in muscle tone with catch and release or min resist at end range (1)  Bicep/tricep    Slight Increase in muscle tone with catch and release, followed by min resistance through remainder of range (1+)  Wrist flexors    Increased muscle tone through full range, able to be moved easily (2)      Considerable increase in tone, difficult to move (3)      Rigid in Flexion/Extension (4)                                  todays treatment  Pt tolerated BIONESS neuromodulation setting L UE to reduce tone to distal UE and allow for improved volitional grasp and release with containers and fxnl tasks   Pt tolerated 10 minutes total  PROM to distal UE for tone reduction and to inc flexibility and ROM       INTERVENTION COMMENTS:  Diagnosis: Spasticity as late effect of cerebrovascular accident (CVA) Danilo Cuadra, R25 2]  Precautions: fall risk  FOTO: to complete  Insurance: Payor: [address] / Plan: [facility] PLAN [id number] / Product Type: Blue Fee for Service /   8 of 8 visits, PN due 6/9/21

## 2021-05-07 NOTE — PROGRESS NOTES
Daily Note     Today's date: 2021  Patient name: Jocelyne Bobby  : 1955  MRN: [id number]  Referring provider: Kiran Miranda MD  Dx:   Encounter Diagnosis     ICD-10-CM    1  Left hemiparesis ([address] )  G81 94    2  History of stroke  Z86 73                   Subjective: Pt feels good today  Objective: See treatment diary below      Assessment: Pt tolerated treatment well today with improvements during balance activities observed with dec LOB on foam and improved clearance with hurdles forwards and laterally  Pt also was able to climb down/up grassy hills without losing balance and requiring minimal rest breaks with outdoor walking due to fatigue, however needed breaks due to R LE fatigue and discomfort due to knee hyperextension that comes along with the fatigue  Plan: Continue per POC by increasing activity without rest breaks depending on level of fatigue  Precautions: h/o of CVA      Manuals                                                        Neuro Re-Ed             Treadmill  1 5 mph B LUE 4 min    1 7 mph AFO B UE 4 min x 2 1 5 mph B LUE 4 min   2 min 30 sec    1 7 mph AFO B UE 4 min   1 5 mph B UE 3#    8 min x 1  9 min x 1 1 5 mph 3#  BUE  3 min;     R UE  5 min; 2 5 min  1  5mph 2 5# BUE;3:30min x1;  10 mins x1 RUE    1 5 mph 2 5# BUE 5 min; 1 7 mph 2 5# RUE x10 min   stairs   Up and over 10x Up and over x15 1 5# AW (fwd/lat) Up and over 3# 15x Up and over 3# x16       Step up and overs  8 and 6" 10x solo           backwards SOLO 3 full laps 15 ft x 5 5# b/l    5x wo weights 15 ft x 5 3# bl hallway x4 hallway resisted MTB 1 lap        Side step SOLO 2 full laps 15 ft x 4 5# bl    5x w/o wts 15 ft x 4 3# 12 ft x15 1 5# AW 15ft x 3 foam beams Foam beams 3# x6  foam beams 2 5# x5  Foam beams 2 5# x6   Foam beams Side step x 5    tandemx3  Side step - 4x Side step x4         hurdles 6", 5 laps Fwd/lat  6x fwd/lat ea 8x fwd 1 5# AW  x10 fwd/lat x6 fwd/x3 lat 2 5# AW  x6 fwd/lat 2 5# AW   STS 30x w/ foam   2x20 30x 30x       Resisted walking fwd     100ft x 1        Walking marches      3# 15ft x8    2 5# 15 ft x8   Cone taps      2 cones on foam 3# 2x10 ea    2 cones on foam 2 5# 2x10 ea   Ther Ex             Stand hip abd             Stand hip ext             Stand march             HR/TR                                                                 Ther Activity                                       Gait Training             Fast walking W/out  ft x4 With AFO - 160 ft x 2    without AFO - 160 ft x 2 solo step With AFO - 160 ft x 2 hallway x4 with close supervision         Outdoors  20 mins- grassy hills, sidewalk hills          2 5 # AW, 12 mins up/down side walk hill      Modalities

## 2021-05-10 ENCOUNTER — OFFICE VISIT (OUTPATIENT)
Dept: OCCUPATIONAL THERAPY | Facility: CLINIC | Age: 66
End: 2021-05-10
Payer: COMMERCIAL

## 2021-05-10 ENCOUNTER — OFFICE VISIT (OUTPATIENT)
Dept: PHYSICAL THERAPY | Facility: CLINIC | Age: 66
End: 2021-05-10
Payer: COMMERCIAL

## 2021-05-10 DIAGNOSIS — Z86.73 HISTORY OF STROKE: ICD-10-CM

## 2021-05-10 DIAGNOSIS — I69.398 SPASTICITY AS LATE EFFECT OF CEREBROVASCULAR ACCIDENT (CVA): Primary | ICD-10-CM

## 2021-05-10 DIAGNOSIS — G81.94 LEFT HEMIPARESIS (HCC): Primary | ICD-10-CM

## 2021-05-10 DIAGNOSIS — R25.2 SPASTICITY AS LATE EFFECT OF CEREBROVASCULAR ACCIDENT (CVA): Primary | ICD-10-CM

## 2021-05-10 PROCEDURE — 97116 GAIT TRAINING THERAPY: CPT

## 2021-05-10 PROCEDURE — 97112 NEUROMUSCULAR REEDUCATION: CPT

## 2021-05-10 PROCEDURE — 97140 MANUAL THERAPY 1/> REGIONS: CPT

## 2021-05-10 PROCEDURE — 97530 THERAPEUTIC ACTIVITIES: CPT

## 2021-05-10 NOTE — PROGRESS NOTES
Occupational Therapy Daily Note:    Today's date: 5/10/2021  Patient name: Alissa Soares  : 1955  MRN: [id number]  Referring provider: Trudi Conner MD  Dx:   Encounter Diagnosis   Name Primary?  Spasticity as late effect of cerebrovascular accident (CVA) Yes                  Subjective: "I tried to place the washcloth in my Left hand when I was showering but I couldn't let it go"    Objective: Pt seen for OT treatment session focusing on LUE NMR, tone reduction, and HEP development  Pt tolerated 15 min on BIONESS to LUE in neuromodulation setting for tone reduction to inc volitional grasp/release for ADLs/IADLs  Following neuromodulation setting, pt tolerated 5 min "open hand" mode to L UE to facilitate wrist and digit extension while tolerating PROM by OTR to achieve full range extension  Pt then performed 5 min assisted digit flexion with BIONESS in "grasp" mode to fatigue wrist and digit flexors for additional tone reduction benefits  Pt seated EOM to engage in extended arm WB with FES applied to post delt and supraspinatus to inc proximal stability/strength and for distal tone reduction  Pt completed seated WB to LUE, reaching with RUE to low target positioned on L to inc WB technique  OT provided manual assist at elbow to maintain elbow extension with crossing midline and to maintain digit/wrist extension  Pt engaged in fxnl grasp/release task with bean bags to address volitional control of wrist/digit flexors and extensors for ADLs/IADLs, Pt positioned with LUE forearm supported on tabletop and wrist/digits in space with wrist positioned in slight flexion to inc ease of extensor activation  Pt provided with bean bag in hand, required to maintain grasp and actively release to target  Pt demo difficulties releasing weighted bean bag, downgraded task to AROM digit flexion/extension with improved success      HEP provided for pt to complete AROM in gravity assisted positioning to inc volitional grasp/release and control of distal UE for fxnl tasks  Assessment: Tolerated treatment well  Pt beginning to demonstrate improved distal control of wrist and digit extensors in gravity assisted plane, pt able to produce and initiate extension of wrist and D1-3 with wrist positioned in slight flexion  Pt following WB techosmany improvement in overall flexibility/ROM with overall tone reduction  Continue with closed chain strengthening and WB tasks for continued tone reduction to inc overall distal control  Patient would benefit from continued skilled OT  Plan: Continued skilled OT per POC      INTERVENTION COMMENTS:  Diagnosis: Spasticity as late effect of cerebrovascular accident (CVA) Sahil Mayen, R25 2]  Precautions: fall risk  FOTO: to complete  Insurance: Payor: Rayray Wright / Plan: [facility] PLAN [id number] / Product Type: Blue Fee for Service /   1 of ___ visits, PN due 6/9/21

## 2021-05-10 NOTE — PROGRESS NOTES
Daily Note     Today's date: 5/10/2021  Patient name: Lashonda Wong  : 1955  MRN: [id number]  Referring provider: Minoo Perez MD  Dx:   No diagnosis found  Subjective: Has appt for AFO appt tomorrow  Objective: See treatment diary below      Assessment: Patient tolerated session well overall  Able to perform gait training outside on grass and sidewalk incline without and imbalance  Patient did demo fatigue with TM walking with addition of incline  Demo decreased active DF ad he fatigued, but able to correct with cueing  Patient was challenged with reciprocal gait with fwd stepping over hurdles, demo imbalance and decreased foot clearance  Plan: Continue per POC by increasing activity without rest breaks depending on level of fatigue  Precautions: h/o of CVA    **2 5# b/l aw entire session**    Manuals 5/7 5/10                                                               Neuro Re-Ed             Treadmill  1 7 mph 2 5#, 5% incline RUE x10 min           stairs             Step up and overs             backwards SOLO 3 full laps            Side step SOLO 2 full laps Solo, 1/2 lap, fast, 5x           Foam beams Side step x 5    tandemx3 Solo, Side step x 3    tandemx3             hurdles 6", 5 laps Fwd/lat Solo, x3 fwd/lat 2 5# AW           STS 30x w/ foam            Resisted walking fwd             Walking marches             Cone taps             Ther Ex             Stand hip abd             Stand hip ext             Stand march             HR/TR                                                                 Ther Activity                                       Gait Training             Fast walking W/out  ft x4            Outdoors  20 mins- grassy hills, sidewalk hills  20 mins- grass sidewalk hills             Modalities

## 2021-05-14 ENCOUNTER — OFFICE VISIT (OUTPATIENT)
Dept: OCCUPATIONAL THERAPY | Facility: CLINIC | Age: 66
End: 2021-05-14
Payer: COMMERCIAL

## 2021-05-14 ENCOUNTER — EVALUATION (OUTPATIENT)
Dept: PHYSICAL THERAPY | Facility: CLINIC | Age: 66
End: 2021-05-14
Payer: COMMERCIAL

## 2021-05-14 DIAGNOSIS — G81.94 LEFT HEMIPARESIS (HCC): Primary | ICD-10-CM

## 2021-05-14 DIAGNOSIS — R25.2 SPASTICITY AS LATE EFFECT OF CEREBROVASCULAR ACCIDENT (CVA): Primary | ICD-10-CM

## 2021-05-14 DIAGNOSIS — I69.398 SPASTICITY AS LATE EFFECT OF CEREBROVASCULAR ACCIDENT (CVA): Primary | ICD-10-CM

## 2021-05-14 DIAGNOSIS — Z86.73 HISTORY OF STROKE: ICD-10-CM

## 2021-05-14 PROCEDURE — 97112 NEUROMUSCULAR REEDUCATION: CPT

## 2021-05-14 PROCEDURE — 97150 GROUP THERAPEUTIC PROCEDURES: CPT

## 2021-05-14 PROCEDURE — 97014 ELECTRIC STIMULATION THERAPY: CPT

## 2021-05-14 PROCEDURE — G0283 ELEC STIM OTHER THAN WOUND: HCPCS

## 2021-05-14 NOTE — PROGRESS NOTES
Occupational Therapy Daily Note:    Today's date: 2021  Patient name: Elsi Adrian  : 1955  MRN: [id number]  Referring provider: Rose Villegas MD  Dx:   Encounter Diagnosis   Name Primary?  Spasticity as late effect of cerebrovascular accident (CVA) Yes     Time:  unsup with NMES applied   [id number] group   [id number] one on one             Subjective: "wow, I cant believe my hand is that open"    Objective: Pt seen for OT treatment session focusing on LUE NMR, tone reduction, and fxnl strengthening  Pt tolerated 15 min on BIONESS to LUE in neuromodulation setting for tone reduction to inc volitional grasp/release for ADLs/IADLs  Following neuromodulation setting, pt tolerated 5 min "open hand" mode to L UE to facilitate wrist and digit extension while tolerating PROM by OTR to achieve full range extension  Pt then performed 5 min assisted digit flexion with BIONESS in "grasp" mode to fatigue wrist and digit flexors for additional tone reduction benefits  Pt engaged in closed chain seated WB in extended arm position with focus on tone reduction,  proximal strengthening, closed chain strengthening to inc volitional activation of grasp/release patterns for ADL/IADL  FES applied to post delt and supraspinatus for proximal strengthening and stability  Assessment: Tolerated treatment well  drastic improvements in tone reduction following application of bioness and WB technique  Patient would benefit from continued skilled OT  Plan: Continued skilled OT per POC      INTERVENTION COMMENTS:  Diagnosis: Spasticity as late effect of cerebrovascular accident (CVA) [I69 [id number], R25 2]  Precautions: fall risk  FOTO: to complete  Insurance: Payor: BLUE CROSS / Plan: [facility] PLAN [id number] / Product Type: Blue Fee for Service /   2 of 12 visits, PN due 21

## 2021-05-14 NOTE — PROGRESS NOTES
Progress Note     Today's date: 2021  Patient name: Parag Kennedy  : 1955  MRN: [id number]  Referring provider: Tara Rodríguez MD  Dx:   Encounter Diagnosis     ICD-10-CM    1  Left hemiparesis ([address] )  G81 94    2  History of stroke  Z86 73                   Subjective: Patient states that he went to [facility] earlier this week and walked to the office which went ok  He brought the cane even though he was not sure if he really needed it  He says that his walking and balance has gotten better as he feels more steady and has had less close calls  He notes that he is better able to walk the dogs in the yard without feeling as off balanced anymore  He even was able to push mow part of his yard by side stepping and pushing the mower with his R UE  He was happy that he was able to do this without any LOB  Objective: See treatment diary below       Balance Test     6 Minute Walk Test (ft):  : no AD: 850 ft (2 seated rest breaks for about 30 sec ea)   : no AD: 950 ft (no seated rest breaks)   FGA:  : no AD: score 16/30 (increased falls risk)   : no AD: score 20/30 (increased falls risk)   Gait Speed (m/s):  : no AD: 10m/10 5s =  95 m/s (71% of age group ref)   : no AD: 10m/10 0s = 1 0 m/s       Assessment: Patient has been progressing well the PT thus far as he has seen improvements in all aspects  His endurance has increased as he was able to complete a full 6 MWT without any need for a rest break  This has translated into him being able to walk to his office in [facility], which he is very happy about being able to do again  His gait speed also increased, approaching normal and indicating improved community ambulation  His score on the FGA increased by 4 points, meeting the MCID, which means his balance has improved significantly  His current score still classifies him as a falls risk   Functionally, he is able to walk more and do more around the house and yard without feeling as off-balance and with less reported near falls  He has met all STGs and making good progress towards LTGs  Plan to decrease frequency down to 2x/week as a gradual transition to eventual independent management  He would benefit from continued skilled PT to progress endurance and balance to further increase his safety and decrease his falls risk with functional mobility  Goals  STGs in 4 weeks  1  Patient will increase his FGA score by at least 4 points for improved balance (MCID 4 pts)  - MET  2  Patient will improve his 10 meter walk to at least 1 0 m/s for improved community ambulation  - MET  3  Patient will be able to complete 6 MWT without any seated rest breaks to demonstrate improved cardiorespiratory endurance  - MET      LTGs in 8 weeks  1  Patient will improve his FGA to at least 23/30 for improved safety and decreased falls risk (cut-off score for falls risk: 23/30)  - ONGOING  2  Patient will improve 6 MWT to at least 1000 ft for improved cardiorespiratory endurance  - ONGOING  3  Patient will demonstrate independence with HEP in order to maintain progress of functional mobility independently  - ONGOING  4  Patient will be able to walk on uneven ground without any AD for improved ability to walk dog  - ONGOING       Plan: Progress per POC by increasing activity without rest breaks depending on level of fatigue       Patient would benefit from: skilled physical therapy and PT eval  Planned therapy interventions: joint mobilization, neuromuscular re-education, patient education, strengthening, stretching, therapeutic activities, therapeutic exercise, gait training and home exercise program  Frequency: 2x week  Duration in weeks: 4  Treatment plan discussed with: patient            Precautions: h/o of CVA    **2 5# b/l aw entire session**    Manuals 5/7 5/10 5/14                                                              Neuro Re-Ed             Treadmill  1 7 mph 2 5#, 5% incline RUE x10 min           stairs Step up and overs   x10          backwards SOLO 3 full laps  Solo x8 (1/2 lap)          Side step SOLO 2 full laps Solo, 1/2 lap, fast, 5x           Foam beams Side step x 5    tandemx3 Solo, Side step x 3    tandemx3   Solo side step x6;     tandem x6          hurdles 6", 5 laps Fwd/lat Solo, x3 fwd/lat 2 5# AW           STS 30x w/ foam  x30          Resisted walking fwd             Walking marches   Solo x8 (1/2 lap)          Cone taps             Ther Ex             Stand hip abd             Stand hip ext             Stand march             HR/TR                                                                 Ther Activity                                       Gait Training             Fast walking W/out  ft x4            Outdoors  20 mins- grassy hills, sidewalk hills  20 mins- grass sidewalk hills             Modalities

## 2021-05-17 ENCOUNTER — OFFICE VISIT (OUTPATIENT)
Dept: PHYSICAL THERAPY | Facility: CLINIC | Age: 66
End: 2021-05-17
Payer: COMMERCIAL

## 2021-05-17 ENCOUNTER — OFFICE VISIT (OUTPATIENT)
Dept: OCCUPATIONAL THERAPY | Facility: CLINIC | Age: 66
End: 2021-05-17
Payer: COMMERCIAL

## 2021-05-17 DIAGNOSIS — G81.94 LEFT HEMIPARESIS (HCC): Primary | ICD-10-CM

## 2021-05-17 DIAGNOSIS — R25.2 SPASTICITY AS LATE EFFECT OF CEREBROVASCULAR ACCIDENT (CVA): Primary | ICD-10-CM

## 2021-05-17 DIAGNOSIS — I69.398 SPASTICITY AS LATE EFFECT OF CEREBROVASCULAR ACCIDENT (CVA): Primary | ICD-10-CM

## 2021-05-17 DIAGNOSIS — Z86.73 HISTORY OF STROKE: ICD-10-CM

## 2021-05-17 PROCEDURE — 97116 GAIT TRAINING THERAPY: CPT | Performed by: PHYSICAL THERAPIST

## 2021-05-17 PROCEDURE — 97110 THERAPEUTIC EXERCISES: CPT

## 2021-05-17 PROCEDURE — 97140 MANUAL THERAPY 1/> REGIONS: CPT

## 2021-05-17 PROCEDURE — 97112 NEUROMUSCULAR REEDUCATION: CPT

## 2021-05-17 PROCEDURE — 97112 NEUROMUSCULAR REEDUCATION: CPT | Performed by: PHYSICAL THERAPIST

## 2021-05-17 NOTE — PROGRESS NOTES
Daily Note     Today's date: 2021  Patient name: Angeles Sibley  : 1955  MRN: [id number]  Referring provider: Maeve Vasquez MD  Dx:   Encounter Diagnosis     ICD-10-CM    1  Left hemiparesis ([address] )  G81 94    2  History of stroke  Z86 73                   Subjective: Got new brace on Friday, has been wearing it regularly      Objective: See treatment diary below      Assessment: Completed grassy hill with weight on ankle as well as lunges today which he struggled to complete but was bale to do so with no loss of balance  Discussed with pt not wearing brace around home and when he is walking short distances as he is able to without significant impariment  Pt voiced understanding  Plan: Continue per plan of care  Precautions: h/o of CVA    **2 5# b/l aw entire session**    Manuals 5/7 5/10 5/14 5/17                                                             Neuro Re-Ed             Treadmill  1 7 mph 2 5#, 5% incline RUE x10 min  1 6 mph 10 min         stairs    5 x fwd/lat fast as possible 2 5#         Step up and overs   x10          backwards SOLO 3 full laps  Solo x8 (1/2 lap) MTB 40'x 2         Side step SOLO 2 full laps Solo, 1/2 lap, fast, 5x  40'x 6         Foam beams Side step x 5    tandemx3 Solo, Side step x 3    tandemx3   Solo side step x6;     tandem x6          hurdles 6", 5 laps Fwd/lat Solo, x3 fwd/lat 2 5# AW           STS 30x w/ foam  x30 30x foam under foot         Resisted walking fwd    40'x 4         Walking marches   Solo x8 (1/2 lap) Railing 15 ft x 8         Cone taps             Ther Ex             Stand hip abd             Stand hip ext             Stand march             HR/TR             lunges    4 laps no UE                                                Ther Activity                                       Gait Training             Fast walking W/out  ft x4            Outdoors  20 mins- grassy hills, sidewalk hills  20 mins- grass sidewalk hills    2x hill grassy         Modalities

## 2021-05-17 NOTE — PROGRESS NOTES
Occupational Therapy Daily Note:    Today's date: 2021  Patient name: Patience Driver  : 1955  MRN: [id number]  Referring provider: Elizabeth Camacho MD  Dx:   Encounter Diagnosis   Name Primary?  Spasticity as late effect of cerebrovascular accident (CVA) Yes                  Subjective:  "I get another round of Botox today"    Objective: Pt seen for OT treatment session focusing on L UE NMR, tone reduction, general strengthening and endurance to inc volitional use of LUE for fxl ADL/IDL tasks    Pt tolerated 15 min of BIONESS in neuromodulation setting for LUE distal tone reduction to reduce activation of flexor synergy to reduce tone and improve grasp/release patterns    FES was applied to  Posterior delt and supraspinatus to inc proximal strength and fxnl stability of shoulder girdle to promote inc safety and indep with fxnl reaching ADL/IADL tasks    OT provided PROM with BIONESS in open hand setting to promote distal extensor ROM and strength/endurance of UE extensors to promote strength and end for inc ind with ADl/IADL acitivites    Pt tolerated 5 min x2  of arm bike with 2 0 resist in both prograde/retrograde movements to promote UE proximal stability/strength/endurance to inc indep in fxnl ADL/IADL activities and fxnl reaching tasks  Pt req Coban wrap assist on LUE to maintain grasp throughout activity  Pt engaged in UB strengthening activity in supine on mat with use of 4lb dowel bar  Pt required to maintain grasp on bar bimanually to complete shoulder forward flexion/extension and chest press AROM to promote proximal stability during fxnl reaching tasks  Pt required inc VCs and tactile cues to maintain control and  of LUE against gravity  With manual assist from OTR at times with eccentric control to maintain wrist and elbow extension when appropriate        Assessment: Tolerated treatment well  Decreased tone noted after use of Bioness and PROM strategies   Pt tolerate inc in arm bike resistance without evidence of fatigue  Pt able to perform bilateral UE stability/strengthening exercises against gravity  with min A from OT  Inc assist required to maintain fxnl grasp and LUE control with inc repetitions Pt demo weakness in proximal L UE limiting ability to maintain elbow, wrist extension and for place and hold  Patient would benefit from continued skilled OT  Pt education provided on donning L LE MAFO and techniques to improve fit and positioning  Plan: Continued skilled OT per POC      INTERVENTION COMMENTS:  Diagnosis: Spasticity as late effect of cerebrovascular accident (CVA) [I69 398, R25 2]  Precautions: fall risk  FOTO: to complete  Insurance: Payor: [place] CROSS / Plan: [facility] SYSTEM PLAN 361 / Product Type: Blue Fee for Service /   2 RW 12 PHDRACQUELO, PN due 6/9/21

## 2021-05-21 ENCOUNTER — OFFICE VISIT (OUTPATIENT)
Dept: PHYSICAL THERAPY | Facility: CLINIC | Age: 66
End: 2021-05-21
Payer: COMMERCIAL

## 2021-05-21 ENCOUNTER — OFFICE VISIT (OUTPATIENT)
Dept: OCCUPATIONAL THERAPY | Facility: CLINIC | Age: 66
End: 2021-05-21
Payer: COMMERCIAL

## 2021-05-21 DIAGNOSIS — G81.94 LEFT HEMIPARESIS (HCC): Primary | ICD-10-CM

## 2021-05-21 DIAGNOSIS — I69.398 SPASTICITY AS LATE EFFECT OF CEREBROVASCULAR ACCIDENT (CVA): Primary | ICD-10-CM

## 2021-05-21 DIAGNOSIS — Z86.73 HISTORY OF STROKE: ICD-10-CM

## 2021-05-21 DIAGNOSIS — R25.2 SPASTICITY AS LATE EFFECT OF CEREBROVASCULAR ACCIDENT (CVA): Primary | ICD-10-CM

## 2021-05-21 PROCEDURE — 97110 THERAPEUTIC EXERCISES: CPT

## 2021-05-21 PROCEDURE — 97112 NEUROMUSCULAR REEDUCATION: CPT

## 2021-05-21 PROCEDURE — 97116 GAIT TRAINING THERAPY: CPT

## 2021-05-21 PROCEDURE — 97140 MANUAL THERAPY 1/> REGIONS: CPT

## 2021-05-21 NOTE — PROGRESS NOTES
Daily Note     Today's date: 2021  Patient name: Kalee Session  : 1955  MRN: [id number]  Referring provider: Jorge Quintana MD  Dx:   Encounter Diagnosis     ICD-10-CM    1  Left hemiparesis ([address] )  G81 94    2  History of stroke  Z86 73                   Subjective: [place] the city with [place] and it went much better than without it  He says that walking without it feels a lot more challenging than it used to now that he knows how it feels with it  He states that he thinks he could walk through an airport with some assistance for luggage although his concern is maneuvering around people  Objective: See treatment diary below      Assessment: Pt was able to tolerate in increase in repetition of resisted walking compared to previous sessions  With AFO, he does have improved L toe clearance and less instances of observed knee hyperextension  He demonstrated more difficulty with walking on the grassy hill, which he thought was due to it recently being mowed and more clumps of grass around  Continued to encourage him to only wear his AFO for when he is walking long distances as want him to maintain his strength and household mobility without relying on it  Her verbalized understanding  He would benefit from continued skilled PT to progress to walking avoiding obstacles and incorporate perturbation training  Plan: Continue per plan of care  Progress treatment as tolerated         Precautions: h/o of CVA    **2 5# b/l aw entire session**    Manuals 5/7 5/10 5/14 5/17 5/21                                                            Neuro Re-Ed             Treadmill  1 7 mph 2 5#, 5% incline RUE x10 min  1 6 mph 10 min 1 6 mph 5 min 2 5#        stairs    5 x fwd/lat fast as possible 2 5# x10 fwd/lat 2 5#        Step up and overs   x10          backwards SOLO 3 full laps  Solo x8 (1/2 lap) MTB 40'x 2 MTB 40' x8        Side step SOLO 2 full laps Solo, 1/2 lap, fast, 5x  40'x 6 40' x8 Foam beams Side step x 5    tandemx3 Solo, Side step x 3    tandemx3   Solo side step x6;     tandem x6          hurdles 6", 5 laps Fwd/lat Solo, x3 fwd/lat 2 5# AW           STS 30x w/ foam  x30 30x foam under foot         Resisted walking fwd    40'x 4 MTB 40' x8        Walking marches   Solo x8 (1/2 lap) Railing 15 ft x 8         [facility] taps             Ther Ex             Stand hip abd             Stand hip ext             Stand march             HR/TR             lunges    4 laps no UE                                                Ther Activity                                       Gait Training             Fast walking W/out  ft x4            Outdoors  20 mins- grassy hills, sidewalk hills  20 mins- grass sidewalk hills    2x hill grassy 25 mins--grass; sidewalk hills 2 5#        Modalities

## 2021-05-21 NOTE — PROGRESS NOTES
Occupational Therapy Daily Note:    Today's date: 2021  Patient name: Omero Norwood  : 1955  MRN: [id number]  Referring provider: Greyson Esquivel MD  Dx:   Encounter Diagnosis   Name Primary?  Spasticity as late effect of cerebrovascular accident (CVA) Yes            Pt was treated by SAUMYA Greenwood and treatment was supervised and documentation was reviewed by Gemma Arnold MS, OTR/L         Subjective: "I didn't get Botox, he told me he was happy with the progress and im getting another injection in "    Objective: Pt seen for OT treatment session focusing on neuro re-education, UE strength, UE ROM, and dec tone to promote ind and safety with fxnl ADL/IADL activities  Pt tolerated BIONESS in neuromodulation setting for 15 min to fatigue LUE flexors and inc strength/endurance of LUE extensors to promote tone reduction and strength/endurance for ind in ADL/IADL activity and improve fxnl grasp/release pattern  OT provided PROM with BIONESS in open hand setting to promote LUE distal ROM, reduce flexor tone, and promote ind with fxnl grasp/release pattern and fxnl reaching tasks  Pt tolerated IASTM and PROM to distal LUE to reduce flexor tone and promote inc circulation to reduce edema to inc ind and safety of fxnl grasp/release for ADL/IADL activity  Assessment: Tolerated treatment well  Dec in tone noted after BIONESS and PROM  Pt's MD that is completing pt's botox injections stating he is happy with progress in tone management  Next injection scheduled for   Pt states that he is now at night time able to complete repetitive grasp/release with improved success  Patient would benefit from continued skilled OT  Plan: Continued skilled OT per POC      INTERVENTION COMMENTS:  Diagnosis: Spasticity as late effect of cerebrovascular accident (CVA) [I69 398, R25 2]  Precautions: fall risk  FOTO: to complete  Insurance: Payor: BLUE CROSS / Plan: [place] PLAN 361 / Product Type: Blue Fee for Service Nay Harley  9 AV 47 XGVHTQ, PN due 6/9/21

## 2021-05-24 ENCOUNTER — OFFICE VISIT (OUTPATIENT)
Dept: OCCUPATIONAL THERAPY | Facility: CLINIC | Age: 66
End: 2021-05-24
Payer: COMMERCIAL

## 2021-05-24 DIAGNOSIS — R25.2 SPASTICITY AS LATE EFFECT OF CEREBROVASCULAR ACCIDENT (CVA): Primary | ICD-10-CM

## 2021-05-24 DIAGNOSIS — I69.398 SPASTICITY AS LATE EFFECT OF CEREBROVASCULAR ACCIDENT (CVA): Primary | ICD-10-CM

## 2021-05-24 PROCEDURE — 97110 THERAPEUTIC EXERCISES: CPT

## 2021-05-24 PROCEDURE — 97140 MANUAL THERAPY 1/> REGIONS: CPT

## 2021-05-24 PROCEDURE — 97112 NEUROMUSCULAR REEDUCATION: CPT

## 2021-05-24 NOTE — PROGRESS NOTES
Occupational Therapy Daily Note:    Today's date: 2021  Patient name: Carmelita Haile  : 1955  MRN: [id number]  Referring provider: Salud Chaves MD  Dx:   Encounter Diagnosis   Name Primary?  Spasticity as late effect of cerebrovascular accident (CVA) Yes                  Subjective: "I got a massage today and my hand feels lose"    Objective: Pt seen for OT treatment session focusing on L UE tone reduction, strengthening for improved fxnl activation and use of L UE for ADL/IADL    Pt tolerated 5 min x 2 (prograde/retrograde motions at 1 5 resist) seated on UBE with focus on increasing proximal UE strength, endurance, act tolerance and ROM to inc indep and safety with ADL/IADL fxn and fxnl reaching tasks  Coban wrap assist to maintain     Pt tolerated 15 min of BIONESS in neuromodulation setting for LUE distal tone reduction to reduce activation of flexor synergy to reduce tone and improve grasp/release patterns    Pt engaged in extended arm WB of  left  upper extremity to increase proximal strength/stability and reduce tone  Pt seated EOB engaged in repetitive closed chain elbow flexion/ext against resist to reduce tone to inc volitional activation of grasp/release and for proximal strengthening and endurance       OT provided PROM with BIONESS in open hand setting to promote distal extensor ROM and strength/endurance of UE extensors to promote strength and end for inc ind with ADl/IADL acitivites      Assessment: Tolerated treatment well  Patient would benefit from continued skilled OT  Plan: Continued skilled OT per POC      INTERVENTION COMMENTS:  Diagnosis: Spasticity as late effect of cerebrovascular accident (CVA) [I69 398, R25 2]  Precautions: fall risk  FOTO: to complete  Insurance: Payor: BLUE CROSS / Plan: [facility] PLAN 361 / Product Type: Blue Fee for Service /   5 VT 98 MARCELO MILLER due 21

## 2021-05-26 ENCOUNTER — OFFICE VISIT (OUTPATIENT)
Dept: OCCUPATIONAL THERAPY | Facility: CLINIC | Age: 66
End: 2021-05-26
Payer: COMMERCIAL

## 2021-05-26 DIAGNOSIS — I69.398 SPASTICITY AS LATE EFFECT OF CEREBROVASCULAR ACCIDENT (CVA): Primary | ICD-10-CM

## 2021-05-26 DIAGNOSIS — R25.2 SPASTICITY AS LATE EFFECT OF CEREBROVASCULAR ACCIDENT (CVA): Primary | ICD-10-CM

## 2021-05-26 PROCEDURE — 97112 NEUROMUSCULAR REEDUCATION: CPT

## 2021-05-26 PROCEDURE — 97110 THERAPEUTIC EXERCISES: CPT

## 2021-05-26 PROCEDURE — 97530 THERAPEUTIC ACTIVITIES: CPT

## 2021-05-26 NOTE — PROGRESS NOTES
Occupational Therapy Daily Note:    Today's date: 2021  Patient name: Shea Hanson  : 1955  MRN: [id number]  Referring provider: Misti Veloz MD  Dx:   Encounter Diagnosis   Name Primary?  Spasticity as late effect of cerebrovascular accident (CVA) Yes            Pt was treated by SAUMYA Carter and treatment was supervised and documentation was reviewed by Mayra Alcantar MS, OTR/L       Subjective: "its always easier for me to go backwards on the UBE than forward "    Objective: Pt seen for OT treatment session focusing on LUE tone reduction, ROM, and extensor strength/endurance to promote inc safety and ind for ADL/IADL activity  Pt tolerated 5 min x 2 (prograde/retrograde motions at 2 5 resist) on UBE with focus on increasing proximal UE strength, endurance, act tolerance and ROM to inc indep and safety with ADL/IADL fxn and fxnl reaching tasks  Pt req Coban wrap to promote sustained fxnl grasp  Pt tolerated 15 min BIONESS in neuromodulation setting, 5 min in grasp setting and 5 min in open hand setting, to promote dec flexor tone and inc extensor strength/endurance   OT applied ESTIM to supraspinatus and anterior delt to dec tone of proximal LUE extensors  Assessment: Tolerated treatment well  Dec tone in noted after BIONESS and ESTIM techniques  Patient would benefit from continued skilled OT with focus to dec tone of LUE and inc strength/endurance of LUE extensors  Plan: Continued skilled OT per POC      INTERVENTION COMMENTS:  Diagnosis: Spasticity as late effect of cerebrovascular accident (CVA) [I69 398, R25 2]  Precautions: fall risk  FOTO: to complete  Insurance: Payor: ROBERTA DASILVA / Plan: [facility] SYSTEM PLAN 361 / Product Type: Blue Fee for Service /   3 UG 92 EIFBTT, PN due 21

## 2021-06-02 ENCOUNTER — OFFICE VISIT (OUTPATIENT)
Dept: OCCUPATIONAL THERAPY | Facility: CLINIC | Age: 66
End: 2021-06-02
Payer: COMMERCIAL

## 2021-06-02 ENCOUNTER — OFFICE VISIT (OUTPATIENT)
Dept: PHYSICAL THERAPY | Facility: CLINIC | Age: 66
End: 2021-06-02
Payer: COMMERCIAL

## 2021-06-02 DIAGNOSIS — I69.398 SPASTICITY AS LATE EFFECT OF CEREBROVASCULAR ACCIDENT (CVA): Primary | ICD-10-CM

## 2021-06-02 DIAGNOSIS — G81.94 LEFT HEMIPARESIS (HCC): Primary | ICD-10-CM

## 2021-06-02 DIAGNOSIS — Z86.73 HISTORY OF STROKE: ICD-10-CM

## 2021-06-02 DIAGNOSIS — R25.2 SPASTICITY AS LATE EFFECT OF CEREBROVASCULAR ACCIDENT (CVA): Primary | ICD-10-CM

## 2021-06-02 PROCEDURE — 97112 NEUROMUSCULAR REEDUCATION: CPT | Performed by: PHYSICAL THERAPIST

## 2021-06-02 PROCEDURE — 97140 MANUAL THERAPY 1/> REGIONS: CPT

## 2021-06-02 PROCEDURE — 97110 THERAPEUTIC EXERCISES: CPT

## 2021-06-02 PROCEDURE — 97150 GROUP THERAPEUTIC PROCEDURES: CPT | Performed by: PHYSICAL THERAPIST

## 2021-06-02 PROCEDURE — 97112 NEUROMUSCULAR REEDUCATION: CPT

## 2021-06-02 NOTE — PROGRESS NOTES
Daily Note     Today's date: 2021  Patient name: Gricelda Lawton  : 1955  MRN: [id number]  Referring provider: Christian Alamo MD  Dx:   Encounter Diagnosis     ICD-10-CM    1  Left hemiparesis ([address] )  G81 94    2  History of stroke  Z86 73                   Subjective: Feeling OK today, needs to leave 15 min early  Objective: See treatment diary below      Assessment: patient was able to do fast up and over step laterally today with increased speed  He was fatigued afterwards and required seated rest  Overall much less severe fatigue with knee hyper extension noted compared to when starting PT  Plan to continue at next session with resisted walking, may attempt to use weighted vest        Plan: Continue per plan of care  Progress treatment as tolerated         Precautions: h/o of CVA    **2 5# b/l aw entire session**    Manuals 5/7 5/10 5/14 5/17 5/21 6/                                                           Neuro Re-Ed             Treadmill  1 7 mph 2 5#, 5% incline RUE x10 min  1 6 mph 10 min 1 6 mph 5 min 2 5# 1 5 mph 15 min        stairs    5 x fwd/lat fast as possible 2 5# x10 fwd/lat 2 5# Step ups overs lateral 40x fast       Step up and overs   x10          backwards SOLO 3 full laps  Solo x8 (1/2 lap) MTB 40'x 2 MTB 40' x8 MTB 40'x 2       Side step SOLO 2 full laps Solo, 1/2 lap, fast, 5x  40'x 6 40' x8 40'x 4       Foam beams Side step x 5    tandemx3 Solo, Side step x 3    tandemx3   Solo side step x6;     tandem x6   Side step //bars 4 laps       hurdles 6", 5 laps Fwd/lat Solo, x3 fwd/lat 2 5# AW           STS 30x w/ foam  x30 30x foam under foot  30x foam        Resisted walking fwd    40'x 4 MTB 40' x8 mtb 40'x 4       Walking marches   Solo x8 (1/2 lap) Railing 15 ft x 8         [facility] taps             Ther Ex             Stand hip abd             Stand hip ext             Stand march             HR/TR             lunges    4 laps no UE  4 laps no ue Ther Activity                                       Gait Training             Fast walking W/out  ft x4            Outdoors  20 mins- grassy hills, sidewalk hills  20 mins- grass sidewalk hills    2x hill grassy 25 mins--grass; sidewalk hills 2 5#        Modalities

## 2021-06-02 NOTE — PROGRESS NOTES
Occupational Therapy Daily Note:    Today's date: 2021  Patient name: Susan Calles  : 1955  MRN: [id number]  Referring provider: Bela Suárez MD  Dx: No diagnosis found  Subjective: "my hand was pretty relaxed this weekend"    Objective: Pt seen for OT treatment session focusing on LUE tone reduction, UE strengthening/endurance    Pt tolerated 5 min x 2 (prograde/retrograde motions at 2 0 resist) seated on UBE with focus on increasing proximal UE strength, endurance, act tolerance and ROM to inc indep and safety with ADL/IADL fxn and fxnl reaching tasks  Coban wrap assist to maintain L UE grasp    Pt tolerated 15 min BIONESS neuromodulation setting to LUE for tone reduction to improve volitional grasp and release patterns     Pt then tolerated 15 min BIONESS open hand training for improved fxnl release following grasping tasks with ADL/IADL fxn  Pt completed with PROM from OTR to assist with achieving full range extension    Pt tolerated IASTM to distal LUE to reduce tone and increase joint flexibility/ROM      Assessment: Tolerated treatment well  Patient would benefit from continued skilled OT  Plan: Continued skilled OT per POC      INTERVENTION COMMENTS:  Diagnosis: Spasticity as late effect of cerebrovascular accident (CVA) [I69 398, R25 2]  Precautions: fall risk  FOTO: to complete  Insurance: Payor: ROBERTA DASILVA / Plan: [facility] SYSTEM PLAN 361 / Product Type: Blue Fee for Service /   4 JV 56 FEHPBW, PN due 21

## 2021-06-04 ENCOUNTER — OFFICE VISIT (OUTPATIENT)
Dept: PHYSICAL THERAPY | Facility: CLINIC | Age: 66
End: 2021-06-04
Payer: COMMERCIAL

## 2021-06-04 ENCOUNTER — OFFICE VISIT (OUTPATIENT)
Dept: OCCUPATIONAL THERAPY | Facility: CLINIC | Age: 66
End: 2021-06-04
Payer: COMMERCIAL

## 2021-06-04 DIAGNOSIS — Z86.73 HISTORY OF STROKE: ICD-10-CM

## 2021-06-04 DIAGNOSIS — I69.398 SPASTICITY AS LATE EFFECT OF CEREBROVASCULAR ACCIDENT (CVA): Primary | ICD-10-CM

## 2021-06-04 DIAGNOSIS — G81.94 LEFT HEMIPARESIS (HCC): Primary | ICD-10-CM

## 2021-06-04 DIAGNOSIS — R25.2 SPASTICITY AS LATE EFFECT OF CEREBROVASCULAR ACCIDENT (CVA): Primary | ICD-10-CM

## 2021-06-04 PROCEDURE — 97140 MANUAL THERAPY 1/> REGIONS: CPT

## 2021-06-04 PROCEDURE — 97112 NEUROMUSCULAR REEDUCATION: CPT | Performed by: PHYSICAL THERAPIST

## 2021-06-04 PROCEDURE — 97110 THERAPEUTIC EXERCISES: CPT

## 2021-06-04 PROCEDURE — 97116 GAIT TRAINING THERAPY: CPT | Performed by: PHYSICAL THERAPIST

## 2021-06-04 PROCEDURE — 97112 NEUROMUSCULAR REEDUCATION: CPT

## 2021-06-04 NOTE — PROGRESS NOTES
Occupational Therapy Daily Note:    Today's date: 2021  Patient name: Angeles Sibley  : 1955  MRN: [id number]  Referring provider: Maeve Vasquez MD  Dx:   Encounter Diagnosis   Name Primary?  Spasticity as late effect of cerebrovascular accident (CVA) Yes       Start Time: 1000  Stop Time: 1100  Total time in clinic (min): 60 minutes Pt was treated by SAUMYA Soto and treatment was supervised and documentation was reviewed by Damion Cuba MS, OTR/L      Subjective: "I feel like the botox has really helped"    Objective: Pt seen for OT treatment session focusing on neuromuslcular re-ed, UE strength/endurance, and tone reduction to promote inc safety and ind with ADL/IADL activities  Pt tolerated 5 min x 2 (prograde/retrograde motions at 1 0 resist)  on UBE with focus on increasing proximal UE strength, endurance, act tolerance and ROM to inc indep and safety with ADL/IADL fxn and fxnl reaching tasks  Pt req Coban wrap on LUE to maintain fxnl   Pt engaged in LUE WB activity on tabletop in stance to promote tone reduction, UE stablity and fxnl reach  Pt req to transfer small pegs with RUE, crossing midline to place to target on L to inc WB technique  OT provided manual assist throughout activity to maintain LUE elbow extension and hand placement 2* tricep and proximal instability/weakness to improve overall technique  Pt tolerated BIONESS for 15 min in neuromodulation setting to promote inc LUE extensor strength and reduce flexor tone  PROM to distal UE for improved range by OTR  Assessment: Tolerated treatment well  Dec LUE tone noted following WB and BIONESS techniques, able to tolerate full extension of digits/wrist  Pt continues to demo instability in proximal L UE for sustained WB techniques, limiting fxnl control of L UE   Patient would benefit from continued skilled OT with focus to UE strength/endurance, fxnl ROM, and tone reduction       Plan: Continued skilled OT per POC     INTERVENTION COMMENTS:  Diagnosis: Spasticity as late effect of cerebrovascular accident (CVA) Socorro Mcintyre, R25 2]  Precautions: fall risk  FOTO: to complete  Insurance: Payor: ROBERTA DASILVA / Plan: [facility] PLAN 361 / Product Type: Blue Fee for Service /   3 MY 82 TBOBDQ, PN due 6/9/21

## 2021-06-04 NOTE — PROGRESS NOTES
Daily Note     Today's date: 2021  Patient name: Lashonda Wong  : 1955  MRN: [id number]  Referring provider: Minoo Perez MD  Dx:   Encounter Diagnosis     ICD-10-CM    1  Left hemiparesis ([address] )  G81 94    2  History of stroke  Z86 73                   Subjective: Feeling well today      Objective: See treatment diary below      Assessment:  Practiced fast walking while carryign something and having perturbations in ordre to simulate airport situation  Pt had no near losses of bbalance and did well with maintaining speed  Plan: Continue per plan of care  Progress treatment as tolerated         Precautions: h/o of CVA    **2 5# b/l aw entire session**    Manuals 5/7 5/10 5/14 5/17 5/21 6/2  6/4                                                         Neuro Re-Ed             Treadmill  1 7 mph 2 5#, 5% incline RUE x10 min  1 6 mph 10 min 1 6 mph 5 min 2 5# 1 5 mph 15 min   1 5 mph 3 min  2 0 mph 3 min  1 min 2 2 x 3  2 min 1 8 mph     stairs    5 x fwd/lat fast as possible 2 5# x10 fwd/lat 2 5# Step ups overs lateral 40x fast  Up and over 5x fwd/lat ea     Step up and overs   x10          backwards SOLO 3 full laps  Solo x8 (1/2 lap) MTB 40'x 2 MTB 40' x8 MTB 40'x 2       Side step SOLO 2 full laps Solo, 1/2 lap, fast, 5x  40'x 6 40' x8 40'x 4       Foam beams Side step x 5    tandemx3 Solo, Side step x 3    tandemx3   Solo side step x6;     tandem x6   Side step //bars 4 laps       hurdles 6", 5 laps Fwd/lat Solo, x3 fwd/lat 2 5# AW           STS 30x w/ foam  x30 30x foam under foot  30x foam        Resisted walking fwd    40'x 4 MTB 40' x8 mtb 40'x 4       Walking marches   Solo x8 (1/2 lap) Railing 15 ft x 8         [facility] taps             Ther Ex             Stand hip abd             Stand hip ext             Stand march             HR/TR             lunges    4 laps no UE  4 laps no ue                                              Ther Activity                                       Gait Training Fast walking W/out  ft x4       With perturbation while carryign bag 4 laps full solo     Outdoors  20 mins- grassy hills, sidewalk hills  20 mins- grass sidewalk hills    2x hill grassy 25 mins--grass; sidewalk hills 2 5#        Modalities

## 2021-06-07 ENCOUNTER — OFFICE VISIT (OUTPATIENT)
Dept: OCCUPATIONAL THERAPY | Facility: CLINIC | Age: 66
End: 2021-06-07
Payer: COMMERCIAL

## 2021-06-07 ENCOUNTER — OFFICE VISIT (OUTPATIENT)
Dept: PHYSICAL THERAPY | Facility: CLINIC | Age: 66
End: 2021-06-07
Payer: COMMERCIAL

## 2021-06-07 DIAGNOSIS — I69.398 SPASTICITY AS LATE EFFECT OF CEREBROVASCULAR ACCIDENT (CVA): Primary | ICD-10-CM

## 2021-06-07 DIAGNOSIS — G81.94 LEFT HEMIPARESIS (HCC): Primary | ICD-10-CM

## 2021-06-07 DIAGNOSIS — R25.2 SPASTICITY AS LATE EFFECT OF CEREBROVASCULAR ACCIDENT (CVA): Primary | ICD-10-CM

## 2021-06-07 DIAGNOSIS — Z86.73 HISTORY OF STROKE: ICD-10-CM

## 2021-06-07 PROCEDURE — 97112 NEUROMUSCULAR REEDUCATION: CPT | Performed by: PHYSICAL THERAPIST

## 2021-06-07 PROCEDURE — 97140 MANUAL THERAPY 1/> REGIONS: CPT

## 2021-06-07 PROCEDURE — 97110 THERAPEUTIC EXERCISES: CPT | Performed by: PHYSICAL THERAPIST

## 2021-06-07 PROCEDURE — 97112 NEUROMUSCULAR REEDUCATION: CPT

## 2021-06-07 PROCEDURE — 97110 THERAPEUTIC EXERCISES: CPT

## 2021-06-07 NOTE — PROGRESS NOTES
Daily Note     Today's date: 2021  Patient name: Alissa Soares  : 1955  MRN: [id number]  Referring provider: Trudi Conner MD  Dx:   Encounter Diagnosis     ICD-10-CM    1  Left hemiparesis ([address] )  G81 94    2  History of stroke  Z86 73                   Subjective:   Pt reports no new changes since last session    Objective: See treatment diary below      Assessment:  Patient tolerated session well with minimal difficulty with the exception of side stepping on foam  Due to clinic temperature patietn took increased rest breaks compared to previous session  Plan: Continue per plan of care  Progress treatment as tolerated         Precautions: h/o of CVA    **2 5# b/l aw entire session**    Manuals 5/7 5/10 5/14 5/17 5/21 6/2  6/4  6/7                                                       Neuro Re-Ed             Treadmill  1 7 mph 2 5#, 5% incline RUE x10 min  1 6 mph 10 min 1 6 mph 5 min 2 5# 1 5 mph 15 min   1 5 mph 3 min  2 0 mph 3 min  1 min 2 2 x 3  2 min 1 8 mph  1 5 mph 3 min  1 7 mph 12 min   stairs    5 x fwd/lat fast as possible 2 5# x10 fwd/lat 2 5# Step ups overs lateral 40x fast  Up and over 5x fwd/lat ea     Step up and overs   x10       10xea   backwards SOLO 3 full laps  Solo x8 (1/2 lap) MTB 40'x 2 MTB 40' x8 MTB 40'x 2    40'x 4 mtb   Side step SOLO 2 full laps Solo, 1/2 lap, fast, 5x  40'x 6 40' x8 40'x 4       Foam beams Side step x 5    tandemx3 Solo, Side step x 3    tandemx3   Solo side step x6;     tandem x6   Side step //bars 4 laps    Side step //bars 6 laps   hurdles 6", 5 laps Fwd/lat Solo, x3 fwd/lat 2 5# AW        6" 4 laps no UE   STS 30x w/ foam  x30 30x foam under foot  30x foam     3x10 foam   Resisted walking fwd    40'x 4 MTB 40' x8 mtb 40'x 4    40'x 4   Walking marches   Solo x8 (1/2 lap) Railing 15 ft x 8      40'x 4   [facility] taps             Ther Ex             Stand hip abd             Stand hip ext             Stand march             HR/TR             [place] 4 laps no UE  4 laps no ue                                              Ther Activity                                       Gait Training             Fast walking W/out  ft x4       With perturbation while carryign bag 4 laps full solo     Outdoors  20 mins- grassy hills, sidewalk hills  20 mins- grass sidewalk hills    2x hill grassy 25 mins--grass; sidewalk hills 2 5#        Modalities

## 2021-06-07 NOTE — PROGRESS NOTES
Occupational Therapy Daily Note:    Today's date: 2021  Patient name: Radha Arzola  : 1955  MRN: [id number]  Referring provider: Arelis Engel MD  Dx:   Encounter Diagnosis   Name Primary?  Spasticity as late effect of cerebrovascular accident (CVA) Yes            Pt was treated by SAUMYA Sierra and treatment was supervised and documentation was reviewed by Annie Renee MS, OTR/L         Subjective: "I wish my hand would stay this loose all the time"    Objective: Pt seen for OT treatment session focusing on neuromuscular re-ed, UE strength/endurance and dec tone to promote safety and ind with ADL/IADL acitivties and fxnl reach  Pt tolerated 5 min x 2 (prograde/retrograde motions at 2 0 resist)  on UBE with focus on increasing proximal UE strength, endurance, act tolerance and ROM to inc indep and safety with ADL/IADL fxn and fxnl reaching tasks  Coban wrap to LUE to promote sustained fxnl grasp    Pt tolerated BIONESS for 15 min in neuromodulation setting to promote inc LUE extensor strength and reduce flexor tone  Pt tolerated BIONESS for 10 min in open hand setting to promote LUE extensor strength, reduce tone, and promote fxnl grasp/release  PROM to distal UE for improved range by OT  Pt tolerated BIONESS for 5 min in grasp/release setting to promote LUE ext strength, reduce tone, and promote fxnl grasp/release  Pt engaged in AROM during grasp fxn to fatigue UE flexor muscles  Pt engaged in extended arm WB of left upper extremity on physioball to increase proximal strength/stability and reduce tone  Pt used RUE to maintain LUE positioning on ball  OTS provided proximal support at humeroulnar joint to maintain UE extension throughout       Assessment: Tolerated treatment well  Dec tone noted following BIONESS and WB techniques   Patient would benefit from continued skilled OT with focus to UE strength/endurance, neuromuscular re-ed, and reduce tone to promote inc ind and safety with ADL/IADL activities       Plan: Continued skilled OT per POC      INTERVENTION COMMENTS:  Diagnosis: Spasticity as late effect of cerebrovascular accident (CVA) [I69 398, R25 2]  Precautions: fall risk  FOTO: to complete  Insurance: Payor: ROBERTA DASILVA / Plan: [facility] PLAN 361 / Product Type: Blue Fee for Service /   1 MW 43 NWYMIF, PN due 6/9/21

## 2021-06-11 ENCOUNTER — APPOINTMENT (OUTPATIENT)
Dept: PHYSICAL THERAPY | Facility: CLINIC | Age: 66
End: 2021-06-11
Payer: COMMERCIAL

## 2021-06-11 ENCOUNTER — APPOINTMENT (OUTPATIENT)
Dept: OCCUPATIONAL THERAPY | Facility: CLINIC | Age: 66
End: 2021-06-11
Payer: COMMERCIAL

## 2021-06-14 ENCOUNTER — OFFICE VISIT (OUTPATIENT)
Dept: PHYSICAL THERAPY | Facility: CLINIC | Age: 66
End: 2021-06-14
Payer: COMMERCIAL

## 2021-06-14 ENCOUNTER — OFFICE VISIT (OUTPATIENT)
Dept: OCCUPATIONAL THERAPY | Facility: CLINIC | Age: 66
End: 2021-06-14
Payer: COMMERCIAL

## 2021-06-14 DIAGNOSIS — R25.2 SPASTICITY AS LATE EFFECT OF CEREBROVASCULAR ACCIDENT (CVA): Primary | ICD-10-CM

## 2021-06-14 DIAGNOSIS — G81.94 LEFT HEMIPARESIS (HCC): Primary | ICD-10-CM

## 2021-06-14 DIAGNOSIS — Z86.73 HISTORY OF STROKE: ICD-10-CM

## 2021-06-14 DIAGNOSIS — I69.398 SPASTICITY AS LATE EFFECT OF CEREBROVASCULAR ACCIDENT (CVA): Primary | ICD-10-CM

## 2021-06-14 PROCEDURE — 97112 NEUROMUSCULAR REEDUCATION: CPT

## 2021-06-14 PROCEDURE — 97140 MANUAL THERAPY 1/> REGIONS: CPT

## 2021-06-14 PROCEDURE — 97112 NEUROMUSCULAR REEDUCATION: CPT | Performed by: PHYSICAL THERAPIST

## 2021-06-14 PROCEDURE — 97110 THERAPEUTIC EXERCISES: CPT

## 2021-06-14 PROCEDURE — 97116 GAIT TRAINING THERAPY: CPT | Performed by: PHYSICAL THERAPIST

## 2021-06-14 NOTE — PROGRESS NOTES
Occupational Therapy Daily Note:    Today's date: 2021  Patient name: Radha Arzola  : 1955  MRN: [id number]  Referring provider: Arelis Engel MD  Dx:   Encounter Diagnosis   Name Primary?  Spasticity as late effect of cerebrovascular accident (CVA) Yes                  Subjective: "Im so happy I was able to hold on to the bike"    Objective: Pt seen for OT treatment session focusing on neuromuslcular re-ed, UE strength/endurance, and tone reduction to promote inc safety and ind with ADL/IADL activities       Pt tolerated 5 min x 2 (prograde/retrograde motions at 1 0 resist)  on UBE with focus on increasing proximal UE strength, endurance, act tolerance and ROM to inc indep and safety with ADL/IADL fxn and fxnl reaching tasks  Pt able to maintain grasp of LUE throughout exercise without use of coban assist  Pt assisted with use of dycem only  No loss of grasp  Pt tolerated BIONESS for 15 min in neuromodulation setting to promote inc LUE extensor strength and reduce flexor tone  PROM to distal UE for improved range by OTR  Pt completed 10 x2  min of BIONESS in open hand and gross grasp modes with AAROM for tone reduction and to inc voltional grasp/release  IASTM tolerated to forearm and distal LUE for tone reduction and improved circulation  Assessment: Tolerated treatment well  Patient would benefit from continued skilled OT  Plan: Continued skilled OT per POC      INTERVENTION COMMENTS:  Diagnosis: Spasticity as late effect of cerebrovascular accident (CVA) [I69 398, R25 2]  Precautions: fall risk  FOTO: to complete  Insurance: Payor: BLUE CROSS / Plan: [facility] PLAN [id number] / Product Type: Blue Fee for Service /   3 ZP 79 FVQKLD, PN due 21

## 2021-06-14 NOTE — PROGRESS NOTES
Daily Note     Today's date: 2021  Patient name: Jocelyne Bobby  : 1955  MRN: [id number]  Referring provider: Kiran Miranda MD  Dx:   Encounter Diagnosis     ICD-10-CM    1  Left hemiparesis ([address] )  G81 94    2  History of stroke  Z86 73                   Subjective: Patients reports no new changes since last session  Wears AFO about 25% of the time  Objective: See treatment diary below      Assessment:  Pt fatigued by end of session today with b/l 3# ankel weights on  Plan next session to complete a progress update  Plan: Continue per plan of care  Progress treatment as tolerated         Precautions: h/o of CVA    **3# b/l aw entire session**    Manuals                                                        Neuro Re-Ed             Treadmill 1 6 mph up to 2 2 mph 3 min 1 min 2 2 x 3 2 min 1 8 mph     1 5 mph 15 min   1 5 mph 3 min  2 0 mph 3 min  1 min 2 2 x 3  2 min 1 8 mph  1 5 mph 3 min  1 7 mph 12 min   stairs Step ups over lateral 40x fast     Step ups overs lateral 40x fast  Up and over 5x fwd/lat ea     Step up and overs          10xea   backwards Grass - 40 ft     MTB 40'x 2    40'x 4 mtb   Side step Grass 40 ft x 2     40'x 4       Foam beams      Side step //bars 4 laps    Side step //bars 6 laps   hurdles          6" 4 laps no UE   STS 30x foam     30x foam     3x10 foam   Resisted walking fwd      mtb 40'x 4    40'x 4   Walking marches Grass - 50 ft         40'x 4   karaoke Grass - 30 ft x 1 ea            Ther Ex             Stand hip abd             Stand hip ext             Stand march             HR/TR             lunges 40 ft      4 laps no ue                                              Ther Activity                                       Gait Training             Fast walking        With perturbation while carryign bag 4 laps full solo     Outdoors              Modalities

## 2021-06-18 ENCOUNTER — OFFICE VISIT (OUTPATIENT)
Dept: PHYSICAL THERAPY | Facility: CLINIC | Age: 66
End: 2021-06-18
Payer: COMMERCIAL

## 2021-06-18 ENCOUNTER — OFFICE VISIT (OUTPATIENT)
Dept: OCCUPATIONAL THERAPY | Facility: CLINIC | Age: 66
End: 2021-06-18
Payer: COMMERCIAL

## 2021-06-18 DIAGNOSIS — G81.94 LEFT HEMIPARESIS (HCC): ICD-10-CM

## 2021-06-18 DIAGNOSIS — R25.2 SPASTICITY AS LATE EFFECT OF CEREBROVASCULAR ACCIDENT (CVA): Primary | ICD-10-CM

## 2021-06-18 DIAGNOSIS — Z86.73 HISTORY OF STROKE: ICD-10-CM

## 2021-06-18 DIAGNOSIS — I69.398 SPASTICITY AS LATE EFFECT OF CEREBROVASCULAR ACCIDENT (CVA): Primary | ICD-10-CM

## 2021-06-18 DIAGNOSIS — G81.94 LEFT HEMIPARESIS (HCC): Primary | ICD-10-CM

## 2021-06-18 PROCEDURE — 97110 THERAPEUTIC EXERCISES: CPT

## 2021-06-18 PROCEDURE — 97112 NEUROMUSCULAR REEDUCATION: CPT

## 2021-06-18 PROCEDURE — 97116 GAIT TRAINING THERAPY: CPT

## 2021-06-18 PROCEDURE — 97530 THERAPEUTIC ACTIVITIES: CPT

## 2021-06-18 NOTE — PROGRESS NOTES
Occupational Therapy Daily Note:    Today's date: 2021  Patient name: Angeles Sibley  : 1955  MRN: [id number]  Referring provider: Maeve Vasquez MD  Dx:   Encounter Diagnosis   Name Primary?  Spasticity as late effect of cerebrovascular accident (CVA) Yes            Pt was treated by SAUMYA Soto and treatment was supervised and documentation was reviewed by Damion Cuba MS, OTR/L         Subjective: "I am happy that my hand was able to do that"    Objective: Pt seen for OT treatment session focusing on LUE strength/endurance, tone reduction, and fxnl ROM to promote inc ind with ADL/IADL and fxnl reach activities  Pt tolerated 6 min x 2 (prograde/retrograde motions at 2 0 resist) on UBE with focus on increasing proximal UE strength, endurance, act tolerance and ROM to inc indep and safety with ADL/IADL fxn and fxnl reaching tasks  OTS provided Dycem on L handle of UBE to promote sustained fxnl grasp  Pt tolerated BIONESS in neuromodulation setting for 15 min to promote dec tone , fatigue UE flexors, and promote strength/endurance of extensors  Pt engaged in standing UE extended arm WB on BOSU ball with L UE while reaching with RUE to retrieve cone and place to target oriented on L to inc WB technique  OTS providing assist to maintain elbow extension and digit/wrist extension with sustained WB  Pt engaged in closed chain strengthening in stance on BOSU therapy ball while performing modified push up to inc UE endurance, proximal strengthening/stability, and for tone reduction  Pt completed 3 x 15 with OT assist to maintain wrist and digit placement and dycem assist to maintain hand position  Pt engaged in fxnl grasp/release task with use of cup stacking task to p/u up with L UE and place to target to stack   OT providing AAROM at wrist to maintain in neutral  Pt able to complete shoulder ROM necessary to target with noted ability to activate D1-D2 extensors and complete <1/4 range      Assessment: Tolerated treatment well  Pt demonstrating excellent reduction in distal L UE tone following prolonged WB and closed chain exercises  Pt benefits from prolonged WB for inc distal UE control for improved ROM  Edu on home HEP to maximize L UE fxn for fxnl grasp/releasePatient would benefit from continued skilled OT  Plan: Continued skilled OT per POC      INTERVENTION COMMENTS:  Diagnosis: Spasticity as late effect of cerebrovascular accident (CVA) [I69 398, R25 2]  Precautions: fall risk  FOTO: to complete  Insurance: Payor: ROBERTA DASILVA / Plan: [facility] PLAN 361 / Product Type: Blue Fee for Service /   65 VY 06 MARCELO ELMORE due 6/9/21

## 2021-06-18 NOTE — PROGRESS NOTES
Daily Note     Today's date: 2021  Patient name: Anabelle Monet  : 1955  MRN: [id number]  Referring provider: Adeola Verde MD  Dx:   Encounter Diagnosis     ICD-10-CM    1  Left hemiparesis ([address] )  G81 94    2  History of stroke  Z86 73            Subjective: Pt states that he is doing well today  No new issues to report  Objective: See treatment diary below      Assessment: Pt tolerated treatment well with minor to moderate fatigue noted throughout session  Due to this, he needed a seated rest break after 10 mins on the treadmill prior to continuing  He was able to increase repetition of lunges compared to previous sessions  Distance on grass of side stepping and marching was increased with intermittent difficulty with L foot clearance observed  3# AW in grass challenged him appropriately  Plan to perform progress note next session  20 minutes of session was supervised by Marie Benjamin PT  Plan: Progress note during next visit  Progress treatment as tolerated         Precautions: h/o of CVA    **3# b/l aw entire session**    Manuals                                                        Neuro Re-Ed             Treadmill 1 6 mph up to 2 2 mph 3 min 1 min 2 2 x 3 2 min 1 8 mph 1 6 mph 3 min; 2 0 mph 3 min; 1 6 mph 4 min; 2 0 mph 3 min; 1 6 mph 2 min    1 5 mph 15 min   1 5 mph 3 min  2 0 mph 3 min  1 min 2 2 x 3  2 min 1 8 mph  1 5 mph 3 min  1 7 mph 12 min   stairs Step ups over lateral 40x fast Stairs x20 fwd/lat fast    Step ups overs lateral 40x fast  Up and over 5x fwd/lat ea     Step up and overs          10xea   backwards Grass - 40 ft Grass 60 ft     MTB 40'x 2    40'x 4 mtb   Side step Grass 40 ft x 2 Grass 40ft x4    40'x 4       Foam beams      Side step //bars 4 laps    Side step //bars 6 laps   hurdles          6" 4 laps no UE   STS 30x foam 30x foam    30x foam     3x10 foam   Resisted walking fwd      mtb 40'x 4    40'x 4   Walking marches Grass - 50 ft         40'x 4   karaoke Grass - 30 ft x 1 ea Grass x20 ft ea           Ther Ex             Stand hip abd             Stand hip ext             Stand march             HR/TR             lunges 40 ft  // bars x10    4 laps no ue                                              Ther Activity                                       Gait Training             Fast walking        With perturbation while carryign bag 4 laps full solo     Outdoors              Modalities

## 2021-06-21 ENCOUNTER — EVALUATION (OUTPATIENT)
Dept: OCCUPATIONAL THERAPY | Facility: CLINIC | Age: 66
End: 2021-06-21
Payer: COMMERCIAL

## 2021-06-21 ENCOUNTER — OFFICE VISIT (OUTPATIENT)
Dept: PHYSICAL THERAPY | Facility: CLINIC | Age: 66
End: 2021-06-21
Payer: COMMERCIAL

## 2021-06-21 DIAGNOSIS — Z86.73 HISTORY OF STROKE: ICD-10-CM

## 2021-06-21 DIAGNOSIS — I69.398 SPASTICITY AS LATE EFFECT OF CEREBROVASCULAR ACCIDENT (CVA): Primary | ICD-10-CM

## 2021-06-21 DIAGNOSIS — G81.94 LEFT HEMIPARESIS (HCC): Primary | ICD-10-CM

## 2021-06-21 DIAGNOSIS — R25.2 SPASTICITY AS LATE EFFECT OF CEREBROVASCULAR ACCIDENT (CVA): Primary | ICD-10-CM

## 2021-06-21 PROCEDURE — 97530 THERAPEUTIC ACTIVITIES: CPT

## 2021-06-21 PROCEDURE — 97112 NEUROMUSCULAR REEDUCATION: CPT

## 2021-06-21 PROCEDURE — 97110 THERAPEUTIC EXERCISES: CPT | Performed by: PHYSICAL THERAPIST

## 2021-06-21 PROCEDURE — 97112 NEUROMUSCULAR REEDUCATION: CPT | Performed by: PHYSICAL THERAPIST

## 2021-06-21 PROCEDURE — 97140 MANUAL THERAPY 1/> REGIONS: CPT

## 2021-06-21 RX ORDER — METHOCARBAMOL 500 MG/1
TABLET, FILM COATED ORAL
Qty: 60 TABLET | Refills: 5 | Status: SHIPPED | OUTPATIENT
Start: 2021-06-21 | End: 2022-01-10

## 2021-06-21 NOTE — PROGRESS NOTES
OCCUPATIONAL THERAPY PROGRESS UPDATE  6/21/2021  Prudence Ice  1955  [id number]  Trudijada Conner MD  1  Spasticity as late effect of cerebrovascular accident (CVA)        Subjective    "Im going to get botox again, but I definitely feel stronger"    PATIENT GOAL: "I want to be able to move this wrist and hand more"      Assessment/Plan    Skilled Analysis:  Pt is a 78 y o  male referred to Occupational Therapy s/p Spasticity as late effect of cerebrovascular accident (CVA) Saw Montez, R25 2]  Pt participated in skilled OT re-evaluation today  Pt reports that since participation in OP OT services, he has noticed an improvement in L UE tone/tightness with improved flexibility with LUE  Pt states he is able to isolate digits of L UE with increased ease, able to manipulate fxnl items with inc indep  Pt states he has noticed less edema in L UE as well  Pt is participating in OP OT with G improvements in tone, flexibility, ROM and proximal strength  Pt able to tolerate and maintain sustained grasp of fxnl items and exercise tools with indep compared to initial evaluation and previous re-evaluations  Pt is benefiting from neuromodulation with use of BIONESS and proximal strengthening exercises to inc proximal stability with distal control  Pt responding well to PROM and manual techniques such as IASTM and stretching techniques  Following formalized testing today, pt demonstrates improvements in FMS, DIGIT ISOLATION, GMS, PROXIMAL STABILITY/ROM  Pt able to improve >10lbs of LUE  strength and 1-2lbs on intrinsic strengthening  Pt continues to present with the following areas of deficit: fxnl ROM, endurance, volitional muscle activation/termination,  FMC/FMS, GMC/GMS, hypertonicity and in hand manipulation/dexterity , proximal strength/stability impacting indep and completion of ADL/IADL, salient, and leisure tasks    Pt does demo the need for continued skilled Occupational Therapy services 2x/week for another 4 weeks with focus on UE NMR, UE strengthening, UE endurance, FMC/GMC, FMS/GMS and tone reduction to address the goals as listed below  Pt is receiving another round of botox early July    Pt in agreement with POC, POC to  w/in 90 days  OT       Goals:     Motor Short Term Goals (4 -6)WKS      Strength/Endurance    ·  Pt will increase L UE strength to 3/5  through the use of strengthening exercises and home program for eventual return to life and work roles and salient tasks: PROGRESSING    · Pt will demo with G tolerance to supine, seated, and in stance exercise x 45 minutes with minimal rest breaks required for increased engagement in life roles and weekly exercise regimen : PROGRESSING    · Pt will demo with G carryover of Home Exercise Program to improve functional progression towards goals in Plan of care and for improved functional use of LUE : PROGRESSING        FMC/GMC    · Pt will increase L UE prehension patterns for improved utensil and container management  with ability to isolate pincer and lateral pinch grasp >50% of the time:PROGRESSING       Functional Performance    · Pt will increase LUE to functional assist with for ADL/IADL and tabletop tasks for improved functional performance of life roles and salient tasks: PROGRESSING     AROM/PROM    · Pt will demo with decreased  LUE  shoulder sublux to trace for increased AROM for improved ADL and IADL engagement PROGRESSING    · Pt will be able to perform near full range of proximal L UE to demo increased strength and ROM of affected UE for improved ADLs/IADLs: PROGRESSING    · Pt will be able to perform >1/2  range of distal L UE to demo increased strength and ROM of affected UE for improved ADLs/IADLs PROGRESSING    · Pt will demo decreased hypertonicity of Modified Linsey Scale (MAS) of L UE to 1 for improved alternate motor patterns, grasp/release, and termination on command for improved dressing/hygiene PROGRESSING    · Pt will demo good carryover of clinic and home tone reduction strategies for improved AROM initiation with functional reach with ADL fxn PROGRESSING      Modalities    · Pt will tolerate BIONESS/NMES/IASTM for improved motor and sensory performance for overall improved strength, tone reduction, and sensation to inc safety and engagement with ADL/IADL fxn PROGRESSING    · Pt/family will demo G understanding and carryover of use of home NMES unit as prescribed to inc carryover of tone reduction and strengthening strategies of L UE: PROGRESSING      Treatment Interventions  Supine, seated, and in stance neuro re-ed  NMES/FES for strengthening  NMES for tone reduction  BIONESS  Fxnl Grasp and Release  FMC/prehension patterns  Fxnl Tool use (tweezers, tongs)  In hand manipulation  [facility] [place])  Manual tx (IASTM if able, PROM/stretching)  Hand to target tasks  Supine place and hold  Pain Levels:     Restin    With Activity:  Low back, at times (0-5)    Objective    Impairment Observations:    Upper Extremity       RUE LUE Comments                 UPPER EXTREMITY FXN Intact  Dominant Hand: R                         /Pinch Strength         Dynamometer      - Gross Grasp2 35 lbs 35 lbs Improved LUE 15lbs   Pinch Meter       - PINCER 17 lbs 7 lbs     - TRIPOD 16 lbs 10 lbs     - LATERAL 18 lbs  13 lbs Improved LUE UE             AROM (seated)       Elevation/Depression  Near full    Shoulder Flex/Ext  >3/4 range    Shoulder Abd  Near full    Shoulder Add  Near full    Horizontal Abd  3/4 range    Horizontal Add  1/2 range     Elbow Flex  >1/2 range     Elbow Ext  Near full    Pronation  Near full     Supination  3/4     Wrist Flex  3/4     Wrist Ext  <1/4 range     Digit Flex  3/4 range     Digit Ex  trace     Composite Grasp  Full range    Hook Grasp  Unable to isolate     Opposition  Able to oppose to D1-4    Subluxation  1 finger breadth, trace with volitional activation      Full range all planes                       AROM (supine)         Elevation  Near full     Shoulder Flex/Ext  Near full    Shoulder ABd  3/4     Shoulder ADd  full    Horizontal ABd  >1/2    Horizontal ADd  >1/2    Elbow Flex  Near full    Elbow Ext  full    Pronation  full    Supination  1/2     Wrist Flex  Near full    Wrist Ext  <1/4                    Full range all planes                          PROM (supine position)         Elevation/Depression      Retraction/Protraction      Shoulder Flex/Ext      Shoulder ABd/ADd      Horizontal ABd/Add      Elbow Flex      Elbow Ext      Wrist Flex      Wrist Ext      Digit Flex      Digit Ext          Full range all planes   Full range all planes                            MMT         Elevation 4+/5 2/5    Shoulder Flex/Ext 4+/5 2/5    Shoulder Abd 4+/5 2/5    Shoulder ADd 4+/5 2/5    Elbow Flex 4+/5 2/5    Elbow Ext 4+/5 2/5    Wrist Flex 4+/5 3/5    Wrist Ext 4+/5 1/5    Gross Grasp 4+/5 3/5          SENSATION      Myofilaments (3 61 Wnl) 3 61 3 61    Sharp Dull  Intact Intact    Proprioception Intact Intact    Hot/Cold Temp Intact Intact          MODIFIED HUNTER SCALE (TONE) 0 1+, 1    No increase in muscle tone (0)      Slight Increase in muscle tone with catch and release or min resist at end range (1)  Bicep/tricep    Slight Increase in muscle tone with catch and release, followed by min resistance through remainder of range (1+)  Wrist flexors    Increased muscle tone through full range, able to be moved easily (2)      Considerable increase in tone, difficult to move (3)      Rigid in Flexion/Extension (4)                                  todays treatment  Pt tolerated BIONESS neuromodulation setting L UE to reduce tone to distal UE and allow for improved volitional grasp and release with containers and fxnl tasks  Pt tolerated 10 minutes total  PROM to distal UE for tone reduction and to inc flexibility and ROM          INTERVENTION COMMENTS:  Diagnosis: Spasticity as late effect of cerebrovascular accident (CVA) Ana Rosa Rosas, R25 2]  Precautions: fall risk  FOTO: to complete  Insurance: Payor: Trenton Kennedy / Plan: [facility] PLAN 361 / Product Type: Blue Fee for Service /   12 of 12 visits, PN due 7/22

## 2021-06-21 NOTE — PROGRESS NOTES
Progress Note     Today's date: 2021  Patient name: Carmelita Haile  : 1955  MRN: [id number]  Referring provider: Salud Chaves MD  Dx:   Encounter Diagnosis     ICD-10-CM    1  Left hemiparesis ([address] )  G81 94    2  History of stroke  Z86 73            Subjective: Feels he has made siginficant progress and is happy with how he is doing  Would like to keep coming because he feels he can continue to make gains  Objective: See treatment diary below    Balance Test     6 Minute Walk Test (ft):  : no AD: 850 ft (2 seated rest breaks for about 30 sec ea)   : no AD: 950 ft (no seated rest breaks)  : no ad 1325ft   FGA:  : no AD: score 16/30 (increased falls risk)   : no AD: score 20/30 (increased falls risk)   Gait Speed (m/s):  : no AD: 10m/10 5s =  95 m/s (71% of age group ref)   : no AD: 10m/10 0s = 1 0 m/s  : 1 3 m/s no ad            Assessment: Since last progress update and initial evaluation patient has made sigificant progress with overall balance and is nolonger at an increased risk of falls, is bale to walk at normal functional mobility levels within the community with improved endurance, and is independent with HEP  Pt will continue to benefit from skilled therpay intervention in order to obtain maximal level at 2x/week for 4 more weeks  Goals  STGs in 4 weeks  1  Patient will increase his FGA score by at least 4 points for improved balance (MCID 4 pts)  - MET  2  Patient will improve his 10 meter walk to at least 1 0 m/s for improved community ambulation  - MET  3  Patient will be able to complete 6 MWT without any seated rest breaks to demonstrate improved cardiorespiratory endurance  - MET      LTGs in 8 weeks  1  Patient will improve his FGA to at least 23/30 for improved safety and decreased falls risk (cut-off score for falls risk: 23/30)  - ONGOING  2  Patient will improve 6 MWT to at least 1000 ft for improved cardiorespiratory endurance  - MET  3  Patient will demonstrate independence with HEP in order to maintain progress of functional mobility independently  - partially met  4  Patient will be able to walk on uneven ground without any AD for improved ability to walk dog  - MET     Updated goals to be achieved in 4 weeks:  Pt will complete 6 mwt in 1500 ft within 4 weeks  Pt will participate in home program consistently within 4 weeks  Pt will score 30/30 on FGA within 4 weeks    Plan: Progress note during next visit  Progress treatment as tolerated         Precautions: h/o of CVA    **3# b/l aw entire session**    Manuals 6/14 6/18 6/21   6/2 6/4 6/7                                                       Neuro Re-Ed             Treadmill 1 6 mph up to 2 2 mph 3 min 1 min 2 2 x 3 2 min 1 8 mph 1 6 mph 3 min; 2 0 mph 3 min; 1 6 mph 4 min; 2 0 mph 3 min; 1 6 mph 2 min 1 7 mph 3 min  2 4 mph 2 min  1 7 mph 2 min  2 4 mph 2 min  1 min 1 7 mph     1 5 mph 15 min   1 5 mph 3 min  2 0 mph 3 min  1 min 2 2 x 3  2 min 1 8 mph  1 5 mph 3 min  1 7 mph 12 min   stairs Step ups over lateral 40x fast Stairs x20 fwd/lat fast Fwd/lat ea 5x min UE 3# b/l    Step ups overs lateral 40x fast  Up and over 5x fwd/lat ea     Step up and overs          10xea   backwards Grass - 40 ft Grass 60 ft     MTB 40'x 2    40'x 4 mtb   Side step Grass 40 ft x 2 Grass 40ft x4    40'x 4       Foam beams      Side step //bars 4 laps    Side step //bars 6 laps   hurdles          6" 4 laps no UE   STS 30x foam 30x foam    30x foam     3x10 foam   Resisted walking fwd      mtb 40'x 4    40'x 4   Walking marches Grass - 50 ft         40'x 4   karaoke Grass - 30 ft x 1 ea Grass x20 ft ea           Ther Ex             Stand hip abd             Stand hip ext             Stand march             HR/TR             lunges 40 ft  // bars x10    4 laps no ue                                              Ther Activity                                       Gait Training             Fast walking        With perturbation while carryign bag 4 laps full solo     Outdoors    Up and down grassy hill with 3# ankle wts          Modalities

## 2021-06-21 NOTE — LETTER
June 21, 2021    Vilma Alvarez MD  [address] [place] [address]    Patient: Tory Cho   YOB: 1955   Date of Visit: 6/21/2021     Encounter Diagnosis     ICD-10-CM    1  Spasticity as late effect of cerebrovascular accident (CVA)  I69 [id number]     R25 2        Dear Dr Rosas:    Thank you for your recent referral of Tory Cho  Please review the attached evaluation summary from Rakesh's recent visit  Please verify that you agree with the plan of care by signing the attached order  If you have any questions or concerns, please do not hesitate to call  I sincerely appreciate the opportunity to share in the care of one of your patients and hope to have another opportunity to work with you in the near future  Sincerely,    Chencho Castelan, OT      Referring Provider:     I certify that I have read the below Plan of Care and certify the need for these services furnished under this plan of treatment while under my care  Vilma Alvarez MD  [address] [place] [address]  Via Fax: Román Sam  6/21/2021  Tory Cho  1955  [id number]  Rober Vera MD  1  Spasticity as late effect of cerebrovascular accident (CVA)        Subjective    "Im going to get botox again, but I definitely feel stronger"    PATIENT GOAL: "I want to be able to move this wrist and hand more"      Assessment/Plan    Skilled Analysis:  Pt is a 78 y o  male referred to Occupational Therapy s/p Spasticity as late effect of cerebrovascular accident (CVA) Christelle Mccormick, R25 2]  Pt participated in skilled OT re-evaluation today  Pt reports that since participation in OP OT services, he has noticed an improvement in L UE tone/tightness with improved flexibility with LUE  Pt states he is able to isolate digits of L UE with increased ease, able to manipulate fxnl items with inc indep   Pt states he has noticed less edema in L UE as well  Pt is participating in OP OT with G improvements in tone, flexibility, ROM and proximal strength  Pt able to tolerate and maintain sustained grasp of fxnl items and exercise tools with indep compared to initial evaluation and previous re-evaluations  Pt is benefiting from neuromodulation with use of BIONESS and proximal strengthening exercises to inc proximal stability with distal control  Pt responding well to PROM and manual techniques such as IASTM and stretching techniques  Following formalized testing today, pt demonstrates improvements in FMS, DIGIT ISOLATION, GMS, PROXIMAL STABILITY/ROM  Pt able to improve >10lbs of LUE  strength and 1-2lbs on intrinsic strengthening  Pt continues to present with the following areas of deficit: fxnl ROM, endurance, volitional muscle activation/termination,  FMC/FMS, GMC/GMS, hypertonicity and in hand manipulation/dexterity , proximal strength/stability impacting indep and completion of ADL/IADL, salient, and leisure tasks  Pt does demo the need for continued skilled Occupational Therapy services 2x/week for another 4 weeks with focus on UE NMR, UE strengthening, UE endurance, FMC/GMC, FMS/GMS and tone reduction to address the goals as listed below  Pt is receiving another round of botox early July    Pt in agreement with POC, POC to  w/in 90 days  OT       Goals:     Motor Short Term Goals (4 -6)WKS      Strength/Endurance    ·  Pt will increase L UE strength to 3/5  through the use of strengthening exercises and home program for eventual return to life and work roles and salient tasks: PROGRESSING    · Pt will demo with G tolerance to supine, seated, and in stance exercise x 45 minutes with minimal rest breaks required for increased engagement in life roles and weekly exercise regimen : PROGRESSING    · Pt will demo with G carryover of Home Exercise Program to improve functional progression towards goals in Plan of care and for improved functional use of LUE : PROGRESSING        FMC/GMC    · Pt will increase L UE prehension patterns for improved utensil and container management  with ability to isolate pincer and lateral pinch grasp >50% of the time:PROGRESSING       Functional Performance    · Pt will increase LUE to functional assist with for ADL/IADL and tabletop tasks for improved functional performance of life roles and salient tasks: PROGRESSING     AROM/PROM    · Pt will demo with decreased  LUE  shoulder sublux to trace for increased AROM for improved ADL and IADL engagement PROGRESSING    · Pt will be able to perform near full range of proximal L UE to demo increased strength and ROM of affected UE for improved ADLs/IADLs: PROGRESSING    · Pt will be able to perform >1/2  range of distal L UE to demo increased strength and ROM of affected UE for improved ADLs/IADLs PROGRESSING    · Pt will demo decreased hypertonicity of Modified Linsey Scale (MAS) of L UE to 1 for improved alternate motor patterns, grasp/release, and termination on command for improved dressing/hygiene PROGRESSING    · Pt will demo good carryover of clinic and home tone reduction strategies for improved AROM initiation with functional reach with ADL fxn PROGRESSING      Modalities    · Pt will tolerate BIONESS/NMES/IASTM for improved motor and sensory performance for overall improved strength, tone reduction, and sensation to inc safety and engagement with ADL/IADL fxn PROGRESSING    · Pt/family will demo G understanding and carryover of use of home NMES unit as prescribed to inc carryover of tone reduction and strengthening strategies of L UE: PROGRESSING      Treatment Interventions  Supine, seated, and in stance neuro re-ed  NMES/FES for strengthening  NMES for tone reduction  BIONESS  Fxnl Grasp and Release  FMC/prehension patterns  Fxnl Tool use (tweezers, tongs)  In hand manipulation  [employer] [facility] [place])  Manual tx (IASTM if able, PROM/stretching)  Hand to target tasks  Supine place and hold  Pain Levels:     Restin    With Activity:  Low back, at times (0-5)    Objective    Impairment Observations:    Upper Extremity       RUE LUE Comments                 UPPER EXTREMITY FXN Intact  Dominant Hand: R                         /Pinch Strength         Dynamometer      - Gross Grasp2 35 lbs 35 lbs Improved LUE 15lbs   Pinch Meter       - PINCER 17 lbs 7 lbs     - TRIPOD 16 lbs 10 lbs     - LATERAL 18 lbs  13 lbs Improved LUE UE             AROM (seated)       Elevation/Depression  Near full    Shoulder Flex/Ext  >3/4 range    Shoulder Abd  Near full    Shoulder Add  Near full    Horizontal Abd  3/4 range    Horizontal Add  1/2 range     Elbow Flex  >1/2 range     Elbow Ext  Near full    Pronation  Near full     Supination  3/4     Wrist Flex  3/4     Wrist Ext  <1/4 range     Digit Flex  3/4 range     Digit Ex  trace     Composite Grasp  Full range    Hook Grasp  Unable to isolate     Opposition  Able to oppose to D1-4    Subluxation  1 finger breadth, trace with volitional activation      Full range all planes                       AROM (supine)         Elevation  Near full     Shoulder Flex/Ext  Near full    Shoulder ABd  3/4     Shoulder ADd  full    Horizontal ABd  >1/2    Horizontal ADd  >1/2    Elbow Flex  Near full    Elbow Ext  full    Pronation  full    Supination  1/2     Wrist Flex  Near full    Wrist Ext  <1/4                    Full range all planes                          PROM (supine position)         Elevation/Depression      Retraction/Protraction      Shoulder Flex/Ext      Shoulder ABd/ADd      Horizontal ABd/Add      Elbow Flex      Elbow Ext      Wrist Flex      Wrist Ext      Digit Flex      Digit Ext          Full range all planes   Full range all planes                            MMT         Elevation 4+/5 2/5    Shoulder Flex/Ext 4+/5 2/5    Shoulder Abd 4+/5 2/5    Shoulder ADd 4+/5 2/5    Elbow Flex 4+/5 2/5    Elbow Ext 4+/5 2/5    Wrist Flex 4+/5 3/5 Wrist Ext 4+/5 1/5    Gross Grasp 4+/5 3/5          SENSATION      Myofilaments (3 61 Wnl) 3 61 3 61    Sharp Dull  Intact Intact    Proprioception Intact Intact    Hot/Cold Temp Intact Intact          MODIFIED HUNTER SCALE (TONE) 0 1+, 1    No increase in muscle tone (0)      Slight Increase in muscle tone with catch and release or min resist at end range (1)  Bicep/tricep    Slight Increase in muscle tone with catch and release, followed by min resistance through remainder of range (1+)  Wrist flexors    Increased muscle tone through full range, able to be moved easily (2)      Considerable increase in tone, difficult to move (3)      Rigid in Flexion/Extension (4)                                  todays treatment  Pt tolerated BIONESS neuromodulation setting L UE to reduce tone to distal UE and allow for improved volitional grasp and release with containers and fxnl tasks  Pt tolerated 10 minutes total  PROM to distal UE for tone reduction and to inc flexibility and ROM          INTERVENTION COMMENTS:  Diagnosis: Spasticity as late effect of cerebrovascular accident (CVA) Mago Scott, R25 2]  Precautions: fall risk  FOTO: to complete  Insurance: Payor: Tiago Stovall / Plan: [employer] PLAN [id number] / Product Type: Blue Fee for Service /   12 of 12 visits, PN due 7/22

## 2021-06-25 ENCOUNTER — APPOINTMENT (OUTPATIENT)
Dept: OCCUPATIONAL THERAPY | Facility: CLINIC | Age: 66
End: 2021-06-25
Payer: COMMERCIAL

## 2021-06-28 ENCOUNTER — APPOINTMENT (OUTPATIENT)
Dept: PHYSICAL THERAPY | Facility: CLINIC | Age: 66
End: 2021-06-28
Payer: COMMERCIAL

## 2021-06-28 ENCOUNTER — APPOINTMENT (OUTPATIENT)
Dept: OCCUPATIONAL THERAPY | Facility: CLINIC | Age: 66
End: 2021-06-28
Payer: COMMERCIAL

## 2021-07-02 ENCOUNTER — APPOINTMENT (OUTPATIENT)
Dept: OCCUPATIONAL THERAPY | Facility: CLINIC | Age: 66
End: 2021-07-02
Payer: COMMERCIAL

## 2021-07-07 ENCOUNTER — APPOINTMENT (OUTPATIENT)
Dept: OCCUPATIONAL THERAPY | Facility: CLINIC | Age: 66
End: 2021-07-07
Payer: COMMERCIAL

## 2021-07-09 ENCOUNTER — APPOINTMENT (OUTPATIENT)
Dept: OCCUPATIONAL THERAPY | Facility: CLINIC | Age: 66
End: 2021-07-09
Payer: COMMERCIAL

## 2021-07-23 ENCOUNTER — EVALUATION (OUTPATIENT)
Dept: OCCUPATIONAL THERAPY | Facility: CLINIC | Age: 66
End: 2021-07-23
Payer: COMMERCIAL

## 2021-07-23 DIAGNOSIS — G81.94 LEFT HEMIPARESIS (HCC): Primary | ICD-10-CM

## 2021-07-23 PROCEDURE — 97168 OT RE-EVAL EST PLAN CARE: CPT

## 2021-07-23 PROCEDURE — 97112 NEUROMUSCULAR REEDUCATION: CPT

## 2021-07-23 NOTE — PROGRESS NOTES
OCCUPATIONAL THERAPY PROGRESS UPDATE  2021  Yaenli Delacruz  1955  [id number]  Ilsa Cabot, MD  1  Left hemiparesis ([facility] [address] )        Subjective    "I have a hard time squeezing the dog leash but now I can release it after I pick it up"    PATIENT GOAL: "I want to be able to move this wrist and hand more"      Assessment/Plan    Skilled Analysis:  Pt is a 78 y o  male referred to Occupational Therapy s/p Left hemiparesis ([facility] [address] ) [G81 [id number]]  Pt has not received OP OT services since 21 secondary to insurance limitations, wishing to utilize yearly visits conservatively due to upcoming Botox injection to inc rehabilitation potential  Pt received Botox injection on 21 to distal LUE  Pt reports that he "feels a difference" since not being in therapy   Pt reports he has a  "hard time squeezing the dog's leash, but now I  can easily release it" since receiving recent Botox injection  Pt participated in skilled OT re-evaluation today, and based on formalized testing, continues to present with the following areas of deficit: fxnl ROM, endurance, volitional muscle activation/termination,  FMC/FMS, GMC/GMS, hypertonicity and in hand manipulation/dexterity impacting indep and completion of ADL/IADL, salient, and leisure tasks  Clinical observations during previous interventions demonstrate that pt responds well to proximal strengthening, BIONESS and WB techniques to promote tone reduction for inc ROM and volitional movement activation  Pt does demo the need for continued skilled Occupational Therapy services 1x/week for another 4 weeks with focus on UE NMR, UE strengthening, UE endurance, FMC/GMC, FMS/GMS and tone reduction to address the goals as listed below  Pt in agreement with POC, POC to  w/in 90 days  Goals:     Motor Short Term Goals (4 -6)WKS      Strength/Endurance    ·  Pt will increase L UE strength to 3/5  through the use of strengthening exercises and home program for eventual return to life and work roles and salient tasks: goal modified 7/23 PROGRESSING               Pt will increase distal L UE strength to 2/5 consistently and 4/5 proximally through the use of strengthening exercises and home program for eventual return to life and work roles and salient tasks    · Pt will demo with G tolerance to supine, seated, and in stance exercise x 45 minutes with minimal rest breaks required for increased engagement in life roles and weekly exercise regimen : PROGRESSING    · Pt will demo with G carryover of Home Exercise Program to improve functional progression towards goals in Plan of care and for improved functional use of LUE : PROGRESSING        FMC/GMC    · Pt will increase L UE prehension patterns for improved utensil and container management  with ability to isolate pincer and lateral pinch grasp >50% of the time: goal modified 7/23 PROGRESSING          Pt will increase L UE prehension patterns for improved utensil and container management  with ability to isolate pincer and lateral pinch grasp >25% of the time      Functional Performance    · Pt will increase LUE to functional assist with for ADL/IADL and tabletop tasks for improved functional performance of life roles and salient tasks: PROGRESSING     AROM/PROM    · Pt will demo with decreased  LUE  shoulder sublux to trace for increased AROM for improved ADL and IADL engagement PROGRESSING    · Pt will be able to perform near full range of proximal L UE to demo increased strength and ROM of affected UE for improved ADLs/IADLs:goal modifies 7/23  PROGRESSING         Pt will be able to perform full range of proximal L UE  with short lever to demo increased strength and ROM of affected UE for improved ADLs/IADLs    · Pt will be able to perform >1/2  range of distal L UE to demo increased strength and ROM of affected UE for improved ADLs/IADLs PROGRESSING modified 7/23                Pt will be able to perform 1/2  range of distal L UE to demo increased strength and ROM of affected UE for improved ADLs/IADLs     · Pt will demo decreased hypertonicity of Modified Linsey Scale (MAS) of L UE to 1 for improved alternate motor patterns, grasp/release, and termination on command for improved dressing/hygiene PROGRESSING goal modified   Pt will demo decreased hypertonicity of Modified Linsey Scale (MAS) of L UE distal extensors to 1 for improved alternate motor patterns, grasp/release, and termination on command for improved dressing/hygiene    · Pt will demo good carryover of clinic and home tone reduction strategies for improved AROM initiation with functional reach with ADL fxn PROGRESSING      Modalities    · Pt will tolerate BIONESS/NMES/IASTM for improved motor and sensory performance for overall improved strength, tone reduction, and sensation to inc safety and engagement with ADL/IADL fxn PROGRESSING    · Pt/family will demo G understanding and carryover of use of home NMES unit as prescribed to inc carryover of tone reduction and strengthening strategies of L UE: PROGRESSING      Treatment Interventions  Supine, seated, and in stance neuro re-ed  NMES/FES for strengthening  NMES for tone reduction  BIONESS  Fxnl Grasp and Release  FMC/prehension patterns  Fxnl Tool use (tweezers, tongs)  In hand manipulation  [facility] [place]  Manual tx (IASTM if able, PROM/stretching)  Hand to target tasks  Supine place and hold  Pain Levels:     Restin    With Activity:  Low back, at times (0-5)    Objective    Impairment Observations:    Upper Extremity       RUE LUE Comments                 UPPER EXTREMITY FXN Intact impaired Dominant Hand: R                         /Pinch Strength         Dynamometer      - Gross Grasp2 40 lbs 15 lbs Improved RUE 5 lb reduced LUE 20lbs   Pinch Meter       - PINCER 16 lbs 5 lbs Dec 2lb LUE    - TRIPOD 17 lbs 7 lbs Dec 3lb LUE    - LATERAL 17 5 lbs  10 5 lbs Dec LUE             AROM (seated) Elevation/Depression  Near full    Shoulder Flex/Ext  >3/4 range  Short lever   Shoulder Abd   full Short lever   Shoulder Add   full    Horizontal Abd  3/4 range Short lever   Horizontal Add  >3/4 range     Elbow Flex  >1/2 range     Elbow Ext  3/4    Pronation  Near full     Supination  3/4     Wrist Flex  3/4     Wrist Ext  >1/4 range     Digit Flex  trace    Digit Ex  trace     Composite Grasp  3/4 limited D1 D2    Hook Grasp  Unable to isolate     Opposition  unable    Subluxation  1 finger breadth,       Full range all planes                       AROM (supine)         Elevation  Near full     Shoulder Flex/Ext   nearfull Short lever   Shoulder ABd  full Short level    Shoulder ADd  full    Horizontal ABd  full    Horizontal ADd  full    Elbow Flex  Near full    Elbow Ext  >3/4    Pronation  full    Supination  1/4    Wrist Flex   full    Wrist Ext  trace                   Full range all planes                          PROM (supine position)         Elevation/Depression  full    Retraction/Protraction  full    Shoulder Flex/Ext  full    Shoulder ABd/ADd  full    Horizontal ABd/Add  full    Elbow Flex  full    Elbow Ext  full    Wrist Flex  full    Wrist Ext  >3/4    Digit Flex  Full D2-D5, near full D1    Digit Ext  Full, inc tone        Full range all planes                              MMT         Elevation 5/5 5/5    Shoulder Flex/Ext 5/5 4/5 Short lever   Shoulder Abd 4+/5 3+/5    Shoulder ADd 4+/5 3/5    Elbow Flex 4+/5 4/5    Elbow Ext 4+/5 3+/5    Wrist Flex 4+/5 3+/5    Wrist Ext 4+/5 unable    Gross Grasp 4+/5 3+/5          SENSATION      Myofilaments (3 61 Wnl) 3 61 3 61    Sharp Dull  Intact Intact    Proprioception Intact Intact    Hot/Cold Temp Intact Intact          MODIFIED HUNTER SCALE (TONE) 0 1+    No increase in muscle tone (0)  Bicep/tricep,wirst flexors    Slight Increase in muscle tone with catch and release or min resist at end range (1)      Slight Increase in muscle tone with catch and release, followed by min resistance through remainder of range (1+)  Wrist extensors    Increased muscle tone through full range, able to be moved easily (2)      Considerable increase in tone, difficult to move (3)      Rigid in Flexion/Extension (4)                                  todays treatment  Pt tolerated NMES to LUE extensors with focus to distal LUE strength/endurance and tone reduction      INTERVENTION COMMENTS:  Diagnosis: Left hemiparesis ([facility] [place] [address] ) [G74 42]  Precautions: fall risk  FOTO: to complete  Insurance: Payor: Ambar Myers / Plan: [facility] PLAN [id number] / Product Type: Blue Fee for Service /   1 of ___ visits, PN due 8/23

## 2021-07-26 ENCOUNTER — TELEPHONE (OUTPATIENT)
Dept: GASTROENTEROLOGY | Facility: CLINIC | Age: 66
End: 2021-07-26

## 2021-07-26 ENCOUNTER — TELEPHONE (OUTPATIENT)
Dept: CARDIOLOGY CLINIC | Facility: CLINIC | Age: 66
End: 2021-07-26

## 2021-07-26 NOTE — TELEPHONE ENCOUNTER
Pt is scheduled for 9/15/21 for colon for hx of polyps  Pt is informed that he has had stroke last year and is taking Eliquis, sees Dr Carie Ahumada and has afib, cardiologist is Dr Jess Dale  I will call both of their offices in one week to make sure that I sent through Epic correctly  Dr Catherne Favre patient is scheduled for procedure: pt informed has hx of stroke and is taking Eliquis  Please advise if and for how long pt may hold  Thank you so much! On: ___9_/_15 _/_21    _      With: Dr Elder________    He/She is taking the following blood thinner:   Eliquis       Can this be stopped __1____ days prior to the procedure? Physician Approving clearance: ________________________          Dr Yoon Boland patient is scheduled for procedure:  Pt informed has hx of AFIB  Please advise if cleared  Thank you so much! On: ___9_/_15 _/_21    _      With: Dr Elder________    He/She is taking the following blood thinner:          Can this be stopped ______ days prior to the procedure?       Physician Approving clearance: ________________________

## 2021-07-26 NOTE — TELEPHONE ENCOUNTER
[facility]  He needs Cardiac clearance and Eliquis hold for 1 day prior to procedure        Thank you

## 2021-08-03 ENCOUNTER — TELEPHONE (OUTPATIENT)
Dept: NEUROLOGY | Facility: CLINIC | Age: 66
End: 2021-08-03

## 2021-08-03 ENCOUNTER — REMOTE DEVICE CLINIC VISIT (OUTPATIENT)
Dept: CARDIOLOGY CLINIC | Facility: CLINIC | Age: 66
End: 2021-08-03
Payer: COMMERCIAL

## 2021-08-03 DIAGNOSIS — Z95.818 PRESENCE OF OTHER CARDIAC IMPLANTS AND GRAFTS: Primary | ICD-10-CM

## 2021-08-03 PROCEDURE — 93298 REM INTERROG DEV EVAL SCRMS: CPT | Performed by: INTERNAL MEDICINE

## 2021-08-03 PROCEDURE — G2066 INTER DEVC REMOTE 30D: HCPCS | Performed by: INTERNAL MEDICINE

## 2021-08-03 NOTE — TELEPHONE ENCOUNTER
Called and spoke to patient - confirmed upcoming appointment with Dr Delacruz Setting  Provided patient with apt date, time and location  Informed patient that check in is at least 15 minutes prior to apt time

## 2021-08-03 NOTE — PROGRESS NOTES
MDT-LOOP RECORDER/ ACTIVE SYSTEM IS MRI CONDITIONAL   CARELINK TRANSMISSION: LOOP RECORDER  PRESENTING RHYTHM NSR@ 66 BPM  BATTERY STATUS "OK " 3 PAUSE EPISODES  PREVIOUSLY ADDRESSED IN ALERTS OR DURING SLEEP HOURS  NO PATIENT ACTIVATED EPISODES  NORMAL DEVICE FUNCTION   DL

## 2021-08-04 ENCOUNTER — OFFICE VISIT (OUTPATIENT)
Dept: OCCUPATIONAL THERAPY | Facility: CLINIC | Age: 66
End: 2021-08-04
Payer: COMMERCIAL

## 2021-08-04 DIAGNOSIS — G81.94 LEFT HEMIPARESIS (HCC): Primary | ICD-10-CM

## 2021-08-04 PROCEDURE — 97110 THERAPEUTIC EXERCISES: CPT

## 2021-08-04 PROCEDURE — 97112 NEUROMUSCULAR REEDUCATION: CPT

## 2021-08-04 PROCEDURE — 97140 MANUAL THERAPY 1/> REGIONS: CPT

## 2021-08-04 NOTE — PROGRESS NOTES
Occupational Therapy Daily Note:    Today's date: 2021  Patient name: Kelsey Gillette  : 1955  MRN: [id number]  Referring provider: Librada Krabbe, MD  Dx:   Encounter Diagnosis   Name Primary?  Left hemiparesis (HCC) Yes                  Subjective: "My hand has been feeling so much more loose"    Objective: Pt seen for OT treatment session focusing on L UE NMR, tone reduction/spasticity management, and HEP development  Pt tolerated 6 min x 2 (prograde/retrograde motions at 2 0 resist) seated on UBE with focus on increasing proximal UE strength, endurance, act tolerance and ROM to inc indep and safety with ADL/IADL fxn and fxnl reaching tasks  Coban wrap assist to maintain LUE grasp  Pt tolerated low load prolonged stretch to distal LUE for tone reduction and tissue elongation followed by IASTM to distal L UE for inc circulation and tone reduction  Pt able to tolerate full ROM all joints/digits without pain and noted dec tone  Pt tolerated 15 min BIONESS in neuromodulation setting for distal tone reduction followed by 5 min of open hand mode to inc volitional activation and strength of distal extensors for improved fxnl performance  Assessment: Tolerated treatment well  Pt with noted improved and reduced distal LUE tone since previous OT Tx session  Pt has been completed PROM in home environ[employer]net with additional education to complete seated extended arm WB for additional tone reduction benefit and for proximal strengthening and stability  Patient would benefit from continued skilled OT  Plan: Continued skilled OT per POC      INTERVENTION COMMENTS:  Diagnosis: Left hemiparesis ([facility] [address] ) [G81 94]  Precautions: fall risk  FOTO: to complete  Insurance: Payor: Melinda Martinez / Plan: [facility] PLAN [id number] / Product Type: Blue Fee for Service /   2 of 8 visits, PN due

## 2021-08-11 ENCOUNTER — APPOINTMENT (OUTPATIENT)
Dept: OCCUPATIONAL THERAPY | Facility: CLINIC | Age: 66
End: 2021-08-11
Payer: COMMERCIAL

## 2021-08-12 ENCOUNTER — OFFICE VISIT (OUTPATIENT)
Dept: OCCUPATIONAL THERAPY | Facility: CLINIC | Age: 66
End: 2021-08-12
Payer: COMMERCIAL

## 2021-08-12 ENCOUNTER — TELEPHONE (OUTPATIENT)
Dept: NEUROLOGY | Facility: CLINIC | Age: 66
End: 2021-08-12

## 2021-08-12 DIAGNOSIS — G81.94 LEFT HEMIPARESIS (HCC): Primary | ICD-10-CM

## 2021-08-12 PROCEDURE — 97140 MANUAL THERAPY 1/> REGIONS: CPT

## 2021-08-12 PROCEDURE — 97112 NEUROMUSCULAR REEDUCATION: CPT

## 2021-08-12 PROCEDURE — 97110 THERAPEUTIC EXERCISES: CPT

## 2021-08-12 NOTE — PROGRESS NOTES
Occupational Therapy Daily Note:    Today's date: 2021  Patient name: Kira Rossi  : 1955  MRN: [id number]  Referring provider: Carrie Aldana MD  Dx:   Encounter Diagnosis   Name Primary?  Left hemiparesis (HCC) Yes                  Subjective: "my hand feels so good right now"    Objective: Pt seen for OT treatment session focusing on tone reduction, proximal strengthening/stability, volitional grasp/release, and inc fxnl ROM and muscle flexibility to inc indep and engagement with ADL/IADL tasks  Pt tolerated 5 min x 2 (prograde/retrograde motions at 1 5 resist) seated on UBE with focus on increasing proximal UE strength, endurance, act tolerance and ROM to inc indep and safety with ADL/IADL fxn and fxnl reaching tasks  Coban wrap assist to maintain L UE grasp with NMES applied to posterior delt and supraspinatus for subluxation reduction and proximal stability with UBE  Pt tolerated 15 min BIONESS In neuromodulation setting for distal tone reduction followed by 15 min of open hand/grasp alternating motor pattern setting  With pt completing AAROM in conjunction with NMES to inc volitional activation/termination of flexors and extensors distally to inc grasp/release indep  Pt required PROM from OTR to facilitate full extension in digits following volitional grasp  Improved with prolonged stretch  IASTM to forearm/hand of LUE for tone reduction and to inc circulation/ROM  Assessment: Tolerated treatment well  Patient would benefit from continued skilled OT  Plan: Continued skilled OT per POC      INTERVENTION COMMENTS:  Diagnosis: Left hemiparesis ([facility] [address] ) [G81 94]  Precautions: fall risk  FOTO: to complete  Insurance: Payor: BLUE CROSS / Plan: [facility] PLAN 361 / Product Type: Blue Fee for Service /   3 of 8 visits, PN due

## 2021-08-12 NOTE — TELEPHONE ENCOUNTER
Message left for patient to return call to office regarding appointment on 8/17 at 9 am needing to be changed to a virtual visit as provider is only seeing patients virtually this day  Please assist with change to virtual visit upon call back

## 2021-08-17 ENCOUNTER — TELEPHONE (OUTPATIENT)
Dept: NEUROLOGY | Facility: CLINIC | Age: 66
End: 2021-08-17

## 2021-08-17 ENCOUNTER — TELEMEDICINE (OUTPATIENT)
Dept: NEUROLOGY | Facility: CLINIC | Age: 66
End: 2021-08-17
Payer: COMMERCIAL

## 2021-08-17 DIAGNOSIS — I65.23 BILATERAL CAROTID ARTERY STENOSIS: ICD-10-CM

## 2021-08-17 DIAGNOSIS — R73.03 PREDIABETES: ICD-10-CM

## 2021-08-17 DIAGNOSIS — G44.201 ACUTE INTRACTABLE TENSION-TYPE HEADACHE: ICD-10-CM

## 2021-08-17 DIAGNOSIS — Z86.73 HISTORY OF STROKE: Primary | ICD-10-CM

## 2021-08-17 DIAGNOSIS — I10 ESSENTIAL HYPERTENSION: Chronic | ICD-10-CM

## 2021-08-17 DIAGNOSIS — G47.33 OSA (OBSTRUCTIVE SLEEP APNEA): ICD-10-CM

## 2021-08-17 DIAGNOSIS — E78.5 HYPERLIPIDEMIA, UNSPECIFIED HYPERLIPIDEMIA TYPE: Chronic | ICD-10-CM

## 2021-08-17 DIAGNOSIS — I48.0 PAROXYSMAL ATRIAL FIBRILLATION (HCC): ICD-10-CM

## 2021-08-17 DIAGNOSIS — G81.94 LEFT HEMIPARESIS (HCC): ICD-10-CM

## 2021-08-17 PROCEDURE — 99214 OFFICE O/P EST MOD 30 MIN: CPT | Performed by: PSYCHIATRY & NEUROLOGY

## 2021-08-17 NOTE — PROGRESS NOTES
Virtual Regular Visit    Assessment/Plan:   Patient Instructions   Stroke:  Savita Rangel presents for follow-up evaluation with regard to his prior stroke  In the interval since his last visit to the office did not report any new symptoms concerning for recurrent TIA or stroke  He is taking is medications and di not endorse any bleeding or bruising issues  No significant issues with mood or appetite  He is pending a colonoscopy  He is receiving botox for his left hand which has been minimally helpful thus far  - for ongoing stroke prevention he should continue his combination of Eliquis, Lipitor, and appropriate blood pressure and glycemic control   -we will defer to the good judgment of his primary care team for monitoring of his cholesterol panel and blood sugar numbers    -I would like him to continue to exercise his left upper extremity on his own, and particularly to practice specific motions that will allow him to improve the function of the left arm from day-to-day  He should discuss with the physical medicine and rehab team that he continues to have excessive flexor tone in his fist, in spite of the Botox therapy  -   He did not report any falls or significant issues with appetite or mood at this point in time  - he should continue to treat his sleep apnea with CPAP therapy  - I would like him to continue to monitor his blood pressure outside of the doctor's office and target numbers less than 130/80      - he is clear from a neurologic standpoint to hold his Eliquis as directed for his upcoming colonoscopy  -he does have mild bilateral carotid artery stenosis which was last evaluated 2 years ago  We will plan for a repeat Doppler ultrasound over the next few months  I entered the appropriate prescription  The      I will plan for him to return to see us in 12 months time but I would be happy to see him sooner if the need should arise    If he has any symptoms concerning for TIA or stroke such as sudden painless loss of vision or double vision, difficulty speaking or swallowing, vertigo /room spinning that does not quickly resolve, or weakness / numbness affecting 1 side of the face or body he should proceed by ambulance to the nearest emergency room immediately  If he were to fall and strike his head and have any residual symptoms or to developed a sudden extremely severe very unusual headache he should likewise be seen in the nearest emergency room  Problem List Items Addressed This Visit     None               Reason for visit is   Chief Complaint   Patient presents with    Virtual Regular Visit        Encounter provider Ion Damico MD    Provider located at [address] [place] [address] [address]  [facility] [id number] [phone]      Recent Visits  Date Type Provider Dept   08/12/21 Telephone Ion Damico MD [facility] recent visits within past 7 days and meeting all other requirements  Future Appointments  No visits were found meeting these conditions  Showing future appointments within next 150 days and meeting all other requirements       The patient was identified by name and date of birth  Ayo Montejo was informed that this is a telemedicine visit and that the visit is being conducted through [address] [place] [address] Now and patient was informed that this is a secure, HIPAA-compliant platform  He agrees to proceed     My office door was closed  The patient was notified the following individuals were present in the room Dr Antonieta Raman  He acknowledged consent and understanding of privacy and security of the video platform  The patient has agreed to participate and understands they can discontinue the visit at any time  Patient is aware this is a billable service  Subjective  Ayo oMntejo is a 78 y o  male who presents for follow-up with regard to his prior stroke    He does not report any new symptoms concerning for recurrent TIA or stroke  He takes his medications as prescribed  He did not endorse any significant issues with sleep, appetite, mood  He is now receiving Botox in his left upper extremity through the Physical Medicine and rehab group, [facility] at [facility]  He continues now to receive physical and occupational therapy  He is also trying to remain active on his own  He uses his CPAP regularly  He also has a new brace to assist him with walking  He reported some occasional headaches which are treated appropriately with Tylenol  Past Medical History:   Diagnosis Date    Hypertension     Migraine     Stroke [facility])        Past Surgical History:   Procedure Laterality Date    APPENDECTOMY      KNEE SURGERY      NECK SURGERY      TONSILLECTOMY         Current Outpatient Medications   Medication Sig Dispense Refill    amLODIPine (NORVASC) 5 mg tablet Take 5 mg by mouth daily Taking 1 1/2 daily daily   5    atorvastatin (LIPITOR) 80 mg tablet Take 1 tablet (80 mg total) by mouth every evening 90 tablet 3    Eliquis 5 MG TAKE 1 TABLET BY MOUTH TWICE A DAY 60 tablet 5    FLUoxetine (PROzac) 20 mg capsule TAKE 1 CAPSULE BY MOUTH EVERY DAY 90 capsule 3    losartan (COZAAR) 50 mg tablet Take 1 tablet (50 mg total) by mouth daily 90 tablet 0    methocarbamol (ROBAXIN) 500 mg tablet TAKE 1 TABLET BY MOUTH EVERY 12 HOURS 60 tablet 5     No current facility-administered medications for this visit  No Known Allergies    Review of Systems   Constitutional: Negative  Negative for appetite change and fever  HENT: Negative  Negative for hearing loss, tinnitus, trouble swallowing and voice change  Eyes: Negative  Negative for photophobia and pain  Respiratory: Negative  Negative for shortness of breath  Cardiovascular: Negative  Negative for palpitations  Gastrointestinal: Negative  Negative for nausea and vomiting  Endocrine: Negative  Negative for cold intolerance     Genitourinary: Negative  Negative for dysuria, frequency and urgency  Musculoskeletal: Negative  Negative for myalgias and neck pain  Skin: Negative  Negative for rash  Neurological: Negative  Negative for dizziness, tremors, seizures, syncope, facial asymmetry, speech difficulty, weakness, light-headedness, numbness and headaches  Hematological: Negative  Does not bruise/bleed easily  Psychiatric/Behavioral: Negative  Negative for confusion, hallucinations and sleep disturbance  All other systems reviewed and are negative  Reviewed ROS as entered by medical assistant  Video Exam    There were no vitals filed for this visit  Physical Exam       At the time of our examination he was awake, alert, and in no distress  There was minimal if any dysarthria  There is no clear aphasia  He has reasonably good historian  Extraocular movements were intact  Face was symmetric  Sensation was symmetric in V1- V3 bilaterally  In the right upper extremity there was no drift and finger-to-nose did not reveal any clear evidence of tremor, ataxia, or loss of proprioceptive function  He did have decreased shoulder shrug on the left compared with the right  He was able to maintain his left elbow at 90° and did have some active extension at the level of the elbow but clearly had increased flexion tone compared with extension  He was not able to raise/ extend the wrist against gravity  He was able to flex the fingers in his left hand but not really extend them  I have spent 25 minutes with Patient  today including counseling/coordination of care regarding Risks and benefits of tx options, Intructions for management, Patient and family education, Importance of tx compliance and Risk factor reductions as well as on chart review, communication, and documentation  VIRTUAL VISIT DISCLAIMER      Lajean Kehr verbally agrees to participate in [employer]   Pt is aware that Virtual Care Services could be limited without vital signs or the ability to perform a full hands-on physical Oneda Aj understands he or the provider may request at any time to terminate the video visit and request the patient to seek care or treatment in person

## 2021-08-17 NOTE — PATIENT INSTRUCTIONS
Stroke:  Diann Chau presents for follow-up evaluation with regard to his prior stroke  In the interval since his last visit to the office did not report any new symptoms concerning for recurrent TIA or stroke  He is taking is medications and di not endorse any bleeding or bruising issues  No significant issues with mood or appetite  He is pending a colonoscopy  He is receiving botox for his left hand which has been minimally helpful thus far  - for ongoing stroke prevention he should continue his combination of Eliquis, Lipitor, and appropriate blood pressure and glycemic control   -we will defer to the good judgment of his primary care team for monitoring of his cholesterol panel and blood sugar numbers    -I would like him to continue to exercise his left upper extremity on his own, and particularly to practice specific motions that will allow him to improve the function of the left arm from day-to-day  He should discuss with the physical medicine and rehab team that he continues to have excessive flexor tone in his fist, in spite of the Botox therapy  -   He did not report any falls or significant issues with appetite or mood at this point in time  - he should continue to treat his sleep apnea with CPAP therapy  - I would like him to continue to monitor his blood pressure outside of the doctor's office and target numbers less than 130/80      - he is clear from a neurologic standpoint to hold his Eliquis as directed for his upcoming colonoscopy  -he does have mild bilateral carotid artery stenosis which was last evaluated 2 years ago  We will plan for a repeat Doppler ultrasound over the next few months  I entered the appropriate prescription  The      I will plan for him to return to see us in 12 months time but I would be happy to see him sooner if the need should arise    If he has any symptoms concerning for TIA or stroke such as sudden painless loss of vision or double vision, difficulty speaking or swallowing, vertigo /room spinning that does not quickly resolve, or weakness / numbness affecting 1 side of the face or body he should proceed by ambulance to the nearest emergency room immediately  If he were to fall and strike his head and have any residual symptoms or to developed a sudden extremely severe very unusual headache he should likewise be seen in the nearest emergency room

## 2021-08-18 ENCOUNTER — APPOINTMENT (OUTPATIENT)
Dept: OCCUPATIONAL THERAPY | Facility: CLINIC | Age: 66
End: 2021-08-18
Payer: COMMERCIAL

## 2021-08-18 NOTE — TELEPHONE ENCOUNTER
2nd message left to contact office to setup 8 month follow up with Dr Michael Hernandez  Made aware Michael Hernandez ordered him a Carotid Complete Study  Mailed script for carotid and AVS to home address on file in chart

## 2021-08-19 ENCOUNTER — APPOINTMENT (OUTPATIENT)
Dept: OCCUPATIONAL THERAPY | Facility: CLINIC | Age: 66
End: 2021-08-19
Payer: COMMERCIAL

## 2021-08-25 ENCOUNTER — OFFICE VISIT (OUTPATIENT)
Dept: OCCUPATIONAL THERAPY | Facility: CLINIC | Age: 66
End: 2021-08-25
Payer: COMMERCIAL

## 2021-08-25 DIAGNOSIS — G81.94 LEFT HEMIPARESIS (HCC): Primary | ICD-10-CM

## 2021-08-25 PROCEDURE — 97110 THERAPEUTIC EXERCISES: CPT

## 2021-08-25 PROCEDURE — 97140 MANUAL THERAPY 1/> REGIONS: CPT

## 2021-08-25 PROCEDURE — 97112 NEUROMUSCULAR REEDUCATION: CPT

## 2021-08-25 NOTE — PROGRESS NOTES
Occupational Therapy Daily Note:    Today's date: 2021  Patient name: Tiffani Madrid  : 1955  MRN: [id number]  Referring provider: Kelsie No MD  Dx:   Encounter Diagnosis   Name Primary?  Left hemiparesis (HCC) Yes                   Subjective: "I had busy week"    Objective: Objective: Pt seen for OT treatment session focusing on tone reduction, proximal strengthening/stability, volitional grasp/release, and inc fxnl ROM and muscle flexibility to inc indep and engagement with ADL/IADL tasks       Pt tolerated 5 min x 2 (prograde/retrograde motions at 1 5 resist) seated on UBE with focus on increasing proximal UE strength, endurance, act tolerance and ROM to inc indep and safety with ADL/IADL fxn and fxnl reaching tasks  Coban wrap assist to maintain L UE grasp     Pt tolerated 20 min BIONESS In neuromodulation setting for distal tone reduction followed by 15 min of open hand setting with PROM from OTR to achieve full digit extension  Pt then completed resisted, closed chain flexion to fatigue flexors and reduce tone for inc digit activation and fxnl grasp  Assessment: Tolerated treatment well  Patient would benefit from continued skilled OT  Plan: Continued skilled OT per POC      INTERVENTION COMMENTS:  Diagnosis: Left hemiparesis ([address] ) [G81 94]  Precautions: fall risk  FOTO: to complete  Insurance: Payor: ROBERTA CROSS / Plan: [facility] PLAN 361 / Product Type: Blue Fee for Service /   3 [place] 5 MARCELO GAUTAM due

## 2021-09-02 ENCOUNTER — OFFICE VISIT (OUTPATIENT)
Dept: OCCUPATIONAL THERAPY | Facility: CLINIC | Age: 66
End: 2021-09-02
Payer: COMMERCIAL

## 2021-09-02 DIAGNOSIS — G81.94 LEFT HEMIPARESIS (HCC): Primary | ICD-10-CM

## 2021-09-02 PROCEDURE — 97112 NEUROMUSCULAR REEDUCATION: CPT

## 2021-09-02 PROCEDURE — 97140 MANUAL THERAPY 1/> REGIONS: CPT

## 2021-09-02 PROCEDURE — 97110 THERAPEUTIC EXERCISES: CPT

## 2021-09-02 NOTE — PROGRESS NOTES
Occupational Therapy Daily Note:    Today's date: 2021  Patient name: Roslyn Alegria  : 1955  MRN: [id number]  Referring provider: Samaria Christiansen MD  Dx:   Encounter Diagnosis   Name Primary?  Left hemiparesis (HCC) Yes                   Subjective: "I had busy week"    Objective: Objective: Pt seen for OT treatment session focusing on tone reduction, proximal strengthening/stability, volitional grasp/release, and inc fxnl ROM and muscle flexibility to inc indep and engagement with ADL/IADL tasks       Pt tolerated 5 min x 2 (prograde/retrograde motions at 1 5 resist) seated on UBE with focus on increasing proximal UE strength, endurance, act tolerance and ROM to inc indep and safety with ADL/IADL fxn and fxnl reaching tasks  Coban wrap assist to maintain L UE grasp with NMES applied to post delt and supraspinatus for proximal strengthening/stability    Pt tolerated 15 min BIONESS In neuromodulation setting for distal tone reduction followed by 15 min of open hand setting with PROM from OTR to achieve full digit extension  Pt then completed resisted, closed chain flexion to fatigue flexors and reduce tone for inc digit activation and fxnl grasp  IASTM for tone reduction to distal UE, digits, and hand for tone reduction      Assessment: Tolerated treatment well  Drastic reduction in tone following bioness and manual techniques  Patient would benefit from continued skilled OT  Plan: Continued skilled OT per POC        INTERVENTION COMMENTS:  Diagnosis: [facility] hem[facility]aresis [facility] ) [G81 94]  Precautions: fall risk  FOTO: to complete  Insurance: Payor: ROBERTA DASILVA / Plan: [facility] PLAN 361 / Product Type: Blue Fee for Service /   7 XN 8 XSMXTN, PN due

## 2021-09-08 ENCOUNTER — TELEPHONE (OUTPATIENT)
Dept: GASTROENTEROLOGY | Facility: CLINIC | Age: 66
End: 2021-09-08

## 2021-09-09 ENCOUNTER — OFFICE VISIT (OUTPATIENT)
Dept: OCCUPATIONAL THERAPY | Facility: CLINIC | Age: 66
End: 2021-09-09
Payer: COMMERCIAL

## 2021-09-09 DIAGNOSIS — G81.94 LEFT HEMIPARESIS (HCC): Primary | ICD-10-CM

## 2021-09-09 PROCEDURE — 97110 THERAPEUTIC EXERCISES: CPT

## 2021-09-09 PROCEDURE — 97112 NEUROMUSCULAR REEDUCATION: CPT

## 2021-09-09 PROCEDURE — 97140 MANUAL THERAPY 1/> REGIONS: CPT

## 2021-09-09 NOTE — PROGRESS NOTES
Occupational Therapy Daily Note:    Today's date: 2021  Patient name: Jaci Cuevas  : 1955  MRN: [id number]  Referring provider: Ajay Mendoza MD  Dx:   Encounter Diagnosis   Name Primary?  Left hemiparesis (HCC) Yes                  Subjective: "my hand has not been swollen lately"    Objective: Pt seen for OT treatment session focusing on LUE tone reduction, spasticity management, pt education and UE strengthening/endurance  Pt tolerated 5 min x 2 (prograde/retrograde motions at 3 0 resist) seated on UBE with focus on increasing proximal UE strength, endurance, act tolerance and ROM to dec tone and inc fxnl volitional activation of L UE    Pt tolerated 15 min BIONESS in neuromodulation setting for distal tone reduction followed by 15 min in open hand setting with OTR assist to facilitate full digit extension with improved flexibility and elasticity with repetition  IASTM to LUE proximal to distal with focus on RTC, upper trap, wrist/digit flexors/extensors and intrinsic muscles to inc muscle flexibility, ROM, and reduce tone to inc volitional use  Assessment: Tolerated treatment well  edu pt on [facility] wrist/digit extension orthotic for management of LUE following next botox injection 2* insurance limitations with continued visits  Pt in agreement, Levis Eisenmenger will contact rep  Patient would benefit from continued skilled OT      Plan: Continued skilled OT per POC      INTERVENTION COMMENTS:  Diagnosis: Left hemiparesis ([facility] ) [G81 94]  Precautions: fall risk  FOTO: to complete  Insurance: Payor: ROBERTA CROSS / Plan: [facility] PLAN 361 / Product Type: Blue Fee for Service Madalyn Memory  3 PD 8 ENRIQUE PN due

## 2021-09-15 ENCOUNTER — ANESTHESIA (OUTPATIENT)
Dept: GASTROENTEROLOGY | Facility: AMBULATORY SURGERY CENTER | Age: 66
End: 2021-09-15

## 2021-09-15 ENCOUNTER — ANESTHESIA EVENT (OUTPATIENT)
Dept: GASTROENTEROLOGY | Facility: AMBULATORY SURGERY CENTER | Age: 66
End: 2021-09-15

## 2021-09-15 ENCOUNTER — HOSPITAL ENCOUNTER (OUTPATIENT)
Dept: GASTROENTEROLOGY | Facility: AMBULATORY SURGERY CENTER | Age: 66
Discharge: HOME | End: 2021-09-15
Payer: COMMERCIAL

## 2021-09-15 VITALS
HEIGHT: 68 IN | HEART RATE: 49 BPM | SYSTOLIC BLOOD PRESSURE: 128 MMHG | DIASTOLIC BLOOD PRESSURE: 75 MMHG | OXYGEN SATURATION: 99 % | WEIGHT: 180 LBS | TEMPERATURE: 98.9 F | RESPIRATION RATE: 18 BRPM | BODY MASS INDEX: 27.28 KG/M2

## 2021-09-15 DIAGNOSIS — Z86.010 HX OF COLONIC POLYPS: ICD-10-CM

## 2021-09-15 PROBLEM — Z86.0101 HX OF ADENOMATOUS COLONIC POLYPS: Status: ACTIVE | Noted: 2021-09-15

## 2021-09-15 PROCEDURE — 45385 COLONOSCOPY W/LESION REMOVAL: CPT | Performed by: INTERNAL MEDICINE

## 2021-09-15 PROCEDURE — 88305 TISSUE EXAM BY PATHOLOGIST: CPT | Performed by: PATHOLOGY

## 2021-09-15 PROCEDURE — 00811 ANES LWR INTST NDSC NOS: CPT | Performed by: NURSE ANESTHETIST, CERTIFIED REGISTERED

## 2021-09-15 PROCEDURE — 45380 COLONOSCOPY AND BIOPSY: CPT | Performed by: INTERNAL MEDICINE

## 2021-09-15 RX ORDER — SODIUM CHLORIDE 9 MG/ML
50 INJECTION, SOLUTION INTRAVENOUS CONTINUOUS
Status: DISCONTINUED | OUTPATIENT
Start: 2021-09-15 | End: 2021-09-19 | Stop reason: HOSPADM

## 2021-09-15 RX ORDER — PROPOFOL 10 MG/ML
INJECTION, EMULSION INTRAVENOUS AS NEEDED
Status: DISCONTINUED | OUTPATIENT
Start: 2021-09-15 | End: 2021-09-15

## 2021-09-15 RX ORDER — SODIUM CHLORIDE 9 MG/ML
INJECTION, SOLUTION INTRAVENOUS CONTINUOUS PRN
Status: DISCONTINUED | OUTPATIENT
Start: 2021-09-15 | End: 2021-09-15

## 2021-09-15 RX ORDER — SODIUM CHLORIDE 9 MG/ML
30 INJECTION, SOLUTION INTRAVENOUS CONTINUOUS
Status: DISCONTINUED | OUTPATIENT
Start: 2021-09-15 | End: 2021-09-19 | Stop reason: HOSPADM

## 2021-09-15 RX ORDER — SODIUM CHLORIDE 9 MG/ML
20 INJECTION, SOLUTION INTRAVENOUS CONTINUOUS
Status: DISCONTINUED | OUTPATIENT
Start: 2021-09-15 | End: 2021-09-19 | Stop reason: HOSPADM

## 2021-09-15 RX ADMIN — PROPOFOL 20 MG: 10 INJECTION, EMULSION INTRAVENOUS at 13:29

## 2021-09-15 RX ADMIN — PROPOFOL 20 MG: 10 INJECTION, EMULSION INTRAVENOUS at 13:21

## 2021-09-15 RX ADMIN — SODIUM CHLORIDE: 9 INJECTION, SOLUTION INTRAVENOUS at 13:08

## 2021-09-15 RX ADMIN — PROPOFOL 20 MG: 10 INJECTION, EMULSION INTRAVENOUS at 13:19

## 2021-09-15 RX ADMIN — PROPOFOL 20 MG: 10 INJECTION, EMULSION INTRAVENOUS at 13:25

## 2021-09-15 RX ADMIN — PROPOFOL 20 MG: 10 INJECTION, EMULSION INTRAVENOUS at 13:27

## 2021-09-15 RX ADMIN — PROPOFOL 20 MG: 10 INJECTION, EMULSION INTRAVENOUS at 13:17

## 2021-09-15 RX ADMIN — PROPOFOL 20 MG: 10 INJECTION, EMULSION INTRAVENOUS at 13:15

## 2021-09-15 RX ADMIN — PROPOFOL 20 MG: 10 INJECTION, EMULSION INTRAVENOUS at 13:31

## 2021-09-15 RX ADMIN — PROPOFOL 100 MG: 10 INJECTION, EMULSION INTRAVENOUS at 13:12

## 2021-09-15 RX ADMIN — PROPOFOL 20 MG: 10 INJECTION, EMULSION INTRAVENOUS at 13:23

## 2021-09-15 RX ADMIN — PROPOFOL 30 MG: 10 INJECTION, EMULSION INTRAVENOUS at 13:13

## 2021-09-15 NOTE — ANESTHESIA PREPROCEDURE EVALUATION
Procedure:  COLONOSCOPY    Relevant Problems   CARDIO   (+) Hypertension   (+) Paroxysmal atrial fibrillation (HCC)      NEURO/PSYCH   (+) History of stroke      PULMONARY   (+) PADDY (obstructive sleep apnea)      Other   (+) Left hemiparesis (HCC)        Physical Exam    Airway    Mallampati score: II  TM Distance: >3 FB  Neck ROM: full     Dental       Cardiovascular  Rhythm: irregular, Rate: normal,     Pulmonary  Breath sounds clear to auscultation,     Other Findings        Anesthesia Plan  ASA Score- 2     Anesthesia Type- IV sedation with anesthesia with ASA Monitors  Additional Monitors:   Airway Plan:           Plan Factors-Exercise tolerance (METS): >4 METS  Chart reviewed  Patient summary reviewed  Patient is not a current smoker  Induction-     Postoperative Plan-     Informed Consent- Anesthetic plan and risks discussed with patient

## 2021-09-15 NOTE — ANESTHESIA POSTPROCEDURE EVALUATION
Post-Op Assessment Note    CV Status:  Stable  Pain Score: 0    Pain management: adequate     Mental Status:  Alert and awake   Hydration Status:  Euvolemic   PONV Controlled:  Controlled   Airway Patency:  Patent      Post Op Vitals Reviewed: Yes      Staff: CRNA         No complications documented      BP   118/66   Temp      Pulse 47   Resp   20   SpO2   99%

## 2021-09-15 NOTE — H&P
History and Physical - [facility]  Masoud Alanis 78 y o  male MRN: [id number]                  HPI: Masoud Alanis is a 78y o  year old male who presents for colonoscopy due to history of polyps      REVIEW OF SYSTEMS: Per the HPI, and otherwise unremarkable      Historical Information   Past Medical History:   Diagnosis Date    Arthritis     in hands    Cervical herniated disc     Colon polyp     Diabetes mellitus ([facility] )     borderline    Gout     hx of in 2018, none since    Hyperlipidemia     Hypertension     Irregular heart beat     a-fib, has loop recorder    Kidney stone 09/13/2021    hx of    Migraine     S/P Botox injection     in left wrist to help increase mobility    Sleep apnea     mild sleep apnea, cpap was not ordered    Stroke ([facility] ) 2018    left hemiparesis, wears leg brace and uses cane, unable to use left arm and hand    Use of cane as ambulatory aid     had CVA in 2018     Past Surgical History:   Procedure Laterality Date    APPENDECTOMY      COLONOSCOPY      KNEE ARTHROSCOPY      right knee    KNEE SURGERY Right     NECK SURGERY      TONSILLECTOMY       Social History   Social History     Substance and Sexual Activity   Alcohol Use Yes    Comment: socially      Social History     Substance and Sexual Activity   Drug Use No     Social History     Tobacco Use   Smoking Status Former Smoker    Packs/day: 1 00    Types: Cigarettes, Cigars    Quit date: 2018    Years since quitting: 3 7   Smokeless Tobacco Never Used   Tobacco Comment    stopped on 10/20/2018     Family History   Problem Relation Age of Onset    Stroke Mother     Heart disease Father     ALS Father        Meds/Allergies       Current Outpatient Medications:     amLODIPine (NORVASC) 5 mg tablet    atorvastatin (LIPITOR) 80 mg tablet    Eliquis 5 MG    FLUoxetine (PROzac) 20 mg capsule    losartan (COZAAR) 50 mg tablet    methocarbamol (ROBAXIN) 500 mg tablet    Current Facility-Administered Medications:     sodium chloride 0 9 % infusion, 30 mL/hr, Intravenous, Continuous    sodium chloride 0 9 % infusion, 20 mL/hr, Intravenous, Continuous    No Known Allergies    Objective     /78   Pulse (!) 53   Temp 98 9 °F (37 2 °C) (Temporal)   Resp 18   Ht 5' 8" (1 727 m)   Wt 81 6 kg (180 lb)   SpO2 96%   BMI 27 37 kg/m²       PHYSICAL EXAM    Gen: NAD  Head: NCAT  CV: RRR  CHEST: Clear  ABD: soft, NT/ND  EXT: no edema      ASSESSMENT/PLAN:  This is a 78y o  year old male here for colonoscopy, and he is stable and optimized for his procedure

## 2021-09-15 NOTE — DISCHARGE INSTRUCTIONS
Colonoscopy   WHAT YOU NEED TO KNOW:   A colonoscopy is a procedure to examine the inside of your colon (intestine) with a scope  Polyps or tissue growths may have been removed during your colonoscopy  It is normal to feel bloated and to have some abdominal discomfort  You should be passing gas  If you have hemorrhoids or you had polyps removed, you may have a small amount of bleeding  DISCHARGE INSTRUCTIONS:   Seek care immediately if:    You have sudden, severe abdominal pain   You have problems swallowing   You have a large amount of black, sticky bowel movements or blood in your bowel movements   You have sudden trouble breathing   You feel weak, lightheaded, or faint or your heart beats faster than normal for you  Contact your healthcare provider if:    You have a fever and chills   You have nausea or are vomiting   Your abdomen is bloated or feels full and hard   You have abdominal pain   You have black, sticky bowel movements or blood in your bowel movements   You have not had a bowel movement for 3 days after your procedure   You have rash or hives   You have questions or concerns about your procedure  Activity:    Do not lift, strain, or run for 24 hours after your procedure   Rest after your procedure  You have been given medicine to relax you  Do not drive or make important decisions until the day after your procedure  Return to your normal activity as directed   Relieve gas and discomfort from bloating by lying on your right side with a heating pad on your abdomen  You may need to take short walks to help the gas move out  Eat small meals until bloating is relieved  Follow up with your healthcare provider as directed: Write down your questions so you remember to ask them during your visits  If you take a blood thinner, please review the specific instructions from your endoscopist about when you should resume it   These can be found in the Recommendation and Your Medication list sections of this After Visit Summary  Colorectal Polyps   WHAT YOU NEED TO KNOW:   Colorectal polyps are small growths of tissue in the lining of the colon and rectum  Most polyps are hyperplastic polyps and are usually benign (noncancerous)  Certain types of polyps, called adenomatous polyps, may turn into cancer  DISCHARGE INSTRUCTIONS:   Follow up with your healthcare provider or gastroenterologist as directed: You may need to return for more tests, such as another colonoscopy  Write down your questions so you remember to ask them during your visits  Reduce your risk for colorectal polyps:   · Eat a variety of healthy foods:  Healthy foods include fruit, vegetables, whole-grain breads, low-fat dairy products, beans, lean meat, and fish  Ask if you need to be on a special diet  · Maintain a healthy weight:  Ask your healthcare provider if you need to lose weight and how much you need to lose  Ask for help with a weight loss program     · Exercise:  Begin to exercise slowly and do more as you get stronger  Talk with your healthcare provider before you start an exercise program      · Limit alcohol:  Your risk for polyps increases the more you drink  · Do not smoke: If you smoke, it is never too late to quit  Ask for information about how to stop  For support and more information:   · [address] ([facility]) [address] [place], [place] [address]  Phone: [phone]  Web Address: www digestive  niddk nih gov    Contact your healthcare provider or gastroenterologist if:   · You have a fever  · You have chills, a cough, or feel weak and achy  · You have abdominal pain that does not go away or gets worse after you take medicine  · Your abdomen is swollen  · You are losing weight without trying  · You have questions or concerns about your condition or care      Seek care immediately or call 911 if:   · You have sudden shortness of breath  · You have a fast heart rate, fast breathing, or are too dizzy to stand up  · You have severe abdominal pain  · You see blood in your bowel movement  © Copyright [address] Information is for End User's use only and may not be sold, redistributed or otherwise used for commercial purposes  All illustrations and images included in CareNotes® are the copyrighted property of A D A M , Inc  or [facility] Derek Keith   The above information is an  only  It is not intended as medical advice for individual conditions or treatments  Talk to your doctor, nurse or pharmacist before following any medical regimen to see if it is safe and effective for you  Hemorrhoids   WHAT YOU NEED TO KNOW:   What are hemorrhoids? Hemorrhoids are swollen blood vessels inside your rectum (internal hemorrhoids) or on your anus (external hemorrhoids)  Sometimes a hemorrhoid may prolapse  This means it extends out of your anus  What increases my risk for hemorrhoids? · Pregnancy or obesity    · Straining or sitting for a long time during bowel movements    · Liver disease    · Weak muscles around the anus caused by older age, rectal surgery, or anal intercourse    · A lack of physical activity    · Chronic diarrhea or constipation    · A low-fiber diet    What are the signs and symptoms of hemorrhoids? · Pain or itching around your anus or inside your rectum    · Swelling or bumps around your anus    · Bright red blood in your bowel movement, on the toilet paper, or in the toilet bowl    · Tissue bulging out of your anus (prolapsed hemorrhoids)    · Incontinence (poor control over urine or bowel movements)    How are hemorrhoids diagnosed? Your healthcare provider will ask about your symptoms, the foods you eat, and your bowel movements  He or she will examine your anus for external hemorrhoids   You may need the following:  · A digital rectal exam  is a test to check for hemorrhoids  Your healthcare provider will put a gloved finger inside your anus to feel for the hemorrhoids  · An anoscopy  is a test that uses a scope (small tube with a light and camera on the end) to look at your hemorrhoids  How are hemorrhoids treated? Treatment will depend on your symptoms  You may need any of the following:  · Medicines  can help decrease pain and swelling, and soften your bowel movement  The medicine may be a pill, pad, cream, or ointment  · Procedures  may be used to shrink or remove your hemorrhoid  Examples include rubber-band ligation, sclerotherapy, and photocoagulation  These procedures may be done in your healthcare provider's office  Ask your healthcare provider for more information about these procedures  · Surgery  may be needed to shrink or remove your hemorrhoids  How can I manage my symptoms? · Apply ice on your anus for 15 to 20 minutes every hour or as directed  Use an ice pack, or put crushed ice in a plastic bag  Cover it with a towel before you apply it to your anus  Ice helps prevent tissue damage and decreases swelling and pain  · Take a sitz bath  Fill a bathtub with 4 to 6 inches of warm water  You may also use a sitz bath pan that fits inside a toilet bowl  Sit in the sitz bath for 15 minutes  Do this 3 times a day, and after each bowel movement  The warm water can help decrease pain and swelling  · Keep your anal area clean  Gently wash the area with warm water daily  Soap may irritate the area  After a bowel movement, wipe with moist towelettes or wet toilet paper  Dry toilet paper can irritate the area  How can I help prevent hemorrhoids? · Do not strain to have a bowel movement  Do not sit on the toilet too long  These actions can increase pressure on the tissues in your rectum and anus  · Drink plenty of liquids  Liquids can help prevent constipation   Ask how much liquid to drink each day and which liquids are best for you  · Eat a variety of high-fiber foods  Examples include fruits, vegetables, and whole grains  Ask your healthcare provider how much fiber you need each day  You may need to take a fiber supplement  · Exercise as directed  Exercise, such as walking, may make it easier to have a bowel movement  Ask your healthcare provider to help you create an exercise plan  · Do not have anal sex  Anal sex can weaken the skin around your rectum and anus  · Avoid heavy lifting  This can cause straining and increase your risk for another hemorrhoid  When should I seek immediate care? · You have severe pain in your rectum or around your anus  · You have severe pain in your abdomen and you are vomiting  · You have bleeding from your anus that soaks through your underwear  When should I contact my healthcare provider? · You have frequent and painful bowel movements  · Your hemorrhoid looks or feels more swollen than usual      · You do not have a bowel movement for 2 days or more  · You see or feel tissue coming through your anus  · You have questions or concerns about your condition or care  CARE AGREEMENT:   You have the right to help plan your care  Learn about your health condition and how it may be treated  Discuss treatment options with your healthcare providers to decide what care you want to receive  You always have the right to refuse treatment  The above information is an  only  It is not intended as medical advice for individual conditions or treatments  Talk to your doctor, nurse or pharmacist before following any medical regimen to see if it is safe and effective for you  © Copyright [employer] 2021 Information is for End User's use only and may not be sold, redistributed or otherwise used for commercial purposes   All illustrations and images included in CareNotes® are the copyrighted property of [employer]  or [address]   High Fiber Diet   WHAT YOU NEED TO KNOW:   What is a high-fiber diet? A high-fiber diet includes foods that have a high amount of fiber  Fiber is the part of fruits, vegetables, and grains that is not broken down by your body  Fiber keeps your bowel movements regular  Fiber can also help lower your cholesterol level, control blood sugar in people with diabetes, and relieve constipation  Fiber can also help you control your weight because it helps you feel full faster  Most adults should eat 25 to 35 grams of fiber each day  Talk to your dietitian or healthcare provider about the amount of fiber you need  What foods are good sources of fiber? · Foods with at least 4 grams of fiber per serving:      ? ? to ½ cup of high-fiber cereal (check the nutrition label on the box)    ? ½ cup of blackberries or raspberries    ? 4 dried prunes    ? 1 cooked artichoke    ? ½ cup of cooked legumes, such as lentils, or red, kidney, and vela beans    · Foods with 1 to 3 grams of fiber per serving:      ? 1 slice of whole-wheat, pumpernickel, or rye bread    ? ½ cup of cooked brown rice    ? 4 whole-wheat crackers    ? 1 cup of oatmeal    ? ½ cup of cereal with 1 to 3 grams of fiber per serving (check the nutrition label on the box)    ? 1 small piece of fruit, such as an apple, banana, pear, kiwi, or orange    ? 3 dates    ? ½ cup of canned apricots, fruit cocktail, peaches, or pears    ? ½ cup of raw or cooked vegetables, such as carrots, cauliflower, cabbage, spinach, squash, or corn  What are some ways that I can increase fiber in my diet? · Choose brown or wild rice instead of white rice  · Use whole wheat flour in recipes instead of white or all-purpose flour  · Add beans and peas to casseroles or soups  · Choose fresh fruit and vegetables with peels or skins on instead of juices  What other guidelines should I follow? · Add fiber to your diet slowly    You may have abdominal discomfort, bloating, and gas if you add fiber to your diet too quickly  · Drink plenty of liquids as you add fiber to your diet  You may have nausea or develop constipation if you do not drink enough water  Ask how much liquid to drink each day and which liquids are best for you  CARE AGREEMENT:   You have the right to help plan your care  Discuss treatment options with your healthcare provider to decide what care you want to receive  You always have the right to refuse treatment  The above information is an  only  It is not intended as medical advice for individual conditions or treatments  Talk to your doctor, nurse or pharmacist before following any medical regimen to see if it is safe and effective for you  © Copyright [employer] 2021 Information is for End User's use only and may not be sold, redistributed or otherwise used for commercial purposes   All illustrations and images included in CareNotes® are the copyrighted property of A D A M , Inc  or [address] [place] [address]

## 2021-09-23 ENCOUNTER — OFFICE VISIT (OUTPATIENT)
Dept: OCCUPATIONAL THERAPY | Facility: CLINIC | Age: 66
End: 2021-09-23
Payer: COMMERCIAL

## 2021-09-23 DIAGNOSIS — G81.94 LEFT HEMIPARESIS (HCC): Primary | ICD-10-CM

## 2021-09-23 PROCEDURE — 97530 THERAPEUTIC ACTIVITIES: CPT

## 2021-09-23 PROCEDURE — 97140 MANUAL THERAPY 1/> REGIONS: CPT

## 2021-09-23 PROCEDURE — 97110 THERAPEUTIC EXERCISES: CPT

## 2021-09-23 NOTE — PROGRESS NOTES
Occupational Therapy Daily Note:    Today's date: 2021  Patient name: Amina Flores  : 1955  MRN: [id number]  Referring provider: Jossy Rausch MD  Dx:   Encounter Diagnosis   Name Primary?  Left hemiparesis (HCC) Yes                  Subjective: "I think the brace will be good"    Objective: Pt seen for OT treatment session focusing on LUE tone reduction/spasticity management, pt education, inc muscle flexibility/ROM/elastiicty, and orthotic fitting session  Pt tolerated 5 min x 2 (prograde/retrograde motions at 3 0 resist) seated on UBE with focus on increasing proximal UE strength, endurance, act tolerance and ROM to inc indep and safety with ADL/IADL fxn and fxnl reaching tasks  Coban wrap assist to maintain LUE grasp on UBE     IASTM to LUE proximal to distal with focus on RTC, upper trap, wrist/digit flexors/extensors and intrinsic muscles to inc muscle flexibility, ROM, and reduce tone to inc volitional use  Pt tolerated 15 min BIONESS in neuromodulation setting for distal tone reduction followed by 15 min in open hand setting with OTR assist to facilitate full digit extension with improved flexibility and elasticity with repetition  Rep from maggy present for todays session with education and recommendations for wrist extension splint with c cup hand gripper  Pt is a G candidate for orthotic 2* hypertonicity/spasticity and is receiving botox  Pt in agreement, rep to contact pt with final fit  Assessment: Tolerated treatment well  Pt continues to demo hypertonicity/spasticity in LUE with dec elasticity and ROM in L UE  Pt demo difficulties with release of grasp 2* flexor spasticity, however it is improving with manual therapies  Orthosis recommended of wrist extension with C cup hand piece for tissue elongation to optimize benefits of botox  Patient would benefit from continued skilled OT  Plan: Continued skilled OT per POC      INTERVENTION COMMENTS:  Diagnosis: Left hemiparesis ([facility] [address] ) [G81 94]  Precautions: fall risk  FOTO: to complete  Insurance: Payor: BLUE CROSS / Plan: [facility] PLAN 361 / Product Type: Blue Fee for Service /   0 SB 5 MARCELO ANAYA due 8/23

## 2021-11-03 ENCOUNTER — REMOTE DEVICE CLINIC VISIT (OUTPATIENT)
Dept: CARDIOLOGY CLINIC | Facility: CLINIC | Age: 66
End: 2021-11-03
Payer: COMMERCIAL

## 2021-11-03 DIAGNOSIS — Z95.818 PRESENCE OF CARDIAC DEVICE: Primary | ICD-10-CM

## 2021-11-03 PROCEDURE — G2066 INTER DEVC REMOTE 30D: HCPCS | Performed by: INTERNAL MEDICINE

## 2021-11-03 PROCEDURE — 93298 REM INTERROG DEV EVAL SCRMS: CPT | Performed by: INTERNAL MEDICINE

## 2021-12-28 NOTE — PROGRESS NOTES
Pt requesting to d/c OT Services 2* insurance limitations with visits PBP, pt will resume next year, new script will be required

## 2021-12-29 DIAGNOSIS — I63.9 RIGHT SIDED CEREBRAL HEMISPHERE CEREBROVASCULAR ACCIDENT (CVA) (HCC): ICD-10-CM

## 2021-12-29 RX ORDER — FLUOXETINE HYDROCHLORIDE 20 MG/1
CAPSULE ORAL
Qty: 90 CAPSULE | Refills: 3 | Status: SHIPPED | OUTPATIENT
Start: 2021-12-29

## 2022-01-10 DIAGNOSIS — G81.94 LEFT HEMIPARESIS (HCC): ICD-10-CM

## 2022-01-10 RX ORDER — METHOCARBAMOL 500 MG/1
TABLET, FILM COATED ORAL
Qty: 60 TABLET | Refills: 5 | Status: SHIPPED | OUTPATIENT
Start: 2022-01-10 | End: 2022-06-08

## 2022-02-07 ENCOUNTER — REMOTE DEVICE CLINIC VISIT (OUTPATIENT)
Dept: CARDIOLOGY CLINIC | Facility: CLINIC | Age: 67
End: 2022-02-07
Payer: COMMERCIAL

## 2022-02-07 DIAGNOSIS — Z95.818 PRESENCE OF OTHER CARDIAC IMPLANTS AND GRAFTS: Primary | ICD-10-CM

## 2022-02-07 PROCEDURE — 93298 REM INTERROG DEV EVAL SCRMS: CPT | Performed by: INTERNAL MEDICINE

## 2022-02-07 PROCEDURE — G2066 INTER DEVC REMOTE 30D: HCPCS | Performed by: INTERNAL MEDICINE

## 2022-02-07 NOTE — PROGRESS NOTES
MDT-LOOP RECORDER/ ACTIVE SYSTEM IS MRI CONDITIONAL   CARELINK TRANSMISSION: LOOP RECORDER  PRESENTING RHYTHM NSR @ 62 BPM  BATTERY STATUS "OK " NO PATIENT OR DEVICE ACTIVATED EPISODES  NORMAL DEVICE FUNCTION   DL

## 2022-03-21 ENCOUNTER — EVALUATION (OUTPATIENT)
Dept: PHYSICAL THERAPY | Facility: CLINIC | Age: 67
End: 2022-03-21
Payer: COMMERCIAL

## 2022-03-21 DIAGNOSIS — R26.2 AMBULATORY DYSFUNCTION: Primary | ICD-10-CM

## 2022-03-21 DIAGNOSIS — I69.398 GAIT DISTURBANCE, POST-STROKE: ICD-10-CM

## 2022-03-21 DIAGNOSIS — R26.9 GAIT DISTURBANCE, POST-STROKE: ICD-10-CM

## 2022-03-21 PROCEDURE — 97162 PT EVAL MOD COMPLEX 30 MIN: CPT

## 2022-03-21 NOTE — PROGRESS NOTES
PT Evaluation     Today's date: 3/21/2022  Patient name: Kian Dubon  : 1955  MRN: [id number]  Referring provider: Marielle Rivers*  Dx:   Encounter Diagnosis     ICD-10-CM    1  Ambulatory dysfunction  R26 2    2  Gait disturbance, post-stroke  I69 398     R26 9        Start Time: 0900  Stop Time: 1000  Total time in clinic (min): 60 minutes    Assessment  Assessment details: Kian Dubon is a 78 y o  male presenting with h/o stroke with L hemiparesis  Pt presents with associated impairments, including impaired endurance as well as balance, strength, and coordination deficits contributing to pt's risk of falls/recent reported falls  Pt would benefit from reassessment of FGA and 6 MWT without use of AFO to compare directly to last evaluated functional measures as these were taken without AFO  Will benefit from skilled PT interventions for environmental navigation, improved balance strategies to prevent falls, and increased LE strength/coordination to improve overall stability  Plan to have bout of structured PT to increase pt's current safe functional capacity and plan for check in after d/c to assess pt's maintenance of progress outside of PT  Pt would also benefit from planned bout of PT in winter months (Nov/Dec) as pt subjectively reports this is his time of significantly reduced activity and mobility regression  Impairments: abnormal gait, abnormal or restricted ROM, abnormal movement, activity intolerance, impaired balance, impaired physical strength, lacks appropriate home exercise program, safety issue and poor body mechanics  Barriers to therapy: Pt req to come 1x per week due to distance traveled and personal factors   Understanding of Dx/Px/POC: good   Prognosis: good    Goals    Short Term Goals: In 4 weeks, the patient will:  1  Increase FGA by at least 4 points to indicate pt has surpassed MCID and made a clinically significant improvement in balance     2  Report no near falls while not wearing AFO in last two weeks  3  Perform home exercise program with min(A) or supervision     Long Term Goals: In 8 weeks, the patient will:  1  Increase 6 MWT by 300 ft to indicate pt has improved endurance and community ambulation capacity  2  Increase gait speed by 0 2 m/s to indicate improved capacity for community ambulation and improved falls risk  3  Increase FOTO to greater than predicted value  4  Demonstrate ability to perform home exercise program independently to maintain/continue progress towards goals   5  Improve hip strength to 4+/5 to improve functional activity performance         Plan  Patient would benefit from: OT robert  Planned therapy interventions: activity modification, ADL retraining, balance, balance/weight bearing training, body mechanics training, breathing training, coordination, functional ROM exercises, gait training, graded exercise, home exercise program, transfer training, therapeutic exercise, therapeutic activities, strengthening, self care, patient education, postural training and neuromuscular re-education  Frequency: 1x week  Duration in weeks: 8  Treatment plan discussed with: patient        Subjective  Pt reported that he has been getting Botox injections in his LUE and his MD wanted him to have therapy for this  Pt also reports that he's had reduced mobility since his last bout of PT, and his family and him have noticed the decline  He reports the winter makes him nervous and he's had several falls in the last year  He has his AFO and wears it sometimes, especially when he's going to be walking long distances (like when he's going to the city)  He notes he's usually wearing the brace when he falls, and his falls recently have all been due to slipping on ice  He notes several near falls when he's not wearing his AFO due to catching his toe  Pt notes he's anxious about people running into him in the city, and he uses a cane when he goes       Living Situation/Home Setup: Pt still maintains same living situation in 1 story apartment     Goals: Reduce knee locking during walking, improve balance, improve negotiation of crowded environments     Occupation: Pt is still working, mostly from home, as a   Pt goes into the city ([place]) at least 1x per week for work  Objective     BP: 130/80 mmHg, Spo2: 98% HR: 58 bpm     FGA 24/30, AFO donned    6 Minute Walk Test (ft): 1250, AFO donned    Gait Speed (ft/s): 0 64 m/s, AFO donned    5x Sit To Stand (s): 7 09s, AFO donned    TU 3s, AFO donned          MCTSIB Number of Seconds   Feet Together, Eyes Open >30   Feet Together, Eyes Closed >30, mild postural sway    Feet Together, Eyes Open Foam >30, (S)    Feet Together, Eyes Closed Foam >30, mild postural sway, close (S)         Coordination Left Right   Heel To Shin Normal  Normal   Finger To Nose NT  Normal    Rapid Alternating Movement     UE Unable to complete  Normal    LE Dysmetric  Mild dysmetria          Manual Muscle Testing - Hip Left Right   Flexion 3+ 4+   Extension 4 4   Abduction 4+ 4+     Manual Muscle Testing - Knee Left Right   Flexion 4 5   Extension 4+ 5     Manual Muscle Testing - Ankle Left Right   Dorsiflexion NT due to AFO 5   Plantarflexion NT due to AFO  5       Transfers    Sit To Stand Independent    Sit To Supine Indepedent    Roll Independent, increased time to complete         Gait Assessment: With AFO: reduced pushoff with LLE, hip hike on L with reduced hip/knee flexion, shortened step length on R, reduced/absent L arm swing            Precautions: HTN, Afib       Manuals 3/21                                                                Neuro Re-Ed             Busy environmental navigation  NV            Slip practice  NV                                                                             Ther Ex Ther Activity                                       Gait Training                                       Modalities

## 2022-03-21 NOTE — LETTER
2022    Adan Romero DO  [address] [facility] [address]    Patient: Eldridge Essex   YOB: 1955   Date of Visit: 3/21/2022     Encounter Diagnosis     ICD-10-CM    1  Ambulatory dysfunction  R26 2    2  Gait disturbance, post-stroke  I69 398     R26 9        Dear Dr Shiva Vivar: Thank you for your recent referral of Eldridge Essex  Please review the attached evaluation summary from Rakesh's recent visit  Please verify that you agree with the plan of care by signing the attached order  If you have any questions or concerns, please do not hesitate to call  I sincerely appreciate the opportunity to share in the care of one of your patients and hope to have another opportunity to work with you in the near future  Sincerely,    Caesar Bowie, PT      Referring Provider:      I certify that I have read the below Plan of Care and certify the need for these services furnished under this plan of treatment while under my care  Adan Romero DO  [address] [facility] [address]  Via Fax: [phone]          PT Evaluation     Today's date: 3/21/2022  Patient name: Eldridge Essex  : 1955  MRN: [id number]  Referring provider: German Wong*  Dx:   Encounter Diagnosis     ICD-10-CM    1  Ambulatory dysfunction  R26 2    2  Gait disturbance, post-stroke  I69 398     R26 9        Start Time: 0900  Stop Time: 1000  Total time in clinic (min): 60 minutes    Assessment  Assessment details: Eldridge Essex is a 78 y o  male presenting with h/o stroke with L hemiparesis  Pt presents with associated impairments, including impaired endurance as well as balance, strength, and coordination deficits contributing to pt's risk of falls/recent reported falls   Pt would benefit from reassessment of FGA and 6 MWT without use of AFO to compare directly to last evaluated functional measures as these were taken without AFO  Will benefit from skilled PT interventions for environmental navigation, improved balance strategies to prevent falls, and increased LE strength/coordination to improve overall stability  Plan to have bout of structured PT to increase pt's current safe functional capacity and plan for check in after d/c to assess pt's maintenance of progress outside of PT  Pt would also benefit from planned bout of PT in winter months (Nov/Dec) as pt subjectively reports this is his time of significantly reduced activity and mobility regression  Impairments: abnormal gait, abnormal or restricted ROM, abnormal movement, activity intolerance, impaired balance, impaired physical strength, lacks appropriate home exercise program, safety issue and poor body mechanics  Barriers to therapy: Pt req to come 1x per week due to distance traveled and personal factors   Understanding of Dx/Px/POC: good   Prognosis: good    Goals    Short Term Goals: In 4 weeks, the patient will:  1  Increase FGA by at least 4 points to indicate pt has surpassed MCID and made a clinically significant improvement in balance  2  Report no near falls while not wearing AFO in last two weeks  3  Perform home exercise program with min(A) or supervision     Long Term Goals: In 8 weeks, the patient will:  1  Increase 6 MWT by 300 ft to indicate pt has improved endurance and community ambulation capacity  2  Increase gait speed by 0 2 m/s to indicate improved capacity for community ambulation and improved falls risk  3  Increase FOTO to greater than predicted value  4  Demonstrate ability to perform home exercise program independently to maintain/continue progress towards goals   5   Improve hip strength to 4+/5 to improve functional activity performance         Plan  Patient would benefit from: OT robert  Planned therapy interventions: activity modification, ADL retraining, balance, balance/weight bearing training, body mechanics training, breathing training, coordination, functional ROM exercises, gait training, graded exercise, home exercise program, transfer training, therapeutic exercise, therapeutic activities, strengthening, self care, patient education, postural training and neuromuscular re-education  Frequency: 1x week  Duration in weeks: 8  Treatment plan discussed with: patient        Subjective  Pt reported that he has been getting Botox injections in his LUE and his MD wanted him to have therapy for this  Pt also reports that he's had reduced mobility since his last bout of PT, and his family and him have noticed the decline  He reports the winter makes him nervous and he's had several falls in the last year  He has his AFO and wears it sometimes, especially when he's going to be walking long distances (like when he's going to the city)  He notes he's usually wearing the brace when he falls, and his falls recently have all been due to slipping on ice  He notes several near falls when he's not wearing his AFO due to catching his toe  Pt notes he's anxious about people running into him in the city, and he uses a cane when he goes  Living Situation/Home Setup: Pt still maintains same living situation in 1 story apartment     Goals: Reduce knee locking during walking, improve balance, improve negotiation of crowded environments     Occupation: Pt is still working, mostly from home, as a   Pt goes into the city ([place]) at least 1x per week for work       Objective     BP: 130/80 mmHg, Spo2: 98% HR: 58 bpm     FGA , AFO donned    6 Minute Walk Test (ft): 1250, AFO donned    Gait Speed (ft/s): 0 64 m/s, AFO donned    5x Sit To Stand (s): 7 09s, AFO donned    TU 3s, AFO donned          MCTSIB Number of Seconds   Feet Together, Eyes Open >30   Feet Together, Eyes Closed >30, mild postural sway    Feet Together, Eyes Open Foam >30, (S)    Feet Together, Eyes Closed Foam >30, mild postural sway, close (S) Coordination Left Right   Heel To Shin Normal  Normal   Finger To Nose NT  Normal    Rapid Alternating Movement     UE Unable to complete  Normal    LE Dysmetric  Mild dysmetria          Manual Muscle Testing - Hip Left Right   Flexion 3+ 4+   Extension 4 4   Abduction 4+ 4+     Manual Muscle Testing - Knee Left Right   Flexion 4 5   Extension 4+ 5     Manual Muscle Testing - Ankle Left Right   Dorsiflexion NT due to AFO 5   Plantarflexion NT due to AFO  5       Transfers    Sit To Stand Independent    Sit To Supine Indepedent    Roll Independent, increased time to complete         Gait Assessment: With AFO: reduced pushoff with LLE, hip hike on L with reduced hip/knee flexion, shortened step length on R, reduced/absent L arm swing            Precautions: HTN, Afib       Manuals 3/21                                                                Neuro Re-Ed             Busy environmental navigation  NV            Slip practice  NV                                                                             Ther Ex                                                                                                                     Ther Activity                                       Gait Training                                       Modalities

## 2022-03-23 ENCOUNTER — EVALUATION (OUTPATIENT)
Dept: OCCUPATIONAL THERAPY | Facility: CLINIC | Age: 67
End: 2022-03-23
Payer: COMMERCIAL

## 2022-03-23 ENCOUNTER — OFFICE VISIT (OUTPATIENT)
Dept: PHYSICAL THERAPY | Facility: CLINIC | Age: 67
End: 2022-03-23
Payer: COMMERCIAL

## 2022-03-23 DIAGNOSIS — Z86.73 HISTORY OF STROKE: Primary | ICD-10-CM

## 2022-03-23 DIAGNOSIS — I69.398 GAIT DISTURBANCE, POST-STROKE: ICD-10-CM

## 2022-03-23 DIAGNOSIS — R26.9 GAIT DISTURBANCE, POST-STROKE: ICD-10-CM

## 2022-03-23 DIAGNOSIS — R25.2 SPASTICITY AS LATE EFFECT OF CEREBROVASCULAR ACCIDENT (CVA): Primary | ICD-10-CM

## 2022-03-23 DIAGNOSIS — I69.398 SPASTICITY AS LATE EFFECT OF CEREBROVASCULAR ACCIDENT (CVA): Primary | ICD-10-CM

## 2022-03-23 DIAGNOSIS — R26.2 AMBULATORY DYSFUNCTION: Primary | ICD-10-CM

## 2022-03-23 PROCEDURE — 97112 NEUROMUSCULAR REEDUCATION: CPT | Performed by: PHYSICAL THERAPIST

## 2022-03-23 PROCEDURE — 97150 GROUP THERAPEUTIC PROCEDURES: CPT | Performed by: PHYSICAL THERAPIST

## 2022-03-23 PROCEDURE — 97166 OT EVAL MOD COMPLEX 45 MIN: CPT

## 2022-03-23 NOTE — PROGRESS NOTES
OCCUPATIONAL THERAPY INITIAL EVALUATION:    3/23/2022  Kalee Session  1955  [id number]  Jorge Quintana MD  1  Spasticity as late effect of cerebrovascular accident (CVA)        Subjective    "I get the botox injections again next month"    PATIENT GOAL: "to have less tightness"      Assessment/Plan    Skilled Analysis:  Pt is a 78 y o  male referred to Occupational Therapy s/p Spasticity as late effect of cerebrovascular accident (CVA) Stephanie Clark, R25 2]  Pt participated in skilled OT evaluation and following formalized testing, presents with the following areas of deficit: FMC/FMS, GMC/GMS, hypertonicity and ROM, spasticity impacting indep and completion of ADL/IADL, salient, and leisure tasks  Pt does demo the need for skilled Occupational Therapy services 1 x/week for 8-12 weeks with focus on UE NMR, UE strengthening, UE endurance, FMC/GMC, FMS/GMS and tone reduction strategies, spasticity reduction, PROM, AAROM, AROM, and HEP development to address the goals as listed below  Pt in agreement with POC, POC to  w/in 90 days     Goals:   Motor Short Term Goals (8)WKS      Strength/Endurance    ·  Pt will increase L UE proximal strength to 3/5  through the use of strengthening exercises and home program for eventual return to life roles and salient tasks    · Pt will demo with G tolerance to supine, seated, and in stance exercise x 30 minutes with minimal rest breaks required for increased engagement in life roles and weekly exercise regimen     · Pt will demo with G carryover of Home Exercise Program to improve functional progression towards goals in Plan of care and for improved functional use of LUE       Functional Performance    · Pt will increase LUE to gross assist with for ADL/IADL and tabletop tasks for improved functional performance of life roles and salient tasks       AROM/PROM    · Pt will demo with decreased  LUE  shoulder sublux to trace for increased AROM for improved ADL and IADL engagement    · Pt will be able to perform near full range of proximal L UE to demo increased strength and ROM of affected UE for improved ADLs/IADLs    · Pt will be able to perform >1/4 range of distal L UE to demo increased strength and ROM of affected UE for improved ADLs/IADLs    · Pt will demo decreased hypertonicity of Modified Linsey Scale (MAS) of L UE to 1 for improved alternate motor patterns, grasp/release, and termination on command for improved dressing/hygiene     · Pt will demo good carryover of clinic and home tone reduction strategies for improved AROM initiation with functional reach with ADL fxn      Modalities    · Pt will tolerate BIONESS/NMES/IASTM for improved motor and sensory performance for overall improved strength, tone reduction, and sensation to inc safety and engagement with ADL/IADL fxn    Pt will increase compliance with  [facility] for improved elasticity of L UE and tolerate fit of dynamic extension splint with wear time of 4 hrs following botox application      Treatment Interventions  Tone reduction strategies  Weight bearing  bioness  NMES  CP/HP applications  PROM/AAROM/AROM  HEP development  Closed chain strengthening  fxnl grasp/release      HISTORY OF PRESENT ILLNESS:     Pt is a 78 y o  male who was referred to Occupational Therapy s/p  Spasticity as late effect of cerebrovascular accident (CVA) Dalia Barcenas, R25 2]  initial presentation to [facility] 10/20/2018 due to left-sided weakness and facial droop   He also had a speech impairment and was found to have an acute stroke  The patient received tPA at 11:00 p m  on the day of admission after neurology assessment  He had loop recorder placed at time of admission for CVA  during his hospital stay he was dx with a small amount of petechial hemorrhage noted on MRI   CT scan showed positive results for small infarctions within the right frontal lobe and right temporal occipital junction,Pt discharged to [facility] from 10/25/[facility] - [facility]  Pt is familiar to OP OT services, pt has received OP OT services for L UE NMR, tone reduction, and strengthening tasks  Pt with most recent dc in 2021  Pt recently received Botox injections to wrist and digit flexors last month and is now due to receive botox again in April  Pt reports that he is still "unable" to utilize LUE as a refined , but utilizes as gross assist  Pt has dynamic extension splint received last round of therapy but states rep never followed up with him and the splint does not fit properly  Pt is indep with driving, ADLs and is still currently working         PMH:   Past Medical History:   Diagnosis Date    Arthritis     in hands    Cervical herniated disc     Colon polyp     Diabetes mellitus ([facility] [address] )     borderline    Gout     hx of in 2018, none since    Hyperlipidemia     Hypertension     Irregular heart beat     a-fib, has loop recorder    Kidney stone 2021    hx of    Migraine     S/P Botox injection     in left wrist to help increase mobility    Sleep apnea     mild sleep apnea, cpap was not ordered    Stroke ([facility] [address] ) 2018    left hemiparesis, wears leg brace and uses cane, unable to use left arm and hand    Use of cane as ambulatory aid     had CVA in 2018     Pain Levels:     Restin    With Activity:  0    Objective    Impairment Observations:    Upper Extremity       RUE LUE Comments                 UPPER EXTREMITY FXN Intact Impaired Dominant Hand: R                         /Pinch Strength         Dynamometer      - Gross Grasp 40 lbs 30 lbs subnormal   Pinch Meter       - PINCER 13 lbs 3 lbs     - TRIPOD 11 lbs 5 lbs     - LATERAL 9 lbs  10 lbs              AROM (seated)       Elevation/Depression  full    Shoulder Flex/Ext  >3/4 range  Short lever   Shoulder Abd  3/4 range    Shoulder Add  3/4    Horizontal Abd  1/2 range     Horizontal Add  3/4 range     Elbow Flex  1/4 range    Elbow Ext  1/4 range    Pronation  Full Supination  1/4 range     Wrist Flex  Near full     Wrist Ext  trace     Digit Flex  full     Digit Ex  trace     Composite Grasp  full     Hook Grasp  Unable to isolate     Opposition  Lateral edge of D1 with manual blocking      Subluxation  1 finger breadth                                PROM (seated position)         Elevation/Depression  full     Retraction/Protraction  full     Shoulder Flex/Ext  full     Shoulder ABd/ADd  full     Horizontal ABd/Add  full     Elbow Flex  nera full     Elbow Ext  full     Wrist Flex  full     Wrist Ext  full     Digit Flex  full     Digit Ext  Near full                                    MMT         Elevation 4+/5 4/5    Shoulder Flex/Ext 4+/5 2/5    Shoulder Abd 4+/5 2/5    Shoulder ADd 4+/5 2/5    Elbow Flex 4+/5 2/5    Elbow Ext 4+/5 2/5    Wrist Flex 4+/5 2/5    Wrist Ext 4+/5 2/5    Gross Grasp 4+/5 3/5          SENSATION      Myofilaments (2 83 Wnl) 2 83 2 83    Sharp Dull  Intact Intact    Proprioception Intact Impaired    Hot/Cold Temp Intact Intact          MODIFIED HUNTER SCALE (TONE) 0 1+    No increase in muscle tone (0)      Slight Increase in muscle tone with catch and release or min resist at end range (1)  bicep    Slight Increase in muscle tone with catch and release, followed by min resistance through remainder of range (1+)  Wrist/digit flexors    Increased muscle tone through full range, able to be moved easily (2)      Considerable increase in tone, difficult to move (3)      Rigid in Flexion/Extension (4)                                    INTERVENTION COMMENTS:  Diagnosis: Spasticity as late effect of cerebrovascular accident (CVA) Rick Thakkar, R25 2]  Precautions: fall risk,   FOTO: to complete  Insurance: Payor: BLUE CROSS / Plan: [facility] PLAN 361 / Product Type: Blue Fee for Service /   1 of 60 ([facility]PCY) visits, PN due 05/01/22

## 2022-03-23 NOTE — LETTER
March 24, 2022    Sushil PeñaDO  [address] [facility] [address]    Patient: Adelfa Lanes   YOB: 1955   Date of Visit: 3/23/2022     Encounter Diagnosis     ICD-10-CM    1  Spasticity as late effect of cerebrovascular accident (CVA)  H64 291     R25 2        Dear Dr Gracia Vargas: Thank you for your recent referral of Adelfa Lanes  Please review the attached evaluation summary from Rakesh's recent visit  Please verify that you agree with the plan of care by signing the attached order  If you have any questions or concerns, please do not hesitate to call  I sincerely appreciate the opportunity to share in the care of one of your patients and hope to have another opportunity to work with you in the near future  Sincerely,    Marlys Webster, OT      Referring Provider:     I certify that I have read the below Plan of Care and certify the need for these services furnished under this plan of treatment while under my care  Sushil PeñaDO  [address] [facility] [address]  Via Fax: [phone]        OCCUPATIONAL THERAPY INITIAL EVALUATION:    3/23/2022  Adelfa Lanes  1955  [id number]  Sindy Correa MD  1  Spasticity as late effect of cerebrovascular accident (CVA)        Subjective    "I get the botox injections again next month"    PATIENT GOAL: "to have less tightness"      Assessment/Plan    Skilled Analysis:  Pt is a 78 y o  male referred to Occupational Therapy s/p Spasticity as late effect of cerebrovascular accident (CVA) Jona Farias, R25 2]  Pt participated in skilled OT evaluation and following formalized testing, presents with the following areas of deficit: FMC/FMS, GMC/GMS, hypertonicity and ROM, spasticity impacting indep and completion of ADL/IADL, salient, and leisure tasks    Pt does demo the need for skilled Occupational Therapy services 1 x/week for 8-12 weeks with focus on UE NMR, UE strengthening, UE endurance, FMC/GMC, FMS/GMS and tone reduction strategies, spasticity reduction, PROM, AAROM, AROM, and HEP development to address the goals as listed below  Pt in agreement with POC, POC to  w/in 90 days     Goals:   Motor Short Term Goals (8)WKS      Strength/Endurance    ·  Pt will increase L UE proximal strength to 3/5  through the use of strengthening exercises and home program for eventual return to life roles and salient tasks    · Pt will demo with G tolerance to supine, seated, and in stance exercise x 30 minutes with minimal rest breaks required for increased engagement in life roles and weekly exercise regimen     · Pt will demo with G carryover of Home Exercise Program to improve functional progression towards goals in Plan of care and for improved functional use of LUE       Functional Performance    · Pt will increase LUE to gross assist with for ADL/IADL and tabletop tasks for improved functional performance of life roles and salient tasks       AROM/PROM    · Pt will demo with decreased  LUE  shoulder sublux to trace for increased AROM for improved ADL and IADL engagement    · Pt will be able to perform near full range of proximal L UE to demo increased strength and ROM of affected UE for improved ADLs/IADLs    · Pt will be able to perform >1/4 range of distal L UE to demo increased strength and ROM of affected UE for improved ADLs/IADLs    · Pt will demo decreased hypertonicity of Modified Linsey Scale (MAS) of L UE to 1 for improved alternate motor patterns, grasp/release, and termination on command for improved dressing/hygiene     · Pt will demo good carryover of clinic and home tone reduction strategies for improved AROM initiation with functional reach with ADL fxn      Modalities    · Pt will tolerate BIONESS/NMES/IASTM for improved motor and sensory performance for overall improved strength, tone reduction, and sensation to inc safety and engagement with ADL/IADL fxn    Pt will increase compliance with  [facility] for improved elasticity of L UE and tolerate fit of dynamic extension splint with wear time of 4 hrs following botox application      Treatment Interventions  Tone reduction strategies  Weight bearing  bioness  NMES  CP/HP applications  PROM/AAROM/AROM  HEP development  Closed chain strengthening  fxnl grasp/release      HISTORY OF PRESENT ILLNESS:     Pt is a 78 y o  male who was referred to Occupational Therapy s/p  Spasticity as late effect of cerebrovascular accident (CVA) Antonieta Laboy, R25 2]  initial presentation to [facility] 10/20/2018 due to left-sided weakness and facial droop   He also had a speech impairment and was found to have an acute stroke  The patient received tPA at 11:00 p m  on the day of admission after neurology assessment  He had loop recorder placed at time of admission for CVA  during his hospital stay he was dx with a small amount of petechial hemorrhage noted on MRI  CT scan showed positive results for small infarctions within the right frontal lobe and right temporal occipital junction,Pt discharged to [facility] from 10/25/18 - 11/21/18  Pt is familiar to OP OT services, pt has received OP OT services for L UE NMR, tone reduction, and strengthening tasks  Pt with most recent dc in September of 2021  Pt recently received Botox injections to wrist and digit flexors last month and is now due to receive botox again in April  Pt reports that he is still "unable" to utilize LUE as a refined , but utilizes as gross assist  Pt has dynamic extension splint received last round of therapy but states rep never followed up with him and the splint does not fit properly  Pt is indep with driving, ADLs and is still currently working         PMH:   Past Medical History:   Diagnosis Date    Arthritis     in hands    Cervical herniated disc     Colon polyp     Diabetes mellitus ([facility] [address] )     borderline    Gout     hx of in 2018, none since    Hyperlipidemia     Hypertension     Irregular heart beat     a-fib, has loop recorder    Kidney stone 2021    hx of    Migraine     S/P Botox injection     in left wrist to help increase mobility    Sleep apnea     mild sleep apnea, cpap was not ordered    Stroke ([facility] [address] [facility] 2018    left hemiparesis, wears leg brace and uses cane, unable to use left arm and hand    Use of cane as ambulatory aid     had CVA in 2018     Pain Levels:     Restin    With Activity:  0    Objective    Impairment Observations:    Upper Extremity       RUE LUE Comments                 UPPER EXTREMITY FXN Intact Impaired Dominant Hand: R                         /Pinch Strength         Dynamometer      - Gross Grasp 40 lbs 30 lbs subnormal   Pinch Meter       - PINCER 13 lbs 3 lbs     - TRIPOD 11 lbs 5 lbs     - LATERAL 9 lbs  10 lbs              AROM (seated)       Elevation/Depression  full    Shoulder Flex/Ext  >3/4 range  Short lever   Shoulder Abd  3/4 range    Shoulder Add  3/4    Horizontal Abd  1/2 range     Horizontal Add  3/4 range     Elbow Flex  1/4 range    Elbow Ext  1/4 range    Pronation  Full      Supination  1/4 range     Wrist Flex  Near full     Wrist Ext  trace     Digit Flex  full     Digit Ex  trace     Composite Grasp  full     Hook Grasp  Unable to isolate     Opposition  Lateral edge of D1 with manual blocking      Subluxation  1 finger breadth                                PROM (seated position)         Elevation/Depression  full     Retraction/Protraction  full     Shoulder Flex/Ext  full     Shoulder ABd/ADd  full     Horizontal ABd/Add  full     Elbow Flex  nera full     Elbow Ext  full     Wrist Flex  full     Wrist Ext  full     Digit Flex  full     Digit Ext  Near full                                    MMT         Elevation 4+/5 4/5    Shoulder Flex/Ext 4+/5 2/5    Shoulder Abd 4+/5 2/5    Shoulder ADd 4+/5 2/5    Elbow Flex 4+/5 2/5    Elbow Ext 4+/5 2/5    Wrist Flex 4+/5 2/5    Wrist Ext 4+/5 2/5    Gross Grasp 4+/5 3/5          SENSATION      Myofilaments (2 83 Wnl) 2 83 2 83    Sharp Dull  Intact Intact    Proprioception Intact Impaired    Hot/Cold Temp Intact Intact          MODIFIED HUNTER SCALE (TONE) 0 1+    No increase in muscle tone (0)      Slight Increase in muscle tone with catch and release or min resist at end range (1)  bicep    Slight Increase in muscle tone with catch and release, followed by min resistance through remainder of range (1+)  Wrist/digit flexors    Increased muscle tone through full range, able to be moved easily (2)      Considerable increase in tone, difficult to move (3)      Rigid in Flexion/Extension (4)                                    INTERVENTION COMMENTS:  Diagnosis: Spasticity as late effect of cerebrovascular accident (CVA) Brooklyn Favre, R25 2]  Precautions: fall risk,   FOTO: to complete  Insurance: Payor: Darci Marie / Plan: [facility] PLAN 361 / Product Type: Blue Fee for Service /   1 of 60 ([facility]PCY) visits, PN due 05/01/22

## 2022-03-23 NOTE — PROGRESS NOTES
Daily Note     Today's date: 3/23/2022  Patient name: Lizz Garcia  : 1955  MRN: [id number]  Referring provider: Christiane Garcia*  Dx:   Encounter Diagnosis     ICD-10-CM    1  Ambulatory dysfunction  R26 2    2  Gait disturbance, post-stroke  I69 398     R26 9                   Subjective: Feeling well today, just had OTevaluation      Objective: See treatment diary below      Assessment: Pt was fatigued at each 10 min treadmill afterwards  Plan to attempt some fall slip training at next session and work on compensatory strategies  Plan: Continue per plan of care        Precautions: HTN, Afib       Manuals 3/21 3/23                                                               Neuro Re-Ed             Busy environmental navigation  NV nv           Slip practice  NV nv           treadmill  10 min at 2 5 mph 5# aw    10 min 5% incline 2 0 mph 5# aw           Step ups  8" step 15x ea fwd/lat 5# r aw                                                  Ther Ex                                                                                                                     Ther Activity                                       Gait Training                                       Modalities

## 2022-03-30 ENCOUNTER — OFFICE VISIT (OUTPATIENT)
Dept: OCCUPATIONAL THERAPY | Facility: CLINIC | Age: 67
End: 2022-03-30
Payer: COMMERCIAL

## 2022-03-30 ENCOUNTER — OFFICE VISIT (OUTPATIENT)
Dept: PHYSICAL THERAPY | Facility: CLINIC | Age: 67
End: 2022-03-30
Payer: COMMERCIAL

## 2022-03-30 DIAGNOSIS — R25.2 SPASTICITY AS LATE EFFECT OF CEREBROVASCULAR ACCIDENT (CVA): Primary | ICD-10-CM

## 2022-03-30 DIAGNOSIS — R26.9 GAIT DISTURBANCE, POST-STROKE: ICD-10-CM

## 2022-03-30 DIAGNOSIS — I69.398 GAIT DISTURBANCE, POST-STROKE: ICD-10-CM

## 2022-03-30 DIAGNOSIS — R26.2 AMBULATORY DYSFUNCTION: Primary | ICD-10-CM

## 2022-03-30 DIAGNOSIS — I69.398 SPASTICITY AS LATE EFFECT OF CEREBROVASCULAR ACCIDENT (CVA): Primary | ICD-10-CM

## 2022-03-30 PROCEDURE — 97112 NEUROMUSCULAR REEDUCATION: CPT

## 2022-03-30 PROCEDURE — 97110 THERAPEUTIC EXERCISES: CPT

## 2022-03-30 PROCEDURE — 97140 MANUAL THERAPY 1/> REGIONS: CPT

## 2022-03-30 PROCEDURE — 97116 GAIT TRAINING THERAPY: CPT | Performed by: PHYSICAL THERAPIST

## 2022-03-30 PROCEDURE — 97112 NEUROMUSCULAR REEDUCATION: CPT | Performed by: PHYSICAL THERAPIST

## 2022-03-30 PROCEDURE — 97530 THERAPEUTIC ACTIVITIES: CPT

## 2022-03-30 NOTE — PROGRESS NOTES
Occupational Therapy Daily Note:    Today's date: 3/30/2022  Patient name: Yaneth Varghese  : 1955  MRN: [id number]  Referring provider: Jessee Arteaga MD  Dx:   Encounter Diagnosis   Name Primary?  Spasticity as late effect of cerebrovascular accident (CVA) Yes       Subjective: "I really think the botox has mila helping"    Objective: Pt engaged in skilled OT treatment session with focus on UE NMR, UE strengthening, UE endurance and tone reduction/spasticity management to increase engagement, endurance, tolerance, and independence with daily ADL and IADL tasks  CPT Code Minutes                                           Task Details        Therapeutic Activity 10 Pt engaged in tone reduction activity with weight bearing demand to complete closed chain exercise with large therapy ball to press and hold in extended arm position with electrical stimulation applied to wrist and digit extensors  Pt utilizing LUE to press and hold while maintaining extended arm position  OTR assisting with maintaining elbow extension 25% of the time  Neuro Re-Ed 25 For tone reduction, to increase proximal strengthening and stability, improve overall proprioception and body awareness, pt tolerated electrical stimulation to post delt and supraspinatus for subluxation reduction with simultaneous electrical stimulation applied to wrist and digit extensors to promote distal strength and increase volitional release  Therapeutic Exercise 10 For therapeutic exercise benefit to increase strength, endurance, and cardiovascular health, Pt tolerated 5 min x 2 (prograde/retrograde motions at 1 5 resist) seated on UBE with focus on increasing proximal UE strength, endurance, act tolerance and ROM to inc indep and safety with ADL/IADL fxn and fxnl reaching tasks   Coban wrap assist to maintain grasp with L UE               Manual 10 Pt tolerated instrument assisted soft tissue massage to distal RUE for tone reduction to increase tissue extensibility, improve circulation and increase fxnl muscle activation  Self Care           Assessment: Tolerated treatment well  Patient would benefit from continued skilled OT      Plan: Continued skilled OT per POC    INTERVENTION COMMENTS:  Diagnosis: Spasticity as late effect of cerebrovascular accident (CVA) Soraida Screws, R25 2]  Precautions: fall risk,   FOTO: to complete  Insurance: Payor: [employer] / Plan: [employer] PLAN 361 / Product Type: Blue Fee for Service /   2 of 61 ([facility]) visits, PN due 05/01/22

## 2022-03-31 NOTE — PROGRESS NOTES
Daily Note     Today's date: 3/31/2022  Patient name: Gregory Arguelles  : 1955  MRN: [id number]  Referring provider: Jeff Worthington MD  Dx:   Encounter Diagnosis     ICD-10-CM    1  Ambulatory dysfunction  R26 2    2  Gait disturbance, post-stroke  I69 398     R26 9                   Subjective: Feeling well today, not wearing the brace      Objective: See treatment diary below      Assessment: Pt definetely walking at a slower gait speed when not suing AFO  He was able to do full 10 minutes on treadmill at RPE of 8 twice  Added resisted walking to HEP and trouble shooted how to complete at home  Issued MTB  Will assess it again at next session  Plan: Continue per plan of care        Precautions: HTN, Afib       Manuals 3/21 3/23 3/30                                                              Neuro Re-Ed             Busy environmental navigation  NV nv           Slip practice  NV nv           treadmill  10 min at 2 5 mph 5# aw    10 min 5% incline 2 0 mph 5# aw 10 min at 2 2  Mph 7% incline 5# aw    10 min at 2 5 mph 5# aw          Step ups  8" step 15x ea fwd/lat 5# r aw           Resisted bkwds, fwd, lat   mtb //bars 50x ea          hurdles   12" 5 laps                       Ther Ex                                                                                                                     Ther Activity                                       Gait Training                                       Modalities

## 2022-04-05 ENCOUNTER — OFFICE VISIT (OUTPATIENT)
Dept: OCCUPATIONAL THERAPY | Facility: CLINIC | Age: 67
End: 2022-04-05
Payer: COMMERCIAL

## 2022-04-05 ENCOUNTER — OFFICE VISIT (OUTPATIENT)
Dept: PHYSICAL THERAPY | Facility: CLINIC | Age: 67
End: 2022-04-05
Payer: COMMERCIAL

## 2022-04-05 DIAGNOSIS — R26.2 AMBULATORY DYSFUNCTION: Primary | ICD-10-CM

## 2022-04-05 DIAGNOSIS — R26.9 GAIT DISTURBANCE, POST-STROKE: ICD-10-CM

## 2022-04-05 DIAGNOSIS — I69.398 SPASTICITY AS LATE EFFECT OF CEREBROVASCULAR ACCIDENT (CVA): Primary | ICD-10-CM

## 2022-04-05 DIAGNOSIS — R25.2 SPASTICITY AS LATE EFFECT OF CEREBROVASCULAR ACCIDENT (CVA): Primary | ICD-10-CM

## 2022-04-05 DIAGNOSIS — I69.398 GAIT DISTURBANCE, POST-STROKE: ICD-10-CM

## 2022-04-05 PROCEDURE — 97110 THERAPEUTIC EXERCISES: CPT

## 2022-04-05 PROCEDURE — 97140 MANUAL THERAPY 1/> REGIONS: CPT

## 2022-04-05 PROCEDURE — 97110 THERAPEUTIC EXERCISES: CPT | Performed by: PHYSICAL THERAPIST

## 2022-04-05 PROCEDURE — 97112 NEUROMUSCULAR REEDUCATION: CPT | Performed by: PHYSICAL THERAPIST

## 2022-04-05 NOTE — PROGRESS NOTES
Occupational Therapy Daily Note:    Today's date: 2022  Patient name: Elpidio Matthews  : 1955  MRN: [id number]  Referring provider: Froylan Todd MD  Dx:   Encounter Diagnosis   Name Primary?  Spasticity as late effect of cerebrovascular accident (CVA) Yes       Subjective: "that was really encouraging"    Objective: Pt engaged in skilled OT treatment session with focus on UE NMR, UE strengthening, UE endurance and tone reduction/spasticity management to increase engagement, endurance, tolerance, and independence with daily ADL and IADL tasks  CPT Code Minutes                                           Task Details        Therapeutic Activity               Neuro Re-Ed               Therapeutic Exercise 10 For therapeutic exercise benefit to increase strength, endurance, and cardiovascular health, Pt tolerated 5 min x 2 (prograde/retrograde motions at 1 5 resist) seated on UBE with focus on increasing proximal UE strength, endurance, act tolerance and ROM to inc indep and safety with ADL/IADL fxn and fxnl reaching tasks  Coban wrap assist to maintain grasp with L UE    40 Pt in supine, to perform proximal UE endurance and strengthening exercises with use of 3# dowel bar to perform b/l shoulder flex/ext, chest press, place and hold, pulses, shoulder abd/add 3 x 15 with functional electrical stimulation applied to post delt and supraspinatus for proximal stability 2* subluxation  Pt completed to improve ROM, strength, stability, and endurance  OTR providing assistance to achieve full elbow ROM in extension as well as wrist stabilization to inc technique and stability  Manual 10 Pt tolerated PROM to distal RUE for tone reduction to increase tissue extensibility, improve circulation and increase fxnl muscle activation  Pt tolerated well with OTR assist and sustained seated WB positioning           Self Care           Assessment: Tolerated treatment well   Pt benefits greatly from weight bearing techniques as he demonstrates a reduction in distal tone following completion  In addition, pt with no c/o pain throughout with G tolerance for all exercises with rest breaks to manage times of fatigue  Patient would benefit from continued skilled OT      Plan: Continued skilled OT per POC    INTERVENTION COMMENTS:  Diagnosis: Spasticity as late effect of cerebrovascular accident (CVA) Wisdom Eisenmenger, R25 2]  Precautions: fall risk,   FOTO: to complete  Insurance: Payor: Maria Luisa Solis / Plan: [facility] PLAN 361 / Product Type: Blue Fee for Service /    3 of 61 ([facility]) visits, PN due 05/01/22

## 2022-04-06 NOTE — PROGRESS NOTES
Daily Note     Today's date: 2022  Patient name: Yajaira Mcdaniels  : 1955  MRN: [id number]  Referring provider: Carlos A Valentine MD  Dx:   Encounter Diagnosis     ICD-10-CM    1  Ambulatory dysfunction  R26 2    2  Gait disturbance, post-stroke  I69 398     R26 9                   Subjective: Patient reports to physical therapy that he had a very strong weekend and was feeling well doing many outdoor activities  Has been working through exercises at home  Objective: See treatment diary below      Assessment: Patient had difficulty with walking over significantly uneven surfaces with resistance multidirectional  He did not have any instances of falling but his ankle had difficulty being controlled  Plan: Continue per plan of care        Precautions: HTN, Afib       Manuals 3/21 3/23 3/30 4/5                                                             Neuro Re-Ed             Busy environmental navigation  NV nv           Slip practice  NV nv           treadmill  10 min at 2 5 mph 5# aw    10 min 5% incline 2 0 mph 5# aw 10 min at 2 2  Mph 7% incline 5# aw    10 min at 2 5 mph 5# aw 10 min at 2 2  Mph 7% incline 5# aw    10 min at 2 5 mph 5# aw         Step ups  8" step 15x ea fwd/lat 5# r aw           Resisted bkwds, fwd, lat   mtb //bars 50x ea Over uneven mat with weights and pads solo - 4 laps ea         hurdles   12" 5 laps On uneven mat iwht weights/pads 6" 4 laps solo                      Ther Ex                                                                                                                     Ther Activity                                       Gait Training                                       Modalities

## 2022-04-13 ENCOUNTER — OFFICE VISIT (OUTPATIENT)
Dept: PHYSICAL THERAPY | Facility: CLINIC | Age: 67
End: 2022-04-13
Payer: COMMERCIAL

## 2022-04-13 ENCOUNTER — OFFICE VISIT (OUTPATIENT)
Dept: OCCUPATIONAL THERAPY | Facility: CLINIC | Age: 67
End: 2022-04-13
Payer: COMMERCIAL

## 2022-04-13 DIAGNOSIS — I69.398 GAIT DISTURBANCE, POST-STROKE: ICD-10-CM

## 2022-04-13 DIAGNOSIS — R25.2 SPASTICITY AS LATE EFFECT OF CEREBROVASCULAR ACCIDENT (CVA): Primary | ICD-10-CM

## 2022-04-13 DIAGNOSIS — I69.398 SPASTICITY AS LATE EFFECT OF CEREBROVASCULAR ACCIDENT (CVA): Primary | ICD-10-CM

## 2022-04-13 DIAGNOSIS — R26.2 AMBULATORY DYSFUNCTION: Primary | ICD-10-CM

## 2022-04-13 DIAGNOSIS — R26.9 GAIT DISTURBANCE, POST-STROKE: ICD-10-CM

## 2022-04-13 PROCEDURE — 97112 NEUROMUSCULAR REEDUCATION: CPT

## 2022-04-13 PROCEDURE — 97530 THERAPEUTIC ACTIVITIES: CPT

## 2022-04-13 PROCEDURE — 97140 MANUAL THERAPY 1/> REGIONS: CPT

## 2022-04-13 PROCEDURE — 97116 GAIT TRAINING THERAPY: CPT

## 2022-04-13 NOTE — PROGRESS NOTES
Occupational Therapy Daily Note:    Today's date: 2022  Patient name: Naman Cheek  : 1955  MRN: [id number]  Referring provider: Bari Casarez MD  Dx:   Encounter Diagnosis   Name Primary?  Spasticity as late effect of cerebrovascular accident (CVA) Yes       Subjective: "I dont really feel well"    Objective: Pt engaged in skilled OT treatment session with focus on UE NMR, UE strengthening, UE endurance and tone reduction/spasticity management to increase engagement, endurance, tolerance, and independence with daily ADL and IADL tasks  CPT Code Minutes                                           Task Details        Therapeutic Activity 25 Adjustments made to dynasplint product to promote wrist and digit extension             Neuro Re-Ed 25 Pt tolerated neuromuscluar stimulation to post delt and supraspinatus for subluxation reduction with simultaneous stimulation applied to wrist and digit extensors to promote distal mobility and strength             Therapeutic Exercise               Manual 10 Pt tolerated PROM to distal RUE for tone reduction to increase tissue extensibility, improve circulation and increase fxnl muscle activation  Pt tolerated well with OTR assist and sustained seated WB positioning           Self Care           Assessment: Tolerated treatment well  Pt fatigued today, recommending call PCP  Patient would benefit from continued skilled OT      Plan: Continued skilled OT per POC    INTERVENTION COMMENTS:  Diagnosis: Spasticity as late effect of cerebrovascular accident (CVA) Claudine De Paz, R25 2]  Precautions: fall risk,   FOTO: to complete  Insurance: Payor: Beto Sequeira / Plan: [employer] PLAN 361 / Product Type: Blue Fee for Service /   4 of 61 ([facility]) visits, PN due 22

## 2022-04-13 NOTE — PROGRESS NOTES
Daily Note     Today's date: 2022  Patient name: Preston King  : 1955  MRN: [id number]  Referring provider: Ozzie Dickerson MD  Dx:   Encounter Diagnosis     ICD-10-CM    1  Ambulatory dysfunction  R26 2    2  Gait disturbance, post-stroke  I69 398     R26 9        Start Time: 0900  Stop Time: 1000  Total time in clinic (min): 60 minutes    Subjective: Patient reports that he just feels tired this week  Objective: See treatment diary below      Assessment: Tolerated treatment well  Saumya Allen participated in skilled PT session focused on improving balance, gait, and endurance  Patient demonstrates improved B/L step length during ambulation, but when fatigued, LLE hip hikes and drags  Patient demonstrates improved dynamic balance on uneven surfaces with decreased LOB  Patient would continue to benefit from skilled PT interventions to address deficits with balance, gait, and endurance    Patient demonstrated fatigue post treatment      Plan: Continue per plan of care        Precautions: HTN, Afib       Manuals 3/21 3/23 3/30 4/5 4/13                                                            Neuro Re-Ed             Busy environmental navigation  NV nv           Slip practice  NV nv           treadmill  10 min at 2 5 mph 5# aw    10 min 5% incline 2 0 mph 5# aw 10 min at 2 2  Mph 7% incline 5# aw    10 min at 2 5 mph 5# aw 10 min at 2 2  Mph 7% incline 5# aw    10 min at 2 5 mph 5# aw 10 Min at 2 2 mph 7% incline 5# LLE AW    10 Min at 2 5 mph 5# LLE AW        Step ups  8" step 15x ea fwd/lat 5# r aw   SOLO 5# L AW 8" step standing on foam x 15 Fwd each        Resisted bkwds, fwd, lat   mtb //bars 50x ea Over uneven mat with weights and pads solo - 4 laps ea Over uneven Red mat with wts and pads SOLO x 5 laps  5# L AW        hurdles   12" 5 laps On uneven mat iwht weights/pads 6" 4 laps solo On uneven Red mat with wts/pads 6" hurdles (3)  SOLO x 5 laps  5# L AW                     Ther Ex Ther Activity                                       Gait Training                                       Modalities

## 2022-04-18 ENCOUNTER — APPOINTMENT (OUTPATIENT)
Dept: OCCUPATIONAL THERAPY | Facility: CLINIC | Age: 67
End: 2022-04-18
Payer: COMMERCIAL

## 2022-04-18 ENCOUNTER — APPOINTMENT (OUTPATIENT)
Dept: PHYSICAL THERAPY | Facility: CLINIC | Age: 67
End: 2022-04-18
Payer: COMMERCIAL

## 2022-04-19 ENCOUNTER — APPOINTMENT (OUTPATIENT)
Dept: OCCUPATIONAL THERAPY | Facility: CLINIC | Age: 67
End: 2022-04-19
Payer: COMMERCIAL

## 2022-04-19 ENCOUNTER — APPOINTMENT (OUTPATIENT)
Dept: PHYSICAL THERAPY | Facility: CLINIC | Age: 67
End: 2022-04-19
Payer: COMMERCIAL

## 2022-04-25 ENCOUNTER — OFFICE VISIT (OUTPATIENT)
Dept: PHYSICAL THERAPY | Facility: CLINIC | Age: 67
End: 2022-04-25
Payer: COMMERCIAL

## 2022-04-25 ENCOUNTER — OFFICE VISIT (OUTPATIENT)
Dept: OCCUPATIONAL THERAPY | Facility: CLINIC | Age: 67
End: 2022-04-25
Payer: COMMERCIAL

## 2022-04-25 DIAGNOSIS — R25.2 SPASTICITY AS LATE EFFECT OF CEREBROVASCULAR ACCIDENT (CVA): Primary | ICD-10-CM

## 2022-04-25 DIAGNOSIS — I69.398 GAIT DISTURBANCE, POST-STROKE: ICD-10-CM

## 2022-04-25 DIAGNOSIS — R26.9 GAIT DISTURBANCE, POST-STROKE: ICD-10-CM

## 2022-04-25 DIAGNOSIS — I69.398 SPASTICITY AS LATE EFFECT OF CEREBROVASCULAR ACCIDENT (CVA): Primary | ICD-10-CM

## 2022-04-25 DIAGNOSIS — R26.2 AMBULATORY DYSFUNCTION: Primary | ICD-10-CM

## 2022-04-25 PROCEDURE — 97112 NEUROMUSCULAR REEDUCATION: CPT

## 2022-04-25 PROCEDURE — 97112 NEUROMUSCULAR REEDUCATION: CPT | Performed by: PHYSICAL THERAPIST

## 2022-04-25 PROCEDURE — 97140 MANUAL THERAPY 1/> REGIONS: CPT

## 2022-04-25 PROCEDURE — 97110 THERAPEUTIC EXERCISES: CPT

## 2022-04-25 NOTE — PROGRESS NOTES
Occupational Therapy Daily Note:    Today's date: 2022  Patient name: Ashley Engel  : 1955  MRN: [id number]  Referring provider: Najma Kirk MD  Dx:   Encounter Diagnosis   Name Primary?  Spasticity as late effect of cerebrovascular accident (CVA) Yes       Subjective: "I get botox on friday    Objective: Pt engaged in skilled OT treatment session with focus on UE NMR, UE strengthening, UE endurance and tone reduction/spasticity management to increase engagement, endurance, tolerance, and independence with daily ADL and IADL tasks  CPT Code Minutes                                           Task Details        Therapeutic Activity               Neuro Re-Ed 10 Electrical stimulation applied to L UE post delt and supraspinatus for proximal stability, improved proprioception and positioning to reduce subluxation to inc overall motor control             Therapeutic Exercise 10 For therapeutic exercise benefit, Pt tolerated 5 min x 2 (prograde/retrograde motions at 2 0 resist) seated on UBE with focus on increasing proximal UE strength, endurance, act tolerance and ROM to inc indep and safety with ADL/IADL fxn and fxnl reaching tasks  UBE 2* distal weakness      30 Pt engaged in distal UE strengthening and endurance task to inc overall muscle strength, ROM, and sustained grasp  Pt completed (med resist) flexbar exercises 3 x 15 to perform resistive pronation, supination, wrist flex/ext and unilateral (LUE) pronation/supination  Pt performed with brief periods of rest to manage fatigue as well as OTR providing manual assist to block L elbow to dec compensatory shoulder abd to perform  Manual 10 Pt tolerated PROM to distal RUE for tone reduction to increase tissue extensibility, improve circulation and increase fxnl muscle activation   Pt tolerated well with OTR assist and sustained seated WB positioning   Pt tolerated instrument assisted soft tissue massage to distal LUE for inc tissue elasticity and improved circulation to dec tone and spasticity to inc fxnl use          Self Care           Assessment: Tolerated treatment well  Pt reports he is feeling slightly better however did receive testing from PCP to determine source of GI complaints  Pt with improved overall endurance today as compared to previous OT Tx sessions  Pt is demonstrating improved sustained grasp of LUE for exercises and improved proximal stability  Pt continues to compensate at times with isolated distal mobility  Patient would benefit from continued skilled OT      Plan: Continued skilled OT per POC    INTERVENTION COMMENTS:  Diagnosis: Spasticity as late effect of cerebrovascular accident (CVA) Snehal Betters, R25 2]  Precautions: fall risk,   FOTO: to complete  Insurance: Payor: Aga Marker / Plan: [facility] PLAN 361 / Product Type: Blue Fee for Service /   5 of 61 (Lino Tony) visits, PN due 05/01/22

## 2022-04-25 NOTE — PROGRESS NOTES
Progress Note     Today's date: 2022  Patient name: Humphrey Cabrera  : 1955  MRN: [id number]  Referring provider: Andres Longoria MD  Dx:   Encounter Diagnosis     ICD-10-CM    1  Ambulatory dysfunction  R26 2    2  Gait disturbance, post-stroke  I69 398     R26 9                   Subjective: Patient has been sick for the last few weeks and overall is feeling very tired  He       Objective: See treatment diary below      FGA , AFO donned    6 Minute Walk Test (ft): 1250, AFO donned    Gait Speed (ft/s): 0 64 m/s, AFO donned    5x Sit To Stand (s): 7 09s, AFO donned    TU 3s, AFO donned       Functional Outcomes:   5x sts - 8 34 no AFO  Gait Speed: 1 26 m/s no AFO  6 mwt: 1150 no AFO  FGA:         Assessment: Pt scored lower for 6 minute walk test and higher for all other outcomes likely due to not wearing AFO this session compared to initial evaluation  At this time patient is voicing that the is independent with home program and is improving with completing independently at home  Would like to put PT on hold and reassess at end of summer  Therapist voiced agreement  Pt will be [facility] to [facility] at this time  Plan: Continue per plan of care        Precautions: HTN, Afib       Manuals 3/21 3/23 3/30 4/5 4/13 4/25                                                           Neuro Re-Ed             Busy environmental navigation  NV nv           Slip practice  NV nv           treadmill  10 min at 2 5 mph 5# aw    10 min 5% incline 2 0 mph 5# aw 10 min at 2 2  Mph 7% incline 5# aw    10 min at 2 5 mph 5# aw 10 min at 2 2  Mph 7% incline 5# aw    10 min at 2 5 mph 5# aw 10 Min at 2 2 mph 7% incline 5# LLE AW    10 Min at 2 5 mph 5# LLE AW 10 min at 2 2 mph 10 min    10 min at 2 0 mph 5% incline 10 min       Step ups  8" step 15x ea fwd/lat 5# r aw   SOLO 5# L AW 8" step standing on foam x 15 Fwd each        Resisted bkwds, fwd, lat   mtb //bars 50x ea Over uneven mat with weights and pads solo - 4 laps ea Over uneven Red mat with wts and pads SOLO x 5 laps  5# L AW        hurdles   12" 5 laps On uneven mat iwht weights/pads 6" 4 laps solo On uneven Red mat with wts/pads 6" hurdles (3)  SOLO x 5 laps  5# L AW                     Ther Ex                                                                                                                     Ther Activity                                       Gait Training                                       Modalities

## 2022-05-02 ENCOUNTER — OFFICE VISIT (OUTPATIENT)
Dept: OCCUPATIONAL THERAPY | Facility: CLINIC | Age: 67
End: 2022-05-02
Payer: COMMERCIAL

## 2022-05-02 DIAGNOSIS — R25.2 SPASTICITY AS LATE EFFECT OF CEREBROVASCULAR ACCIDENT (CVA): Primary | ICD-10-CM

## 2022-05-02 DIAGNOSIS — I69.398 SPASTICITY AS LATE EFFECT OF CEREBROVASCULAR ACCIDENT (CVA): Primary | ICD-10-CM

## 2022-05-02 PROCEDURE — 97110 THERAPEUTIC EXERCISES: CPT

## 2022-05-02 PROCEDURE — 97140 MANUAL THERAPY 1/> REGIONS: CPT

## 2022-05-02 NOTE — PROGRESS NOTES
Occupational Therapy Daily Note:    Today's date: 2022  Patient name: Julian Castelan  : 1955  MRN: [id number]  Referring provider: Katarina Garza MD  Dx:   Encounter Diagnosis   Name Primary?  Spasticity as late effect of cerebrovascular accident (CVA) Yes       Subjective: "I got botox and my hand has never felt better"    Objective: Pt engaged in skilled OT treatment session with focus on UE NMR, UE strengthening, UE endurance and tone reduction/spasticity management to increase engagement, endurance, tolerance, and independence with daily ADL and IADL tasks  CPT Code Minutes                                           Task Details        Therapeutic Activity               Neuro Re-Ed 10 Electrical stimulation applied to L UE post delt and supraspinatus for proximal stability, improved proprioception and positioning to reduce subluxation to inc overall motor control             Therapeutic Exercise 10 For therapeutic exercise benefit, Pt tolerated 5 min x 2 (prograde/retrograde motions at 2 0 resist) seated on UBE with focus on increasing proximal UE strength, endurance, act tolerance and ROM to inc indep and safety with ADL/IADL fxn and fxnl reaching tasks  UBE 2* distal weakness               Manual 45 Pt tolerated PROM to distal RUE for tone reduction to increase tissue extensibility, improve circulation and increase fxnl muscle activation following BOTOX injection received last week  Pt tolerated low load prologned stretch to wrist and digits in supine with CP application to optimize BOTOX benefits  Pt tolerated instrument assisted soft tissue massage to distal L UE and hand  Self Care           Assessment: Tolerated treatment well  Improved ROM and reduced tone noted today following BOTOX injections and manual therapies  Patient would benefit from continued skilled OT      Plan: Continued skilled OT per POC    INTERVENTION COMMENTS:  Diagnosis: Spasticity as late effect of cerebrovascular accident (CVA) [Q06 402, R25 2]  Precautions: fall risk,   FOTO: to complete  Insurance: Payor: Gonzalo Renee / Plan: [facility] PLAN 361 / Product Type: Blue Fee for Service /   6 of 61 (Shrery Polanco) visits, PN due 05/01/22

## 2022-05-09 ENCOUNTER — REMOTE DEVICE CLINIC VISIT (OUTPATIENT)
Dept: CARDIOLOGY CLINIC | Facility: CLINIC | Age: 67
End: 2022-05-09
Payer: COMMERCIAL

## 2022-05-09 ENCOUNTER — TELEPHONE (OUTPATIENT)
Dept: GASTROENTEROLOGY | Facility: AMBULARY SURGERY CENTER | Age: 67
End: 2022-05-09

## 2022-05-09 DIAGNOSIS — Z95.818 PRESENCE OF OTHER CARDIAC IMPLANTS AND GRAFTS: Primary | ICD-10-CM

## 2022-05-09 PROCEDURE — 93298 REM INTERROG DEV EVAL SCRMS: CPT | Performed by: INTERNAL MEDICINE

## 2022-05-09 PROCEDURE — G2066 INTER DEVC REMOTE 30D: HCPCS | Performed by: INTERNAL MEDICINE

## 2022-05-09 NOTE — TELEPHONE ENCOUNTER
Patients GI provider:  Dr Merlinda Noble    Number to return call: (  [phone]    Reason for call: Pt called stating that Dr Gogo Shrestha is requesting for him to have a colonoscopy due to recent episodes of diarrhea    Scheduled procedure/appointment date if applicable: Apt/procedure  N/A

## 2022-05-12 ENCOUNTER — OFFICE VISIT (OUTPATIENT)
Dept: GASTROENTEROLOGY | Facility: CLINIC | Age: 67
End: 2022-05-12
Payer: COMMERCIAL

## 2022-05-12 VITALS
HEART RATE: 56 BPM | SYSTOLIC BLOOD PRESSURE: 132 MMHG | HEIGHT: 68 IN | BODY MASS INDEX: 28.04 KG/M2 | DIASTOLIC BLOOD PRESSURE: 80 MMHG | WEIGHT: 185 LBS

## 2022-05-12 DIAGNOSIS — D12.6 ADENOMATOUS POLYP OF COLON, UNSPECIFIED PART OF COLON: ICD-10-CM

## 2022-05-12 DIAGNOSIS — Z12.11 COLON CANCER SCREENING: ICD-10-CM

## 2022-05-12 DIAGNOSIS — R19.7 DIARRHEA OF PRESUMED INFECTIOUS ORIGIN: Primary | ICD-10-CM

## 2022-05-12 PROCEDURE — 99213 OFFICE O/P EST LOW 20 MIN: CPT | Performed by: NURSE PRACTITIONER

## 2022-05-12 NOTE — PATIENT INSTRUCTIONS
Patient informed that if symptoms return he should call office and we can schedule a colonoscopy for further evaluation  At this time patient is asymptomatic and no GI intervention is needed

## 2022-05-12 NOTE — PROGRESS NOTES
[facility] - Outpatient Follow-up Note  Rosmery Carson 78 y o  male MRN: [id number]  Encounter: [id number]          ASSESSMENT AND PLAN:      1  Diarrhea of presumed infectious origin  Patient reported approximately 2 weeks ago he had episode of diarrhea patient was given antibiotics by Dr Gisela Chauhan and symptoms resolved  Stool studies were done which were all negative  Patient reports at current time he has 1 soft formed bowel movement daily  Patient denies any abdominal pain  Patient denies any blood in stool, blood from rectal area, or black tarry stool  Diarrhea most likely from gastroenteritis or colitis  No GI intervention needed at present time  Patient informed that if he has recurrence of symptoms he should reach out to GI office and we will schedule him for colonoscopy for further evaluation  2  Adenomatous polyp of colon, unspecified part of colon  3  Colon cancer screening  Patient has history of tubular adenoma  Colon cancer screening is up-to-date  Last colonoscopy done in  09/15/2021 3 colon polyps, biopsy showed hyperplastic polyp     Patient due for repeat colonoscopy in 2024     ______________________________________________________________________    SUBJECTIVE:  This is a 57-year-old man who presents to the office with report of diarrhea  Patient reported that he had diarrhea approximately 2 weeks ago  Patient was given antibiotics by Dr Edd Montague and diarrhea resolved  Stool studies for C diff and bacterial panel  were done which were negative  Patient reported that symptoms resolved after taking antibiotics  Patient reports he currently has 1 large soft bowel movement daily  Patient denies any further diarrhea  Patient denies blood in stool, blood from rectal area, or black tarry stool  Abdomen exam benign, no tenderness or guarding  Last colonoscopy was done 09/15/2021 which showed 3 colon polyps which were hyperplastic    Patient currently denies any GI symptoms  Patient is a former smoker  No family history of gastric or colon cancer  REVIEW OF SYSTEMS IS OTHERWISE NEGATIVE  Historical Information   Past Medical History:   Diagnosis Date    Arthritis     in hands    Cervical herniated disc     Colon polyp     [facility] ([facility] )     borderline    Gout     hx of in 2018, none since    Hyperlipidemia     Hypertension     Irregular heart beat     a-fib, has loop recorder    Kidney stone 2021    hx of    Migraine     S/P Botox injection     in left wrist to help increase mobility    Sleep apnea     mild sleep apnea, cpap was not ordered    Stroke ([facility] ) 2018    left hemiparesis, wears leg brace and uses cane, unable to use left arm and hand    Use of cane as ambulatory aid     had CVA in 2018     Past Surgical History:   Procedure Laterality Date    APPENDECTOMY      COLONOSCOPY      KNEE ARTHROSCOPY      right knee    KNEE SURGERY Right     NECK SURGERY      TONSILLECTOMY       Social History   Social History     Substance and Sexual Activity   Alcohol Use Yes    Comment: socially      Social History     Substance and Sexual Activity   Drug Use No     Social History     Tobacco Use   Smoking Status Former Smoker    Packs/day: 1 00    Types: Cigarettes, Cigars    Quit date:     Years since quittin 3   Smokeless Tobacco Never Used   Tobacco Comment    stopped on 10/20/2018     Family History   Problem Relation Age of Onset    Stroke Mother     Heart disease Father     ALS Father        Meds/Allergies       Current Outpatient Medications:     amLODIPine (NORVASC) 5 mg tablet    atorvastatin (LIPITOR) 80 mg tablet    Eliquis 5 MG    FLUoxetine (PROzac) 20 mg capsule    losartan (COZAAR) 50 mg tablet    methocarbamol (ROBAXIN) 500 mg tablet    No Known Allergies        Objective     Blood pressure 132/80, pulse 56, height 5' 8" (1 727 m), weight 83 9 kg (185 lb)   Body mass index is 28 13 kg/m²       PHYSICAL EXAM:      General Appearance:   Alert, cooperative, no distress   HEENT:   Normocephalic, atraumatic, anicteric      Neck:  Supple, symmetrical, trachea midline   Lungs:   Clear to auscultation bilaterally; no rales, rhonchi or wheezing; respirations unlabored    Heart[de-identified]   Regular rate and rhythm; no murmur, rub, or gallop  Abdomen:   Soft, non-tender, non-distended; normal bowel sounds; no masses, no organomegaly    Genitalia:   Deferred    Rectal:   Deferred    Extremities:  No cyanosis, clubbing or edema    Pulses:  2+ and symmetric    Skin:  No jaundice, rashes, or lesions    Lymph nodes:  No palpable cervical lymphadenopathy        Lab Results:   No visits with results within 1 Day(s) from this visit  Latest known visit with results is:   Hospital Outpatient Visit on 09/15/2021   Component Date Value    Case Report 09/15/2021                      Value:Surgical Pathology Report                         Case: [id number]                                   Authorizing Provider:  Maira Laguerre MD           Collected:           09/15/2021 1329              Ordering Location:     [address] [place] [address] Received:            09/15/2021 [address] [place] [address]                                                                  Pathologist:           Valente Valdez MD                                                   Specimens:   A) - Large Intestine, Left/Descending Colon, cold snare descending polyp                            B) - Rectum, cold bx rectum polyps x2                                                      Final Diagnosis 09/15/2021                      Value: This result contains rich text formatting which cannot be displayed here   Additional Information 09/15/2021                      Value: This result contains rich text formatting which cannot be displayed here  Bro Michael Gross Description 09/15/2021                      Value: This result contains rich text formatting which cannot be displayed here  Radiology Results:   Cardiac EP device report    Result Date: 5/9/2022  Narrative: MDT-LOOP RECORDER/ ACTIVE SYSTEM IS MRI CONDITIONAL CARELINK TRANSMISSION: LOOP RECORDER  PRESENTING RHYTHM NSR @ 62 BPM  BATTERY STATUS "OK " NO PATIENT OR DEVICE ACTIVATED EPISODES  NORMAL DEVICE FUNCTION   DL

## 2022-05-13 ENCOUNTER — OFFICE VISIT (OUTPATIENT)
Dept: OCCUPATIONAL THERAPY | Facility: CLINIC | Age: 67
End: 2022-05-13
Payer: COMMERCIAL

## 2022-05-13 DIAGNOSIS — R25.2 SPASTICITY AS LATE EFFECT OF CEREBROVASCULAR ACCIDENT (CVA): Primary | ICD-10-CM

## 2022-05-13 DIAGNOSIS — I69.398 SPASTICITY AS LATE EFFECT OF CEREBROVASCULAR ACCIDENT (CVA): Primary | ICD-10-CM

## 2022-05-13 PROCEDURE — 97140 MANUAL THERAPY 1/> REGIONS: CPT

## 2022-05-13 PROCEDURE — 97110 THERAPEUTIC EXERCISES: CPT

## 2022-05-13 NOTE — PROGRESS NOTES
Occupational Therapy Daily Note:    Today's date: 2022  Patient name: Suzanne Riddle  : 1955  MRN: [id number]  Referring provider: Carolin Cabrera MD  Dx:   No diagnosis found  Subjective: "Im able to hold onto my tools easier"    Objective: Pt engaged in skilled OT treatment session with focus on UE NMR, UE strengthening, UE endurance and tone reduction/spasticity management to increase engagement, endurance, tolerance, and independence with daily ADL and IADL tasks  CPT Code Minutes                                           Task Details        Therapeutic Activity               Neuro Re-Ed               Therapeutic Exercise 15 For therapeutic exercise benefit, Pt tolerated 7 min x 2 (prograde/retrograde motions at 2 0 resist) seated on UBE with focus on increasing proximal UE strength, endurance, act tolerance and ROM to inc indep and safety with ADL/IADL fxn and fxnl reaching tasks  Pt able to maintain grasp on UBE with LUE                Manual 45 Moist heat application proximal and distal LUE  to  tolerated PROM to distal LUE for tone reduction to increase tissue extensibility, improve circulation and increase fxnl muscle activation followed by high frequency vibration  Pt tolerated low load prologned stretch to wrist, forearm, and digits in supine to optimize BOTOX benefits  Self Care           Assessment: Tolerated treatment well  Improved ROM and reduced tone noted today following  Manual techniques and MH application  Pt edu to utilize orthosis today  Patient would benefit from continued skilled OT      Plan: Continued skilled OT per POC    INTERVENTION COMMENTS:  Diagnosis: Spasticity as late effect of cerebrovascular accident (CVA) Ascdeja Hartman, R25 2]  Precautions: fall risk,   FOTO: to complete  Insurance: Payor: Oniel Devries / Plan: [facility] PLAN 361 / Product Type: Blue Fee for Service /    ([facility]) visits, PN due 22

## 2022-05-16 ENCOUNTER — OFFICE VISIT (OUTPATIENT)
Dept: OCCUPATIONAL THERAPY | Facility: CLINIC | Age: 67
End: 2022-05-16
Payer: COMMERCIAL

## 2022-05-16 DIAGNOSIS — I69.398 SPASTICITY AS LATE EFFECT OF CEREBROVASCULAR ACCIDENT (CVA): Primary | ICD-10-CM

## 2022-05-16 DIAGNOSIS — R25.2 SPASTICITY AS LATE EFFECT OF CEREBROVASCULAR ACCIDENT (CVA): Primary | ICD-10-CM

## 2022-05-16 PROCEDURE — 97110 THERAPEUTIC EXERCISES: CPT

## 2022-05-16 PROCEDURE — 97140 MANUAL THERAPY 1/> REGIONS: CPT

## 2022-05-16 PROCEDURE — 97112 NEUROMUSCULAR REEDUCATION: CPT

## 2022-05-16 NOTE — PROGRESS NOTES
Occupational Therapy Daily Note:    Today's date: 2022  Patient name: Suzanne Riddle  : 1955  MRN: [id number]  Referring provider: Carolin Cabrera MD  Dx:   Encounter Diagnosis   Name Primary?  Spasticity as late effect of cerebrovascular accident (CVA) Yes       Subjective: "the stretch, I could really feel that today"    Objective: Pt engaged in skilled OT treatment session with focus on UE NMR, UE strengthening, UE endurance and tone reduction/spasticity management to increase engagement, endurance, tolerance, and independence with daily ADL and IADL tasks  CPT Code Minutes                                           Task Details        Therapeutic Activity               Neuro Re-Ed 10 Pt engaged in seated WB of  left  upper extremity to increase proximal strength/stability, reduce tone and increase proprioceptive input  Pt seated while WB in extended arm position with OTR providing manual assist at elbow to maintain elbow extension to inc benefit                Therapeutic Exercise 10 For therapeutic exercise benefit, Pt tolerated 5 min x 2 (prograde/retrograde motions at 2 0 resist) seated on UBE with focus on increasing proximal UE strength, endurance, act tolerance and ROM to inc indep and safety with ADL/IADL fxn and fxnl reaching tasks  Pt able to maintain grasp on UBE with LUE       30 Pt engaged in distal UE strengthening and endurance task to inc overall muscle strength, ROM, and sustained grasp  Pt completed (med resist) flexbar exercises 3 x 15 to perform resistive pronation, supination, wrist flex/ext and unilateral (LUE) pronation/supination  Pt performed with brief periods of rest to manage fatigue as well as OTR providing manual assist to block L elbow to dec compensatory shoulder abd to perform           Manual 25 Pt tolerated PROM to distal LUE for tone reduction to increase tissue extensibility, improve circulation and increase fxnl muscle activation followed by high frequency vibration  Pt tolerated low load prologned stretch to wrist, forearm, and digits in supine to optimize BOTOX benefits  Pt tolerated instrument assisted soft tissue massage to distal LUE            Self Care           Assessment: Tolerated treatment well  Improved ROM and reduced tone noted today following  Manual techniques with improved ability to complete exercises with continued need for decreasing compensatory mvmt  G compliance with wrist and hand orthosis  Patient would benefit from continued skilled OT      Plan: Continued skilled OT per POC    INTERVENTION COMMENTS:  Diagnosis: Spasticity as late effect of cerebrovascular accident (CVA) Prima Elsy, R25 2]  Precautions: fall risk,   FOTO: to complete  Insurance: Payor: Johanne Mojica / Plan: [employer] PLAN 361 / Product Type: Blue Fee for Service /   8 of 61 ([facility]) visits, PN due 05/01/22

## 2022-05-27 ENCOUNTER — APPOINTMENT (OUTPATIENT)
Dept: OCCUPATIONAL THERAPY | Facility: CLINIC | Age: 67
End: 2022-05-27
Payer: COMMERCIAL

## 2022-06-02 ENCOUNTER — OFFICE VISIT (OUTPATIENT)
Dept: OCCUPATIONAL THERAPY | Facility: CLINIC | Age: 67
End: 2022-06-02
Payer: COMMERCIAL

## 2022-06-02 DIAGNOSIS — I69.398 SPASTICITY AS LATE EFFECT OF CEREBROVASCULAR ACCIDENT (CVA): Primary | ICD-10-CM

## 2022-06-02 DIAGNOSIS — R25.2 SPASTICITY AS LATE EFFECT OF CEREBROVASCULAR ACCIDENT (CVA): Primary | ICD-10-CM

## 2022-06-02 PROCEDURE — 97110 THERAPEUTIC EXERCISES: CPT

## 2022-06-02 PROCEDURE — 97112 NEUROMUSCULAR REEDUCATION: CPT

## 2022-06-02 PROCEDURE — 97140 MANUAL THERAPY 1/> REGIONS: CPT

## 2022-06-02 NOTE — PROGRESS NOTES
Occupational Therapy Daily Note:    Today's date: 2022  Patient name: Thompson Klinefelter  : 1955  MRN: [id number]  Referring provider: Bin Avery MD  Dx:   Encounter Diagnosis   Name Primary?  Spasticity as late effect of cerebrovascular accident (CVA) Yes       Subjective: "I really notice that my shoulder is getting stronger"    Objective: Pt engaged in skilled OT treatment session with focus on UE NMR, UE strengthening, UE endurance and tone reduction/spasticity management to increase engagement, endurance, tolerance, and independence with daily ADL and IADL tasks  CPT Code Minutes                                           Task Details        Therapeutic Activity               Neuro Re-Ed 10 With electrical stimulation applied to wrist and digit extensors, pt performed AAROM and mental imagery with stim assisted digit and wrist extension to reduce distal tone, increase distal muscle strength, improve body awareness to improve overall volitional use for ADL performance and container management  Pt tolerated well              Therapeutic Exercise 40 Pt engaged in therapeutic exercise with use of 4# dowel bar to increase b/l proximal ROM, endurance, act tolerance, strength, and cardiovascular health to improve participation and tolerance for bimanual UE tasks such as dressing, reaching and leisure pursuits  Pt performed chest press, shoulder flex/ext, shoulder abd/add, and horizontal abd/add 3 x 10 repetitions  OTR provided manual assist at wrist to maintain neutral position to increase quality of and sustained grasp and verbal/tactile cues to maintain elbow extension when appropriate for exercise through 3/4 range  Pt without  assist today however required assist from OTR to reposition L UE grasp on exercise tool with shoulder abd/add exercises  Electrical stimulation applied to post delt and supraspinatus throughout exercises for improved proximal stability                Manual 10 Pt tolerated instrument assisted soft tissue massage/mobilzation to distal LUE forearm > digits to increase tissue extensibility, elasticity, and improve circulation to improve overall volitional activation of distal musculature for grasp and release demands  Following technique, pt tolerated low load prolonged stretch to promote wrist, digit, and elbow extension 2* tone  Self Care           Assessment: Tolerated treatment well  Pt today tolerated increased weight and resistance with therapeutic exercise demo increased strength of proximal UEs today as compared to previous OT tx sessions  Pt did demo muscle fatigue with repetition of exercises managed by therapeutic rest breaks and manual assist from OTR, increased assist required to stabilize in neutral wrist position 2* increased distal flexor tone  Pt demonstrating continued improvement in tricep strength with ability to correct and maintain elbow extension with more verbal cueing vs manual cueing today  Following manual techniques, pt's distal tone appeared more relaxed with increased ease of OTR to manually perform wrist and digit extension  Patient would benefit from continued skilled OT      Plan: Continued skilled OT per POC    INTERVENTION COMMENTS:  Diagnosis: Spasticity as late effect of cerebrovascular accident (CVA) Oumar Arreaga, R25 2]  Precautions: fall risk,   FOTO: to complete  Insurance: Payor: Nelson Montejo / Plan: [facility] PLAN 361 / Product Type: Blue Fee for Service /   10 of 61 (Vickie Whitney) visits, PN due 05/01/22

## 2022-06-07 ENCOUNTER — OFFICE VISIT (OUTPATIENT)
Dept: OCCUPATIONAL THERAPY | Facility: CLINIC | Age: 67
End: 2022-06-07
Payer: COMMERCIAL

## 2022-06-07 DIAGNOSIS — R25.2 SPASTICITY AS LATE EFFECT OF CEREBROVASCULAR ACCIDENT (CVA): Primary | ICD-10-CM

## 2022-06-07 DIAGNOSIS — I69.398 SPASTICITY AS LATE EFFECT OF CEREBROVASCULAR ACCIDENT (CVA): Primary | ICD-10-CM

## 2022-06-07 PROCEDURE — 97110 THERAPEUTIC EXERCISES: CPT

## 2022-06-07 PROCEDURE — 97112 NEUROMUSCULAR REEDUCATION: CPT

## 2022-06-07 PROCEDURE — 97140 MANUAL THERAPY 1/> REGIONS: CPT

## 2022-06-07 NOTE — PROGRESS NOTES
Occupational Therapy Daily Note:    Today's date: 2022  Patient name: Naman Cheek  : 1955  MRN: [id number]  Referring provider: Bari Casarez MD  Dx:   Encounter Diagnosis   Name Primary? 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 [facility]"    Objective: Pt engaged in skilled OT treatment session with focus on UE NMR, UE strengthening, UE endurance and tone reduction/spasticity management to increase engagement, endurance, tolerance, and independence with daily ADL and IADL tasks  CPT Code Minutes                                           Task Details        Therapeutic Activity               Neuro Re-Ed 25 Pt engaged in seated extended arm WB into large therapy ball to inc proximal proprioceptive input, reduce distal tone, and inc overall stability and strength  Pt seated with electrical stimulation applied to wrist and digit extensors to complete unilateral LUE WB into therapy ball with OTR assist to maintain wrist/hand position  Pt completed prolonged closed chain WB, upgraded to pulses at end range             Therapeutic Exercise 15 Pt engaged in therapeutic exercise with use of flexbar med resistance to perform resistive pronation, supination bilaterally and then unilaterally for inc distal strengthening and ROM for inc participation in preferred task  Pt completed 3 x 10 with OTR assist to dec compensatory shoulder abd      10 For therapeutic exercise, Pt tolerated 5 min x 2 (prograde/retrograde motions at 2 5 resist) seated on UBE with focus on increasing proximal UE strength, endurance, act tolerance and ROM to inc indep and safety with ADL/IADL fxn and fxnl reaching tasks   Electrical stimulation applied to post delt and supraspinatus for proximal stabilization with exercise          Manual 10 Pt tolerated instrument assisted soft tissue massage/mobilzation to distal LUE forearm > digits to increase tissue extensibility, elasticity, and improve circulation to improve overall volitional activation of distal musculature for grasp and release demands  Following technique, pt tolerated low load prolonged stretch to promote wrist, digit, and elbow extension 2* tone  Self Care           Assessment: Tolerated treatment well  Pt demonstrating a drastic improvement in unilateral WB task with improved ability to maintain tricep extension to maintain elbow extension position for improved WB technique  Pt has been demonstrating ability to utilize UBE without  assist from coban, no loss of grasp today demo improved fine motor control  Patient would benefit from continued skilled OT      Plan: Continued skilled OT per POC    INTERVENTION COMMENTS:  Diagnosis: Spasticity as late effect of cerebrovascular accident (CVA) Abigail Ball, R25 2]  Precautions: fall risk,   FOTO: to complete  Insurance: Payor: Blake Zambrano / Plan: [facility] PLAN 361 / Product Type: Blue Fee for Service /   11 of 60 (Atkins Skimartin) visits, PN due 05/01/22

## 2022-06-08 DIAGNOSIS — G81.94 LEFT HEMIPARESIS (HCC): ICD-10-CM

## 2022-06-08 RX ORDER — METHOCARBAMOL 500 MG/1
TABLET, FILM COATED ORAL
Qty: 60 TABLET | Refills: 5 | Status: SHIPPED | OUTPATIENT
Start: 2022-06-08

## 2022-06-14 ENCOUNTER — APPOINTMENT (OUTPATIENT)
Dept: OCCUPATIONAL THERAPY | Facility: CLINIC | Age: 67
End: 2022-06-14
Payer: COMMERCIAL

## 2022-06-16 ENCOUNTER — APPOINTMENT (OUTPATIENT)
Dept: OCCUPATIONAL THERAPY | Facility: CLINIC | Age: 67
End: 2022-06-16
Payer: COMMERCIAL

## 2022-06-20 ENCOUNTER — OFFICE VISIT (OUTPATIENT)
Dept: OCCUPATIONAL THERAPY | Facility: CLINIC | Age: 67
End: 2022-06-20
Payer: COMMERCIAL

## 2022-06-20 DIAGNOSIS — I69.398 SPASTICITY AS LATE EFFECT OF CEREBROVASCULAR ACCIDENT (CVA): Primary | ICD-10-CM

## 2022-06-20 DIAGNOSIS — R25.2 SPASTICITY AS LATE EFFECT OF CEREBROVASCULAR ACCIDENT (CVA): Primary | ICD-10-CM

## 2022-06-20 PROCEDURE — 97110 THERAPEUTIC EXERCISES: CPT

## 2022-06-20 PROCEDURE — 97140 MANUAL THERAPY 1/> REGIONS: CPT

## 2022-06-20 NOTE — PROGRESS NOTES
Occupational Therapy Daily Note:    Today's date: 2022  Patient name: Rosmery Carson  : 1955  MRN: [id number]  Referring provider: Marylou Mehta MD  Dx:   Encounter Diagnosis   Name Primary?  Spasticity as late effect of cerebrovascular accident (CVA) Yes       Subjective: "I want to wait until after I get botox to save my visits"    Objective: Pt engaged in skilled OT treatment session with focus on UE NMR, UE strengthening, UE endurance and tone reduction/spasticity management to increase engagement, endurance, tolerance, and independence with daily ADL and IADL tasks  CPT Code Minutes                                           Task Details        Therapeutic Activity               Neuro Re-Ed 25 Pt engaged in seated extended arm WB into large therapy ball to inc proximal proprioceptive input, reduce distal tone, and inc overall stability and strength  Pt seated with electrical stimulation applied to wrist and digit extensors to complete unilateral LUE WB into therapy ball with OTR assist to maintain wrist/hand position  Pt completed prolonged closed chain WB, upgraded to pulses at end range             Therapeutic Exercise      10 For therapeutic exercise, Pt tolerated 5 min x 2 (prograde/retrograde motions at 2 5 resist) seated on UBE with focus on increasing proximal UE strength, endurance, act tolerance and ROM to inc indep and safety with ADL/IADL fxn and fxnl reaching tasks  Electrical stimulation applied to post delt and supraspinatus for proximal stabilization with exercise   Pt completed x 1 min unitately propulsion of UBE with LUE only           Manual 45 Moist heat application to proximal>distal LUE with high frequency vibration for tone/spasticity reduction followed by Pt tolerated instrument assisted soft tissue massage/mobilzation to distal LUE forearm > digits to increase tissue extensibility, elasticity, and improve circulation to improve overall volitional activation of distal musculature for grasp and release demands  Following technique, pt tolerated low load prolonged stretch to promote wrist, digit, and elbow extension 2* tone  Self Care           Assessment: Tolerated treatment well  Pt scheduled to receive BOTOX next month, wishes to stop therapy at this time and conserve visits for after his botox injections which is appropriate at this time  Patient would benefit from continued skilled OT      Plan: Continued skilled OT per POC    INTERVENTION COMMENTS:  Diagnosis: Spasticity as late effect of cerebrovascular accident (CVA) Nedra Alejo, R25 2]  Precautions: fall risk,   FOTO: to complete  Insurance: Payor: Jesse Ewnig / Plan: [employer] PLAN 361 / Product Type:n/a Blue Fee for Service /   12 of 61 ([facility]) visits, PN n/a

## 2022-08-05 ENCOUNTER — APPOINTMENT (OUTPATIENT)
Dept: OCCUPATIONAL THERAPY | Facility: CLINIC | Age: 67
End: 2022-08-05
Payer: COMMERCIAL

## 2022-08-08 ENCOUNTER — EVALUATION (OUTPATIENT)
Dept: OCCUPATIONAL THERAPY | Facility: CLINIC | Age: 67
End: 2022-08-08
Payer: COMMERCIAL

## 2022-08-08 ENCOUNTER — REMOTE DEVICE CLINIC VISIT (OUTPATIENT)
Dept: CARDIOLOGY CLINIC | Facility: CLINIC | Age: 67
End: 2022-08-08
Payer: COMMERCIAL

## 2022-08-08 DIAGNOSIS — Z86.73 HISTORY OF STROKE: Primary | ICD-10-CM

## 2022-08-08 DIAGNOSIS — Z95.818 PRESENCE OF OTHER CARDIAC IMPLANTS AND GRAFTS: Primary | ICD-10-CM

## 2022-08-08 PROCEDURE — 97530 THERAPEUTIC ACTIVITIES: CPT

## 2022-08-08 PROCEDURE — 93298 REM INTERROG DEV EVAL SCRMS: CPT | Performed by: INTERNAL MEDICINE

## 2022-08-08 PROCEDURE — G2066 INTER DEVC REMOTE 30D: HCPCS | Performed by: INTERNAL MEDICINE

## 2022-08-08 NOTE — PROGRESS NOTES
OCCUPATIONAL THERAPY RE-EVALUATION:    2022  Prudence Ice  1955  [id number]  Trudi Conner MD  1  History of stroke        Subjective    "I got botox about 3 weeks ago "    PATIENT GOAL: "to have less tightness"      Assessment/Plan    Skilled Analysis:  Pt is a 75 y o  male referred to Occupational Therapy s/p History of stroke [Z86 73]  Pt is familiar to OP OT services, with most recent services received in  of this year following BOTOX injection  Pt with recent Botox injection approximately 3 weeks ago  Pt states he has been utilizing flexbar tools to strengthening forearm and use of dynamic splint 3/4 nights weekly  Pt states his spasticity "changes" day to day however he notices that when completing functional tasks in home environment such as weed whacking or functional tool use  Pt participated in skilled OT re-evaluation and following formalized testing, presents with the following areas of deficit: FMC/FMS, GMC/GMS, hypertonicity and ROM, spasticity dec volitional grasp and release, impacting indep and completion of ADL/IADL, salient, and leisure tasks  Pt does demo the need for skilled Occupational Therapy services 1 x/week for 8-12 weeks with focus on UE NMR, UE strengthening, UE endurance, FMC/GMC, FMS/GMS and tone reduction strategies, spasticity reduction, PROM, AAROM, AROM, and HEP development to address the goals as listed below  Pt in agreement with POC, POC to  w/in 90 days     Goals:   Motor Short Term Goals (8)WKS      Strength/Endurance    ·  Pt will increase L UE proximal strength to 3/5  through the use of strengthening exercises and home program for eventual return to life roles and salient tasks    · Pt will demo with G tolerance to supine, seated, and in stance exercise x 30 minutes with minimal rest breaks required for increased engagement in life roles and weekly exercise regimen     · Pt will demo with G carryover of Home Exercise Program to improve functional progression towards goals in Plan of care and for improved functional use of LUE       Functional Performance    · Pt will increase LUE to gross assist with for ADL/IADL and tabletop tasks for improved functional performance of life roles and salient tasks       AROM/PROM    · Pt will demo with decreased  LUE  shoulder sublux to trace for increased AROM for improved ADL and IADL engagement    · Pt will be able to perform near full range of proximal L UE to demo increased strength and ROM of affected UE for improved ADLs/IADLs    · Pt will be able to perform >1/4 range of wrist/digits and >1/2 range of forearm/elbow of L UE to demo increased strength and ROM of affected UE for improved ADLs/IADLs    · Pt will demo decreased hypertonicity of Modified Linsey Scale (MAS) of L UE to 0 for improved alternate motor patterns, grasp/release, and termination on command for improved dressing/hygiene     · Pt will demo good carryover of clinic and home tone reduction strategies for improved AROM initiation with functional reach with ADL fxn      Modalities    · Pt will tolerate BIONESS/NMES/IASTM for improved motor and sensory performance for overall improved strength, tone reduction, and sensation to inc safety and engagement with ADL/IADL fxn    Pt will increase compliance with  [facility] for improved elasticity of L UE and tolerate fit of dynamic extension splint with wear time of 8 hrs following botox application      Treatment Interventions  Tone reduction strategies  Weight bearing  bioness  NMES  CP/HP applications  PROM/AAROM/AROM  HEP development  Closed chain strengthening  fxnl grasp/release        Pain Levels:     Restin    With Activity:  0    Objective    Impairment Observations:    Upper Extremity       RUE LUE Comments                 UPPER EXTREMITY FXN Intact Impaired Dominant Hand: R                         /Pinch Strength         Dynamometer      - Gross Grasp 35 lbs 25 lbs LUE declined 5#   RUE: declined 5#   Pinch Meter       - PINCER 13  lbs 3 lbs     - TRIPOD 14 lbs 5 lbs     - LATERAL 12  lbs  10 lbs              AROM (seated)       Elevation/Depression  full    Shoulder Flex/Ext  >3/4 range  Short lever, >1/2 range with long lever   Shoulder Abd  3/4 range +short lever   Shoulder Add  3/4    Horizontal Abd  1/2 range     Horizontal Add  3/4 range     Elbow Flex  >1/2 range    Elbow Ext  3/4 range From fully flexed position   Pronation  Full      Supination  >1/2 range     Wrist Flex  1/2 range    Wrist Ext  trace     Digit Flex  full     Digit Ex  trace     Composite Grasp  Full D3-D5, >3/4 range D2    Hook Grasp  Unable to isolate     Opposition  Lateral edge of D2-D3 with manual blocking      Subluxation  1 finger breadth Can actively approximate to reduce to none                               PROM (seated position)         Elevation/Depression  full     Retraction/Protraction  full     Shoulder Flex/Ext  full     Shoulder ABd/ADd  full     Horizontal ABd/Add  full     Elbow Flex  nera full     Elbow Ext  full     Wrist Flex  full     Wrist Ext  full     Digit Flex  full     Digit Ext  Near full                                    MMT         Elevation 4+/5 4/5    Shoulder Flex/Ext 4+/5 2/5    Shoulder Abd 4+/5 2/5    Shoulder ADd 4+/5 2/5    Elbow Flex 4+/5 2/5    Elbow Ext 4+/5 2/5    Wrist Flex 4+/5 2/5    Wrist Ext 4+/5 2/5    Gross Grasp 4+/5 3/5          SENSATION      Myofilaments (2 83 Wnl) 2 83 2 83    Sharp Dull  Intact Intact    Proprioception Intact Impaired    Hot/Cold Temp Intact Intact          MODIFIED HUNTER SCALE (TONE) 0 1    No increase in muscle tone (0)      Slight Increase in muscle tone with catch and release or min resist at end range (1)  Bicep,Wrist/digit flexors,     Slight Increase in muscle tone with catch and release, followed by min resistance through remainder of range (1+)      Increased muscle tone through full range, able to be moved easily (2)      Considerable increase in tone, difficult to move (3)      Rigid in Flexion/Extension (4)                                    INTERVENTION COMMENTS:  Diagnosis: History of stroke [Z86 73]  Precautions: fall risk,   FOTO: n/a  Insurance: Payor: Palmer Resendiz / Plan: [facility] PLAN 361 / Product Type: Blue Fee for Service /   13 of 61 ([facility]) visits, PN due 05/01/22

## 2022-08-08 NOTE — PROGRESS NOTES
MDT-LOOP RECORDER/ ACTIVE SYSTEM IS MRI CONDITIONAL   CARELINK TRANSMISSION:  BATTERY REACHED RRT ON 7/30/2022   NO PATIENT OR DEVICE ACTIVATED EPISODES   TASK TO DR BARRETT   NORMAL DEVICE FUNCTION, AT RRT  Kim Conklin

## 2022-08-16 ENCOUNTER — OFFICE VISIT (OUTPATIENT)
Dept: OCCUPATIONAL THERAPY | Facility: CLINIC | Age: 67
End: 2022-08-16
Payer: COMMERCIAL

## 2022-08-16 DIAGNOSIS — Z86.73 HISTORY OF STROKE: Primary | ICD-10-CM

## 2022-08-16 PROCEDURE — 97112 NEUROMUSCULAR REEDUCATION: CPT

## 2022-08-16 PROCEDURE — 97110 THERAPEUTIC EXERCISES: CPT

## 2022-08-16 PROCEDURE — 97140 MANUAL THERAPY 1/> REGIONS: CPT

## 2022-08-16 NOTE — PROGRESS NOTES
Occupational Therapy Daily Note:    Today's date: 2022  Patient name: Eldridge Essex  : 1955  MRN: [id number]  Referring provider: Margret Curiel MD  Dx:   Encounter Diagnosis   Name Primary?  History of stroke Yes       Subjective: "my thumb has gained more mobility"    Objective: Pt engaged in skilled OT treatment session with focus on UE NMR, UE strengthening, UE endurance and tone reduction/spasticity management to increase engagement, endurance, tolerance, and independence with daily ADL and IADL tasks  CPT Code Minutes                                           Task Details        Therapeutic Activity               Neuro Re-Ed 25 Pt tolerated electrical stimulation applied to post delt, supraspinatus for proximal stability with simultaneous electrical stimulation applied to wrist and digit extensors to promote distal extension for inc fxnl grasp/release tasks  Pt required to complete visualization when electrical stimulation applied  Pt then completed volitional grasp against closed chain resistance to increase proprioception, reduce tone and improve fxnl ROM              Therapeutic Exercise 10              Manual 25 Pt tolerated instrument assisted soft tissue massage to distal LUE for spasticity and tone reduction, increased circulation to improve overall tissue extensibility and elasticity for improved fxnl use  Pt then tolerated PROM to wrist and digits to promote extension and reduce flexor tone synergies  Self Care           Assessment: Tolerated treatment well  Patient would benefit from continued skilled OT      Plan: Continued skilled OT per POC    INTERVENTION COMMENTS:  Diagnosis: History of stroke [Z86 73]  Precautions: fall risk,   FOTO: n/a  Insurance: Payor: Parisa Walton / Plan: [employer] PLAN 361 / Product Type: Blue Fee for Service /   15 of 61 (Amador Luna) visits, PN due 22

## 2022-08-23 ENCOUNTER — OFFICE VISIT (OUTPATIENT)
Dept: OCCUPATIONAL THERAPY | Facility: CLINIC | Age: 67
End: 2022-08-23
Payer: COMMERCIAL

## 2022-08-23 ENCOUNTER — EVALUATION (OUTPATIENT)
Dept: PHYSICAL THERAPY | Facility: CLINIC | Age: 67
End: 2022-08-23
Payer: COMMERCIAL

## 2022-08-23 DIAGNOSIS — Z86.73 HISTORY OF STROKE: ICD-10-CM

## 2022-08-23 DIAGNOSIS — G81.94 LEFT HEMIPARESIS (HCC): Primary | ICD-10-CM

## 2022-08-23 DIAGNOSIS — Z86.73 HISTORY OF STROKE: Primary | ICD-10-CM

## 2022-08-23 PROCEDURE — 97164 PT RE-EVAL EST PLAN CARE: CPT | Performed by: PHYSICAL THERAPIST

## 2022-08-23 PROCEDURE — 97112 NEUROMUSCULAR REEDUCATION: CPT

## 2022-08-23 PROCEDURE — 97112 NEUROMUSCULAR REEDUCATION: CPT | Performed by: PHYSICAL THERAPIST

## 2022-08-23 PROCEDURE — 97140 MANUAL THERAPY 1/> REGIONS: CPT

## 2022-08-23 PROCEDURE — 97110 THERAPEUTIC EXERCISES: CPT

## 2022-08-23 NOTE — PROGRESS NOTES
Re-Evaluation Note     Today's date: 2022  Patient name: Heide Coe  : 1955  MRN: [id number]  Referring provider: Harjinder Gomez MD  Dx: No diagnosis found  Subjective: Went to [facility] for a week for business  He reports having some difficulty with airports but for the most part was able to get around  Wore AFO for longer distance walking, has not been wearing it for daily walking  Objective: See treatment diary below    Functional Outcomes:   5x sts - 8 34 no AFO  Gait Speed: 1 26 m/s no AFO  6 mwt: 1150 no AFO  FGA:     Functional Outcomes:   Gait Speed: 1/2 m/s no afo  6 mwt: 1150 ft no afo  5x sts:6 97 sec  FGA:    MMT: left ankle 3/5 inversion, eversion, DF; Hip flex/ext: 3+/5    Assessment: Patient presents to physical therapy after 4 months completing at home maintenance care, with no decline in function  However patient does remain below age referenced normative values and more importantly below previous abilities when completing previous episodes of care with endurance, balance, gait efficiency and gait speed  At this time patient has decreased LE strength, coordination causing him difficulty with balance, gait, endurance and overall abiliyt to complete community mobility required for his occupation  Pt will benefit from skilled therapy itnervention 1x/week for 8 weeks in order to regain previous levels of function  STG:  Pt will complete 6 mwt in 1300 ft within 4 weeks  Pt will complete FGA with a score of 26 or greater wtihin 4 weeks  Pt will improve gait speed to 1 4 m/s within 4 weeks    LTG  Pt will consistently participate in HEP within 8 weeks  Pt will report minimal difficulty when ambulating through airports within 8 weeks      Plan: Continue per plan of care        Precautions: HTN, Afib     ST2022  LTG: 10/23/2022  POC end: 10/23/2022      Manuals 3/21 3/23 3/30 4/5 4/13 4/25 8/23 Neuro Re-Ed             Busy environmental navigation  NV nv           Slip practice  NV nv           treadmill  10 min at 2 5 mph 5# aw    10 min 5% incline 2 0 mph 5# aw 10 min at 2 2  Mph 7% incline 5# aw    10 min at 2 5 mph 5# aw 10 min at 2 2  Mph 7% incline 5# aw    10 min at 2 5 mph 5# aw 10 Min at 2 2 mph 7% incline 5# LLE AW    10 Min at 2 5 mph 5# LLE AW 10 min at 2 2 mph 10 min    10 min at 2 0 mph 5% incline 10 min 10 min at 10% incline 1 5 mph      Step ups  8" step 15x ea fwd/lat 5# r aw   SOLO 5# L AW 8" step standing on foam x 15 Fwd each  8" 10x 10# aw      Resisted bkwds, fwd, lat   mtb //bars 50x ea Over uneven mat with weights and pads solo - 4 laps ea Over uneven Red mat with wts and pads SOLO x 5 laps  5# L AW        hurdles   12" 5 laps On uneven mat iwht weights/pads 6" 4 laps solo On uneven Red mat with wts/pads 6" hurdles (3)  SOLO x 5 laps  5# L AW  10x 10# aw                   Ther Ex                                                                                                                     Ther Activity                                       Gait Training                                       Modalities

## 2022-08-23 NOTE — PROGRESS NOTES
Occupational Therapy Daily Note:    Today's date: 2022  Patient name: Felipe Márquez  : 1955  MRN: [id number]  Referring provider: Rosa Anand MD  Dx:   Encounter Diagnosis   Name Primary?  History of stroke Yes       Subjective: "I can really feel the difference after that massage"    Objective: Pt engaged in skilled OT treatment session with focus on UE NMR, UE strengthening, UE endurance and tone reduction/spasticity management to increase engagement, endurance, tolerance, and independence with daily ADL and IADL tasks  CPT Code Minutes                                           Task Details        Therapeutic Activity               Neuro Re-Ed 25 Pt tolerated electrical stimulation applied to post delt, supraspinatus for proximal stability with simultaneous electrical stimulation applied to wrist and digit extensors to promote distal extension for inc fxnl grasp/release tasks  Pt required to complete visualization when electrical stimulation applied  Pt then completed volitional grasp against closed chain resistance to increase proprioception, reduce tone and improve fxnl ROM  Pt then performed and tolerated seated extended arm WB into large therapy ball with place and hold for 10 seconds at end range  Therapeutic Exercise 10 Pt tolerated 5 min x 2 (prograde/retrograde motions at 2 0 resist) seated on UBE with focus on increasing proximal UE strength, endurance, act tolerance and ROM to inc indep and safety with ADL/IADL fxn and fxnl reaching tasks  Pt required use of coban wrap assist to maintain LUE grasp on UBE  Manual 25 Pt tolerated instrument assisted soft tissue massage to distal LUE for spasticity and tone reduction, increased circulation to improve overall tissue extensibility and elasticity for improved fxnl use  Pt then tolerated PROM to wrist and digits to promote extension and reduce flexor tone synergies             Self Care           Assessment: Tolerated treatment well  Pt responds well to weight bearing exercises with G tone reduction and spasticity reduction following techniques  Pt is participating in use of  Dynamic extensor splint to promote tissue elongation and elasticity  Patient would benefit from continued skilled OT      Plan: Continued skilled OT per POC    INTERVENTION COMMENTS:  Diagnosis: History of stroke [Z86 73]  Precautions: fall risk,   FOTO: n/a  Insurance: Payor: Chyna Palomino / Plan: [facility] PLAN 361 / Product Type: Blue Fee for Service /   13 of 61 (Yvette Weiss) visits, PN due 05/01/22

## 2022-08-24 ENCOUNTER — APPOINTMENT (OUTPATIENT)
Dept: PHYSICAL THERAPY | Facility: CLINIC | Age: 67
End: 2022-08-24
Payer: COMMERCIAL

## 2022-08-30 ENCOUNTER — APPOINTMENT (OUTPATIENT)
Dept: OCCUPATIONAL THERAPY | Facility: CLINIC | Age: 67
End: 2022-08-30
Payer: COMMERCIAL

## 2022-08-30 ENCOUNTER — OFFICE VISIT (OUTPATIENT)
Dept: PHYSICAL THERAPY | Facility: CLINIC | Age: 67
End: 2022-08-30
Payer: COMMERCIAL

## 2022-08-30 DIAGNOSIS — G81.94 LEFT HEMIPARESIS (HCC): Primary | ICD-10-CM

## 2022-08-30 DIAGNOSIS — Z86.73 HISTORY OF STROKE: ICD-10-CM

## 2022-08-30 PROCEDURE — 97116 GAIT TRAINING THERAPY: CPT | Performed by: PHYSICAL THERAPIST

## 2022-08-30 PROCEDURE — 97112 NEUROMUSCULAR REEDUCATION: CPT | Performed by: PHYSICAL THERAPIST

## 2022-08-30 NOTE — PROGRESS NOTES
Daily Note     Today's date: 2022  Patient name: Mikayla Redd  : 1955  MRN: [id number]  Referring provider: Trisha Ny MD  Dx:   Encounter Diagnosis     ICD-10-CM    1  Left hemiparesis ([address] )  G81 94    2  History of stroke  Z86 73                   Subjective: Patient reports no new changes since last session      Objective: See treatment diary below      Assessment: Pt significntly fatigued at end of session today  Able to tolerate siginficant amount of activity today  Plan to continue to progress as able at next session  Plan: Continue per plan of care        Precautions: HTN, Afib     ST2022  LTG: 10/23/2022  POC end: 10/23/2022      Manuals 3/21 3/23 3/30 4/5 4/13 4/25 8/23  8/30                                                        Neuro Re-Ed             Busy environmental navigation  NV nv           Slip practice  NV nv           treadmill  10 min at 2 5 mph 5# aw    10 min 5% incline 2 0 mph 5# aw 10 min at 2 2  Mph 7% incline 5# aw    10 min at 2 5 mph 5# aw 10 min at 2 2  Mph 7% incline 5# aw    10 min at 2 5 mph 5# aw 10 Min at 2 2 mph 7% incline 5# LLE AW    10 Min at 2 5 mph 5# LLE AW 10 min at 2 2 mph 10 min    10 min at 2 0 mph 5% incline 10 min 10 min at 10% incline 1 5 mph  10 min at 10% incline 1 5 mph    10 min with 5# aw 1 6 mph    Step ups  8" step 15x ea fwd/lat 5# r aw   SOLO 5# L AW 8" step standing on foam x 15 Fwd each  8" 10x 10# aw  Up and over with 10# bolster and 10# aw on L LE up and over fwd 4 x, lat 3 x    Resisted bkwds, fwd, lat   mtb //bars 50x ea Over uneven mat with weights and pads solo - 4 laps ea Over uneven Red mat with wts and pads SOLO x 5 laps  5# L AW    MTB 40 ft x 4 in each direction    hurdles   12" 5 laps On uneven mat iwht weights/pads 6" 4 laps solo On uneven Red mat with wts/pads 6" hurdles (3)  SOLO x 5 laps  5# L AW  10x 10# aw  10x 10 # aw                 Ther Ex Ther Activity                                       Gait Training                                       Modalities

## 2022-09-06 ENCOUNTER — APPOINTMENT (OUTPATIENT)
Dept: OCCUPATIONAL THERAPY | Facility: CLINIC | Age: 67
End: 2022-09-06
Payer: COMMERCIAL

## 2022-09-06 ENCOUNTER — OFFICE VISIT (OUTPATIENT)
Dept: PHYSICAL THERAPY | Facility: CLINIC | Age: 67
End: 2022-09-06
Payer: COMMERCIAL

## 2022-09-06 DIAGNOSIS — Z86.73 HISTORY OF STROKE: ICD-10-CM

## 2022-09-06 DIAGNOSIS — G81.94 LEFT HEMIPARESIS (HCC): Primary | ICD-10-CM

## 2022-09-06 PROCEDURE — 97116 GAIT TRAINING THERAPY: CPT | Performed by: PHYSICAL THERAPIST

## 2022-09-06 PROCEDURE — 97112 NEUROMUSCULAR REEDUCATION: CPT | Performed by: PHYSICAL THERAPIST

## 2022-09-09 NOTE — PROGRESS NOTES
Daily Note     Today's date: 2022  Patient name: Parag Kennedy  : 1955  MRN: [id number]  Referring provider: Tara Rodríguez MD  Dx:   Encounter Diagnosis     ICD-10-CM    1  Left hemiparesis ([address] )  G81 94    2  History of stroke  Z86 73                   Subjective: Patient reports to physical therapy with no new changes      Objective: See treatment diary below      Assessment: Patient was able to continue to improve push off and clearance with affected limb  Pt significantly fatigued at end of session  Plan: Continue per plan of care        Precautions: HTN, Afib     ST2022  LTG: 10/23/2022  POC end: 10/23/2022      Manuals 3/21 3/23 3/30 4/5 4/13 4/25 8/23  8/30 9/9                                                       Neuro Re-Ed             Busy environmental navigation  NV nv           Slip practice  NV nv           treadmill  10 min at 2 5 mph 5# aw    10 min 5% incline 2 0 mph 5# aw 10 min at 2 2  Mph 7% incline 5# aw    10 min at 2 5 mph 5# aw 10 min at 2 2  Mph 7% incline 5# aw    10 min at 2 5 mph 5# aw 10 Min at 2 2 mph 7% incline 5# LLE AW    10 Min at 2 5 mph 5# LLE AW 10 min at 2 2 mph 10 min    10 min at 2 0 mph 5% incline 10 min 10 min at 10% incline 1 5 mph  10 min at 10% incline 1 5 mph    10 min with 5# aw 1 6 mph 10 min at 10% incline 1 5 mph    10 min with 5# aw 1 6 mph   Step ups  8" step 15x ea fwd/lat 5# r aw   SOLO 5# L AW 8" step standing on foam x 15 Fwd each  8" 10x 10# aw  Up and over with 10# bolster and 10# aw on L LE up and over fwd 4 x, lat 3 x Up and over with 10# bolster and 10# aw on L LE up and over fwd 4 x, lat 3 x   Resisted bkwds, fwd, lat   mtb //bars 50x ea Over uneven mat with weights and pads solo - 4 laps ea Over uneven Red mat with wts and pads SOLO x 5 laps  5# L AW    MTB 40 ft x 4 in each direction    hurdles   12" 5 laps On uneven mat iwht weights/pads 6" 4 laps solo On uneven Red mat with wts/pads 6" hurdles (3)  SOLO x 5 laps  5# L AW 10x 10# aw  10x 10 # aw    Foam and steps         6" and 8" steps with foam before and after steps - 5# aw solo step 4 laps fwd/lat ea    Ther Ex                                                                                                                     Ther Activity                                       Gait Training                                       Modalities

## 2022-09-12 ENCOUNTER — APPOINTMENT (OUTPATIENT)
Dept: PHYSICAL THERAPY | Facility: CLINIC | Age: 67
End: 2022-09-12
Payer: COMMERCIAL

## 2022-09-26 ENCOUNTER — OFFICE VISIT (OUTPATIENT)
Dept: PHYSICAL THERAPY | Facility: CLINIC | Age: 67
End: 2022-09-26
Payer: COMMERCIAL

## 2022-09-26 ENCOUNTER — OFFICE VISIT (OUTPATIENT)
Dept: OCCUPATIONAL THERAPY | Facility: CLINIC | Age: 67
End: 2022-09-26
Payer: COMMERCIAL

## 2022-09-26 DIAGNOSIS — Z86.73 HISTORY OF STROKE: ICD-10-CM

## 2022-09-26 DIAGNOSIS — Z86.73 HISTORY OF STROKE: Primary | ICD-10-CM

## 2022-09-26 DIAGNOSIS — G81.94 LEFT HEMIPARESIS (HCC): Primary | ICD-10-CM

## 2022-09-26 PROCEDURE — 97112 NEUROMUSCULAR REEDUCATION: CPT | Performed by: PHYSICAL THERAPIST

## 2022-09-26 PROCEDURE — 97112 NEUROMUSCULAR REEDUCATION: CPT

## 2022-09-26 PROCEDURE — 97110 THERAPEUTIC EXERCISES: CPT

## 2022-09-26 PROCEDURE — 97150 GROUP THERAPEUTIC PROCEDURES: CPT | Performed by: PHYSICAL THERAPIST

## 2022-09-26 NOTE — PROGRESS NOTES
OCCUPATIONAL THERAPY PROGRESS UPDATE:    2022  Sebastian Heart  1955  [id number]  Sary Santos MD  1  History of stroke        Subjective    "Am I any better than when I had my stroke, I would say so "    PATIENT GOAL: "To have less tightness  I would love to move my arm better "      Assessment/Plan    Skilled Analysis:  Pt is a 75 y o  male referred to Occupational Therapy s/p History of stroke [Z86 73]  Pt is familiar to OP OT services, with most recent services received in  of this year following BOTOX injection  Pt with recent Botox injection in 2022 and has one coming up  Pt completes some stretching at home  Pt states he has been utilizing flexbar tools for strengthening forearm (using yellow resistive flex bar)  Is using dynamic splint 2x/week about 6 hrs/week (not wearing at night like he used too due to weight of splint)  Pt states his spasticity "changes" day to day however he notices that when completing functional tasks in home environment such as weed whacking or functional tool use, it improves  Pt participated in skilled OT re-evaluation and following formalized testing, presents with the following areas of deficit: FMC/FMS, GMC/GMS, hypertonicity and ROM, spasticity, dec volitional grasp and release, impacting indep and completion of ADL/IADL, salient, and leisure tasks  Specifically, pt reports difficulties with bi-manual coordination tasks including donning socks/pants, opening containers and stabilizing jars, and unilateral movements of reaching for functional items  Pt does demo the need for skilled Occupational Therapy services 1x/week for 8 more weeks with focus on UE NMR, UE strengthening, UE endurance, FMC/GMC, FMS/GMS and tone reduction strategies, spasticity reduction, PROM, AAROM, AROM, and HEP development to address the goals as listed below  Pt in agreement with POC, POC to  w/in 60 days  Goals:   Motor Short Term Goals (8) WKS  Strength/Endurance  ·  Pt will increase L UE proximal strength to 3/5  through the use of strengthening exercises and home program for eventual return to life roles and salient tasks = NOT MET    · Pt will demo with G tolerance to supine, seated, and in stance exercise x 30 minutes with minimal rest breaks required for increased engagement in life roles and weekly exercise regimen = PARTIALLY MET    · Pt will demo with G carryover of Home Exercise Program to improve functional progression towards goals in Plan of care and for improved functional use of LUE = NOT MET      Functional Performance  · Pt will increase LUE to gross assist with for ADL/IADL and tabletop tasks for improved functional performance of life roles and salient tasks = NOT MET      AROM/PROM  · Pt will demo with decreased  LUE  shoulder sublux to trace for increased AROM for improved ADL and IADL engagement = NOT MET    · Pt will be able to perform near full range of proximal L UE to demo increased strength and ROM of affected UE for improved ADLs/IADLs = NOT MET    · Pt will be able to perform >1/4 range of wrist/digits and >1/2 range of forearm/elbow of L UE to demo increased strength and ROM of affected UE for improved ADLs/IADLs = NOT MET    · Pt will demo decreased hypertonicity of Modified Linsey Scale (MAS) of L UE to 0 for improved alternate motor patterns, grasp/release, and termination on command for improved dressing/hygiene = NOT MET    · Pt will demo good carryover of clinic and home tone reduction strategies for improved AROM initiation with functional reach with ADL fxn = NOT MET      Modalities  · Pt will tolerate BIONESS/NMES/IASTM for improved motor and sensory performance for overall improved strength, tone reduction, and sensation to inc safety and engagement with ADL/IADL fxn = GOAL MET, ongoing     Pt will increase compliance with  [facility] for improved elasticity of L UE and tolerate fit of dynamic extension splint with wear time of 8 hrs following botox application = = NOT MET (wearing 2x/week, ~6 hrs total)      Treatment Interventions  Tone reduction strategies  Weight bearing  bioness  NMES  CP/HP applications  PROM/AAROM/AROM  HEP development  Closed chain strengthening  fxnl grasp/release      Patient-Specific Functional Scale   Task is scored 0 (unable to perform activity) to 10 (ability to perform activity independently)    Activity Date:  22 Date:   1  Putting socks on  3    2  Opening container, jars, medication bottles  0    3  Reaching (for hand railing on stairs, reaching for door handle on car, refrigerator) 0    4                Pain Levels  Restin    With Activity:  0    Objective    Impairment Observations:    Upper Extremity     RUE KIRT Comments                 UPPER EXTREMITY FXN Intact Impaired Dominant Hand: R                         /Pinch Strength         Dynamometer      - Gross Grasp 35 lbs 25 lbs Remained B/L   Pinch Meter       - PINCER 13  lbs 4 lbs Improved 1 lb in L    - TRIPOD 14 lbs 5 lbs     - LATERAL 12  lbs  8 5 lbs Decreased 1 5 lb              AROM (seated)       Elevation/Depression  full    Shoulder Flex/Ext  >3/4 range  Short lever, >1/2 range with long lever   Shoulder Abd  3/4 range + short lever   Shoulder Add  3/4    Horizontal Abd  1/2 range     Horizontal Add  3/4 range     Elbow Flex  >1/2 range    Elbow Ext  1/2 range Significant difficulty extending*    Pronation  Full      Supination  1/2 range     Wrist Flex  1/2 range    Wrist Ext  trace     Digit Flex  full     Digit Ex  trace     Composite Grasp  Full D3-D5, >3/4 range D2    Hook Grasp  Unable to isolate     Opposition  Lateral edge of D2-D3 with manual blocking      Subluxation  1 finger breadth Can actively approximate to reduce to none                               PROM (seated position)         Elevation/Depression  full     Retraction/Protraction  full     Shoulder Flex/Ext  full     Shoulder ABd/ADd  full Horizontal ABd/Add  full     Elbow Flex  nera full     Elbow Ext  full     Wrist Flex  full     Wrist Ext  full     Digit Flex  full     Digit Ext  Near full                                    MMT         Elevation 4+/5 4/5    Shoulder Flex/Ext 4+/5 2/5    Shoulder Abd 4+/5 2/5    Shoulder ADd 4+/5 2/5    Elbow Flex 4+/5 2/5    Elbow Ext 4+/5 2/5    Wrist Flex 4+/5 2/5    Wrist Ext 4+/5 2/5    Gross Grasp 4+/5 3/5          SENSATION      Myofilaments (2 83 Wnl) 2 83 2 83    Sharp Dull  Intact Intact    Proprioception Intact Impaired    Hot/Cold Temp Intact Intact          MODIFIED HUNTER SCALE (TONE) 0 1    No increase in muscle tone (0)      Slight Increase in muscle tone with catch and release or min resist at end range (1)  Bicep, Wrist/digit flexors    Slight Increase in muscle tone with catch and release, followed by min resistance through remainder of range (1+)      Increased muscle tone through full range, able to be moved easily (2)      Considerable increase in tone, difficult to move (3)      Rigid in Flexion/Extension (4)                                  TODAYS TREATMENT:  Thera Ex:  NMES applied to post delt/supraspinatus x 25 min in session for shoulder subluxation reduction and proximal shoulder control  Pt started on and tolerated UBE 10 min x 2 in prograde/retrograde motions at 1 5 resist seated on UBE with focus on increasing proximal LUE strength/posterior and rhomboid strength, sustained grasp on handle, cardiopulmonary endurance, act tolerance and ROM to inc indep and safety with ADL/IADL fxn and fxnl reaching tasks  Pt attempted prograde with no coban wrap and lost  x1 min into motion  Seated edge of mat, pt completed scap retractions with 2# dowel and added manual resistance by OTR and pronated bicep curls x1 min  Pt req manual blocking at lateral elbow with bicep curls due to tone   In supine, pt completed unilateral LUE tricep extensions and shoulder/tricep press x1 min with stabilization assist to forearm/hand  Pt completed a 2nd round x30 sec only for improved proximal strength/stability         INTERVENTION COMMENTS:  Diagnosis: History of stroke [Z86 73]  Precautions: fall risk  FOTO: n/a  Insurance: Payor: Gadiel Villarreal / Plan: [employer] PLAN 361 / Product Type: Blue Fee for Service /   4 of 12 visits through 11/8/2022 (60 VPCY combined PT/OT)   PN due 10/26/2022

## 2022-09-26 NOTE — PROGRESS NOTES
Daily Note     Today's date: 2022  Patient name: Gricelda Lawton  : 1955  MRN: [id number]  Referring provider: Christian Alamo MD  Dx:   Encounter Diagnosis     ICD-10-CM    1  Left hemiparesis ([address] )  G81 94    2  History of stroke  Z86 73                   Subjective: Patient reports to physical therapy today after 2 weeks off from therapy due to back pain and schedule conflicts  His arrives without his AFO and in general has not been wearing it  Objective: See treatment diary below    Functional Outcomes:   5x sts - 8 34 no AFO  Gait Speed: 1 26 m/s no AFO  6 mwt: 1150 no AFO  FGA:      Functional Outcomes:   Gait Speed: 1/2 m/s no afo  6 mwt: 1150 ft no afo  5x sts:6 97 sec  FGA:    Functional Outcomes:   6 mwt: 1075 ft  Gait speed: 1 1 m/s  5x sts 7 9   FGA:     Assessment: Completed progress update this session  At this tie he remains steady with his 6 minute walk test, gait speed, and balance and did not make progress  Likely this is due to attending only one session of physical therapy  STG:  Pt will complete 6 mwt in 1300 ft within 4 weeks - not met  Pt will complete FGA with a score of 26 or greater wtihin 4 weeks - not met  Pt will improve gait speed to 1 4 m/s within 4 weeks - not met     LTG  Pt will consistently participate in HEP within 8 weeks  Pt will report minimal difficulty when ambulating through airports within 8 weeks    Plan: Continue per plan of care        Precautions: HTN, Afib     ST2022  LTG: 10/23/2022  POC end: 10/23/2022      Manuals 3/21 3/23 3/30 4/5 4/13 4/25 8/23  8/30 9/9                                                       Neuro Re-Ed             Busy environmental navigation  NV nv           Slip practice  NV nv           treadmill  10 min at 2 5 mph 5# aw    10 min 5% incline 2 0 mph 5# aw 10 min at 2 2  Mph 7% incline 5# aw    10 min at 2 5 mph 5# aw 10 min at 2 2  Mph 7% incline 5# aw    10 min at 2 5 mph 5# aw 10 Min at 2 2 mph 7% incline 5# LLE AW    10 Min at 2 5 mph 5# LLE AW 10 min at 2 2 mph 10 min    10 min at 2 0 mph 5% incline 10 min 10 min at 10% incline 1 5 mph  10 min at 10% incline 1 5 mph    10 min with 5# aw 1 6 mph 10 min at 10% incline 1 5 mph    10 min with 5# aw 1 6 mph   Step ups  8" step 15x ea fwd/lat 5# r aw   SOLO 5# L AW 8" step standing on foam x 15 Fwd each  8" 10x 10# aw  Up and over with 10# bolster and 10# aw on L LE up and over fwd 4 x, lat 3 x Up and over with 10# bolster and 10# aw on L LE up and over fwd 4 x, lat 3 x   Resisted bkwds, fwd, lat   mtb //bars 50x ea Over uneven mat with weights and pads solo - 4 laps ea Over uneven Red mat with wts and pads SOLO x 5 laps  5# L AW    MTB 40 ft x 4 in each direction    hurdles   12" 5 laps On uneven mat iwht weights/pads 6" 4 laps solo On uneven Red mat with wts/pads 6" hurdles (3)  SOLO x 5 laps  5# L AW  10x 10# aw  10x 10 # aw    Foam and steps         6" and 8" steps with foam before and after steps - 5# aw solo step 4 laps fwd/lat ea    Ther Ex                                                                                                                     Ther Activity                                       Gait Training                                       Modalities

## 2022-10-03 ENCOUNTER — OFFICE VISIT (OUTPATIENT)
Dept: PHYSICAL THERAPY | Facility: CLINIC | Age: 67
End: 2022-10-03
Payer: COMMERCIAL

## 2022-10-03 ENCOUNTER — OFFICE VISIT (OUTPATIENT)
Dept: OCCUPATIONAL THERAPY | Facility: CLINIC | Age: 67
End: 2022-10-03
Payer: COMMERCIAL

## 2022-10-03 DIAGNOSIS — Z86.73 HISTORY OF STROKE: Primary | ICD-10-CM

## 2022-10-03 DIAGNOSIS — G81.94 LEFT HEMIPARESIS (HCC): ICD-10-CM

## 2022-10-03 PROCEDURE — 97110 THERAPEUTIC EXERCISES: CPT

## 2022-10-03 PROCEDURE — 97110 THERAPEUTIC EXERCISES: CPT | Performed by: PHYSICAL THERAPIST

## 2022-10-03 PROCEDURE — 97112 NEUROMUSCULAR REEDUCATION: CPT

## 2022-10-03 PROCEDURE — 97112 NEUROMUSCULAR REEDUCATION: CPT | Performed by: PHYSICAL THERAPIST

## 2022-10-03 NOTE — PROGRESS NOTES
Occupational Therapy Daily Note:    Today's date: 10/3/2022  Patient name: Omero Norwood  : 1955  MRN: [id number]  Referring provider: Greyson Esquivel MD  Dx:   Encounter Diagnosis   Name Primary?  History of stroke Yes       Subjective: "You are kicking my butt!"    Objective: Pt engaged in skilled OT treatment session with focus on UE NMR, UE strengthening, UE endurance and tone reduction/spasticity management to increase engagement, endurance, tolerance, and independence with daily ADL and IADL tasks  CPT Code Minutes                                           Task Details        Therapeutic Activity               Neuro Re-Ed  Pt tolerated electrical stimulation applied to post delt, supraspinatus for proximal stability with simultaneous electrical stimulation applied to wrist and digit extensors to promote distal extension for inc fxnl grasp/release tasks  Pt engaged in weight bearing on mat in quadruped for prolonged low load stretch and proximal > distal strengthening  Pt req to reach in opposite arm to grasp pegs and place to peg board (slanted) requiring unilateral holds in quadruped  OT placed therapy ball under torso for trunk support/control  Pt took 3 rest breaks during activity  Pt concluded with standing functional reach on conor bars reaching with cylindrical grasp and transferring to opposite side of mat req a sustained grasp/hold with release of conor  OT provided stabilization assist to upper arm/forearm with cues for proximal > distal teaming  Therapeutic Exercise  NMES applied to post delt / supraspinatus for proximal stability and strengthening   Seated edge of mat, pt completed the following UB exercises for improved proximal strength, forearm strength/control, 2 x12 reps:  - 3# dowel into scap retractions   - 3# dowel into supinated bicep curls   Supine:   -  Neutral curl to shoulder press   - tricep extensions   Supine with 1# wrist weight:  - 2 x12 unilateral shoulder flexion raise with elbow extension and concentric hold x5 sec with eccentric control                Manual          Self Care           Assessment: Tolerated treatment well  Patient would benefit from continued skilled OT  Pt tolerated UB exercises well with rest breaks due to fatigue, pt required maximal stabilization assist to distal wrist due to tone and difficulty controlling wrist movements  Pt able to complete unilateral exercises with DB without release or drop of DB  Pt demo flaring of elbow with curls due to tone and dec proximal control, req elbow support  Pt demo significant tremor and ataxia with tricep AG and proximal concentric holds although improved with ability to hold x 5 sec compared to last session, with increased fatigue and ataxia as movements progressed  Pt demo max difficulty with volitional grasp/release requiring max-total assist for releasing and digit extension  Pt continues to benefit from NMES for proximal stabilization of shoulder, tolerating for > 50 min of session  Plan: Continued skilled OT per POC  Volitional grasp/release on flexors and extensors      INTERVENTION COMMENTS:  Diagnosis: History of stroke [Z86 73]  Precautions: fall risk  FOTO: n/a  Insurance: Payor: Young Hardy / Plan: [facility] PLAN 361 / Product Type: Blue Fee for Service /   5 of 12 visits through 11/8/2022 (60 VPCY combined PT/OT)   PN due 10/26/2022

## 2022-10-03 NOTE — PROGRESS NOTES
Daily Note     Today's date: 10/3/2022  Patient name: Cheyanne Atwood  : 1955  MRN: [id number]  Referring provider: Mary Wolf MD  Dx: No diagnosis found  Subjective: No new changes upon arrival to session      Objective: See treatment diary below      Assessment: Today's session focused on push off, plantarflexion strength, hip extension strength, and single limb stability  With the chair push, pt began with 50#, but it was to much weight and brought down to 35#  Pt had a minor LOB with the hurdles d/t being performed at end of session and was fatigued  Pt can continue to be challenged with gait and balance exercises  Plan: Continue per plan of care        Precautions: HTN, Afib     ST2022  LTG: 10/23/2022  POC end: 10/23/2022    Manuals 10/3 3/23 3/30 4/5 4/13 4/25 8/23  8/30 9/9                                                       Neuro Re-Ed             Busy environmental navigation   nv           Slip practice   nv           treadmill  10 min at 2 5 mph 5# aw    10 min 5% incline 2 0 mph 5# aw 10 min at 2 2  Mph 7% incline 5# aw    10 min at 2 5 mph 5# aw 10 min at 2 2  Mph 7% incline 5# aw    10 min at 2 5 mph 5# aw 10 Min at 2 2 mph 7% incline 5# LLE AW    10 Min at 2 5 mph 5# LLE AW 10 min at 2 2 mph 10 min    10 min at 2 0 mph 5% incline 10 min 10 min at 10% incline 1 5 mph  10 min at 10% incline 1 5 mph    10 min with 5# aw 1 6 mph 10 min at 10% incline 1 5 mph    10 min with 5# aw 1 6 mph   Step ups  8" step 15x ea fwd/lat 5# r aw   SOLO 5# L AW 8" step standing on foam x 15 Fwd each  8" 10x 10# aw  Up and over with 10# bolster and 10# aw on L LE up and over fwd 4 x, lat 3 x Up and over with 10# bolster and 10# aw on L LE up and over fwd 4 x, lat 3 x   Resisted bkwds, fwd, lat   mtb //bars 50x ea Over uneven mat with weights and pads solo - 4 laps ea Over uneven Red mat with wts and pads SOLO x 5 laps  5# L AW    MTB 40 ft x 4 in each direction    Step up and over LLE 7 5# aw  3 6" steps   x4 laps     RPE: 7/10            Shuttle  LLE 7 5# aw  4 cones   x2 laps  10# weight in RUE            Chair push LLE 7 5# aw  20 ft x5 laps   #35 weight on chair   /  RPE:             Side step up and over LLE 7 5# aw  6" step   lat  x10ea            Match cone and turn LLE 7 5# aw    5 cones match 20 ft distance   x2 rounds            hurdles LLE 7 5# aw  6" hurdles (4) with airex pads in between  x4 laps    RPE: 8-9/10  12" 5 laps On uneven mat iwht weights/pads 6" 4 laps solo On uneven Red mat with wts/pads 6" hurdles (3)  SOLO x 5 laps  5# L AW  10x 10# aw  10x 10 # aw    Foam and steps         6" and 8" steps with foam before and after steps - 5# aw solo step 4 laps fwd/lat ea    Ther Ex             Treadmill  Solo  LLE #7 5 aw  1 5 mph   5% incline  3 minutes  RPE: 6/10  1 5 mph   7% incline  7 minutes  RPE: 8/10                                                                                                       Ther Activity                                       Gait Training                                       Modalities

## 2022-10-10 ENCOUNTER — APPOINTMENT (OUTPATIENT)
Dept: PHYSICAL THERAPY | Facility: CLINIC | Age: 67
End: 2022-10-10

## 2022-10-10 ENCOUNTER — APPOINTMENT (OUTPATIENT)
Dept: OCCUPATIONAL THERAPY | Facility: CLINIC | Age: 67
End: 2022-10-10

## 2022-10-11 PROBLEM — R19.7 DIARRHEA OF PRESUMED INFECTIOUS ORIGIN: Status: RESOLVED | Noted: 2022-05-12 | Resolved: 2022-10-11

## 2022-10-11 PROBLEM — Z12.11 COLON CANCER SCREENING: Status: RESOLVED | Noted: 2022-05-12 | Resolved: 2022-10-11

## 2022-10-12 ENCOUNTER — APPOINTMENT (OUTPATIENT)
Dept: PHYSICAL THERAPY | Facility: CLINIC | Age: 67
End: 2022-10-12

## 2022-10-17 ENCOUNTER — APPOINTMENT (OUTPATIENT)
Dept: OCCUPATIONAL THERAPY | Facility: CLINIC | Age: 67
End: 2022-10-17

## 2022-10-17 ENCOUNTER — APPOINTMENT (OUTPATIENT)
Dept: PHYSICAL THERAPY | Facility: CLINIC | Age: 67
End: 2022-10-17

## 2022-10-19 ENCOUNTER — APPOINTMENT (OUTPATIENT)
Dept: OCCUPATIONAL THERAPY | Facility: CLINIC | Age: 67
End: 2022-10-19

## 2022-10-19 ENCOUNTER — APPOINTMENT (OUTPATIENT)
Dept: PHYSICAL THERAPY | Facility: CLINIC | Age: 67
End: 2022-10-19

## 2022-10-24 ENCOUNTER — OFFICE VISIT (OUTPATIENT)
Dept: OCCUPATIONAL THERAPY | Facility: CLINIC | Age: 67
End: 2022-10-24
Payer: COMMERCIAL

## 2022-10-24 ENCOUNTER — OFFICE VISIT (OUTPATIENT)
Dept: PHYSICAL THERAPY | Facility: CLINIC | Age: 67
End: 2022-10-24
Payer: COMMERCIAL

## 2022-10-24 DIAGNOSIS — Z86.73 HISTORY OF STROKE: Primary | ICD-10-CM

## 2022-10-24 DIAGNOSIS — G81.94 LEFT HEMIPARESIS (HCC): ICD-10-CM

## 2022-10-24 PROCEDURE — 97110 THERAPEUTIC EXERCISES: CPT

## 2022-10-24 PROCEDURE — 97112 NEUROMUSCULAR REEDUCATION: CPT

## 2022-10-24 PROCEDURE — 97112 NEUROMUSCULAR REEDUCATION: CPT | Performed by: PHYSICAL THERAPIST

## 2022-10-24 PROCEDURE — 97164 PT RE-EVAL EST PLAN CARE: CPT

## 2022-10-24 NOTE — PROGRESS NOTES
PT Re-Evaluation    Today's date: 10/24/2022  Patient name: Taylor Drake  : 1955  MRN: [id number]  Referring provider: Beau Najera MD  Dx:   Encounter Diagnosis     ICD-10-CM    1  History of stroke  Z86 73    2  Left hemiparesis ([facility] [address] )  G81 94        Start Time: 0900  Stop Time: 1000  Total time in clinic (min): 60 minutes    Assessment  Assessment details: Assessment details: Pt a 75year old that presented to outpatient PT with the diagnosis of a stroke and left hemiparesis  He presented with decreased strength, decreased L ankle DF ROM, increased L PF tone, and decreased L hip and knee coordination during gait, which impacts his balance and gait efficiency  Also, per the normative values for the pt's age, he has decreased endurance  Pt would benefit from skilled PT to improve his balance, endurance, and strength  Impairments: abnormal gait, impaired balance, impaired physical strength and safety issue  Understanding of Dx/Px/POC: good   Prognosis: good    Goals  Goals from Prior POC   STG:  Pt will complete 6 mwt in 1300 ft within 4 weeks - not met  Pt will complete FGA with a score of 26 or greater wtihin 4 weeks - not met  Pt will improve gait speed to 1 4 m/s within 4 weeks - not met     LTG  1  Pt will consistently participate in HEP within 8 weeks- partially met  Pt has been walking at home  2  Pt will report minimal difficulty [place] ambulating [place] airports within 8 weeks  -met     New goals for POC  ST  Pt will improve gait FGA score by 4 points in 4 weeks to show an improvement in the pt's balance  2  Pt will improve 6 MWT by 164 ft in 4 weeks showing an improvement in the pt's endurance  3  Pt will be independent with HEP in 4 weeks to maintain mobility and strength  LT  Pt will improve gait speed to 1 3 m/s in 8 weeks to safely cross the street  2  Pt will improve FGA score to 30/30 in 8 weeks to show an improvement in his balance                  Plan  Patient would benefit from: skilled occupational therapy and skilled physical therapy  Planned therapy interventions: balance, home exercise program, strengthening, neuromuscular re-education and therapeutic exercise  Frequency: 1x week  Duration in weeks: 8  Plan of Care beginning date: 10/24/2022  Plan of Care expiration date: 12/23/2022  Treatment plan discussed with: patient        Subjective Evaluation    History of Present Illness  Mechanism of injury: Pt a 75year old male that presented to outpatient PT with the diagnosis of a history of a stroke and L hemiparesis  Pt feels like things have be going ok at home  Prior to having a cold and flu, the pt was walking a lot at home  He notices that he stumbles and toes get caught when he is tired  Pt has been wearing his AFO when he is walking a lot, however, today, he was not wearing an AFO   Also, he reported that he uses an [facility] when walking for longer distances   Quality of life: good    Pain  No pain reported    Social Support    Employment status: working  Patient Goals  Patient goals for therapy: increased strength and improved balance          Objective     PF:  B/L 5/5  DF: B/L 5/5  Hip flexion: B/L 5/5  Hip Adduction: L  3/5 R: 5/5  Hip Abduction: B/L 5/5  Knee flexion: B/L  5/5  Knee ext: B/L 5/5     Functional Outcomes: 4/25  5x sts - 8 34 no AFO  Gait Speed: 1 26 m/s no AFO  6 mwt: 1150 no AFO  FGA: 23/30     Functional Outcomes: 8/23  Gait Speed: 1/2 m/s no afo  6 mwt: 1150 ft no afo  5x sts:6 97 sec  FGA:22/30     Functional Outcomes: 9/26  6 mwt: 1075 ft  Gait speed: 1 1 m/s  5x sts 7 9   FGA: 23/30     Functional Outcomes: 10/24   6 mwt: 1174 ft no LLE AFO  Gait Speed: 1 24 m/s  5x sts: 7 28 seconds  FGA:  23/30            Precautions: HTN, Afib          Manuals 10/3 10/24 3/30 4/5 4/13 4/25 8/23  8/30 9/9                                                       Neuro Re-Ed             Busy environmental navigation              [facility]   10 min at 2 2  Mph 7% incline 5# aw    10 min at 2 5 mph 5# aw 10 min at 2 2  Mph 7% incline 5# aw    10 min at 2 5 mph 5# aw 10 Min at 2 2 mph 7% incline 5# LLE AW    10 Min at 2 5 mph 5# LLE AW 10 min at 2 2 mph 10 min    10 min at 2 0 mph 5% incline 10 min 10 min at 10% incline 1 5 mph  10 min at 10% incline 1 5 mph    10 min with 5# aw 1 6 mph 10 min at 10% incline 1 5 mph    10 min with 5# aw 1 6 mph   Step ups     SOLO 5# L AW 8" step standing on foam x 15 Fwd each  8" 10x 10# aw  Up and over with 10# bolster and 10# aw on L LE up and over fwd 4 x, lat 3 x Up and over with 10# bolster and 10# aw on L LE up and over fwd 4 x, lat 3 x   Resisted bkwds, fwd, lat  SOLO  7 5# LLE   Purple band    FWD   1/2 track x 3 laps  RPE: 8/10    Lat   1/4 track x3 laps   RPE: 8/10 mtb //bars 50x ea Over uneven mat with weights and pads solo - 4 laps ea Over uneven Red mat with wts and pads SOLO x 5 laps  5# L AW    MTB 40 ft x 4 in each direction    Step up and over LLE 7 5# aw  3 6" steps   x4 laps     RPE: 7/10            Shuttle  LLE 7 5# aw  4 cones   x2 laps  10# weight in RUE LLE 7 5# aw  4 cones    FWD/backward  x2 trials            Chair push LLE 7 5# aw  20 ft x5 laps   #35 weight on chair   /  RPE:             Side step up and over LLE 7 5# aw  6" step   lat  x10ea            Match cone and turn LLE 7 5# aw    5 cones match 20 ft distance   x2 rounds            hurdles LLE 7 5# aw  6" hurdles (4) with airex pads in between  x4 laps    RPE: 8-9/10 LLE 7 5# aw  6" hurdles (4) with airex pads in between  x4 laps    RPE: 9/10 12" 5 laps On uneven mat iwht weights/pads 6" 4 laps solo On uneven Red mat with wts/pads 6" hurdles (3)  SOLO x 5 laps  5# L AW  10x 10# aw  10x 10 # aw    Foam and steps         6" and 8" steps with foam before and after steps - 5# aw solo step 4 laps fwd/lat ea    Ther Ex             Treadmill  Solo  LLE #7 5 aw  1 5 mph   5% incline  3 minutes  RPE: 6/10  1 5 mph   7% incline  7 minutes  RPE: 8/10                                                                                                       Ther Activity                                       Gait Training                                       Modalities

## 2022-10-24 NOTE — PROGRESS NOTES
Occupational Therapy Daily Note:    Today's date: 10/24/2022  Patient name: Hiral Rodgers  : 1955  MRN: [id number]  Referring provider: Maritza Palacio MD  Dx:   Encounter Diagnosis   Name Primary? • History of stroke Yes       Subjective: "Now this I like!"    Objective: Pt engaged in skilled OT treatment session with focus on UE NMR, UE strengthening, UE endurance and tone reduction/spasticity management to increase engagement, endurance, tolerance, and independence with daily ADL and IADL tasks  CPT Code Minutes                                           Task Details        Therapeutic Activity               Neuro Re-Ed  Pt transitioned to table top with FES applied x 2 leads to forearm flexors and x 2 leads to forearm extensors on EMS-A with focus on pt grasping a cone and transferring across midline  Downgraded task to pt grasping small dowels when flexor pads turned on and releasing when extensors stimulated  - Continued with pt grasping small foam blocks with a pincer and lateral pinch requiring stabilization by OT to maintain a pincer grasp during movement in order to improve FM demands, automaticity of grasp/release     Pt concluded with b/l UE movements into forearm supination and pronation grasping round hoop with focus on improving ROM, sustained grasp on handle simulating driving demands  Therapeutic Exercise  Pt tolerated 5 min x 2 in prograde/retrograde motions at 1 5 resist in retrograde and 1 0 resist in prograde (with coban) on UBE with focus on increasing proximal UE strength, endurance, act tolerance and LUE ROM, and sustained grasp on handle to inc indep and safety with ADL/IADL fxn and fxnl reaching tasks  Manual          Self Care           Assessment: Tolerated treatment well  Patient would benefit from continued skilled OT  Pt tolerated UB exercises well with moderate challenge level and most difficulty in prograde 2* pt losing sustained grasp  Pt demo significant challenge with pressure grading, a refined pincer grasp requiring stabilization assist at digits and assist with obtaining dowel in position of hand  Pt benefit from e-stim for improving extension response to release dowels  Pt demo G grasp on hoop given stabilization assist during movements  Plan: Continued skilled OT per POC  Volitional grasp/release on flexors and extensors  Simulation of driving on steering wheel       INTERVENTION COMMENTS:  Diagnosis: History of stroke [Z86 73]  Precautions: fall risk  FOTO: n/a  Insurance: Payor: Meghan Avila / Plan: [facility] PLAN 361 / Product Type: Blue Fee for Service /   6 of 12 visits through 11/8/2022 (60 [facility] combined PT/OT)   PN due 10/26/2022

## 2022-10-24 NOTE — LETTER
2022    Micah Huggins MD  [address]    Patient: Vivian Penn   YOB: 1955   Date of Visit: 10/24/2022     Encounter Diagnosis     ICD-10-CM    1  History of stroke  Z86 73    2  Left hemiparesis [facility])  G81 94        Dear Dr Alayna Ro:    Thank you for your recent referral of Vivian Penn  Please review the attached evaluation summary from Rakesh's recent visit  Please verify that you agree with the plan of care by signing the attached order  If you have any questions or concerns, please do not hesitate to call  I sincerely appreciate the opportunity to share in the care of one of your patients and hope to have another opportunity to work with you in the near future  Sincerely,    Chaparro Bhatti, PT      Referring Provider:      I certify that I have read the below Plan of Care and certify the need for these services furnished under this plan of treatment while under my care  Micah Huggins MD  [address]  [place] [address]  Via Fax: [phone]          PT Re-Evaluation    Today's date: 10/24/2022  Patient name: Vivian Penn  : 1955  MRN: [id number]  Referring provider: Fabiola Campoverde MD  Dx:   Encounter Diagnosis     ICD-10-CM    1  History of stroke  Z86 73    2  Left hemiparesis ([facility] [address] )  G81 94        Start Time: 0900  Stop Time: 1000  Total time in clinic (min): 60 minutes    Assessment  Assessment details: Assessment details: Pt a 75year old that presented to outpatient PT with the diagnosis of a stroke and left hemiparesis  He presented with decreased strength, decreased L ankle DF ROM, increased L PF tone, and decreased L hip and knee coordination during gait, which impacts his balance and gait efficiency  Also, per the normative values for the pt's age, he has decreased endurance  Pt would benefit from skilled PT to improve his balance, endurance, and strength       Impairments: abnormal gait, impaired balance, impaired physical strength and safety issue  Understanding of Dx/Px/POC: good   Prognosis: good    Goals  Goals from Prior POC   STG:  Pt will complete 6 mwt in 1300 ft within 4 weeks - not met  Pt will complete FGA with a score of 26 or greater wtihin 4 weeks - not met  Pt will improve gait speed to 1 4 m/s within 4 weeks - not met     LTG  1  Pt will consistently participate in HEP within 8 weeks- partially met  Pt has been walking at home  2  Pt will report minimal difficulty when ambulating through airports within 8 weeks  -met     New goals for POC  ST  Pt will improve gait FGA score by 4 points in 4 weeks to show an improvement in the pt's balance  2  Pt will improve 6 MWT by 164 ft in 4 weeks showing an improvement in the pt's endurance  3  Pt will be independent with HEP in 4 weeks to maintain mobility and strength  LT  Pt will improve gait speed to 1 3 m/s in 8 weeks to safely cross the street  2  Pt will improve FGA score to 30/30 in 8 weeks to show an improvement in his balance  Plan  Patient would benefit from: skilled occupational therapy and skilled physical therapy  Planned therapy interventions: balance, home exercise program, strengthening, neuromuscular re-education and therapeutic exercise  Frequency: 1x week  Duration in weeks: 8  Plan of Care beginning date: 10/24/2022  Plan of Care expiration date: 2022  Treatment plan discussed with: patient        Subjective Evaluation    History of Present Illness  Mechanism of injury: Pt a 75year old male that presented to outpatient PT with the diagnosis of a history of a stroke and L hemiparesis  Pt feels like things have be going ok at home  Prior to having a cold and flu, the pt was walking a lot at home  He notices that he stumbles and toes get caught when he is tired  Pt has been wearing his AFO when he is walking a lot, however, today, he was not wearing an AFO   Also, he reported that he uses an [facility] when walking for longer distances   Quality of life: good    Pain  No pain reported    Social Support    Employment status: working  Patient Goals  Patient goals for therapy: increased strength and improved balance          Objective     PF:  B/L 5/5  DF: B/L 5/5  Hip flexion: B/L 5/5  Hip Adduction: L  3/5 R: 5/5  Hip Abduction: B/L 5/5  Knee flexion: B/L  5/5  Knee ext: B/L 5/5     Functional Outcomes: 4/25  5x sts - 8 34 no AFO  Gait Speed: 1 26 m/s no AFO  6 mwt: 1150 no AFO  FGA: 23/30     Functional Outcomes: 8/23  Gait Speed: 1/2 m/s no afo  6 mwt: 1150 ft no afo  5x sts:6 97 sec  FGA:22/30     Functional Outcomes: 9/26  6 mwt: 1075 ft  Gait speed: 1 1 m/s  5x sts 7 9   FGA: 23/30     Functional Outcomes: 10/24   6 mwt: 1174 ft no LLE AFO  Gait Speed: 1 24 m/s  5x sts: 7 28 seconds  FGA:  23/30            Precautions: HTN, Afib          Manuals 10/3 10/24 3/30 4/5 4/13 4/25 8/23  8/30 9/9                                                       Neuro Re-Ed             Busy environmental navigation              Slip practice              treadmill   10 min at 2 2  Mph 7% incline 5# aw    10 min at 2 5 mph 5# aw 10 min at 2 2  Mph 7% incline 5# aw    10 min at 2 5 mph 5# aw 10 Min at 2 2 mph 7% incline 5# LLE AW    10 Min at 2 5 mph 5# LLE AW 10 min at 2 2 mph 10 min    10 min at 2 0 mph 5% incline 10 min 10 min at 10% incline 1 5 mph  10 min at 10% incline 1 5 mph    10 min with 5# aw 1 6 mph 10 min at 10% incline 1 5 mph    10 min with 5# aw 1 6 mph   Step ups     SOLO 5# L AW 8" step standing on foam x 15 Fwd each  8" 10x 10# aw  Up and over with 10# bolster and 10# aw on L LE up and over fwd 4 x, lat 3 x Up and over with 10# bolster and 10# aw on L LE up and over fwd 4 x, lat 3 x   Resisted bkwds, fwd, lat  SOLO  7 5# LLE   Purple band    FWD   1/2 track x 3 laps  RPE: 8/10    Lat   1/4 track x3 laps   RPE: 8/10 mtb //bars 50x ea Over uneven mat with weights and pads solo - 4 laps ea Over uneven Red mat with wts and pads SOLO x 5 laps  5# L AW    MTB 40 ft x 4 in each direction    Step up and over LLE 7 5# aw  3 6" steps   x4 laps     RPE: 7/10            Shuttle  LLE 7 5# aw  4 cones   x2 laps  10# weight in RUE LLE 7 5# aw  4 cones    FWD/backward  x2 trials            Chair push LLE 7 5# aw  20 ft x5 laps   #35 weight on chair   /  RPE:             Side step up and over LLE 7 5# aw  6" step   lat  x10ea            Match cone and turn LLE 7 5# aw    5 cones match 20 ft distance   x2 rounds            hurdles LLE 7 5# aw  6" hurdles (4) with airex pads in between  x4 laps    RPE: 8-9/10 LLE 7 5# aw  6" hurdles (4) with airex pads in between  x4 laps    RPE: 9/10 12" 5 laps On uneven mat iwht weights/pads 6" 4 laps solo On uneven Red mat with wts/pads 6" hurdles (3)  SOLO x 5 laps  5# L AW  10x 10# aw  10x 10 # aw    Foam and steps         6" and 8" steps with foam before and after steps - 5# aw solo step 4 laps fwd/lat ea    Ther Ex             Treadmill  Solo  LLE #7 5 aw  1 5 mph   5% incline  3 minutes  RPE: 6/10  1 5 mph   7% incline  7 minutes  RPE: 8/10                                                                                                       Ther Activity                                       Gait Training                                       Modalities                                           Attestation signed by Ambika House PT at 10/25/2022  4:42 PM:  I supervised the visit  We discussed the case to ensure appropriate continuation and progression of care and I reviewed the documentation

## 2022-10-31 ENCOUNTER — OFFICE VISIT (OUTPATIENT)
Dept: PHYSICAL THERAPY | Facility: CLINIC | Age: 67
End: 2022-10-31

## 2022-10-31 ENCOUNTER — OFFICE VISIT (OUTPATIENT)
Dept: OCCUPATIONAL THERAPY | Facility: CLINIC | Age: 67
End: 2022-10-31

## 2022-10-31 DIAGNOSIS — Z86.73 HISTORY OF STROKE: Primary | ICD-10-CM

## 2022-10-31 DIAGNOSIS — G81.94 LEFT HEMIPARESIS (HCC): ICD-10-CM

## 2022-10-31 NOTE — PROGRESS NOTES
Daily Note     Today's date: 10/31/2022  Patient name: Irma Steele  : 1955  MRN: [id number]  Referring provider: Sybil Allen MD  Dx:   Encounter Diagnosis     ICD-10-CM    1  History of stroke  Z86 73    2  Left hemiparesis ([facility] [address] )  G81 94                   Subjective: Reports fall on Wednesday while combing his hair in the bathroom  States he took a step backwards and left knee buckled on him causing him to fall into the door and into floor  Denies injury  Objective: See treatment diary below      Assessment: Tolerated treatment well  Increased LE fatigue remainder of treatment after progressing speed on TM  Patient demonstrated fatigue post treatment and would benefit from continued PT      Plan: Continue per plan of care        Precautions: HTN, Afib          Manuals 10/3 10/24 10/31  4/13 4/25 8/23  8/30 9/9                                                       Neuro Re-Ed   10/31          Busy environmental navigation              Slip practice              treadmill    10 min at 2 2  Mph 7% incline 5# aw    10 min at 2 5 mph 5# aw 10 Min at 2 2 mph 7% incline 5# LLE AW    10 Min at 2 5 mph 5# LLE AW 10 min at 2 2 mph 10 min    10 min at 2 0 mph 5% incline 10 min 10 min at 10% incline 1 5 mph  10 min at 10% incline 1 5 mph    10 min with 5# aw 1 6 mph 10 min at 10% incline 1 5 mph    10 min with 5# aw 1 6 mph   Step ups     SOLO 5# L AW 8" step standing on foam x 15 Fwd each  8" 10x 10# aw  Up and over with 10# bolster and 10# aw on L LE up and over fwd 4 x, lat 3 x Up and over with 10# bolster and 10# aw on L LE up and over fwd 4 x, lat 3 x   Resisted bkwds, fwd, lat  SOLO  7 5# LLE   Purple band    FWD   1/2 track x 3 laps  RPE: 8/10    Lat   1/4 track x3 laps   RPE: 8/10 SOLO 7 5# LLE  Purple band    Fwd   1/2 track  X 4 laps    Lat 1/2 track 3 laps  Over uneven Red mat with wts and pads SOLO x 5 laps  5# L AW    MTB 40 ft x 4 in each direction    Step up and over LLE 7 5# aw  3 6" steps x4 laps     RPE: 7/10            Shuttle  LLE 7 5# aw  4 cones   x2 laps  10# weight in RUE LLE 7 5# aw  4 cones    FWD/backward  x2 trials  LLE 7 5# AW 4 cones fwd/backward x4 laps          Chair push LLE 7 5# aw  20 ft x5 laps   #35 weight on chair   /  RPE:             Side step up and over LLE 7 5# aw  6" step   lat  x10ea            Match cone and turn LLE 7 5# aw    5 cones match 20 ft distance   x2 rounds            hurdles LLE 7 5# aw  6" hurdles (4) with airex pads in between  x4 laps    RPE: 8-9/10 LLE 7 5# aw  6" hurdles (4) with airex pads in between  x4 laps    RPE: 9/10 7 5# AW LLE  6" hurdles ( 4 )  With airex pads in between x 4 laps     RPE: 9/10  On uneven Red mat with wts/pads 6" hurdles (3)  SOLO x 5 laps  5# L AW  10x 10# aw  10x 10 # aw    Foam and steps         6" and 8" steps with foam before and after steps - 5# aw solo step 4 laps fwd/lat ea    Ther Ex   10/31          Treadmill  Solo  LLE #7 5 aw  1 5 mph   5% incline  3 minutes  RPE: 6/10  1 5 mph   7% incline  7 minutes  RPE: 8/10  SOLO  LLE  #7 5 AW 1  5mph  5% incline 3 minutes  RPE: 5/10;    1 7mph  7% incline  7 minutes  RPE: 8-9/10                                                                                                       Ther Activity                                       Gait Training                                       Modalities

## 2022-10-31 NOTE — PROGRESS NOTES
OCCUPATIONAL THERAPY PROGRESS UPDATE:    10/31/2022  Laureen Ny  1955  [id number]  Bruce Gallegos MD  1  History of stroke        Subjective    "I feel that my hand is doing a little bit better than it was  It seems to be doing a little bit better "     PATIENT GOAL: "To have less tightness  I would love to move my arm better "      Assessment/Plan    Skilled Analysis:  Pt is a 75 y o  male referred to Occupational Therapy s/p History of stroke [Z86 73]  Pt participated in Progress Note today reporting that his hand seems to be doing more, such as stabilizing objects, a martinez container, reaching for door knobs, and was able to place his trailer on a hitch  Pt sustained a recent fall last week when combing his hair in the BR when he stepped backwards, he lost his balance, but was able to get up using his L elbow to push from the floor  Reports trying to relax as much as he can when walking, continuously telling himself to relax his arm down  He is getting BOTOX this week  Pt completes some stretching at home  Pt states he has been utilizing flexbar tools for strengthening forearm (using yellow resistive flex bar)  Is using dynamic splint 2x/week about 6 hrs/week (not wearing at night like he used too due to weight of splint)  Pt states his spasticity "changes" day to day however he notices that when completing functional tasks in home environment such as weed whacking or functional tool use, it improves  Pt participated in skilled OT progress update and following formalized testing, presents with the following areas of deficit: FMC/FMS, GMC/GMS, hypertonicity and ROM, spasticity, dec volitional grasp and release, and ataxia with supine forward flexion exercises impacting indep and completion of ADL/IADL, salient, and leisure tasks    Specifically, pt reports difficulties with bi-manual coordination tasks including donning socks/pants, opening containers and stabilizing jars, and unilateral movements of reaching for functional items  Pt does demo the need for skilled Occupational Therapy services 1x/week for additional 8 more weeks with focus on UE NMR, UE strengthening, UE endurance, FMC/GMC, FMS/GMS and tone reduction strategies, spasticity reduction, PROM, AAROM, AROM, and HEP development to address the goals as listed below  Pt in agreement with POC, POC to  w/in 60 days  Goals:   Motor Short Term Goals (8) WKS  Strength/Endurance  ·  Pt will increase L UE proximal strength to 3/5  through the use of strengthening exercises and home program for eventual return to life roles and salient tasks = NOT MET    · Pt will demo with G tolerance to supine, seated, and in stance exercise x 30 minutes with minimal rest breaks required for increased engagement in life roles and weekly exercise regimen = PARTIALLY MET, improving    · Pt will demo with G carryover of Home Exercise Program to improve functional progression towards goals in Plan of care and for improved functional use of LUE = PARTIALLY MET      Functional Performance  · Pt will increase LUE to gross assist with for ADL/IADL and tabletop tasks for improved functional performance of life roles and salient tasks = NOT MET      AROM/PROM  · Pt will demo with decreased  LUE  shoulder sublux to trace for increased AROM for improved ADL and IADL engagement = NOT MET    · Pt will be able to perform near full range of proximal L UE to demo increased strength and ROM of affected UE for improved ADLs/IADLs = NOT MET    · Pt will be able to perform >1/4 range of wrist/digits and >1/2 range of forearm/elbow of L UE to demo increased strength and ROM of affected UE for improved ADLs/IADLs = NOT MET    · Pt will demo decreased hypertonicity of Modified Linsey Scale (MAS) of L UE to 0 for improved alternate motor patterns, grasp/release, and termination on command for improved dressing/hygiene = NOT MET    · Pt will demo good carryover of clinic and home tone reduction strategies for improved AROM initiation with functional reach with ADL fxn = NOT MET      Modalities  · Pt will tolerate BIONESS/NMES/IASTM for improved motor and sensory performance for overall improved strength, tone reduction, and sensation to inc safety and engagement with ADL/IADL fxn = GOAL MET, ongoing     Pt will increase compliance with  [facility] for improved elasticity of L UE and tolerate fit of dynamic extension splint with wear time of 8 hrs following botox application = = NOT MET (wearing 2x/week, ~6 hrs total)      Treatment Interventions  Tone reduction strategies  Weight bearing  bioness  NMES  CP/HP applications  PROM/AAROM/AROM  HEP development  Closed chain strengthening  fxnl grasp/release      Patient-Specific Functional Scale   Task is scored 0 (unable to perform activity) to 10 (ability to perform activity independently)    Activity Date:  22 Date:  10/31/22   1  Putting socks on  3 "I get them on but I get them on backwards "     Reports sliding from bed when pulling pants on sometimes  2    Opening container, jars, medication bottles  0 0    3  Reaching (for hand railing on stairs, reaching for door handle on car, refrigerator) 0 Reaching for door knobs with some success  Able to get hand to door knob now      4    Using LUE as stabilizer   2-3         Pain Levels  Restin    With Activity:  0    Objective    Impairment Observations:    Upper Extremity     NETTIE MORALEZ Comments                 UPPER EXTREMITY FXN Intact Impaired Dominant Hand: R                         /Pinch Strength         Dynamometer      - Gross Grasp 35 lbs 25 lbs Remained B/L   Pinch Meter       - PINCER 13  lbs 4 lbs Improved 1 lb in L    - TRIPOD 14 lbs 5 lbs     - LATERAL 12  lbs  8 5 lbs Decreased 1 5 lb              AROM (seated)       Elevation/Depression  full    Shoulder Flex/Ext  >3/4 range  Short lever, >1/2 range with long lever   Shoulder Abd  3/4 range + short lever   Shoulder Add  3/4 Horizontal Abd  1/2 range     Horizontal Add  3/4 range     Elbow Flex  >1/2 range    Elbow Ext  1/2 range Significant difficulty extending*    Pronation  Full      Supination  1/2 range     Wrist Flex  1/2 range    Wrist Ext  trace     Digit Flex  full     Digit Ex  trace     Composite Grasp  Full D3-D5, >3/4 range D2    Hook Grasp  Unable to isolate     Opposition  Lateral edge of D2-D3 with manual blocking      Subluxation  1 finger breadth Can actively approximate to reduce to none                               PROM (seated position)         Elevation/Depression  full     Retraction/Protraction  full     Shoulder Flex/Ext  full     Shoulder ABd/ADd  full     Horizontal ABd/Add  full     Elbow Flex  nera full     Elbow Ext  full     Wrist Flex  full     Wrist Ext  full     Digit Flex  full     Digit Ext  Near full                                    MMT         Elevation 4+/5 4/5    Shoulder Flex/Ext 4+/5 2/5    Shoulder Abd 4+/5 2/5    Shoulder ADd 4+/5 2/5    Elbow Flex 4+/5 2/5    Elbow Ext 4+/5 2/5    Wrist Flex 4+/5 2/5    Wrist Ext 4+/5 2/5    Gross Grasp 4+/5 3/5          SENSATION      Myofilaments (2 83 Wnl) 2 83 2 83    Sharp Dull  Intact Intact    Proprioception Intact Impaired    Hot/Cold Temp Intact Intact          MODIFIED HUNTER SCALE (TONE) 0 1    No increase in muscle tone (0)      Slight Increase in muscle tone with catch and release or min resist at end range (1)  Bicep, Wrist/digit flexors    Slight Increase in muscle tone with catch and release, followed by min resistance through remainder of range (1+)      Increased muscle tone through full range, able to be moved easily (2)      Considerable increase in tone, difficult to move (3)      Rigid in Flexion/Extension (4)                                  TODAYS TREATMENT:  Thera Ex:  NMES applied to post delt/supraspinatus x 25 min in session for shoulder subluxation reduction and proximal shoulder control   Pt started on and tolerated UBE 5 min x 2 in prograde/retrograde motions at 1 0 resist seated on UBE with focus on increasing proximal LUE strength/posterior and rhomboid strength, sustained grasp on handle, cardiopulmonary endurance, act tolerance and ROM to inc indep and safety with ADL/IADL fxn and fxnl reaching tasks  OTR provided stabilization assist at wrist/hand in prograde motion 2* flexor tone limiting pt's sustained grasping abilities in this motion  Pt improved with ability to obtain a neutral  over time with tactile assist versus using coban wrap  Neuro Re-ed:  Pt transitioned to various UB exercises for improved functional use of UE for ADLs/IADLs:  - Neutral bicep curl to shoulder press 2 x10 with 1# wrist weight  - Shoulder flexion 2 x10 with 1# wrist weight   - Bed mobility transfer using LUE to assist with movement x3  - Seated scap row with green resistive band 2 x10   - Standing/bent over row with green resistive band 2 x10     Pt then engaged in holding of a round ball while walking in therapy gym req shoulder IR to stabilize at ball during movement  Pt then engaged in ball slams (using air ball) req b/l coordination, force production, and power of LUE  Pt concluded with ball taps/throws using LUE repetitively for improved automaticity, reaction time, speed of LUE movement with pt completing an underhand toss        INTERVENTION COMMENTS:  Diagnosis: History of stroke [Z86 73]  Precautions: fall risk  FOTO: n/a  Insurance: Payor: Kaushal Castillo / Plan: [facility] PLAN 361 / Product Type: Blue Fee for Service /   7 of 12 visits through 11/8/2022 (60 [facility]Y combined PT/OT)   PN due 10/26/2022

## 2022-11-04 NOTE — PROGRESS NOTES
Occupational Therapy Daily Note:    Today's date: 2021  Patient name: Cheyanne Atwood  : 1955  MRN: [id number]  Referring provider: Mary Wolf MD  Dx:   Encounter Diagnosis   Name Primary?  Spasticity as late effect of cerebrovascular accident (CVA) Yes                  Subjective: "I feel like my thumb moves more now"    Objective: Pt seen for OT treatment session focusing on L UE NMR, tone reduction, fxnl grasp/release, hand to target, and proximal strengthening/endurance    Pt tolerated 5 min x 2 (prograde/retrograde motions at 1 6 resist) seated on UBE with focus on increasing proximal UE strength, endurance, act tolerance and ROM to inc indep and safety with ADL/IADL fxn and fxnl reaching tasks  Coban wrap assist to LUE to maintain grasp       Pt tolerated BIONESS neuromodulation setting L UE to reduce tone to distal UE and allow for improved volitional grasp and release with containers and fxnl tasks  Pt tolerated 15 minutes total    Pt engaged in fxnl reach, grasp and release task seated EOB with use of [place] MAS to reach to ring located on R side with LUE and complete lateral pinch to p/u ring and place to vertical target  Target positioned on pt's L to inc L elbow extension and fxnl grasp/release with reaching  Pt req A to release 75% of the time with volitional control indep 25% of the time for lateral pinch/release  Pt then engaged in resistive clothes pin placement/removal with lateral pinch to fatigue to reduce distal tone and improve volitional control  Assessment: Tolerated treatment well  Patient would benefit from continued skilled OT  Plan: Continued skilled OT per POC      INTERVENTION COMMENTS:  Diagnosis: Spasticity as late effect of cerebrovascular accident (CVA) Francisca Lu, R25 2]  Precautions: fall risk  FOTO: to complete  Insurance: Payor: [employer] / Plan: [facility] SYSTEM PLAN 361 / Product Type: Blue Fee for Service /    SL 8 BUPRTO, PN due 21 N/A

## 2022-11-07 ENCOUNTER — OFFICE VISIT (OUTPATIENT)
Dept: PHYSICAL THERAPY | Facility: CLINIC | Age: 67
End: 2022-11-07

## 2022-11-07 ENCOUNTER — OFFICE VISIT (OUTPATIENT)
Dept: OCCUPATIONAL THERAPY | Facility: CLINIC | Age: 67
End: 2022-11-07

## 2022-11-07 ENCOUNTER — REMOTE DEVICE CLINIC VISIT (OUTPATIENT)
Dept: CARDIOLOGY CLINIC | Facility: CLINIC | Age: 67
End: 2022-11-07

## 2022-11-07 DIAGNOSIS — Z86.73 HISTORY OF STROKE: Primary | ICD-10-CM

## 2022-11-07 DIAGNOSIS — G81.94 LEFT HEMIPARESIS (HCC): ICD-10-CM

## 2022-11-07 DIAGNOSIS — Z95.818 PRESENCE OF OTHER CARDIAC IMPLANTS AND GRAFTS: Primary | ICD-10-CM

## 2022-11-07 NOTE — PROGRESS NOTES
Daily Note     Today's date: 2022  Patient name: Lennie Romberg  : 1955  MRN: [id number]  Referring provider: Kim Owusu MD  Dx:   Encounter Diagnosis     ICD-10-CM    1  History of stroke  Z86 73    2  Left hemiparesis ([facility] [address] )  G81 94        Start Time: 0910  Stop Time: 1000  Total time in clinic (min): 50 minutes    Subjective: Pt arrived 10 minutes late to session  Objective: See treatment diary below      Assessment: Today's session focused on multidirectional walking, functional mobility, improving push off in gait, and endurance  When performing the hurdles today, the pt demonstrated increased difficulty with bringing the LLE over the hurdles, and he demonstrated some instability  He demonstrated increased fatigue by end of session  Plan: Continue per plan of care  Progress treatment as tolerated         Precautions: HTN, Afib          Manuals 10/3 10/24 10/31 11/7 4/13 4/25 8/23  8/30 9/9                                                       Neuro Re-Ed   10/31          Busy environmental navigation              Slip practice              treadmill     10 Min at 2 2 mph 7% incline 5# LLE AW    10 Min at 2 5 mph 5# LLE AW 10 min at 2 2 mph 10 min    10 min at 2 0 mph 5% incline 10 min 10 min at 10% incline 1 5 mph  10 min at 10% incline 1 5 mph    10 min with 5# aw 1 6 mph 10 min at 10% incline 1 5 mph    10 min with 5# aw 1 6 mph   Step ups     SOLO 5# L AW 8" step standing on foam x 15 Fwd each  8" 10x 10# aw  Up and over with 10# bolster and 10# aw on L LE up and over fwd 4 x, lat 3 x Up and over with 10# bolster and 10# aw on L LE up and over fwd 4 x, lat 3 x   Resisted bkwds, fwd, lat  SOLO  7 5# LLE   Purple band    FWD   1/2 track x 3 laps  RPE: 8/10    Lat   1/4 track x3 laps   RPE: 8/10 SOLO 7 5# LLE  Purple band    Fwd   1/2 track  X 4 laps    Lat 1/2 track 3 laps SOLO 7 5# LLE  Purple band    Lat 1/4 track x 3 laps    Backwards   1/4 track x 3 laps    RPE: 8/10 Over uneven Red mat with wts and pads SOLO x 5 laps  5# L AW    MTB 40 ft x 4 in each direction    Step up and over LLE 7 5# aw  3 6" steps   x4 laps     RPE: 7/10     SOLO  LLE 7 5# aw  6", 8", 6"  FWD     RPE: 8/10           Shuttle  LLE 7 5# aw  4 cones   x2 laps  10# weight in RUE LLE 7 5# aw  4 cones    FWD/backward  x2 trials  LLE 7 5# AW 4 cones fwd/backward x4 laps LLE 7 5# AW 4 cones fwd/backward x2 trials   While carrying 10# weight in RUE    RPE: 8/10           Chair push LLE 7 5# aw  20 ft x5 laps   #35 weight on chair   /  RPE:             Side step up and over LLE 7 5# aw  6" step   lat  x10ea            Match cone and turn LLE 7 5# aw    5 cones match 20 ft distance   x2 rounds   LLE 7 5# aw    8 cones match 20 ft distance    RPE: 8/10         hurdles LLE 7 5# aw  6" hurdles (4) with airex pads in between  x4 laps    RPE: 8-9/10 LLE 7 5# aw  6" hurdles (4) with airex pads in between  x4 laps    RPE: 9/10 7 5# AW LLE  6" hurdles ( 4 )  With airex pads in between x 4 laps     RPE: 9/10 7 5# AW LLE  Alternating 6" and 8" hurdles    FWD x 3 laps  RPE: 8/10 On uneven Red mat with wts/pads 6" hurdles (3)  SOLO x 5 laps  5# L AW  10x 10# aw  10x 10 # aw    Foam and steps         6" and 8" steps with foam before and after steps - 5# aw solo step 4 laps fwd/lat ea    Ther Ex   10/31          Treadmill  Solo  LLE #7 5 aw  1 5 mph   5% incline  3 minutes  RPE: 6/10  1 5 mph   7% incline  7 minutes  RPE: 8/10  SOLO  LLE  #7 5 AW 1  5mph  5% incline 3 minutes  RPE: 5/10;    1 7mph  7% incline  7 minutes  RPE: 8-9/10   SOLO  LLE  #7 5 AW  1 7 mph  5% incline    X 8 minutes   RPE: 10/10                                                                                                    Ther Activity                                       Gait Training                                       Modalities

## 2022-11-07 NOTE — PROGRESS NOTES
Occupational Therapy Daily Note:    Today's date: 2022  Patient name: Thompson Klinefelter  : 1955  MRN: [id number]  Referring provider: Bin Avery MD  Dx:   Encounter Diagnosis   Name Primary? • History of stroke Yes       Subjective: "This feels really good when I do this " (tricep / supine work)    Objective: Pt engaged in skilled OT treatment session with focus on UE NMR, UE strengthening, UE endurance and tone reduction/spasticity management to increase engagement, endurance, tolerance, and independence with daily ADL and IADL tasks  CPT Code Minutes                                           Task Details        Therapeutic Activity               Neuro Re-Ed  Pt transitioned to various UB exercises for improved functional use of UE for ADLs/IADLs:  - Neutral and supinated bicep curl 2 x10   - Shoulder press 2 x10 with 1# wrist weight  - Shoulder flexion 2 x10 with 1# wrist weight   - Tricep press 2 x10 with 1# wrist weight   - Standing scap rows with blue / moder resist 2 x10   - Bent over standing row 2 x10 with blue/mod resist           Pt completed standing functional reach, grasp/release with focus on assisting with volitional extension of hand using resistive tongs (black/red bands), grasping wooden dowels at table height and transferring to side table in shoulder flexion > tricep extension > digit release  OT provided stabilization assist at elbow and wrist to improve ROM and grasp of objects  Pt concluded with functional walking with 5# DB in a semi-flexed position x 2 min; then using 1# wrist weight walking while performing bicep curls with stabilization assist by OT for multi-tasking demands             Therapeutic Exercise  Pt tolerated standing 5 min x 2 with 1# wrist weight in prograde/retrograde motions at 1 5 resist in retrograde and prograde on UBE with focus on increasing proximal UE strength, endurance, act tolerance and LUE ROM, and sustained grasp on handle to inc indep and safety with ADL/IADL fxn and fxnl reaching tasks  Manual          Self Care           Assessment: Tolerated treatment well  Patient would benefit from continued skilled OT  Pt continuing to demo improved stability, control, and tolerance to supine exercises, would benefit from upgrading weight  Pt able to complete UBE today without loss of  on handle maintaining RPMs between 35-45 in both directions  Pt overall reports standing prograde UBE and standing/reaching task difficulty a 9/10 difficulty  Pt also demo benefit of using resistive tongs for proprioceptive feedback with volitional release 2* difficulty with active release in R hand  Plan: Continued skilled OT per POC  Volitional grasp/release on flexors and extensors  Simulation of driving on steering wheel       INTERVENTION COMMENTS:  Diagnosis: History of stroke [Z86 73]  Precautions: fall risk  FOTO: n/a  Insurance: Payor: Jesse Ewing / Plan: [facility] BC PLAN 361 / Product Type: Blue Fee for Service /   8 of 12 visits through 11/8/2022 (60 [facility] combined PT/OT)   PN due 10/26/2022

## 2022-11-07 NOTE — PROGRESS NOTES
MDT-LOOP RECORDER/ ACTIVE SYSTEM IS MRI CONDITIONAL   CARELINK TRANSMISSION: LOOP RECORDER  PRESENTING RHYTHM NSR @ 73 BPM  BATTERY RRT SINCE 7/30/22 AND HAS BEEN ADDRESSED  NO PATIENT OR DEVICE ACTIVATED EPISODES  NORMAL DEVICE FUNCTION   DL

## 2022-11-14 ENCOUNTER — OFFICE VISIT (OUTPATIENT)
Dept: OCCUPATIONAL THERAPY | Facility: CLINIC | Age: 67
End: 2022-11-14

## 2022-11-14 ENCOUNTER — OFFICE VISIT (OUTPATIENT)
Dept: PHYSICAL THERAPY | Facility: CLINIC | Age: 67
End: 2022-11-14

## 2022-11-14 DIAGNOSIS — Z86.73 HISTORY OF STROKE: Primary | ICD-10-CM

## 2022-11-14 DIAGNOSIS — G81.94 LEFT HEMIPARESIS (HCC): Primary | ICD-10-CM

## 2022-11-14 DIAGNOSIS — Z86.73 HISTORY OF STROKE: ICD-10-CM

## 2022-11-14 NOTE — PROGRESS NOTES
Occupational Therapy Daily Note:    Today's date: 2022  Patient name: Radha Arzola  : 1955  MRN: [id number]  Referring provider: Arelis Engel MD  Dx:   Encounter Diagnosis   Name Primary? • History of stroke Yes       TIME  [address] [address]   1382-1167 Group  0698-1120        Subjective: Reported some shoulder soreness after last session, but felt good  Objective: Pt engaged in skilled OT treatment session with focus on UE NMR, UE strengthening, UE endurance and tone reduction/spasticity management to increase engagement, endurance, tolerance, and independence with daily ADL and IADL tasks  CPT Code Minutes                                           Task Details        Therapeutic Activity               Neuro Re-Ed                   Therapeutic Exercise  Pt transitioned to various UB exercises for improved functional use of UE for ADLs/IADLs using 1 5# wrist weight, x10 reps on first set and x8 reps on second set:  - Neutral/bicep curl to shoulder press   - shoulder flexion 90*   - tricep extension   - shoulder abduction to 90*   - bicep curl with stabilization assist at elbow       Pt tolerated standing 4 min x 2 in prograde/retrograde motions at 1 6 resist in retrograde and prograde on UBE with focus on increasing proximal UE strength, endurance, act tolerance and LUE ROM, and sustained grasp on handle to inc indep and safety with ADL/IADL fxn and fxnl reaching tasks  Pt completed red flexbar strengthening in pronated and supinated , wrist flexion/extension movements x 10 reps  Manual  Pt tolerated instrument assisted soft tissue massage to L forearm to wrist flexor and extensor mass and tendon pulleys while providing low load prolonged stretch to decrease tightness, improve flexibility and extensibility  Followed by prolonged low load stretch to L shoulder in ER, elbow extension, and wrist extension x 30 sec holds    Performed passive tendon glides starting at wrist to MCP's repetitively to improve mobility of digits  Self Care         Assessment: Tolerated treatment well  Patient would benefit from continued skilled OT  Pt demo G tolerance to added weight of 1/2# today with report of 9/10 fatigue and challenge level on 2nd set of exercises  Pt benefit from IASTM today with significant less tightness in distal hand after  Pt demo loss of  multiple times on UBE in prograde motion  Pt demo significant challenge with wrist extension on flexbar 2* flexor tone with pt able to perform ~2-5* of movement req assist at wrist to increase ROM  Plan: Continued skilled OT per POC        INTERVENTION COMMENTS:  Diagnosis: History of stroke [Z86 73]  Precautions: fall risk  FOTO: n/a  Insurance: Payor: [employer] / Plan: [facility] BC PLAN 361 / Product Type: Blue Fee for Service /   9 of 12 visits through 12/31/22 (60 [facility] combined PT/OT)   PN due 11/30/2022

## 2022-11-14 NOTE — PROGRESS NOTES
Daily Note     Today's date: 2022  Patient name: Walter Garcia  : 1955  MRN: [id number]  Referring provider: Lizandro Ricks MD  Dx:   Encounter Diagnosis     ICD-10-CM    1  Left hemiparesis ([address] )  G81 94    2  History of stroke  Z86 73                   Subjective: Pt reports feeling tired today, despite sleeping well last night  Objective: See treatment diary below      Assessment: Today's session focused on push off in gait and endurance  Per pt request, the treadmill speed was increased, but approximately half way through the treadmill trial, the pt required for speed to be decreased due to increased fatigue  Pt demonstrated difficulty with RLE advancement over hurdles  By the end of session, the pt demonstrated increased fatigue  Plan: Continue per plan of care  Progress treatment as tolerated         Precautions: HTN, Afib          Manuals 10/3 10/24 10/31 11/7 11/14 4/25 8/23  8/30 9/9                                                       Neuro Re-Ed   10/31          Busy environmental navigation              Slip practice              treadmill      10 min at 2 2 mph 10 min    10 min at 2 0 mph 5% incline 10 min 10 min at 10% incline 1 5 mph  10 min at 10% incline 1 5 mph    10 min with 5# aw 1 6 mph 10 min at 10% incline 1 5 mph    10 min with 5# aw 1 6 mph   Step ups       8" 10x 10# aw  Up and over with 10# bolster and 10# aw on L LE up and over fwd 4 x, lat 3 x Up and over with 10# bolster and 10# aw on L LE up and over fwd 4 x, lat 3 x   Resisted bkwds, fwd, lat  SOLO  7 5# LLE   Purple band    FWD   1/2 track x 3 laps  RPE: 8/10    Lat   1/4 track x3 laps   RPE: 8/10 SOLO 7 5# LLE  Purple band    Fwd   1/2 track  X 4 laps    Lat 1/2 track 3 laps SOLO 7 5# LLE  Purple band    Lat 1/4 track x 3 laps    Backwards   1/4 track x 3 laps    RPE: 8/10     MTB 40 ft x 4 in each direction    Step up and over LLE 7 5# aw  3 6" steps   x4 laps     RPE: 7/10     SOLO  LLE 7 5# aw  6", 8", 6"  FWD     RPE: 8/10   SOLO  LLE 7 5# aw   FWD x 3 laps   RPE: 8-9/10    Lat x 2 laps   RPE: 9/10        Shuttle  LLE 7 5# aw  4 cones   x2 laps  10# weight in RUE LLE 7 5# aw  4 cones    FWD/backward  x2 trials  LLE 7 5# AW 4 cones fwd/backward x4 laps LLE 7 5# AW 4 cones fwd/backward x2 trials   While carrying 10# weight in RUE    RPE: 8/10   LLE 7 5# AW 4 cones   carrying 10# weight in RUE    Side stepping   X 2 trials   RPE: 9/10    FWD/backward  x2 trials   RPE: 9/10          Chair push LLE 7 5# aw  20 ft x5 laps   #35 weight on chair   /  RPE:     LLE #7 5 aw   50 ft x 2 laps   #35 weights on chair  Side step up and over LLE 7 5# aw  6" step   lat  x10ea            Match cone and turn LLE 7 5# aw    5 cones match 20 ft distance   x2 rounds   LLE 7 5# aw    8 cones match 20 ft distance    RPE: 8/10         hurdles LLE 7 5# aw  6" hurdles (4) with airex pads in between  x4 laps    RPE: 8-9/10 LLE 7 5# aw  6" hurdles (4) with airex pads in between  x4 laps    RPE: 9/10 7 5# AW LLE  6" hurdles ( 4 )  With airex pads in between x 4 laps     RPE: 9/10 7 5# AW LLE  Alternating 6" and 8" hurdles    FWD x 3 laps  RPE: 8/10 7 5# AW LLE  Alternating 6" and 8" hurdles    FWD x 3 laps  RPE: 9/10  10x 10# aw  10x 10 # aw    Foam and steps         6" and 8" steps with foam before and after steps - 5# aw solo step 4 laps fwd/lat ea    Ther Ex   10/31          Treadmill  Solo  LLE #7 5 aw  1 5 mph   5% incline  3 minutes  RPE: 6/10  1 5 mph   7% incline  7 minutes  RPE: 8/10  SOLO  LLE  #7 5 AW 1  5mph  5% incline 3 minutes  RPE: 5/10;    1 7mph  7% incline  7 minutes  RPE: 8-9/10   SOLO  LLE  #7 5 AW  1 7 mph  5% incline    X 8 minutes   RPE: 10/10 SOLO  LLE  #7 5 aw   1 9 mph   6% incline  X4 5 minutes    1 7 mph   6% incline  6 5 minute  RPE: 9/10                                                                                                   Ther Activity                                       Gait Training Modalities

## 2022-11-14 NOTE — PROGRESS NOTES
Daily Note     Today's date: 2022  Patient name: Tung Schulz  : 1955  MRN: [id number]  Referring provider: Samuella Severs, MD  Dx:   Encounter Diagnosis     ICD-10-CM    1  Left hemiparesis ([address] )  G81 94    2  History of stroke  Z86 73                   Subjective: ***      Objective: See treatment diary below      Assessment:       Plan: Continue per plan of care  Progress treatment as tolerated         Precautions: HTN, Afib          Manuals 10/3 10/24 10/31 11/7 11/14 4/25 8/23  8/30 9/9                                                       Neuro Re-Ed   10/31          Busy environmental navigation              Slip practice              treadmill      10 min at 2 2 mph 10 min    10 min at 2 0 mph 5% incline 10 min 10 min at 10% incline 1 5 mph  10 min at 10% incline 1 5 mph    10 min with 5# aw 1 6 mph 10 min at 10% incline 1 5 mph    10 min with 5# aw 1 6 mph   Step ups       8" 10x 10# aw  Up and over with 10# bolster and 10# aw on L LE up and over fwd 4 x, lat 3 x Up and over with 10# bolster and 10# aw on L LE up and over fwd 4 x, lat 3 x   Resisted bkwds, fwd, lat  SOLO  7 5# LLE   Purple band    FWD   1/2 track x 3 laps  RPE: 8/10    Lat   1/4 track x3 laps   RPE: 8/10 SOLO 7 5# LLE  Purple band    Fwd   1/2 track  X 4 laps    Lat 1/2 track 3 laps SOLO 7 5# LLE  Purple band    Lat 1/4 track x 3 laps    Backwards   1/4 track x 3 laps    RPE: 8/10     MTB 40 ft x 4 in each direction    Step up and over LLE 7 5# aw  3 6" steps   x4 laps     RPE: 7/10     SOLO  LLE 7 5# aw  6", 8", 6"  FWD     RPE: 8/10           Shuttle  LLE 7 5# aw  4 cones   x2 laps  10# weight in RUE LLE 7 5# aw  4 cones    FWD/backward  x2 trials  LLE 7 5# AW 4 cones fwd/backward x4 laps LLE 7 5# AW 4 cones fwd/backward x2 trials   While carrying 10# weight in RUE    RPE: 8/10           Chair push LLE 7 5# aw  20 ft x5 laps   #35 weight on chair   /  RPE:             Side step up and over LLE 7 5# aw  6" step   lat  x10ea Match cone and turn LLE 7 5# aw    5 cones match 20 ft distance   x2 rounds   LLE 7 5# aw    8 cones match 20 ft distance    RPE: 8/10         hurdles LLE 7 5# aw  6" hurdles (4) with airex pads in between  x4 laps    RPE: 8-9/10 LLE 7 5# aw  6" hurdles (4) with airex pads in between  x4 laps    RPE: 9/10 7 5# AW LLE  6" hurdles ( 4 )  With airex pads in between x 4 laps     RPE: 9/10 7 5# AW LLE  Alternating 6" and 8" hurdles    FWD x 3 laps  RPE: 8/10   10x 10# aw  10x 10 # aw    Foam and steps         6" and 8" steps with foam before and after steps - 5# aw solo step 4 laps fwd/lat ea    Ther Ex   10/31          Treadmill  Solo  LLE #7 5 aw  1 5 mph   5% incline  3 minutes  RPE: 6/10  1 5 mph   7% incline  7 minutes  RPE: 8/10  SOLO  LLE  #7 5 AW 1  5mph  5% incline 3 minutes  RPE: 5/10;    1 7mph  7% incline  7 minutes  RPE: 8-9/10   SOLO  LLE  #7 5 AW  1 7 mph  5% incline    X 8 minutes   RPE: 10/10 SOLO  LLE  #7 5 aw   1 7 mph   6% incline                                                                                                   Ther Activity                                       Gait Training                                       Modalities

## 2022-11-21 ENCOUNTER — OFFICE VISIT (OUTPATIENT)
Dept: PHYSICAL THERAPY | Facility: CLINIC | Age: 67
End: 2022-11-21

## 2022-11-21 ENCOUNTER — OFFICE VISIT (OUTPATIENT)
Dept: OCCUPATIONAL THERAPY | Facility: CLINIC | Age: 67
End: 2022-11-21

## 2022-11-21 DIAGNOSIS — Z86.73 HISTORY OF STROKE: ICD-10-CM

## 2022-11-21 DIAGNOSIS — Z86.73 HISTORY OF STROKE: Primary | ICD-10-CM

## 2022-11-21 DIAGNOSIS — G81.94 LEFT HEMIPARESIS (HCC): Primary | ICD-10-CM

## 2022-11-21 NOTE — PROGRESS NOTES
Daily Note     Today's date: 2022  Patient name: Ashley Engel  : 1955  MRN: [id number]  Referring provider: Najma Kirk MD  Dx: No diagnosis found  Subjective: Reports that he has had a busy morning, a lot of traffic  Feels that the can continue to improve his balance a lot and that is his main complaint  Feels that he swings his leg when he gets tired  Objective: See treatment diary below      Assessment: Tolerated treatment well  Reported moderated levels of fatigue post session  Session focused on dynamic balance stability during ambulation both sideways and forwards as is patient's main complaint at home  Improved stepping strategy during gait when cued to improve hip and knee flexion during swing in order to reduce amount of circumduction with improved results during stepping activities including gait and step ups  Patient demonstrated fatigue post treatment and would benefit from continued PT      Plan: Continue per plan of care  Progress treatment as tolerated  Progress on uneven surface challenges with FOAM or red mat activities        Precautions: HTN, Afib    Vitals  Pre-session:  132/78, 48 bpm, 97% SPO2:  Post-Session: 138/74, 78 bpm, 98% SPO2      Manuals 10/3 10/24 10/31 11/7 11/14 11/21 8/23 8/30 9/9f                                                   Neuro Re-Ed   10/31         Busy environmental navigation             Slip practice       7 5# AW Static and dynamic  pertubations   SOLO 1/4 lap   6x laps     3x fwd  3x lat      treadmill       10 min at 10% incline 1 5 mph 10 min at 10% incline 1 5 mph    10 min with 5# aw 1 6 mph 10 min at 10% incline 1 5 mph    10 min with 5# aw 1 6 mph   Step ups      SOLO 2 6" steps interspersed by airex foam pads (3)     2x laps fwd  2x laps lat    7 5# AW LLE 8" 10x 10# aw Up and over with 10# bolster and 10# aw on L LE up and over fwd 4 x, lat 3 x Up and over with 10# bolster and 10# aw on L LE up and over fwd 4 x, lat 3 x Resisted bkwds, fwd, lat  SOLO  7 5# LLE   Purple band    FWD   1/2 track x 3 laps  RPE: 8/10    Lat   1/4 track x3 laps   RPE: 8/10 SOLO 7 5# LLE  Purple band    Fwd   1/2 track  X 4 laps    Lat 1/2 track 3 laps SOLO 7 5# LLE  Purple band    Lat 1/4 track x 3 laps    Backwards   1/4 track x 3 laps    RPE: 8/10  NO SOLO Hallway 7 5# AW LLE    fwds #50 w/c sled push, 2 purple TB resistance   2x200'     RPE: 9/10  MTB 40 ft x 4 in each direction    Step up and over LLE 7 5# aw  3 6" steps   x4 laps     RPE: 7/10     SOLO  LLE 7 5# aw  6", 8", 6"  FWD     RPE: 8/10   SOLO  LLE 7 5# aw   FWD x 3 laps   RPE: 8-9/10    Lat x 2 laps   RPE: 9/10       Shuttle  LLE 7 5# aw  4 cones   x2 laps  10# weight in RUE LLE 7 5# aw  4 cones    FWD/backward  x2 trials  LLE 7 5# AW 4 cones fwd/backward x4 laps LLE 7 5# AW 4 cones fwd/backward x2 trials   While carrying 10# weight in RUE    RPE: 8/10   LLE 7 5# AW 4 cones   carrying 10# weight in RUE    Side stepping   X 2 trials   RPE: 9/10    FWD/backward  x2 trials   RPE: 9/10         Chair push LLE 7 5# aw  20 ft x5 laps   #35 weight on chair   /  RPE:     LLE #7 5 aw   50 ft x 2 laps   #35 weights on chair  Side step up and over LLE 7 5# aw  6" step   lat  x10ea           Match cone and turn LLE 7 5# aw    5 cones match 20 ft distance   x2 rounds   LLE 7 5# aw    8 cones match 20 ft distance    RPE: 8/10        hurdles LLE 7 5# aw  6" hurdles (4) with airex pads in between  x4 laps    RPE: 8-9/10 LLE 7 5# aw  6" hurdles (4) with airex pads in between  x4 laps    RPE: 9/10 7 5# AW LLE  6" hurdles ( 4 )  With airex pads in between x 4 laps     RPE: 9/10 7 5# AW LLE  Alternating 6" and 8" hurdles    FWD x 3 laps  RPE: 8/10 7 5# AW LLE  Alternating 6" and 8" hurdles    FWD x 3 laps  RPE: 9/10  10x 10# aw 10x 10 # aw    Foam and steps      SOLO     Interspersed foam pads (4) fwd/lat 3x ea   One LOB [email or web address] to correct  6" and 8" steps with foam before and after steps - 5# aw solo step 4 laps fwd/lat ea    Ther Ex   10/31         Treadmill  Solo  LLE #7 5 aw  1 5 mph   5% incline  3 minutes  RPE: 6/10  1 5 mph   7% incline  7 minutes  RPE: 8/10  SOLO  LLE  #7 5 AW 1  5mph  5% incline 3 minutes  RPE: 5/10;    1 7mph  7% incline  7 minutes  RPE: 8-9/10   SOLO  LLE  #7 5 AW  1 7 mph  5% incline    X 8 minutes   RPE: 10/10 SOLO  LLE  #7 5 aw   1 9 mph   6% incline  X4 5 minutes    1 7 mph   6% incline  6 5 minute  RPE: 9/10 SOLO LLE #7 5 AW     1 7 mph   6% incline x10  mins    RPE: 8-9/10    2 min cool down   x12 mins total                                                                                             Ther Activity                                    Gait Training                                    Modalities

## 2022-11-21 NOTE — PROGRESS NOTES
Occupational Therapy Daily Note:    Today's date: 2022  Patient name: Ann Medina  : 1955  MRN: [id number]  Referring provider: Irma Smith MD  Dx:   Encounter Diagnosis   Name Primary? • History of stroke Yes       TIME  830-840 Group  840-900   9-930 Unsup       Subjective: "My arm feels alive again "  (post mirror/visual feedback exercise)    BOTOX last week  Objective: Pt engaged in skilled OT treatment session with focus on UE NMR, UE strengthening, UE endurance and tone reduction/spasticity management to increase engagement, endurance, tolerance, and independence with daily ADL and IADL tasks  CPT Code Minutes                                           Task Details        Therapeutic Activity               Neuro Re-Ed  Pt transitioned to continued supine exercise with air cast donned to elbow for decreasing flexor tone with movement and providing shoulder stability/control, while pt grasping small ring in hand using a lateral pinch and performing shoulder flexion to bring over top of dowel bar req stability of shoulder and release of finger     - Pt then completed in reverse, grasping O-Ring in shoulder flexion position with lateral pinch to then transfer into shoulder ABDuction for release    - Added mirror for visual feedback with pt performing active movement in RUE for improved mobility in L hand with release, including improved movement for forearm pronation                  Therapeutic Exercise  Pt started with supine exercises with 1 5# wrist weight, performing internal / external rotation, shoulder scaption, supinated bicep curl with stabilization at forearm to maintain position, and chest press 2 x10 to improve proximal UE strength, stability, control and endurance  Manual          Self Care         Assessment: Tolerated treatment well  Patient would benefit from continued skilled OT      Pt demo excellent ability to engage in all UE exercises today and responded well to using mirror for proprioceptive and visual feedback to improve active mobility of L hand  Pt did demo significant fatigue post session 2* pt req increased time with all parts of movement for combining proximal > distal teaming to grasp O-rings, pronate arm, and actively extend thumb  Pt also demo G response to BOTOX from last week demo significantly less tone in hand, which may have improved with release of objects  Plan: Continued skilled OT per POC  Continue using mirror for visual feedback      INTERVENTION COMMENTS:  Diagnosis: History of stroke [Z86 73]  Precautions: fall risk  FOTO: n/a  Insurance: Payor: Stephen Corbett / Plan: [facility] [place] PLAN 361 / Product Type: Blue Fee for Service /   10 of 12 visits through 12/31/22 (60 VPCY combined PT/OT)   PN due 11/30/2022

## 2022-12-02 ENCOUNTER — OFFICE VISIT (OUTPATIENT)
Dept: OCCUPATIONAL THERAPY | Facility: CLINIC | Age: 67
End: 2022-12-02

## 2022-12-02 ENCOUNTER — OFFICE VISIT (OUTPATIENT)
Dept: PHYSICAL THERAPY | Facility: CLINIC | Age: 67
End: 2022-12-02

## 2022-12-02 DIAGNOSIS — G81.94 LEFT HEMIPARESIS (HCC): Primary | ICD-10-CM

## 2022-12-02 DIAGNOSIS — Z86.73 HISTORY OF STROKE: ICD-10-CM

## 2022-12-02 DIAGNOSIS — Z86.73 HISTORY OF STROKE: Primary | ICD-10-CM

## 2022-12-02 NOTE — PROGRESS NOTES
Daily Note     Today's date: 2022  Patient name: James Nagel  : 1955  MRN: [id number]  Referring provider: Emilia Townsend MD  Dx:   Encounter Diagnosis     ICD-10-CM    1  Left hemiparesis ([address] )  G81 94       2  History of stroke  Z86 73                      Subjective: Patient reports no new changes since lats session      Objective: See treatment diary below      Assessment: Focused on limb clearance through push off and weigth on ankle this session  Pt was able to push off with resisted sled push but with incline on treadmill struggled  Plan: Continue per plan of care        Precautions: HTN, Afib    Vitals  Pre-session:  132/78, 48 bpm, 97% SPO2:  Post-Session: 138/74, 78 bpm, 98% SPO2      Manuals 10/3 10/24 10/31 11/7 11/14 11/21 12/2                                                     Neuro Re-Ed   10/31         Busy environmental navigation             Slip practice       7 5# AW Static and dynamic  pertubations   SOLO 1/4 lap   6x laps     3x fwd  3x lat      treadmill            Step ups      SOLO 2 6" steps interspersed by airex foam pads (3)     2x laps fwd  2x laps lat    7 5# AW LLE Up and over 20x with 10# aw R LE solo     Resisted bkwds, fwd, lat  SOLO  7 5# LLE   Purple band    FWD   1/2 track x 3 laps  RPE: 8/10    Lat   1/4 track x3 laps   RPE: 8/10 SOLO 7 5# LLE  Purple band    Fwd   1/2 track  X 4 laps    Lat 1/2 track 3 laps SOLO 7 5# LLE  Purple band    Lat 1/4 track x 3 laps    Backwards   1/4 track x 3 laps    RPE: 8/10  NO SOLO Hallway 7 5# AW LLE    fwds #50 w/c sled push, 2 purple TB resistance   2x200'     RPE: 9/10 Lateral on foam beam - 4 laps      Step up and over LLE 7 5# aw  3 6" steps   x4 laps     RPE: 7/10     SOLO  LLE 7 5# aw  6", 8", 6"  FWD     RPE: 8/10   SOLO  LLE 7 5# aw   FWD x 3 laps   RPE: 8-9/10    Lat x 2 laps   RPE: 9/10  2 6" with airex and one 8" with airex 5 laps MTB resistance solo step     Shuttle  LLE 7 5# aw  4 cones   x2 laps  10# weight in RUE LLE 7 5# aw  4 cones    FWD/backward  x2 trials  LLE 7 5# AW 4 cones fwd/backward x4 laps LLE 7 5# AW 4 cones fwd/backward x2 trials   While carrying 10# weight in RUE    RPE: 8/10   LLE 7 5# AW 4 cones   carrying 10# weight in RUE    Side stepping   X 2 trials   RPE: 9/10    FWD/backward  x2 trials   RPE: 9/10         Chair push LLE 7 5# aw  20 ft x5 laps   #35 weight on chair   /  RPE:     LLE #7 5 aw   50 ft x 2 laps   #35 weights on chair  WC 2 MTB 40 ft x 8     Side step up and over LLE 7 5# aw  6" step   lat  x10ea           Match cone and turn LLE 7 5# aw    5 cones match 20 ft distance   x2 rounds   LLE 7 5# aw    8 cones match 20 ft distance    RPE: 8/10        hurdles LLE 7 5# aw  6" hurdles (4) with airex pads in between  x4 laps    RPE: 8-9/10 LLE 7 5# aw  6" hurdles (4) with airex pads in between  x4 laps    RPE: 9/10 7 5# AW LLE  6" hurdles ( 4 )  With airex pads in between x 4 laps     RPE: 9/10 7 5# AW LLE  Alternating 6" and 8" hurdles    FWD x 3 laps  RPE: 8/10 7 5# AW LLE  Alternating 6" and 8" hurdles    FWD x 3 laps  RPE: 9/10       Foam and steps      SOLO     Interspersed foam pads (4) fwd/lat 3x ea  One LOB modAx1 to correct      Ther Ex   10/31         Treadmill  Solo  LLE #7 5 aw  1 5 mph   5% incline  3 minutes  RPE: 6/10  1 5 mph   7% incline  7 minutes  RPE: 8/10  SOLO  LLE  #7 5 AW 1  5mph  5% incline 3 minutes  RPE: 5/10;    1 7mph  7% incline  7 minutes  RPE: 8-9/10   SOLO  LLE  #7 5 AW  1 7 mph  5% incline    X 8 minutes   RPE: 10/10 SOLO  LLE  #7 5 aw   1 9 mph   6% incline  X4 5 minutes    1 7 mph   6% incline  6 5 minute  RPE: 9/10 SOLO LLE #7 5 AW     1 7 mph   6% incline x10  mins    RPE: 8-9/10    2 min cool down   x12 mins total    Solo L LE 10# aw incline 8% 1 5 mph 2 min x3    2 4 mph 8% incline no aw 2 min                                                                                             Ther Activity                                    Gait Training Modalities

## 2022-12-02 NOTE — PROGRESS NOTES
Occupational Therapy Daily Note:    Today's date: 2022  Patient name: James Nagel  : 1955  MRN: [id number]  Referring provider: Emilia Townsend MD  Dx:   Encounter Diagnosis   Name Primary? • History of stroke Yes       Subjective: "sometimes I don't know which way my hand should go"  Objective: Pt engaged in skilled OT treatment session with focus on UE NMR, UE strengthening, UE endurance, FMC/GMC, FMS/GMS and body awareness and tone reduction to increase engagement, endurance, tolerance, and independence with daily ADL and IADL tasks  CPT Code Minutes                                           Task Details        Therapeutic Activity 10 Pt engaged in dynamic activity to improve functional motor performance in grasping, elbow extension promoting fxl fwd reaching w/target demands and overall activity endurance  Pt instructed to retrieve large beads and place to vertical dowels placed midline of pt facilitating fxl grasp/release and fwd reaching for increased indep in self care and home mngt tasks  Pt required assist for stabilization at forearm and assist to open digits for release of beads  Pt able to sustain grasp on bead for transfer to dowel  Neuro Re-Ed 40 Pt tolerated electrical stimulation w/pad placement to forearm extensors and triceps facilitating wrist/digit extension and elbow extension  Pt LUE elevated on wedge stabilizing 2# weight in hand performing wrist extension with assist required providing additional prolonged stretch to wrist and digits for tone reduction  Pt unable to bring wrist into neutral with assist of STIM  Therapeutic Exercise 10 For UB exercise benefit and to increase overall cardiovascular health, proximal strength, gross grasp and endurance, pt engaged in [address] for 5 min prograde/5 min retrograde w/resistance at L1 5  Manual          Self Care           Assessment: Tolerated treatment well   Pt noted with difficulty motor planning during fxl movement patterns for peg placement with wrist sustained in neutral and elbow extension, min droppage noted during transfer of beads  Patient would benefit from continued skilled OT      Plan: Continued skilled OT per POC      INTERVENTION COMMENTS:  Diagnosis: History of stroke [Z86 73]  Precautions: fall risk  FOTO: n/a  Insurance: Payor: Ben Bishop / Plan: [facility] PLAN 361 / Product Type: Blue Fee for Service /   11 of 12 visits through 12/31/22 (60 VPCY combined PT/OT)   PN due 11/30/2022

## 2022-12-05 ENCOUNTER — APPOINTMENT (OUTPATIENT)
Dept: OCCUPATIONAL THERAPY | Facility: CLINIC | Age: 67
End: 2022-12-05

## 2022-12-05 ENCOUNTER — APPOINTMENT (OUTPATIENT)
Dept: PHYSICAL THERAPY | Facility: CLINIC | Age: 67
End: 2022-12-05

## 2022-12-07 ENCOUNTER — EVALUATION (OUTPATIENT)
Dept: OCCUPATIONAL THERAPY | Facility: CLINIC | Age: 67
End: 2022-12-07

## 2022-12-07 ENCOUNTER — OFFICE VISIT (OUTPATIENT)
Dept: PHYSICAL THERAPY | Facility: CLINIC | Age: 67
End: 2022-12-07

## 2022-12-07 DIAGNOSIS — Z86.73 HISTORY OF STROKE: ICD-10-CM

## 2022-12-07 DIAGNOSIS — G81.94 LEFT HEMIPARESIS (HCC): Primary | ICD-10-CM

## 2022-12-07 DIAGNOSIS — Z86.73 HISTORY OF STROKE: Primary | ICD-10-CM

## 2022-12-07 NOTE — PROGRESS NOTES
OCCUPATIONAL THERAPY PROGRESS UPDATE #2:    12/7/2022  Skyler Rams  [birth year]  [id number]  Quyen Houston MD  1  History of stroke        Subjective    "Some days are better than other days  Sometimes it goes really well, but I think there are more better days  I notice wanting to do more with my left and I am thinking about it "     PATIENT GOAL: "To have less tightness  I would love to move my arm better "      Assessment/Plan    Skilled Analysis:  Pt is a 75 y o  male referred to Occupational Therapy s/p History of stroke [Z86 73]  Pt participated in Progress Note #2 today  Pt reports feeling a little more sore than usual with exercises, which is a good feeling for him  Pt recently had BOTOX 3 weeks ago and reports less tightness in his digits and wrist  Pt continuing to report that his hand is doing more such as stabilizing objects when opening containers, reaching for door knobs, using LUE to assist with getting out of bed  Pt reports recently engaging in cutting of styrofoam with L arm stabilizing while cutting the pieces although as day progressed his LUE fatigued more  Is completing distal FMS with flexbar's at home for strengthening and stretching  Is using dynamic splint 2x/week about 6 hrs/week (not wearing at night like he used too due to weight of splint)  Pt states his spasticity "changes" day to day however he notices that when completing functional tasks in home environment such as weed whacking or functional tool use, it improves  Pt participated in skilled OT progress update and following formalized testing, presents with the following areas of deficit: FMC/FMS, GMC/GMS, hypertonicity and ROM, spasticity, dec volitional grasp and release, and ataxia with supine forward flexion exercises impacting indep and completion of ADL/IADL, salient, and leisure tasks    Specifically, pt reports difficulties with bi-manual coordination tasks including donning socks/pants, opening containers and stabilizing jars, and unilateral movements of reaching for functional items  Pt does demo the need for skilled Occupational Therapy services 1x/week for additional 8-10 more weeks with focus on UE NMR, UE strengthening, UE endurance, FMC/GMC, FMS/GMS and tone reduction strategies, spasticity reduction, PROM, AAROM, AROM, and HEP development to address the goals as listed below  Pt in agreement with POC, POC to  w/in 60 days  Goals:   Motor Short Term Goals (8) WKS  Strength/Endurance  ·  Pt will increase L UE proximal strength to 3/5  through the use of strengthening exercises and home program for eventual return to life roles and salient tasks = PARTIALLY MET    · Pt will demo with G tolerance to supine, seated, and in stance exercise x 30 minutes with minimal rest breaks required for increased engagement in life roles and weekly exercise regimen = PARTIALLY MET, improving    · Pt will demo with G carryover of Home Exercise Program to improve functional progression towards goals in Plan of care and for improved functional use of LUE = PARTIALLY MET      Functional Performance  · Pt will increase LUE to gross assist with for ADL/IADL and tabletop tasks for improved functional performance of life roles and salient tasks = PARTIALLY MET      AROM/PROM  · Pt will demo with decreased  LUE shoulder sublux to trace for increased AROM for improved ADL and IADL engagement = PARTIALLY MET    · Pt will be able to perform near full range of proximal L UE to demo increased strength and ROM of affected UE for improved ADLs/IADLs = NOT MET    · Pt will be able to perform > 1/4 range of wrist/digits and > 1/2 range of forearm/elbow of L UE to demo increased strength and ROM of affected UE for improved ADLs/IADLs = NOT MET    · Pt will demo decreased hypertonicity of Modified Linsey Scale (MAS) of L UE to 0 for improved alternate motor patterns, grasp/release, and termination on command for improved dressing/hygiene = PARTIALLY MET    · Pt will demo good carryover of clinic and home tone reduction strategies for improved AROM initiation with functional reach with ADL fxn = PARTIALLY MET      Modalities  · Pt will tolerate BIONESS/NMES/IASTM for improved motor and sensory performance for overall improved strength, tone reduction, and sensation to inc safety and engagement with ADL/IADL fxn = GOAL MET, ongoing     Pt will increase compliance with [facility] for improved elasticity of L UE and tolerate fit of dynamic extension splint with wear time of 8 hrs following botox application = PARTIALLY MET (wearing 2x/week, ~6 hrs total)      Treatment Interventions  Tone reduction strategies  Weight bearing  bioness  NMES  CP/HP applications  PROM/AAROM/AROM  HEP development  Closed chain strengthening  fxnl grasp/release      Patient-Specific Functional Scale   Task is scored 0 (unable to perform activity) to 10 (ability to perform activity independently)  Activity Date:  22 Date:  10/31/22   1  Putting socks/pants on  3 4-5/10 for pants  IMPROVED    2  Opening container, jars, medication bottles  0 0 - unable to open     3-4/10 with stabilizing the bottle     3  Reaching (for hand railing on stairs, reaching for door handle on car, refrigerator) 0 4/10   IMPROVED    Difficulty combining reaching and grasping      4    Using LUE as stabilizer   3-4   IMPROVED, depends on fatigue levels          Pain Levels  Restin    With Activity:  0    Objective    Impairment Observations:    Upper Extremity     RUE LUBJ Comments                 UPPER EXTREMITY FXN Intact Impaired Dominant Hand: R                         /Pinch Strength         Dynamometer      - Gross Grasp 35 lbs 25 lbs Remained L    Pinch Meter       - PINCER 13  lbs 3 5 lbs Slight decrease     - TRIPOD 14 lbs 6 lbs Improved 1 lb     - LATERAL 12  lbs  7 lbs Slight decrease              AROM (seated)       Elevation/Depression  full    Shoulder Flex/Ext  >3/4 range  Short lever, >1/2 range with long lever   Shoulder Abd  3/4 range + short lever   Shoulder Add  3/4    Horizontal Abd  1/2 range     Horizontal Add  3/4 range     Elbow Flex  >1/2 range    Elbow Ext  1/2 range Significant difficulty extending*    Pronation  Full      Supination  1/2 range     Wrist Flex  1/2 range    Wrist Ext  trace     Digit Flex  full     Digit Ex  trace     Composite Grasp  Full D3-D5, >3/4 range D2    Hook Grasp  Unable to isolate     Opposition  Lateral edge of D2-D3 with manual blocking      Subluxation  1 finger breadth Can actively approximate to reduce to none                               PROM (seated position)         Elevation/Depression  full     Retraction/Protraction  full     Shoulder Flex/Ext  full     Shoulder ABd/ADd  full     Horizontal ABd/Add  full     Elbow Flex  nera full     Elbow Ext  full     Wrist Flex  full     Wrist Ext  full     Digit Flex  full     Digit Ext  Near full                                    MMT         Elevation 4+/5 4/5    Shoulder Flex/Ext 4+/5 2/5    Shoulder Abd 4+/5 2/5    Shoulder ADd 4+/5 2/5    Elbow Flex 4+/5 2/5    Elbow Ext 4+/5 2/5    Wrist Flex 4+/5 2/5    Wrist Ext 4+/5 2/5    Gross Grasp 4+/5 3/5          SENSATION      Myofilaments (2 83 Wnl) 2 83 2 83    Sharp Dull  Intact Intact    Proprioception Intact Impaired    Hot/Cold Temp Intact Intact          MODIFIED HUNTER SCALE (TONE) 0 1    No increase in muscle tone (0)      Slight Increase in muscle tone with catch and release or min resist at end range (1)  Bicep, Wrist/digit flexors    Slight Increase in muscle tone with catch and release, followed by min resistance through remainder of range (1+)      Increased muscle tone through full range, able to be moved easily (2)      Considerable increase in tone, difficult to move (3)      Rigid in Flexion/Extension (4)                                  TODAYS TREATMENT:   Thera Ex:  Pt tolerated 5 min x 2 in prograde/retrograde motions at 1 7 resist seated on UBE with focus on increasing proximal UE strength, endurance, act tolerance and ROM to inc indep and safety with ADL/IADL fxn and fxnl reaching tasks  - In stance, pt completed x3 min retrograde at 1 7 resist   Pt reported moderate difficulty of 6-7/10 with unilateral movement  Pt completed in stance UB TE with 2# wrist weight with manual resist by OT into flexion and extension planes x 10, then shoulder adduction with resist x10, Standing tricep extensions (modified) x10  Pt completed x 40 sec of 5# DB/2# wrist weight holds while ambulating; unable to maintain for 60 sec holds  On 2nd round, pt completed 1 min with holds            INTERVENTION COMMENTS:  Diagnosis: History of stroke [Z86 73]  Precautions: fall risk  FOTO: n/a  Insurance: Payor: BLUE CROSS / Plan: [employer] PLAN 361 / Product Type: Blue Fee for Service /   12 of 12 visits through 12/31/22 (60 VPCY combined PT/OT)   PN due 11/30/2022

## 2022-12-09 NOTE — PROGRESS NOTES
Daily Note     Today's date: 2022  Patient name: Milind Sandoval  : 1955  MRN: [id number]  Referring provider: Ovidio Iglesias MD  Dx:   Encounter Diagnosis     ICD-10-CM    1  Left hemiparesis ([address] )  G81 94       2  History of stroke  Z86 73                      Subjective: Patient reports feeling sore after last session but in a good muscle sore way      Objective: See treatment diary below      Assessment: Patient struggled to get foot up and over 12" hurdles but was able to clear about 75% of the time  Plan to continue to attempt at next session to improve hip flexor strength and limb clearnce during walking  Plan: Continue per plan of care        Precautions: HTN, Afib    Vitals  Pre-session:  132/78, 48 bpm, 97% SPO2:  Post-Session: 138/74, 78 bpm, 98% SPO2      Manuals 10/3 10/24 10/31 11/7 11/14 11/21 12/2 12/7                                                    Neuro Re-Ed   10/31         Busy environmental navigation             Slip practice       7 5# AW Static and dynamic  pertubations   SOLO 1/4 lap   6x laps     3x fwd  3x lat      treadmill            Step ups      SOLO 2 6" steps interspersed by airex foam pads (3)     2x laps fwd  2x laps lat    7 5# AW LLE Up and over 20x with 10# aw R LE solo Up and over 20x with 10# aw R LE solo    Resisted bkwds, fwd, lat  SOLO  7 5# LLE   Purple band    FWD   1/2 track x 3 laps  RPE: 8/10    Lat   1/4 track x3 laps   RPE: 8/10 SOLO 7 5# LLE  Purple band    Fwd   1/2 track  X 4 laps    Lat 1/2 track 3 laps SOLO 7 5# LLE  Purple band    Lat 1/4 track x 3 laps    Backwards   1/4 track x 3 laps    RPE: 8/10  NO SOLO Hallway 7 5# AW LLE    fwds #50 w/c sled push, 2 purple TB resistance   2x200'     RPE: 9/10 Lateral on foam beam - 4 laps  Lateral on foam beam - 4 laps    Step up and over LLE 7 5# aw  3 6" steps   x4 laps     RPE: 7/10     SOLO  LLE 7 5# aw  6", 8", 6"  FWD     RPE: 8/10   SOLO  LLE 7 5# aw   FWD x 3 laps   RPE: 8-9/10    Lat x 2 laps   RPE: 9/10  2 6" with airex and one 8" with airex 5 laps MTB resistance solo step 2 6" with airex and one 8" with airex 5 laps MTB resistance solo step    Shuttle  LLE 7 5# aw  4 cones   x2 laps  10# weight in RUE LLE 7 5# aw  4 cones    FWD/backward  x2 trials  LLE 7 5# AW 4 cones fwd/backward x4 laps LLE 7 5# AW 4 cones fwd/backward x2 trials   While carrying 10# weight in RUE    RPE: 8/10   LLE 7 5# AW 4 cones   carrying 10# weight in RUE    Side stepping   X 2 trials   RPE: 9/10    FWD/backward  x2 trials   RPE: 9/10         Chair push LLE 7 5# aw  20 ft x5 laps   #35 weight on chair   /  RPE:     LLE #7 5 aw   50 ft x 2 laps   #35 weights on chair  WC 2 MTB 40 ft x 8     Side step up and over LLE 7 5# aw  6" step   lat  x10ea           Match cone and turn LLE 7 5# aw    5 cones match 20 ft distance   x2 rounds   LLE 7 5# aw    8 cones match 20 ft distance    RPE: 8/10        hurdles LLE 7 5# aw  6" hurdles (4) with airex pads in between  x4 laps    RPE: 8-9/10 LLE 7 5# aw  6" hurdles (4) with airex pads in between  x4 laps    RPE: 9/10 7 5# AW LLE  6" hurdles ( 4 )  With airex pads in between x 4 laps     RPE: 9/10 7 5# AW LLE  Alternating 6" and 8" hurdles    FWD x 3 laps  RPE: 8/10 7 5# AW LLE  Alternating 6" and 8" hurdles    FWD x 3 laps  RPE: 9/10   12" solo fwd - 4 laps fwd/lat ea    Foam and steps      SOLO     Interspersed foam pads (4) fwd/lat 3x ea  One LOB modAx1 to correct      Ther Ex   10/31         Treadmill  Solo  LLE #7 5 aw  1 5 mph   5% incline  3 minutes  RPE: 6/10  1 5 mph   7% incline  7 minutes  RPE: 8/10  SOLO  LLE  #7 5 AW 1  5mph  5% incline 3 minutes  RPE: 5/10;    1 7mph  7% incline  7 minutes  RPE: 8-9/10   SOLO  LLE  #7 5 AW  1 7 mph  5% incline    X 8 minutes   RPE: 10/10 SOLO  LLE  #7 5 aw   1 9 mph   6% incline  X4 5 minutes    1 7 mph   6% incline  6 5 minute  RPE: 9/10 SOLO LLE #7 5 AW     1 7 mph   6% incline x10  mins    RPE: 8-9/10    2 min cool down   x12 mins total Solo L LE 10# aw incline 8% 1 5 mph 2 min x3    2 4 mph 8% incline no aw 2 min                                                                                             Ther Activity                                    Gait Training                                    Modalities

## 2022-12-12 ENCOUNTER — OFFICE VISIT (OUTPATIENT)
Dept: PHYSICAL THERAPY | Facility: CLINIC | Age: 67
End: 2022-12-12

## 2022-12-12 ENCOUNTER — OFFICE VISIT (OUTPATIENT)
Dept: OCCUPATIONAL THERAPY | Facility: CLINIC | Age: 67
End: 2022-12-12

## 2022-12-12 DIAGNOSIS — Z86.73 HISTORY OF STROKE: ICD-10-CM

## 2022-12-12 DIAGNOSIS — Z86.73 HISTORY OF STROKE: Primary | ICD-10-CM

## 2022-12-12 DIAGNOSIS — G81.94 LEFT HEMIPARESIS (HCC): Primary | ICD-10-CM

## 2022-12-12 NOTE — PROGRESS NOTES
Occupational Therapy Daily Note:    Today's date: 2022  Patient name: Susan Calles  : 1955  MRN: [id number]  Referring provider: Bela Suárez MD  Dx:   Encounter Diagnosis   Name Primary? • History of stroke Yes       Subjective: "That was really hard "     Objective: Pt engaged in skilled OT treatment session with focus on UE NMR, UE strengthening, UE endurance, FMC/GMC, FMS/GMS and body awareness and tone reduction to increase engagement, endurance, tolerance, and independence with daily ADL and IADL tasks  CPT Code Minutes                                           Task Details        Therapeutic Activity               Neuro Re-Ed  Pt engaged in grasping/releasing of 2" blocks using resistive tongs with yellow/red bands on each side providing proprioceptive feedback for active grasp and releasing to improve sensory/motor input for improving active opening/releasing motion  Pt req to grasp blocks and transfer to bowl' then removed from bowl to container crossing midline  OT provided 90% tactile assist and stabilization assist at elbow/wrist or hand with movement  Therapeutic Exercise  Pt tolerated 3 min x 2 IN STANCE for prograde/retrograde motions at 1 7 resist then SEATED 3 min x 2 on UBE with focus on increasing proximal UE strength, cardiopulmonary endurance, act tolerance and ROM to inc indep and safety with ADL/IADL fxn and fxnl reaching tasks  Pt completed in stance UB TE with 2 5# wrist weight with manual resist by OT into flexion and extension planes x 10, then shoulder adduction with resist x10, Standing tricep extensions (modified) x10  Pt completed sustained holds with 5# DB while walking up/down stairs x 1 5 min; then alternated to 5# wrist weight while holding a 2# wrist weight in a hook grasp x 3-4 min to improve dynamic movement with sustained holds in L hand  Manual          Self Care         Assessment: Tolerated treatment well  Patient would benefit from continued skilled OT  Pt reported 8/10 difficulty with prograde motion on UBE; pt reported MAX difficulty in prograde and lost  x10 sec before time was over although demo G tolerance to added wrist weight today  Pt demo improved tolerance and strength for holding larger and heavier weights for sustained periods of time          Plan: Continued skilled OT per POC      INTERVENTION COMMENTS:  Diagnosis: History of stroke [Z86 73]  Precautions: fall risk  FOTO: n/a  Insurance: Payor: [employer] / Plan: [facility] PLAN 361 / Product Type: Blue Fee for Service /   1 of 4 visits through 12/31/22 (60 [facility] combined PT/OT)   PN due 11/30/2022

## 2022-12-12 NOTE — PROGRESS NOTES
Daily Note     Today's date: 2022  Patient name: Mikayla Redd  : 1955  MRN: [id number]  Referring provider: Trisha Ny MD  Dx: No diagnosis found  Subjective: Patient reports to physical therapy with no new changes      Objective: See treatment diary below        Assessment: Utilized slip  for the first time this session in order to improve reactive balance training  Patient performed well while facing toward therapist during pulls and pushes, plan next session to attempt while facing opposite directions  Plan: Continue per plan of care        Precautions: HTN, Afib    Vitals  Pre-session:  132/78, 48 bpm, 97% SPO2:  Post-Session: 138/74, 78 bpm, 98% SPO2      Manuals 10/3 10/24 10/31 11/7 11/14 11/21 12/2 12/7 12/12                                                   Neuro Re-Ed   10/31         Busy environmental navigation             Slip practice       7 5# AW Static and dynamic  pertubations   SOLO 1/4 lap   6x laps     3x fwd  3x lat   20 pulls/pushes slip    treadmill            Step ups      SOLO 2 6" steps interspersed by airex foam pads (3)     2x laps fwd  2x laps lat    7 5# AW LLE Up and over 20x with 10# aw R LE solo Up and over 20x with 10# aw R LE solo 8" with airex pad on top 20x ea LE fwd/lat    Resisted bkwds, fwd, lat  SOLO  7 5# LLE   Purple band    FWD   1/2 track x 3 laps  RPE: 8/10    Lat   1/4 track x3 laps   RPE: 8/10 SOLO 7 5# LLE  Purple band    Fwd   1/2 track  X 4 laps    Lat 1/2 track 3 laps SOLO 7 5# LLE  Purple band    Lat 1/4 track x 3 laps    Backwards   1/4 track x 3 laps    RPE: 8/10  NO SOLO Hallway 7 5# AW LLE    fwds #50 w/c sled push, 2 purple TB resistance   2x200'     RPE: 9/10 Lateral on foam beam - 4 laps  Lateral on foam beam - 4 laps    Step up and over LLE 7 5# aw  3 6" steps   x4 laps     RPE: 7/10     SOLO  LLE 7 5# aw  6", 8", 6"  FWD     RPE: 8/10   SOLO  LLE 7 5# aw   FWD x 3 laps   RPE: 8-9/10    Lat x 2 laps RPE: 9/10  2 6" with airex and one 8" with airex 5 laps MTB resistance solo step 2 6" with airex and one 8" with airex 5 laps MTB resistance solo step    Shuttle  LLE 7 5# aw  4 cones   x2 laps  10# weight in RUE LLE 7 5# aw  4 cones    FWD/backward  x2 trials  LLE 7 5# AW 4 cones fwd/backward x4 laps LLE 7 5# AW 4 cones fwd/backward x2 trials   While carrying 10# weight in RUE    RPE: 8/10   LLE 7 5# AW 4 cones   carrying 10# weight in RUE    Side stepping   X 2 trials   RPE: 9/10    FWD/backward  x2 trials   RPE: 9/10         Chair push LLE 7 5# aw  20 ft x5 laps   #35 weight on chair   /  RPE:     LLE #7 5 aw   50 ft x 2 laps   #35 weights on chair  WC 2 MTB 40 ft x 8     Side step up and over LLE 7 5# aw  6" step   lat  x10ea           Foam beams         5# aw L LE - 5 laps     Tandem 5# L Le - 4 laps    hurdles LLE 7 5# aw  6" hurdles (4) with airex pads in between  x4 laps    RPE: 8-9/10 LLE 7 5# aw  6" hurdles (4) with airex pads in between  x4 laps    RPE: 9/10 7 5# AW LLE  6" hurdles ( 4 )  With airex pads in between x 4 laps     RPE: 9/10 7 5# AW LLE  Alternating 6" and 8" hurdles    FWD x 3 laps  RPE: 8/10 7 5# AW LLE  Alternating 6" and 8" hurdles    FWD x 3 laps  RPE: 9/10   12" solo fwd - 4 laps fwd/lat ea    Foam and steps      SOLO     Interspersed foam pads (4) fwd/lat 3x ea  One LOB modAx1 to correct      Ther Ex   10/31         Treadmill  Solo  LLE #7 5 aw  1 5 mph   5% incline  3 minutes  RPE: 6/10  1 5 mph   7% incline  7 minutes  RPE: 8/10  SOLO  LLE  #7 5 AW 1  5mph  5% incline 3 minutes  RPE: 5/10;    1 7mph  7% incline  7 minutes  RPE: 8-9/10   SOLO  LLE  #7 5 AW  1 7 mph  5% incline    X 8 minutes   RPE: 10/10 SOLO  LLE  #7 5 aw   1 9 mph   6% incline  X4 5 minutes    1 7 mph   6% incline  6 5 minute  RPE: 9/10 SOLO LLE #7 5 AW     1 7 mph   6% incline x10  mins    RPE: 8-9/10    2 min cool down   x12 mins total    Solo L LE 10# aw incline 8% 1 5 mph 2 min x3    2 4 mph 8% incline no aw 2 min      SOLO 5# L LE - 10% incline 1 8 mph 9 min                                                                                       Ther Activity                                    Gait Training                                    Modalities

## 2022-12-19 ENCOUNTER — APPOINTMENT (OUTPATIENT)
Dept: PHYSICAL THERAPY | Facility: CLINIC | Age: 67
End: 2022-12-19

## 2022-12-21 ENCOUNTER — APPOINTMENT (OUTPATIENT)
Dept: OCCUPATIONAL THERAPY | Facility: CLINIC | Age: 67
End: 2022-12-21

## 2022-12-28 ENCOUNTER — OFFICE VISIT (OUTPATIENT)
Dept: OCCUPATIONAL THERAPY | Facility: CLINIC | Age: 67
End: 2022-12-28

## 2022-12-28 ENCOUNTER — OFFICE VISIT (OUTPATIENT)
Dept: PHYSICAL THERAPY | Facility: CLINIC | Age: 67
End: 2022-12-28

## 2022-12-28 DIAGNOSIS — Z86.73 HISTORY OF STROKE: Primary | ICD-10-CM

## 2022-12-28 DIAGNOSIS — G81.94 LEFT HEMIPARESIS (HCC): Primary | ICD-10-CM

## 2022-12-28 DIAGNOSIS — Z86.73 HISTORY OF STROKE: ICD-10-CM

## 2022-12-28 NOTE — LETTER
2022    Anmol Patino MD  [address]    Patient: Gianfranco Borrego   YOB: 1955   Date of Visit: 2022     Encounter Diagnosis     ICD-10-CM    1  Left hemiparesis ([address] )  G81 94       2  History of stroke  Z80 78           Dear Dr Claudia Bravo:    Thank you for your recent referral of Gianfranco Borrego  Please review the attached evaluation summary from Rakesh's recent visit  Please verify that you agree with the plan of care by signing the attached order  If you have any questions or concerns, please do not hesitate to call  I sincerely appreciate the opportunity to share in the care of one of your patients and hope to have another opportunity to work with you in the near future  Sincerely,    Adelle Baumgarten, PT      Referring Provider:      I certify that I have read the below Plan of Care and certify the need for these services furnished under this plan of treatment while under my care  Anmol Patino MD  [address] [facility] [place] [address]  Via Fax: [phone]          Re-evaluation Note     Today's date: 2022  Patient name: Gianfranco Borrego  : 1955  MRN: [id number]  Referring provider: Wanda New MD  Dx:   Encounter Diagnosis     ICD-10-CM    1  Left hemiparesis ([address] )  G81 94       2  History of stroke  Z86 73                      Subjective: Patient reports feeling that he's made gains  Was home sick and didn't do very much over the last two weeks due to having covid         Objective: See treatment diary below    Functional Outcomes:   5x sts - 8 34 no AFO  Gait Speed: 1 26 m/s no AFO  6 mwt: 1150 no AFO  FGA:      Functional Outcomes:   Gait Speed: 1/2 m/s no afo  6 mwt: 1150 ft no afo  5x sts:6 97 sec  FGA:     Functional Outcomes:   6 mwt: 1075 ft  Gait speed: 1 1 m/s  5x sts 7 9   FGA:      Functional Outcomes: 10/24   6 mwt: 1174 ft no LLE AFO  Gait Speed: 1 24 m/s  5x sts: 7 28 seconds  FGA:       Functional Outcomes:   6 mwt: 1175 no AFO  Giat speed: 1 23 m/s no AFO  FGA:      Assessment: Patient presents to physical therapy with a diagnosis of CVA  At this time patient has made minimal progress towards improving gait speed or endurance per testing, however in the recent presence of covid and period of activity and lack of attendance with therapy it is likely that maintaining his current status was due to prior attendance of PT  Plan to continue at 1x/week for 8 more weeks in order to attempt to improve overall endurance and gait speed  Pt has hwoever made progress with FGA otuside of fall risk category  Plan to continue to work on balance as well as giat speed and endurance intherapy  STG: none met - plan to continue 22 to 23  1  Pt will improve gait FGA score by 4 points in 4 weeks to show an improvement in the pt's balance  2  Pt will improve 6 MWT by 164 ft in 4 weeks showing an improvement in the pt's endurance  3  Pt will be independent with HEP in 4 weeks to maintain mobility and strength  LT  Pt will improve gait speed to 1 3 m/s in 8 weeks to safely cross the street  2  Pt will improve FGA score to 30/30 in 8 weeks to show an improvement in his balance      Plan: Continue per plan of care       Precautions: HTN, Afib    Vitals  Pre-session:  132/78, 48 bpm, 97% SPO2:  Post-Session: 138/74, 78 bpm, 98% SPO2      Manuals 10/3 10/24 10/31 11/7 11/14 11/21 12/2 12/7 12/12                                                   Neuro Re-Ed   10/31         Busy environmental navigation             Slip practice       7 5# AW Static and dynamic  pertubations   SOLO 1/4 lap   6x laps     3x fwd  3x lat   20 pulls/pushes slip    treadmill            Step ups      SOLO 2 6" steps interspersed by airex foam pads (3)     2x laps fwd  2x laps lat    7 5# AW LLE Up and over 20x with 10# aw R LE solo Up and over 20x with 10# aw R LE solo 8" with airex pad on top 20x ea LE fwd/lat    Resisted bkwds, fwd, lat  SOLO  7 5# LLE   Purple band    FWD   1/2 track x 3 laps  RPE: 8/10    Lat   1/4 track x3 laps   RPE: 8/10 SOLO 7 5# LLE  Purple band    Fwd   1/2 track  X 4 laps    Lat 1/2 track 3 laps SOLO 7 5# LLE  Purple band    Lat 1/4 track x 3 laps    Backwards   1/4 track x 3 laps    RPE: 8/10  NO SOLO Hallway 7 5# AW LLE    fwds #50 w/c sled push, 2 purple TB resistance   2x200'     RPE: 9/10 Lateral on foam beam - 4 laps  Lateral on foam beam - 4 laps    Step up and over LLE 7 5# aw  3 6" steps   x4 laps     RPE: 7/10     SOLO  LLE 7 5# aw  6", 8", 6"  FWD     RPE: 8/10   SOLO  LLE 7 5# aw   FWD x 3 laps   RPE: 8-9/10    Lat x 2 laps   RPE: 9/10  2 6" with airex and one 8" with airex 5 laps MTB resistance solo step 2 6" with airex and one 8" with airex 5 laps MTB resistance solo step    Shuttle  LLE 7 5# aw  4 cones   x2 laps  10# weight in RUE LLE 7 5# aw  4 cones    FWD/backward  x2 trials  LLE 7 5# AW 4 cones fwd/backward x4 laps LLE 7 5# AW 4 cones fwd/backward x2 trials   While carrying 10# weight in RUE    RPE: 8/10   LLE 7 5# AW 4 cones   carrying 10# weight in RUE    Side stepping   X 2 trials   RPE: 9/10    FWD/backward  x2 trials   RPE: 9/10         Chair push LLE 7 5# aw  20 ft x5 laps   #35 weight on chair   /  RPE:     LLE #7 5 aw   50 ft x 2 laps   #35 weights on chair    WC 2 MTB 40 ft x 8     Side step up and over LLE 7 5# aw  6" step   lat  x10ea           Foam beams         5# aw L LE - 5 laps     Tandem 5# L Le - 4 laps    hurdles LLE 7 5# aw  6" hurdles (4) with airex pads in between  x4 laps    RPE: 8-9/10 LLE 7 5# aw  6" hurdles (4) with airex pads in between  x4 laps    RPE: 9/10 7 5# AW LLE  6" hurdles ( 4 )  With airex pads in between x 4 laps     RPE: 9/10 7 5# AW LLE  Alternating 6" and 8" hurdles    FWD x 3 laps  RPE: 8/10 7 5# AW LLE  Alternating 6" and 8" hurdles    FWD x 3 laps  RPE: 9/10   12" solo fwd - 4 laps fwd/lat ea    Foam and steps      SOLO     Interspersed foam pads (4) fwd/lat 3x ea  One LOB modAx1 to correct      Ther Ex   10/31         Treadmill  Solo  LLE #7 5 aw  1 5 mph   5% incline  3 minutes  RPE: 6/10  1 5 mph   7% incline  7 minutes  RPE: 8/10  SOLO  LLE  #7 5 AW 1  5mph  5% incline 3 minutes  RPE: 5/10;    1 7mph  7% incline  7 minutes  RPE: 8-9/10   SOLO  LLE  #7 5 AW  1 7 mph  5% incline    X 8 minutes   RPE: 10/10 SOLO  LLE  #7 5 aw   1 9 mph   6% incline  X4 5 minutes    1 7 mph   6% incline  6 5 minute  RPE: 9/10 SOLO LLE #7 5 AW     1 7 mph   6% incline x10  mins    RPE: 8-9/10    2 min cool down   x12 mins total    Solo L LE 10# aw incline 8% 1 5 mph 2 min x3    2 4 mph 8% incline no aw 2 min      SOLO 5# L LE - 10% incline 1 8 mph 9 min                                                                                       Ther Activity                                    Gait Training                                    Modalities

## 2022-12-28 NOTE — PROGRESS NOTES
Re-evaluation Note     Today's date: 2022  Patient name: Juan Zuñiga  : 1955  MRN: [id number]  Referring provider: Carlyle Machado MD  Dx:   Encounter Diagnosis     ICD-10-CM    1  Left hemiparesis ([address] )  G81 94       2  History of stroke  Z86 73                      Subjective: Patient reports feeling that he's made gains  Was home sick and didn't do very much over the last two weeks due to having covid  Objective: See treatment diary below    Functional Outcomes:   5x sts - 8 34 no AFO  Gait Speed: 1 26 m/s no AFO  6 mwt: 1150 no AFO  FGA:      Functional Outcomes:   Gait Speed: 1/2 m/s no afo  6 mwt: 1150 ft no afo  5x sts:6 97 sec  FGA:     Functional Outcomes:   6 mwt: 1075 ft  Gait speed: 1 1 m/s  5x sts 7 9   FGA:      Functional Outcomes: 10/24   6 mwt: 1174 ft no LLE AFO  Gait Speed: 1 24 m/s  5x sts: 7 28 seconds  FGA:       Functional Outcomes:   6 mwt: 1175 no AFO  Giat speed: 1 23 m/s no AFO  FGA:      Assessment: Patient presents to physical therapy with a diagnosis of CVA  At this time patient has made minimal progress towards improving gait speed or endurance per testing, however in the recent presence of covid and period of activity and lack of attendance with therapy it is likely that maintaining his current status was due to prior attendance of PT  Plan to continue at 1x/week for 8 more weeks in order to attempt to improve overall endurance and gait speed  Pt has hwoever made progress with FGA otuside of fall risk category  Plan to continue to work on balance as well as giat speed and endurance intherapy  STG: none met - plan to continue 22 to 23  1  Pt will improve gait FGA score by 4 points in 4 weeks to show an improvement in the pt's balance  2  Pt will improve 6 MWT by 164 ft in 4 weeks showing an improvement in the pt's endurance     3  Pt will be independent with HEP in 4 weeks to maintain mobility and strength  LT  Pt will improve gait speed to 1 3 m/s in 8 weeks to safely cross the street  2  Pt will improve FGA score to 30/30 in 8 weeks to show an improvement in his balance      Plan: Continue per plan of care        Precautions: HTN, Afib    Vitals  Pre-session:  132/78, 48 bpm, 97% SPO2:  Post-Session: 138/74, 78 bpm, 98% SPO2      Manuals 10/3 10/24 10/31 11/7 11/14 11/21 12/2 12/7 12/12                                                   Neuro Re-Ed   10/31         Busy environmental navigation             Slip practice       7 5# AW Static and dynamic  pertubations   SOLO 1/4 lap   6x laps     3x fwd  3x lat   20 pulls/pushes slip    treadmill            Step ups      SOLO 2 6" steps interspersed by airex foam pads (3)     2x laps fwd  2x laps lat    7 5# AW LLE Up and over 20x with 10# aw R LE solo Up and over 20x with 10# aw R LE solo 8" with airex pad on top 20x ea LE fwd/lat    Resisted bkwds, fwd, lat  SOLO  7 5# LLE   Purple band    FWD   1/2 track x 3 laps  RPE: 8/10    Lat   1/4 track x3 laps   RPE: 8/10 SOLO 7 5# LLE  Purple band    Fwd   1/2 track  X 4 laps    Lat 1/2 track 3 laps SOLO 7 5# LLE  Purple band    Lat 1/4 track x 3 laps    Backwards   1/4 track x 3 laps    RPE: 8/10  NO SOLO Hallway 7 5# AW LLE    fwds #50 w/c sled push, 2 purple TB resistance   2x200'     RPE: 9/10 Lateral on foam beam - 4 laps  Lateral on foam beam - 4 laps    Step up and over LLE 7 5# aw  3 6" steps   x4 laps     RPE: 7/10     SOLO  LLE 7 5# aw  6", 8", 6"  FWD     RPE: 8/10   SOLO  LLE 7 5# aw   FWD x 3 laps   RPE: 8-9/10    Lat x 2 laps   RPE: /10  2 6" with airex and one 8" with airex 5 laps MTB resistance solo step 2 6" with airex and one 8" with airex 5 laps MTB resistance solo step    Shuttle  LLE 7 5# aw  4 cones   x2 laps  10# weight in RUE LLE 7 5# aw  4 cones    FWD/backward  x2 trials  LLE 7 5# AW 4 cones fwd/backward x4 laps LLE 7 5# AW 4 cones fwd/backward x2 trials   While carrying 10# weight in RUE    RPE: 8/10   LLE 7 5# AW 4 cones   carrying 10# weight in RUE    Side stepping   X 2 trials   RPE: 9/10    FWD/backward  x2 trials   RPE: 9/10         Chair push LLE 7 5# aw  20 ft x5 laps   #35 weight on chair   /  RPE:     LLE #7 5 aw   50 ft x 2 laps   #35 weights on chair  WC 2 MTB 40 ft x 8     Side step up and over LLE 7 5# aw  6" step   lat  x10ea           Foam beams         5# aw L LE - 5 laps     Tandem 5# L Le - 4 laps    hurdles LLE 7 5# aw  6" hurdles (4) with airex pads in between  x4 laps    RPE: 8-9/10 LLE 7 5# aw  6" hurdles (4) with airex pads in between  x4 laps    RPE: 9/10 7 5# AW LLE  6" hurdles ( 4 )  With airex pads in between x 4 laps     RPE: 9/10 7 5# AW LLE  Alternating 6" and 8" hurdles    FWD x 3 laps  RPE: 8/10 7 5# AW LLE  Alternating 6" and 8" hurdles    FWD x 3 laps  RPE: 9/10   12" solo fwd - 4 laps fwd/lat ea    Foam and steps      SOLO     Interspersed foam pads (4) fwd/lat 3x ea  One LOB [email or web address] to correct      Ther Ex   10/31         Treadmill  Solo  LLE #7 5 aw  1 5 mph   5% incline  3 minutes  RPE: 6/10  1 5 mph   7% incline  7 minutes  RPE: 8/10  SOLO  LLE  #7 5 AW 1  5mph  5% incline 3 minutes  RPE: 5/10;    1 7mph  7% incline  7 minutes  RPE: 8-9/10   SOLO  LLE  #7 5 AW  1 7 mph  5% incline    X 8 minutes   RPE: 10/10 SOLO  LLE  #7 5 aw   1 9 mph   6% incline  X4 5 minutes    1 7 mph   6% incline  6 5 minute  RPE: 9/10 SOLO LLE #7 5 AW     1 7 mph   6% incline x10  mins    RPE: 8-9/10    2 min cool down   x12 mins total    Solo L LE 10# aw incline 8% 1 5 mph 2 min x3    2 4 mph 8% incline no aw 2 min      SOLO 5# L LE - 10% incline 1 8 mph 9 min                                                                                       Ther Activity                                    Gait Training                                    Modalities

## 2022-12-28 NOTE — PROGRESS NOTES
Occupational Therapy Daily Note:    Today's date: 2022  Patient name: Kimi Kennedy  : 1955  MRN: [id number]  Referring provider: Danial Corona MD  Dx:   Encounter Diagnosis   Name Primary? • History of stroke Yes       Subjective: "I am feeling tired but it was good "   "It's like I forget how to move this way "    Objective: Pt engaged in skilled OT treatment session with focus on UE NMR, UE strengthening, UE endurance, FMC/GMC, FMS/GMS and body awareness and tone reduction to increase engagement, endurance, tolerance, and independence with daily ADL and IADL tasks  CPT Code Minutes                                           Task Details        Therapeutic Activity               Neuro Re-Ed  OTR facilitated reaching in seated position in shoulder extension with tricep extension using resistive tongs with OTR stabilizing hand in tongs with pt performing active proximal shoulder/elbow movement to improve functional movement patterns out of flexor synergy pattern  Pt grasped large blocks and transferred across midline in horizontal adduction pattern  Therapeutic Exercise  Pt tolerated 3 min x 2 IN STANCE for prograde/retrograde motions at 1 7 resist with 3 lb wrist weight then SEATED unilaterally in LUE 1 5 min in retrograde (with R arm assisting slightly) on UBE with focus on increasing proximal UE strength, cardiopulmonary endurance, act tolerance and ROM to inc indep and safety with ADL/IADL fxn and fxnl reaching tasks  Pt completed in stance UB TE with 3# wrist weight with manual resist by OT into flexion and extension planes x 12, then shoulder adduction and abduction with resist x12, Standing tricep extensions and bicep flexion x12, standing scap rows x12  Manual          Self Care         Assessment: Tolerated treatment well  Patient would benefit from continued skilled OT      Pt demo excellent tolerance to added weight today tolerated 3 lbs for about 40 min  Pt also demo improved strength, tolerance during standing TE and UBE  Pt able to tolerate upgraded UBE although did need assist from R arm to perform full movement in retrograde  Pt required 100% hands on assist during functional reaching movements 2* tone and difficulty with extending forearm against gravity to fullest extent       Plan: Continued skilled OT per POC      INTERVENTION COMMENTS:  Diagnosis: History of stroke [Z86 73]  Precautions: fall risk  FOTO: n/a  Insurance: Payor: [employer] / Plan: [facility] PLAN [id number] / Product Type: Blue Fee for Service /   2 of 4 visits through 12/31/22 (60 [facility] combined PT/OT)   PN due 11/30/2022

## 2023-01-03 ENCOUNTER — APPOINTMENT (OUTPATIENT)
Dept: PHYSICAL THERAPY | Facility: CLINIC | Age: 68
End: 2023-01-03

## 2023-01-09 ENCOUNTER — OFFICE VISIT (OUTPATIENT)
Dept: OCCUPATIONAL THERAPY | Facility: CLINIC | Age: 68
End: 2023-01-09

## 2023-01-09 ENCOUNTER — OFFICE VISIT (OUTPATIENT)
Dept: PHYSICAL THERAPY | Facility: CLINIC | Age: 68
End: 2023-01-09

## 2023-01-09 DIAGNOSIS — G81.94 LEFT HEMIPARESIS (HCC): ICD-10-CM

## 2023-01-09 DIAGNOSIS — Z86.73 HISTORY OF STROKE: Primary | ICD-10-CM

## 2023-01-09 NOTE — PROGRESS NOTES
OCCUPATIONAL THERAPY RE-EVAL:    1/9/2023  Yaneth Varghese  1955  [id number]  Jessee Arteaga MD  1  History of stroke        Subjective    "I've been feeling really good  I am trying to use my left arm more, I do feel stronger than I ever have "     PATIENT GOAL: "To have less tightness  I would love to move my arm better "      Assessment/Plan    Skilled Analysis:  Pt is a 75 y o  male referred to Occupational Therapy s/p History of stroke [Z86 73]  Pt participated in Re-eval  Pt reports feeling a little more sore than usual with exercises, which is a good feeling for him  Pt recently had BOTOX end of November and reports less tightness in his digits and wrist  Pt continuing to report that his hand is doing more such as stabilizing objects when opening containers, reaching for door knobs, using LUE to assist with getting out of bed  Pt reports recently engaging in cutting of styrofoam with L arm stabilizing while cutting the pieces although as day progressed his LUE fatigued more  Is completing distal FMS with flexbar's at home for strengthening and stretching  Is using dynamic splint 2x/week about 6 hrs/week (not wearing at night like he used too due to weight of splint)  Pt states his spasticity "changes" day to day however he notices that when completing functional tasks in home environment such as weed whacking or functional tool use, it improves  In OT, pt has been engaging in functional movements and tasks with significant improvement in tolerance, strength, and movement out of flexor synergy patterns  Pt participated in skilled OT progress update and following formalized testing, presents with the following areas of deficit: FMC/FMS, GMC/GMS, hypertonicity and ROM, spasticity, dec volitional grasp and release, and ataxia with supine forward flexion exercises impacting indep and completion of ADL/IADL, salient, and leisure tasks    Specifically, pt reports difficulties with bi-manual coordination tasks including donning socks/pants, opening containers and stabilizing jars, and unilateral movements of reaching for functional items  Pt does demo the need for skilled Occupational Therapy services 1x/week for additional 12 more weeks with focus on UE NMR, UE strengthening, UE endurance, FMC/GMC, FMS/GMS and tone reduction strategies, spasticity reduction, PROM, AAROM, AROM, and HEP development to address the goals as listed below  Pt in agreement with POC, POC to  w/in 90 days  Goals:   Motor Short Term Goals (8) WKS  Strength/Endurance  ·  Pt will increase L UE proximal strength to 3/5  through the use of strengthening exercises and home program for eventual return to life roles and salient tasks = PARTIALLY MET    · Pt will demo with G tolerance to supine, seated, and in stance exercise x 30 minutes with minimal rest breaks required for increased engagement in life roles and weekly exercise regimen = PARTIALLY MET, improving    · Pt will demo with G carryover of Home Exercise Program to improve functional progression towards goals in Plan of care and for improved functional use of LUE = PARTIALLY MET      Functional Performance  · Pt will increase LUE to gross assist with for ADL/IADL and tabletop tasks for improved functional performance of life roles and salient tasks = PARTIALLY MET      AROM/PROM  · Pt will demo with decreased LUE shoulder sublux to trace for increased AROM for improved ADL and IADL engagement = PARTIALLY MET    · Pt will be able to perform near full range of proximal L UE to demo increased strength and ROM of affected UE for improved ADLs/IADLs = PARTIALLY MET    · Pt will be able to perform > 1/4 range of wrist/digits and > 1/2 range of forearm/elbow of L UE to demo increased strength and ROM of affected UE for improved ADLs/IADLs = NOT MET    · Pt will demo decreased hypertonicity of Modified Linsey Scale (MAS) of L UE to 0 for improved alternate motor patterns, grasp/release, and termination on command for improved dressing/hygiene = PARTIALLY MET    · Pt will demo good carryover of clinic and home tone reduction strategies for improved AROM initiation with functional reach with ADL fxn = PARTIALLY MET    Modalities  · Pt will tolerate BIONESS/NMES/IASTM for improved motor and sensory performance for overall improved strength, tone reduction, and sensation to inc safety and engagement with ADL/IADL fxn = GOAL MET, ongoing     Pt will increase compliance with [facility] for improved elasticity of L UE and tolerate fit of dynamic extension splint with wear time of 8 hrs following botox application = PARTIALLY MET (wearing 2x/week, ~6 hrs total)      Treatment Interventions  Tone reduction strategies  Weight bearing  bioness  NMES  CP/HP applications  PROM/AAROM/AROM  HEP development  Closed chain strengthening  fxnl grasp/release      Patient-Specific Functional Scale   Task is scored 0 (unable to perform activity) to 10 (ability to perform activity independently)  Activity Date:  22 Date:  23   1  Putting socks/pants on  3 4-5/10 for pants  IMPROVED    2  Opening container, jars, medication bottles  0 0 - unable to open     3-4/10 with stabilizing the bottle     3  Reaching (for hand railing on stairs, reaching for door handle on car, refrigerator) 0 4/10   IMPROVED    Difficulty combining reaching and grasping      4    Using LUE as stabilizer   3-4   IMPROVED, depends on fatigue levels          Pain Levels  Restin    With Activity:  0    Objective    Impairment Observations:    Upper Extremity     RUE LUE Comments                 UPPER EXTREMITY FXN Intact Impaired Dominant Hand: R                         /Pinch Strength         Dynamometer      - Gross Grasp 35 lbs 25 lbs Remained L    Pinch Meter       - PINCER 13  lbs 3 5 lbs Slight decrease     - TRIPOD 14 lbs 6 lbs Improved 1 lb     - LATERAL 12  lbs  7 lbs Slight decrease              AROM (seated) Elevation/Depression  full    Shoulder Flex/Ext  >3/4 range  Short lever, >1/2 range with long lever   Shoulder Abd  3/4 range + short lever   Shoulder Add  3/4    Horizontal Abd  1/2 range     Horizontal Add  3/4 range     Elbow Flex  >1/2 range    Elbow Ext  1/2 range Significant difficulty extending*    Pronation  Full      Supination  1/2 range     Wrist Flex  1/2 range    Wrist Ext  trace     Digit Flex  full     Digit Ex  trace     Composite Grasp  Full D3-D5, >3/4 range D2    Hook Grasp  Unable to isolate     Opposition  Lateral edge of D2-D3 with manual blocking      Subluxation  1 finger breadth Can actively approximate to reduce to none                               PROM (seated position)         Elevation/Depression  full     Retraction/Protraction  full     Shoulder Flex/Ext  full     Shoulder ABd/ADd  full     Horizontal ABd/Add  full     Elbow Flex  nera full     Elbow Ext  full     Wrist Flex  full     Wrist Ext  full     Digit Flex  full     Digit Ext  Near full                                    MMT         Elevation 4+/5 4/5    Shoulder Flex/Ext 4+/5 2/5    Shoulder Abd 4+/5 2/5    Shoulder ADd 4+/5 2/5    Elbow Flex 4+/5 2/5    Elbow Ext 4+/5 2/5    Wrist Flex 4+/5 2/5    Wrist Ext 4+/5 2/5    Gross Grasp 4+/5 3/5          SENSATION      Myofilaments (2 83 Wnl) 2 83 2 83    Sharp Dull  Intact Intact    Proprioception Intact Impaired    Hot/Cold Temp Intact Intact          MODIFIED HUNTER SCALE (TONE) 0 1    No increase in muscle tone (0)      Slight Increase in muscle tone with catch and release or min resist at end range (1)  Bicep, Wrist/digit flexors    Slight Increase in muscle tone with catch and release, followed by min resistance through remainder of range (1+)      Increased muscle tone through full range, able to be moved easily (2)      Considerable increase in tone, difficult to move (3)      Rigid in Flexion/Extension (4)                                     TODAYS TREATMENT (1/9): CPT Code Minutes                                           Task Details        Therapeutic Activity               Neuro Re-Ed  OTR facilitated reaching in seated position in shoulder extension with tricep extension using resistive tongs with OTR stabilizing hand in tongs with pt performing active proximal shoulder/elbow movement to improve functional movement patterns out of flexor synergy pattern  Pt grasped dowels and transferred across midline in horizontal adduction pattern  Therapeutic Exercise  Pt tolerated 5 min in retrograde motions at 2 5 resist with 3 lb wrist weight then IN STANCE 2 5 min in retrograde on UBE with focus on increasing proximal UE strength, cardiopulmonary endurance, act tolerance and ROM to inc indep and safety with ADL/IADL fxn and fxnl reaching tasks  Pt completed in stance UB TE with 3# wrist weight with green resistance band with cylindrical grasp, into flexion and extension planes x 12, then shoulder adduction and abduction with resist x 12, Standing tricep extensions and bicep flexion x 12, standing scap rows x 12  In supine with air cast on for tone reduction, pt engaged in shoulder ER/IR rotation pulses for improved stability proximally with OT providing stabilization at elbow             Manual          Self Care         INTERVENTION COMMENTS:  Diagnosis: History of stroke [Z86 73]  Precautions: fall risk  FOTO: n/a  Insurance: Payor: [employer] / Plan: [employer] PLAN 361 / Product Type: Blue Fee for Service /   1 of ___ visits through ___ (61 [facility] combined PT/OT)   PN due 2/9/2023

## 2023-01-09 NOTE — LETTER
January 10, 2023    Raina Webster MD  [address]    Patient: Angeles Sibley   YOB: 1955   Date of Visit: 1/9/2023     Encounter Diagnosis     ICD-10-CM    1  History of stroke  Z80 78           Dear Dr Johnny Koehler:    Thank you for your recent referral of Angeles Sibley  Please review the attached evaluation summary from Rakesh's recent visit  Please verify that you agree with the plan of care by signing the attached order  If you have any questions or concerns, please do not hesitate to call  I sincerely appreciate the opportunity to share in the care of one of your patients and hope to have another opportunity to work with you in the near future  Sincerely,    Kevin, OT      Referring Provider:     I certify that I have read the below Plan of Care and certify the need for these services furnished under this plan of treatment while under my care  Raina Webster MD  [address] [place] [address]  Via Fax: [phone]        OCCUPATIONAL THERAPY RE-EVAL:    1/9/2023  Angeles Sibley  1955  [id number]  Maeve Vasquez MD  1  History of stroke        Subjective    "I've been feeling really good  I am trying to use my left arm more, I do feel stronger than I ever have "     PATIENT GOAL: "To have less tightness  I would love to move my arm better "      Assessment/Plan    Skilled Analysis:  Pt is a 75 y o  male referred to Occupational Therapy s/p History of stroke [Z86 73]  Pt participated in Re-eval  Pt reports feeling a little more sore than usual with exercises, which is a good feeling for him  Pt recently had BOTOX end of November and reports less tightness in his digits and wrist  Pt continuing to report that his hand is doing more such as stabilizing objects when opening containers, reaching for door knobs, using LUE to assist with getting out of bed   Pt reports recently engaging in cutting of styrofoam with L arm stabilizing while cutting the pieces although as day progressed his LUE fatigued more  Is completing distal FMS with flexbar's at home for strengthening and stretching  Is using dynamic splint 2x/week about 6 hrs/week (not wearing at night like he used too due to weight of splint)  Pt states his spasticity "changes" day to day however he notices that when completing functional tasks in home environment such as weed whacking or functional tool use, it improves  In OT, pt has been engaging in functional movements and tasks with significant improvement in tolerance, strength, and movement out of flexor synergy patterns  Pt participated in skilled OT progress update and following formalized testing, presents with the following areas of deficit: FMC/FMS, GMC/GMS, hypertonicity and ROM, spasticity, dec volitional grasp and release, and ataxia with supine forward flexion exercises impacting indep and completion of ADL/IADL, salient, and leisure tasks  Specifically, pt reports difficulties with bi-manual coordination tasks including donning socks/pants, opening containers and stabilizing jars, and unilateral movements of reaching for functional items  Pt does demo the need for skilled Occupational Therapy services 1x/week for additional 12 more weeks with focus on UE NMR, UE strengthening, UE endurance, FMC/GMC, FMS/GMS and tone reduction strategies, spasticity reduction, PROM, AAROM, AROM, and HEP development to address the goals as listed below  Pt in agreement with POC, POC to  w/in 90 days  Goals:   Motor Short Term Goals (8) WKS  Strength/Endurance  ·  Pt will increase L UE proximal strength to 3/5  through the use of strengthening exercises and home program for eventual return to life roles and salient tasks = PARTIALLY MET    · Pt will demo with G tolerance to supine, seated, and in stance exercise x 30 minutes with minimal rest breaks required for increased engagement in life roles and weekly exercise regimen = PARTIALLY MET, improving    · Pt will demo with G carryover of Home Exercise Program to improve functional progression towards goals in Plan of care and for improved functional use of LUE = PARTIALLY MET      Functional Performance  · Pt will increase LUE to gross assist with for ADL/IADL and tabletop tasks for improved functional performance of life roles and salient tasks = PARTIALLY MET      AROM/PROM  · Pt will demo with decreased LUE shoulder sublux to trace for increased AROM for improved ADL and IADL engagement = PARTIALLY MET    · Pt will be able to perform near full range of proximal L UE to demo increased strength and ROM of affected UE for improved ADLs/IADLs = PARTIALLY MET    · Pt will be able to perform > 1/4 range of wrist/digits and > 1/2 range of forearm/elbow of L UE to demo increased strength and ROM of affected UE for improved ADLs/IADLs = NOT MET    · Pt will demo decreased hypertonicity of Modified Linsey Scale (MAS) of L UE to 0 for improved alternate motor patterns, grasp/release, and termination on command for improved dressing/hygiene = PARTIALLY MET    · Pt will demo good carryover of clinic and home tone reduction strategies for improved AROM initiation with functional reach with ADL fxn = PARTIALLY MET    Modalities  · Pt will tolerate BIONESS/NMES/IASTM for improved motor and sensory performance for overall improved strength, tone reduction, and sensation to inc safety and engagement with ADL/IADL fxn = GOAL MET, ongoing     Pt will increase compliance with [facility] for improved elasticity of L UE and tolerate fit of dynamic extension splint with wear time of 8 hrs following botox application = PARTIALLY MET (wearing 2x/week, ~6 hrs total)      Treatment Interventions  Tone reduction strategies  Weight bearing  bioness  NMES  CP/HP applications  PROM/AAROM/AROM  HEP development  Closed chain strengthening  fxnl grasp/release      Patient-Specific Functional Scale Task is scored 0 (unable to perform activity) to 10 (ability to perform activity independently)  Activity Date:  22 Date:  23   1  Putting socks/pants on  3 4-510 for pants  IMPROVED    2  Opening container, jars, medication bottles  0 0 - unable to open     3-4/10 with stabilizing the bottle     3  Reaching (for hand railing on stairs, reaching for door handle on car, refrigerator) 0 4/10   IMPROVED    Difficulty combining reaching and grasping      4    Using LUE as stabilizer   3-4   IMPROVED, depends on fatigue levels          Pain Levels  Restin    With Activity:  0    Objective    Impairment Observations:    Upper Extremity     RUE LUE Comments                 UPPER EXTREMITY FXN Intact Impaired Dominant Hand: R                         /Pinch Strength         Dynamometer      - Gross Grasp 35 lbs 25 lbs Remained L    Pinch Meter       - PINCER 13  lbs 3 5 lbs Slight decrease     - TRIPOD 14 lbs 6 lbs Improved 1 lb     - LATERAL 12  lbs  7 lbs Slight decrease              AROM (seated)       Elevation/Depression  full    Shoulder Flex/Ext  >3/4 range  Short lever, >1/2 range with long lever   Shoulder Abd  3/4 range + short lever   Shoulder Add  3/4    Horizontal Abd  1/2 range     Horizontal Add  3/4 range     Elbow Flex  >1/2 range    Elbow Ext  1/2 range Significant difficulty extending*    Pronation  Full      Supination  1/2 range     Wrist Flex  1/2 range    Wrist Ext  trace     Digit Flex  full     Digit Ex  trace     Composite Grasp  Full D3-D5, >3/4 range D2    Hook Grasp  Unable to isolate     Opposition  Lateral edge of D2-D3 with manual blocking      Subluxation  1 finger breadth Can actively approximate to reduce to none                               PROM (seated position)         Elevation/Depression  full     Retraction/Protraction  full     Shoulder Flex/Ext  full     Shoulder ABd/ADd  full     Horizontal ABd/Add  full     Elbow Flex  nera full     Elbow Ext  full Wrist Flex  full     Wrist Ext  full     Digit Flex  full     Digit Ext  Near full                                    MMT         Elevation 4+/5 4/5    Shoulder Flex/Ext 4+/5 2/5    Shoulder Abd 4+/5 2/5    Shoulder ADd 4+/5 2/5    Elbow Flex 4+/5 2/5    Elbow Ext 4+/5 2/5    Wrist Flex 4+/5 2/5    Wrist Ext 4+/5 2/5    Gross Grasp 4+/5 3/5          SENSATION      Myofilaments (2 83 Wnl) 2 83 2 83    Sharp Dull  Intact Intact    Proprioception Intact Impaired    Hot/Cold Temp Intact Intact          MODIFIED HUNTER SCALE (TONE) 0 1    No increase in muscle tone (0)      Slight Increase in muscle tone with catch and release or min resist at end range (1)  Bicep, Wrist/digit flexors    Slight Increase in muscle tone with catch and release, followed by min resistance through remainder of range (1+)      Increased muscle tone through full range, able to be moved easily (2)      Considerable increase in tone, difficult to move (3)      Rigid in Flexion/Extension (4)                                     TODAYS TREATMENT (1/9):     CPT Code Minutes                                           Task Details        Therapeutic Activity               Neuro Re-Ed  OTR facilitated reaching in seated position in shoulder extension with tricep extension using resistive tongs with OTR stabilizing hand in tongs with pt performing active proximal shoulder/elbow movement to improve functional movement patterns out of flexor synergy pattern  Pt grasped dowels and transferred across midline in horizontal adduction pattern  Therapeutic Exercise  Pt tolerated 5 min in retrograde motions at 2 5 resist with 3 lb wrist weight then IN STANCE 2 5 min in retrograde on UBE with focus on increasing proximal UE strength, cardiopulmonary endurance, act tolerance and ROM to inc indep and safety with ADL/IADL fxn and fxnl reaching tasks            Pt completed in stance UB TE with 3# wrist weight with green resistance band with cylindrical grasp, into flexion and extension planes x 12, then shoulder adduction and abduction with resist x 12, Standing tricep extensions and bicep flexion x 12, standing scap rows x 12  In supine with air cast on for tone reduction, pt engaged in shoulder ER/IR rotation pulses for improved stability proximally with OT providing stabilization at elbow             Manual          Self Care         INTERVENTION COMMENTS:  Diagnosis: History of stroke [Z86 73]  Precautions: fall risk  FOTO: n/a  Insurance: Payor: [employer] / Plan: [facility] PLAN [id number] / Product Type: Blue Fee for Service /   1 of ___ visits through ___ (61 [facility] combined PT/OT)   PN due 2/9/2023

## 2023-01-18 ENCOUNTER — OFFICE VISIT (OUTPATIENT)
Dept: OCCUPATIONAL THERAPY | Facility: CLINIC | Age: 68
End: 2023-01-18

## 2023-01-18 ENCOUNTER — OFFICE VISIT (OUTPATIENT)
Dept: PHYSICAL THERAPY | Facility: CLINIC | Age: 68
End: 2023-01-18

## 2023-01-18 DIAGNOSIS — Z86.73 HISTORY OF STROKE: Primary | ICD-10-CM

## 2023-01-18 DIAGNOSIS — G81.94 LEFT HEMIPARESIS (HCC): ICD-10-CM

## 2023-01-18 NOTE — PROGRESS NOTES
Daily Note     Today's date: 2023  Patient name: Addison Rico  : 1955  MRN: [id number]  Referring provider: Carroll Mosley MD  Dx:   Encounter Diagnosis     ICD-10-CM    1  History of stroke  Z86 73       2  Left hemiparesis ([address] )  G81 94                      Subjective: Pt reprots having a stiff neck and a headache for about a week that hasn't gone away  Did do overhead activity looking up putting in insulation one day  Objective: See treatment diary below    C Spine: Hypomobile upper cervical  t spine: hypomobile        Assessment: Completed some manual therapy and issued TE today for cervical spine as patient had significant increase in HA with eertion  HA completelygone post exercise and manuals  Issued HEP to maintain  Plan to return to aerobic exercise next session  Plan: Continue per plan of care        Precautions: HTN, Afib    Vitals  Pre-session:  132/78, 48 bpm, 97% SPO2:  Post-Session: 138/74, 78 bpm, 98% SPO2      Manuals    T spine mobilization   Gr 4 T1-7                                             Neuro Re-Ed            hopping Bunny hop - 20 ft x 5    Jogging - 20 ft x 5    Foot to foot hop - 20 ft x 5           Slip practice       7 5# AW Static and dynamic  pertubations   SOLO 1/4 lap   6x laps     3x fwd  3x lat   20 pulls/pushes slip    treadmill   10 min total:  3 min 1 6 mph  7 min 2 1 mph 5% incline 1 UE assist solo         Step ups      SOLO 2 6" steps interspersed by airex foam pads (3)     2x laps fwd  2x laps lat    7 5# AW LLE Up and over 20x with 10# aw R LE solo Up and over 20x with 10# aw R LE solo 8" with airex pad on top 20x ea LE fwd/lat    Resisted bkwds, fwd, lat      NO SOLO Hallway 7 5# AW LLE    fwds #50 w/c sled push, 2 purple TB resistance   2x200'     RPE: 9/10 Lateral on foam beam - 4 laps  Lateral on foam beam - 4 laps    Step up and over     SOLO  LLE 7 5# aw   FWD x 3 laps   RPE: 8-9/10    Lat x 2 laps   RPE: 9/10  2 6" with airex and one 8" with airex 5 laps MTB resistance solo step 2 6" with airex and one 8" with airex 5 laps MTB resistance solo step    Shuttle      LLE 7 5# AW 4 cones   carrying 10# weight in RUE    Side stepping   X 2 trials   RPE: 9/10    FWD/backward  x2 trials   RPE: 9/10         Chair push     LLE #7 5 aw   50 ft x 2 laps   #35 weights on chair  WC 2 MTB 40 ft x 8     Side step up and over            Foam beams         5# aw L LE - 5 laps     Tandem 5# L Le - 4 laps    hurdles 12" conor with airex pads between - 4 hurdles - 4 laps fwd/4 laps lat solo step    7 5# AW LLE  Alternating 6" and 8" hurdles    FWD x 3 laps  RPE: 9/10   12" solo fwd - 4 laps fwd/lat ea    Foam and steps 2 8" steps with airex and 1 6" step with airex - 4 laps fwd/lat ea     SOLO     Interspersed foam pads (4) fwd/lat 3x ea   One LOB [email or web address] to correct      Ther Ex            Treadmill  Solo - 10 min 15% grade 1 9 mph    SOLO  LLE  #7 5 aw   1 9 mph   6% incline  X4 5 minutes    1 7 mph   6% incline  6 5 minute  RPE: 9/10 SOLO LLE #7 5 AW     1 7 mph   6% incline x10  mins    RPE: 8-9/10    2 min cool down   x12 mins total    Solo L LE 10# aw incline 8% 1 5 mph 2 min x3    2 4 mph 8% incline no aw 2 min      SOLO 5# L LE - 10% incline 1 8 mph 9 min   llevator stretch   30 sec x 2 ea         UT stretch   30 sec x 2 ea         scap squeeze   [id number]         Chin tuck   [id number]                                             Ther Activity                                    Gait Training                                    Modalities

## 2023-01-18 NOTE — PROGRESS NOTES
Occupational Therapy Daily Note:    Today's date: 2023  Patient name: Elle Kearney  : 1955  MRN: [id number]  Referring provider: Shelbie Moss MD  Dx:   Encounter Diagnosis   Name Primary? • History of stroke Yes       Subjective: "I don't know sometimes it feels like my body doesn't know how to do the movement  If that makes sence"  Objective: Pt engaged in skilled OT treatment session with focus on NMRE, tone reduction and proximal strength/stabilization improving motor skills to increase indep in ADL/IADL's to increase safety, engagement, endurance, tolerance, and independence with daily ADL and IADL tasks  CPT Code Minutes                                           Task Details        Therapeutic Activity               Neuro Re-Ed 27 Pt tolerated electrical stimulation with pad placement to dorsum of hand, forearm extensors and triceps promoting wrist/elbow extension with L forearm positioned on wedge in prolonged stretch  Minimal active movement noted to digits  Seated EOM pt engaged in block retrieval w/targeted demands focusing on fxl movement patterns w/high repetitions improving indep with active arm movements  Pt instructed to perform elbow flex/ext, IR and FF in movement pattern to retrieve and place foam block to target  Pt required tactile cues about 50% of time to guide pt through movement pattern w/assist to stabilize wrist 100%          Therapeutic Exercise 25 Seated EOM pt engaged in therex with use of UE ranger completing protraction/retraction and horizontal add/abd 15x with assist to stabilize hand  Pt required tactile cues for shoulder activation in protraction about 35% of time to decrease use of compensatory techniques  With UE elevated on wedge and saebo barrel placed in hand, PROM provided to wrist in extension/flex x20           Manual 8 Pt tolerated HP applied to upper traps w/massager followed by instrument assisted soft tissue massage focusing on improvement in tissue elasticity and increased blood/nutrient flow increasing ROM/AROM for fxl reach abilities and skin/joint integrity  Self Care           Assessment: Tolerated treatment well  Improvment noted in movement patterns with mass practice  No active movement noted in wrist extension with and without use of stim  Patient would benefit from continued skilled OT      Plan: Continued skilled OT per POC    INTERVENTION COMMENTS:  Diagnosis: History of stroke [Z86 73]  Precautions: fall risk  FOTO: n/a  Insurance: Payor: [employer] / Plan: [employer] PLAN 361 / Product Type: Blue Fee for Service /   2 of 6 visits through ___ (61 [facility] combined PT/OT)   PN due 2/9/2023

## 2023-01-23 ENCOUNTER — APPOINTMENT (OUTPATIENT)
Dept: PHYSICAL THERAPY | Facility: CLINIC | Age: 68
End: 2023-01-23

## 2023-01-23 ENCOUNTER — APPOINTMENT (OUTPATIENT)
Dept: OCCUPATIONAL THERAPY | Facility: CLINIC | Age: 68
End: 2023-01-23

## 2023-01-24 ENCOUNTER — APPOINTMENT (OUTPATIENT)
Dept: OCCUPATIONAL THERAPY | Facility: CLINIC | Age: 68
End: 2023-01-24

## 2023-01-24 ENCOUNTER — APPOINTMENT (OUTPATIENT)
Dept: PHYSICAL THERAPY | Facility: CLINIC | Age: 68
End: 2023-01-24

## 2023-01-28 ENCOUNTER — APPOINTMENT (EMERGENCY)
Dept: CT IMAGING | Facility: HOSPITAL | Age: 68
End: 2023-01-28

## 2023-01-28 ENCOUNTER — HOSPITAL ENCOUNTER (EMERGENCY)
Facility: HOSPITAL | Age: 68
Discharge: HOME | End: 2023-01-28
Attending: EMERGENCY MEDICINE

## 2023-01-28 VITALS
DIASTOLIC BLOOD PRESSURE: 81 MMHG | RESPIRATION RATE: 16 BRPM | TEMPERATURE: 98.1 F | HEART RATE: 50 BPM | SYSTOLIC BLOOD PRESSURE: 153 MMHG | OXYGEN SATURATION: 99 %

## 2023-01-28 DIAGNOSIS — G43.909 MIGRAINE: Primary | ICD-10-CM

## 2023-01-28 LAB
ALBUMIN SERPL BCP-MCNC: 4.8 G/DL (ref 3.5–5)
ALP SERPL-CCNC: 54 U/L (ref 34–104)
ALT SERPL W P-5'-P-CCNC: 43 U/L (ref 7–52)
ANION GAP SERPL CALCULATED.3IONS-SCNC: 5 MMOL/L (ref 4–13)
AST SERPL W P-5'-P-CCNC: 29 U/L (ref 13–39)
ATRIAL RATE: 53 BPM
BASOPHILS # BLD AUTO: 0.05 THOUSANDS/ÂΜL (ref 0–0.1)
BASOPHILS NFR BLD AUTO: 1 % (ref 0–1)
BILIRUB SERPL-MCNC: 1.07 MG/DL (ref 0.2–1)
BUN SERPL-MCNC: 17 MG/DL (ref 5–25)
CALCIUM SERPL-MCNC: 10 MG/DL (ref 8.4–10.2)
CHLORIDE SERPL-SCNC: 105 MMOL/L (ref 96–108)
CO2 SERPL-SCNC: 31 MMOL/L (ref 21–32)
CREAT SERPL-MCNC: 0.95 MG/DL (ref 0.6–1.3)
EOSINOPHIL # BLD AUTO: 0.23 THOUSAND/ÂΜL (ref 0–0.61)
EOSINOPHIL NFR BLD AUTO: 3 % (ref 0–6)
ERYTHROCYTE [DISTWIDTH] IN BLOOD BY AUTOMATED COUNT: 14.8 % (ref 11.6–15.1)
GFR SERPL CREATININE-BSD FRML MDRD: 81 ML/MIN/1.73SQ M
GLUCOSE SERPL-MCNC: 103 MG/DL (ref 65–140)
GLUCOSE SERPL-MCNC: 98 MG/DL (ref 65–140)
HCT VFR BLD AUTO: 44.5 % (ref 36.5–49.3)
HGB BLD-MCNC: 14.5 G/DL (ref 12–17)
IMM GRANULOCYTES # BLD AUTO: 0.02 THOUSAND/UL (ref 0–0.2)
IMM GRANULOCYTES NFR BLD AUTO: 0 % (ref 0–2)
LYMPHOCYTES # BLD AUTO: 2.55 THOUSANDS/ÂΜL (ref 0.6–4.47)
LYMPHOCYTES NFR BLD AUTO: 37 % (ref 14–44)
MCH RBC QN AUTO: 29.2 PG (ref 26.8–34.3)
MCHC RBC AUTO-ENTMCNC: 32.6 G/DL (ref 31.4–37.4)
MCV RBC AUTO: 90 FL (ref 82–98)
MONOCYTES # BLD AUTO: 0.83 THOUSAND/ÂΜL (ref 0.17–1.22)
MONOCYTES NFR BLD AUTO: 12 % (ref 4–12)
NEUTROPHILS # BLD AUTO: 3.17 THOUSANDS/ÂΜL (ref 1.85–7.62)
NEUTS SEG NFR BLD AUTO: 47 % (ref 43–75)
NRBC BLD AUTO-RTO: 0 /100 WBCS
P AXIS: 48 DEGREES
PLATELET # BLD AUTO: 243 THOUSANDS/UL (ref 149–390)
PMV BLD AUTO: 9.9 FL (ref 8.9–12.7)
POTASSIUM SERPL-SCNC: 4.7 MMOL/L (ref 3.5–5.3)
PR INTERVAL: 152 MS
PROT SERPL-MCNC: 7.8 G/DL (ref 6.4–8.4)
QRS AXIS: 8 DEGREES
QRSD INTERVAL: 84 MS
QT INTERVAL: 424 MS
QTC INTERVAL: 397 MS
RBC # BLD AUTO: 4.97 MILLION/UL (ref 3.88–5.62)
SODIUM SERPL-SCNC: 141 MMOL/L (ref 135–147)
T WAVE AXIS: 56 DEGREES
VENTRICULAR RATE: 53 BPM
WBC # BLD AUTO: 6.85 THOUSAND/UL (ref 4.31–10.16)

## 2023-01-28 RX ORDER — METOCLOPRAMIDE HYDROCHLORIDE 5 MG/ML
10 INJECTION INTRAMUSCULAR; INTRAVENOUS ONCE
Status: COMPLETED | OUTPATIENT
Start: 2023-01-28 | End: 2023-01-28

## 2023-01-28 RX ORDER — NAPROXEN 250 MG/1
500 TABLET ORAL ONCE
Status: COMPLETED | OUTPATIENT
Start: 2023-01-28 | End: 2023-01-28

## 2023-01-28 RX ORDER — ACETAMINOPHEN 325 MG/1
650 TABLET ORAL ONCE
Status: COMPLETED | OUTPATIENT
Start: 2023-01-28 | End: 2023-01-28

## 2023-01-28 RX ADMIN — ACETAMINOPHEN 650 MG: 325 TABLET, FILM COATED ORAL at 10:28

## 2023-01-28 RX ADMIN — SODIUM CHLORIDE 1000 ML: 0.9 INJECTION, SOLUTION INTRAVENOUS at 10:28

## 2023-01-28 RX ADMIN — NAPROXEN 500 MG: 250 TABLET ORAL at 10:28

## 2023-01-28 RX ADMIN — METOCLOPRAMIDE 10 MG: 5 INJECTION, SOLUTION INTRAMUSCULAR; INTRAVENOUS at 10:28

## 2023-01-28 NOTE — ED PROVIDER NOTES
History  Chief Complaint   Patient presents with   • CVA/TIA-like Symptoms     Pt reports headache xcouple weeks worsening last couple days on right side, pt also reports slurred speech xcouple days     HPI Pt is a 76 y/o m presenting to the ED with slurred speech for a few days, waxing and waning- initially noticed by daughter at least 2 days ago  Patient has had a right-sided headache that has been also waxing and waning but for several weeks  On Eliquis 5mg  History of paroxysmal A  Fib, bradycardia, CVA 4 years ago with left-sided residual weakness including tongue deviation  Neurodeficits have been consistent for the past 4 years, no new neurodeficit apart from the last few days of slurred speech  Persistent neurodeficits from 4 years ago include LUE, LLE weakness, left sided facial droop, right sided tongue deviation  Headache is described as 7-8 out of 10 pain and waxing/waning for the past few weeks -headache was also present on 1/18 when seen by PT for residual weakness  He has tried Tylenol without much relief  No recent fevers, chills, nausea, vomiting, abdominal pain  No AMS  Prior to Admission Medications   Prescriptions Last Dose Informant Patient Reported? Taking?    Eliquis 5 MG   No No   Sig: TAKE 1 TABLET BY MOUTH TWICE A DAY   FLUoxetine (PROzac) 20 mg capsule   No No   Sig: TAKE 1 CAPSULE BY MOUTH EVERY DAY   amLODIPine (NORVASC) 5 mg tablet   Yes No   Sig: Take 5 mg by mouth daily Taking 1 1/2 daily daily    atorvastatin (LIPITOR) 80 mg tablet   No No   Sig: Take 1 tablet (80 mg total) by mouth every evening   losartan (COZAAR) 50 mg tablet   No No   Sig: Take 1 tablet (50 mg total) by mouth daily   methocarbamol (ROBAXIN) 500 mg tablet   No No   Sig: TAKE 1 TABLET BY MOUTH EVERY 12 HOURS      Facility-Administered Medications: None       Past Medical History:   Diagnosis Date   • Arthritis     in hands   • Cervical herniated disc    • Colon polyp    • Diabetes mellitus ([facility] [address] ) borderline   • Gout     hx of in 2018, none since   • Hyperlipidemia    • Hypertension    • Irregular heart beat     a-fib, has loop recorder   • Kidney stone 2021    hx of   • Migraine    • S/P Botox injection     in left wrist to help increase mobility   • Sleep apnea     mild sleep apnea, cpap was not ordered   • Stroke ([facility] [address] ) 2018    left hemiparesis, wears leg brace and uses cane, unable to use left arm and hand   • Use of cane as ambulatory aid     had CVA in 2018       Past Surgical History:   Procedure Laterality Date   • APPENDECTOMY     • COLONOSCOPY     • KNEE ARTHROSCOPY      right knee   • KNEE SURGERY Right    • NECK SURGERY     • TONSILLECTOMY         Family History   Problem Relation Age of Onset   • Stroke Mother    • Heart disease Father    • ALS Father      I have reviewed and agree with the history as documented  E-Cigarette/Vaping   • E-Cigarette Use Never User      E-Cigarette/Vaping Substances   • Nicotine No    • THC No    • CBD No    • Flavoring No    • Other No    • Unknown No      Social History     Tobacco Use   • Smoking status: Former     Packs/day: 1 00     Types: Cigarettes, Cigars     Quit date:      Years since quittin 0   • Smokeless tobacco: Never   • Tobacco comments:     stopped on 10/20/2018   Vaping Use   • Vaping Use: Never used   Substance Use Topics   • Alcohol use: Yes     Comment: socially    • Drug use: No        Review of Systems   Constitutional: Negative for chills and fever  HENT: Negative for congestion and sore throat  Eyes: Negative for photophobia and visual disturbance  Respiratory: Negative for cough, chest tightness, shortness of breath, wheezing and stridor  Cardiovascular: Negative for chest pain, palpitations and leg swelling  Gastrointestinal: Negative for abdominal distention, abdominal pain, constipation, diarrhea, nausea and vomiting  Genitourinary: Negative for difficulty urinating and dysuria     Musculoskeletal: Negative for neck pain and neck stiffness  Skin: Negative for rash  Neurological: Positive for facial asymmetry (At baseline), speech difficulty (New over the past few days), weakness (at baseline, left side), numbness (At baseline, left) and headaches  Negative for dizziness, tremors, seizures, syncope and light-headedness  Psychiatric/Behavioral: Negative for confusion  All other systems reviewed and are negative  Physical Exam  ED Triage Vitals [01/28/23 0937]   Temperature Pulse Respirations Blood Pressure SpO2   98 1 °F (36 7 °C) 62 18 (!) 177/87 100 %      Temp Source Heart Rate Source Patient Position - Orthostatic VS BP Location FiO2 (%)   Oral Monitor Sitting Right arm --      Pain Score       7             Orthostatic Vital Signs  Vitals:    01/28/23 0937 01/28/23 0947 01/28/23 1100 01/28/23 1230   BP: (!) 177/87 (!) 174/94 144/79 153/81   Pulse: 62  (!) 52 (!) 50   Patient Position - Orthostatic VS: Sitting  Lying Sitting       Physical Exam  Vitals and nursing note reviewed  Constitutional:       General: He is not in acute distress  Appearance: He is not ill-appearing, toxic-appearing or diaphoretic  HENT:      Head: Atraumatic  Nose: Nose normal       Mouth/Throat:      Mouth: Mucous membranes are moist       Pharynx: Oropharynx is clear  No oropharyngeal exudate or posterior oropharyngeal erythema  Eyes:      General: No scleral icterus  Right eye: No discharge  Left eye: No discharge  Extraocular Movements: Extraocular movements intact  Conjunctiva/sclera: Conjunctivae normal       Pupils: Pupils are equal, round, and reactive to light  Neck:      Vascular: No carotid bruit  Comments: No rigidity, unilateral on right  Cardiovascular:      Rate and Rhythm: Bradycardia present  Pulses: Normal pulses  Heart sounds: Normal heart sounds  No murmur heard  No friction rub  No gallop     Pulmonary:      Effort: Pulmonary effort is normal  No respiratory distress  Breath sounds: Normal breath sounds  No stridor  No wheezing, rhonchi or rales  Chest:      Chest wall: No tenderness  Abdominal:      General: Abdomen is flat  There is no distension  Palpations: Abdomen is soft  There is no mass  Tenderness: There is no abdominal tenderness  There is no guarding or rebound  Hernia: No hernia is present  Musculoskeletal:         General: No swelling, deformity or signs of injury  Cervical back: Normal range of motion and neck supple  Tenderness (Base of right occiput, with point tenderness in his right splenius capitis ) present  No rigidity  Right lower leg: No edema  Left lower leg: No edema  Comments: Range of motion at baseline   Lymphadenopathy:      Cervical: No cervical adenopathy  Skin:     General: Skin is warm and dry  Coloration: Skin is not jaundiced or pale  Findings: No bruising, erythema, lesion or rash  Neurological:      Mental Status: He is alert and oriented to person, place, and time  Cranial Nerves: Cranial nerve deficit (At baseline: left-sided facial droop, tongue deviated to right) present  Sensory: Sensory deficit (At baseline, decree sensation on left upper and left lower extremities) present  Motor: Weakness (Left-sided residual weakness in left upper and lower extremity including left-sided facial droop unchanged from baseline) present  Coordination: Coordination normal       Gait: Gait abnormal (At baseline)     Psychiatric:         Mood and Affect: Mood normal          Behavior: Behavior normal          ED Medications  Medications   metoclopramide (REGLAN) injection 10 mg (10 mg Intravenous Given 1/28/23 1028)   acetaminophen (TYLENOL) tablet 650 mg (650 mg Oral Given 1/28/23 1028)   sodium chloride 0 9 % bolus 1,000 mL (0 mL Intravenous Stopped 1/28/23 1140)   naproxen (NAPROSYN) tablet 500 mg (500 mg Oral Given 1/28/23 1028)       Diagnostic Studies  Results Reviewed     Procedure Component Value Units Date/Time    Comprehensive metabolic panel [[id number]]  (Abnormal) Collected: 01/28/23 1004    Lab Status: Final result Specimen: Blood from Arm, Left Updated: 01/28/23 1042     Sodium 141 mmol/L      Potassium 4 7 mmol/L      Chloride 105 mmol/L      CO2 31 mmol/L      ANION GAP 5 mmol/L      BUN 17 mg/dL      Creatinine 0 95 mg/dL      Glucose 103 mg/dL      Calcium 10 0 mg/dL      AST 29 U/L      ALT 43 U/L      Alkaline Phosphatase 54 U/L      Total Protein 7 8 g/dL      Albumin 4 8 g/dL      Total Bilirubin 1 07 mg/dL      eGFR 81 ml/min/1 73sq m     Narrative:      National Kidney Disease Foundation guidelines for Chronic Kidney Disease (CKD):   •  Stage 1 with normal or high GFR (GFR > 90 mL/min/1 73 square meters)  •  Stage 2 Mild CKD (GFR = 60-89 mL/min/1 73 square meters)  •  Stage 3A Moderate CKD (GFR = 45-59 mL/min/1 73 square meters)  •  Stage 3B Moderate CKD (GFR = 30-44 mL/min/1 73 square meters)  •  Stage 4 Severe CKD (GFR = 15-29 mL/min/1 73 square meters)  •  Stage 5 End Stage CKD (GFR <15 mL/min/1 73 square meters)  Note: GFR calculation is accurate only with a steady state creatinine    CBC and differential [[id number]] Collected: 01/28/23 1004    Lab Status: Final result Specimen: Blood from Arm, Left Updated: 01/28/23 1014     WBC 6 85 Thousand/uL      RBC 4 97 Million/uL      Hemoglobin 14 5 g/dL      Hematocrit 44 5 %      MCV 90 fL      MCH 29 2 pg      MCHC 32 6 g/dL      RDW 14 8 %      MPV 9 9 fL      Platelets 723 Thousands/uL      nRBC 0 /100 WBCs      Neutrophils Relative 47 %      Immat GRANS % 0 %      Lymphocytes Relative 37 %      Monocytes Relative 12 %      Eosinophils Relative 3 %      Basophils Relative 1 %      Neutrophils Absolute 3 17 Thousands/µL      Immature Grans Absolute 0 02 Thousand/uL      Lymphocytes Absolute 2 55 Thousands/µL      Monocytes Absolute 0 83 Thousand/µL      Eosinophils Absolute 0 23 Thousand/µL      Basophils Absolute 0 05 Thousands/µL     Fingerstick Glucose (POCT) [[id number]]  (Normal) Collected: 01/28/23 0937    Lab Status: Final result Updated: 01/28/23 0938     POC Glucose 98 mg/dl                  CT head without contrast   Final Result by Jon Clinton MD (01/28 1132)      No acute intracranial abnormality  Chronic right frontotemporal infarction  Chronic cerebral microangiopathic changes  Workstation performed: [id number]               Procedures  Procedures      ED Course     Patient's slurred speech has been persistent for several days, waxing and waning  Headache has been persistent waxing and waning for the past several weeks  No need for stroke alert  Patient is currently anticoagulated on Eliquis  No new neurodeficits apart from slurring of speech  Previous left-sided CVA 4 years ago has residual weakness that is unchanged from baseline per patient and girlfriend  No new ambulatory issues  Pt is a feeling much improved, slurred speech has resolved after resolution of migraine  Offered admission/observation for further evaluation - he would prefer to be discharged home with referral to his neurologist Dr Monica Viveros  Discussed return precautions  He and girlfriend report that he is at baseline and will return as advised  MDM   Pt's symptoms resolved with management if migraine headache  Other than slurred speech pt was at baseline with left sided weakness from previous cva several years prior, was seen recently for similar headache  Initial slurred speech began several days ago, first witnessed by daughter  Pt's vitals were stable, systolic was in the 848'R upon initial presentation, trending down with pain management    Complex migraine  Atypical migraine  Tension headache  TIA  Low suspicion for CVA  DDx including but not limited to: Migraine headache, atypical migraine headache, tension headache, cluster headache; doubt: ICH, SAH, tumor, meningitis, carbon monoxide poisoning, zoster  Disposition  Final diagnoses:   Migraine - Complex     Time reflects when diagnosis was documented in both MDM as applicable and the Disposition within this note     Time User Action Codes Description Comment    1/28/2023  1:25 PM Gaye Garcia Add [G43 909] Migraine     1/28/2023  1:25 PM Gaye Garcia Modify [G43 909] Migraine Complex      ED Disposition     ED Disposition   Discharge    Condition   Stable    Date/Time   Sat Jan 28, 2023  1:39 PM    Comment   Corinne Hall discharge to home/self care  Follow-up Information     Follow up With Specialties Details Why Contact Info    Eunice Randall MD Neurology Schedule an appointment as soon as possible for a visit  Call Monday morning [address]  [phone]            Discharge Medication List as of 1/28/2023  1:39 PM      CONTINUE these medications which have NOT CHANGED    Details   amLODIPine (NORVASC) 5 mg tablet Take 5 mg by mouth daily Taking 1 1/2 daily daily , Starting Tue 1/15/2019, Historical Med      atorvastatin (LIPITOR) 80 mg tablet Take 1 tablet (80 mg total) by mouth every evening, Starting Fri 12/14/2018, Normal      Eliquis 5 MG TAKE 1 TABLET BY MOUTH TWICE A DAY, Normal      FLUoxetine (PROzac) 20 mg capsule TAKE 1 CAPSULE BY MOUTH EVERY DAY, Normal      losartan (COZAAR) 50 mg tablet Take 1 tablet (50 mg total) by mouth daily, Starting Tue 3/12/2019, Normal      methocarbamol (ROBAXIN) 500 mg tablet TAKE 1 TABLET BY MOUTH EVERY 12 HOURS, Normal               PDMP Review     None           ED Provider  Attending physically available and evaluated Corinne Hall I managed the patient along with the ED Attending      Electronically Signed by         Mario De Jesus DO  02/03/23 0413

## 2023-01-28 NOTE — ED ATTENDING ATTESTATION
1/28/2023  IJoel MD, saw and evaluated the patient  I have discussed the patient with the resident/non-physician practitioner and agree with the resident's/non-physician practitioner's findings, Plan of Care, and MDM as documented in the resident's/non-physician practitioner's note, except where noted  All available labs and Radiology studies were reviewed  I was present for key portions of any procedure(s) performed by the resident/non-physician practitioner and I was immediately available to provide assistance  At this point I agree with the current assessment done in the Emergency Department  I have conducted an independent evaluation of this patient a history and physical is as follows: This is a 28-year-old male patient presenting to the ED today for complaint of dizziness  Patient also complained of some associated slurred speech  In addition to this he also had a headache  His headache is unilateral, posterior right side, with associated neck pain which is palpable on exam   Patient otherwise does not have any focal weakness, focal numbness or tingling or any other significantly related symptoms  His exam aside from his initial slurred speech with improvement after therapy for the most part was unremarkable  His differential diagnosis includes: CVA versus atypical migraine versus electrolyte abnormality versus other  Patient had a CBC, metabolic panel, blood sugar, EKG, CT of his head showing no significant abnormalities  He received Tylenol, Reglan, naproxen, and a liter of IV fluids with almost complete resolution of his pain  Patient was offered hospitalization for further work-up, potentially further imaging, especially with his history, and since his symptoms were resolved patient was comfortable going home, and actually requested to go home  Patient is well appearing and stable for discharge  Discussed return precautions and patient expressed understanding   Patient will follow up with PCP as directed and return to ED sooner if worsening or persistent symptoms         ED Course         Critical Care Time  Procedures

## 2023-01-28 NOTE — DISCHARGE INSTRUCTIONS
Follow-up with your neurologist, Dr Mireya Covarrubias - internal referral ordered for possible complex migraine  Drink plenty of clear fluids   Return to ED with worsening neurologic symptoms, unmanageable headache, fevers, confusion, etc

## 2023-01-30 ENCOUNTER — APPOINTMENT (OUTPATIENT)
Dept: PHYSICAL THERAPY | Facility: CLINIC | Age: 68
End: 2023-01-30

## 2023-01-30 ENCOUNTER — APPOINTMENT (OUTPATIENT)
Dept: OCCUPATIONAL THERAPY | Facility: CLINIC | Age: 68
End: 2023-01-30

## 2023-02-08 ENCOUNTER — OFFICE VISIT (OUTPATIENT)
Dept: NEUROLOGY | Facility: CLINIC | Age: 68
End: 2023-02-08

## 2023-02-08 VITALS
WEIGHT: 190.6 LBS | HEART RATE: 63 BPM | HEIGHT: 68 IN | BODY MASS INDEX: 28.89 KG/M2 | TEMPERATURE: 97.8 F | DIASTOLIC BLOOD PRESSURE: 74 MMHG | SYSTOLIC BLOOD PRESSURE: 158 MMHG

## 2023-02-08 DIAGNOSIS — R29.90 STROKE-LIKE SYMPTOMS: ICD-10-CM

## 2023-02-08 DIAGNOSIS — G43.909 MIGRAINE: ICD-10-CM

## 2023-02-08 DIAGNOSIS — R47.81 SLURRED SPEECH: ICD-10-CM

## 2023-02-08 DIAGNOSIS — G44.52 NEW DAILY PERSISTENT HEADACHE: Primary | ICD-10-CM

## 2023-02-08 RX ORDER — BACLOFEN 5 MG/1
TABLET ORAL
COMMUNITY
Start: 2023-01-10

## 2023-02-08 RX ORDER — LORAZEPAM 0.5 MG/1
0.5 TABLET ORAL ONCE AS NEEDED
Qty: 2 TABLET | Refills: 0 | Status: SHIPPED | OUTPATIENT
Start: 2023-02-08

## 2023-02-08 RX ORDER — KETOROLAC TROMETHAMINE 30 MG/ML
60 INJECTION, SOLUTION INTRAMUSCULAR; INTRAVENOUS ONCE
Status: COMPLETED | OUTPATIENT
Start: 2023-02-08 | End: 2023-02-08

## 2023-02-08 RX ORDER — PREDNISONE 10 MG/1
80 TABLET ORAL DAILY
Qty: 36 TABLET | Refills: 0 | Status: SHIPPED | OUTPATIENT
Start: 2023-02-08

## 2023-02-08 RX ADMIN — KETOROLAC TROMETHAMINE 60 MG: 30 INJECTION, SOLUTION INTRAMUSCULAR; INTRAVENOUS at 13:25

## 2023-02-08 NOTE — PROGRESS NOTES
Patient ID: Bhargav Albarran is a 75 y o  male  who presents for follow-up evaluation with regard to his headaches  Assessment/Plan:    New daily persistent headache  -Headaches may potentially be migrainous, however,  would like to complete a repeat MRI of the brain to rule out new acute stroke given sudden worsening of slurred speech  This is reasonable  MRI order placed   -He does have a history of neck surgery and feels that his headaches are originating from the neck  We will also order MRI C-spine   -Headache has been daily and persistent for the past month and he has had to take Tylenol around-the-clock  Discussed with him potential medication overuse headache with Tylenol  Advised him to not take Tylenol for the time being   -We will trial a short course of prednisone taper to break his current headache cycle  He will start by taking 80 mg of prednisone and decrease by 10 mg daily till course is complete  Advised her to please not take steroids at bedtime   -He was in severe pain today in the office reporting a pain level of 9 out of 10  Ketorolac injection given to him in the office today  By the end of the visit pain had decreased to a level of 3 out of 10 and he reports he was feeling much better   -Currently also on baclofen and methocarbamol for left arm muscle tightness from the stroke  He has been taking this and finds that it does not help with his headaches   -Discussed lifestyle interventions that are helpful for migraines  - Advised him to track his headaches  Subjective:     HPI    Interval updates as of 2/8/2023:  -He was recently seen in the ED for slurred speech and headache on the right side  CT of the head at the time revealed no acute changes  What is your current pain level ? 9/10  Has there been any change in your headaches since your last visit? He has had a headache that started about a month ago   He is also having a lot of neck pain that will start on the right side of her neck and it will radiate upwards into his head and will go into his forehead  At some point he even felt a lump on his neck  Since the headache started he has noticed a slurring of speech  He finds that he notices the worse his headache gets he gets worse slurred speech  How often do the headaches occur?   - as of 2/8/2023: Has been occurring daily and has not gone away  Will take a few tylenol and will decrease but immediately comes back  Has been taking tylenol daily multiple times per day which will help a little bit and will come right back  - He has also had neck surgery in the past and has had no issues till not   - denies any recent trauma   What time of the day do the headaches start? How long do the headaches last? Several hours, he typically will take tylenol at bed time and the headache will wake him up around 4 am he will take more tylenol and go back to sleep  Are you ever headache free? Yes    Where is your headache located: base of skull on the right side, and will radiate upward to the forehead and ear and back of eye     Describe your usual headache:   [x] Dull [x] Nagging    Aura? No    Associated symptoms:   [x] Nausea    [x] Photophobia  [x] Problem with concentration  [x] Stiff or sore neck  [x] Prefer to be alone and in a dark room    Things that make the headache worse? He was doing therapy up until 2 weeks ago because therapy would make it worse  He was on the treadmill during physical therapy     Headache triggers: No    Have you seen someone else for headaches or pain? No  Have you had trigger point injection performed and how often? No  Have you had Botox injection performed and how often? Botox for left arm  Have you had epidural injections or transforaminal injections performed? No    Have you ever had any Brain imaging?  Yes    Lifestyle:    Sleep   Averages: 6-7 hours  Problems falling asleep?:   No  Problems staying asleep?:  Yes    Physical activity: Yes    Water:  Not a lot   Caffeine: 2 cups per day    Mood: Denies history of anxiety and depression         Physical Exam:  Awake, alert, oriented, and in no apparent distress  Mood is appropriate to situation  He has some notable slurred speech  Cranial nerves 2-12 were symmetrically intact bilaterally, with the exception of right tongue deviation and decreased shoulder shrug on the left  Motor testing reveals 5/5 strength in the right upper and lower extremities  Overall 4+/5 strength in the upper and lower left extremity with the exception of 4/5  strength  Coordination intact, no drift present in the right  Finger-to-nose absent for tremor, dysmetria, or ataxia on the right side  DTRs are hyperreflexic on the left  Able to rise without assistance, gait is hemiparetic  Stiffness on the right side of neck with increased discomfort on palpation of right shoulder and right side of the head  Vitals:            /74 (BP Location: Right arm, Patient Position: Sitting, Cuff Size: Standard)   Pulse 63   Temp 97 8 °F (36 6 °C) (Temporal)   Ht 5' 8" (1 727 m)   Wt 86 5 kg (190 lb 9 6 oz)   BMI 28 98 kg/m²           Review of Systems   Constitutional: Negative  Negative for appetite change and fever  HENT: Positive for trouble swallowing (right side discomfort when swallowing same side where the headaches are)  Negative for hearing loss, tinnitus and voice change  Eyes: Positive for photophobia (worsening in the last few weeks)  Negative for pain and visual disturbance  Respiratory: Negative  Negative for shortness of breath  Cardiovascular: Negative  Negative for palpitations  Gastrointestinal: Negative  Negative for nausea and vomiting  Endocrine: Negative  Negative for cold intolerance  Genitourinary: Negative  Negative for dysuria, frequency and urgency     Musculoskeletal: Positive for neck pain (right side of the neck increased pain recently)  Negative for gait problem and myalgias  Skin: Negative  Negative for rash  Allergic/Immunologic: Negative  Neurological: Positive for speech difficulty (slurred speech and family reports worsening) and headaches  Negative for dizziness, tremors, seizures, syncope, facial asymmetry, weakness, light-headedness and numbness  Hematological: Negative  Does not bruise/bleed easily  Psychiatric/Behavioral: Negative  Negative for confusion, hallucinations and sleep disturbance  I personally reviewed the ROS entered by the MA    The following portions of the patient's history were reviewed and updated as appropriate: allergies, current medications, past family history, past medical history, past social history, past surgical history and problem list     I have spent 45 minutes with Patient  today in which greater than 50% of this time was spent in counseling/coordination of care regarding Diagnostic results, Prognosis, Risks and benefits of tx options, Intructions for management, Patient and family education, Importance of tx compliance, Risk factor reductions, Impressions and Plan of care as above

## 2023-02-08 NOTE — PATIENT INSTRUCTIONS
Patient Instructions: Additional Testing:   Neurodiagnostic workup: MRI Brain ordered    Headache Calendar  Please maintain a headache calendar  Consider using phone applications such as Migraine Ha or Migraine Diary    Headache/migraine treatment:     Rescue medications (for immediate treatment of a headache):   - Please do not take Tylenol for now   - Do not recommend taking NSAIDs like ibuprofen or naproxen due to increased risk of bleeding with being on eliquis  - To break current headache cycle trial Prednisone, start at 80 mg and decrease by 10 mg daily  Lifestyle Recommendations:  [x] SLEEP - Maintain a regular sleep schedule: Adults need at least 7-8 hours of uninterrupted a night  Maintain good sleep hygiene:  Going to bed and waking up at consistent times, avoiding excessive daytime naps, avoiding caffeinated beverages in the evening, avoid excessive stimulation in the evening and generally using bed primarily for sleeping  One hour before bedtime would recommend turning lights down lower, decreasing your activity (may read quietly, listen to music at a low volume)  When you get into bed, should eliminate all technology (no texting, emailing, playing with your phone, iPad or tablet in bed)  [x] HYDRATION - Maintain good hydration  Drink  2L of fluid a day (4 typical small water bottles)  [x] DIET - Maintain good nutrition  In particular don't skip meals and try and eat healthy balanced meals regularly  [x] TRIGGERS - Look for other triggers and avoid them: Limit caffeine to 1-2 cups a day or less  Avoid dietary triggers that you have noticed bring on your headaches (this could include aged cheese, peanuts, MSG, aspartame and nitrates)  [x] EXERCISE - physical exercise as we all know is good for you in many ways, and not only is good for your heart, but also is beneficial for your mental health, cognitive health and  chronic pain/headaches   I would encourage at the least 5 days of physical exercise weekly for at least 30 minutes  Education and Follow-up  [x] Please call with any questions or concerns  Of course if any new concerning symptoms go to the emergency department    [x] Follow up in 1 month

## 2023-02-08 NOTE — ASSESSMENT & PLAN NOTE
-Headaches may potentially be migrainous, however,  would like to complete a repeat MRI of the brain to rule out new acute stroke given sudden worsening of slurred speech  This is reasonable  MRI order placed   -He does have a history of neck surgery and feels that his headaches are originating from the neck  We will also order MRI C-spine   -Headache has been daily and persistent for the past month and he has had to take Tylenol around-the-clock  Discussed with him potential medication overuse headache with Tylenol  Advised him to not take Tylenol for the time being   -We will trial a short course of prednisone taper to break his current headache cycle  He will start by taking 80 mg of prednisone and decrease by 10 mg daily till course is complete  Advised her to please not take steroids at bedtime   -He was in severe pain today in the office reporting a pain level of 9 out of 10  Ketorolac injection given to him in the office today  By the end of the visit pain had decreased to a level of 3 out of 10 and he reports he was feeling much better   -Currently also on baclofen and methocarbamol for left arm muscle tightness from the stroke  He has been taking this and finds that it does not help with his headaches   -Discussed lifestyle interventions that are helpful for migraines  - Advised him to track his headaches

## 2023-02-09 ENCOUNTER — HOSPITAL ENCOUNTER (OUTPATIENT)
Dept: MRI IMAGING | Facility: HOSPITAL | Age: 68
Discharge: HOME | End: 2023-02-09

## 2023-02-09 DIAGNOSIS — G44.52 NEW DAILY PERSISTENT HEADACHE: ICD-10-CM

## 2023-02-09 DIAGNOSIS — G43.909 MIGRAINE: ICD-10-CM

## 2023-02-09 DIAGNOSIS — R29.90 STROKE-LIKE SYMPTOMS: ICD-10-CM

## 2023-02-09 DIAGNOSIS — R47.81 SLURRED SPEECH: ICD-10-CM

## 2023-02-10 ENCOUNTER — TELEPHONE (OUTPATIENT)
Dept: NEUROLOGY | Facility: CLINIC | Age: 68
End: 2023-02-10

## 2023-02-10 NOTE — TELEPHONE ENCOUNTER
Pt left voicemail requesting MRI results  [phone]    Per chart review, [facility]o [facility] advised pt of results on [facility]:    "Hi Mr Mary Saucedo your MRI of the neck shows some arthritic changes  It is possible your neck tension can be contributing to the headaches  I know we started the short course of steroids, which I am hoping will help with this  If you would like, I can refer you to pain management, they would be able to do things like injections for your neck as well that can help with the pain  Additionally, I would probably recommend Physical therapy for your neck and things like massages and heat therapy could also be helpful  Please let me know what you think "    "Hi Mr Mary Saucedo, MRI of the brain looks stable!"    Discussed results with pt, he verbalizes understanding  He would like to hold off on referrals at this time, will discuss further at next visit  Notes his headaches have improved with steroids so far  Pt to call our office with any questions or concerns

## 2023-02-20 ENCOUNTER — HOSPITAL ENCOUNTER (OUTPATIENT)
Dept: VASCULAR ULTRASOUND | Facility: HOSPITAL | Age: 68
Discharge: HOME | End: 2023-02-20
Attending: PSYCHIATRY & NEUROLOGY

## 2023-02-20 DIAGNOSIS — I65.23 BILATERAL CAROTID ARTERY STENOSIS: ICD-10-CM

## 2023-02-20 DIAGNOSIS — I65.23 BILATERAL CAROTID ARTERY STENOSIS: Primary | ICD-10-CM

## 2023-02-20 NOTE — RESULT ENCOUNTER NOTE
The carotid doppler is stable with no change  We will plan to repeat this in one year with no change to your current therapy  I entered the appropriate order  Thanks!

## 2023-02-25 DIAGNOSIS — G81.94 LEFT HEMIPARESIS (HCC): ICD-10-CM

## 2023-02-27 RX ORDER — METHOCARBAMOL 500 MG/1
TABLET, FILM COATED ORAL
Qty: 180 TABLET | Refills: 2 | Status: SHIPPED | OUTPATIENT
Start: 2023-02-27

## 2023-03-09 ENCOUNTER — TELEPHONE (OUTPATIENT)
Dept: NEUROLOGY | Facility: CLINIC | Age: 68
End: 2023-03-09

## 2023-03-10 NOTE — TELEPHONE ENCOUNTER
GMOM for the patient to call us back to reschedule his appt with Bayron Mahmood next week because she will not be in the office  If the patient calls back please schedule with Lavell Fields 
Spoke with the patient and rescheduled the appt for the [place] day [place] [facility] [place] 
yes

## 2023-03-15 ENCOUNTER — OFFICE VISIT (OUTPATIENT)
Dept: NEUROLOGY | Facility: CLINIC | Age: 68
End: 2023-03-15

## 2023-03-15 ENCOUNTER — APPOINTMENT (OUTPATIENT)
Dept: LAB | Facility: CLINIC | Age: 68
End: 2023-03-15

## 2023-03-15 VITALS
SYSTOLIC BLOOD PRESSURE: 124 MMHG | HEART RATE: 52 BPM | DIASTOLIC BLOOD PRESSURE: 70 MMHG | BODY MASS INDEX: 29.38 KG/M2 | WEIGHT: 193.2 LBS

## 2023-03-15 DIAGNOSIS — R47.81 SLURRED SPEECH: ICD-10-CM

## 2023-03-15 DIAGNOSIS — Z86.73 HISTORY OF STROKE: ICD-10-CM

## 2023-03-15 DIAGNOSIS — G44.52 NEW DAILY PERSISTENT HEADACHE: Primary | ICD-10-CM

## 2023-03-15 DIAGNOSIS — G44.52 NEW DAILY PERSISTENT HEADACHE: ICD-10-CM

## 2023-03-15 LAB
25(OH)D3 SERPL-MCNC: 29.9 NG/ML (ref 30–100)
FOLATE SERPL-MCNC: 15.3 NG/ML (ref 3.1–17.5)
TSH SERPL DL<=0.05 MIU/L-ACNC: 3.09 UIU/ML (ref 0.45–4.5)
VIT B12 SERPL-MCNC: 1778 PG/ML (ref 100–900)

## 2023-03-15 NOTE — PATIENT INSTRUCTIONS
New persistent daily headache:  I had the pleasure of evaluating Neisha Mo today in the office at [facility] [address] [facility] in [place]  He has come into the office today for a follow-up of his persistent daily headaches  Today, he notes that his headaches have seemed to resolve after having the prednisone taper and a Toradol shot in the office at the last visit with [address]  Not really noting any significant headaches at this time  Still having some neck pain on the right side of the body, however, not as bad or significant as it was at the last visit as well     -Would encourage the patient to continue taking his muscle relaxants, baclofen and Robaxin as needed for muscle stiffness/tightness of his neck  This may contribute to helping with headaches if the patient ever does experience them again   -If the patient does have a headache in the future, he should be able to take Tylenol just fine  At a previous appointment, advised to not take Tylenol anymore due to the possibility of medication overuse headaches  As long as the patient is not taking Tylenol more than 3 or 4 days out of the week then he should be okay at preventing medication overuse headaches  However, would imagine he would not be taking it as frequently because headache is seem to got a lot better  -MRI C-spine ordered at last visit, showed fusion of the vertebrae at the neck, mild degenerative disc disease and some foraminal narrowing  No focal impingement noted on MRI  -If headaches seem to worsen at any point, please reach out to the office and let us know and we can schedule you for a sooner follow-up appointment  Persistent slurred speech: At the last visit, patient had presented with slurred speech along with his headaches  Wanted to rule out the possibility of an acute infarct at last visit, MRI brain was ordered    The results of the MRI brain have returned and there is no evidence of acute infarct or ischemia on this most recent imaging  However, patient still has persistent issues with slurred speech and speech difficulty   -Placing a referral for occupational therapy  We will see if continuation of occupational therapy can help with the patient's speech at this time and then also working on other fine motor deficits from the patient's previous stroke  -Will order a standard couple of labs to see if there are any vitamin deficiencies or issues with the patient's thyroid at this time  Ordering vitamin B12 level, folate level, vitamin D level, and TSH to see if there is any other potential neurological reason the patient could be having issues with his speech  History of Stroke:  -Continue to take Eliquis and atorvastatin for stroke prevention   -Continue to take amlodipine and frequently check your blood pressure   -Placing a new referral to physical therapy  Since not continuing with physical therapy for about a month's time due to headache, patient feels that his stroke deficits had gotten slightly worse  Would like for the patient to continue to work on stroke deficits and also at the same time to see if there is any therapy that can be done with his neck to help his chronic neck stiffness/tightness   -Continue routine physical activity   -Continue to get a full 7 to 8 hours of sleep every night, if the patient has issues with sleep apnea or excessive tiredness in the future may consider looking into CPAP or additional sleep study in the future   -Encourage well-balanced diet, encourage Mediterranean diet for secondary stroke prevention  Follow-up in 4 months with [facility]  If the patient has any new strokelike symptoms he is to report to the ED as soon as possible  If the slurring of the speech continues to worsen I would suggest also going to the ED  If it is just mildly getting worse over time then he should call the office and schedule a sooner appointment    Any other questions or concerns can always contact the office or reach out to me on my chart and I would be happy to answer them

## 2023-03-15 NOTE — PROGRESS NOTES
ROS:    Review of Systems   Constitutional: Negative  Negative for appetite change and fever  HENT: Negative  Negative for hearing loss, tinnitus, trouble swallowing and voice change  Eyes: Negative  Negative for photophobia, pain and visual disturbance  Respiratory: Negative  Negative for shortness of breath  Cardiovascular: Negative  Negative for palpitations  Gastrointestinal: Negative  Negative for nausea and vomiting  Endocrine: Negative  Negative for cold intolerance  Genitourinary: Negative  Negative for dysuria, frequency and urgency  Musculoskeletal: Positive for neck pain  Negative for gait problem and myalgias  Skin: Negative  Negative for rash  Allergic/Immunologic: Negative  Neurological: Positive for speech difficulty (slurring words )  Negative for dizziness, tremors, seizures, syncope, facial asymmetry, weakness, light-headedness, numbness and headaches  Hematological: Negative  Does not bruise/bleed easily  Psychiatric/Behavioral: Negative  Negative for confusion, hallucinations and sleep disturbance  All other systems reviewed and are negative

## 2023-03-15 NOTE — PROGRESS NOTES
[facility]  PATIENT:  Kaor Keller  MRN:  [id number]  :  1955  DATE OF SERVICE:  3/15/2023      Assessment/Plan:     New persistent daily headache:  I had the pleasure of evaluating Abdoul Asif today in the office at [facility] in [place]  He has come into the office today for a follow-up of his persistent daily headaches  Today, he notes that his headaches have seemed to resolve after having the prednisone taper and a Toradol shot in the office at the last visit with [address]  Not really noting any significant headaches at this time  Still having some neck pain on the right side of the body, however, not as bad or significant as it was at the last visit as well     -Would encourage the patient to continue taking his muscle relaxants, baclofen and Robaxin as needed for muscle stiffness/tightness of his neck  This may contribute to helping with headaches if the patient ever does experience them again   -If the patient does have a headache in the future, he should be able to take Tylenol just fine  At a previous appointment, advised to not take Tylenol anymore due to the possibility of medication overuse headaches  As long as the patient is not taking Tylenol more than 3 or 4 days out of the week then he should be okay at preventing medication overuse headaches  However, would imagine he would not be taking it as frequently because headache is seem to got a lot better  -MRI C-spine ordered at last visit, showed fusion of the vertebrae at the neck, mild degenerative disc disease and some foraminal narrowing  No focal impingement noted on MRI  -If headaches seem to worsen at any point, please reach out to the office and let us know and we can schedule you for a sooner follow-up appointment  Persistent slurred speech: At the last visit, patient had presented with slurred speech along with his headaches    Wanted to rule out the possibility of an acute infarct at last visit, MRI brain was ordered  The results of the MRI brain have returned and there is no evidence of acute infarct or ischemia on this most recent imaging  However, patient still has persistent issues with slurred speech and speech difficulty   -Placing a referral for occupational therapy  We will see if continuation of occupational therapy can help with the patient's speech at this time and then also working on other fine motor deficits from the patient's previous stroke  -Will order a standard couple of labs to see if there are any vitamin deficiencies or issues with the patient's thyroid at this time  Ordering vitamin B12 level, folate level, vitamin D level, and TSH to see if there is any other potential neurological reason the patient could be having issues with his speech  History of Stroke:  -Continue to take Eliquis and atorvastatin for stroke prevention   -Continue to take amlodipine and frequently check your blood pressure   -Placing a new referral to physical therapy  Since not continuing with physical therapy for about a month's time due to headache, patient feels that his stroke deficits had gotten slightly worse  Would like for the patient to continue to work on stroke deficits and also at the same time to see if there is any therapy that can be done with his neck to help his chronic neck stiffness/tightness   -Continue routine physical activity   -Continue to get a full 7 to 8 hours of sleep every night, if the patient has issues with sleep apnea or excessive tiredness in the future may consider looking into CPAP or additional sleep study in the future   -Encourage well-balanced diet, encourage Mediterranean diet for secondary stroke prevention  Follow-up in 4 months with [facility]  If the patient has any new strokelike symptoms he is to report to the ED as soon as possible  If the slurring of the speech continues to worsen I would suggest also going to the ED    If it is just mildly getting worse over time then he should call the office and schedule a sooner appointment  Any other questions or concerns can always contact the office or reach out to me on my chart and I would be happy to answer them  History of Present Illness: We had the pleasure of evaluating Amira Marrero in neurological follow up  today for headaches  As you know,  he is a 75 y o    male who had previously been seen by [facility] for stroke follow-up and treatment of new daily persistent headaches  Patient had recently seen Sayra Mata for the management and treatment of his new daily persistent headache  Patient was seen on 02/08/2023  Patient stated that his headaches are occurring daily and not gone away  Patient will take a few Tylenol and will help decrease the pain but it will immediately come back  He had been taking Tylenol multiple times per day  He also had a previous neck surgery in the past   Denied any head or neck trauma recently  Headache will last several hours at a time will take Tylenol before bed and the headache will wake him up around 4 in the morning  Patient describes a headache at the base of the skull on the right side and will radiate upwards towards the forehead and the ear and the back of the eye on that side  Describes pain as a dull nagging sensation  Associated symptoms of nausea, photophobia, problems with concentration, stiff sore neck  An order for an MRI brain was placed to rule out any acute stroke  Also, an MRI C-spine due to a history of neck injury  MRI brain had been completed and revealed no acute infarct  MRI C-spine showed no acute cord compression or no evidence of focal impingement  However, patient does have multilevel degenerative disc disease with mild central canal narrowing at C3-4 and C4-5 levels      Patient had been advised at that time to stop taking Tylenol as frequently as he was as this could lead to potential medication overuse headaches  Patient tried a short course of a prednisone taper for his headaches  Started today taking 80 mg of prednisone and decrease by 10 mg daily to the course is complete  Patient also received a ketorolac injection in the office today which reduced his pain from a 9 out of 10 to a 3 out of 10  Patient currently on baclofen and Robaxin for left arm muscle tightness from his previous stroke  Current medical illnesses: Type 2 diabetes mellitus, sleep apnea, hypertension, migraine, history of stroke, left hemiparesis, hyperlipidemia, migraine    What medications do you take or have you taken for your headaches? Current Preventive:   Fluoxetine,   Current Abortive:   Robaxin, prednisone taper (completed)    Prior Preventive:   None  Prior Abortive:   Tylenol    Interval updates as of 3/15/2023:  Headache has been improving, still struggling with some of the neck pain  Patient states that he really has not had a headache for the past few days to a week  He states at the last visit he was started on a prednisone taper and received a shot of Toradol at his last visit which had really seem to help his headache improved  States that he is really not even getting a headache anymore  States despite relief with his headache, states that he is still having issues with slurred speech at this time  Patient reports that he has a difficult time being able to initiate words and exactly what he wants to say and is also having a a lot of saliva in his mouth which makes it difficult to speak  Patient had reported to the ED on 01/28/2023 with slurred speech for a few days  At last visit with stenting, MRI brain was ordered  Showed no acute ischemia or infarct  No new strokelike symptoms at this time  Residual stroke deficits consistent of left sided hemiparesis  More specifically, patient's arm is significantly weak and flaccid compared to the right    Patient states he is still receiving Botox with physical medicine for his arm at this time  Notes that this has significantly helped with his left hemiparesis  Patient had recently stopped doing physical therapy due to the headache and slurred speech  Would like to know if it be okay for him to start up physical therapy and Occupational Therapy again at this time  What is your current pain level ? None  How often do the headaches occur? Not getting headaches anymore, if the patient does have a headache it's coming from neck  Are you ever headache free? yes    Headache history with updates:  Headache are worse if the patient: not significantly worse with positional change  Headache triggers:  Neck stiffness/tightness    Aura/warning and how long does it last ? No aura    What time of the day do the headaches start? Mostly in the morning    How long do the headaches last?   Would last for the whole day, take some Tylenol and it would help    What is the intensity of pain? No pain or headache at this time    Where is the headache located? Base of the skull on the right side, will radiate upward towards the forehead and ear and back of thigh  Describes her usual headaches? Dull and nagging  Associated symptoms:   - Nausea  - Photophobia  - Problems with concentration  - Prefer to be in dark room  - Stiff sore neck      Have you had trigger point injection performed and how often? No  Have you had Botox injection performed and how often? Yes, Botox in right arm  Have you had epidural injections or transforaminal injections performed? No    Have you ever had any Brain imaging? yes MRI brain without contrast, 02/09/2023  I personally reviewed these images  Reviewed old notes from physician seen in the past- see above HPI for summary of previous encounters         Past Medical History:   Diagnosis Date   • Arthritis     in hands   • Cervical herniated disc    • Colon polyp    • Diabetes mellitus ([facility] [address] ) borderline   • Gout     hx of in 2018, none since   • Hyperlipidemia    • Hypertension    • Irregular heart beat     a-fib, has loop recorder   • Kidney stone 09/13/2021    hx of   • Migraine    • S/P Botox injection     in left wrist to help increase mobility   • Sleep apnea     mild sleep apnea, cpap was not ordered   • Stroke ([facility] [address] ) 2018    left hemiparesis, wears leg brace and uses cane, unable to use left arm and hand   • Use of cane as ambulatory aid     had CVA in 2018       Patient Active Problem List   Diagnosis   • History of stroke   • Hypertension   • Anxiety   • Hyperlipidemia   • Gout   • Prediabetes   • Bradycardia   • Left hemiparesis (HCC)   • Left shoulder pain   • Encounter for loop recorder check   • Acute intractable tension-type headache   • Paroxysmal atrial fibrillation (HCC)   • PADDY (obstructive sleep apnea)   • Hx of adenomatous colonic polyps   • Adenomatous polyp of colon   • New daily persistent headache       Medications:      Current Outpatient Medications   Medication Sig Dispense Refill   • amLODIPine (NORVASC) 5 mg tablet Take 5 mg by mouth daily Taking 1 1/2 daily daily   5   • atorvastatin (LIPITOR) 80 mg tablet Take 1 tablet (80 mg total) by mouth every evening 90 tablet 3   • Baclofen 5 MG TABS TAKE 1 TABLET BY MOUTH AT LUNCHTIME AND 1 TABLET AT BEDTIME FOR SPASITICITY MANAGEMENT     • Eliquis 5 MG TAKE 1 TABLET BY MOUTH TWICE A DAY 60 tablet 5   • FLUoxetine (PROzac) 20 mg capsule TAKE 1 CAPSULE BY MOUTH EVERY DAY 90 capsule 3   • LORazepam (Ativan) 0 5 mg tablet Take 1 tablet (0 5 mg total) by mouth once as needed for anxiety for up to 1 dose Please take 30 minutes prior to MRI 2 tablet 0   • losartan (COZAAR) 50 mg tablet Take 1 tablet (50 mg total) by mouth daily 90 tablet 0   • methocarbamol (ROBAXIN) 500 mg tablet TAKE 1 TABLET BY MOUTH EVERY 12 HOURS 180 tablet 2   • predniSONE 10 mg tablet Take 8 tablets (80 mg total) by mouth daily Decrease by 10 mg daily till prescription is done 36 tablet 0     No current facility-administered medications for this visit  Allergies:    No Known Allergies    Family History:     Family History   Problem Relation Age of Onset   • Stroke Mother    • Heart disease Father    • ALS Father        Social History:     Social History     Socioeconomic History   • Marital status:      Spouse name: Not on file   • Number of children: Not on file   • Years of education: Not on file   • Highest education level: Not on file   Occupational History   • Not on file   Tobacco Use   • Smoking status: Former     Packs/day: 1 00     Types: Cigarettes, Cigars     Quit date:      Years since quittin 2   • Smokeless tobacco: Never   • Tobacco comments:     stopped on 10/20/2018   Vaping Use   • Vaping Use: Never used   Substance and Sexual Activity   • Alcohol use: Yes     Comment: socially    • Drug use: No   • Sexual activity: Not on file   Other Topics Concern   • Not on file   Social History Narrative   • Not on file     Social Determinants of Health     Financial Resource Strain: Not on file   Food Insecurity: Not on file   Transportation Needs: Not on file   Physical Activity: Not on file   Stress: Not on file   Social Connections: Not on file   Intimate Partner Violence: Not on file   Housing Stability: Not on file         Objective:     Physical Exam:                                                                 Vitals:            Constitutional:    /70 (BP Location: Right arm, Patient Position: Sitting, Cuff Size: Adult)   Pulse (!) 52   Wt 87 6 kg (193 lb 3 2 oz)   BMI 29 38 kg/m²   BP Readings from Last 3 Encounters:   03/15/23 124/70   23 158/74   23 153/81     Pulse Readings from Last 3 Encounters:   03/15/23 (!) 52   23 63   23 (!) 48         Well developed, well nourished, well groomed  No dysmorphic features         Psychiatric:  Normal behavior and appropriate affect        Neurological Examination:     Mental status/cognitive function:   Orientated to time, place and person  Recent and remote memory intact  Attention span and concentration as well as fund of knowledge are appropriate for age  Normal language and spontaneous speech  Cranial Nerves:  II-visual fields full  III, IV, VI-Pupils were equal, round, and reactive to light and accomodation  Extraocular movements were full and conjugate without nystagmus  Conjugate gaze, normal smooth pursuits, normal saccades   V-facial sensation symmetric  VII-facial expression symmetric, intact forehead wrinkle, strong eye closure, symmetric smile    VIII-hearing grossly intact bilaterally   IX, X-palate elevation symmetric, slight dysarthria/slurred speech  XI-shoulder shrug strength intact    XII-tongue protrusion midline  Motor Exam: symmetric bulk and tone throughout, no pronator drift  Power/strength 5/5 of the right upper and lower extremities, 4/5 left upper and lower extremities, decreased  strength in left hand compared to right  Sensory: grossly intact light touch in all extremities  Reflexes: Left brachioradialis 2+, right brachioradialis 1+, left biceps 2+, right biceps 1+, bilateral knee 0  Coordination: Finger nose finger intact on the right side, unable to examine on the left side due to hemiparesis, no tremor, dysmetria, or ataxia noted  Gait: Hemiparetic gait     ROS:    Review of Systems   Constitutional: Negative  Negative for appetite change and fever  HENT: Negative  Negative for hearing loss, tinnitus, trouble swallowing and voice change  Eyes: Negative  Negative for photophobia, pain and visual disturbance  Respiratory: Negative  Negative for shortness of breath  Cardiovascular: Negative  Negative for palpitations  Gastrointestinal: Negative  Negative for nausea and vomiting  Endocrine: Negative  Negative for cold intolerance  Genitourinary: Negative    Negative for dysuria, frequency and urgency  Musculoskeletal: Positive for neck pain  Negative for gait problem and myalgias  Skin: Negative  Negative for rash  Allergic/Immunologic: Negative  Neurological: Positive for speech difficulty (slurring words )  Negative for dizziness, tremors, seizures, syncope, facial asymmetry, weakness, light-headedness, numbness and headaches  Hematological: Negative  Does not bruise/bleed easily  Psychiatric/Behavioral: Negative  Negative for confusion, hallucinations and sleep disturbance  All other systems reviewed and are negative  I personally reviewed the ROS entered by the MA    I spent 45 minutes in total time for this visit      Author:  Arnel Bone PA-C 3/15/2023 7:26 AM

## 2023-03-22 ENCOUNTER — APPOINTMENT (OUTPATIENT)
Dept: OCCUPATIONAL THERAPY | Facility: CLINIC | Age: 68
End: 2023-03-22

## 2023-03-31 ENCOUNTER — APPOINTMENT (OUTPATIENT)
Dept: OCCUPATIONAL THERAPY | Facility: CLINIC | Age: 68
End: 2023-03-31

## 2023-04-07 ENCOUNTER — APPOINTMENT (OUTPATIENT)
Dept: OCCUPATIONAL THERAPY | Facility: CLINIC | Age: 68
End: 2023-04-07

## 2023-04-24 ENCOUNTER — EVALUATION (OUTPATIENT)
Dept: SPEECH THERAPY | Facility: CLINIC | Age: 68
End: 2023-04-24

## 2023-04-24 ENCOUNTER — OFFICE VISIT (OUTPATIENT)
Dept: PHYSICAL THERAPY | Facility: CLINIC | Age: 68
End: 2023-04-24

## 2023-04-24 DIAGNOSIS — Z86.73 HISTORY OF STROKE: Primary | ICD-10-CM

## 2023-04-24 DIAGNOSIS — Z86.73 HISTORY OF STROKE: ICD-10-CM

## 2023-04-24 DIAGNOSIS — G44.52 NEW DAILY PERSISTENT HEADACHE: ICD-10-CM

## 2023-04-24 DIAGNOSIS — R47.1 DYSARTHRIA: Primary | ICD-10-CM

## 2023-04-24 NOTE — PROGRESS NOTES
"Speech-Language Pathology Initial Evaluation    Today's date: 2023  Patient’s name: Jennifer Perez  : 1955  MRN: [id number]  Safety measures: h/o CVA, Afib, fall risk  Referring provider: Ganesh Vila*    Encounter Diagnosis     ICD-10-CM    1  Dysarthria  R47 1       2  History of stroke  Z86 73       3  New daily persistent headache  G44 52         Visit tracking:  -Referring provider: Epic  -Billing guidelines: CMS  -Visit #   -Insurance: Pattie Eller (60 vpcy-not combined, $50 co-pay)  -RE due 2023    Subjective comments: Patient reports in the last 2 months, patient started to have slurring of speech  Patient reported frequent headaches and dizziness  Steroids were provided to alleviate the pain, however, slurring of speech persisted  Patient reports he feels like his \"tongue is thick\" and has \"more saliva\" in his mouth  Patient reports recent sinus infection/bronchitus caused his hearing levels to decrease  How did the patient hear about us? Prior patient    Patient's goal(s): \"to speak better\"    Reason for referral: Decreased speech intelligibility  Prior functional status: Communication effective and appropriate in all situations  Clinically complex situations: Recent hospitalization in January    History: Patient is a 75 y o  male who was referred to outpatient skilled Speech Therapy services for a motor speech evaluation  In 2018, patient had a right-CVA and was seen for OP ST to address cognitive-linguistic deficits  Patient was discharged in   Per chart review, patient also has a PMH in bradycardia, DM, gout, HLD, anxiety, HTN, and arthritis  On -2023, patient was admitted to the ER with symptoms presenting similar to a TIA  Impressions of the visit include:   \"Patient's slurred speech has been persistent for several days, waxing and waning  Headache has been persistent waxing and waning for the past several weeks  No need for stroke alert    " "Patient is currently anticoagulated on Eliquis  No new neurodeficits apart from slurring of speech  Previous left-sided CVA 4 years ago has residual weakness that is unchanged from baseline per patient and girlfriend  No new ambulatory issues  Pt is a feeling much improved, slurred speech has resolved after resolution of migraine  Offered admission/observation for further evaluation - he would prefer to be discharged home with referral to his neurologist Dr Justice Campbell  Discussed return precautions  He and girlfriend report that he is at baseline and will return as advised       MDM   Pt's symptoms resolved with management if migraine headache  Other than slurred speech pt was at baseline with left sided weakness from previous cva several years prior, was seen recently for similar headache  Initial slurred speech began several days ago, first witnessed by daughter  Pt's vitals were stable, systolic was in the 719'A upon initial presentation, trending down with pain management  Complex migraine  Atypical migraine  Tension headache  TIA  Low suspicion for CVA  DDx including but not limited to: Migraine headache, atypical migraine headache, tension headache, cluster headache; doubt: ICH, SAH, tumor, meningitis, carbon monoxide poisoning, zoster \"    Per MRI without contrast on 02/09/2023,  \"IMPRESSION:     1  No acute infarction, edema, or mass effect      2  Chronic right frontotemporal infarct with encephalomalacia and surrounding gliosis extending along the right cerebral peduncle and brainstem      3  Focal area of gliosis within the posterior right occipital lobe, probable sequela of prior ischemic injury\"    Symptoms of slurred speech has persisted since ER visit      Hearing: Hearing levels decreased without hearing aids, audiology recommended  Vision: Glasses    Home environment/lifestyle: lives in apartment attached to daughter's and son-in-laws house  Highest level of education: High school  Vocational " status: part-time     Mental status: Alert  Behavior status: Cooperative  Patient reported symptoms of: N/A  Communication modalities: Verbal  Rehabilitation prognosis: Good rehab potential to reach and maintain prior level of function    Assessments    The [place] Dysarthria Assessment--Second Edition (FDA-2) is a theoretically sound, research-based method of assessing dysarthria in an accurate manner  The FDA-2 assesses a patient's performance on a range of behaviors relating to speech function from (A-E) with “A” being normal function and “E” being the most severe  The test is divided into 7 sections: reflexes, respiration, lips, palate, laryngeal, tongue, intelligibility  The Influencing Factors section includes hearing, sight, teeth, language, mood, posture, rate (words per minute), and sensation  Two letters indicate half values  RATING FORM:    REFLEXES  Cough: B - Patient has occasional difficulty with choking, or food sometimes going down the wrong way; states that some care must be taken  Swallow: B - Patient reports having some difficulty; notices that eating/drinking is slower  Pauses more than is typical when drinking  Dribble/Drool: B - Occasional dampness at the corners of the mouth  Patient may report that pillow is damp at night (only note this if there has been a change of status--some people have slight dribbling/drooling at night without having impairment)  Drools slightly when drinking  RESPIRATION  At Rest: A - No difficulty  In Speech: A - No abnormality  LIPS  At Rest: A - No abnormality  Spread: B - Slight asymmetry--noticed by skilled observer  Seal: B - Occasional air leakage, break in lip seal, or lip seal not consistent for plosion on each sound  Alternate: B - Patient able to articulate both movements in 15 seconds  May have faltering rhythm or variability in rounding or spreading of lips      In Speech: A - Lip movements within normal "limits  PALATE  Fluids: A - No difficulty  Maintenance: A - Smooth, symmetrical movement of palate fully maintained  In Speech: B - Slight hypernasality and/or imbalanced nasal resonance and/or occasional slight nasal emission  LARYNGEAL  Time: B - Patient can say \"ah\" clearly for 10 seconds  Pitch: A - No abnormality  Volume: A - Patient able to change volume in controlled manner  In Speech: A - No abnormality  Voice production is spontaneously effective and appropriate  TONGUE  At Rest: A - No abnormality  Protrusion: C - Patient varies in ability--movement irregular or accompanied by facial grimace or noticeable tremor--or takes between 6 and 8 seconds  Elevation: B - Moves well, but slowly (within 8 seconds)  Lateral: C - Moves both ways, but movement is labored or incomplete  May take 7 to 8 seconds  Alternate: D - Tongue changes in position, and different sounds can be identified  C - One sound is well articulated, but the other is poorly presented, or task deteriorates  Task takes 10 seconds to complete  In Speech: C - Correct articulation points on the whole, but slow alternating movements makes speech labored  Several omissions of consonants  B - Tongue movement slightly inaccurate; occasional mispronunciation  INTELLIGIBILITY  Words: A - Ten words correctly interpreted by the therapist, with speech easily intelligible  Sentences: A - Ten sentences correctly interpreted by the therapist, with speech easily intelligible  Conversation: B - Speech abnormal, but intelligible--patient occasionally has to repeat   A - No abnormality       INFLUENCING FACTORS:  Hearing: WFL for testing    Sight: WFL for testing    Teeth: WFL    LANGUAGE: WFL    MOOD: WFL    POSTURE: WFL    RATE (WORDS/MIN): WFL    SENSATION:  UPPER LIP (R): WFL    UPPER LIP (L): WFL    TONGUE TIP: WFL      *Patient's scores on the FDA-2 correlated most consistently with ataxic dysarthria (observed " with cerebellar lesions)  Goals      Short-term goals:  Patient  will be educated on speech intelligibility strategies (e g , over-exaggeration, slow rate, breath groups,, etc ) to promote increased communication success (to be achieved in 2-3 weeks)  Patient will perform oral motor exercises using IOPI device on  lingual and labial muscles to facilitate improved strength and function for increased speech intelligibility, to be achieved in 4-6 weeks  Patient will perform oral motor and diadochokinetic speech rate exercises with > 90% accuracy to facilitate increased speech intelligibility, to be achieved in 4-6 weeks      Patient will utilize compensatory strategies (I e , over-articulation, decreased rate, etc) 80% of the time while orally reading sentence/paragraph length material to facilitate increased speech intelligibility, to be achieved in 4-6 weeks      Patient will utilize speech intelligibility strategies 80% of the time with min verbal cues while reading words, sentences, and paragraph length material to facilitate increased generalization of skills into conversational speech (to be achieved in 4-6 weeks)       Long-term goals  Patient will demonstrate improved functional and intelligible speech with the use of adequate labial and lingual function, increased articulatory precision, and speech prosody by discharge        Patient will independently utilize speech intelligibility strategies (e g , over-exaggeration, slow rate, breath groups, etc ) during oral reading tasks >90% intelligibility to facilitate increased generalization of skills into conversational speech by discharge       Patient will demonstrate independent implementation of compensatory strategies in conversation to improve speech intelligibility by discharge       Patient will develop functional and intelligible speech to promote positive communication interractions with the use of speech intelligibility strategies (to be achieved "by discharge)  Impressions/Recommendations    Impressions:   -Patient presents with mild-moderate dysarthria c/b deficits in tongue alternation/lateralization/elevation/alternation in speech, lip asymmetry, poor secretion management, and reduced intelligibility of speech  Patient was administered the FDA-2 to formally evaluate the patient's performance of speech and nonspeech functions  Patient scored below \"normal functions\" in the reflexes section which is due to the poor secretion management and occasional difficulties with swallowing  Patient scored within \"normal functions\" for respiration  In the lips section, patient scored \"normal functions\" for lips at rest and in speech, however, scored below normal functions for the lip spread, seal, and alternating tasks  This indicates deficits in tongue neuromuscular control  For the palate section, patient scored within normal function for the palate of managing secretions and maintenance, however, patient in speech task had scored below normal functions due to difficulties with /m/ nasal sounds  For the laryngeal section, patient scored within normal functioning for the pitch, volume, in speech tasks, however, patient scored below the time section due to inability to phonate in the normative range  In the tongue section, patient scored below normal functions for all tasks (protusion elevation, lateral, alternate, and in speech) except for at rest  These results are consistent with visual observation of tongue deviations  Patient scored within normal functions for intelligibility, however, conversation task was slightly reduced  Noted there were instances when clinician required additional attention to determine speech quality  Overall, FDA 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 c/b by deficits in tongue and lip neuromuscularature  Patient is currently motivated for speech therapy services to address intelligibility   Patient would benefit from " outpatient skilled Speech Therapy services for increased support for  positive communication interactions with both familiar and unfamiliar listeners, further education/training on speech intelligibility strategies, facilitate overall improved quality of life and reduce caregiver burden       Recommendations:  -Patient would benefit from outpatient skilled Speech Therapy services: Speech-language therapy    -Frequency: 1-2x weekly  -Duration: 10-12 weeks    -Intervention certification from: 3/63/2092  -Intervention certification to: 50/68/6911

## 2023-04-24 NOTE — PROGRESS NOTES
"Daily Note     Today's date: 2023  Patient name: Tracey Albarran  : 1955  MRN: [id number]  Referring provider: Patricia Nicholas*  Dx:   Encounter Diagnosis     ICD-10-CM    1  History of stroke  Z86 73                      Subjective: Did a lot of walking over the weekend  Arrived wearing AFO, has been wearing consistently since last session  Objective: See treatment diary below      Assessment: Focused session on increased stance time, propulsion and staiblity on left LE, and increased step length on R LE  Pt struggled to keep upright posture through activities with stance time challenge demonstrating forward flexed posture  Plan to continue to atempt to improve stance time on left LE to improve R LE step length and ultimately left LE swing advancement  Plan: Continue per plan of care        Precautions: fall risk    Manuals                                                                Neuro Re-Ed             treadmill 1 5 mph 10 min 1 8 mph 10% incline B handrails 11 min           hurdles  Side steps 12\" with cga 6x15ft            Obstacle course  Foam box step ups 6\" steps with airex cga, reactive balance step overs with poodle noodles with cog challenge - 5# aw 3x15 ft                                                               Ther Ex                                                                                                                       Ther Activity                                       Gait Training  Leonela Step overs with dots for prolonged stance leg with 5# ankle weight  7x15ft             Ambulatory Box Step-ups with 5# ankle weight 6x15ft                        Modalities                                            "

## 2023-04-24 NOTE — PROGRESS NOTES
Daily Speech Treatment Note    Today's date: 5/3/2023   Patients name: Aurora Gilliland  : 1955  MRN: [id number]  Safety measures: h/o CVA, Afib, fall risk  Referring provider: Mike Bond*    Encounter Diagnosis     ICD-10-CM    1  Dysarthria  R47 1       2  History of stroke  Z86 73         Visit tracking:  -Referring provider: Epic  -Billing guidelines: CMS  -Visit #   -Insurance: Gerri Duncan (60 vpcy-not combined, $50 co-pay)  -RE due 2023    Subjective/Behavioral:  -Patient with no new concerns upon presentation to today's session  Objective/Assessment:    Short-term goals:  Patient will perform oral motor exercises using IOPI device on lingual and labial muscles to facilitate improved strength and function for increased speech intelligibility, to be achieved in 4-6 weeks  IOPI PEAK MEASUREMENT WEEKLY GRID      2023           Tongue tip  (Goal = 56) 51           Tongue back  (Goal = 50) 58           Right Lip  (Goal = 35) 29        Left Lip  (Goal = 35) 28            IOPI -- Today's Exercise Targets    Tongue tip  (Goal = 56) Peak Max after 3 trials: 51 Effort level: 65% Target for treatment: 33   Tongue back  (Goal = 50) Peak Max after 3 trials: 58 Effort level: 65% Target for treatment: 37   Right Lip  (Goal = 35) Peak Max after 3 trials: 30 Effort level: 65% Target for treatment: 19   Left Lip  (Goal = 35) Peak Max after 3 trials: 28 Effort level: 18% Target for treatment: 18        2023        Tongue tip  3 sets of 30 reps (pumps)        Tongue back 3 sets of 30 reps (pumps)        Right Lip 3 sets of 30 reps (pumps)         Left Lip 3 sets of 30 reps (pumps)            Clinician presented patient with HEP ([place], Access Code: [email or web address])       Patient will perform oral motor and diadochokinetic speech rate exercises with > 90% accuracy to facilitate increased speech intelligibility, to be achieved in 4-6 weeks      Patient will utilize compensatory strategies (I e , over-articulation, decreased rate, etc) 80% of the time while orally reading sentence/paragraph length material to facilitate increased speech intelligibility, to be achieved in 4-6 weeks      Patient will complete oral reading and conversational tasks with the consistent use of compensatory strategies >90% of the time to facilitate increased speech intelligibility, to be achieved in 4-6 weeks      Patient  will be educated on speech intelligibility strategies (e g , over-exaggeration, slow rate, breath groups,, etc ) to promote increased communication success (to be achieved in 2-3 weeks)  -Presented patient with education on speech intelligibility strategies, including pronouncing every sound possible (over-articulation), exaggerate movements during speaking (especially when tired), speaking louder, slowing down speech rate, taking pauses, and pronouncing the last sound in each word      Patient will utilize speech intelligibility strategies 80% of the time with min verbal cues while reading words, sentences, and paragraph length material to facilitate increased generalization of skills into conversational speech (to be achieved in 4-6 weeks)  Plan:  -Patient was provided with home exercises/activities to target goals in plan of care at the end of today's session   -Continue with current plan of care

## 2023-04-26 ENCOUNTER — OFFICE VISIT (OUTPATIENT)
Dept: OCCUPATIONAL THERAPY | Facility: CLINIC | Age: 68
End: 2023-04-26

## 2023-04-26 DIAGNOSIS — G81.94 LEFT HEMIPARESIS (HCC): Primary | ICD-10-CM

## 2023-04-26 DIAGNOSIS — Z86.73 HISTORY OF STROKE: ICD-10-CM

## 2023-04-26 NOTE — PROGRESS NOTES
"Occupational Therapy Daily Note:    Today's date: 2023  Patient name: Carmel Desir  : 1955  MRN: [id number]  Referring provider: Brandon Watson*  Dx:   Encounter Diagnoses   Name Primary? • Left hemiparesis ([facility] [address] ) Yes   • History of stroke                   Subjective: \"This feels good  \" (mobility in supine)     Objective: See below  Thera Ex:  Pt started on UBE x 5 min prograde and x 5 min retrograde at 1 1 resist while seated with coban wrap applied to L hand for stabilization and EMS-S applied x 2 leads to post delt and supraspinatus and x 2 leads at rhomboids focusing on proximal stability, strength, fnxl movement, GMC, and cardiopulmonary endurance  - Pt tolerated NMES for 40 min in OT session     Thera Ex: In supine, pt completed AROM starting with shoulder flexion to 90*, shoulder presses, supinated bicep curls x 15 with OTR stabilizing at wrist/hand  OTR then provided vibration over forearm extensor mass promoting wrist extension in neutral with OT assisting and facilitating movement x 10  Upgraded to performing the above exercises x 10 total with a 1 5 lb wrist weight  Manual:  Provided K-tape with 3 tail from D2-D4 from DIPs to forearm extensor mass with resting position of wrist placed in slight extension and 50% tension to promote increased proprioceptive feedback, wrist extension, digit extension  Instructed pt to wear for 2-3 days, remove with any irritation, redness, or discomfort  Assessment: Tolerated treatment well  Patient would benefit from continued skilled OT  Pt tolerated UBE and NMES well today, able to achieve RPMs of 30+ in both directions  Pt able to perform AROM of UEs with tactile cues to elbow promoting tricep extension  Pt demo F response to vibration applied to extensors with ~2-5* of movement seen in wrist, improved with facilitation of cues to palm  Plan: Continued skilled OT per POC        INTERVENTION COMMENTS:  Diagnosis: Left " hemiparesis ([facility] [address] ) [G81 94]  Precautions: fall risk, A-fib  FOTO: n/a  Insurance: Payor: Negin Stephenson / Plan: HUMERABTZAR / Product Type: Blue Fee for Service /   2 of __ visits, PN due 5/19/2023 and 6/19/2023  POC Expiration/Re-eval date: 7/19/2023

## 2023-05-03 ENCOUNTER — OFFICE VISIT (OUTPATIENT)
Dept: PHYSICAL THERAPY | Facility: CLINIC | Age: 68
End: 2023-05-03

## 2023-05-03 ENCOUNTER — OFFICE VISIT (OUTPATIENT)
Dept: OCCUPATIONAL THERAPY | Facility: CLINIC | Age: 68
End: 2023-05-03

## 2023-05-03 ENCOUNTER — OFFICE VISIT (OUTPATIENT)
Dept: SPEECH THERAPY | Facility: CLINIC | Age: 68
End: 2023-05-03

## 2023-05-03 DIAGNOSIS — Z86.73 HISTORY OF STROKE: ICD-10-CM

## 2023-05-03 DIAGNOSIS — Z86.73 HISTORY OF STROKE: Primary | ICD-10-CM

## 2023-05-03 DIAGNOSIS — G81.94 LEFT HEMIPARESIS (HCC): Primary | ICD-10-CM

## 2023-05-03 DIAGNOSIS — R47.1 DYSARTHRIA: Primary | ICD-10-CM

## 2023-05-03 NOTE — PROGRESS NOTES
"Occupational Therapy Daily Note:    Today's date: 5/3/2023  Patient name: Lucie Higuera  : 1955  MRN: [id number]  Referring provider: Dave Tinoco*  Dx:   Encounter Diagnoses   Name Primary?  Left hemiparesis ([facility]) Yes    History of stroke                   Subjective: \"I am in no pain at all, but it's just fatigue  \" (post exercises)     Objective: See below  Thera Ex:  Pt started with supine exercise using 1 lb DB focusing on improved proximal control, strength, endurance, and fnxl grasp  Pt completed the following x 15 of each:  - supinated bicep curls  - chest press  - shoulder flexion 90*  - tricep AG   - In prone, scap rows/pull backs for post delt     Neuro Re-ed:  Pt continued with prone exercise adding functional electrical stimulation to wrist extensor mass x 2 leads at proximal and distal location of extensors focusing on digit extension with gravity eliminated  Pt req to squeeze hand during \"off\" phase and release during \"on\" phase of FES  OT trialed/added vibration to extensors to start (w/o FES) although difficulty terminating flexors  Provided mirror for visual feedback  FES parameters:  Ramp: 2 sec  On: 10 sec  Off: 8 sec  Rate: 45 Hz  Width: 310     Assessment: Tolerated treatment well  Patient would benefit from continued skilled OT  Pt tolerated supine TE well, req stabilization at wrist/hand 2* flexor tone  Pt benefits from neutralizing tone b/n flexors and extensors during movements, with report of positive sensation by OT  Pt responded well to using mirror for visual feedback and able to perform active flexion of digits and electrical stimulation able to extend D2-D4 with improved termination of flexors  Plan: Continued skilled OT per POC  Trial NMES x 4 leads for improved digit extension, gravity eliminated        INTERVENTION COMMENTS:  Diagnosis: Left hemiparesis ([facility] [address] ) [G81 94]  Precautions: fall risk, A-fib  FOTO: n/a  Insurance: Payor: Nicole Benson " SHIELD / Plan: [id number] / Product Type: Blue Fee for Service /   3 of __ visits, PN due 5/19/2023 and 6/19/2023  POC Expiration/Re-eval date: 7/19/2023

## 2023-05-05 NOTE — PROGRESS NOTES
"Daily Note     Today's date: 2023  Patient name: Abdiel Foster  : 1955  MRN: [id number]  Referring provider: Donald Monsivais MD  Dx:   Encounter Diagnosis     ICD-10-CM    1  History of stroke  Z86 73                      Subjective: Patient reports going for walks on a daily basis       Objective: See treatment diary below      Assessment: Patient struggled to complete step ups on foam with L LE lead and to push into knee extension  Patient was significantly fatigued at end of session and had more difficulty with limb clearance  Plan: Continue per plan of care        Precautions: fall risk    Manuals 4/19 4/24 5/3                                                              Neuro Re-Ed             treadmill 1 5 mph 10 min 1 8 mph 10% incline B handrails 11 min 7% incline 2 0 mph R UE 10 min          hurdles  Side steps 12\" with cga 6x15ft            Obstacle course  Foam box step ups 6\" steps with airex cga, reactive balance step overs with poodle noodles with cog challenge - 5# aw 3x15 ft           Step up overs   6\" and 8\" steps with airex pads 5 laps fwd                                                 Ther Ex                                                                                                                       Ther Activity                                       Gait Training  Leonela Step overs with dots for prolonged stance leg with 5# ankle weight  7x15ft             Ambulatory Box Step-ups with 5# ankle weight 6x15ft                        Modalities                                            "

## 2023-05-08 ENCOUNTER — OFFICE VISIT (OUTPATIENT)
Dept: OCCUPATIONAL THERAPY | Facility: CLINIC | Age: 68
End: 2023-05-08

## 2023-05-08 ENCOUNTER — OFFICE VISIT (OUTPATIENT)
Dept: PHYSICAL THERAPY | Facility: CLINIC | Age: 68
End: 2023-05-08

## 2023-05-08 DIAGNOSIS — G81.94 LEFT HEMIPARESIS (HCC): Primary | ICD-10-CM

## 2023-05-08 DIAGNOSIS — Z86.73 HISTORY OF STROKE: ICD-10-CM

## 2023-05-08 DIAGNOSIS — Z86.73 HISTORY OF STROKE: Primary | ICD-10-CM

## 2023-05-08 NOTE — PROGRESS NOTES
"Occupational Therapy Daily Note:    Today's date: 2023  Patient name: Leisa Apley  : 1955  MRN: [id number]  Referring provider: Tish Faith*  Dx:   Encounter Diagnoses   Name Primary? • Left hemiparesis ([facility] [address] ) Yes   • History of stroke                   Subjective: \"I am in no pain at all, but it's just fatigue  \" (post exercises)     Objective: See below  Thera Ex:  Pt started on UBE x 5 min prograde and x 5 min retrograde at 1 7 resist with NMES to L shoulder x 4 leads at post delt/supraspinatus, rhomboids for improved proximal strengthening, dec subluxation of shoulder, improved scapula positioning, UB coordination, and sustained grasp on handle  NMES parameters:  Ramp: 2 sec  On: 30 sec  Off: 10 sec  Rate: 40 Hz  Width: 400     Neuro Re-ed:  Pt continued with table top task with functional electrical stimulation to wrist extensor mass x 2 leads at proximal and distal location of extensors focusing on digit/wrist extension and with hand in neutral positioning  Trialed various positions / movements for improved fnxl use of affected UE/hand  1  Neutral positioning with thumb lateral pinching of a box of cards during \"off\" phase and releasing during \"on\" phase  2  Used mirror for visual feedback to improve digit extension, completed repetitively   3  Performed forearm/hand isometric contraction to table during \"off\" phase and released during \"on\" phase     FES parameters:  Ramp: 2 sec  On: 15 sec  Off: 5 sec  Rate: 35 Hz  Width: 280     Assessment: Tolerated treatment well  Patient would benefit from continued skilled OT  Pt tolerated UBE added resistance well with loss of  x 1 on handle at last 8 min of UBE  Pt continues to respond well to NMES to shoulder for improved strength, had no redness/irritation today (also shaved hair and noted improved muscle contraction) x 40+ min    Pt noted to have increased response time to terminate forearm flexors after cathy and making " a fist 2* increased tone, requiring ~6+ sec at times to obtain release  Plan: Continued skilled OT per POC  Trial cold pack on flexors / FES to flexors  Show pt positions of FES (home unit)         INTERVENTION COMMENTS:  Diagnosis: Left hemiparesis ([facility] [address] ) [G81 94]  Precautions: fall risk, A-fib  FOTO: n/a  Insurance: Payor: [address] [place] [address] / Plan: [id number] / Product Type: Blue Fee for Service /   4 of __ visits, PN due 5/19/2023 and 6/19/2023  POC Expiration/Re-eval date: 7/19/2023

## 2023-05-08 NOTE — PROGRESS NOTES
"Daily Note     Today's date: 2023  Patient name: Isaiah Bashir  : 1955  MRN: [id number]  Referring provider: Renzo Torres MD  Dx:   Encounter Diagnosis     ICD-10-CM    1  History of stroke  Z86 73                      Subjective: Patient reports doing a lot of walking over the weekend purposefully      Objective: See treatment diary below      Assessment: Pt was able to complete the incline walking both forward and backwards in all direction but specifically was very challenged with backwards going up the incline  Plan to attempt again at nex tsession  Able to maintain strength well enough to complete 12\" step up to second step  Plan: Continue per plan of care        Precautions: fall risk    Manuals 4/19 4/24 5/3 5/8                                                             Neuro Re-Ed             treadmill 1 5 mph 10 min 1 8 mph 10% incline B handrails 11 min 7% incline 2 0 mph R UE 10 min 7% incline 2 0 mph R UE 10 min         hurdles  Side steps 12\" with cga 6x15ft            Obstacle course  Foam box step ups 6\" steps with airex cga, reactive balance step overs with poodle noodles with cog challenge - 5# aw 3x15 ft           Step up overs   6\" and 8\" steps with airex pads 5 laps fwd          incline    5x fwd both directions; 5x lateral ea; 5x backwards ea solo step         Second step up    On 6\" side fwd L LE - 10x; lateral L LE - 10x 5#aw                      Ther Ex                                                                                                                       Ther Activity                                       Gait Training  Leonela Step overs with dots for prolonged stance leg with 5# ankle weight  7x15ft             Ambulatory Box Step-ups with 5# ankle weight 6x15ft                        Modalities                                            "

## 2023-05-15 ENCOUNTER — APPOINTMENT (OUTPATIENT)
Dept: OCCUPATIONAL THERAPY | Facility: CLINIC | Age: 68
End: 2023-05-15
Payer: COMMERCIAL

## 2023-05-17 ENCOUNTER — OFFICE VISIT (OUTPATIENT)
Dept: PHYSICAL THERAPY | Facility: CLINIC | Age: 68
End: 2023-05-17

## 2023-05-17 ENCOUNTER — OFFICE VISIT (OUTPATIENT)
Dept: SPEECH THERAPY | Facility: CLINIC | Age: 68
End: 2023-05-17

## 2023-05-17 ENCOUNTER — OFFICE VISIT (OUTPATIENT)
Dept: OCCUPATIONAL THERAPY | Facility: CLINIC | Age: 68
End: 2023-05-17

## 2023-05-17 DIAGNOSIS — G44.52 NEW DAILY PERSISTENT HEADACHE: ICD-10-CM

## 2023-05-17 DIAGNOSIS — G81.94 LEFT HEMIPARESIS (HCC): Primary | ICD-10-CM

## 2023-05-17 DIAGNOSIS — Z86.73 HISTORY OF STROKE: ICD-10-CM

## 2023-05-17 DIAGNOSIS — R47.1 DYSARTHRIA: Primary | ICD-10-CM

## 2023-05-17 DIAGNOSIS — Z86.73 HISTORY OF STROKE: Primary | ICD-10-CM

## 2023-05-17 NOTE — PROGRESS NOTES
Daily Speech Treatment Note    Today's date: 2023 ***  Patient’s name: Mariah Fernandes  : 1955  MRN: [id number]  Safety measures: h/o CVA, Afib, fall risk  Referring provider: Rob Ardon*    Encounter Diagnosis     ICD-10-CM    1  Dysarthria  R47 1       2  History of stroke  Z86 73       3  New daily persistent headache  G44 52         Visit tracking:  -Referring provider: Epic  -Billing guidelines: CMS  -Visit #***/60   -Insurance: Neymar Antolin (60 vpcy-not combined, $50 co-pay)  -RE due 2023    Subjective/Behavioral:  -***    Objective/Assessment:    Short-term goals:  Patient will perform oral motor exercises using IOPI device on lingual and labial muscles to facilitate improved strength and function for increased speech intelligibility, to be achieved in 4-6 weeks      IOPI PEAK MEASUREMENT WEEKLY GRID      2023 ***         Tongue tip  (Goal = 56) 51 54          Tongue back  (Goal = 50) 58 62          Right Lip  (Goal = 35) 29 35       Left Lip  (Goal = 35) 28 28           IOPI -- Today's Exercise Targets    Tongue tip  (Goal = 56) Peak Max after 3 trials: 51 Effort level: ***% Target for treatment: ***   Tongue back  (Goal = 50) Peak Max after 3 trials: 58 Effort level: ***% Target for treatment: ***   Right Lip  (Goal = 35) Peak Max after 3 trials: 30 Effort level: ***% Target for treatment: ***   Left Lip  (Goal = 35) Peak Max after 3 trials: 28 Effort level: ***% Target for treatment: ***        2023 ***      Tongue tip  3 sets of 30 reps (pumps) 3 sets of 30 reps (pumps)    2 sets of 30 reps (3 sec holds)         Tongue back 3 sets of 30 reps (pumps) 3 sets of 30 reps (pumps)    2 sets of 30 reps (3 sec holds)       Right Lip 3 sets of 30 reps (pumps) 3 sets of 30 reps (pumps)    2 sets of 30 reps (3 sec holds)          Left Lip 3 sets of 30 reps (pumps) 3 sets of 30 reps (pumps)    2 sets of 15 reps (3 sec holds)           Patient will perform oral motor and diadochokinetic speech rate exercises with > 90% accuracy to facilitate increased speech intelligibility, to be achieved in 4-6 weeks      Patient will utilize compensatory strategies (I e , over-articulation, decreased rate, etc) 80% of the time while orally reading sentence/paragraph length material to facilitate increased speech intelligibility, to be achieved in 4-6 weeks      Patient will complete oral reading and conversational tasks with the consistent use of compensatory strategies >90% of the time to facilitate increased speech intelligibility, to be achieved in 4-6 weeks      Patient  will be educated on speech intelligibility strategies (e g , over-exaggeration, slow rate, breath groups,, etc ) to promote increased communication success (to be achieved in 2-3 weeks)      Patient will utilize speech intelligibility strategies 80% of the time with min verbal cues while reading words, sentences, and paragraph length material to facilitate increased generalization of skills into conversational speech (to be achieved in 4-6 weeks)  Plan:  -Patient was provided with home exercises/activities to target goals in plan of care at the end of today's session   -Continue with current plan of care

## 2023-05-17 NOTE — PROGRESS NOTES
Daily Speech Treatment Note    Today's date: 2023   Patient’s name: Ezra Hawthorne  : 1955  MRN: [id number]  Safety measures: h/o CVA, Afib, fall risk  Referring provider: Maira Camejo*    Encounter Diagnosis     ICD-10-CM    1  Dysarthria  R47 1       2  History of stroke  Z86 73       3  New daily persistent headache  G44 52         Visit tracking:  -Referring provider: Epic  -Billing guidelines: CMS  -Visit #3/60   -Insurance: Zheng Giraldo (60 vpcy-not combined, $50 co-pay)  -RE due 2023    Subjective/Behavioral:  -Patient reported that he has been compliant with his recommended HEP  Objective/Assessment:    Short-term goals:  Patient will perform oral motor exercises using IOPI device on lingual and labial muscles to facilitate improved strength and function for increased speech intelligibility, to be achieved in 4-6 weeks      IOPI PEAK MEASUREMENT WEEKLY GRID      2023          Tongue tip  (Goal = 56) 51 54          Tongue back  (Goal = 50) 58 62          Right Lip  (Goal = 35) 29 35       Left Lip  (Goal = 35) 28 28           IOPI -- Today's Exercise Targets    Tongue tip  (Goal = 56) Peak Max after 3 trials: 51 Effort level: 65% Target for treatment: 35   Tongue back  (Goal = 50) Peak Max after 3 trials: 58 Effort level: 65% Target for treatment: 40   Right Lip  (Goal = 35) Peak Max after 3 trials: 30 Effort level: 65% Target for treatment: 23   Left Lip  (Goal = 35) Peak Max after 3 trials: 28 Effort level: 65% Target for treatment: 18        2023       Tongue tip  3 sets of 30 reps (pumps) 3 sets of 30 reps (pumps)    2 sets of 30 reps (3 sec holds)         Tongue back 3 sets of 30 reps (pumps) 3 sets of 30 reps (pumps)    2 sets of 30 reps (3 sec holds)       Right Lip 3 sets of 30 reps (pumps) 3 sets of 30 reps (pumps)    2 sets of 30 reps (3 sec holds)          Left Lip 3 sets of 30 reps (pumps) 3 sets of 30 reps (pumps)    2 sets of 15 reps (3 sec holds)           Patient will perform oral motor and diadochokinetic speech rate exercises with > 90% accuracy to facilitate increased speech intelligibility, to be achieved in 4-6 weeks      Patient will utilize compensatory strategies (I e , over-articulation, decreased rate, etc) 80% of the time while orally reading sentence/paragraph length material to facilitate increased speech intelligibility, to be achieved in 4-6 weeks      Patient will complete oral reading and conversational tasks with the consistent use of compensatory strategies >90% of the time to facilitate increased speech intelligibility, to be achieved in 4-6 weeks      Patient  will be educated on speech intelligibility strategies (e g , over-exaggeration, slow rate, breath groups,, etc ) to promote increased communication success (to be achieved in 2-3 weeks)      Patient will utilize speech intelligibility strategies 80% of the time with min verbal cues while reading words, sentences, and paragraph length material to facilitate increased generalization of skills into conversational speech (to be achieved in 4-6 weeks)  Plan:  -Patient was provided with home exercises/activities to target goals in plan of care at the end of today's session   -Continue with current plan of care

## 2023-05-17 NOTE — PROGRESS NOTES
"Occupational Therapy Daily Note:    Today's date: 2023  Patient name: Ariel Ragsdale  : 1955  MRN: [id number]  Referring provider: David Huerta*  Dx:   Encounter Diagnoses   Name Primary? • Left hemiparesis ([facility] [address] ) Yes   • History of stroke                   Subjective: Reports his e-stim unit is not charging - discussed possibly needing a new battery for it  Objective: See below  Thera Ex:  Pt started on UBE x 5 min prograde and x 5 min retrograde at 1 3 resist with NMES to L shoulder x 4 leads at post delt/supraspinatus, rhomboids for improved proximal strengthening, dec subluxation of shoulder, improved scapula positioning, UB coordination, and sustained grasp on handle  NMES parameters:  Ramp: 2 sec  On: 30 sec  Off: 10 sec  Rate: 40 Hz  Width: 400     Neuro Re-ed:  Pt continued with seated edge of mat task focusing on fatiguing flexor tone muscle with 2 leads placed on forearm flexor mass with 5 sec on time/5 sec off time while ice pack applied to forearm flexors x 15 min  OT gradually provided light resistance to digits during \"on\" phase for increased feedback/fatigue of muscles  Pt then progressed to digit flexion/extension of L hand with FES applied to wrist extensors x 2 leads;   - Ramp 2 sec  - On 5 sec, off 10 sec  - rate 35 Hz, width 300   Pt with placement of L hand in pronation on lap gravity eliminated and facilitated grasp of a large block into pincer pinch followed by release of block during \"on\" phase performing repetitively  Assessment: Tolerated treatment well  Patient would benefit from continued skilled OT  Pt tolerated UBE added resistance well with loss of  x 2 on handle at last 7-7 5 min of UBE in prograde motion  Pt had a G response to use of cold pack and would benefit from continuing in OT sessions  Pt may have had flexor fatigue when placed on forearm flexors although not enough to promote relaxed digit extension    With gravity " eliminated, pt had an excellent response with the above parameters into digit extension followed by only performing a pincer grasp as this continuously allowed even distribution of digit flexors/extensors during grasp/release  Discussed focusing on deep breaths when attempting to relax or extend hand as taking focus off the movement has increased pt's relaxation and opening of digits  Plan: Continued skilled OT per POC  Continue cold pack on flexors / FES to extensors(above parameters)  Show pt positions of FES (home unit f/u)         INTERVENTION COMMENTS:  Diagnosis: Left hemiparesis ([facility] [address] ) [G81 94]  Precautions: fall risk, A-fib  FOTO: n/a  Insurance: Payor: Carolina Mahan / Plan: [id number] / Product Type: Blue Fee for Service /   5 of __ visits, PN due 5/19/2023 and 6/19/2023  POC Expiration/Re-eval date: 7/19/2023

## 2023-05-19 NOTE — PROGRESS NOTES
"Daily Note     Today's date: 2023  Patient name: Nargis Valencia  : 1955  MRN: [id number]  Referring provider: Lucita Sandoval MD  Dx:   Encounter Diagnosis     ICD-10-CM    1  History of stroke  Z86 73                      Subjective:       Objective: See treatment diary below      Assessment:       Plan: Continue per plan of care        Precautions: fall risk    Manuals 4/19 4/24 5/3 5/8                                                             Neuro Re-Ed             treadmill 1 5 mph 10 min 1 8 mph 10% incline B handrails 11 min 7% incline 2 0 mph R UE 10 min 7% incline 2 0 mph R UE 10 min         hurdles  Side steps 12\" with cga 6x15ft            Obstacle course  Foam box step ups 6\" steps with airex cga, reactive balance step overs with poodle noodles with cog challenge - 5# aw 3x15 ft           Step up overs   6\" and 8\" steps with airex pads 5 laps fwd          incline    5x fwd both directions; 5x lateral ea; 5x backwards ea solo step         Second step up    On 6\" side fwd L LE - 10x; lateral L LE - 10x 5#aw                      Ther Ex                                                                                                                       Ther Activity                                       Gait Training  Leonela Step overs with dots for prolonged stance leg with 5# ankle weight  7x15ft             Ambulatory Box Step-ups with 5# ankle weight 6x15ft                        Modalities                                            "

## 2023-05-22 NOTE — PROGRESS NOTES
Daily Speech Treatment Note    Today's date: 2023   Patient’s name: Delon Primrose  : 1955  MRN: [id number]  Safety measures: h/o CVA, Afib, fall risk  Referring provider: Sherri Yousif*    Encounter Diagnosis     ICD-10-CM    1  Dysarthria  R47 1       2  History of stroke  Z86 73       3  New daily persistent headache  G44 52         Visit tracking:  -Referring provider: Epic  -Billing guidelines: CMS  -Visit #   -Insurance: Hossein Brandon (60 vpcy-not combined, $50 co-pay)  -RE due 2023    Subjective/Behavioral:  -Patient reported that he had limited HEP practice since last appointment  Objective/Assessment:    Short-term goals:  Patient will perform oral motor exercises using IOPI device on lingual and labial muscles to facilitate improved strength and function for increased speech intelligibility, to be achieved in 4-6 weeks    IOPI PEAK MEASUREMENT WEEKLY GRID      2023         Tongue tip  (Goal = 56) 51 54 60         Tongue back  (Goal = 50) 58 62 61         Right Lip  (Goal = 35) 29 35 56      Left Lip  (Goal = 35) 28 28 63          IOPI -- Today's Exercise Targets    Tongue tip  (Goal = 56) Peak Max after 3 trials: 60 Effort level: 70% Target for treatment: 42   Tongue back  (Goal = 50) Peak Max after 3 trials: 61 Effort level: 70% Target for treatment: 43   Right Lip  (Goal = 35) Peak Max after 3 trials: 56 Effort level: 70% Target for treatment: 39   Left Lip  (Goal = 35) Peak Max after 3 trials: 63 Effort level: 70% Target for treatment: 44        2023      Tongue tip  3 sets of 30 reps (pumps) 3 sets of 30 reps (pumps)    2 sets of 30 reps (3 sec holds)   3 sets of 30 reps (pumps)    2 sets of 30 reps (3 sec holds)      Tongue back 3 sets of 30 reps (pumps) 3 sets of 30 reps (pumps)    2 sets of 30 reps (3 sec holds) 3 sets of 30 reps (pumps)    2 sets of 30 reps (3 sec holds)      Right Lip 3 sets of 30 reps (pumps) 3 sets of 30 reps (pumps)    2 sets of 30 reps (3 sec holds)   3 sets of 30 reps (pumps)    2 sets of 30 reps (3 sec holds)      Left Lip 3 sets of 30 reps (pumps) 3 sets of 30 reps (pumps)    2 sets of 15 reps (3 sec holds) 3 sets of 30 reps (pumps)    2 sets of 30 reps (3 sec holds)          Patient will perform oral motor and diadochokinetic speech rate exercises with > 90% accuracy to facilitate increased speech intelligibility, to be achieved in 4-6 weeks      Patient will utilize compensatory strategies (I e , over-articulation, decreased rate, etc) 80% of the time while orally reading sentence/paragraph length material to facilitate increased speech intelligibility, to be achieved in 4-6 weeks      Patient will complete oral reading and conversational tasks with the consistent use of compensatory strategies >90% of the time to facilitate increased speech intelligibility, to be achieved in 4-6 weeks      Patient  will be educated on speech intelligibility strategies (e g , over-exaggeration, slow rate, breath groups,, etc ) to promote increased communication success (to be achieved in 2-3 weeks)      Patient will utilize speech intelligibility strategies 80% of the time with min verbal cues while reading words, sentences, and paragraph length material to facilitate increased generalization of skills into conversational speech (to be achieved in 4-6 weeks)  Plan:  -Patient was provided with home exercises/activities to target goals in plan of care at the end of today's session   -Continue with current plan of care

## 2023-05-24 ENCOUNTER — APPOINTMENT (OUTPATIENT)
Dept: PHYSICAL THERAPY | Facility: CLINIC | Age: 68
End: 2023-05-24
Payer: COMMERCIAL

## 2023-05-24 ENCOUNTER — APPOINTMENT (OUTPATIENT)
Dept: SPEECH THERAPY | Facility: CLINIC | Age: 68
End: 2023-05-24
Payer: COMMERCIAL

## 2023-05-24 DIAGNOSIS — Z86.73 HISTORY OF STROKE: ICD-10-CM

## 2023-05-24 DIAGNOSIS — R47.1 DYSARTHRIA: Primary | ICD-10-CM

## 2023-05-24 DIAGNOSIS — G44.52 NEW DAILY PERSISTENT HEADACHE: ICD-10-CM

## 2023-05-31 ENCOUNTER — OFFICE VISIT (OUTPATIENT)
Dept: PHYSICAL THERAPY | Facility: CLINIC | Age: 68
End: 2023-05-31

## 2023-05-31 ENCOUNTER — OFFICE VISIT (OUTPATIENT)
Dept: SPEECH THERAPY | Facility: CLINIC | Age: 68
End: 2023-05-31

## 2023-05-31 ENCOUNTER — OFFICE VISIT (OUTPATIENT)
Dept: OCCUPATIONAL THERAPY | Facility: CLINIC | Age: 68
End: 2023-05-31

## 2023-05-31 DIAGNOSIS — G81.94 LEFT HEMIPARESIS (HCC): Primary | ICD-10-CM

## 2023-05-31 DIAGNOSIS — Z86.73 HISTORY OF STROKE: ICD-10-CM

## 2023-05-31 DIAGNOSIS — G44.52 NEW DAILY PERSISTENT HEADACHE: ICD-10-CM

## 2023-05-31 DIAGNOSIS — R47.1 DYSARTHRIA: Primary | ICD-10-CM

## 2023-05-31 DIAGNOSIS — Z86.73 HISTORY OF STROKE: Primary | ICD-10-CM

## 2023-05-31 NOTE — PROGRESS NOTES
OCCUPATIONAL THERAPY PROGRESS NOTE #1    5/31/2023  Chema Vyas  1955  [id number]  Thomas[facility] Backbone*   Diagnosis ICD-10-CM Associated Orders   1  Left hemiparesis ([facility] [address] )  G81 94       2  History of stroke  Z86 73             Assessment/Plan    Skilled Analysis:  Chema Vyas is a 70 y o  male referred to Occupational Therapy s/p Left hemiparesis ([facility] [address] ) [G81 94]  Pt participated in skilled OT PN #1 to assess functional progress towards goals/POC  Pt has had 4 visits with focus on stimulating forearm and digit extensors for improved active release of objects in hand through use of electrical stimulation and massed practice/repetition  Pt reports engaging in HEP at home using flexbars/theraball although would benefit from developing a HEP using E-stim  Following formalized testing as well as clinical observation, Pt presents with the following areas of deficit:  Bicep and extrinsic forearm flexor hypertonicity, 1 fingerbreadth subluxation, decreased termination of flexors on command impacting release of objects or grasping objects, decreased volitional grasp, ROM, and overall GMC skills impacting ADLs, IADLs, and salient/leisure tasks  Pt will benefit from skilled Occupational Therapy services 1x/week for 8 more weeks with focus on Neuro Re-ed, Thera Act, Thera Ex, Self Care, and Manual Trx to improve functional use of LUE  Chema Vyas is in agreement with POC  Todays treatment:  Pt tolerated 5 min x 2 (prograde/retrograde motions at 1 4 resist) seated on UBE with focus on increasing proximal UE strength, endurance, sustained grasp on handle, act tolerance and ROM to inc indep and safety with ADL/IADL fxn and fxnl reaching tasks  In stance at table top, pt completed weight bearing performing tricep and chest push-ups focusing on weight bearing/proprioceptive input through LUE    Pt then completed towel glides on table top to end range with e-stim on forearm extensors focusing on increased "proprioceptive input through LUE  With FES on wrist extensors x 2 leads; pt completed massed practice of SMALL 1/2\" blocks grasping with pincer grasp and releasing during \"on phase\"   Pt completed x 44 reps with electrical stim and 10 reps w/o stim with pt able to achieve first 6 with active release of thumb only of block before increased flexor tone started and difficulty terminating flexors  - Ramp 2 sec  - On 5 sec, off 10 sec  - rate 35 Hz, width 300            GOALS    Motor Short Term Goals (6 weeks)  AROM/PROM  · Pt will improve L elbow extension to - 20* for improved functional reaching and movement patterns   · Pt will demo with decreased LUE shoulder sublux to 3/4 fingerbreadth through use of E-stim for increased proximal control, shoulder approximation, and UE strength  · Pt will increase LUE proximal shoulder strength to 3+/5 in supine sustaining minimal resistance to improve proximal stability/control   · Pt will demo good carryover of clinic and home tone reduction strategies for improved AROM initiation with functional reach with ADL fxn    Strength/Endurance  · Pt will increase ability to perform UBE x 5 min with sustained grasp on handle w/o losing  to improve endurance and strength  · Pt will demo with G carryover of HEP to improve functional progression towards goals in POC  · Pt will demo with G tolerance to supine, seated, and in stance exercise x 25 minutes with minimal rest breaks required for increased engagement in life roles and weekly exercise regimen    Functional Performance  · Pt will demo improved motor learning of constructional and new motor actions for improved B/L coordination and motor planning evident by ability to utilize LUE as a gross stabilizer (gross grasp) with opening containers, bottles    · Pt will improve ability to reach for a door knob and rotate 1/2 motion with a pull to improve functional use of LUE   · Pt will demo G understanding of a sock aide for " "improving ability to don socks with MIN A    Modalities  · Pt will tolerate BIONESS/NMES/IASTM and vibration for improved motor and sensory performance for overall improved strength, tone reduction, and sensation to inc safety and engagement with ADL/IADL fxn        Motor Long Term Goals (12 weeks)  AROM/PROM  · Pt will demo with decreased LUE shoulder sublux to 1/2 fingerbreadth through use of E-stim for increased proximal control, shoulder approximation, and UE strength  · Pt will increase LUE proximal shoulder strength to 4-/5 in supine sustaining minimal resistance to improve proximal stability/control   · Pt will improve L elbow extension to - 10* for improved functional reaching and movement patterns     Strength/Endurance  · Pt will increase ability to perform UBE x 10 min with sustained grasp on handle w/o losing  to improve endurance and strength  · Pt will demo with G carryover of HEP to improve functional progression towards goals in POC  · Pt will demo with G tolerance to supine, seated, and in stance exercise x 40 minutes with minimal rest breaks required for increased engagement in life roles     Functional Performance  · Pt will demo improved motor learning of constructional and new motor actions for improved B/L coordination and motor planning evident by ability to utilize LUE as a semifunctional stabilizer (hand assisting with fastenings, gross grasp with slight release, able to push/pull)   · Pt will improve ability to reach for a door knob and rotate 3/4 motion with a pull to improve functional use of LUE     Modalities  · Pt will tolerate BIONESS/NMES/IASTM and vibration for improved motor and sensory performance for overall improved strength, tone reduction, and sensation to inc safety and engagement with ADL/IADL fxn          Subjective    Patient Goal: \"Ulitimately be able to use my hand again  At least get it to where it was  I was doing well until I stopped  \"    Patient-Specific " "Functional Scale   Task is scored 0 (unable to perform activity) to 10 (ability to perform activity independently)    Activity Date:  4/19/2023 Date:   1  Drive with 2 hands   0    2  Open a door and turn it   0    3  Stabilizing objects (receipts, check book)   3    4  Holding a bottle   0    5  Taking dog for walk and holding leash in L hand   5    6  Drying back      7  Putting socks on          HISTORY OF PRESENT ILLNESS:     Pt, Zuleima Wiseman, is a 70 y o  male who was referred to Occupational Therapy s/p  Left hemiparesis ([address] ) [G81 94]  Pt familiar with OP OT services  Pts PMH: Initial presentation to [facility] on 10/20/2018 due to left-sided weakness and facial droop   He also had a speech impairment and was found to have an acute stroke  The patient received tPA at 11:00 p m  on the day of admission after neurology assessment  He had loop recorder placed at time of admission for CVA  during his hospital stay he was dx with a small amount of petechial hemorrhage noted on MRI  CT scan showed positive results for small infarctions within the right frontal lobe and right temporal occipital junction, Pt [facility] to [facility] from 10/25/18 - 11/21/18  Pt had recent MRI Spine on 2/9/23: \" 1  ACDF C5-C7  2  Multilevel degenerative disc disease with mild central canal narrowing at the C3-4 and C4-5 levels  3  The cervical cord appears normal in caliber and signal with no evidence of focal impingement  \"      MRI Brain on 2/9/23: \"1  No acute infarction, edema, or mass effect  2  Chronic right frontotemporal infarct with encephalomalacia and surrounding gliosis extending along the right cerebral peduncle and brainstem  3  Focal area of gliosis within the posterior right occipital lobe, probable sequela of prior ischemic injury  \"      Had BOTOX recently, is due in ~6 weeks  Wears his dynasplint (extension splint) a few times a week    He reports difficulty stabilizing his L hand to open a jar or " medications  He reports having his daughters open his medication bottles and then he has to leave them open  Can hold his dogs leash with L hand then has to switch  Had 2 falls this year, lost balance both times when turning and walking to bathroom  hCristina Gonzalez resides with his daughter/son in law in an in-law suite  Pt is performing ADLs independently but takes a while  Says he was never a big cooker, his daughter usually cooks or orders in   Grocery shops but tired after  Pt works for [employer], works 3-4x/week or works from home           PMH:   Past Medical History:   Diagnosis Date   • Arthritis     in hands   • Cervical herniated disc    • Colon polyp    • Diabetes mellitus ([facility] [address] )     borderline   • Gout     hx of in 2018, none since   • Hyperlipidemia    • Hypertension    • Irregular heart beat     a-fib, has loop recorder   • Kidney stone 2021    hx of   • Migraine    • S/P Botox injection     in left wrist to help increase mobility   • Sleep apnea     mild sleep apnea, cpap was not ordered   • Stroke ([facility] ) 2018    left hemiparesis, wears leg brace and uses cane, unable to use left arm and hand   • Use of cane as ambulatory aid     had CVA in 2018       Pain Levels   Restin    With Activity:  3 - R knee pain from fall       Objective    Impairment Observations:    NETTIE MORALEZ Comments           UPPER EXTREMITY FUNCTION   Intact Impaired Dominant Hand: RIGHT     /PINCH STRENGTH             Dynamometer    - Gross Grasp 35 lbs 25 lbs abnormal   L: increased 10 lbs   Pinch Meter     - Pincer 13 5 lbs 2 5 lbs abnormal   Compensated with placement     - Tripod 13 lbs 5 lbs abnormal   Compensated with placement     - Lateral 13 lbs  8 lbs abnormal   R: increased 1 lb      AROM (Seated)         Scapular winging     Elevation Full 3/4     Shoulder FF Full  105*  Abducted, elbow flexed    Shoulder Ext  0*      Shoulder Abd  95*     Horizontal Abd  Neutral     Horizontal Add  3/4     Elbow Flex  80*  L shoulder abducted    Elbow Ext  -40*      Pronation  Full     Supination  Neutral      Wrist Flex  50*     Wrist Ext  Unable      Digit Flex  3/4 motion     Digit Ex  Unable  Unable to terminate flexors to extend digits    Composite Grasp  3/4      Hook Grasp  Unable     Subluxation   1 fingerbreadth(middle)       AROM (Supine)             Shoulder FF   150*  Decreased control    Shoulder Ext      Shoulder ABD      Shoulder ADD      Horizontal ABD      Horizontal ADD      Elbow Flex      Elbow Ext   Able to achieve full motion in supine (PROM)   Pronation      Supination      Wrist Flex      Wrist Ext      Digit Flex      Digit Ext        MMT             Shoulder FF 5/5 2/5    Shoulder Ext 5/5 1/5    Shoulder Abduction 5/5 2/5    Shoulder Adduction 5/5 2/5    Elbow Flex 5/5 2/5    Elbow Ext 5/5 3/5    Wrist Flex 5/5 3/5    Wrist Ext 5/5 2/5      COORDINATION      Opposition  With isolation of thumb, D1-D2   Lateral pinch to D3 DIP    Finger to Nose Intact  Impaired     TONE: MODIFIED HUNTER SCALE      No increase in muscle tone (0)      Slight Increase in muscle tone with catch and release or min resist at end range (1)      Slight Increase in muscle tone with catch and release, followed by min resistance through remainder of range (1+)  Elbow flexors (biceps)    Wrist forearm flexors     Increased muscle tone through full range, able to be moved easily (2)      Considerable increase in tone, difficult to move (3)      Rigid in Flexion/Extension (4)              OTHER PLANNED THERAPY INTERVENTIONS:   Supine, seated, and in stance neuro re-ed  Task-Oriented Training  Tricep AG  NMES/FES  Cryotherapy for tone reduction  GMC  Proximal to distal teaming  Manual tx  IASTM  Hand to target  Seated functional reach: crossing midline  Supine place and hold  WBearing strategies   Closed chain activities  Open chain activities  Mirror Therapy  HEP      INTERVENTION COMMENTS:  Diagnosis: Left hemiparesis ([address] ) [G81 66]  Precautions: fall risk, A-fib  FOTO: n/a  Insurance: Payor: Brittany Rodriguez / Plan: [id number] / Product Type: Blue Fee for Service /   6 of __ visits, PN due 6/19/2023  POC Expiration/Re-eval date: 7/19/2023

## 2023-05-31 NOTE — PROGRESS NOTES
Daily Speech Treatment Note    Today's date: 2023 ***  Patient’s name: Annetta Calero  : 1955  MRN: [id number]  Safety measures: h/o CVA, Afib, fall risk  Referring provider: Theodore Teixeira*    Encounter Diagnosis     ICD-10-CM    1  Dysarthria  R47 1       2  History of stroke  Z86 73       3  New daily persistent headache  G44 52         Visit tracking:  -Referring provider: Epic  -Billing guidelines: CMS  -Visit #***/60   -Insurance: Jason Khanna (60 vpcy-not combined, $50 co-pay)  -RE due 2023    Subjective/Behavioral:  -***    Objective/Assessment:    Short-term goals:  Patient will perform oral motor exercises using IOPI device on lingual and labial muscles to facilitate improved strength and function for increased speech intelligibility, to be achieved in 4-6 weeks  ***    Patient will perform oral motor and diadochokinetic speech rate exercises with > 90% accuracy to facilitate increased speech intelligibility, to be achieved in 4-6 weeks      Patient will utilize compensatory strategies (I e , over-articulation, decreased rate, etc) 80% of the time while orally reading sentence/paragraph length material to facilitate increased speech intelligibility, to be achieved in 4-6 weeks      Patient will complete oral reading and conversational tasks with the consistent use of compensatory strategies >90% of the time to facilitate increased speech intelligibility, to be achieved in 4-6 weeks      Patient  will be educated on speech intelligibility strategies (e g , over-exaggeration, slow rate, breath groups,, etc ) to promote increased communication success (to be achieved in 2-3 weeks)      Patient will utilize speech intelligibility strategies 80% of the time with min verbal cues while reading words, sentences, and paragraph length material to facilitate increased generalization of skills into conversational speech (to be achieved in 4-6 weeks)       Plan:  -Patient was provided with home exercises/activities to target goals in plan of care at the end of today's session   -Continue with current plan of care

## 2023-05-31 NOTE — PROGRESS NOTES
"Daily Note     Today's date: 2023  Patient name: Rand Narayan  : 1955  MRN: [id number]  Referring provider: Antonieta Zheng MD  Dx:   Encounter Diagnosis     ICD-10-CM    1  History of stroke  Z86 73                      Subjective: Had a family emergency last week and has been overall feeling really shook up since then and is a little down and tired today      Objective: See treatment diary below      Assessment: Pt was challenged by using river rocks even further with cog challenge to step to specific ones with greater loss of balance and need for UE in solo step  Plan: Continue per plan of care        Precautions: fall risk    Manuals 4/19 4/24 5/3 5/8 5/31                                                            Neuro Re-Ed             treadmill 1 5 mph 10 min 1 8 mph 10% incline B handrails 11 min 7% incline 2 0 mph R UE 10 min 7% incline 2 0 mph R UE 10 min         hurdles  Side steps 12\" with cga 6x15ft            Obstacle course  Foam box step ups 6\" steps with airex cga, reactive balance step overs with poodle noodles with cog challenge - 5# aw 3x15 ft           Step up overs   6\" and 8\" steps with airex pads 5 laps fwd  8\" with airex //bars 15x ea fwd/lat no aw min UE        incline    5x fwd both directions; 5x lateral ea; 5x backwards ea solo step outdoors x 1 with CGa throughout        Second step up    On 6\" side fwd L LE - 10x; lateral L LE - 10x 5#aw         River rocks     3x without color pattern PRN UE    6x laps with specific cog challenge - PRN UE        Ther Ex                                                                                                                       Ther Activity                                       Gait Training  Leonela Step overs with dots for prolonged stance leg with 5# ankle weight  7x15ft   Grassy uneven surfaces with 5# aw throughout with sidewalk incline uphill           Ambulatory Box Step-ups with 5# ankle weight 6x15ft                " Modalities

## 2023-06-05 ENCOUNTER — APPOINTMENT (OUTPATIENT)
Dept: PHYSICAL THERAPY | Facility: CLINIC | Age: 68
End: 2023-06-05
Payer: COMMERCIAL

## 2023-06-05 ENCOUNTER — APPOINTMENT (OUTPATIENT)
Dept: SPEECH THERAPY | Facility: CLINIC | Age: 68
End: 2023-06-05
Payer: COMMERCIAL

## 2023-06-05 ENCOUNTER — APPOINTMENT (OUTPATIENT)
Dept: OCCUPATIONAL THERAPY | Facility: CLINIC | Age: 68
End: 2023-06-05
Payer: COMMERCIAL

## 2023-06-05 DIAGNOSIS — G44.52 NEW DAILY PERSISTENT HEADACHE: ICD-10-CM

## 2023-06-05 DIAGNOSIS — R47.1 DYSARTHRIA: Primary | ICD-10-CM

## 2023-06-05 DIAGNOSIS — Z86.73 HISTORY OF STROKE: ICD-10-CM

## 2023-06-05 NOTE — PROGRESS NOTES
Daily Speech Treatment Note    Today's date: 2023  Patient’s name: Lisa Fam  : 1955  MRN: [id number]  Safety measures: h/o CVA, Afib, fall risk  Referring provider: Beatrice Rey*    Encounter Diagnosis     ICD-10-CM    1  Dysarthria  R47 1       2  History of stroke  Z86 73       3  New daily persistent headache  G44 52         Visit tracking:  -Referring provider: Epic  -Billing guidelines: CMS  -Visit #   -Insurance: Kizzy Putnam (60 vpcy-not combined, $50 co-pay)  -RE due 2023    Subjective/Behavioral:  -Patient reported that his family, as well as a colleague, commented that he is not slurring his speech as much   -Patient also reported that he was presented with a new IOPI bulb last session as there was air leakage observed with his original one  Objective/Assessment:    Short-term goals:  Patient will perform oral motor exercises using IOPI device on lingual and labial muscles to facilitate improved strength and function for increased speech intelligibility, to be achieved in 4-6 weeks    IOPI PEAK MEASUREMENT WEEKLY GRID       2023      Tongue tip  (Goal = 56) 51 54 60 61  60      Tongue back  (Goal = 50) 58 62 61 61  63      Right Lip  (Goal = 35) 29 35 56 65 48      Left Lip  (Goal = 35) 28 28 63 67 33            IOPI -- Today's Exercise Targets     Tongue tip  (Goal = 56) Peak Max after 3 trials: 60 Effort level: 75% Target for treatment: 42   Tongue back  (Goal = 50) Peak Max after 3 trials: 63 Effort level: 75% Target for treatment: 47   Right Lip  (Goal = 35) Peak Max after 3 trials: 48 Effort level: 75% Target for treatment: 28   Left Lip  (Goal = 35) Peak Max after 3 trials: 33 Effort level: 75% Target for treatment: 25           2023     Tongue tip  3 sets of 30 reps (pumps) 3 sets of 30 reps (pumps)     2 sets of 30 reps (3 sec holds)    3 sets of 30 reps (pumps)     2 sets of 30 reps (3 sec holds) 3 sets of 30 reps (pumps)     2 sets of 30 reps (3 sec holds) 3 sets of 30 reps (pumps)     3 sets of 30 reps (3 sec holds)     Tongue back 3 sets of 30 reps (pumps) 3 sets of 30 reps (pumps)     2 sets of 30 reps (3 sec holds) 3 sets of 30 reps (pumps)     2 sets of 30 reps (3 sec holds) 3 sets of 30 reps (pumps)     2 sets of 30 reps (3 sec holds)    3 sets of 30 reps (pumps)     3 sets of 30 reps (3 sec holds)     Right Lip 3 sets of 30 reps (pumps) 3 sets of 30 reps (pumps)     2 sets of 30 reps (3 sec holds)    3 sets of 30 reps (pumps)     2 sets of 30 reps (3 sec holds) 3 sets of 30 reps (pumps)     2 sets of 30 reps (3 sec holds) 3 sets of 30 reps (pumps)     2 sets of 30 reps (3 sec holds)     Left Lip 3 sets of 30 reps (pumps) 3 sets of 30 reps (pumps)     2 sets of 15 reps (3 sec holds) 3 sets of 30 reps (pumps)     2 sets of 30 reps (3 sec holds) 3 sets of 30 reps (pumps)     2 sets of 30 reps (3 sec holds) 3 sets of 30 reps (pumps)     2 sets of 30 reps (3 sec holds)        Patient will perform oral motor and diadochokinetic speech rate exercises with > 90% accuracy to facilitate increased speech intelligibility, to be achieved in 4-6 weeks      Patient will utilize compensatory strategies (I e , over-articulation, decreased rate, etc) 80% of the time while orally reading sentence/paragraph length material to facilitate increased speech intelligibility, to be achieved in 4-6 weeks      Patient will complete oral reading and conversational tasks with the consistent use of compensatory strategies >90% of the time to facilitate increased speech intelligibility, to be achieved in 4-6 weeks      Patient  will be educated on speech intelligibility strategies (e g , over-exaggeration, slow rate, breath groups,, etc ) to promote increased communication success (to be achieved in 2-3 weeks)      Patient will utilize speech intelligibility strategies 80% of the time with min verbal cues while reading words, sentences, and paragraph length material to facilitate increased generalization of skills into conversational speech (to be achieved in 4-6 weeks)  Plan:  -Patient was provided with home exercises/activities to target goals in plan of care at the end of today's session   -Continue with current plan of care

## 2023-06-06 ENCOUNTER — OFFICE VISIT (OUTPATIENT)
Dept: SPEECH THERAPY | Facility: CLINIC | Age: 68
End: 2023-06-06
Payer: COMMERCIAL

## 2023-06-06 DIAGNOSIS — G44.52 NEW DAILY PERSISTENT HEADACHE: ICD-10-CM

## 2023-06-06 DIAGNOSIS — R47.1 DYSARTHRIA: Primary | ICD-10-CM

## 2023-06-06 DIAGNOSIS — Z86.73 HISTORY OF STROKE: ICD-10-CM

## 2023-06-06 PROCEDURE — 92507 TX SP LANG VOICE COMM INDIV: CPT

## 2023-06-06 NOTE — PROGRESS NOTES
Daily Speech Treatment Note    Today's date: 2023   Patient’s name: Catia Beaulieu  : 1955  MRN: [id number]  Safety measures: h/o CVA, Afib, fall risk  Referring provider: Bernadette Avendaño*    Encounter Diagnosis     ICD-10-CM    1  Dysarthria  R47 1       2  History of stroke  Z86 73       3  New daily persistent headache  G44 52         Visit tracking:  -Referring provider: Epic  -Billing guidelines: CMS  -Visit #  -Insurance: Feli Roth (60 vpcy-not combined, $50 co-pay)  -RE due 2023    Subjective/Behavioral:  -Patient reported that he completed his HEP since last session  Objective/Assessment:    Short-term goals:  Patient will perform oral motor exercises using IOPI device on lingual and labial muscles to facilitate improved strength and function for increased speech intelligibility, to be achieved in 4-6 weeks    IOPI PEAK MEASUREMENT WEEKLY GRID      2023        Tongue tip  (Goal = 56) 51 54 60 61        Tongue back  (Goal = 50) 58 62 61 61        Right Lip  (Goal = 35) 29 35 56 65     Left Lip  (Goal = 35) 28 28 63 67         IOPI -- Today's Exercise Targets    Tongue tip  (Goal = 56) Peak Max after 3 trials: 60 Effort level: 70% Target for treatment: 42   Tongue back  (Goal = 50) Peak Max after 3 trials: 61 Effort level: 70% Target for treatment: 43   Right Lip  (Goal = 35) Peak Max after 3 trials: 56 Effort level: 70% Target for treatment: 39   Left Lip  (Goal = 35) Peak Max after 3 trials: 63 Effort level: 70% Target for treatment: 44        2023     Tongue tip  3 sets of 30 reps (pumps) 3 sets of 30 reps (pumps)    2 sets of 30 reps (3 sec holds)   3 sets of 30 reps (pumps)    2 sets of 30 reps (3 sec holds) 3 sets of 30 reps (pumps)    2 sets of 30 reps (3 sec holds)     Tongue back 3 sets of 30 reps (pumps) 3 sets of 30 reps (pumps)    2 sets of 30 reps (3 sec holds) 3 sets of 30 reps (pumps)    2 sets of 30 reps (3 sec holds) 3 sets of 30 reps (pumps)    2 sets of 30 reps (3 sec holds)        Right Lip 3 sets of 30 reps (pumps) 3 sets of 30 reps (pumps)    2 sets of 30 reps (3 sec holds)   3 sets of 30 reps (pumps)    2 sets of 30 reps (3 sec holds) 3 sets of 30 reps (pumps)    2 sets of 30 reps (3 sec holds)     Left Lip 3 sets of 30 reps (pumps) 3 sets of 30 reps (pumps)    2 sets of 15 reps (3 sec holds) 3 sets of 30 reps (pumps)    2 sets of 30 reps (3 sec holds) 3 sets of 30 reps (pumps)    2 sets of 30 reps (3 sec holds)         Patient will perform oral motor and diadochokinetic speech rate exercises with > 90% accuracy to facilitate increased speech intelligibility, to be achieved in 4-6 weeks      Patient will utilize compensatory strategies (I e , over-articulation, decreased rate, etc) 80% of the time while orally reading sentence/paragraph length material to facilitate increased speech intelligibility, to be achieved in 4-6 weeks      Patient will complete oral reading and conversational tasks with the consistent use of compensatory strategies >90% of the time to facilitate increased speech intelligibility, to be achieved in 4-6 weeks      Patient  will be educated on speech intelligibility strategies (e g , over-exaggeration, slow rate, breath groups,, etc ) to promote increased communication success (to be achieved in 2-3 weeks)      Patient will utilize speech intelligibility strategies 80% of the time with min verbal cues while reading words, sentences, and paragraph length material to facilitate increased generalization of skills into conversational speech (to be achieved in 4-6 weeks)  Plan:  -Patient was provided with home exercises/activities to target goals in plan of care at the end of today's session   -Continue with current plan of care

## 2023-06-07 ENCOUNTER — OFFICE VISIT (OUTPATIENT)
Dept: PHYSICAL THERAPY | Facility: CLINIC | Age: 68
End: 2023-06-07
Payer: COMMERCIAL

## 2023-06-07 ENCOUNTER — OFFICE VISIT (OUTPATIENT)
Dept: OCCUPATIONAL THERAPY | Facility: CLINIC | Age: 68
End: 2023-06-07
Payer: COMMERCIAL

## 2023-06-07 DIAGNOSIS — Z86.73 HISTORY OF STROKE: ICD-10-CM

## 2023-06-07 DIAGNOSIS — G81.94 LEFT HEMIPARESIS (HCC): Primary | ICD-10-CM

## 2023-06-07 DIAGNOSIS — Z86.73 HISTORY OF STROKE: Primary | ICD-10-CM

## 2023-06-07 PROCEDURE — 97110 THERAPEUTIC EXERCISES: CPT

## 2023-06-07 PROCEDURE — 97112 NEUROMUSCULAR REEDUCATION: CPT | Performed by: PHYSICAL THERAPIST

## 2023-06-07 PROCEDURE — 97116 GAIT TRAINING THERAPY: CPT | Performed by: PHYSICAL THERAPIST

## 2023-06-07 PROCEDURE — 97112 NEUROMUSCULAR REEDUCATION: CPT

## 2023-06-07 NOTE — PROGRESS NOTES
"Occupational Therapy Daily Note:    Today's date: 2023  Patient name: Jennifer Hardy  : 1955  MRN: [id number]  Referring provider: Phyllis Corley*  Dx:   Encounter Diagnoses   Name Primary? • Left hemiparesis ([facility] [address] ) Yes   • History of stroke                   Subjective: Doing okay, new E-stim should be arriving soon  Objective: See below  Neuro Re-ed:  Pt started with NMES placed to lateral forearm for engagement of FPL muscle x 2 leads with pt performing repetitive task of grasping various sized cups and containers (weighted) at table top with a lateral pinch during \"on\" phase and transferring to other side of table for target accuracy/placement and removing during \"off\" phase  Ramp: 2 sec  On time: 5 sec  Off time: 8 sec  Rate: 45 hz  Width: 300     Pt transitioned to NMES placed to forearm extensors x 2 leads focusing on digit extension and tone reduction  OT utilized resistive yellow theraputty for the following movements:  1  Digit extension/hand pushes into table with OTR stabilizing at L hand  2  Digit extension with putty placed around digits providing slight resistance during \"on\" phase  3  Lumbrical pushes/intrinsic strengthening with OTR stabilizing at digits  4  Educated/performed lateral pinches with putty for improved     Ramp: 2 sec  On time: 10 sec  Off time: 10 sec  Rate: 45 hz  Width: 300    Thera Ex:  Pt concluded on UBE x 5 min prograde and x 5 min retrograde at 1 5 resist for improved proximal strengthening, improved scapula positioning, UB coordination, and sustained grasp on handle  Assessment: Tolerated treatment well  Patient would benefit from continued skilled OT  Pt had a G response to placement of E-stim pads for engagement of thumb flexion and for increasing release during \"off phase  \"  Pt demo significant ataxia with target placements of cups in LUE and benefit from stabilizing of cups at times   Pt able to hold cups without dropping or providing " increased pressure through it  Pt reports having increased challenge of L hand / forearm using putty today and had a G response with OT inhibiting flexor tone and spasticity with attempts to strengthen extensor muscles  Plan: Continued skilled OT per POC  Continue cold pack on flexors / FES to extensors(above parameters)  Show pt positions of FES (home unit f/u)         INTERVENTION COMMENTS:  Diagnosis: Left hemiparesis ([facility] [address] ) [G81 94]  Precautions: fall risk, A-fib  FOTO: n/a  Insurance: Payor: [address] [place] [address] / Plan: [id number] / Product Type: Blue Fee for Service /   7 of __ visits, PN due 6/19/2023  POC Expiration/Re-eval date: 7/19/2023

## 2023-06-09 NOTE — PROGRESS NOTES
"Daily Note     Today's date: 2023  Patient name: Francis Heredia  : 1955  MRN: [id number]  Referring provider: Ceferino Gamble MD  Dx:   Encounter Diagnosis     ICD-10-CM    1  History of stroke  Z86 73                      Subjective:       Objective: See treatment diary below      Assessment:       Plan: Continue per plan of care        Precautions: fall risk    Manuals 4/19 4/24 5/3 5/8 5/31  6/7                                                          Neuro Re-Ed             treadmill 1 5 mph 10 min 1 8 mph 10% incline B handrails 11 min 7% incline 2 0 mph R UE 10 min 7% incline 2 0 mph R UE 10 min   7% incline 2 3 mph R UE      hurdles  Side steps 12\" with cga 6x15ft      6\" hurdles with aw 5# L LE - carrying tidal tank - 5 lapsfwd  Solo step      Obstacle course  Foam box step ups 6\" steps with airex cga, reactive balance step overs with poodle noodles with cog challenge - 5# aw 3x15 ft           Step up overs   6\" and 8\" steps with airex pads 5 laps fwd  8\" with airex //bars 15x ea fwd/lat no aw min UE        incline    5x fwd both directions; 5x lateral ea; 5x backwards ea solo step outdoors x 1 with CGa throughout        Second step up    On 6\" side fwd L LE - 10x; lateral L LE - 10x 5#aw         [place]iver rocks     3x without color pattern PRN UE    6x laps with specific cog challenge - PRN UE  3x without color patterns solo step    5x with color pattern cog challenge solo step      Ther Ex                                                                                                                       Ther Activity                                       Gait Training  Leonela Step overs with dots for prolonged stance leg with 5# ankle weight  7x15ft   Grassy uneven surfaces with 5# aw throughout with sidewalk incline uphill           Ambulatory Box Step-ups with 5# ankle weight 6x15ft     Box step up and overs 5# aw L LE lead                   Modalities                                            "

## 2023-06-10 NOTE — PROGRESS NOTES
Daily Speech Treatment Note    Today's date: 2023  Patient’s name: Teresita Tony  : 1955  MRN: [id number]  Safety measures: h/o CVA, Afib, fall risk  Referring provider: Tracee Ariza*    Encounter Diagnosis     ICD-10-CM    1  Dysarthria  R47 1       2  History of stroke  Z86 73       3  New daily persistent headache  G44 52         Visit tracking:  -Referring provider: Epic  -Billing guidelines: CMS  -Visit #  -Insurance: Aidanhao Nevarez (60 vpcy-not combined, $50 co-pay)  -RE due 2023    Subjective/Behavioral:  -Patient reported that a coworker complimented him on his speech intelligibility today  Objective/Assessment:    Short-term goals:  Patient will perform oral motor exercises using IOPI device on lingual and labial muscles to facilitate improved strength and function for increased speech intelligibility, to be achieved in 4-6 weeks    IOPI PEAK MEASUREMENT WEEKLY GRID       2023    Tongue tip  (Goal = 56) 51 54 60 61  60  62    Tongue back  (Goal = 50) 58 62 61 61  63  DNT (goal met)    Right Lip  (Goal = 35) 29 35 56 65 48   66   Left Lip  (Goal = 35) 28 28 63 67 33   36         IOPI -- Today's Exercise Targets     Tongue tip  (Goal = 56) Peak Max after 3 trials: 62 Effort level: 80% Target for treatment: 50   Tongue back  (Goal = 50) DNT (goal met)     Right Lip  (Goal = 35) Peak Max after 3 trials: 66 Effort level: 80% Target for treatment: 53   Left Lip  (Goal = 35) Peak Max after 3 trials: 36 Effort level: 80% Target for treatment: 29           2023   Tongue tip  3 sets of 30 reps (pumps) 3 sets of 30 reps (pumps)     2 sets of 30 reps (3 sec holds)    3 sets of 30 reps (pumps)     2 sets of 30 reps (3 sec holds) 3 sets of 30 reps (pumps)     2 sets of 30 reps (3 sec holds) 3 sets of 30 reps (pumps)     3 sets of 30 reps (3 sec holds) 3 sets of 30 reps (pumps)     3 sets of 30 reps (3 sec holds)   Tongue back 3 sets of 30 reps (pumps) 3 sets of 30 reps (pumps)     2 sets of 30 reps (3 sec holds) 3 sets of 30 reps (pumps)     2 sets of 30 reps (3 sec holds) 3 sets of 30 reps (pumps)     2 sets of 30 reps (3 sec holds)    3 sets of 30 reps (pumps)     3 sets of 30 reps (3 sec holds) DNT (goal met)   Right Lip 3 sets of 30 reps (pumps) 3 sets of 30 reps (pumps)     2 sets of 30 reps (3 sec holds)    3 sets of 30 reps (pumps)     2 sets of 30 reps (3 sec holds) 3 sets of 30 reps (pumps)     2 sets of 30 reps (3 sec holds) 3 sets of 30 reps (pumps)     2 sets of 30 reps (3 sec holds) 3 sets of 30 reps (pumps)     3 sets of 30 reps (3 sec holds)   Left Lip 3 sets of 30 reps (pumps) 3 sets of 30 reps (pumps)     2 sets of 15 reps (3 sec holds) 3 sets of 30 reps (pumps)     2 sets of 30 reps (3 sec holds) 3 sets of 30 reps (pumps)     2 sets of 30 reps (3 sec holds) 3 sets of 30 reps (pumps)     2 sets of 30 reps (3 sec holds) 3 sets of 30 reps (pumps)     3 sets of 30 reps (3 sec holds)     Patient will perform oral motor and diadochokinetic speech rate exercises with > 90% accuracy to facilitate increased speech intelligibility, to be achieved in 4-6 weeks      Patient will utilize compensatory strategies (I e , over-articulation, decreased rate, etc) 80% of the time while orally reading sentence/paragraph length material to facilitate increased speech intelligibility, to be achieved in 4-6 weeks      Patient will complete oral reading and conversational tasks with the consistent use of compensatory strategies >90% of the time to facilitate increased speech intelligibility, to be achieved in 4-6 weeks      Patient  will be educated on speech intelligibility strategies (e g , over-exaggeration, slow rate, breath groups,, etc ) to promote increased communication success (to be achieved in 2-3 weeks)      Patient will utilize speech intelligibility strategies 80% of the time with min verbal cues while reading words, sentences, and paragraph length material to facilitate increased generalization of skills into conversational speech (to be achieved in 4-6 weeks)  Plan:  -Patient was provided with home exercises/activities to target goals in plan of care at the end of today's session   -Continue with current plan of care

## 2023-06-12 ENCOUNTER — OFFICE VISIT (OUTPATIENT)
Dept: PHYSICAL THERAPY | Facility: CLINIC | Age: 68
End: 2023-06-12
Payer: COMMERCIAL

## 2023-06-12 ENCOUNTER — OFFICE VISIT (OUTPATIENT)
Dept: OCCUPATIONAL THERAPY | Facility: CLINIC | Age: 68
End: 2023-06-12
Payer: COMMERCIAL

## 2023-06-12 ENCOUNTER — OFFICE VISIT (OUTPATIENT)
Dept: SPEECH THERAPY | Facility: CLINIC | Age: 68
End: 2023-06-12
Payer: COMMERCIAL

## 2023-06-12 DIAGNOSIS — G44.52 NEW DAILY PERSISTENT HEADACHE: ICD-10-CM

## 2023-06-12 DIAGNOSIS — G81.94 LEFT HEMIPARESIS (HCC): Primary | ICD-10-CM

## 2023-06-12 DIAGNOSIS — Z86.73 HISTORY OF STROKE: ICD-10-CM

## 2023-06-12 DIAGNOSIS — Z86.73 HISTORY OF STROKE: Primary | ICD-10-CM

## 2023-06-12 DIAGNOSIS — R47.1 DYSARTHRIA: Primary | ICD-10-CM

## 2023-06-12 PROCEDURE — 97112 NEUROMUSCULAR REEDUCATION: CPT

## 2023-06-12 PROCEDURE — 92507 TX SP LANG VOICE COMM INDIV: CPT | Performed by: SPEECH-LANGUAGE PATHOLOGIST

## 2023-06-12 NOTE — PROGRESS NOTES
"Occupational Therapy Daily Note:    Today's date: 2023  Patient name: Daniel Jesus  : 1955  MRN: [id number]  Referring provider: Devyn Leigh*  Dx:   Encounter Diagnoses   Name Primary? • Left hemiparesis ([facility] [address] ) Yes   • History of stroke                   Subjective: Got new battery for E-stim unit! Objective: See below  Neuro Re-ed:  Pt educated on pad placements for E-Stim home unit (recently obtained a new battery)  OTR placed pads on forearm extensors x 2 leads as well as L shoulder at posterior delt and supraspinatus x 2 leads and took video for pts ability to carryover in home setting  Continued with strengthening of extensors with pt performing digit extension pushes using light/brown resistive theraputty and OT stabilizing digits to decrease flexion/flexor tone  Progressed to pt performing digit tripod/tip pinch with theraputty focusing on digit extension / release during \"on\" phase  Ramp: 2 sec  On time: 15 sec  Off time: 10 sec  Rate: 45 hz  Width: 300     Concluded with pt performing tip pinch of pegs during \"off\" phase of electrical stim and placing to peg board with release during \"on\" phase for improved automaticity of grasp/releasing patterns  Assessment: Tolerated treatment well  Patient would benefit from continued skilled OT  Pt demo G understanding of electrical stim placements and completing daily for up to 60 min on shoulder for dec shoulder subluxation  Pt continues to demo flexor tone with distal strengthening and benefits from stabilizatioin assist and blocking of digits into flexion  Pt reports a positive benefit to electrical stim as it engages his extensor muscles  Plan: Continued skilled OT per POC  Continue cold pack on flexors / FES to extensors(above parameters)  Show pt positions of FES (home unit f/u) - if needed         INTERVENTION COMMENTS:  Diagnosis: [facility] ) [G81 94]  Precautions: fall risk, A-fib  FOTO: " n/a  Insurance: Payor: [address] [place] [address] / Plan: [id number] / Product Type: Blue Fee for Service /   8 of __ visits, PN due 6/19/2023  POC Expiration/Re-eval date: 7/19/2023

## 2023-06-12 NOTE — PROGRESS NOTES
"Daily Note     Today's date: 2023  Patient name: Lit Ta  : 1955  MRN: [id number]  Referring provider: Kendrick Mcmahan MD  Dx:   Encounter Diagnosis     ICD-10-CM    1  History of stroke  Z86 73                      Subjective: Pt presents without any new complaints  Objective: See treatment diary below      Assessment: Pt is cooperative and highly motivated to participate in therapy session this date  Pt demonstrates continued difficulty with dynamic balance challenges, but responds well to high-intensity type gait training activities  Progressed step up and overs to include carrying medball and tidal tank  These challenges create increased difficulty in maintaining stability and require pt inc time to complete task  Pt demonstrates good problem solving skills for use of balance strategies  He continues to make steady progress and will benefit from continued skilled PT services to address limitations in strength, balance, and functional endurance to maximize his functional potential, reduce risk for falls, and return to all prior life roles and responsibilities  Plan: Continue per plan of care        Precautions: fall risk    Manuals 4/19 4/24 5/3 5/8 5/31  6/7 6/12                                                         Neuro Re-Ed             treadmill 1 5 mph 10 min 1 8 mph 10% incline B handrails 11 min 7% incline 2 0 mph R UE 10 min 7% incline 2 0 mph R UE 10 min   7% incline 2 3 mph R UE 10 min at 7% incline at 2 3 mph with RUE support     hurdles  Side steps 12\" with cga 6x15ft      6\" hurdles with aw 5# L LE - carrying tidal tank - 5 lapsfwd  Solo step In SOLO: 6 inch hurdles with 5# AW on LLE x4 laps; carrying tidal tank x4 laps     Obstacle course  Foam box step ups 6\" steps with airex cga, reactive balance step overs with poodle noodles with cog challenge - 5# aw 3x15 ft           Step up overs   6\" and 8\" steps with airex pads 5 laps fwd  8\" with airex //bars 15x ea " "fwd/lat no aw min UE   In SOLO with 5# AW on LLE:  4 laps stepping up/over 4 boxes 4 inch to 8 inch    4 laps as above while holding 3kg medball    4 laps as above while holding tidal tank     incline    5x fwd both directions; 5x lateral ea; 5x backwards ea solo step outdoors x 1 with CGa throughout        Second step up    On 6\" side fwd L LE - 10x; lateral L LE - 10x 5#aw         [place]     3x without color pattern PRN UE    6x laps with specific cog challenge - PRN UE  3x without color patterns solo step    5x with color pattern cog challenge solo step      Ther Ex                                                                                                                       Ther Activity                                       Gait Training  Leonela Step overs with dots for prolonged stance leg with 5# ankle weight  7x15ft   Grassy uneven surfaces with 5# aw throughout with sidewalk incline uphill           Ambulatory Box Step-ups with 5# ankle weight 6x15ft     Box step up and overs 5# aw L LE lead                   Modalities                                            "

## 2023-06-19 ENCOUNTER — OFFICE VISIT (OUTPATIENT)
Dept: OCCUPATIONAL THERAPY | Facility: CLINIC | Age: 68
End: 2023-06-19
Payer: COMMERCIAL

## 2023-06-19 ENCOUNTER — OFFICE VISIT (OUTPATIENT)
Dept: PHYSICAL THERAPY | Facility: CLINIC | Age: 68
End: 2023-06-19
Payer: COMMERCIAL

## 2023-06-19 ENCOUNTER — OFFICE VISIT (OUTPATIENT)
Dept: SPEECH THERAPY | Facility: CLINIC | Age: 68
End: 2023-06-19
Payer: COMMERCIAL

## 2023-06-19 DIAGNOSIS — G44.52 NEW DAILY PERSISTENT HEADACHE: ICD-10-CM

## 2023-06-19 DIAGNOSIS — Z86.73 HISTORY OF STROKE: ICD-10-CM

## 2023-06-19 DIAGNOSIS — R47.1 DYSARTHRIA: Primary | ICD-10-CM

## 2023-06-19 DIAGNOSIS — G81.94 LEFT HEMIPARESIS (HCC): Primary | ICD-10-CM

## 2023-06-19 DIAGNOSIS — Z86.73 HISTORY OF STROKE: Primary | ICD-10-CM

## 2023-06-19 PROCEDURE — 97112 NEUROMUSCULAR REEDUCATION: CPT

## 2023-06-19 PROCEDURE — 97112 NEUROMUSCULAR REEDUCATION: CPT | Performed by: PHYSICAL THERAPIST

## 2023-06-19 PROCEDURE — 97140 MANUAL THERAPY 1/> REGIONS: CPT

## 2023-06-19 PROCEDURE — 97110 THERAPEUTIC EXERCISES: CPT | Performed by: PHYSICAL THERAPIST

## 2023-06-19 PROCEDURE — 92507 TX SP LANG VOICE COMM INDIV: CPT | Performed by: SPEECH-LANGUAGE PATHOLOGIST

## 2023-06-19 NOTE — PROGRESS NOTES
"Daily Speech Treatment Note    Today's date: 2023  Patient’s name: Yumiko Rashid  : 1955  MRN: [id number]  Safety measures: h/o CVA, Afib, fall risk  Referring provider: Shin Torres*    Encounter Diagnosis     ICD-10-CM    1  Dysarthria  R47 1       2  History of stroke  Z86 73       3  New daily persistent headache  G44 52         Visit tracking:  -Referring provider: Epic  -Billing guidelines: CMS  -Visit #   -Insurance: Bryce Faria (60 vpcy-not combined, $50 co-pay)  -RE due 2023    Subjective/Behavioral:  -Patient reported that he noticed smaller pills have been giving him trouble when swallowing them (\"get stuck\")  Clinician educated patient to try administering them with a puree (e g , yogurt, applesauce, pudding) if able  Objective/Assessment:    Short-term goals:  Patient will perform oral motor exercises using IOPI device on lingual and labial muscles to facilitate improved strength and function for increased speech intelligibility, to be achieved in 4-6 weeks    OPI PEAK MEASUREMENT WEEKLY GRID       2023       Tongue tip  (Goal = 56) 62 61       Tongue back  (Goal = 50) DNT (goal met) DNT (goal met)       Right Lip  (Goal = 35) 66 DNT (goal met)       Left Lip  (Goal = 35) 36 30             IOPI -- Today's Exercise Targets     Tongue tip  (Goal = 56) Peak Max after 3 trials: 61 Effort level: 85% Target for treatment: 52   Tongue back  (Goal = 50) DNT (goal met)     Right Lip  (Goal = 35) DNT (goal met)     Left Lip  (Goal = 35) Peak Max after 3 trials: 30 Effort level: 85% Target for treatment: 26           2023       Tongue tip  3 sets of 30 reps (pumps)     3 sets of 30 reps (3 sec holds) 3 sets of 30 reps (pumps)     3 sets of 30 reps (3 sec holds)       Tongue back DNT (goal met)        Right Lip DNT (goal met)        Left Lip 3 sets of 30 reps (pumps)     3 sets of 30 reps (3 sec holds) 3 sets of 30 reps (pumps)     3 sets of 30 reps " "(3 sec holds)           Patient will perform oral motor and diadochokinetic speech rate exercises with > 90% accuracy to facilitate increased speech intelligibility, to be achieved in 4-6 weeks      Patient will utilize compensatory strategies (I e , over-articulation, decreased rate, etc) 80% of the time while orally reading sentence/paragraph length material to facilitate increased speech intelligibility, to be achieved in 4-6 weeks      Patient will complete oral reading and conversational tasks with the consistent use of compensatory strategies >90% of the time to facilitate increased speech intelligibility, to be achieved in 4-6 weeks      Patient  will be educated on speech intelligibility strategies (e g , over-exaggeration, slow rate, breath groups,, etc ) to promote increased communication success (to be achieved in 2-3 weeks)      Patient will utilize speech intelligibility strategies 80% of the time with min verbal cues while reading words, sentences, and paragraph length material to facilitate increased generalization of skills into conversational speech (to be achieved in 4-6 weeks)  -Patient read sentences containing the /s/ and /z/ alveolar sounds  Patient was 100% intelligible; however, tongue placement/airflow is not quite as \"crisp\" as PLOF  Plan:  -Patient was provided with home exercises/activities to target goals in plan of care at the end of today's session   -Continue with current plan of care     "

## 2023-06-19 NOTE — PROGRESS NOTES
"Daily Note     Today's date: 2023  Patient name: Dorinda Prakash  : 1955  MRN: [id number]  Referring provider: Chandu Carnes MD  Dx:   Encounter Diagnosis     ICD-10-CM    1  History of stroke  Z86 73                      Subjective: Patient reports wearing his brace a lot lately  Does not wear it around the house  Feeling well today  Objective: See treatment diary below      Assessment: Trialed step ups onto steps with airex laterally which pt had significant difficulty with when leading with effected LE  Utilized solo step harness several times  Plan next session to progress strengthening  Plan: Continue per plan of care        Precautions: fall risk    Manuals 4/19 4/24 5/3 5/8 5/31  6/7 6/12  6/19                                                       Neuro Re-Ed             treadmill 1 5 mph 10 min 1 8 mph 10% incline B handrails 11 min 7% incline 2 0 mph R UE 10 min 7% incline 2 0 mph R UE 10 min   7% incline 2 3 mph R UE 10 min at 7% incline at 2 3 mph with RUE support  10 min at 10% incline at 1 9 mph with RUE support   hurdles  Side steps 12\" with cga 6x15ft      6\" hurdles with aw 5# L LE - carrying tidal tank - 5 lapsfwd  Solo step In SOLO: 6 inch hurdles with 5# AW on LLE x4 laps; carrying tidal tank x4 laps     Obstacle course  Foam box step ups 6\" steps with airex cga, reactive balance step overs with poodle noodles with cog challenge - 5# aw 3x15 ft           Step up overs   6\" and 8\" steps with airex pads 5 laps fwd  8\" with airex //bars 15x ea fwd/lat no aw min UE   In SOLO with 5# AW on LLE:  4 laps stepping up/over 4 boxes 4 inch to 8 inch    4 laps as above while holding 3kg medball    4 laps as above while holding tidal tank  6\" steps with airex; 8\" step with airex fwd solo step   incline    5x fwd both directions; 5x lateral ea; 5x backwards ea solo step outdoors x 1 with CGa throughout        Second step up    On 6\" side fwd L LE - 10x; lateral L LE - 10x 5#aw       " [place]     3x without color pattern PRN UE    6x laps with specific cog challenge - PRN UE  3x without color patterns solo step    5x with color pattern cog challenge solo step   5x solo step no patterns    3 x solo step with cog challenge   Ther Ex                                                                                                                       Ther Activity                                       Gait Training  Leonela Step overs with dots for prolonged stance leg with 5# ankle weight  7x15ft   Grassy uneven surfaces with 5# aw throughout with sidewalk incline uphill           Ambulatory Box Step-ups with 5# ankle weight 6x15ft     Box step up and overs 5# aw L LE lead                   Modalities

## 2023-06-19 NOTE — PROGRESS NOTES
OCCUPATIONAL THERAPY PROGRESS NOTE #2    6/19/2023  Cal Arboleda  1955  [id number]  Dominic Washington*   Diagnosis ICD-10-CM Associated Orders   1  Left hemiparesis ([address] )  G81 94       2  History of stroke  Z86 73             Assessment/Plan    Skilled Analysis:  Cal Arboleda is a 70 y o  male referred to Occupational Therapy s/p Left hemiparesis ([address] ) [G81 94]  Pt participated in skilled OT PN #2 to assess functional progress towards goals/POC  Since last note, pt has now been engaging in electrical stimulation use at home for decreasing tone, increasing proximal shoulder strength of deltoids/supraspinatus to decrease subluxation, and for improved digit extension/release  Pt most recently provided with yellow resistive theraputty with HEP to improve pinching patterns/strength  Pt also engaging in HEP at home using flexbars/theraball  In OT, pt has been engaging in massed practice of grasping/releasing with electrical stimulation, performing tasks in gravity eliminated plane, and massed practiced of using lateral pinch with tasks  Following formalized testing as well as clinical observation, Pt presents with the following areas of deficit:  Bicep and extrinsic forearm flexor hypertonicity, 1 fingerbreadth subluxation, decreased termination of flexors on command impacting release of objects or grasping objects, decreased volitional grasp, ROM, and overall GMC skills impacting ADLs, IADLs, and salient/leisure tasks  Pt will benefit from skilled Occupational Therapy services 1x/week for 4 more weeks with focus on Neuro Re-ed, Thera Act, Thera Ex, Self Care, and Manual Trx to improve functional use of LUE  Cal Arboleda is in agreement with POC        Todays treatment:  Pt tolerated 5 min x 2 (prograde/retrograde motions at 1 3 resist) seated on UBE with added NMES x2 leads to post delt/supraspinatus with 30 sec on time and 10 sec off time with focus on increasing proximal UE strength, endurance, "sustained grasp on handle, act tolerance and ROM to inc indep and safety with ADL/IADL fxn and fxnl reaching tasks  Electrical stimulation tolerated on shoulder x 45 min with on time of 30 sec and off time of 10 sec for dec shoulder subluxation  Progressed to pt performing high repetitions of functional lateral pinch grasp of picking up bean bags and transferring to L side with OT stabilizing at wrist (no e-stim on)  Pt then peformed grasp/release of larger styrofoam blocks  Advanced to using resistive tools (yellow bands applied) to provide proprioceptive feedback and input to extend digits with pt grasping foam blocks and releasing during \"on\" phase of e-stim, engaging in wrist/digit extensors  Rock Tape applied to L D2-D4 with anchor at distal DIP with 25% tension placed on tape with wrist in extension and anchored at proximal/dorsal forearm     Applied an I-band from distal IP to mid forearm extensors focusing on providing tactile feedback and proprioceptive feedback for digit extension                GOALS    Motor Short Term Goals (6 weeks)  AROM/PROM  · Pt will improve L elbow extension to - 20* for improved functional reaching and movement patterns = PROGRESSING  · Pt will demo with decreased LUE shoulder sublux to 3/4 fingerbreadth through use of E-stim for increased proximal control, shoulder approximation, and UE strength = PROGRESSING  · Pt will increase LUE proximal shoulder strength to 3+/5 in supine sustaining minimal resistance to improve proximal stability/control   · Pt will demo good carryover of clinic and home tone reduction strategies for improved AROM initiation with functional reach with ADL fxn = GOAL MET (using e-stim, have tried cryotherapy)    Strength/Endurance  · Pt will increase ability to perform UBE x 5 min with sustained grasp on handle w/o losing  to improve endurance and strength = GOAL MET (in retrograde)  · Pt will demo with G carryover of HEP to improve " functional progression towards goals in POC = CONTINUE  · Pt will demo with G tolerance to supine, seated, and in stance exercise x 25 minutes with minimal rest breaks required for increased engagement in life roles and weekly exercise regimen = PROGRESSING    Functional Performance  · Pt will demo improved motor learning of constructional and new motor actions for improved B/L coordination and motor planning evident by ability to utilize LUE as a gross stabilizer (gross grasp) with opening containers, bottles   = CONTINUE  · Pt will improve ability to reach for a door knob and rotate 1/2 motion with a pull to improve functional use of LUE = CONTINUE  · Pt will demo G understanding of a sock aide for improving ability to don socks with MIN A = CONTINUE    Modalities  · Pt will tolerate BIONESS/NMES/IASTM and vibration for improved motor and sensory performance for overall improved strength, tone reduction, and sensation to inc safety and engagement with ADL/IADL fxn = GOAL MET         Motor Long Term Goals (12 weeks)  AROM/PROM  · Pt will demo with decreased LUE shoulder sublux to 1/2 fingerbreadth through use of E-stim for increased proximal control, shoulder approximation, and UE strength  · Pt will increase LUE proximal shoulder strength to 4-/5 in supine sustaining minimal resistance to improve proximal stability/control   · Pt will improve L elbow extension to - 10* for improved functional reaching and movement patterns     Strength/Endurance  · Pt will increase ability to perform UBE x 10 min with sustained grasp on handle w/o losing  to improve endurance and strength  · Pt will demo with G carryover of HEP to improve functional progression towards goals in POC  · Pt will demo with G tolerance to supine, seated, and in stance exercise x 40 minutes with minimal rest breaks required for increased engagement in life roles     Functional Performance  · Pt will demo improved motor learning of constructional and "new motor actions for improved B/L coordination and motor planning evident by ability to utilize LUE as a semifunctional stabilizer (hand assisting with fastenings, gross grasp with slight release, able to push/pull)   · Pt will improve ability to reach for a door knob and rotate 3/4 motion with a pull to improve functional use of LUE     Modalities  · Pt will tolerate BIONESS/NMES/IASTM and vibration for improved motor and sensory performance for overall improved strength, tone reduction, and sensation to inc safety and engagement with ADL/IADL fxn          Subjective    Patient Goal: \"Ulitimately be able to use my hand again  At least get it to where it was  I was doing well until I stopped  \"    Patient-Specific Functional Scale   Task is scored 0 (unable to perform activity) to 10 (ability to perform activity independently)    Activity Date:  4/19/2023 Date:   1  Drive with 2 hands   0    2  Open a door and turn it   0    3  Stabilizing objects (receipts, check book)   3    4  Holding a bottle   0    5  Taking dog for walk and holding leash in L hand   5    6  Drying back      7  Putting socks on          HISTORY OF PRESENT ILLNESS:     Pt, Karlee Mahoney, is a 70 y o  male who was referred to Occupational Therapy s/p  Left hemiparesis ([facility] [address] ) [G81 94]  Pt familiar with OP OT services  Pts PMH: Initial presentation to [facility] on 10/20/2018 due to left-sided weakness and facial droop   He also had a speech impairment and was found to have an acute stroke  The patient received tPA at 11:00 p m  on the day of admission after neurology assessment  He had loop recorder placed at time of admission for CVA  during his hospital stay he was dx with a small amount of petechial hemorrhage noted on MRI  CT scan showed positive results for small infarctions within the right frontal lobe and right temporal occipital junction, Pt discharged to [facility] from 10/25/18 - 11/21/18  Pt had recent MRI Spine on 2/9/23: \" 1   " "ACDF C5-C7  2  Multilevel degenerative disc disease with mild central canal narrowing at the C3-4 and C4-5 levels  3  The cervical cord appears normal in caliber and signal with no evidence of focal impingement  \"      MRI Brain on 23: \"1  No acute infarction, edema, or mass effect  2  Chronic right frontotemporal infarct with encephalomalacia and surrounding gliosis extending along the right cerebral peduncle and brainstem  3  Focal area of gliosis within the posterior right occipital lobe, probable sequela of prior ischemic injury  \"      Had BOTOX recently, is due in ~6 weeks  Wears his dynasplint (extension splint) a few times a week  He reports difficulty stabilizing his L hand to open a jar or medications  He reports having his daughters open his medication bottles and then he has to leave them open  Can hold his dogs leash with L hand then has to switch  Had 2 falls this year, lost balance both times when turning and walking to bathroom  Jessica De Guzman resides with his daughter/son in law in an in-law suite  Pt is performing ADLs independently but takes a while  Says he was never a big cooker, his daughter usually cooks or orders in   Grocery shops but tired after  Pt works for [employer], works 3-4x/week or works from home           PMH:   Past Medical History:   Diagnosis Date   • Arthritis     in hands   • Cervical herniated disc    • Colon polyp    • Diabetes mellitus ([address] )     borderline   • Gout     hx of in 2018, none since   • Hyperlipidemia    • Hypertension    • Irregular heart beat     a-fib, has loop recorder   • Kidney stone 2021    hx of   • Migraine    • S/P Botox injection     in left wrist to help increase mobility   • Sleep apnea     mild sleep apnea, cpap was not ordered   • Stroke ([facility] [address] ) 2018    left hemiparesis, wears leg brace and uses cane, unable to use left arm and hand   • Use of cane as ambulatory aid     had CVA in 2018       Pain Levels   Restin    With " Activity:  3 - R knee pain from fall       Objective    Impairment Observations:    NETTIE Chappell           UPPER EXTREMITY FUNCTION   Intact Impaired Dominant Hand: RIGHT     /PINCH STRENGTH             Dynamometer    - Gross Grasp 35 lbs 25 lbs abnormal   L: increased 10 lbs   Pinch Meter     - Pincer 13 5 lbs 2 5 lbs abnormal   Compensated with placement     - Tripod 13 lbs 5 lbs abnormal   Compensated with placement     - Lateral 13 lbs  8 lbs abnormal   R: increased 1 lb      AROM (Seated)         Scapular winging     Elevation Full 3/4     Shoulder FF Full  105*  Abducted, elbow flexed    Shoulder Ext  0*      Shoulder Abd  95*     Horizontal Abd  Neutral     Horizontal Add  3/4     Elbow Flex  80*  L shoulder abducted    Elbow Ext  -40*      Pronation  Full     Supination  Neutral      Wrist Flex  50*     Wrist Ext  Unable      Digit Flex  3/4 motion     Digit Ex  Unable  Unable to terminate flexors to extend digits    Composite Grasp  3/4      Hook Grasp  Unable     Subluxation   1 fingerbreadth(middle)       AROM (Supine)             Shoulder FF   150*  Decreased control    Shoulder Ext      Shoulder ABD      Shoulder ADD      Horizontal ABD      Horizontal ADD      Elbow Flex      Elbow Ext   Able to achieve full motion in supine (PROM)   Pronation      Supination      Wrist Flex      Wrist Ext      Digit Flex      Digit Ext        MMT             Shoulder FF 5/5 2/5    Shoulder Ext 5/5 1/5    Shoulder Abduction 5/5 2/5    Shoulder Adduction 5/5 2/5    Elbow Flex 5/5 2/5    Elbow Ext 5/5 3/5    Wrist Flex 5/5 3/5    Wrist Ext 5/5 2/5      COORDINATION      Opposition  With isolation of thumb, D1-D2   Lateral pinch to D3 DIP    Finger to Nose Intact  Impaired     TONE: MODIFIED HUNTER SCALE      No increase in muscle tone (0)      Slight Increase in muscle tone with catch and release or min resist at end range (1)      Slight Increase in muscle tone with catch and release, followed by min resistance through remainder of range (1+)  Elbow flexors (biceps)    Wrist forearm flexors     Increased muscle tone through full range, able to be moved easily (2)      Considerable increase in tone, difficult to move (3)      Rigid in Flexion/Extension (4)              OTHER PLANNED THERAPY INTERVENTIONS:   Supine, seated, and in stance neuro re-ed  Task-Oriented Training  Tricep AG  NMES/FES  Cryotherapy for tone reduction  GMC  Proximal to distal teaming  Manual tx  IASTM  Hand to target  Seated functional reach: crossing midline  Supine place and hold  WBearing strategies   Closed chain activities  Open chain activities  Mirror Therapy  HEP      INTERVENTION COMMENTS:  Diagnosis: Left hemiparesis ([facility] [address] ) [G81 94]  Precautions: fall risk, A-fib  FOTO: n/a  Insurance: Payor: [address] [place] [address] / Plan: [id number] / Product Type: Blue Fee for Service /   9 of __ visits, PN due 7/19/2023  POC Expiration/Re-eval date: 7/19/2023

## 2023-06-26 ENCOUNTER — OFFICE VISIT (OUTPATIENT)
Dept: SPEECH THERAPY | Facility: CLINIC | Age: 68
End: 2023-06-26
Payer: COMMERCIAL

## 2023-06-26 ENCOUNTER — OFFICE VISIT (OUTPATIENT)
Dept: OCCUPATIONAL THERAPY | Facility: CLINIC | Age: 68
End: 2023-06-26
Payer: COMMERCIAL

## 2023-06-26 ENCOUNTER — OFFICE VISIT (OUTPATIENT)
Dept: PHYSICAL THERAPY | Facility: CLINIC | Age: 68
End: 2023-06-26
Payer: COMMERCIAL

## 2023-06-26 DIAGNOSIS — G81.94 LEFT HEMIPARESIS (HCC): Primary | ICD-10-CM

## 2023-06-26 DIAGNOSIS — Z86.73 HISTORY OF STROKE: ICD-10-CM

## 2023-06-26 DIAGNOSIS — R47.1 DYSARTHRIA: Primary | ICD-10-CM

## 2023-06-26 DIAGNOSIS — Z86.73 HISTORY OF STROKE: Primary | ICD-10-CM

## 2023-06-26 DIAGNOSIS — G44.52 NEW DAILY PERSISTENT HEADACHE: ICD-10-CM

## 2023-06-26 PROCEDURE — 92507 TX SP LANG VOICE COMM INDIV: CPT | Performed by: SPEECH-LANGUAGE PATHOLOGIST

## 2023-06-26 PROCEDURE — 97112 NEUROMUSCULAR REEDUCATION: CPT | Performed by: PHYSICAL THERAPIST

## 2023-06-26 PROCEDURE — 97112 NEUROMUSCULAR REEDUCATION: CPT

## 2023-06-26 PROCEDURE — 97110 THERAPEUTIC EXERCISES: CPT | Performed by: PHYSICAL THERAPIST

## 2023-06-26 PROCEDURE — 97110 THERAPEUTIC EXERCISES: CPT

## 2023-06-26 NOTE — PROGRESS NOTES
"Occupational Therapy Daily Note:    Today's date: 2023  Patient name: Earl Brock  : 1955  MRN: [id number]  Referring provider: Adal Mathews*  Dx:   Encounter Diagnoses   Name Primary? • Left hemiparesis ([facility] [address] ) Yes   • History of stroke                   Subjective: Using E-stim at home  \"I can really feel that into my hand  It feels good  \"    Objective: See below  Neuro Re-ed:  Started with passive stretch to wrist/digits then continued with applying vibration with hand over arm trough for stimulating wrist/forearm extensors starting from wrist and moving to proximal extensor mass for improved extension of LUE  Added Functional Electrical Stimulation (FES) to extensors x2 leads while applying vibration to extensors focusing on mass wrist/digit extension and repetitively  Ramp: 2 sec  On time: 10 sec  Off time: 5 sec  Rate: 25 hz  Width: 300     Rock Tape applied to L D2-D4 with anchor at distal DIP with 25% tension placed on tape with wrist in extension and anchored at proximal/dorsal forearm  Applied an I-band from distal IP to mid forearm extensors focusing on providing tactile feedback and proprioceptive feedback for digit extension  Thera Ex: Facilitated grasp/release of R hand lateral and pincer grasp with use of gravity to assist with digit extension  Started with grasping pegs from peg board at low surface (below knees) and transferred to container  Progressed to pt grasping small 1/2\" blocks (using a lateral pinch) and concluded with pt grasping a golf ball with a cylindrical grasp to place to container  Completed tasks with FES applied to extensors to assist with release  Concluded with adding a weight to L D3/D4 with coban wrap applied with FES facilitating digit/wrist extension for added weight/resistance against gravity  Added slight manual resist by OTR  Assessment: Tolerated treatment well  Patient would benefit from continued skilled OT      Pt had " "a F response to vibration over forearm extensor mass with slight \"twiching\" seen at D3, thumb, and occasionally D2  Pt had an excellent response with arm placed over trough to facilitate wrist extension and benefit from a tactile cue to increase past neutral positioning although pt unable to sustain hold at this level (> 60*)  Pt benefit from stabilizing wrist/arm with grasping of objects from low surface 2* ataxia/dec proximal control  Pt had maximal difficulties with a cylindrical grasp 2* dec digit extension needing assist to place into hand 100% of time  Pt had a best response with OTR facilitating FES \"on\" time to assist with releasing of objects       Plan: Continued skilled OT per POC           INTERVENTION COMMENTS:  Diagnosis: Left hemiparesis ([facility] [address] ) [G81 94]  Precautions: fall risk, A-fib  FOTO: n/a  Insurance: Payor: Sid Ko / Plan: [id number] / Product Type: Blue Fee for Service /   8 of __ visits, PN due 7/19/2023  POC Expiration/Re-eval date: 7/19/2023           "

## 2023-06-26 NOTE — PROGRESS NOTES
"Daily Note     Today's date: 2023  Patient name: Earl Brock  : 1955  MRN: [id number]  Referring provider: Galen Melendrez MD  Dx:   Encounter Diagnosis     ICD-10-CM    1  History of stroke  Z86 73                      Subjective: patient reports to physical therapy with no new changes to report      Objective: See treatment diary below      Assessment: Patient completed slip training this session with perturbations using tidal tank, uneven surfaces under a mat, and improved LE strength  Plan to complete progress update at next session wit hpossible d/c        Plan: Continue per plan of care        Precautions: fall risk    Manuals                                                        Neuro Re-Ed             treadmill 10 min at 2 3 mph 10% incline      7% incline 2 3 mph R UE 10 min at 7% incline at 2 3 mph with RUE support  10 min at 10% incline at 1 9 mph with RUE support   hurdles airex pad between 7 5# aw 4 laps fwd, 4 laps lat      6\" hurdles with aw 5# L LE - carrying tidal tank - 5 lapsfwd  Solo step In SOLO: 6 inch hurdles with 5# AW on LLE x4 laps; carrying tidal tank x4 laps     Obstacle course             Step up overs        In SOLO with 5# AW on LLE:  4 laps stepping up/over 4 boxes 4 inch to 8 inch    4 laps as above while holding 3kg medball    4 laps as above while holding tidal tank  6\" steps with airex; 8\" step with airex fwd solo step   Slip  40x in solo step alteranting fwd/bkwd            Second step up             [address] rocks Under mat - 8 laps solo step      3x without color patterns solo step    5x with color pattern cog challenge solo step   5x solo step no patterns    3 x solo step with cog challenge   Ther Ex                                                                                                                     Ther Activity                                       Gait Training                    Box step up and overs 5# aw L LE " lead                   Modalities

## 2023-07-02 NOTE — PROGRESS NOTES
Speech-Language Pathology Re-Evaluation    Today's date: 2023 ***  Patient’s name: Erika Wilks  : 1955  MRN: [id number]  Safety measures: h/o CVA, Afib, fall risk  Referring provider: Edmundo Tovar*    Encounter Diagnosis     ICD-10-CM    1. Dysarthria  R47.1       2. History of stroke  Z86.73       3. New daily persistent headache  G44.52         Visit tracking:  -Referring provider: Epic  -Billing guidelines: CMS  -Visit #***/60   -Insurance: Leroy Resendiz (60 vpcy-not combined, $50 co-pay)  -RE due ***    Subjective comments: ***    Patient's goal(s): "to speak better"    Assessments    Updates:  -***    The Frenchay Dysarthria Assessment--Second Edition (FDA-2) is a theoretically sound, research-based method of assessing dysarthria in an accurate manner. The FDA-2 assesses a patient's performance on a range of behaviors relating to speech function from (A-E) with “A” being normal function and “E” being the most severe. The test is divided into 7 sections: reflexes, respiration, lips, palate, laryngeal, tongue, intelligibility. The Influencing Factors section includes hearing, sight, teeth, language, mood, posture, rate (words per minute), and sensation. Two letters indicate half values. RATING FORM:    REFLEXES  Cough: B - Patient has occasional difficulty with choking, or food sometimes going down the wrong way; states that some care must be taken. (IE: ***) = ***    Swallow: B - Patient reports having some difficulty; notices that eating/drinking is slower. Pauses more than is typical when drinking. (IE: ***) = ***    Dribble/Drool: B - Occasional dampness at the corners of the mouth. Patient may report that pillow is damp at night (only note this if there has been a change of status--some people have slight dribbling/drooling at night without having impairment). Drools slightly when drinking. (IE: ***) = ***    RESPIRATION  At Rest: A - No difficulty.  (IE: ***) = ***    In Speech: A - No abnormality. (IE: ***) = ***    LIPS  At Rest: A - No abnormality. (IE: ***) = ***    Spread: B - Slight asymmetry--noticed by skilled observer. (IE: ***) = ***    Seal: B - Occasional air leakage, break in lip seal, or lip seal not consistent for plosion on each sound. (IE: ***) = ***    Alternate: B - Patient able to articulate both movements in 15 seconds. May have faltering rhythm or variability in rounding or spreading of lips. (IE: ***) = ***    In Speech: A - Lip movements within normal limits. (IE: ***) = ***    PALATE  Fluids: A - No difficulty. (IE: ***) = ***    Maintenance: A - Smooth, symmetrical movement of palate fully maintained. (IE: ***) = ***    In Speech: B - Slight hypernasality and/or imbalanced nasal resonance and/or occasional slight nasal emission. (IE: ***) = ***    LARYNGEAL  Time: B - Patient can say "ah" clearly for 10 seconds. (IE: ***) = ***    Pitch: A - No abnormality. (IE: ***) = ***    Volume: A - Patient able to change volume in controlled manner. (IE: ***) = ***    In Speech: A - No abnormality. Voice production is spontaneously effective and appropriate. (IE: ***) = ***    TONGUE  At Rest: A - No abnormality. (IE: ***) = ***    Protrusion: C - Patient varies in ability--movement irregular or accompanied by facial grimace or noticeable tremor--or takes between 6 and 8 seconds. (IE: ***) = ***    Elevation: B - Moves well, but slowly (within 8 seconds). (IE: ***) = ***    Lateral: C - Moves both ways, but movement is labored or incomplete. May take 7 to 8 seconds. (IE: ***) = ***    Alternate: D - Tongue changes in position, and different sounds can be identified. C - One sound is well articulated, but the other is poorly presented, or task deteriorates. Task takes 10 seconds to complete. (IE: ***) = ***    In Speech: C - Correct articulation points on the whole, but slow alternating movements makes speech labored. Several omissions of consonants.  B - Tongue movement slightly inaccurate; occasional mispronunciation. (IE: ***) = ***    INTELLIGIBILITY  Words: A - Ten words correctly interpreted by the therapist, with speech easily intelligible. (IE: ***) = ***    Sentences: A - Ten sentences correctly interpreted by the therapist, with speech easily intelligible. (IE: ***) = ***    Conversation: B - Speech abnormal, but intelligible--patient occasionally has to repeat. 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 ***    “IE” indicates the scores from the initial evaluation (***). Treatment    ***    Goals      Short-term goals:  Patient  will be educated on speech intelligibility strategies (e.g., over-exaggeration, slow rate, breath groups,, etc.) to promote increased communication success (to be achieved in 2-3 weeks). -- {AZJP:40956} MET    Patient will perform oral motor exercises using IOPI device on  lingual and labial muscles to facilitate improved strength and function for increased speech intelligibility, to be achieved in 4-6 weeks. -- {WPSB:82425} MET    Patient will perform oral motor and diadochokinetic speech rate exercises with > 90% accuracy to facilitate increased speech intelligibility, to be achieved in 4-6 weeks. -- {TYNT:83744} MET     Patient will utilize compensatory strategies (I.e., over-articulation, decreased rate, etc) 80% of the time while orally reading sentence/paragraph length material to facilitate increased speech intelligibility, to be achieved in 4-6 weeks.  -- {PRVC:94157} MET     Patient will utilize speech intelligibility strategies 80% of the time with min verbal cues while reading words, sentences, and paragraph length material to facilitate increased generalization of skills into conversational speech (to be achieved in 4-6 weeks). -- {NOTE:72510} MET    Long-term goals  Patient will demonstrate improved functional and intelligible speech with the use of adequate labial and lingual function, increased articulatory precision, and speech prosody by discharge. -- {FDGK:05805} MET     Patient will independently utilize speech intelligibility strategies (e.g., over-exaggeration, slow rate, breath groups, etc.) during oral reading tasks >90% intelligibility to facilitate increased generalization of skills into conversational speech by discharge. -- {DFWF:84152} MET     Patient will demonstrate independent implementation of compensatory strategies in conversation to improve speech intelligibility by discharge. -- {ZRTN:63303} MET     Patient will develop functional and intelligible speech to promote positive communication interractions with the use of speech intelligibility strategies (to be achieved by discharge). -- {COVX:49220} MET    Impressions/Recommendations    Impressions:   -***    ***OLD: Patient presents with mild-moderate dysarthria c/b deficits in tongue alternation/lateralization/elevation/alternation in speech, lip asymmetry, poor secretion management, and reduced intelligibility of speech. Patient was administered the FDA-2 to formally evaluate the patient's performance of speech and nonspeech functions. Patient scored below "normal functions" in the reflexes section which is due to the poor secretion management and occasional difficulties with swallowing. Patient scored within "normal functions" for respiration. In the lips section, patient scored "normal functions" for lips at rest and in speech, however, scored below normal functions for the lip spread, seal, and alternating tasks. This indicates deficits in tongue neuromuscular control.  For the palate section, patient scored within normal function for the palate of managing secretions and maintenance, however, patient in speech task had scored below normal functions due to difficulties with /m/ nasal sounds. For the laryngeal section, patient scored within normal functioning for the pitch, volume, in speech tasks, however, patient scored below the time section due to inability to phonate in the normative range. In the tongue section, patient scored below normal functions for all tasks (protusion elevation, lateral, alternate, and in speech) except for at rest. These results are consistent with visual observation of tongue deviations. Patient scored within normal functions for intelligibility, however, conversation task was slightly reduced. Noted there were instances when clinician required additional attention to determine speech quality. 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 Patient is currently motivated for speech therapy services to address intelligibility. Patient would benefit from outpatient skilled Speech Therapy services for increased support for  positive communication interactions with both familiar and unfamiliar listeners, further education/training on speech intelligibility strategies, facilitate overall improved quality of life and reduce caregiver burden.      Recommendations:  -Patient would benefit from outpatient skilled Speech Therapy services: Speech-language therapy    -Frequency: 1x weekly  -Duration: 10-12 weeks ***    -Intervention certification from: 8/6/4170  -Intervention certification to: ***    -Intervention comments:  -Motor speech re-evaluation with patient (*** minutes)  -Speech treatment, including discussion about recommendations, POC/goals, ***, and HEP review (*** minutes)

## 2023-07-05 ENCOUNTER — APPOINTMENT (OUTPATIENT)
Dept: PHYSICAL THERAPY | Facility: CLINIC | Age: 68
End: 2023-07-05
Payer: COMMERCIAL

## 2023-07-05 ENCOUNTER — APPOINTMENT (OUTPATIENT)
Dept: SPEECH THERAPY | Facility: CLINIC | Age: 68
End: 2023-07-05
Payer: COMMERCIAL

## 2023-07-05 ENCOUNTER — APPOINTMENT (OUTPATIENT)
Dept: OCCUPATIONAL THERAPY | Facility: CLINIC | Age: 68
End: 2023-07-05
Payer: COMMERCIAL

## 2023-07-06 NOTE — PROGRESS NOTES
Speech-Language Pathology Re-Evaluation    Today's date: 2023  Patient’s name: Ish Coon  : 1955  MRN: [id number]  Safety measures: h/o CVA, Afib, fall risk  Referring provider: Pricila Dobbs*    Encounter Diagnosis     ICD-10-CM    1. Dysarthria  R47.1       2. History of stroke  Z86.73       3. New daily persistent headache  G44.52         Visit tracking:  -Referring provider: Epic  -Billing guidelines: CMS  -Visit #   -Insurance: Quinten Escobedo (60 vpcy-not combined, $50 co-pay)  -RE due 2023    Subjective comments: Patient reported that family and colleagues have noticed an improvement in his speech clarity since beginning OP ST services targeting dysarthria. Patient's goal(s): "to speak better"    Assessments    The Frenchay Dysarthria Assessment--Second Edition (FDA-2) is a theoretically sound, research-based method of assessing dysarthria in an accurate manner. The FDA-2 assesses a patient's performance on a range of behaviors relating to speech function from (A-E) with “A” being normal function and “E” being the most severe. The test is divided into 7 sections: reflexes, respiration, lips, palate, laryngeal, tongue, intelligibility. The Influencing Factors section includes hearing, sight, teeth, language, mood, posture, rate (words per minute), and sensation. Two letters indicate half values. RATING FORM:    REFLEXES  Cough: B - Patient has occasional difficulty with choking, or food sometimes going down the wrong way; states that some care must be taken. (IE: B) = no change    Swallow: A - No abnormality. (IE: B) = improvement    Dribble/Drool: B - Occasional dampness at the corners of the mouth. Patient may report that pillow is damp at night (only note this if there has been a change of status--some people have slight dribbling/drooling at night without having impairment). Drools slightly when drinking. (IE: B) = no change    RESPIRATION  At Rest: A - No difficulty. (IE: A) = no change (highest level achieved)    In Speech: A - No abnormality. (IE: A) = no change (highest level achieved)    LIPS  At Rest: A - No abnormality. (IE: A) = no change (highest level achieved)    Spread: A - No abnormality. (IE: B) = improvement    Seal: A - Good lip seal. Retains pressure for 15 seconds or repeats "/p/ /p/" with even seal. (IE: B) = improvement    Alternate: B - Patient able to articulate both movements in 15 seconds. May have faltering rhythm or variability in rounding or spreading of lips. (IE: B) = no change    In Speech: A - Lip movements within normal limits. (IE: A) = no change (highest level achieved)    PALATE  Fluids: A - No difficulty. (IE: A) = no change (highest level achieved)    Maintenance: A - Smooth, symmetrical movement of palate fully maintained. (IE: A) = no change (highest level achieved)    In Speech: A - Normal resonance. No nasal emission. (IE: B) = improvement    LARYNGEAL  Time: A - Patient can say "ah" clearly for 15 seconds. (IE: B) = improvement    Pitch: A - No abnormality. (IE: A) = no change (highest level achieved)    Volume: A - Patient able to change volume in controlled manner. (IE: A) = no change (highest level achieved)    In Speech: A - No abnormality. Voice production is spontaneously effective and appropriate. (IE: A) = no change (highest level achieved)    TONGUE  At Rest: A - No abnormality. (IE: A) = no change (highest level achieved)    Protrusion: B - Task is slow (between 4 and 6 seconds), but otherwise normal. (IE: C) = improvement    Elevation: B - Moves well, but slowly (within 8 seconds). (IE: B) = no change    Lateral: B - Moves well, but slowly--takes 5 to 6 seconds. (IE: C) = improvement    Alternate: B - Some difficulty observed--slight incoordination, somewhat slow. Task takes 5 to 7 seconds to complete. (IE: C-D) = improvement    In Speech: B - Tongue movement slightly inaccurate; occasional mispronunciation.  (IE: B-C) = improvement    INTELLIGIBILITY  Words: A - Ten words correctly interpreted by the therapist, with speech easily intelligible. (IE: A) = no change (highest level achieved)    Sentences: A - Ten sentences correctly interpreted by the therapist, with speech easily intelligible. (IE: A) = no change (highest level achieved)    Conversation: B - Speech abnormal, but intelligible--patient occasionally has to repeat. A - No abnormality (IE: A-B) = no change      INFLUENCING FACTORS:   Hearing: WFL for testing     Sight: WFL for testing    Teeth: WFL    LANGUAGE: WFL    MOOD: WFL    POSTURE: WFL    RATE (WORDS/MIN): WFL    SENSATION:  UPPER LIP (R): WFL    UPPER LIP (L): WFL    TONGUE TIP: WFL    “IE” indicates the scores from the initial evaluation (04/24/2023).     Treatment    IOPI PEAK MEASUREMENT WEEKLY GRID       06/16/2023 06/26/2023 07/12/2023      Tongue tip  (Goal = 56) 62 61 DNT (goal met)      Tongue back  (Goal = 50) DNT (goal met) DNT (goal met) DNT (goal met)      Right Lip  (Goal = 35) 66 DNT (goal met) DNT (goal met)      Left Lip  (Goal = 35) 36 30             IOPI -- Today's Exercise Targets     Tongue tip  (Goal = 56) DNT (goal met)     Tongue back  (Goal = 50) DNT (goal met)     Right Lip  (Goal = 35) DNT (goal met)     Left Lip  (Goal = 35) Peak Max after 3 trials: 30 Effort level: 85% Target for treatment: 26 06/19/2023 06/26/2023 07/12/2023      Tongue tip  3 sets of 30 reps (pumps)     3 sets of 30 reps (3 sec holds) 3 sets of 30 reps (pumps)     3 sets of 30 reps (3 sec holds) DNT (goal met)      Tongue back DNT (goal met) DNT (goal met) DNT (goal met)      Right Lip DNT (goal met) DNT (goal met) DNT (goal met)      Left Lip 3 sets of 30 reps (pumps)     3 sets of 30 reps (3 sec holds) 3 sets of 30 reps (pumps)     3 sets of 30 reps (3 sec holds) 6 sets of 30 reps (pumps)     6 sets of 30 reps (3 sec holds)        Goals      Short-term goals:  Patient  will be educated on speech intelligibility strategies (e.g., over-exaggeration, slow rate, breath groups,, etc.) to promote increased communication success (to be achieved in 2-3 weeks). -- MET    Patient will perform oral motor exercises using IOPI device on lingual and labial muscles to facilitate improved strength and function for increased speech intelligibility, to be achieved in 4-6 weeks. -- PARTIALLY MET/ONGOING    Patient will perform oral motor and diadochokinetic speech rate exercises with > 90% accuracy to facilitate increased speech intelligibility, to be achieved in 4-6 weeks. -- PARTIALLY MET/ONGOING     Patient will utilize compensatory strategies (I.e., over-articulation, decreased rate, etc) 80% of the time while orally reading sentence/paragraph length material to facilitate increased speech intelligibility, to be achieved in 4-6 weeks. -- PARTIALLY MET/ONGOING     Patient will utilize speech intelligibility strategies 80% of the time with min verbal cues while reading words, sentences, and paragraph length material to facilitate increased generalization of skills into conversational speech (to be achieved in 4-6 weeks). -- PARTIALLY MET/ONGOING    Long-term goals  Patient will demonstrate improved functional and intelligible speech with the use of adequate labial and lingual function, increased articulatory precision, and speech prosody by discharge. -- PARTIALLY MET/ONGOING     Patient will independently utilize speech intelligibility strategies (e.g., over-exaggeration, slow rate, breath groups, etc.) during oral reading tasks >90% intelligibility to facilitate increased generalization of skills into conversational speech by discharge. -- PARTIALLY MET/ONGOING     Patient will demonstrate independent implementation of compensatory strategies in conversation to improve speech intelligibility by discharge.  -- PARTIALLY MET/ONGOING     Patient will develop functional and intelligible speech to promote positive communication interractions with the use of speech intelligibility strategies (to be achieved by discharge). -- PARTIALLY MET/ONGOING    Impressions/Recommendations    Impressions:   -Patient presents with mild dysarthria c/b imprecise articulation and slow rate of speech. Patient with deficits with lip alternation, lingual protrusion/elevation/lateralization/alternation, poor secretion management, and reduced intelligibility of speech at the conversational speaking level. Patient's speech intelligibility is judged to be 90% by this clinician. Distortions with the /s/ and /z/ sounds are noted with decreased precise articulation and slower rate. Patient was administered the FDA-2 again on this date of service to compare his performance to his initial evaluation testing on 04/24/2023. Patient demonstrated improvements in the abovementioned areas. Patient reported that family and colleagues have noticed an improvement in his speech clarity since beginning OP ST services targeting dysarthria. Patient would benefit from continued outpatient skilled Speech Therapy services to facilitate positive communication interactions with both familiar and unfamiliar listeners, practice with speech intelligibility strategies in formal/informal speaking tasks, facilitate overall improved quality of life, and reduce caregiver burden.      Recommendations:  -Patient would benefit from outpatient skilled Speech Therapy services: Speech-language therapy    -Frequency: 1x weekly  -Duration: 6-8 weeks    -Intervention certification from: 3/68/0493  -Intervention certification to: 86/09/2629    -Intervention comments:  -Motor speech re-evaluation with patient (20 minutes)  -Speech treatment, including discussion about recommendations, treatment (see above), and HEP review (30 minutes)

## 2023-07-12 ENCOUNTER — OFFICE VISIT (OUTPATIENT)
Dept: OCCUPATIONAL THERAPY | Facility: CLINIC | Age: 68
End: 2023-07-12
Payer: COMMERCIAL

## 2023-07-12 ENCOUNTER — EVALUATION (OUTPATIENT)
Dept: SPEECH THERAPY | Facility: CLINIC | Age: 68
End: 2023-07-12
Payer: COMMERCIAL

## 2023-07-12 ENCOUNTER — APPOINTMENT (OUTPATIENT)
Dept: PHYSICAL THERAPY | Facility: CLINIC | Age: 68
End: 2023-07-12
Payer: COMMERCIAL

## 2023-07-12 DIAGNOSIS — G81.94 LEFT HEMIPARESIS (HCC): Primary | ICD-10-CM

## 2023-07-12 DIAGNOSIS — Z86.73 HISTORY OF STROKE: ICD-10-CM

## 2023-07-12 DIAGNOSIS — G44.52 NEW DAILY PERSISTENT HEADACHE: ICD-10-CM

## 2023-07-12 DIAGNOSIS — R47.1 DYSARTHRIA: Primary | ICD-10-CM

## 2023-07-12 PROCEDURE — 97150 GROUP THERAPEUTIC PROCEDURES: CPT

## 2023-07-12 PROCEDURE — 92522 EVALUATE SPEECH PRODUCTION: CPT | Performed by: SPEECH-LANGUAGE PATHOLOGIST

## 2023-07-12 PROCEDURE — 97112 NEUROMUSCULAR REEDUCATION: CPT

## 2023-07-12 PROCEDURE — 92507 TX SP LANG VOICE COMM INDIV: CPT | Performed by: SPEECH-LANGUAGE PATHOLOGIST

## 2023-07-12 NOTE — PROGRESS NOTES
Occupational Therapy Daily Note:    Today's date: 2023  Patient name: Park Hewitt  : 1955  MRN: [id number]  Referring provider: Zully Jordan*  Dx:   Encounter Diagnoses   Name Primary? • Left hemiparesis ([address]) Yes   • History of stroke      Subjective: "The stretch always feels so good". Objective: Pt engaged in skilled OT treatment session with focus on NMRE and tone reduction improving ROM and skin/joint integrity. CPT Code Minutes                                           Task Details        Therapeutic Activity               Neuro Re-Ed  Pt tolerated electrical stimulation w/pad placement to dorsum of hand and forearm extensors facilitating wrist and digit extension. Therapeutic Exercise               Manual  Rock Tape applied to L D2-D4 with anchor at distal DIP with 25% tension placed on tape with wrist in extension and anchored at proximal/dorsal forearm.   Applied an I-band from distal IP to mid forearm extensors focusing on providing tactile feedback and proprioceptive feedback for digit extension. Self Care     :  [id number]  [id number]:1  [id number]:1  [id number] self directed     Assessment: Tolerated treatment well. Pt tolerated estim well with improvement noted in tone reduction. No AROM noted during estim application, however muscle firing evident in extensor mass and D2 &3. Patient would benefit from continued skilled OT.     Plan: Continued skilled OT per POC    INTERVENTION COMMENTS:  Diagnosis: Left hemiparesis ([address]) [G81.94]  Precautions: fall risk, A-fib  FOTO: n/a  Insurance: Payor: [facility] Isaac Hecker / Plan: [id number] / Product Type: Blue Fee for Service /   6 of __ visits, PN due 2023  POC Expiration/Re-eval date: 2023

## 2023-07-17 ENCOUNTER — APPOINTMENT (OUTPATIENT)
Dept: OCCUPATIONAL THERAPY | Facility: CLINIC | Age: 68
End: 2023-07-17
Payer: COMMERCIAL

## 2023-07-17 ENCOUNTER — APPOINTMENT (OUTPATIENT)
Dept: PHYSICAL THERAPY | Facility: CLINIC | Age: 68
End: 2023-07-17
Payer: COMMERCIAL

## 2023-07-17 ENCOUNTER — APPOINTMENT (OUTPATIENT)
Dept: SPEECH THERAPY | Facility: CLINIC | Age: 68
End: 2023-07-17
Payer: COMMERCIAL

## 2023-07-17 NOTE — PROGRESS NOTES
Daily Speech Treatment Note    Today's date: 2023 ***  Patient’s name: Dwight Wadsworth  : 1955  MRN: [id number]  Safety measures: h/o CVA, Afib, fall risk  Referring provider: Kushal Tyson*    Encounter Diagnosis     ICD-10-CM    1. Dysarthria  R47.1       2. History of stroke  Z86.73       3. New daily persistent headache  G44.52         Visit tracking:  -Referring provider: Epic  -Billing guidelines: CMS  -Visit #***/60   -Insurance: Hortencia Pick (60 vpcy-not combined, $50 co-pay)  -RE due 2023    Subjective/Behavioral:  -***    Objective/Assessment:  -Reviewed patient's home exercises/activities completed since last appointment. ***    Short-term goals:  Patient will perform oral motor exercises using IOPI device on lingual and labial muscles to facilitate improved strength and function for increased speech intelligibility, to be achieved in 4-6 weeks.    ScaffoldI Pro Pmax Data Tracker Grid (from [id number]):     2023 ***       Tongue tip (GOAL = 56) DNT (goal met)        Tongue back (GOAL = 50) DNT (goal met)        Right lip (GOAL = 35) DNT (goal met)        Left lip   (GOAL = 35) 56            IOPI Pro -- Today's Exercise Targets:     Pmax (after 3 trials): Effort level: Target for today's treatment:   Tongue tip  (GOAL = 56) DNT (goal met)     Tongue back  (GOAL = 50) DNT (goal met)     Right lip  (GOAL = 35) DNT (goal met)     Left lip  (GOAL = 35) *** ***% ***       IOPI Pro -- Exercise Tracker Grid (from [id number]):     2023 ***       Tongue tip DNT (goal met)          Tongue back DNT (goal met)          Right lip DNT (goal met)          Left lip 6 sets of 30 reps (pumps)    6 sets of 30 reps (3 second holds)            Patient will perform oral motor and diadochokinetic speech rate exercises with > 90% accuracy to facilitate increased speech intelligibility, to be achieved in 4-6 weeks.      Patient will utilize compensatory strategies (I.e., over-articulation, decreased rate, etc) 80% of the time while orally reading sentence/paragraph length material to facilitate increased speech intelligibility, to be achieved in 4-6 weeks.     Patient will utilize speech intelligibility strategies 80% of the time with min verbal cues while reading words, sentences, and paragraph length material to facilitate increased generalization of skills into conversational speech (to be achieved in 4-6 weeks). Plan:  -Patient was provided with home exercises/activities to target goals in plan of care at the end of today's session.  -Continue with current plan of care.

## 2023-07-18 ENCOUNTER — OFFICE VISIT (OUTPATIENT)
Dept: PHYSICAL THERAPY | Facility: CLINIC | Age: 68
End: 2023-07-18
Payer: COMMERCIAL

## 2023-07-18 ENCOUNTER — OFFICE VISIT (OUTPATIENT)
Dept: OCCUPATIONAL THERAPY | Facility: CLINIC | Age: 68
End: 2023-07-18
Payer: COMMERCIAL

## 2023-07-18 ENCOUNTER — OFFICE VISIT (OUTPATIENT)
Dept: NEUROLOGY | Facility: CLINIC | Age: 68
End: 2023-07-18
Payer: COMMERCIAL

## 2023-07-18 ENCOUNTER — OFFICE VISIT (OUTPATIENT)
Dept: SPEECH THERAPY | Facility: CLINIC | Age: 68
End: 2023-07-18
Payer: COMMERCIAL

## 2023-07-18 VITALS
DIASTOLIC BLOOD PRESSURE: 66 MMHG | HEART RATE: 54 BPM | WEIGHT: 187.4 LBS | SYSTOLIC BLOOD PRESSURE: 134 MMHG | BODY MASS INDEX: 28.49 KG/M2

## 2023-07-18 DIAGNOSIS — G81.94 LEFT HEMIPARESIS (HCC): Primary | ICD-10-CM

## 2023-07-18 DIAGNOSIS — Z86.73 HISTORY OF STROKE: ICD-10-CM

## 2023-07-18 DIAGNOSIS — E78.5 HYPERLIPIDEMIA, UNSPECIFIED HYPERLIPIDEMIA TYPE: Chronic | ICD-10-CM

## 2023-07-18 DIAGNOSIS — R47.1 DYSARTHRIA: Primary | ICD-10-CM

## 2023-07-18 DIAGNOSIS — G44.52 NEW DAILY PERSISTENT HEADACHE: Primary | ICD-10-CM

## 2023-07-18 DIAGNOSIS — I10 PRIMARY HYPERTENSION: Chronic | ICD-10-CM

## 2023-07-18 DIAGNOSIS — G47.33 OSA (OBSTRUCTIVE SLEEP APNEA): ICD-10-CM

## 2023-07-18 DIAGNOSIS — I48.0 PAROXYSMAL ATRIAL FIBRILLATION (HCC): ICD-10-CM

## 2023-07-18 DIAGNOSIS — Z86.73 HISTORY OF STROKE: Primary | ICD-10-CM

## 2023-07-18 DIAGNOSIS — G44.52 NEW DAILY PERSISTENT HEADACHE: ICD-10-CM

## 2023-07-18 PROCEDURE — 92507 TX SP LANG VOICE COMM INDIV: CPT

## 2023-07-18 PROCEDURE — 97110 THERAPEUTIC EXERCISES: CPT

## 2023-07-18 PROCEDURE — 99213 OFFICE O/P EST LOW 20 MIN: CPT

## 2023-07-18 PROCEDURE — 97112 NEUROMUSCULAR REEDUCATION: CPT | Performed by: PHYSICAL THERAPIST

## 2023-07-18 PROCEDURE — 97164 PT RE-EVAL EST PLAN CARE: CPT | Performed by: PHYSICAL THERAPIST

## 2023-07-18 PROCEDURE — 97112 NEUROMUSCULAR REEDUCATION: CPT

## 2023-07-18 NOTE — PROGRESS NOTES
[facility]  PATIENT:  Laura Bailey  MRN:  [id number]  :  1955  DATE OF SERVICE:  2023      Assessment/Plan:     New daily persistent headache:    Since the last visit, patient has seen significant improvement in his headaches. His headaches are mostly related to when he does have a stiff sore neck at times. However, he is having headaches not even once a month at this point in time. The headaches that he does get are more or less tension type headaches after having a lot of stress or tension at the upper back and neck area or when staring at a screen for too long.    -Would like for the patient to continue to take his muscle relaxant medication as needed for the neck stiffness/tightness that he is experiencing. Would like him to continue to do therapy exercises at home for the upper back and neck area to continue to try and stretch out this area and limit the amount of tension on the patient gets. -Patient is able to take Tylenol as needed for abortive medication in regards to his headaches. Due to the fact the patient was on Eliquis, I would try to stay away from any NSAIDs such as Advil or naproxen at this time. Just taking with Tylenol will be best for abortive therapy of the headache patient does need to take anything. Persistent slurred speech:    -Patient has made significant improvements in speech with speech therapy and Occupational Therapy currently. I would encourage the patient to continue to follow with therapy at this time to try and learn new exercises for his dysarthria at this time. History of Stroke:    - For ongoing stroke prevention continue: Eliquis 5 mg twice daily, atorvastatin 80 mg once daily  - Discussed the importance of antiplatelet management with the patient to prevent future strokes. - Recommend to check blood pressure occasionally away from the doctor's office to make sure that those numbers are typically less than 130/80.   If they are frequently higher than that, we recommend checking a little more often and to follow up with primary care team   - Will defer to primary care team for monitoring of cholesterol panel and blood sugar numbers with target LDL cholesterol of less than 70 and hemoglobin A1c less than 7%  - Recommend following a low salt, mediterranean diet   - Recommend routine physical exercise as tolerated     We will plan for him to return to the office in 1 year time to see on of the APPs or Dr. Delta Warner but would be happy to see him sooner if the need should arise. If he has any symptoms concerning for TIA or stroke including sudden painless loss of vision or double vision, difficulty speaking or swallowing, vertigo/room spinning that does not quickly resolve, or weakness/numbness/loss of coordination affecting 1 side of the face or body he should proceed by ambulance to the nearest emergency room immediately. History of Present Illness: For Review: We had the pleasure of evaluating Augustus Strauss in neurological follow up  today for headaches. As you know,  he is a 70 y.o.   male with a past history of migraine headaches. Patient was last seen in the office at [facility] on 03/15/2023 by myself. Patient was presenting as a follow-up appointment in regard to his persistent daily headache. Patient had noted that his headache at mostly seem to resolve with having a prednisone taper a Toradol injection in the office at his previous visit with Angus Raymond, [address] [place] [address]. At the last visit, patient was not noting any significant headaches. Still having some neck pain in the right side of his body but not as significant as last visit. I encouraged the patient to continue to take his muscle relaxant medications with baclofen and naproxen as needed for the muscle stiffness/tightness that he is experiencing in his neck.   Patient was recommended to continue to take Tylenol as needed for abortive therapy in regard to his headaches. He is instructed to take no more than 3 or 4 days out of the week as this may contribute to medication overuse headaches. Patient's MRI C-spine results were discussed with him at the previous visit, no significant focal impingement noted on MRI. For the patient's persistent speech she was having, I had recommended that he go back to occupational therapy and speech therapy in regard to the speech difficulties. I also ran a number of vitamin deficiency labs and thyroid labs to see if there was any abnormalities that could have led to the patient's slurred speech. Patient's vitamin D was slightly decreased at that time. Patient was going to continue to take Eliquis 5 mg twice daily for secondary stroke prevention and for persistent atrial fibrillation. He was also going to continue to take atorvastatin 80 mg once daily in the evening. Is going to continue taking amlodipine for his blood pressure was going to continue to frequently check his blood pressure. I have placed a new referral for physical therapy, as the patient has felt like symptoms discontinuing therapy as significantly regressed. Also, to work on some of the chronic neck pain along with the stiffness/tightness he was experiencing. Current medical illnesses: Obstructive sleep apnea, migraine, hypertension, paroxysmal atrial fibrillation, hyperlipidemia, prediabetes, tension type headache, adenomatous colonic polyp      What medications do you take or have you taken for your headaches? Current Preventive:   Fluoxetine  Current Abortive:   Robaxin, baclofen, Tylenol    Prior Preventive:   None  Prior Abortive:   None    Interval updates as of 7/18/2023:    Stroke deficits of left hemiparesis about the same. Improved slurred speech with speech therapy. No new stroke like symptoms at this time. Having tension type headaches still, headaches have seemed to improve.  Always had some difficulty swallowing ever since the stroke, still having some difficulty. Little bit of difficulty still taking his pills because of the swallowing. Has not been choking on his pills or food or liquid. Still taking his atorvastatin and his Eliquis for secondary stroke prevention. Still taking atorvastatin for secondary stroke prevention. No longer doing Botox in his left arm for the left sided hemiparesis. Does use electric stim for his arm every night to try and stimulate the muscles. Patient's headaches still seem to be under control. Patient is not even having about 1 headache per month at this time. Patient has felt much better since his previous appointment and the appointment before that with Eber Simon. How often do the headaches occur? Every once and awhile, from neck tension. Not even happening once a month   Are you ever headache free? yes    What time of the day do the headaches start? Tension headache in the afternoon at work     How long do the headaches last?   Headaches seem to be an hour or two then go away     Describe your usual headache ? Dull achy pain     Where is your headache located? Front of forehead on both sides    What is the intensity of pain? Average: 2 or 3/10     Associated symptoms:   - Problem with concentration  - stiff or sore neck can exacerbate the headaches     Headache history with updates:  Headache are worse if the patient: not necessarily worse with positional change or Valsalva     Any positional change headaches? No positional change headaches    Headache triggers:  Stiff sore neck, staring at computer screen     Aura/warning and how long does it last ? No     What time of the year do headaches occur more frequently? do not seem to be related to any time of the day    Have you seen someone else for headaches or pain? No    Have you ever had any Brain imaging? Yes, MRI brain without contrast on 02/09/2023. I personally reviewed these images.     Reviewed old notes from physician seen in the past- see above HPI for summary of previous encounters.        Past Medical History:   Diagnosis Date   • Arthritis     in hands   • Cervical herniated disc    • Colon polyp    • Diabetes mellitus ([address])     borderline   • Gout     hx of in 2018, none since   • Hyperlipidemia    • Hypertension    • Irregular heart beat     a-fib, has loop recorder   • Kidney stone 09/13/2021    hx of   • Migraine    • S/P Botox injection     in left wrist to help increase mobility   • Sleep apnea     mild sleep apnea, cpap was not ordered   • Stroke ([address]) 2018    left hemiparesis, wears leg brace and uses cane, unable to use left arm and hand   • Use of cane as ambulatory aid     had CVA in 2018       Patient Active Problem List   Diagnosis   • History of stroke   • Hypertension   • Anxiety   • Hyperlipidemia   • Gout   • Prediabetes   • Bradycardia   • Left hemiparesis (HCC)   • Left shoulder pain   • Encounter for loop recorder check   • Acute intractable tension-type headache   • Paroxysmal atrial fibrillation (HCC)   • PADDY (obstructive sleep apnea)   • Hx of adenomatous colonic polyps   • Adenomatous polyp of colon   • New daily persistent headache       Medications:      Current Outpatient Medications   Medication Sig Dispense Refill   • amLODIPine (NORVASC) 5 mg tablet Take 5 mg by mouth daily Taking 1 1/2 daily daily   5   • atorvastatin (LIPITOR) 80 mg tablet Take 1 tablet (80 mg total) by mouth every evening 90 tablet 3   • Baclofen 5 MG TABS TAKE 1 TABLET BY MOUTH AT LUNCHTIME AND 1 TABLET AT BEDTIME FOR SPASITICITY MANAGEMENT     • Eliquis 5 MG TAKE 1 TABLET BY MOUTH TWICE A DAY 60 tablet 5   • FLUoxetine (PROzac) 20 mg capsule TAKE 1 CAPSULE BY MOUTH EVERY DAY 90 capsule 3   • LORazepam (Ativan) 0.5 mg tablet Take 1 tablet (0.5 mg total) by mouth once as needed for anxiety for up to 1 dose Please take 30 minutes prior to MRI 2 tablet 0   • losartan (COZAAR) 50 mg tablet Take 1 tablet (50 mg total) by mouth daily 90 tablet 0   • predniSONE 10 mg tablet Take 8 tablets (80 mg total) by mouth daily Decrease by 10 mg daily till prescription is done 36 tablet 0   • methocarbamol (ROBAXIN) 500 mg tablet TAKE 1 TABLET BY MOUTH EVERY 12 HOURS 180 tablet 2     No current facility-administered medications for this visit.         Allergies:    No Known Allergies    Family History:     Family History   Problem Relation Age of Onset   • Stroke Mother    • Heart disease Father    • ALS Father        Social History:     Social History     Socioeconomic History   • Marital status:      Spouse name: Not on file   • Number of children: Not on file   • Years of education: Not on file   • Highest education level: Not on file   Occupational History   • Not on file   Tobacco Use   • Smoking status: Former     Packs/day: 1.00     Types: Cigarettes, Cigars     Quit date:      Years since quittin.5   • Smokeless tobacco: Never   • Tobacco comments:     stopped on 10/20/2018   Vaping Use   • Vaping Use: Never used   Substance and Sexual Activity   • Alcohol use: Yes     Comment: socially    • Drug use: No   • Sexual activity: Not on file   Other Topics Concern   • Not on file   Social History Narrative   • Not on file     Social Determinants of Health     Financial Resource Strain: Not on file   Food Insecurity: Not on file   Transportation Needs: Not on file   Physical Activity: Not on file   Stress: Not on file   Social Connections: Not on file   Intimate Partner Violence: Not on file   Housing Stability: Not on file         Objective:     Physical Exam:                                                                 Vitals:            Constitutional:    /66 (BP Location: Right arm, Patient Position: Sitting, Cuff Size: Standard)   Pulse (!) 54   Wt 85 kg (187 lb 6.4 oz)   BMI 28.49 kg/m²   BP Readings from Last 3 Encounters:   23 134/66   03/15/23 124/70   23 158/74     Pulse Readings from Last 3 Encounters:   07/18/23 (!) 54   03/15/23 (!) 52   02/08/23 63         Well developed, well nourished, well groomed. No dysmorphic features. Psychiatric:  Normal behavior and appropriate affect        Neurological Examination:     Mental status/cognitive function:   Orientated to time, place and person. Recent and remote memory intact. Attention span and concentration as well as fund of knowledge are appropriate for age. Normal language and spontaneous speech. Cranial Nerves:  II-visual fields full. III, IV, VI-Pupils were equal, round, and reactive to light and accomodation. Extraocular movements were full and conjugate without nystagmus. Conjugate gaze, normal smooth pursuits, normal saccades   V-facial sensation symmetric. VII-facial expression symmetric, intact forehead wrinkle, strong eye closure, symmetric smile    VIII-hearing grossly intact bilaterally   IX, X-palate elevation symmetric, no dysarthria. XI-shoulder shrug strength intact    XII-tongue protrusion midline. Motor Exam: Increased bulk and tone of the left upper extremity, increased flaccidness of left upper extremity. Power/strength 5/5 right upper and lower extremities, power/strength 4/5 of the left lower extremity, power/strength 3/5 of the left upper extremity,  Sensory: grossly intact light touch in all extremities. Reflexes:  Right: Brachioradialis 2+, biceps 2+, knee 2+  Left: Brachioradialis 3+, biceps 3+, knee 3+  Coordination: Finger nose finger intact on the right side, could not be tested on the left due to stroke deficits  Gait: steady casual and tandem gait. Review of Systems:     Review of Systems   Constitutional: Negative for appetite change, fatigue and fever. HENT: Positive for trouble swallowing (A little bit) and voice change (a little bit). Negative for hearing loss and tinnitus. Eyes: Negative. Negative for photophobia, pain and visual disturbance. Respiratory: Negative.   Negative for shortness of breath. Cardiovascular: Negative. Negative for palpitations. Gastrointestinal: Negative. Negative for nausea and vomiting. Endocrine: Negative. Negative for cold intolerance. Genitourinary: Negative. Negative for dysuria, frequency and urgency. Musculoskeletal: Positive for gait problem (A little bit of stumbling). Negative for back pain, myalgias and neck pain. Balance Issues     Skin: Negative. Negative for rash. Allergic/Immunologic: Negative. Neurological: Positive for speech difficulty (Slurred speech but getting better with therapy) and light-headedness (A little bit once in a while). Negative for dizziness, tremors, seizures, syncope, facial asymmetry, weakness, numbness and headaches (Has gotten better). Hematological: Bruises/bleeds easily. Psychiatric/Behavioral: Negative. Negative for confusion, hallucinations and sleep disturbance. All other systems reviewed and are negative. I personally reviewed the ROS entered by the MA    I spent 25 minutes in total time for this visit.     Author:  Katy Duval PA-C 7/18/2023 7:40 AM

## 2023-07-18 NOTE — PROGRESS NOTES
Occupational Therapy Daily Note:    Today's date: 2023  Patient name: Jason Boland  : 1955  MRN: [id number]  Referring provider: Nam Foster*  Dx:   Encounter Diagnoses   Name Primary? • Left hemiparesis ([address]) Yes   • History of stroke      Subjective: "I think that's from taking the tape off". (Bruising to forearm) reviewed safe tape removal procedure w/pt. Objective: Pt engaged in skilled OT treatment session with focus on NMRE and tone reduction improving ROM and skin/joint integrity. CPT Code Minutes                                           Task Details        Therapeutic Activity               Neuro Re-Ed 40 Pt tolerated electrical stimulation w/pad placement to dorsum of hand and forearm extensors facilitating wrist and digit extension. With electrical stimulation therapist performed PROM in prolonged stretch to forearm, wrist and digits, x10. Improvement noted in digit/wrist flexibility. Pt tolerated electrical stimulation with pad placement to thenar and dorsum of hand facilitating digit extension while engaging in fxl grasp/release activity. Pt instructed to stabilize tongs in hand for transfer of foam blocks w/target demands following fxl movement pattern of protraction/retraction, grasp/release and ER. Pt able to stabilize tongs and grasp in blocks, assist for release of tongs about 50% of time. Pt required blocking to shoulder and tactile cues to elbow facilitating fxl movement patterns as pt tends to hike trunk for shoulder elevation. Therapeutic Exercise  10 For UB exercise benefit and to increase overall cardiovascular health, proximal strength, gross grasp and endurance, pt engaged in [email or web address] for 5 min prograde/5 min retrograde increasing resistance to L2.0. Pt completed activity w/o loss of .                   Manual 8 Rock Tape applied to L D2-D4 with anchor at distal DIP with 25% tension placed on tape with wrist in extension and anchored at proximal/dorsal forearm.   Applied an I-band from distal IP to mid forearm extensors focusing on providing tactile feedback and proprioceptive feedback for digit extension. Self Care        Assessment: Tolerated treatment well. Pt continues with difficulty to terminate flexors for release of items during fxl grasp. Pt tolerates taping strategies well although tends to remove tape from skin w/o use of lotion causing small bruise to mid forearm. Reviewed safe tape removal procedure with pt. Patient would benefit from continued skilled OT.     Plan: Continued skilled OT per POC    INTERVENTION COMMENTS:  Diagnosis: Left hemiparesis ([address]) [G81.94]  Precautions: fall risk, A-fib  FOTO: n/a  Insurance: Payor: F[employer]iqi / Plan: [id number] / Product Type: Blue Fee for Service /   15 of __ visits, PN due 7/19/2023  POC Expiration/Re-eval date: 7/19/2023

## 2023-07-18 NOTE — PROGRESS NOTES
Daily Speech Treatment Note    Today's date: 2023  Patient’s name: Tata Swenson  : 1955  MRN: [id number]  Safety measures: h/o CVA, Afib, fall risk  Referring provider: Juan Hdez*    Encounter Diagnosis     ICD-10-CM    1. Dysarthria  R47.1       2. History of stroke  Z86.73       3. New daily persistent headache  G44.52         Visit tracking:  -Referring provider: Epic  -Billing guidelines: CMS  -Visit #10/60   -Insurance: Ashley Brandt (60 vpcy-not combined, $50 co-pay)  -RE due 2023    Subjective/Behavioral:  -Patient was very pleasant to work with. Objective/Assessment:  -Reviewed testing results and goals in plan care with patient. Patient is in agreement at this time. Short-term goals:  Patient will perform oral motor exercises using IOPI device on lingual and labial muscles to facilitate improved strength and function for increased speech intelligibility, to be achieved in 4-6 weeks.  -- PARTIALLY MET/ONGOING    IOPI PEAK MEASUREMENT WEEKLY GRID       2023     Tongue tip  (Goal = 56) 62 61 DNT (goal met) DC     Tongue back  (Goal = 50) DNT (goal met) DNT (goal met) DNT (goal met) DC     Right Lip  (Goal = 35) 66 DNT (goal met) DNT (goal met) DC     Left Lip  (Goal = 35) 36 30  56           IOPI -- Today's Exercise Targets     Tongue tip  (Goal = 56) DNT (goal met)     Tongue back  (Goal = 50) DNT (goal met)     Right Lip  (Goal = 35) DNT (goal met)     Left Lip  (Goal = 35) Peak Max after 3 trials: 56 Effort level: 85% Target for treatment: 47           2023     Tongue tip  3 sets of 30 reps (pumps)     3 sets of 30 reps (3 sec holds) 3 sets of 30 reps (pumps)     3 sets of 30 reps (3 sec holds) DNT (goal met) DC     Tongue back DNT (goal met) DNT (goal met) DNT (goal met) DC     Right Lip DNT (goal met) DNT (goal met) DNT (goal met) DC     Left Lip 3 sets of 30 reps (pumps)     3 sets of 30 reps (3 sec holds) 3 sets of 30 reps (pumps)     3 sets of 30 reps (3 sec holds) 6 sets of 30 reps (pumps)     6 sets of 30 reps (3 sec holds) 6 sets of 30 reps (pumps)    6 sets of 30 reps (3 sec holds)         Patient will perform oral motor and diadochokinetic speech rate exercises with > 90% accuracy to facilitate increased speech intelligibility, to be achieved in 4-6 weeks. -- PARTIALLY MET/ONGOING     Patient will utilize compensatory strategies (I.e., over-articulation, decreased rate, etc) 80% of the time while orally reading sentence/paragraph length material to facilitate increased speech intelligibility, to be achieved in 4-6 weeks. -- PARTIALLY MET/ONGOING    To target compensatory strategies: Patient read "tongue twister" sentences using slow rate and precise articulation. Patient read with approx. 95% intelligibility. Patient was noted to have better success with /s/ sound when combined with a /t/ in a consonant blend. This is believed to be the case since /t/ is a short, precise sound that helped with /s/ precision. Patient was also encouraged to practice this task at home in front of a mirror to see facial expressions. The more expressive he was with his face, the more precise his articulation became.      Patient will utilize speech intelligibility strategies 80% of the time with min verbal cues while reading words, sentences, and paragraph length material to facilitate increased generalization of skills into conversational speech (to be achieved in 4-6 weeks). -- PARTIALLY MET/ONGOING      Plan:  -Patient was provided with home exercises/activities to target goals in plan of care at the end of today's session.  -Continue with current plan of care.

## 2023-07-18 NOTE — PROGRESS NOTES
Re-evaluation Note     Today's date: 2023  Patient name: Ish Coon  : 1955  MRN: [id number]  Referring provider: Frankey Orchard, MD  Dx: No diagnosis found. Subjective: Feeling well, overall has had no near falls or falls, feels he is making progress. Would like to continue with skilled PT      Objective: See treatment diary below    Functional outcomes:   5x sts: 7.16 sec  6 mwt: 1350 ft   Gait speed: 1.4 m/s       Functional outcomes  6 minute walk test:  975 ft   Gait speed: 1.1 m/s  5x sit to stand: 10.59 (seconds)              Functional Outcomes: 10/24/22  6 mwt: 4727 ft no LLE AFO  Gait Speed: 1.24 m/s  5x sts: 7.28 seconds  FGA:       Functional Outcomes: 22  6 mwt: 1175 no AFO  Giat speed: 1.23 m/s no AFO  FGA:    Assessment: Patient reports to physical therapy for Re-evaluation. At this time patient has made significant progress with endurance, gait speed, transfers, and balance compared to initial evlauation. However, patient remains below age matched normative values. Pt will continue to benefit from skilled therpay interventoin 1x/week for 4 weeks. Goals  STG:  Pt will complete 6 mwt in 1150 ft within 4 weeks  Pt will improve gait speed to 1.2 m/s within 4 weeks  Pt will complete HEP independently daily wtihin 4 weeks    LTG  Pt will be independent with ongoing and updated HEP within 12 weeks  Pt will no loner need to wear AFO all the time to be safe with ambuation wtihin 12 weeks  Pt will not require SPC when walking outdoors within 12 weeks    New Goals:  Pt will complete 6 mwt in 1500 ft within 4 weeks  Pt will have giat speed of 1.5 m/s within 4 weeks  Pt will be completing HEP daily within 4 weeks      Plan: Continue per plan of care.       Precautions: fall risk    STG/POC end: 23    Manuals                                                        Neuro Re-Ed             treadmill 10 min at 2.3 mph 10% incline 10 min at 2.3 mph 10% incline     7% incline 2.3 mph R UE 10 min at 7% incline at 2.3 mph with RUE support  10 min at 10% incline at 1.9 mph with RUE support   hurdles airex pad between 7.5# aw 4 laps fwd, 4 laps lat      6" hurdles with aw 5# L LE - carrying tidal tank - 5 lapsfwd  Solo step In SOLO: 6 inch hurdles with 5# AW on LLE x4 laps; carrying tidal tank x4 laps     Obstacle course             Step up overs        In SOLO with 5# AW on LLE:  4 laps stepping up/over 4 boxes 4 inch to 8 inch    4 laps as above while holding 3kg medball    4 laps as above while holding tidal tank  6" steps with airex; 8" step with airex fwd solo step   Slip  40x in solo step alteranting fwd/bkwd            Second step up             [address] [facility] Under mat - 8 laps solo step      3x without color patterns solo step    5x with color pattern cog challenge solo step   5x solo step no patterns    3 x solo step with cog challenge   Ther Ex                                                                                                                     Ther Activity                                       Gait Training                    Box step up and overs 5# aw L LE lead                   Modalities

## 2023-07-18 NOTE — PATIENT INSTRUCTIONS
New daily persistent headache:    Since the last visit, patient has seen significant improvement in his headaches. His headaches are mostly related to when he does have a stiff sore neck at times. However, he is having headaches not even once a month at this point in time. The headaches that he does get are more or less tension type headaches after having a lot of stress or tension at the upper back and neck area or when staring at a screen for too long.    -Would like for the patient to continue to take his muscle relaxant medication as needed for the neck stiffness/tightness that he is experiencing. Would like him to continue to do therapy exercises at home for the upper back and neck area to continue to try and stretch out this area and limit the amount of tension on the patient gets. -Patient is able to take Tylenol as needed for abortive medication in regards to his headaches. Due to the fact the patient was on Eliquis, I would try to stay away from any NSAIDs such as Advil or naproxen at this time. Just taking with Tylenol will be best for abortive therapy of the headache patient does need to take anything. Persistent slurred speech:    -Patient has made significant improvements in speech with speech therapy and Occupational Therapy currently. I would encourage the patient to continue to follow with therapy at this time to try and learn new exercises for his dysarthria at this time. History of Stroke:    - For ongoing stroke prevention continue: Eliquis 5 mg twice daily, atorvastatin 80 mg once daily  - Discussed the importance of antiplatelet management with the patient to prevent future strokes. - Recommend to check blood pressure occasionally away from the doctor's office to make sure that those numbers are typically less than 130/80.   If they are frequently higher than that, we recommend checking a little more often and to follow up with primary care team   - Will defer to primary care team for monitoring of cholesterol panel and blood sugar numbers with target LDL cholesterol of less than 70 and hemoglobin A1c less than 7%  - Recommend following a low salt, mediterranean diet   - Recommend routine physical exercise as tolerated     We will plan for him to return to the office in 1 year time to see on of the APPs or Dr. Cecile Comer but would be happy to see him sooner if the need should arise. If he has any symptoms concerning for TIA or stroke including sudden painless loss of vision or double vision, difficulty speaking or swallowing, vertigo/room spinning that does not quickly resolve, or weakness/numbness/loss of coordination affecting 1 side of the face or body he should proceed by ambulance to the nearest emergency room immediately.

## 2023-07-18 NOTE — PROGRESS NOTES
Review of Systems   Constitutional: Negative for appetite change, fatigue and fever. HENT: Positive for trouble swallowing (A little bit) and voice change (a little bit). Negative for hearing loss and tinnitus. Eyes: Negative. Negative for photophobia, pain and visual disturbance. Respiratory: Negative. Negative for shortness of breath. Cardiovascular: Negative. Negative for palpitations. Gastrointestinal: Negative. Negative for nausea and vomiting. Endocrine: Negative. Negative for cold intolerance. Genitourinary: Negative. Negative for dysuria, frequency and urgency. Musculoskeletal: Positive for gait problem (A little bit of stumbling). Negative for back pain, myalgias and neck pain. Balance Issues     Skin: Negative. Negative for rash. Allergic/Immunologic: Negative. Neurological: Positive for speech difficulty (Slurred speech but getting better with therapy) and light-headedness (A little bit once in a while). Negative for dizziness, tremors, seizures, syncope, facial asymmetry, weakness, numbness and headaches (Has gotten better). Hematological: Bruises/bleeds easily. Psychiatric/Behavioral: Negative. Negative for confusion, hallucinations and sleep disturbance. All other systems reviewed and are negative.

## 2023-07-21 NOTE — PROGRESS NOTES
Daily Speech Treatment Note    Today's date: 2023 ***  Patient’s name: Pipe Turner  : 1955  MRN: [id number]  Safety measures: h/o CVA, Afib, fall risk  Referring provider: Donny Pierre*    Encounter Diagnosis     ICD-10-CM    1. Dysarthria  R47.1       2. History of stroke  Z86.73       3. New daily persistent headache  G44.52         Visit tracking:  -Referring provider: Epic  -Billing guidelines: CMS  -Visit #***/60   -Insurance: Natalie Luna (60 vpcy-not combined, $50 co-pay)  -RE due 2023    Subjective/Behavioral:  -***    Objective/Assessment:  -Reviewed patient's home exercises/activities completed since last appointment. ***    Short-term goals:  Patient will perform oral motor exercises using IOPI device on lingual and labial muscles to facilitate improved strength and function for increased speech intelligibility, to be achieved in 4-6 weeks.    [employer] Pro Pmax Data Tracker Grid (from [id number]):     2023 ***       Tongue tip (GOAL = 56) DNT (goal met)        Tongue back (GOAL = 50) DNT (goal met)        Right lip (GOAL = 35) DNT (goal met)        Left lip   (GOAL = 35) 56            IOPI Pro -- Today's Exercise Targets:     Pmax (after 3 trials): Effort level: Target for today's treatment:   Tongue tip  (GOAL = 56) DNT (goal met)     Tongue back  (GOAL = 50) DNT (goal met)     Right lip  (GOAL = 35) DNT (goal met)     Left lip  (GOAL = 35) *** ***% ***       IOPI Pro -- Exercise Tracker Grid (from [id number]):     2023 ***       Tongue tip DNT (goal met)          Tongue back DNT (goal met)          Right lip DNT (goal met)          Left lip 6 sets of 30 reps (pumps)    6 sets of 30 reps (3 second holds)            Patient will perform oral motor and diadochokinetic speech rate exercises with > 90% accuracy to facilitate increased speech intelligibility, to be achieved in 4-6 weeks.      Patient will utilize compensatory strategies (I.e., over-articulation, decreased rate, etc) 80% of the time while orally reading sentence/paragraph length material to facilitate increased speech intelligibility, to be achieved in 4-6 weeks.     Patient will utilize speech intelligibility strategies 80% of the time with min verbal cues while reading words, sentences, and paragraph length material to facilitate increased generalization of skills into conversational speech (to be achieved in 4-6 weeks). Plan:  -Patient was provided with home exercises/activities to target goals in plan of care at the end of today's session.  -Continue with current plan of care.

## 2023-07-24 ENCOUNTER — APPOINTMENT (OUTPATIENT)
Dept: SPEECH THERAPY | Facility: CLINIC | Age: 68
End: 2023-07-24
Payer: COMMERCIAL

## 2023-07-24 ENCOUNTER — APPOINTMENT (OUTPATIENT)
Dept: PHYSICAL THERAPY | Facility: CLINIC | Age: 68
End: 2023-07-24
Payer: COMMERCIAL

## 2023-07-24 ENCOUNTER — APPOINTMENT (OUTPATIENT)
Dept: OCCUPATIONAL THERAPY | Facility: CLINIC | Age: 68
End: 2023-07-24
Payer: COMMERCIAL

## 2023-07-24 DIAGNOSIS — Z86.73 HISTORY OF STROKE: ICD-10-CM

## 2023-07-24 DIAGNOSIS — G44.52 NEW DAILY PERSISTENT HEADACHE: ICD-10-CM

## 2023-07-24 DIAGNOSIS — R47.1 DYSARTHRIA: Primary | ICD-10-CM

## 2023-07-24 NOTE — PROGRESS NOTES
Daily Speech Treatment Note    Today's date: 2023   Patient’s name: Jason Boland  : 1955  MRN: [id number]  Safety measures: h/o CVA, Afib, fall risk  Referring provider: Nam Foster*    Encounter Diagnosis     ICD-10-CM    1. Dysarthria  R47.1       2. History of stroke  Z86.73       3. New daily persistent headache  G44.52         Visit tracking:  -Referring provider: Epic  -Billing guidelines: CMS  -Visit #   -Insurance: [employer] (60 vpcy-not combined, $50 co-pay)  -RE due 2023    Subjective/Behavioral:  -Patient and clinician revised upcoming schedule. Due to patient going out of town, next appointment will be 2023.  -Reviewed neurology appointment from 23:    "New daily persistent headache:     Since the last visit, patient has seen significant improvement in his headaches. His headaches are mostly related to when he does have a stiff sore neck at times. However, he is having headaches not even once a month at this point in time. The headaches that he does get are more or less tension type headaches after having a lot of stress or tension at the upper back and neck area or when staring at a screen for too long.     -Would like for the patient to continue to take his muscle relaxant medication as needed for the neck stiffness/tightness that he is experiencing. Would like him to continue to do therapy exercises at home for the upper back and neck area to continue to try and stretch out this area and limit the amount of tension on the patient gets. -Patient is able to take Tylenol as needed for abortive medication in regards to his headaches. Due to the fact the patient was on Eliquis, I would try to stay away from any NSAIDs such as Advil or naproxen at this time.   Just taking with Tylenol will be best for abortive therapy of the headache patient does need to take anything.     Persistent slurred speech:     -Patient has made significant improvements in speech with speech therapy and Occupational Therapy currently. I would encourage the patient to continue to follow with therapy at this time to try and learn new exercises for his dysarthria at this time."    Objective/Assessment:  -Reviewed patient's home exercises/activities completed since last appointment. Due to next patient's appointment in September, recommended to continue HEP. Short-term goals:  Patient will perform oral motor exercises using IOPI device on lingual and labial muscles to facilitate improved strength and function for increased speech intelligibility, to be achieved in 4-6 weeks.    IOPI PEAK MEASUREMENT WEEKLY GRID       06/16/2023 06/26/2023 07/12/2023 07/18/2023 07/31/23      Tongue tip  (Goal = 56) 62 61 DNT (goal met) DC DC      Tongue back  (Goal = 50) DNT (goal met) DNT (goal met) DNT (goal met) DC  DC     Right Lip  (Goal = 35) 66 DNT (goal met) DNT (goal met) DC  DC     Left Lip  (Goal = 35) 36 30   56  52           IOPI -- Today's Exercise Targets - 7/31/2023     Tongue tip  (Goal = 56) DNT (goal met)       Tongue back  (Goal = 50) DNT (goal met)       Right Lip  (Goal = 35) DNT (goal met)       Left Lip  (Goal = 35) Peak Max after 3 trials: 56 Effort level: 85% Target for treatment: 44           06/19/2023 06/26/2023 07/12/2023 07/18/2023 7/31/2023     Tongue tip  3 sets of 30 reps (pumps)     3 sets of 30 reps (3 sec holds) 3 sets of 30 reps (pumps)     3 sets of 30 reps (3 sec holds) DNT (goal met) DC  DC     Tongue back DNT (goal met) DNT (goal met) DNT (goal met) DC  DC     Right Lip DNT (goal met) DNT (goal met) DNT (goal met) DC  DC     Left Lip 3 sets of 30 reps (pumps)     3 sets of 30 reps (3 sec holds) 3 sets of 30 reps (pumps)     3 sets of 30 reps (3 sec holds) 6 sets of 30 reps (pumps)     6 sets of 30 reps (3 sec holds) 6 sets of 30 reps (pumps)     6 sets of 30 reps (3 sec holds)  6 sets of 30 reps (pumps)     6 sets of 30 reps (3 sec holds)          Patient will perform oral motor and diadochokinetic speech rate exercises with > 90% accuracy to facilitate increased speech intelligibility, to be achieved in 4-6 weeks.      Patient will utilize compensatory strategies (I.e., over-articulation, decreased rate, etc) 80% of the time while orally reading sentence/paragraph length material to facilitate increased speech intelligibility, to be achieved in 4-6 weeks.     Patient will utilize speech intelligibility strategies 80% of the time with min verbal cues while reading words, sentences, and paragraph length material to facilitate increased generalization of skills into conversational speech (to be achieved in 4-6 weeks). Patient and clinician reviewed intelligibility strategies (ex. Over-articulation, louder, etc.). To target intelligibility at reading level (level 3 paragraphs)- patient read aloud reading paragraphs. Patient was intelligible 75% of the passages. Patient benefited from a scanner to read and utilizing intelligibility strategies. Plan:  -Patient was provided with home exercises/activities to target goals in plan of care at the end of today's session.  -Continue with current plan of care.

## 2023-07-27 ENCOUNTER — APPOINTMENT (OUTPATIENT)
Dept: OCCUPATIONAL THERAPY | Facility: CLINIC | Age: 68
End: 2023-07-27
Payer: COMMERCIAL

## 2023-07-27 ENCOUNTER — APPOINTMENT (OUTPATIENT)
Dept: SPEECH THERAPY | Facility: CLINIC | Age: 68
End: 2023-07-27
Payer: COMMERCIAL

## 2023-07-27 DIAGNOSIS — R47.1 DYSARTHRIA: Primary | ICD-10-CM

## 2023-07-27 DIAGNOSIS — G44.52 NEW DAILY PERSISTENT HEADACHE: ICD-10-CM

## 2023-07-27 DIAGNOSIS — Z86.73 HISTORY OF STROKE: ICD-10-CM

## 2023-07-31 ENCOUNTER — APPOINTMENT (OUTPATIENT)
Dept: PHYSICAL THERAPY | Facility: CLINIC | Age: 68
End: 2023-07-31
Payer: COMMERCIAL

## 2023-07-31 ENCOUNTER — OFFICE VISIT (OUTPATIENT)
Dept: OCCUPATIONAL THERAPY | Facility: CLINIC | Age: 68
End: 2023-07-31
Payer: COMMERCIAL

## 2023-07-31 ENCOUNTER — OFFICE VISIT (OUTPATIENT)
Dept: SPEECH THERAPY | Facility: CLINIC | Age: 68
End: 2023-07-31
Payer: COMMERCIAL

## 2023-07-31 DIAGNOSIS — R47.1 DYSARTHRIA: Primary | ICD-10-CM

## 2023-07-31 DIAGNOSIS — Z86.73 HISTORY OF STROKE: ICD-10-CM

## 2023-07-31 DIAGNOSIS — G81.94 LEFT HEMIPARESIS (HCC): Primary | ICD-10-CM

## 2023-07-31 DIAGNOSIS — G44.52 NEW DAILY PERSISTENT HEADACHE: ICD-10-CM

## 2023-07-31 PROCEDURE — 97168 OT RE-EVAL EST PLAN CARE: CPT

## 2023-07-31 PROCEDURE — 97110 THERAPEUTIC EXERCISES: CPT

## 2023-07-31 PROCEDURE — 92507 TX SP LANG VOICE COMM INDIV: CPT

## 2023-07-31 NOTE — PROGRESS NOTES
OCCUPATIONAL THERAPY RE-EVALUATION    7/31/2023  Ulysses Formosa  1955  [id number]  Atkins Pillow*   Diagnosis ICD-10-CM Associated Orders   1. Left hemiparesis ([address])  G81.94       2. History of stroke  Z86.73             Assessment/Plan    Skilled Analysis:  Ulysses Formosa is a 70 y.o. male referred to Occupational Therapy s/p Left hemiparesis ([address]) [G81.94]. Pt participated in skilled OT Re-Eval to assess functional progress towards goals/POC. Pt reports using his electrical stimulation unit at home with placement to his forearm/digit extensors and a HEP at home. He reports using his L hand as a stabilizer when washing dishes and at work with pinching/pulling items as able. Pt has also been provided with resistive theraputty for HEP to improve pinching patterns/strength. In OT, pt has been engaging in massed practice of grasping/releasing with electrical stimulation, performing tasks in gravity eliminated plane, and massed practiced of using lateral pinch with tasks. Following formalized testing as well as clinical observation, Pt presents with the following areas of deficit:  Bicep and extrinsic forearm flexor hypertonicity, 1 fingerbreadth subluxation, decreased termination of flexors on command impacting release of objects or grasping objects, decreased volitional grasp, ROM, and overall GMC skills impacting ADLs, IADLs, and salient/leisure tasks. At this time, pt will be placed on a 30 day hold 2* going on vacation. OTR recommended pt to continue HEP and functional movements at home. Pt to return on 9/11/23 for re-evaluation. Ulysses Formosa is in agreement with POC. Todays treatment:    Pt completed flexbar exercises using green level resistance bar with pt performing ulnar/radial deviations movements toward and away from pt, forearm pronation/supination movements 2 x10 each for improved distal strength, sustained grasp, and distal AROM.  Pt benefit from stabilizing bar with movements 2* tone/placement of hand. Pt tolerated 8 min x 2 (prograde/retrograde motions at 1.0 resist) seated on UBE with added NMES x2 leads to post delt/supraspinatus with 20 sec on time and 10 sec off time with focus on increasing proximal UE strength, endurance, sustained grasp on handle, act tolerance and ROM to inc indep and safety with ADL/IADL fxn and fxnl reaching tasks. Electrical stimulation tolerated on shoulder x 15 min.             GOALS    Motor Short Term Goals (6 weeks)  AROM/PROM  · Pt will improve L elbow extension to - 20* for improved functional reaching and movement patterns = PROGRESSING  · Pt will demo with decreased LUE shoulder sublux to 3/4 fingerbreadth through use of E-stim for increased proximal control, shoulder approximation, and UE strength = PROGRESSING  · Pt will increase LUE proximal shoulder strength to 3+/5 in supine sustaining minimal resistance to improve proximal stability/control   · Pt will demo good carryover of clinic and home tone reduction strategies for improved AROM initiation with functional reach with ADL fxn = GOAL MET (using e-stim, have tried cryotherapy)    Strength/Endurance  · Pt will increase ability to perform UBE x 5 min with sustained grasp on handle w/o losing  to improve endurance and strength = GOAL MET (in retrograde)  · Pt will demo with G carryover of HEP to improve functional progression towards goals in POC = CONTINUE  · Pt will demo with G tolerance to supine, seated, and in stance exercise x 25 minutes with minimal rest breaks required for increased engagement in life roles and weekly exercise regimen = PROGRESSING    Functional Performance  · Pt will demo improved motor learning of constructional and new motor actions for improved B/L coordination and motor planning evident by ability to utilize LUE as a gross stabilizer (gross grasp) with opening containers, bottles.  = CONTINUE  · Pt will improve ability to reach for a door knob and rotate 1/2 motion with a pull to improve functional use of LUE = CONTINUE  · Pt will demo G understanding of a sock aide for improving ability to don socks with MIN A = CONTINUE    Modalities  · Pt will tolerate BIONESS/NMES/IASTM and vibration for improved motor and sensory performance for overall improved strength, tone reduction, and sensation to inc safety and engagement with ADL/IADL fxn = GOAL MET         Motor Long Term Goals (12 weeks)  AROM/PROM  · Pt will demo with decreased LUE shoulder sublux to 1/2 fingerbreadth through use of E-stim for increased proximal control, shoulder approximation, and UE strength  · Pt will increase LUE proximal shoulder strength to 4-/5 in supine sustaining minimal resistance to improve proximal stability/control   · Pt will improve L elbow extension to - 10* for improved functional reaching and movement patterns     Strength/Endurance  · Pt will increase ability to perform UBE x 10 min with sustained grasp on handle w/o losing  to improve endurance and strength  · Pt will demo with G carryover of HEP to improve functional progression towards goals in POC  · Pt will demo with G tolerance to supine, seated, and in stance exercise x 40 minutes with minimal rest breaks required for increased engagement in life roles     Functional Performance  · Pt will demo improved motor learning of constructional and new motor actions for improved B/L coordination and motor planning evident by ability to utilize LUE as a semifunctional stabilizer (hand assisting with fastenings, gross grasp with slight release, able to push/pull)   · Pt will improve ability to reach for a door knob and rotate 3/4 motion with a pull to improve functional use of LUE     Modalities  · Pt will tolerate BIONESS/NMES/IASTM and vibration for improved motor and sensory performance for overall improved strength, tone reduction, and sensation to inc safety and engagement with ADL/IADL fxn          Subjective    Patient Goal: "Ulitimately be able to use my hand again. At least get it to where it was. I was doing well until I stopped."    Patient-Specific Functional Scale   Task is scored 0 (unable to perform activity) to 10 (ability to perform activity independently)    Activity Date:  4/19/2023 Date:  7/31/2023    1. Drive with 2 hands   0     2. Open a door and turn it   0    3. Stabilizing objects (receipts, check book)   3    4. Holding a bottle   0    5. Taking dog for walk and holding leash in L hand   5    6. Drying back      7. Putting socks on          HISTORY OF PRESENT ILLNESS:     Pt, Teodora Wasserman, is a 70 y.o. male who was referred to Occupational Therapy s/p  Left hemiparesis ([address] [facility]) [G81.94]  Pt familiar with OP OT services. Pts PMH: Initial presentation to [facility] on 10/20/2018 due to left-sided weakness and facial droop.  He also had a speech impairment and was found to have an acute stroke. The patient received tPA at 11:00 p.m. on the day of admission after neurology assessment. He had loop recorder placed at time of admission for CVA. during his hospital stay he was dx with a small amount of petechial hemorrhage noted on MRI. CT scan showed positive results for small infarctions within the right frontal lobe and right temporal occipital junction, Pt discharged to [facility] from 10/25/18 - 11/21/18. Pt had recent MRI Spine on 2/9/23: " 1. ACDF C5-C7. 2. Multilevel degenerative disc disease with mild central canal narrowing at the C3-4 and C4-5 levels. 3. The cervical cord appears normal in caliber and signal with no evidence of focal impingement."      MRI Brain on 2/9/23: "1. No acute infarction, edema, or mass effect. 2. Chronic right frontotemporal infarct with encephalomalacia and surrounding gliosis extending along the right cerebral peduncle and brainstem.   3. Focal area of gliosis within the posterior right occipital lobe, probable sequela of prior ischemic injury."      Had BOTOX recently, is due in ~6 weeks. Wears his dynasplint (extension splint) a few times a week. He reports difficulty stabilizing his L hand to open a jar or medications. He reports having his daughters open his medication bottles and then he has to leave them open. Can hold his dogs leash with L hand then has to switch. Had 2 falls this year, lost balance both times when turning and walking to bathroom. Roni Long resides with his daughter/son in law in an in-law suite. Pt is performing ADLs independently but takes a while. Says he was never a big cooker, his daughter usually cooks or orders in.  Grocery shops but tired after. Pt works for [employer], works 3-4x/week or works from home.          PMH:   Past Medical History:   Diagnosis Date   • Arthritis     in hands   • Cervical herniated disc    • Colon polyp    • Diabetes mellitus ([address])     borderline   • Gout     hx of in 2018, none since   • Hyperlipidemia    • Hypertension    • Irregular heart beat     a-fib, has loop recorder   • Kidney stone 2021    hx of   • Migraine    • S/P Botox injection     in left wrist to help increase mobility   • Sleep apnea     mild sleep apnea, cpap was not ordered   • Stroke ([address]) 2018    left hemiparesis, wears leg brace and uses cane, unable to use left arm and hand   • Use of cane as ambulatory aid     had CVA in 2018       Pain Levels   Restin    With Activity:  3 - R knee pain from fall       Objective    Impairment Observations:    NETTIE MORALEZ Comments           UPPER EXTREMITY FUNCTION   Intact Impaired Dominant Hand: RIGHT     /PINCH STRENGTH             Dynamometer    - Gross Grasp 44 lbs 30 lbs abnormal   L: increased 5 lbs  (increased total of 15 lbs since start of OT)   Pinch Meter     - Pincer 13.5 lbs 5 lbs abnormal   Compensated with placement   L: increased 2.5 lbs       - Tripod 13 lbs 9 lbs abnormal   L: increased 4 lbs  Compensated with placement       - Lateral 13 lbs  10 lbs abnormal   L: increased 2 lb        AROM (Seated)         Scapular winging     Elevation Full 3/4     Shoulder FF Full  105*  Abducted, elbow flexed    Shoulder Ext  0*      Shoulder Abd  95*     Horizontal Abd  Neutral     Horizontal Add  3/4     Elbow Flex  80*  L shoulder abducted    Elbow Ext  -40*      Pronation  Full     Supination  Neutral      Wrist Flex  50*     Wrist Ext  Unable      Digit Flex  3/4 motion     Digit Ex  Unable  Unable to terminate flexors to extend digits    Composite Grasp  3/4      Hook Grasp  Unable     Subluxation   1 fingerbreadth(middle)       AROM (Supine)             Shoulder FF   150*  Decreased control    Shoulder Ext      Shoulder ABD      Shoulder ADD      Horizontal ABD      Horizontal ADD      Elbow Flex      Elbow Ext   Able to achieve full motion in supine (PROM)   Pronation      Supination      Wrist Flex      Wrist Ext      Digit Flex      Digit Ext        MMT             Shoulder FF 5/5 2/5    Shoulder Ext 5/5 1/5    Shoulder Abduction 5/5 2/5    Shoulder Adduction 5/5 2/5    Elbow Flex 5/5 2/5    Elbow Ext 5/5 3/5    Wrist Flex 5/5 3/5    Wrist Ext 5/5 2/5      COORDINATION      Opposition  With isolation of thumb, D1-D2.  Lateral pinch to D3 DIP    Finger to Nose Intact  Impaired     TONE: MODIFIED HUNTER SCALE      No increase in muscle tone (0)      Slight Increase in muscle tone with catch and release or min resist at end range (1)      Slight Increase in muscle tone with catch and release, followed by min resistance through remainder of range (1+)  Elbow flexors (biceps)    Wrist forearm flexors     Increased muscle tone through full range, able to be moved easily (2)      Considerable increase in tone, difficult to move (3)      Rigid in Flexion/Extension (4)              OTHER PLANNED THERAPY INTERVENTIONS:   Supine, seated, and in stance neuro re-ed  Task-Oriented Training  Tricep AG  NMES/FES  Cryotherapy for tone reduction  GMC  Proximal to distal teaming  Manual tx  IASTM  Hand to target  Seated functional reach: crossing midline  Supine place and hold  WBearing strategies   Closed chain activities  Open chain activities  Mirror Therapy  HEP      INTERVENTION COMMENTS:  Diagnosis: Left hemiparesis ([address]) [G81.94]  Precautions: fall risk, A-fib  FOTO: n/a  Insurance: Payor: [address] [place] [address] / Plan: [id number] / Product Type: Blue Fee for Service /   15 of __ visits, PN due 7/19/2023  POC Expiration/Re-eval date: 7/19/2023

## 2023-09-04 NOTE — PROGRESS NOTES
Speech-Language Pathology Re-Evaluation/***    Today's date: 2023 ***  Patient’s name: Curt Mendoza  : 1955  MRN: [id number]  Safety measures: h/o CVA, Afib, fall risk  Referring provider: Marichuy Brody*    No diagnosis found. Visit tracking:  -Referring provider: Epic  -Billing guidelines: CMS  -Visit #***/   -Insurance: [employer] (60 vpcy-not combined, $50 co-pay)  -RE due ***    Subjective comments: ***    Patient's goal(s): "to speak better"    Assessments    No formal testing was completed on this date of service.  The following serves as a diagnostic treatment session and progress update:    Treatment    -***  Treatment ***UPDATE***    IOPI PEAK MEASUREMENT WEEKLY GRID       2023      Tongue tip  (Goal = 56) 62 61 DNT (goal met)      Tongue back  (Goal = 50) DNT (goal met) DNT (goal met) DNT (goal met)      Right Lip  (Goal = 35) 66 DNT (goal met) DNT (goal met)      Left Lip  (Goal = 35) 36 30             IOPI -- Today's Exercise Targets     Tongue tip  (Goal = 56) DNT (goal met)     Tongue back  (Goal = 50) DNT (goal met)     Right Lip  (Goal = 35) DNT (goal met)     Left Lip  (Goal = 35) Peak Max after 3 trials: 30 Effort level: 85% Target for treatment: 2023      Tongue tip  3 sets of 30 reps (pumps)     3 sets of 30 reps (3 sec holds) 3 sets of 30 reps (pumps)     3 sets of 30 reps (3 sec holds) DNT (goal met)      Tongue back DNT (goal met) DNT (goal met) DNT (goal met)      Right Lip DNT (goal met) DNT (goal met) DNT (goal met)      Left Lip 3 sets of 30 reps (pumps)     3 sets of 30 reps (3 sec holds) 3 sets of 30 reps (pumps)     3 sets of 30 reps (3 sec holds) 6 sets of 30 reps (pumps)     6 sets of 30 reps (3 sec holds)        Goals      Short-term goals: ***  Patient  will be educated on speech intelligibility strategies (e.g., over-exaggeration, slow rate, breath groups,, etc.) to promote increased communication success (to be achieved in 2-3 weeks). -- MET    Patient will perform oral motor exercises using IOPI device on lingual and labial muscles to facilitate improved strength and function for increased speech intelligibility, to be achieved in 4-6 weeks. -- PARTIALLY MET/ONGOING    Patient will perform oral motor and diadochokinetic speech rate exercises with > 90% accuracy to facilitate increased speech intelligibility, to be achieved in 4-6 weeks. -- PARTIALLY MET/ONGOING     Patient will utilize compensatory strategies (I.e., over-articulation, decreased rate, etc) 80% of the time while orally reading sentence/paragraph length material to facilitate increased speech intelligibility, to be achieved in 4-6 weeks. -- PARTIALLY MET/ONGOING     Patient will utilize speech intelligibility strategies 80% of the time with min verbal cues while reading words, sentences, and paragraph length material to facilitate increased generalization of skills into conversational speech (to be achieved in 4-6 weeks). -- PARTIALLY MET/ONGOING    Long-term goals: ***  Patient will demonstrate improved functional and intelligible speech with the use of adequate labial and lingual function, increased articulatory precision, and speech prosody by discharge. -- PARTIALLY MET/ONGOING     Patient will independently utilize speech intelligibility strategies (e.g., over-exaggeration, slow rate, breath groups, etc.) during oral reading tasks >90% intelligibility to facilitate increased generalization of skills into conversational speech by discharge. -- PARTIALLY MET/ONGOING     Patient will demonstrate independent implementation of compensatory strategies in conversation to improve speech intelligibility by discharge. -- PARTIALLY MET/ONGOING     Patient will develop functional and intelligible speech to promote positive communication interractions with the use of speech intelligibility strategies (to be achieved by discharge).  -- PARTIALLY MET/ONGOING    Impressions/Recommendations    Impressions:   -***    ***OLD: Patient presents with mild dysarthria c/b imprecise articulation and slow rate of speech. Patient with deficits with lip alternation, lingual protrusion/elevation/lateralization/alternation, poor secretion management, and reduced intelligibility of speech at the conversational speaking level. Patient's speech intelligibility is judged to be 90% by this clinician. Distortions with the /s/ and /z/ sounds are noted with decreased precise articulation and slower rate. Patient was administered the FDA-2 again on this date of service to compare his performance to his initial evaluation testing on 04/24/2023. Patient demonstrated improvements in the abovementioned areas. Patient reported that family and colleagues have noticed an improvement in his speech clarity since beginning OP ST services targeting dysarthria. Patient would benefit from continued outpatient skilled Speech Therapy services to facilitate positive communication interactions with both familiar and unfamiliar listeners, practice with speech intelligibility strategies in formal/informal speaking tasks, facilitate overall improved quality of life, and reduce caregiver burden.      Recommendations: ***  -Patient would benefit from outpatient skilled Speech Therapy services: Speech-language therapy     -Frequency: 1x weekly   -Duration: 6-8 weeks    -Intervention certification from: 5/7/4739  -Intervention certification to: 72/68/4498    -Intervention comments: ***  -Motor speech re-evaluation with patient (20 minutes)  -Speech treatment, including discussion about recommendations, treatment (see above), and HEP review (30 minutes)

## 2023-09-10 NOTE — PROGRESS NOTES
Speech-Language Pathology Re-Evaluation/Discharge    Today's date: 2023  Patient’s name: Elizabet Pacheco  : 1955  MRN: [id number]  Safety measures: h/o CVA, Afib, fall risk  Referring provider: Imelda Turcios*    Encounter Diagnosis     ICD-10-CM    1. Dysarthria  R47.1       2. History of stroke  Z86.73       3. New daily persistent headache  G44.52         Visit tracking:  -Referring provider: Epic  -Billing guidelines: CMS  -Visit #   -Insurance: [employer] (60 vpcy-not combined, $50 co-pay)    Subjective comments: Patient presented to today's session with report that his tongue has increased deviation to the R side with protrusion as compared to his last session (2023). Patient stated that he became aware of the increased deviation to the R side with protrusion a few weeks ago. No other reported concerns. Patient reported that his symptoms of dysarthria have remained unchanged, as well as no increased symptoms of dysphagia or changes with upper or lower extremities. Patient's goal(s): "to speak better"    Assessments     IOPI PEAK MEASUREMENT (Pre-treatment vs. Today's measurements)      2023    Tongue tip  (Goal = 56) 51 62 GOAL MET   Tongue back  (Goal = 50) 58 69 GOAL MET   Right Lip  (Goal = 35) 29 65 GOAL MET   Left Lip  (Goal = 35) 28 63 GOAL MET     Treatment    -Clinician reviewed each of the IOPI exercises (tongue tip & back, right & left lips). Patient was instructed to continue with practice for his independent HEP. Reciprocal comprehension verbally expressed. Patient was able to demonstrate each exercise accurately x30 reps during today's session. Patient benefited from having a supplemental handout to increase his comprehension of where to place the IOPI bulb. Patient has a copy of this visual in his HEP. -Patient was able to verbalize strategies of slowing his rate, as well as overexaggeration of speech sounds.  Clinician also recommended the strategy of speaking at a louder volume as another method to increase speech intelligibility. Patient is currently implementing strategies, especially when in Zoom work meetings, per report. Goals      Short-term goals:   Patient will be educated on speech intelligibility strategies (e.g., over-exaggeration, slow rate, breath groups,, etc.) to promote increased communication success (to be achieved in 2-3 weeks). -- MET    Patient will perform oral motor exercises using IOPI device on lingual and labial muscles to facilitate improved strength and function for increased speech intelligibility, to be achieved in 4-6 weeks. -- MET    Patient will perform oral motor and diadochokinetic speech rate exercises with > 90% accuracy to facilitate increased speech intelligibility, to be achieved in 4-6 weeks. -- MET     Patient will utilize compensatory strategies (I.e., over-articulation, decreased rate, etc) 80% of the time while orally reading sentence/paragraph length material to facilitate increased speech intelligibility, to be achieved in 4-6 weeks. -- MET     Patient will utilize speech intelligibility strategies 80% of the time with min verbal cues while reading words, sentences, and paragraph length material to facilitate increased generalization of skills into conversational speech (to be achieved in 4-6 weeks). -- MET    Long-term goals:  Patient will demonstrate improved functional and intelligible speech with the use of adequate labial and lingual function, increased articulatory precision, and speech prosody by discharge. -- MET    Patient will independently utilize speech intelligibility strategies (e.g., over-exaggeration, slow rate, breath groups, etc.) during oral reading tasks >90% intelligibility to facilitate increased generalization of skills into conversational speech by discharge.  -- MET     Patient will demonstrate independent implementation of compensatory strategies in conversation to improve speech intelligibility by discharge. -- MET    Patient will develop functional and intelligible speech to promote positive communication interractions with the use of speech intelligibility strategies (to be achieved by discharge). -- MET    Impressions/Recommendations    Impressions:   -Patient continues to present with mild dysarthria c/b imprecise articulation and slow rate of speech. Patient with deficits with lip alternation, lingual protrusion/elevation/lateralization/alternation, poor secretion management, and reduced intelligibility of speech at the conversational speaking level. Distortions continue with the /s/ and /z/ sounds. Patient's speech intelligibility is judged to be >90% by this clinician. Patient is independent with the implementation of compensatory strategies in conversation to improve his overall speech intelligibility. Patient has met all the goals in his OP ST POC. Patient demonstrated improvements in all areas of re-assessment with IOPI testing today. Patient's strength measured WFL for his tongue tip & back and right & left lips. Patient presented to today's session with report that his tongue had increased deviation to the R side with protrusion as compared to his last session (07/31/2023). At rest, it was at midline. Patient stated that he became aware of the increased deviation to the R side with protrusion a few weeks ago. No other reported concerns. Patient reported that his symptoms of dysarthria have remained unchanged, as well as no increased symptoms of dysphagia or changes with upper or lower extremities. Patient would benefit from following-up with his neurologist about this new symptom of increased lingual deviation to the R side with protrusion. Patient to be discharged to an independent Home Exercise Program as he has achieved all goals in his OP ST POC.     Recommendations:  -Follow-up with neurology about new symptom of increased lingual deviation to the R side with protrusion.    -Patient to be discharged from outpatient skilled Speech Therapy services: Patient achieved all goals in plan of care.  Patient to be discharged to an independent Home Exercise Program.    -Frequency: No treatment is warranted at this time.  -Duration: N/A

## 2023-09-11 ENCOUNTER — APPOINTMENT (OUTPATIENT)
Dept: SPEECH THERAPY | Facility: CLINIC | Age: 68
End: 2023-09-11
Payer: COMMERCIAL

## 2023-09-11 ENCOUNTER — APPOINTMENT (OUTPATIENT)
Dept: OCCUPATIONAL THERAPY | Facility: CLINIC | Age: 68
End: 2023-09-11
Payer: COMMERCIAL

## 2023-09-11 ENCOUNTER — APPOINTMENT (OUTPATIENT)
Dept: PHYSICAL THERAPY | Facility: CLINIC | Age: 68
End: 2023-09-11
Payer: COMMERCIAL

## 2023-09-15 ENCOUNTER — APPOINTMENT (OUTPATIENT)
Dept: PHYSICAL THERAPY | Facility: CLINIC | Age: 68
End: 2023-09-15
Payer: COMMERCIAL

## 2023-09-15 ENCOUNTER — APPOINTMENT (OUTPATIENT)
Dept: SPEECH THERAPY | Facility: CLINIC | Age: 68
End: 2023-09-15
Payer: COMMERCIAL

## 2023-09-18 ENCOUNTER — OFFICE VISIT (OUTPATIENT)
Dept: PHYSICAL THERAPY | Facility: CLINIC | Age: 68
End: 2023-09-18
Payer: COMMERCIAL

## 2023-09-18 ENCOUNTER — EVALUATION (OUTPATIENT)
Dept: OCCUPATIONAL THERAPY | Facility: CLINIC | Age: 68
End: 2023-09-18
Payer: COMMERCIAL

## 2023-09-18 ENCOUNTER — EVALUATION (OUTPATIENT)
Dept: SPEECH THERAPY | Facility: CLINIC | Age: 68
End: 2023-09-18
Payer: COMMERCIAL

## 2023-09-18 DIAGNOSIS — G44.52 NEW DAILY PERSISTENT HEADACHE: ICD-10-CM

## 2023-09-18 DIAGNOSIS — R47.1 DYSARTHRIA: Primary | ICD-10-CM

## 2023-09-18 DIAGNOSIS — Z86.73 HISTORY OF STROKE: ICD-10-CM

## 2023-09-18 DIAGNOSIS — G81.94 LEFT HEMIPARESIS (HCC): Primary | ICD-10-CM

## 2023-09-18 PROCEDURE — 97110 THERAPEUTIC EXERCISES: CPT

## 2023-09-18 PROCEDURE — 97168 OT RE-EVAL EST PLAN CARE: CPT

## 2023-09-18 PROCEDURE — 97164 PT RE-EVAL EST PLAN CARE: CPT | Performed by: PHYSICAL THERAPIST

## 2023-09-18 PROCEDURE — 92507 TX SP LANG VOICE COMM INDIV: CPT | Performed by: SPEECH-LANGUAGE PATHOLOGIST

## 2023-09-18 NOTE — PROGRESS NOTES
Re-Evaluation Note     Today's date: 2023  Patient name: Whit Lechuga  : 1955  MRN: [id number]  Referring provider: Samantha Larsen MD  Dx: No diagnosis found. Subjective: Would like to continue with physical therapy but feels his balance and walking have been pretty good. He has been very ctive and walking a lot. Objective: See treatment diary below    Functional Outcomes 23  6 mwt: 1250 ft - no AFO  Gait speed: 1.25 m/s  5x sts: 6.75  ABC: 56%  FGA:    Functional outcomes: 23  5x sts: 7.16 sec  6 mwt: 1350 ft  - with AFO  Gait speed: 1.4 m/s        Functional outcomes  6 minute walk test:  975 ft   Gait speed: 1.1 m/s  5x sit to stand: 10.59 (seconds)           Functional Outcomes: 10/24/22  6 mwt: 1174 ft no LLE AFO  Gait Speed: 1.24 m/s  5x sts: 7.28 seconds  FGA:       Functional Outcomes: 22  6 mwt: 1175 no AFO  Giat speed: 1.23 m/s no AFO  FGA:    Assessment: At the time of last re-evaluatoin patient was unable to complete any further appointments and never attended another session. At this time he completed a re-evaluation maintaining his level of gains that he had made at the last session in regards to balance, endurance and overall walking speed. At this time patient will be discharged from skilled physical therapy and will re-examine for his maintained progress in five months. New Goals:  Pt will complete 6 mwt in 1500 ft within 4 weeks  Pt will have giat speed of 1.5 m/s within 4 weeks  Pt will be completing HEP daily within 4 weeks    Plan: Continue per plan of care.       Precautions: fall risk    STG/POC end: 23    Manuals                                                        Neuro Re-Ed             treadmill 10 min at 2.3 mph 10% incline 10 min at 2.3 mph 10% incline     7% incline 2.3 mph R UE 10 min at 7% incline at 2.3 mph with RUE support  10 min at 10% incline at 1.9 mph with NETTIE support   hurdles airex pad between 7.5# aw 4 laps fwd, 4 laps lat      6" hurdles with aw 5# L LE - carrying tidal tank - 5 lapsfwd  Solo step In SOLO: 6 inch hurdles with 5# AW on LLE x4 laps; carrying tidal tank x4 laps     Obstacle course             Step up overs        In SOLO with 5# AW on LLE:  4 laps stepping up/over 4 boxes 4 inch to 8 inch    4 laps as above while holding 3kg medball    4 laps as above while holding tidal tank  6" steps with airex; 8" step with airex fwd solo step   Slip  40x in solo step alteranting fwd/bkwd            Second step up             [address] [facility] Under mat - 8 laps solo step      3x without color patterns solo step    5x with color pattern cog challenge solo step   5x solo step no patterns    3 x solo step with cog challenge   Ther Ex                                                                                                                     Ther Activity                                       Gait Training                    Box step up and overs 5# aw L LE lead                   Modalities

## 2023-09-18 NOTE — PROGRESS NOTES
OCCUPATIONAL THERAPY RE-EVALUATION    9/18/2023  Mika Mercer  1955  [id number]  Go [place]*   Diagnosis ICD-10-CM Associated Orders   1. Left hemiparesis ([address])  G81.94             Assessment/Plan    Skilled Analysis:  Mika Mercer is a 70 y.o. male referred to Occupational Therapy s/p Left hemiparesis ([address]) [G81.94]. Pt participated in skilled OT Re-Eval since last seen in office 1.5 months ago. Pt reports he is engaging in strengthening tasks at home using the flexbar and functional electrical stimulation for decreasing tone and strengthening extensors. Following formalized testing as well as clinical observation, Pt presents with the following areas of deficit:  Bicep and extrinsic forearm flexor hypertonicity, 1 fingerbreadth subluxation, decreased termination of flexors on command impacting release of objects or grasping objects, decreased volitional grasp, ROM, and overall GMC skills impacting ADLs, IADLs, and salient/leisure tasks. Pt will benefit from skilled Occupational Therapy services 1x/week for 8 weeks with focus on Neuro Re-ed, Thera Act, Thera Ex, Self Care, and Manual Trx to improve functional use of LUE. Mika Mercer is in agreement with POC.       Todays treatment:  Pt tolerated 5 min x 2 (prograde/retrograde motions at 1.0 resist) seated on UBE for improved grasp on handle, proximal strength, and mobility.             GOALS    Motor Short Term Goals (6 weeks)  AROM/PROM  · Pt will improve L elbow extension to - 20* for improved functional reaching and movement patterns = PROGRESSING  · Pt will demo with decreased LUE shoulder sublux to 3/4 fingerbreadth through use of E-stim for increased proximal control, shoulder approximation, and UE strength = PROGRESSING  · Pt will increase LUE proximal shoulder strength to 3+/5 in supine sustaining minimal resistance to improve proximal stability/control   · Pt will demo good carryover of clinic and home tone reduction strategies for improved AROM initiation with functional reach with ADL fxn = GOAL MET (using e-stim, have tried cryotherapy)    Strength/Endurance  · Pt will increase ability to perform UBE x 5 min with sustained grasp on handle w/o losing  to improve endurance and strength = GOAL MET (in retrograde)  · Pt will demo with G carryover of HEP to improve functional progression towards goals in POC = CONTINUE  · Pt will demo with G tolerance to supine, seated, and in stance exercise x 25 minutes with minimal rest breaks required for increased engagement in life roles and weekly exercise regimen = PROGRESSING    Functional Performance  · Pt will demo improved motor learning of constructional and new motor actions for improved B/L coordination and motor planning evident by ability to utilize LUE as a gross stabilizer (gross grasp) with opening containers, bottles.  = CONTINUE  · Pt will improve ability to reach for a door knob and rotate 1/2 motion with a pull to improve functional use of LUE = CONTINUE  · Pt will demo G understanding of a sock aide for improving ability to don socks with MIN A = CONTINUE    Modalities  · Pt will tolerate BIONESS/NMES/IASTM and vibration for improved motor and sensory performance for overall improved strength, tone reduction, and sensation to inc safety and engagement with ADL/IADL fxn = GOAL MET         Motor Long Term Goals (12 weeks)  AROM/PROM  · Pt will demo with decreased LUE shoulder sublux to 1/2 fingerbreadth through use of E-stim for increased proximal control, shoulder approximation, and UE strength  · Pt will increase LUE proximal shoulder strength to 4-/5 in supine sustaining minimal resistance to improve proximal stability/control   · Pt will improve L elbow extension to - 10* for improved functional reaching and movement patterns     Strength/Endurance  · Pt will increase ability to perform UBE x 10 min with sustained grasp on handle w/o losing  to improve endurance and strength  · Pt will demo with G carryover of HEP to improve functional progression towards goals in POC  · Pt will demo with G tolerance to supine, seated, and in stance exercise x 40 minutes with minimal rest breaks required for increased engagement in life roles     Functional Performance  · Pt will demo improved motor learning of constructional and new motor actions for improved B/L coordination and motor planning evident by ability to utilize LUE as a semifunctional stabilizer (hand assisting with fastenings, gross grasp with slight release, able to push/pull)   · Pt will improve ability to reach for a door knob and rotate 3/4 motion with a pull to improve functional use of LUE     Modalities  · Pt will tolerate BIONESS/NMES/IASTM and vibration for improved motor and sensory performance for overall improved strength, tone reduction, and sensation to inc safety and engagement with ADL/IADL fxn          Subjective    Patient Goal: "Control my hand a little more, use my hand If I can."    Patient-Specific Functional Scale   Task is scored 0 (unable to perform activity) to 10 (ability to perform activity independently)    Activity Date:  9/18/2023 Date:   1. Drive with 2 hands   0    2. Open a door and turn it   0    3. Stabilizing objects (receipts, check book)   3    4. Holding a bottle   0    5. Taking dog for walk and holding leash in L hand   5    6. Drying back      7. Putting socks on          HISTORY OF PRESENT ILLNESS:     Pt, Rosalina Christopher, is a 70 y.o. male who was referred to Occupational Therapy s/p  Left hemiparesis ([address] [facility]) [G81.94]  Pt familiar with OP OT services. Pts PMH: Initial presentation to [facility] on 10/20/2018 due to left-sided weakness and facial droop.  He also had a speech impairment and was found to have an acute stroke.  The patient received tPA at 11:00 p.m. on the day of admission after neurology assessment. He had loop recorder placed at time of admission for CVA. during his hospital stay he was dx with a small amount of petechial hemorrhage noted on MRI. CT scan showed positive results for small infarctions within the right frontal lobe and right temporal occipital junction, Pt discharged to [facility] from 10/25/18 - 11/21/18. Pt had recent MRI Spine on 2/9/23: " 1. ACDF C5-C7. 2. Multilevel degenerative disc disease with mild central canal narrowing at the C3-4 and C4-5 levels. 3. The cervical cord appears normal in caliber and signal with no evidence of focal impingement."      MRI Brain on 2/9/23: "1. No acute infarction, edema, or mass effect. 2. Chronic right frontotemporal infarct with encephalomalacia and surrounding gliosis extending along the right cerebral peduncle and brainstem. 3. Focal area of gliosis within the posterior right occipital lobe, probable sequela of prior ischemic injury."      Had BOTOX recently, is due in ~6 weeks. Wears his dynasplint (extension splint) a few times a week. He reports difficulty stabilizing his L hand to open a jar or medications. He reports having his daughters open his medication bottles and then he has to leave them open. Can hold his dogs leash with L hand then has to switch. Had 2 falls this year, lost balance both times when turning and walking to bathroom. [place] with his daughter/son in law in an in-law suite. Pt is performing ADLs independently but takes a while. Says he was never a big cooker, his daughter usually cooks or orders in.  Grocery shops but tired after. Pt works for [employer], works 3-4x/week or works from home.          PMH:   Past Medical History:   Diagnosis Date   • Arthritis     in hands   • Cervical herniated disc    • Colon polyp    • Diabetes mellitus ([address])     borderline   • Gout     hx of in 2018, none since   • Hyperlipidemia    • Hypertension    • Irregular heart beat     a-fib, has loop recorder   • Kidney stone 09/13/2021    hx of   • Migraine    • S/P Botox injection     in left wrist to help increase mobility   • Sleep apnea     mild sleep apnea, cpap was not ordered   • Stroke ([address]) 2018    left hemiparesis, wears leg brace and uses cane, unable to use left arm and hand   • Use of cane as ambulatory aid     had CVA in 2018       Pain Levels   Restin    With Activity:  3 - R knee pain from fall       Objective    Impairment Observations:    NETTIE MORALEZ Comments           UPPER EXTREMITY FUNCTION   Intact Impaired Dominant Hand: RIGHT     /PINCH STRENGTH             Dynamometer    - Gross Grasp 35 lbs 30 lbs abnormal   L: increased 5 lbs   Pinch Meter     - Pincer 13.5 lbs 5 lbs abnormal   L: increased   Compensated with placement     - Tripod 13 lbs 3 lbs abnormal   L: decreased   Compensated with placement     - Lateral 13 lbs  10 lbs abnormal   R: increased 2 lb      AROM (Seated)         Scapular winging     Elevation Full 3/4     Shoulder FF Full  110*  Abducted, elbow flexed    Shoulder Ext  0*      Shoulder Abd  95*  Remained    Horizontal Abd  Neutral     Horizontal Add  3/4     Elbow Flex  80*  L shoulder abducted    Elbow Ext  -40*      Pronation  Full     Supination  Neutral      Wrist Flex  50*     Wrist Ext  Unable      Digit Flex  3/4 motion     Digit Ex  Unable  Unable to terminate flexors to extend digits    Composite Grasp  3/4      Hook Grasp  Unable     Subluxation   1 fingerbreadth(middle)       AROM (Supine)             Shoulder FF   150*  Decreased control    Shoulder Ext      Shoulder ABD      Shoulder ADD      Horizontal ABD      Horizontal ADD      Elbow Flex      Elbow Ext   Able to achieve full motion in supine (PROM)   Pronation      Supination      Wrist Flex      Wrist Ext      Digit Flex      Digit Ext        MMT             Shoulder FF 5/5 2/5    Shoulder Ext 5/5 1/5    Shoulder Abduction 5/5 2/5    Shoulder Adduction 5/5 2/5    Elbow Flex 5/5 2/5    Elbow Ext 5/5 3/5    Wrist Flex 5/5 3/5    Wrist Ext 5/5 2/5      COORDINATION      Opposition  With isolation of thumb, D1-D2.  Lateral pinch to D3 DIP    Finger to Nose Intact  Impaired     TONE: MODIFIED HUNTER SCALE      No increase in muscle tone (0)      Slight Increase in muscle tone with catch and release or min resist at end range (1)      Slight Increase in muscle tone with catch and release, followed by min resistance through remainder of range (1+)  Elbow flexors (biceps)    Wrist forearm flexors     Increased muscle tone through full range, able to be moved easily (2)      Considerable increase in tone, difficult to move (3)      Rigid in Flexion/Extension (4)              OTHER PLANNED THERAPY INTERVENTIONS:   Supine, seated, and in stance neuro re-ed  Task-Oriented Training  Tricep AG  NMES/FES  Cryotherapy for tone reduction  GMC  Proximal to distal teaming  Manual tx  IASTM  Hand to target  Seated functional reach: crossing midline  Supine place and hold  WBearing strategies   Closed chain activities  Open chain activities  Mirror Therapy  HEP      INTERVENTION COMMENTS:  Diagnosis: Left hemiparesis ([address]) [G81.94]  Precautions: fall risk, A-fib  FOTO: n/a  Insurance: Payor: [address] [place] [address] / Plan: [id number] / Product Type: Blue Fee for Service /   14 of 61 vpbp, PN due 10/18/23  POC Expiration/Re-eval date: 11/18/23

## 2023-09-27 ENCOUNTER — OFFICE VISIT (OUTPATIENT)
Dept: OCCUPATIONAL THERAPY | Facility: CLINIC | Age: 68
End: 2023-09-27
Payer: COMMERCIAL

## 2023-09-27 DIAGNOSIS — G81.94 LEFT HEMIPARESIS (HCC): Primary | ICD-10-CM

## 2023-09-27 PROCEDURE — 97112 NEUROMUSCULAR REEDUCATION: CPT

## 2023-09-27 PROCEDURE — 97110 THERAPEUTIC EXERCISES: CPT

## 2023-09-27 NOTE — PROGRESS NOTES
Occupational Therapy Daily Note:    Today's date: 2023  Patient name: Janette Haney  : 1955  MRN: [id number]  Referring provider: Jannie Mobley*  Dx:   Encounter Diagnosis   Name Primary? • Left hemiparesis (HCC) Yes        Subjective: "It's feeling good this morning!"    Objective: Pt engaged in skilled OT treatment session with focus on NMRE and tone reduction improving ROM and skin/joint integrity. CPT Code Minutes                                           Task Details        Therapeutic Activity               Neuro Re-Ed  Facilitated grasp/release of L hand lateral pinch with use of gravity to assist with digit extension and isolation of L arm. Started with grasping pegs from peg board at low surface (on mat surface) and transferred to container on R side for improved FMC of digits, LUE coordination and distal control. Therapeutic Exercise   Pt tolerated 5 min x 2 in prograde/retrograde motions at 1.4 resist while seated on UBE with focus on increasing proximal LUE strength, endurance, act tolerance and ROM to inc indep and safety with ADL/IADL fxn and fxnl reaching tasks.      In supine, facilitated LUE unilateral strengthening using a 1.5 lb wrist weight with pt performing   - chest press (with stabilization at wrist)  - supine flexion (stabilization at elbow/wrist)  Facilitated seated exercise with the above movements using a blue resistive theraband into a scap pull/retraction and shoulder flexion. Concluded session with pt performing functional mobility in clinic while holding a bucket of heavy bean bags, starting with a low amount of weight and progressing up to a heavier weight, facilitating a hook grasp for improved ability to carry objects in L hand.   Trial 1: 40 sec  Trial 2: 50 sec (req assist near end of trial)   Trial 3: picking up in squatting motion b/l hands then carrying in L for 1 min 23 sec   Trial 4: picking up in squatting motion b/l hands then carrying in L for 44 sec                  Manual          Self Care        Assessment: Tolerated treatment well. Patient would benefit from continued skilled OT. Pt tolerated UBE demands well with increase to 1.4 today with RPMs ranging above 25 and with abilities to sustain endurance/movement. OT started pt with a 1 lb DB then downgraded to a wrist weight 2* tone and causing significant flexion of wrist, downgraded to limit this and promote more isolation of shoulder movement. Pt demo dec retraction movement performing ~1/2 of shoulder extension pull and some compensation at trunk present. Pt able to carry weighted bucket x1 trial without assist and with low weight, although needed standby assist to sustain grasp on bucket with other 3 trials having more weight applied 2* weakness.        Plan: Continued skilled OT per POC    INTERVENTION COMMENTS:  Diagnosis: Left hemiparesis ([address]) [G81.94]  Precautions: fall risk, A-fib  FOTO: n/a  Insurance: Payor: Kelsy Boyle / [facility] / Product Type: Blue Fee for Service /   15 of 61 vpbp, PN due 10/18/23  POC Expiration/Re-eval date: 11/18/23

## 2023-10-05 ENCOUNTER — OFFICE VISIT (OUTPATIENT)
Dept: OCCUPATIONAL THERAPY | Facility: CLINIC | Age: 68
End: 2023-10-05
Payer: COMMERCIAL

## 2023-10-05 DIAGNOSIS — G81.94 LEFT HEMIPARESIS (HCC): Primary | ICD-10-CM

## 2023-10-05 PROCEDURE — 97110 THERAPEUTIC EXERCISES: CPT

## 2023-10-05 PROCEDURE — 97140 MANUAL THERAPY 1/> REGIONS: CPT

## 2023-10-05 PROCEDURE — 97112 NEUROMUSCULAR REEDUCATION: CPT

## 2023-10-05 NOTE — PROGRESS NOTES
Occupational Therapy Daily Note:    Today's date: 10/5/2023  Patient name: Whit Lechuga  : 1955  MRN: [id number]  Referring provider: Minoo Robbins  Dx:   Encounter Diagnosis   Name Primary? • Left hemiparesis (HCC) Yes      Subjective: "Let's try it." (1.5 resistance on UBE)    Objective: Pt engaged in skilled OT treatment session with focus on NMRE, UE proximal strengthening, and tone reduction improving ROM and skin/joint integrity. CPT Code Minutes                                           Task Details        Therapeutic Activity               Neuro Re-Ed 20 Patient participated in dynamic motor activity in stance with focus on neuro re-ed, crossing midline, LUE coordination, distal control, and grasp/release of L hand. Patient grasped a total of 10 bean bags from ipsilateral side (on mat surface) and transferred to bucket on contralateral side utilizing a lateral pinch. Patient then retrieved bean bags from contralateral side and returned to bucket positioned ipsilaterally. Patient completed activity x 2 with 10% droppage, requiring support from RUE at elbow of LUE to improve distal control when grasping objects. Therapeutic Exercise  15 For therapeutic exercise benefit, pt tolerated 5 min x 2 prograde/retrograde UBE at 1.5 resistance for neuro motor re-ed, GMC/GMS, proximal UE strength/endurance, & ROM to increase independence and safety with ADL/IADL function and functional reaching tasks. 15 Facilitated LUE unilateral strengthening using a 1.5 lb wrist weight while completing 3 sets x 10 reps of chest press and supine flexion with stabilization provided at elbow and wrist.        Manual 10 Provided PROM with prolonged stretch to forearm, wrist, and digits to facilitate wrist and digit extension and promote pain reduction. Improvement noted in digit/wrist flexibility. Self Care        Assessment: Tolerated treatment well.  Patient tolerated UBE demands well with increase to 1.5 today with RPMs ranging above 35 and with abilities to sustain endurance/movement. Patient initially demonstrated difficulty releasing bean bags due to increased tone, however pt demonstrated improvement when cued to incorporate deep breathing, resulting in reduced muscle tone and an increased rate of release. Patient would benefit from continued skilled OT.     Plan: Continued skilled OT per POC    INTERVENTION COMMENTS:  Diagnosis: Left hemiparesis ([address]) [G81.94]  Precautions: fall risk, A-fib  FOTO: n/a  Insurance: Payor: [employer] / Plan: [id number] / Product Type: Blue Fee for Service /   16 of 61 vpbp, PN due 10/18/23  POC Expiration/Re-eval date: 11/18/23

## 2023-10-11 ENCOUNTER — OFFICE VISIT (OUTPATIENT)
Dept: OCCUPATIONAL THERAPY | Facility: CLINIC | Age: 68
End: 2023-10-11
Payer: COMMERCIAL

## 2023-10-11 DIAGNOSIS — G81.94 LEFT HEMIPARESIS (HCC): Primary | ICD-10-CM

## 2023-10-11 PROCEDURE — 97112 NEUROMUSCULAR REEDUCATION: CPT

## 2023-10-11 PROCEDURE — 97110 THERAPEUTIC EXERCISES: CPT

## 2023-10-11 NOTE — PROGRESS NOTES
Occupational Therapy Daily Note:    Today's date: 10/11/2023  Patient name: Carlynn Meigs  : 1955  MRN: [id number]  Referring provider: Ha Katz*  Dx:   Encounter Diagnosis   Name Primary? Left hemiparesis (HCC) Yes        Subjective: "That feels good! It's almost like I forgot how to do the movements."    Objective: Pt engaged in skilled OT treatment session with focus on NMRE, UE proximal strengthening, and tone reduction improving ROM and skin/joint integrity. CPT Code Minutes                                           Task Details        Therapeutic Activity               Neuro Re-Ed  In prone on mat, facilitated LUE reaching into shoulder extension for tricep/lat dorsi engagement with 2.5 lb wrist weight with placement of cards into L lateral pinch of hand > flexing forward to release card from hand with IP opening for improved gross coordination/unilateral movement. Pt performed repetitive reps ~15x with weight then removed weight. Pt engaged in modified PNF pattern movement of LUE performing L trunk rotation with LUE reach for boards (foam boards, peg boards, plastic boards) with pt then performing adduction movement to obtain at side, bring forward and grasp with a lateral pinch (other UE assisting with movement), then bringing forward and releasing from hand. Concluded with pt performing functional sit to stands with placing a foam board in lateral pinch of hand. Pt then performed functional mobility with lateral pinch holding of foam board with LUE at side for improved ability to hold objects with at home tasks. Therapeutic Exercise   For therapeutic exercise benefit, pt tolerated 5 min x 2 prograde/retrograde UBE at 1.5 resistance for neuro motor re-ed, GMC/GMS, proximal UE strength/endurance, & LUE ROM to increase independence and safety with ADL/IADL function and functional reaching tasks.      In supine, pt performed LUE strengthening with 2.5 lb wrist weight applied with pt performing 2 x15 of tricep extensions then bicep curl to elbow extension for improved elbow extension strength/mobility and ROM. Manual          Self Care        Assessment: Tolerated treatment well. Patient would benefit from continued skilled OT. Pt tolerated UBE demands well with no loss of  throughout x 10 min. Pt performed tricep extensions with blocking/guarding with elbow extension/flexion 2* tone and movement into abduction pattern. Pt also req assist with stabilizing elbow into tricep extensions 2* proximal weakness. Pt able to perform Fair movement of trunk rotation and reaching with ataxia present and cues needed to extend LUE. Pt demo improved ability to extend IP of thumb when performing continuous sit to stand movements although with functional mobility, pt needing increased attention/focus to LUE 2* tone, learned non-use, and decreased use of L hand.      Plan: Continued skilled OT per POC    INTERVENTION COMMENTS:  Diagnosis: Left hemiparesis ([address]) [G81.94]  Precautions: fall risk, A-fib  FOTO: n/a  Insurance: Payor: Declan Paredes / Plan: [id number] / Product Type: Blue Fee for Service /   17 of 61 vpbp, PN due 10/18/23  POC Expiration/Re-eval date: 11/18/23

## 2023-10-18 ENCOUNTER — APPOINTMENT (OUTPATIENT)
Dept: OCCUPATIONAL THERAPY | Facility: CLINIC | Age: 68
End: 2023-10-18
Payer: COMMERCIAL

## 2023-10-20 ENCOUNTER — APPOINTMENT (OUTPATIENT)
Dept: OCCUPATIONAL THERAPY | Facility: CLINIC | Age: 68
End: 2023-10-20
Payer: COMMERCIAL

## 2023-10-25 ENCOUNTER — OFFICE VISIT (OUTPATIENT)
Dept: OCCUPATIONAL THERAPY | Facility: CLINIC | Age: 68
End: 2023-10-25
Payer: COMMERCIAL

## 2023-10-25 DIAGNOSIS — G81.94 LEFT HEMIPARESIS (HCC): Primary | ICD-10-CM

## 2023-10-25 PROCEDURE — 97110 THERAPEUTIC EXERCISES: CPT

## 2023-10-25 PROCEDURE — 97535 SELF CARE MNGMENT TRAINING: CPT

## 2023-10-25 NOTE — PROGRESS NOTES
OCCUPATIONAL THERAPY RE-EVALUATION    10/25/2023  Ton Keene  1955  [id number]  Josephine Black*   Diagnosis ICD-10-CM Associated Orders   1. Left hemiparesis ([address])  G81.94               Assessment/Plan    Skilled Analysis:  Ton Keene is a 70 y.o. male referred to Occupational Therapy s/p Left hemiparesis ([address]) [G81.94]. Pt participated in skilled OT PN since starting OT, having 3 sessions. Pt is wearing his RHS at night a few times a week. Pt reports he is engaging in strengthening tasks at home using the flexbar and functional electrical stimulation for decreasing tone and strengthening extensors. Pt continues to present with the following areas of deficit:  Bicep and extrinsic forearm flexor hypertonicity, 1 fingerbreadth subluxation, decreased termination of flexors on command impacting release of objects or grasping objects, decreased volitional grasp, ROM, and overall GMC skills impacting ADLs, IADLs, and salient/leisure tasks. Pt will benefit from skilled Occupational Therapy services 1x/week for 8 weeks with focus on Neuro Re-ed, Thera Act, Thera Ex, Self Care, and Manual Trx to improve functional use of LUE. Ton Keene is in agreement with POC. Todays treatment:  There ex:  Pt tolerated 5.5 min x 2 (prograde/retrograde motions at 1.0 resist) seated on UBE for improved grasp on handle, proximal strength, and mobility. Self care; Donning/doffing socks at edge of mat several times focusing on using a lateral pinch to hold socks and don over foot. Assisted pt with grasping on L foot 2* decreased ability to sustain grasp with movement. Pt able to don sock on R foot without assist x 2. Self care: Simulated drying backside with a towel focusing on LUE functional movement using a lateral pinch and hold on a towel. Pt having most difficulty with obtaining an IR movement up/down backside.     There ex:  Pt then completed shoulder IR and ER movements with arm adducted at side focusing on increasing AROM of LUE. Completed isometric holds of LUE with OT assisting and using wall. Self care: Concluded with simulated grasping of a door handle using full grasp then a lateral thumb pinch and pulling open - pt able to do the movement and pull with increased time and effort, using body to assist with pulling motion. GOALS    Motor Short Term Goals (6 weeks)  AROM/PROM  Pt will improve L elbow extension to - 20* for improved functional reaching and movement patterns = PROGRESSING  Pt will demo with decreased LUE shoulder sublux to 3/4 fingerbreadth through use of E-stim for increased proximal control, shoulder approximation, and UE strength = PROGRESSING  Pt will increase LUE proximal shoulder strength to 3+/5 in supine sustaining minimal resistance to improve proximal stability/control = PROGRESSING  Pt will demo good carryover of clinic and home tone reduction strategies for improved AROM initiation with functional reach with ADL fxn = GOAL MET (using e-stim, have tried cryotherapy)    Strength/Endurance  Pt will increase ability to perform UBE x 5 min with sustained grasp on handle w/o losing  to improve endurance and strength = GOAL MET (in retrograde/prograde)  Pt will demo with G carryover of HEP to improve functional progression towards goals in POC = CONTINUE  Pt will demo with G tolerance to supine, seated, and in stance exercise x 25 minutes with minimal rest breaks required for increased engagement in life roles and weekly exercise regimen = PROGRESSING    Functional Performance  Pt will demo improved motor learning of constructional and new motor actions for improved B/L coordination and motor planning evident by ability to utilize LUE as a gross stabilizer (gross grasp) with opening containers, bottles.  = CONTINUE, progressing with functional tasks   Pt will improve ability to reach for a door knob and rotate 1/2 motion with a pull to improve functional use of LUE = CONTINUE  Pt will demo G understanding of a sock aide for improving ability to don socks with MIN A = CONTINUE    Modalities  Pt will tolerate BIONESS/NMES/IASTM and vibration for improved motor and sensory performance for overall improved strength, tone reduction, and sensation to inc safety and engagement with ADL/IADL fxn = GOAL MET         Motor Long Term Goals (12 weeks)  AROM/PROM  Pt will demo with decreased LUE shoulder sublux to 1/2 fingerbreadth through use of E-stim for increased proximal control, shoulder approximation, and UE strength  Pt will increase LUE proximal shoulder strength to 4-/5 in supine sustaining minimal resistance to improve proximal stability/control   Pt will improve L elbow extension to - 10* for improved functional reaching and movement patterns     Strength/Endurance  Pt will increase ability to perform UBE x 10 min with sustained grasp on handle w/o losing  to improve endurance and strength = GOAL MET  Pt will demo with G carryover of HEP to improve functional progression towards goals in POC  Pt will demo with G tolerance to supine, seated, and in stance exercise x 40 minutes with minimal rest breaks required for increased engagement in life roles     Functional Performance  Pt will demo improved motor learning of constructional and new motor actions for improved B/L coordination and motor planning evident by ability to utilize LUE as a semifunctional stabilizer (hand assisting with fastenings, gross grasp with slight release, able to push/pull)   Pt will improve ability to reach for a door knob and rotate 3/4 motion with a pull to improve functional use of LUE     Modalities  Pt will tolerate BIONESS/NMES/IASTM and vibration for improved motor and sensory performance for overall improved strength, tone reduction, and sensation to inc safety and engagement with ADL/IADL fxn          Subjective    Patient Goal: "Control my hand a little more, use my hand If I can."    Patient-Specific Functional Scale   Task is scored 0 (unable to perform activity) to 10 (ability to perform activity independently)    Activity Date:  9/18/2023 Date:  10/25/2023   Drive with 2 hands   0    2. Open a door and turn it   0    3. Stabilizing objects (receipts, check book)   3    4. Holding a bottle   0    5. Taking dog for walk and holding leash in L hand   5    6. Drying back      7. Putting socks on          HISTORY OF PRESENT ILLNESS:     Pt, Mahogany Burks, is a 70 y.o. male who was referred to Occupational Therapy s/p  Left hemiparesis ([address]) [G81.94]  Pt familiar with OP OT services. Pts PMH: Initial presentation to [address] [place] [address] Drive on 10/20/2018 due to left-sided weakness and facial droop. He also had a speech impairment and was found to have an acute stroke. The patient received tPA at 11:00 p.m. on the day of admission after neurology assessment. He had loop recorder placed at time of admission for CVA. during his hospital stay he was dx with a small amount of petechial hemorrhage noted on MRI. CT scan showed positive results for small infarctions within the right frontal lobe and right temporal occipital junction, Pt discharged to [facility] from 10/25/18 - 11/21/18. Pt had recent MRI Spine on 2/9/23: " 1. ACDF C5-C7. 2. Multilevel degenerative disc disease with mild central canal narrowing at the C3-4 and C4-5 levels. 3. The cervical cord appears normal in caliber and signal with no evidence of focal impingement."      MRI Brain on 2/9/23: "1. No acute infarction, edema, or mass effect. 2. Chronic right frontotemporal infarct with encephalomalacia and surrounding gliosis extending along the right cerebral peduncle and brainstem. 3. Focal area of gliosis within the posterior right occipital lobe, probable sequela of prior ischemic injury."      Had BOTOX recently, is due in ~6 weeks. Wears his dynasplint (extension splint) a few times a week.   He reports difficulty stabilizing his L hand to open a jar or medications. He reports having his daughters open his medication bottles and then he has to leave them open. Can hold his dogs leash with L hand then has to switch. Had 2 falls this year, lost balance both times when turning and walking to bathroom. Aniyah Sanchez resides with his daughter/son in law in an in-law suite. Pt is performing ADLs independently but takes a while. Says he was never a big cooker, his daughter usually cooks or orders in.  Grocery shops but tired after. Pt works for [employer], works 3-4x/week or works from home.          PMH:   Past Medical History:   Diagnosis Date    Arthritis     in hands    Cervical herniated disc     Colon polyp     Diabetes mellitus ([address])     borderline    Gout     hx of in 2018, none since    Hyperlipidemia     Hypertension     Irregular heart beat     a-fib, has loop recorder    Kidney stone 2021    hx of    Migraine     S/P Botox injection     in left wrist to help increase mobility    Sleep apnea     mild sleep apnea, cpap was not ordered    Stroke ([address]) 2018    left hemiparesis, wears leg brace and uses cane, unable to use left arm and hand    Use of cane as ambulatory aid     had CVA in 2018       Pain Levels   Restin    With Activity:  3 - R knee pain from fall       Objective    Impairment Observations:    NETTIE MORALEZ Comments           UPPER EXTREMITY FUNCTION   Intact Impaired Dominant Hand: RIGHT     /PINCH STRENGTH             Dynamometer    - Gross Grasp 35 lbs 30 lbs abnormal   L: increased 5 lbs   Pinch Meter     - Pincer 13.5 lbs 5 lbs abnormal   L: increased   Compensated with placement     - Tripod 13 lbs 3 lbs abnormal   L: decreased   Compensated with placement     - Lateral 13 lbs  10 lbs abnormal   R: increased 2 lb      AROM (Seated)         Scapular winging     Elevation Full 3/4     Shoulder FF Full  110*  Abducted, elbow flexed    Shoulder Ext  0*      Shoulder Abd  95*  Remained    Horizontal Abd  Neutral Horizontal Add  3/4     Elbow Flex  80*  L shoulder abducted    Elbow Ext  -40*      Pronation  Full     Supination  Neutral      Wrist Flex  50*     Wrist Ext  Unable      Digit Flex  3/4 motion     Digit Ex  Unable  Unable to terminate flexors to extend digits    Composite Grasp  3/4      Hook Grasp  Unable     Subluxation   1 fingerbreadth(middle)       AROM (Supine)             Shoulder FF   150*  Decreased control    Shoulder Ext      Shoulder ABD      Shoulder ADD      Horizontal ABD      Horizontal ADD      Elbow Flex      Elbow Ext   Able to achieve full motion in supine (PROM)   Pronation      Supination      Wrist Flex      Wrist Ext      Digit Flex      Digit Ext        MMT             Shoulder FF 5/5 2/5    Shoulder Ext 5/5 1/5    Shoulder Abduction 5/5 2/5    Shoulder Adduction 5/5 2/5    Elbow Flex 5/5 2/5    Elbow Ext 5/5 3/5    Wrist Flex 5/5 3/5    Wrist Ext 5/5 2/5      COORDINATION      Opposition  With isolation of thumb, D1-D2.  Lateral pinch to D3 DIP    Finger to Nose Intact  Impaired     TONE: MODIFIED HUNTER SCALE      No increase in muscle tone (0)      Slight Increase in muscle tone with catch and release or min resist at end range (1)      Slight Increase in muscle tone with catch and release, followed by min resistance through remainder of range (1+)  Elbow flexors (biceps)    Wrist forearm flexors     Increased muscle tone through full range, able to be moved easily (2)      Considerable increase in tone, difficult to move (3)      Rigid in Flexion/Extension (4)              OTHER PLANNED THERAPY INTERVENTIONS:   Supine, seated, and in stance neuro re-ed  Task-Oriented Training  Tricep AG  NMES/FES  Cryotherapy for tone reduction  GMC  Proximal to distal teaming  Manual tx  IASTM  Hand to target  Seated functional reach: crossing midline  Supine place and hold  WBearing strategies   Closed chain activities  Open chain activities  Mirror Therapy  HEP       INTERVENTION COMMENTS:  Diagnosis: Left hemiparesis ([address] ) [G81.94]  Precautions: fall risk, A-fib  FOTO: n/a  Insurance: Payor: [address] [place] [address] / Plan: HAM / Product Type: Blue Fee for Service /   18 of 60 vpbp, PN due 11/18/23  POC Expiration/Re-eval date: 11/18/23

## 2023-11-01 ENCOUNTER — OFFICE VISIT (OUTPATIENT)
Dept: OCCUPATIONAL THERAPY | Facility: CLINIC | Age: 68
End: 2023-11-01
Payer: COMMERCIAL

## 2023-11-01 DIAGNOSIS — G81.94 LEFT HEMIPARESIS (HCC): Primary | ICD-10-CM

## 2023-11-01 PROCEDURE — 97110 THERAPEUTIC EXERCISES: CPT

## 2023-11-01 PROCEDURE — 97530 THERAPEUTIC ACTIVITIES: CPT

## 2023-11-01 PROCEDURE — 97112 NEUROMUSCULAR REEDUCATION: CPT

## 2023-11-01 NOTE — PROGRESS NOTES
Occupational Therapy Daily Note:    Today's date: 2023  Patient name: Tawny Fajardo  : 1955  MRN: [id number]  Referring provider: Yoon Wong*  Dx:   Encounter Diagnosis   Name Primary? Left hemiparesis (HCC) Yes          Subjective: "That was good, the rope was different than my dogs rope so I really had to hold on."     Objective: Pt engaged in skilled OT treatment session with focus on NMRE, UE proximal strengthening, and tone reduction improving ROM and skin/joint integrity. CPT Code Minutes                                           Task Details        Therapeutic Activity  Concluded session with pt performing fnxl mobility in / around clinic and then outside clinic while walking [facility] (dog) with an added 2 lb wrist weight to L hand and sustaining a hold in L hand gross grasp for improved grasp, sustained holds, fnxl mobility with LUE motor movement. Neuro Re-Ed  In stance, facilitated functional reaching of LUE and unilateral coordination with pt placing a marble, flat laminated card, and a round dowel in lateral pinch of L thumb then reaching forward ~60-70* to release from thumb for active opening of MCP/IP joint. Pt performed repetitively for LUE function. Therapeutic Exercise   For therapeutic exercise benefit, pt tolerated 5 min x 2 prograde/retrograde UBE at 1.0 resistance for neuro motor re-ed, GMC/GMS, proximal UE strength/endurance, & LUE ROM to increase independence and safety with ADL/IADL function and functional reaching tasks. Manual          Self Care        Assessment: Tolerated treatment well. Patient would benefit from continued skilled OT. Pt tolerated UBE demands well with loss of  x1 in prograde motion throughout x 10 min. Pt performed reaching forward well with ataxia at end range and moderate difficulty with sustaining forward hold 2* ataxia.   Pt able to sustain hold of leash in hand x 15 min while performing fnxl mobility with no loss of .      Plan: Continued skilled OT per POC    INTERVENTION COMMENTS:  Diagnosis: Left hemiparesis ([address]) [G81.94]  Precautions: fall risk, A-fib  FOTO: n/a  Insurance: Payor: Rocio Burgess / Plan: [id number] / Product Type: Blue Fee for Service /   19 of 60 vpbp, PN due 11/18/23  POC Expiration/Re-eval date: 11/18/23

## 2023-11-06 ENCOUNTER — OFFICE VISIT (OUTPATIENT)
Dept: OCCUPATIONAL THERAPY | Facility: CLINIC | Age: 68
End: 2023-11-06
Payer: COMMERCIAL

## 2023-11-06 DIAGNOSIS — G81.94 LEFT HEMIPARESIS (HCC): Primary | ICD-10-CM

## 2023-11-06 PROCEDURE — 97110 THERAPEUTIC EXERCISES: CPT

## 2023-11-06 PROCEDURE — 97530 THERAPEUTIC ACTIVITIES: CPT

## 2023-11-06 PROCEDURE — 97535 SELF CARE MNGMENT TRAINING: CPT

## 2023-11-06 NOTE — PROGRESS NOTES
Occupational Therapy Daily Note:    Today's date: 2023  Patient name: Herlinda Jules  : 1955  MRN: [id number]  Referring provider: Seymour Brown*  Dx:   Encounter Diagnosis   Name Primary? Left hemiparesis (HCC) Yes            Subjective: "I can do it now that I know what I am doing!"    Objective: Pt engaged in skilled OT treatment session with focus on NMRE, UE proximal strengthening, and tone reduction improving ROM and skin/joint integrity. CPT Code Minutes                                           Task Details        Therapeutic Activity  Engaged pt in timed trials of placement of object in L hand lateral thumb pinch and releasing into container focusing on active release of thumb with gravity assisting, pt in stance:    Trial 1:  2 min 24 sec   Trial 2:  2 min 21 sec   Trial 3:  2 min 13 sec     Concluded with using green resistive power web performing 3 x10 of lateral pinch of L thumb for strengthening of movement and providing proprioceptive input into opening phase of thumb. Neuro Re-Ed                 Therapeutic Exercise   For therapeutic exercise benefit, pt tolerated 5 min x 2 prograde/retrograde UBE at 1.5 resistance for neuro motor re-ed, GMC/GMS, proximal UE strength/endurance, & LUE ROM to increase independence and safety with ADL/IADL function and functional reaching tasks. In stance, pt performed LUE strengthening using pink resistive theraband in a palm  of hand   - 2 x 10 of lat pull backs  - 2 x 10 of scap retractions  - 2 x 10 of tricep extensions              Manual          Self Care  Facilitated functional movements of LUE of grasping a towel and transferring behind back with L hand or R hand, grasping and moving around body.       Timed Trials engaging in donning/doffing of simulated pants with BUE coordination and using L hand to stabilize/assist.   Trial 1: 22 sec to don and 29 sec to doff   Trial 2: 21 sec to don and 23 sec to doff Trial 3: 18 sec to don and 21 sec to doff           Assessment: Tolerated treatment well. Patient would benefit from continued skilled OT. Pt tolerated UBE demands well with loss of  x3 in prograde motion and benefit from downgrading to 1.0 resist with OT stabilized at wrist 2* tone. Pt demo limited shoulder IR and attempted a swinging motion of LUE to reach behind back, performing only 1/4 of movement. Pt able to perform lateral pinch opening x 1 and unable too after repetitive movement 2* tone. Pt demo increased fatigue of L thumb with repetitive trials of grasp/release although able to improve timed trial score each attempt.         Plan: Continued skilled OT per POC    INTERVENTION COMMENTS:  Diagnosis: Left hemiparesis ([address]) [G81.94]  Precautions: fall risk, A-fib  FOTO: n/a  Insurance: Payor: Luzma Hughes / Plan: [id number] / Product Type: Blue Fee for Service /   21 of 60 vpbp, PN due 11/18/23  POC Expiration/Re-eval date: 11/18/23

## 2023-11-08 ENCOUNTER — OFFICE VISIT (OUTPATIENT)
Dept: OCCUPATIONAL THERAPY | Facility: CLINIC | Age: 68
End: 2023-11-08
Payer: COMMERCIAL

## 2023-11-08 DIAGNOSIS — G81.94 LEFT HEMIPARESIS (HCC): Primary | ICD-10-CM

## 2023-11-08 PROCEDURE — 97530 THERAPEUTIC ACTIVITIES: CPT

## 2023-11-08 PROCEDURE — 97110 THERAPEUTIC EXERCISES: CPT

## 2023-11-08 NOTE — PROGRESS NOTES
Occupational Therapy Daily Note:    Today's date: 2023  Patient name: Greta Strickland  : 1955  MRN: [id number]  Referring provider: Kuldeep Lozano  Dx:   Encounter Diagnosis   Name Primary? Left hemiparesis (HCC) Yes         Subjective: "It wasn't my thumb doing the work."    Objective: Pt engaged in skilled OT treatment session with focus on NMRE, UE proximal strengthening, and tone reduction improving ROM and skin/joint integrity. CPT Code Minutes                                           Task Details        Therapeutic Activity  Engaged pt in opening various containers, either using L hand lateral thumb/lateral D2 to assist with opening snap containers then opening spin cap containers with using L hand as a gross assister on bottle and opening with R hand. Pt then performed pinch of a quarter in L lateral thumb and placing to container slot x 6 then performed again just releasing into container for larger open space x 6. Neuro Re-Ed                 Therapeutic Exercise   For therapeutic exercise benefit, pt tolerated 5 min seated in retrograde then 5 min standing at UBE at 1.0 resistance for neuro motor re-ed, GMC/GMS, proximal UE strength/endurance, & LUE ROM to increase independence and safety with ADL/IADL function and functional reaching tasks. In seated, pt performed BUE strengthening using a blue 5lb dowel with neoprene wrap around L hand for stabilization with pt performing the following movements:  - shoulder flexion 15x   - pronated forearm/bicep curls 15x (break half way)  - chest press 15x with stabilization at forearm/wrist   - shoulder adduction movement 23x   - shoulder extension with bar / trunk rotation to L side 15x   - tricep extensions 15x              Manual          Self Care            Assessment: Tolerated treatment well. Patient would benefit from continued skilled OT.     Pt tolerated UBE demands in stance well with increased challenge reported, with ability to sustain RPMs at 40-45 range x5 min. Pt had droppage of > 75% of coins in L hand although able to sustain release of thumb IP repetitively.         Plan: Continued skilled OT per POC    INTERVENTION COMMENTS:  Diagnosis: Left hemiparesis ([address]) [G81.94]  Precautions: fall risk, A-fib  FOTO: n/a  Insurance: Payor: Jonas Rosario / Plan: [id number] / Product Type: Blue Fee for Service /   21 of 60 vpbp, PN due 11/18/23  POC Expiration/Re-eval date: 11/18/23

## 2023-11-13 ENCOUNTER — APPOINTMENT (OUTPATIENT)
Dept: OCCUPATIONAL THERAPY | Facility: CLINIC | Age: 68
End: 2023-11-13
Payer: COMMERCIAL

## 2023-11-15 ENCOUNTER — APPOINTMENT (OUTPATIENT)
Dept: OCCUPATIONAL THERAPY | Facility: CLINIC | Age: 68
End: 2023-11-15
Payer: COMMERCIAL

## 2023-11-20 ENCOUNTER — OFFICE VISIT (OUTPATIENT)
Dept: OCCUPATIONAL THERAPY | Facility: CLINIC | Age: 68
End: 2023-11-20
Payer: COMMERCIAL

## 2023-11-20 DIAGNOSIS — G81.94 LEFT HEMIPARESIS (HCC): Primary | ICD-10-CM

## 2023-11-20 PROCEDURE — 97110 THERAPEUTIC EXERCISES: CPT

## 2023-11-20 PROCEDURE — 97530 THERAPEUTIC ACTIVITIES: CPT

## 2023-11-20 NOTE — PROGRESS NOTES
OCCUPATIONAL THERAPY DISCHARGE    11/202023  Siobhan Robbins  1955  [id number]  Fatuma Fam*   Diagnosis ICD-10-CM Associated Orders   1. Left hemiparesis ([address])  G81.94               Assessment/Plan    Skilled Analysis:  Siobhan Robbins is a 70 y.o. male referred to Occupational Therapy s/p Left hemiparesis ([address]) [G81.94]. Pt participated in skilled OT re-eval. Pt reports that he is now holding containers in his L hand as a gross stabilizer (on PB container/marmalade). Pt also now opening his cologne using his L lateral pinch and opening some doors (pull-down / L-handle doors only). Pt is also performing strengthening tasks at home using the flexbar (red only). I instructed pt to use more resistive flexbars to challenge LUE/. He feels like his LUE "hangs" a lot when he walks and I recommended wearing the GivMohr sling to support his shoulder and to prevent swelling in his hand. He continues with the following areas of deficit: Bicep and extrinsic forearm flexor hypertonicity, 1 fingerbreadth subluxation, decreased termination of flexors on command impacting release of objects or grasping objects, decreased volitional grasp, and decreased AROM. At this time, pt is performing more functional tasks within his ADLs/IADLs at home and has made some gains in  strength/FMS. Recommending pt to f/u in 6 months to reassess for any functional changes at that time. Pt is in agreement with POC.             GOALS    Motor Short Term Goals (6 weeks)  AROM/PROM  Pt will improve L elbow extension to - 20* for improved functional reaching and movement patterns = PROGRESSING  Pt will demo with decreased LUE shoulder sublux to 3/4 fingerbreadth through use of E-stim for increased proximal control, shoulder approximation, and UE strength = PROGRESSING  Pt will increase LUE proximal shoulder strength to 3+/5 in supine sustaining minimal resistance to improve proximal stability/control = PROGRESSING  Pt will demo good carryover of clinic and home tone reduction strategies for improved AROM initiation with functional reach with ADL fxn = GOAL MET (using e-stim, have tried cryotherapy)    Strength/Endurance  Pt will increase ability to perform UBE x 5 min with sustained grasp on handle w/o losing  to improve endurance and strength = GOAL MET (in retrograde/prograde)  Pt will demo with G carryover of HEP to improve functional progression towards goals in POC = CONTINUE  Pt will demo with G tolerance to supine, seated, and in stance exercise x 25 minutes with minimal rest breaks required for increased engagement in life roles and weekly exercise regimen = PROGRESSING    Functional Performance  Pt will demo improved motor learning of constructional and new motor actions for improved B/L coordination and motor planning evident by ability to utilize LUE as a gross stabilizer (gross grasp) with opening containers, bottles.  = CONTINUE, progressing with functional tasks   Pt will improve ability to reach for a door knob and rotate 1/2 motion with a pull to improve functional use of LUE = CONTINUE  Pt will demo G understanding of a sock aide for improving ability to don socks with MIN A = CONTINUE    Modalities  Pt will tolerate BIONESS/NMES/IASTM and vibration for improved motor and sensory performance for overall improved strength, tone reduction, and sensation to inc safety and engagement with ADL/IADL fxn = GOAL MET         Motor Long Term Goals (12 weeks)  AROM/PROM  Pt will demo with decreased LUE shoulder sublux to 1/2 fingerbreadth through use of E-stim for increased proximal control, shoulder approximation, and UE strength  Pt will increase LUE proximal shoulder strength to 4-/5 in supine sustaining minimal resistance to improve proximal stability/control   Pt will improve L elbow extension to - 10* for improved functional reaching and movement patterns     Strength/Endurance  Pt will increase ability to perform UBE x 10 min with sustained grasp on handle w/o losing  to improve endurance and strength = GOAL MET  Pt will demo with G carryover of HEP to improve functional progression towards goals in POC = GOAL MET  Pt will demo with G tolerance to supine, seated, and in stance exercise x 40 minutes with minimal rest breaks required for increased engagement in life roles = PARTIALLY MET    Functional Performance  Pt will demo improved motor learning of constructional and new motor actions for improved B/L coordination and motor planning evident by ability to utilize LUE as a semifunctional stabilizer (hand assisting with fastenings, gross grasp with slight release, able to push/pull)  = PARTIALLY MET  Pt will improve ability to reach for a door knob and rotate 3/4 motion with a pull to improve functional use of LUE  = GOAL MET (L-bar handles only)    Modalities  Pt will tolerate BIONESS/NMES/IASTM and vibration for improved motor and sensory performance for overall improved strength, tone reduction, and sensation to inc safety and engagement with ADL/IADL fxn = MET          Subjective    Patient Goal: "Control my hand a little more, use my hand If I can."    Patient-Specific Functional Scale   Task is scored 0 (unable to perform activity) to 10 (ability to perform activity independently)    Activity Date:  9/18/2023 Date:  10/25/2023   Drive with 2 hands   0    2. Open a door and turn it   0    3. Stabilizing objects (receipts, check book)   3    4. Holding a bottle   0    5. Taking dog for walk and holding leash in L hand   5    6. Drying back      7. Putting socks on          HISTORY OF PRESENT ILLNESS:     Pt, Luis March, is a 70 y.o. male who was referred to Occupational Therapy s/p  Left hemiparesis ([address]) [G81.94]  Pt familiar with OP OT services. Pts PMH: Initial presentation to [address] [place] [address] on 10/20/2018 due to left-sided weakness and facial droop.   He also had a speech impairment and was found to have an acute stroke. The patient received tPA at 11:00 p.m. on the day of admission after neurology assessment. He had loop recorder placed at time of admission for CVA. during his hospital stay he was dx with a small amount of petechial hemorrhage noted on MRI. CT scan showed positive results for small infarctions within the right frontal lobe and right temporal occipital junction, Pt discharged to [facility] from 10/25/18 - 11/21/18. Pt had recent MRI Spine on 2/9/23: " 1. ACDF C5-C7. 2. Multilevel degenerative disc disease with mild central canal narrowing at the C3-4 and C4-5 levels. 3. The cervical cord appears normal in caliber and signal with no evidence of focal impingement."      MRI Brain on 2/9/23: "1. No acute infarction, edema, or mass effect. 2. Chronic right frontotemporal infarct with encephalomalacia and surrounding gliosis extending along the right cerebral peduncle and brainstem. 3. Focal area of gliosis within the posterior right occipital lobe, probable sequela of prior ischemic injury."      Had BOTOX recently, is due in ~6 weeks. Wears his dynasplint (extension splint) a few times a week. He reports difficulty stabilizing his L hand to open a jar or medications. He reports having his daughters open his medication bottles and then he has to leave them open. Can hold his dogs leash with L hand then has to switch. Had 2 falls this year, lost balance both times when turning and walking to bathroom. Joce Millan resides with his daughter/son in law in an in-law suite. Pt is performing ADLs independently but takes a while. Says he was never a big cooker, his daughter usually cooks or orders in.  Grocery shops but tired after. Pt works for [employer], works 3-4x/week or works from home.          PMH:   Past Medical History:   Diagnosis Date    Arthritis     in hands    Cervical herniated disc     Colon polyp     Diabetes mellitus ([address])     borderline    Gout     hx of in 2018, none since    Hyperlipidemia Hypertension     Irregular heart beat     a-fib, has loop recorder    Kidney stone 2021    hx of    Migraine     S/P Botox injection     in left wrist to help increase mobility    Sleep apnea     mild sleep apnea, cpap was not ordered    Stroke ([address]) 2018    left hemiparesis, wears leg brace and uses cane, unable to use left arm and hand    Use of cane as ambulatory aid     had CVA in 2018       Pain Levels   Restin    With Activity:  3 - R knee pain from fall       Objective    Impairment Observations:    NETTIE MORALEZ Comments           UPPER EXTREMITY FUNCTION   Intact Impaired Dominant Hand: RIGHT     /PINCH STRENGTH             Dynamometer    - Gross Grasp 68 lbs 34 lbs abnormal   L: increased 4 lbs   Pinch Meter     - Pincer 13.5 lbs 2 lbs abnormal   L: decreased since last PN, compensated with placement in L hand     - Tripod 13 lbs 6 lbs abnormal   L: Improved by 3 lbs      Compensated with placement in L hand       - Lateral 13 lbs  8 lbs abnormal   R: decreased 2 lb      AROM (Seated)         Scapular winging     Elevation Full 3/4     Shoulder FF Full  110*  Abducted, elbow flexed    Shoulder Ext  0*      Shoulder Abd  95*  Remained    Horizontal Abd  Neutral     Horizontal Add  3/4     Elbow Flex  80*  L shoulder abducted    Elbow Ext  -40*      Pronation  Full     Supination  Neutral      Wrist Flex  50*     Wrist Ext  Unable      Digit Flex  3/4 motion     Digit Ex  Unable  Unable to terminate flexors to extend digits    Composite Grasp  3/4      Hook Grasp  Unable     Subluxation   1 fingerbreadth(middle)       AROM (Supine)             Shoulder FF   150*  Decreased control    Shoulder Ext      Shoulder ABD      Shoulder ADD      Horizontal ABD      Horizontal ADD      Elbow Flex      Elbow Ext   Able to achieve full motion in supine (PROM)   Pronation      Supination      Wrist Flex      Wrist Ext      Digit Flex      Digit Ext        MMT             Shoulder FF 5/5 2/5    Shoulder Ext 5/5 1/5    Shoulder Abduction 5/5 2/5    Shoulder Adduction 5/5 2/5    Elbow Flex 5/5 2/5    Elbow Ext 5/5 3/5    Wrist Flex 5/5 3/5    Wrist Ext 5/5 2/5      COORDINATION      Opposition  With isolation of thumb, D1-D2. Lateral pinch to D3 DIP    Finger to Nose Intact  Impaired     TONE: MODIFIED HUNTER SCALE      No increase in muscle tone (0)      Slight Increase in muscle tone with catch and release or min resist at end range (1)      Slight Increase in muscle tone with catch and release, followed by min resistance through remainder of range (1+)  Elbow flexors (biceps)    Wrist forearm flexors     Increased muscle tone through full range, able to be moved easily (2)      Considerable increase in tone, difficult to move (3)      Rigid in Flexion/Extension (4)              OTHER PLANNED THERAPY INTERVENTIONS:   Supine, seated, and in stance neuro re-ed  Task-Oriented Training  Tricep AG  NMES/FES  Cryotherapy for tone reduction  GMC  Proximal to distal teaming  Manual tx  IASTM  Hand to target  Seated functional reach: crossing midline  Supine place and hold  WBearing strategies   Closed chain activities  Open chain activities  Mirror Therapy  HEP       CPT Code Minutes                                           Task Details        Therapeutic Activity               Neuro Re-Ed                 Therapeutic Exercise   For therapeutic exercise benefit, pt tolerated 5 min seated in retrograde then 5 min standing at UBE at 1.0 resistance for neuro motor re-ed, GMC/GMS, proximal UE strength/endurance, & LUE ROM to increase independence and safety with ADL/IADL function and functional reaching tasks.      Pt performed seated UB exercise using a green resistive band and 2.5 lb wrist weight on LUE performing:  - scap retraction  - neutral  curls   - resistive L shoulder adduction pulls  - resistive shoulder forward flexion   -              Manual          Self Care             INTERVENTION COMMENTS:  Diagnosis: Left hemiparesis ([address]) [G81.94]  Precautions: fall risk, A-fib  FOTO: n/a  Insurance: Payor: Carla Soto / Plan: TONNY / Product Type: Blue Fee for Service /   22 of 60 vpbp, PN due 11/18/23  OT DC

## 2023-11-22 ENCOUNTER — APPOINTMENT (OUTPATIENT)
Dept: OCCUPATIONAL THERAPY | Facility: CLINIC | Age: 68
End: 2023-11-22
Payer: COMMERCIAL

## 2024-06-10 DIAGNOSIS — G81.94 LEFT HEMIPARESIS (HCC): ICD-10-CM

## 2024-06-11 RX ORDER — METHOCARBAMOL 500 MG/1
TABLET, FILM COATED ORAL
Qty: 60 TABLET | Refills: 8 | Status: SHIPPED | OUTPATIENT
Start: 2024-06-11 | End: 2024-06-22 | Stop reason: ALTCHOICE

## 2024-06-22 ENCOUNTER — APPOINTMENT (EMERGENCY)
Dept: RADIOLOGY | Facility: HOSPITAL | Age: 69
End: 2024-06-22
Payer: COMMERCIAL

## 2024-06-22 ENCOUNTER — APPOINTMENT (EMERGENCY)
Dept: CT IMAGING | Facility: HOSPITAL | Age: 69
End: 2024-06-22
Payer: COMMERCIAL

## 2024-06-22 ENCOUNTER — HOSPITAL ENCOUNTER (OUTPATIENT)
Facility: HOSPITAL | Age: 69
Setting detail: OBSERVATION
Discharge: HOME | End: 2024-06-23
Attending: EMERGENCY MEDICINE | Admitting: EMERGENCY MEDICINE
Payer: COMMERCIAL

## 2024-06-22 DIAGNOSIS — R07.9 CHEST PAIN: ICD-10-CM

## 2024-06-22 DIAGNOSIS — G47.33 OSA (OBSTRUCTIVE SLEEP APNEA): ICD-10-CM

## 2024-06-22 DIAGNOSIS — R53.1 GENERALIZED WEAKNESS: ICD-10-CM

## 2024-06-22 DIAGNOSIS — R00.1 SINUS BRADYCARDIA: ICD-10-CM

## 2024-06-22 DIAGNOSIS — R00.1 BRADYCARDIA: Primary | ICD-10-CM

## 2024-06-22 PROBLEM — R53.83 FATIGUE: Status: ACTIVE | Noted: 2024-06-22

## 2024-06-22 LAB
2HR DELTA HS TROPONIN: 1 NG/L
4HR DELTA HS TROPONIN: 1 NG/L
ALBUMIN SERPL BCG-MCNC: 4.3 G/DL (ref 3.5–5)
ALP SERPL-CCNC: 47 U/L (ref 34–104)
ALT SERPL W P-5'-P-CCNC: 24 U/L (ref 7–52)
ANION GAP SERPL CALCULATED.3IONS-SCNC: 9 MMOL/L (ref 4–13)
AST SERPL W P-5'-P-CCNC: 21 U/L (ref 13–39)
BASOPHILS # BLD AUTO: 0.05 THOUSANDS/ÂΜL (ref 0–0.1)
BASOPHILS NFR BLD AUTO: 1 % (ref 0–1)
BILIRUB SERPL-MCNC: 1.03 MG/DL (ref 0.2–1)
BUN SERPL-MCNC: 14 MG/DL (ref 5–25)
CALCIUM SERPL-MCNC: 9.4 MG/DL (ref 8.4–10.2)
CARDIAC TROPONIN I PNL SERPL HS: 4 NG/L
CARDIAC TROPONIN I PNL SERPL HS: 5 NG/L
CARDIAC TROPONIN I PNL SERPL HS: 5 NG/L
CHLORIDE SERPL-SCNC: 103 MMOL/L (ref 96–108)
CO2 SERPL-SCNC: 29 MMOL/L (ref 21–32)
CREAT SERPL-MCNC: 0.91 MG/DL (ref 0.6–1.3)
EOSINOPHIL # BLD AUTO: 0.21 THOUSAND/ÂΜL (ref 0–0.61)
EOSINOPHIL NFR BLD AUTO: 3 % (ref 0–6)
ERYTHROCYTE [DISTWIDTH] IN BLOOD BY AUTOMATED COUNT: 15 % (ref 11.6–15.1)
FLUAV RNA RESP QL NAA+PROBE: NEGATIVE
FLUBV RNA RESP QL NAA+PROBE: NEGATIVE
GFR SERPL CREATININE-BSD FRML MDRD: 85 ML/MIN/1.73SQ M
GLUCOSE SERPL-MCNC: 76 MG/DL (ref 65–140)
HCT VFR BLD AUTO: 40.2 % (ref 36.5–49.3)
HGB BLD-MCNC: 13.2 G/DL (ref 12–17)
IMM GRANULOCYTES # BLD AUTO: 0.03 THOUSAND/UL (ref 0–0.2)
IMM GRANULOCYTES NFR BLD AUTO: 0 % (ref 0–2)
LYMPHOCYTES # BLD AUTO: 2.31 THOUSANDS/ÂΜL (ref 0.6–4.47)
LYMPHOCYTES NFR BLD AUTO: 27 % (ref 14–44)
MAGNESIUM SERPL-MCNC: 2 MG/DL (ref 1.9–2.7)
MCH RBC QN AUTO: 29.7 PG (ref 26.8–34.3)
MCHC RBC AUTO-ENTMCNC: 32.8 G/DL (ref 31.4–37.4)
MCV RBC AUTO: 91 FL (ref 82–98)
MONOCYTES # BLD AUTO: 0.89 THOUSAND/ÂΜL (ref 0.17–1.22)
MONOCYTES NFR BLD AUTO: 11 % (ref 4–12)
NEUTROPHILS # BLD AUTO: 5.01 THOUSANDS/ÂΜL (ref 1.85–7.62)
NEUTS SEG NFR BLD AUTO: 58 % (ref 43–75)
NRBC BLD AUTO-RTO: 0 /100 WBCS
PHOSPHATE SERPL-MCNC: 3 MG/DL (ref 2.3–4.1)
PLATELET # BLD AUTO: 217 THOUSANDS/UL (ref 149–390)
PMV BLD AUTO: 10.2 FL (ref 8.9–12.7)
POTASSIUM SERPL-SCNC: 3.7 MMOL/L (ref 3.5–5.3)
PROT SERPL-MCNC: 6.8 G/DL (ref 6.4–8.4)
RBC # BLD AUTO: 4.44 MILLION/UL (ref 3.88–5.62)
RSV RNA RESP QL NAA+PROBE: NEGATIVE
SARS-COV-2 RNA RESP QL NAA+PROBE: NEGATIVE
SODIUM SERPL-SCNC: 141 MMOL/L (ref 135–147)
TSH SERPL DL<=0.05 MIU/L-ACNC: 1.7 UIU/ML (ref 0.45–4.5)
WBC # BLD AUTO: 8.5 THOUSAND/UL (ref 4.31–10.16)

## 2024-06-22 PROCEDURE — 99285 EMERGENCY DEPT VISIT HI MDM: CPT

## 2024-06-22 PROCEDURE — 84443 ASSAY THYROID STIM HORMONE: CPT

## 2024-06-22 PROCEDURE — 84484 ASSAY OF TROPONIN QUANT: CPT | Performed by: INTERNAL MEDICINE

## 2024-06-22 PROCEDURE — 83735 ASSAY OF MAGNESIUM: CPT

## 2024-06-22 PROCEDURE — 70450 CT HEAD/BRAIN W/O DYE: CPT

## 2024-06-22 PROCEDURE — 84484 ASSAY OF TROPONIN QUANT: CPT

## 2024-06-22 PROCEDURE — 93005 ELECTROCARDIOGRAM TRACING: CPT

## 2024-06-22 PROCEDURE — 99285 EMERGENCY DEPT VISIT HI MDM: CPT | Performed by: EMERGENCY MEDICINE

## 2024-06-22 PROCEDURE — 99223 1ST HOSP IP/OBS HIGH 75: CPT | Performed by: INTERNAL MEDICINE

## 2024-06-22 PROCEDURE — 96365 THER/PROPH/DIAG IV INF INIT: CPT

## 2024-06-22 PROCEDURE — 85025 COMPLETE CBC W/AUTO DIFF WBC: CPT

## 2024-06-22 PROCEDURE — 0241U HB NFCT DS VIR RESP RNA 4 TRGT: CPT

## 2024-06-22 PROCEDURE — 80053 COMPREHEN METABOLIC PANEL: CPT

## 2024-06-22 PROCEDURE — 36415 COLL VENOUS BLD VENIPUNCTURE: CPT

## 2024-06-22 PROCEDURE — 84100 ASSAY OF PHOSPHORUS: CPT

## 2024-06-22 PROCEDURE — 71045 X-RAY EXAM CHEST 1 VIEW: CPT

## 2024-06-22 RX ORDER — FLUOXETINE HYDROCHLORIDE 20 MG/1
20 CAPSULE ORAL DAILY
Status: DISCONTINUED | OUTPATIENT
Start: 2024-06-23 | End: 2024-06-23 | Stop reason: HOSPADM

## 2024-06-22 RX ORDER — ASPIRIN 81 MG/1
324 TABLET, CHEWABLE ORAL ONCE
Status: COMPLETED | OUTPATIENT
Start: 2024-06-22 | End: 2024-06-22

## 2024-06-22 RX ORDER — AMLODIPINE BESYLATE 5 MG/1
5 TABLET ORAL DAILY
Status: DISCONTINUED | OUTPATIENT
Start: 2024-06-23 | End: 2024-06-23 | Stop reason: HOSPADM

## 2024-06-22 RX ORDER — ATROPINE SULFATE 1 MG/ML
0.4 INJECTION, SOLUTION INTRAVENOUS DAILY PRN
Status: DISCONTINUED | OUTPATIENT
Start: 2024-06-22 | End: 2024-06-23 | Stop reason: HOSPADM

## 2024-06-22 RX ORDER — ACETAMINOPHEN 325 MG/1
650 TABLET ORAL EVERY 6 HOURS PRN
Status: DISCONTINUED | OUTPATIENT
Start: 2024-06-22 | End: 2024-06-23 | Stop reason: HOSPADM

## 2024-06-22 RX ORDER — LOSARTAN POTASSIUM 50 MG/1
50 TABLET ORAL DAILY
Status: DISCONTINUED | OUTPATIENT
Start: 2024-06-23 | End: 2024-06-23 | Stop reason: HOSPADM

## 2024-06-22 RX ORDER — SODIUM CHLORIDE, SODIUM GLUCONATE, SODIUM ACETATE, POTASSIUM CHLORIDE, MAGNESIUM CHLORIDE, SODIUM PHOSPHATE, DIBASIC, AND POTASSIUM PHOSPHATE .53; .5; .37; .037; .03; .012; .00082 G/100ML; G/100ML; G/100ML; G/100ML; G/100ML; G/100ML; G/100ML
500 INJECTION, SOLUTION INTRAVENOUS ONCE
Status: COMPLETED | OUTPATIENT
Start: 2024-06-22 | End: 2024-06-22

## 2024-06-22 RX ORDER — SODIUM CHLORIDE 9 MG/ML
3 INJECTION INTRAVENOUS
Status: DISCONTINUED | OUTPATIENT
Start: 2024-06-22 | End: 2024-06-23 | Stop reason: HOSPADM

## 2024-06-22 RX ORDER — ATORVASTATIN CALCIUM 40 MG/1
80 TABLET, FILM COATED ORAL EVERY EVENING
Status: DISCONTINUED | OUTPATIENT
Start: 2024-06-22 | End: 2024-06-23 | Stop reason: HOSPADM

## 2024-06-22 RX ORDER — ONDANSETRON 2 MG/ML
4 INJECTION INTRAMUSCULAR; INTRAVENOUS EVERY 6 HOURS PRN
Status: DISCONTINUED | OUTPATIENT
Start: 2024-06-22 | End: 2024-06-23 | Stop reason: HOSPADM

## 2024-06-22 RX ORDER — MAGNESIUM HYDROXIDE/ALUMINUM HYDROXICE/SIMETHICONE 120; 1200; 1200 MG/30ML; MG/30ML; MG/30ML
30 SUSPENSION ORAL EVERY 6 HOURS PRN
Status: DISCONTINUED | OUTPATIENT
Start: 2024-06-22 | End: 2024-06-23 | Stop reason: HOSPADM

## 2024-06-22 RX ADMIN — ASPIRIN 81 MG 324 MG: 81 TABLET ORAL at 12:28

## 2024-06-22 RX ADMIN — SODIUM CHLORIDE, SODIUM GLUCONATE, SODIUM ACETATE, POTASSIUM CHLORIDE, MAGNESIUM CHLORIDE, SODIUM PHOSPHATE, DIBASIC, AND POTASSIUM PHOSPHATE 500 ML: .53; .5; .37; .037; .03; .012; .00082 INJECTION, SOLUTION INTRAVENOUS at 12:33

## 2024-06-22 RX ADMIN — APIXABAN 5 MG: 5 TABLET, FILM COATED ORAL at 17:17

## 2024-06-22 RX ADMIN — ATORVASTATIN CALCIUM 80 MG: 40 TABLET, FILM COATED ORAL at 17:17

## 2024-06-22 NOTE — ASSESSMENT & PLAN NOTE
Patient has residual left-sided weakness.  He utilizes a cane to walk.  He has slower speech but clear.  Continue statin, blood pressure control and Eliquis

## 2024-06-22 NOTE — H&P
[facility]  H&P  Name: Rakesh Appiah 69 y.o. male I MRN: [id number]  Unit/Bed#: ED-05 I Date of Admission: 6/22/2024   Date of Service: 6/22/2024 I Hospital Day: 0      Assessment & Plan   * Bradycardia  Assessment & Plan  Patient reports fatigue over the last few weeks.  He was to see his primary care physician who was concerned about bradycardia.  Review of his records he does often run lower in the 50s and 60s particular at visits and old EKGs.  In 2022 after his stroke he did have a Holter monitor which was revealed to be normal sinus rhythm in the 70s.  He was scheduled for follow-up with a cardiologist but started to feel continued fatigue including lightheadedness and dizziness when going from sitting to standing or bending over.  He also noticed occasional chest discomfort.  He denies any recent rashes or exposure to ticks  Hold all AV loida blockers   Continue telemetry  Cycle troponins  Consult cardiology for symptomatic bradycardia.  He may be a candidate for pacemaker.  Atropine as needed    Fatigue  Assessment & Plan  See plan for bradycardia.  He has no anemia, does not appear dehydrated.  He does not appear septic.    Paroxysmal atrial fibrillation (HCC)  Assessment & Plan  He is sinus rhythm on his EKG.  Continue Eliquis    Hyperlipidemia  Assessment & Plan  Continue statin    Hypertension  Assessment & Plan  Patient reports compliance with losartan and amlodipine.  Will continue these with parameters    History of stroke  Assessment & Plan  Patient has residual left-sided weakness.  He utilizes a cane to walk.  He has slower speech but clear.  Continue statin, blood pressure control and Eliquis         VTE Pharmacologic Prophylaxis:   Moderate Risk (Score 3-4) - Pharmacological DVT Prophylaxis Contraindicated. Sequential Compression Devices Ordered.  Code Status: Prior full code, Miroslava (older daughter) would be POA  Discussion with family: Patient declined call to contact  person.     Anticipated Length of Stay: Patient will be admitted on an observation basis with an anticipated length of stay of less than 2 midnights secondary to symptomatic bradycardia.    Total Time Spent on Date of Encounter in care of patient: 35 mins. This time was spent on one or more of the following: performing physical exam; counseling and coordination of care; obtaining or reviewing history; documenting in the medical record; reviewing/ordering tests, medications or procedures; communicating with other healthcare professionals and discussing with patient's family/caregivers.    Chief Complaint: fatigue x 2-3 weeks    History of Present Illness:  Rakesh Appiah is a 69 y.o. male with a PMH of stroke who presents with fatigue.  He has been feeling this way for a few weeks and went to his PCP. His PCP noted that he is bradycardic and he was referred to cardiology but it was months away. He notes since then he felt continued fatigue. He reports a chest heaviness at times that is on and off.  He notes a baseline dyspnea at times.  He does not notice chest discomfort or sob with ambulation.  He reports his chest discomfort is heartburn.  He notes that he has dizziness and lightheadedness when going from sitting to standing or bending over.  Occasionally feels like it is hard to catch his breath and that is at rest.    He denies any recent exposure to ticks.  He has no recent rashes.  He has not had any changes in medications in 6 years since his stroke.    He has noted that over the last few weeks his stools have become loose.  They are still once a day.  To normal color, he denies melena.    Review of Systems:  Review of Systems   Constitutional:  Positive for activity change. Negative for appetite change, chills and fever.   HENT:  Negative for sore throat.    Respiratory:  Negative for cough, chest tightness, shortness of breath and wheezing.    Cardiovascular:  Negative for chest pain and leg swelling.    Gastrointestinal:  Negative for abdominal distention, abdominal pain, diarrhea and nausea.   Endocrine: Negative for cold intolerance and heat intolerance.   Genitourinary:  Negative for difficulty urinating and urgency.   Musculoskeletal:  Negative for arthralgias and myalgias.   Neurological:  Positive for weakness and light-headedness. Negative for dizziness, seizures and syncope.   Psychiatric/Behavioral:  Negative for agitation. The patient is nervous/anxious.        Past Medical and Surgical History:   Past Medical History:   Diagnosis Date    Arthritis     in hands    Cervical herniated disc     Colon polyp     Diabetes mellitus (HCC)     borderline    Gout     hx of in 2018, none since    Hyperlipidemia     Hypertension     Irregular heart beat     a-fib, has loop recorder    Kidney stone 09/13/2021    hx of    Migraine     S/P Botox injection     in left wrist to help increase mobility    Sleep apnea     mild sleep apnea, cpap was not ordered    Stroke (HCC) 2018    left hemiparesis, wears leg brace and uses cane, unable to use left arm and hand    Use of cane as ambulatory aid     had CVA in 2018       Past Surgical History:   Procedure Laterality Date    APPENDECTOMY      COLONOSCOPY      KNEE ARTHROSCOPY      right knee    KNEE SURGERY Right     NECK SURGERY      TONSILLECTOMY         Meds/Allergies:  Prior to Admission medications    Medication Sig Start Date End Date Taking? Authorizing Provider   amLODIPine (NORVASC) 5 mg tablet Take 5 mg by mouth daily Taking 1 1/2 daily daily  1/15/19   Historical Provider, MD   atorvastatin (LIPITOR) 80 mg tablet Take 1 tablet (80 mg total) by mouth every evening 12/14/18   PATEL Clayton   Baclofen 5 MG TABS TAKE 1 TABLET BY MOUTH AT LUNCHTIME AND 1 TABLET AT BEDTIME FOR SPASITICITY MANAGEMENT 1/10/23   Historical Provider, MD   Eliquis 5 MG TAKE 1 TABLET BY MOUTH TWICE A DAY 11/16/20   Jonas Rothman MD   FLUoxetine (PROzac) 20 mg capsule TAKE 1  CAPSULE BY MOUTH EVERY DAY 21   Jonas Rothman MD   LORazepam (Ativan) 0.5 mg tablet Take 1 tablet (0.5 mg total) by mouth once as needed for anxiety for up to 1 dose Please take 30 minutes prior to MRI 23   PATEL Eubanks   losartan (COZAAR) 50 mg tablet Take 1 tablet (50 mg total) by mouth daily 3/12/19   Jeremiah Martin MD   methocarbamol (ROBAXIN) 500 mg tablet TAKE 1 TABLET BY MOUTH EVERY 12 HOURS 24   Jonas Rothman MD   predniSONE 10 mg tablet Take 8 tablets (80 mg total) by mouth daily Decrease by 10 mg daily till prescription is done 23   PATEL Eubanks     I have reviewed home medications with patient personally.    Allergies: No Known Allergies    Social History:  Marital Status:    Occupation: Not available  Patient Pre-hospital Living Situation: Home  Patient Pre-hospital Level of Mobility: walks with cane  Patient Pre-hospital Diet Restrictions: None  Substance Use History:   Social History     Substance and Sexual Activity   Alcohol Use Yes    Comment: socially      Social History     Tobacco Use   Smoking Status Former    Current packs/day: 0.00    Types: Cigarettes, Cigars    Quit date:     Years since quittin.4   Smokeless Tobacco Never   Tobacco Comments    stopped on 10/20/2018     Social History     Substance and Sexual Activity   Drug Use No       Family History:  Family History   Problem Relation Age of Onset    Stroke Mother     Heart disease Father     ALS Father        Physical Exam:     Vitals:   Blood Pressure: 117/56 (24 1343)  Pulse: (!) 43 (24 1343)  Temperature: 98.1 °F (36.7 °C) (24 1159)  Temp Source: Oral (24 1159)  Respirations: 18 (24 1343)  SpO2: 100 % (24 1343)    Physical Exam  Vitals and nursing note reviewed.   Constitutional:       Appearance: Normal appearance. He is not ill-appearing or diaphoretic.   HENT:      Head: Normocephalic.      Nose: Nose normal. No congestion.       Mouth/Throat:      Mouth: Mucous membranes are moist.      Pharynx: No oropharyngeal exudate.   Eyes:      General: No scleral icterus.        Right eye: No discharge.         Left eye: No discharge.      Extraocular Movements: Extraocular movements intact.      Conjunctiva/sclera: Conjunctivae normal.   Cardiovascular:      Rate and Rhythm: Regular rhythm. Bradycardia present.      Heart sounds: No murmur heard.  Pulmonary:      Effort: Pulmonary effort is normal. No respiratory distress.      Breath sounds: No stridor. No wheezing, rhonchi or rales.   Abdominal:      General: Bowel sounds are normal. There is no distension.      Palpations: Abdomen is soft.      Tenderness: There is no abdominal tenderness.   Genitourinary:     Comments: No Back  Musculoskeletal:         General: Normal range of motion.      Cervical back: Normal range of motion and neck supple. No rigidity.      Right lower leg: No edema.      Left lower leg: No edema.   Skin:     General: Skin is warm.      Coloration: Skin is not jaundiced.      Findings: No bruising or erythema.   Neurological:      Mental Status: He is alert and oriented to person, place, and time.      Motor: Weakness present.      Comments: Left upper extremity weakness   Psychiatric:         Mood and Affect: Mood normal.         Behavior: Behavior normal.          Additional Data:     Lab Results:  Results from last 7 days   Lab Units 06/22/24  1223   WBC Thousand/uL 8.50   HEMOGLOBIN g/dL 13.2   HEMATOCRIT % 40.2   PLATELETS Thousands/uL 217   SEGS PCT % 58   LYMPHO PCT % 27   MONO PCT % 11   EOS PCT % 3     Results from last 7 days   Lab Units 06/22/24  1223   SODIUM mmol/L 141   POTASSIUM mmol/L 3.7   CHLORIDE mmol/L 103   CO2 mmol/L 29   BUN mg/dL 14   CREATININE mg/dL 0.91   ANION GAP mmol/L 9   CALCIUM mg/dL 9.4   ALBUMIN g/dL 4.3   TOTAL BILIRUBIN mg/dL 1.03*   ALK PHOS U/L 47   ALT U/L 24   AST U/L 21   GLUCOSE RANDOM mg/dL 76             Lab Results   Component  Value Date    HGBA1C 6.0 (H) 05/06/2021    HGBA1C 6.4 (H) 10/23/2020    HGBA1C 6.6 (H) 10/21/2018           Lines/Drains:  Invasive Devices       Peripheral Intravenous Line  Duration             Peripheral IV 06/22/24 Proximal;Right;Ventral (anterior) Forearm <1 day                        Imaging: Personally reviewed the following imaging: chest xray and CT head  X-ray chest 1 view portable   ED Interpretation by Janes Escamilla MD (06/22 1252)   No acute cardiopulmonary disease      Final Result by Neena Patino MD (06/22 3005)      No acute cardiopulmonary disease.            Workstation performed: [id number]         CT head without contrast    (Results Pending)       EKG and Other Studies Reviewed on Admission:   EKG: Sinus Bradycardia. HR 40s no ST elevations.    ** Please Note: This note has been constructed using a voice recognition system. **

## 2024-06-22 NOTE — ASSESSMENT & PLAN NOTE
See plan for bradycardia.  He has no anemia, does not appear dehydrated.  He does not appear septic.

## 2024-06-22 NOTE — ED PROVIDER NOTES
History  Chief Complaint   Patient presents with    Chest Pain     CP, fatigued, and headache for past few days. Pt also reports also low HR in 40's.      69-year-old male presenting to the ED with a complaint of fatigue, headache, bradycardia for the past few days.  Patient states that over the past week he has been feeling more tired than usual.  Patient was seen at his primary care provider and noted to have a bradycardic rhythm.  Patient's symptoms continued and also began to have chest pressure today prompting him to come to the ER.  Chest pressure substernal and not changed with palpitation or movement.  She is denying chest pain, palpitations, shortness of breath, difficulty breathing, abdominal pain, nausea, vomiting, diarrhea.  Patient's denying any recent illnesses or travel.        Prior to Admission Medications   Prescriptions Last Dose Informant Patient Reported? Taking?   Eliquis 5 MG  Self No No   Sig: TAKE 1 TABLET BY MOUTH TWICE A DAY   FLUoxetine (PROzac) 20 mg capsule Not Taking Self No No   Sig: TAKE 1 CAPSULE BY MOUTH EVERY DAY   Patient not taking: Reported on 6/22/2024   amLODIPine (NORVASC) 5 mg tablet  Self Yes No   Sig: Take 5 mg by mouth daily Taking 1 1/2 daily daily    atorvastatin (LIPITOR) 80 mg tablet  Self No No   Sig: Take 1 tablet (80 mg total) by mouth every evening   losartan (COZAAR) 50 mg tablet  Self No No   Sig: Take 1 tablet (50 mg total) by mouth daily      Facility-Administered Medications: None       Past Medical History:   Diagnosis Date    Arthritis     in hands    Cervical herniated disc     Colon polyp     Diabetes mellitus (HCC)     borderline    Gout     hx of in 2018, none since    Hyperlipidemia     Hypertension     Irregular heart beat     a-fib, has loop recorder    Kidney stone 09/13/2021    hx of    Migraine     S/P Botox injection     in left wrist to help increase mobility    Sleep apnea     mild sleep apnea, cpap was not ordered    Stroke (HCC) 2018    left  hemiparesis, wears leg brace and uses cane, unable to use left arm and hand    Use of cane as ambulatory aid     had CVA in 2018       Past Surgical History:   Procedure Laterality Date    APPENDECTOMY      COLONOSCOPY      KNEE ARTHROSCOPY      right knee    KNEE SURGERY Right     NECK SURGERY      TONSILLECTOMY         Family History   Problem Relation Age of Onset    Stroke Mother     Heart disease Father     ALS Father      I have reviewed and agree with the history as documented.    E-Cigarette/Vaping    E-Cigarette Use Never User      E-Cigarette/Vaping Substances    Nicotine No     THC No     CBD No     Flavoring No     Other No     Unknown No      Social History     Tobacco Use    Smoking status: Former     Current packs/day: 0.00     Types: Cigarettes, Cigars     Quit date:      Years since quittin.4    Smokeless tobacco: Never    Tobacco comments:     stopped on 10/20/2018   Vaping Use    Vaping status: Never Used   Substance Use Topics    Alcohol use: Yes     Comment: socially     Drug use: No        Review of Systems   Constitutional:  Positive for fatigue. Negative for chills and fever.   HENT:  Negative for congestion and rhinorrhea.    Eyes:  Negative for visual disturbance.   Respiratory:  Negative for chest tightness and shortness of breath.    Cardiovascular:  Negative for chest pain and palpitations.   Gastrointestinal:  Negative for abdominal pain, diarrhea, nausea and vomiting.   Genitourinary:  Negative for dysuria.   Musculoskeletal:  Negative for arthralgias and myalgias.   Skin:  Negative for color change.   Neurological:  Negative for dizziness, weakness, numbness and headaches.   Psychiatric/Behavioral:  Negative for agitation.        Physical Exam  ED Triage Vitals   Temperature Pulse Respirations Blood Pressure SpO2   24 1159 24 1157 24 1157 24 1159 24 1157   98.1 °F (36.7 °C) 60 18 134/63 95 %      Temp Source Heart Rate Source Patient Position -  Orthostatic VS BP Location FiO2 (%)   06/22/24 1159 06/22/24 1157 -- 06/22/24 1157 --   Oral Monitor  Right arm       Pain Score       06/22/24 1343       No Pain             Orthostatic Vital Signs  Vitals:    06/22/24 1245 06/22/24 1343 06/22/24 1602 06/22/24 1649   BP:  117/56 153/73 147/73   Pulse: (!) 44 (!) 43 (!) 45 (!) 50       Physical Exam  Constitutional:       Appearance: He is well-developed.   HENT:      Head: Normocephalic and atraumatic.   Eyes:      Pupils: Pupils are equal, round, and reactive to light.   Cardiovascular:      Rate and Rhythm: Regular rhythm. Bradycardia present.      Heart sounds: Normal heart sounds.   Pulmonary:      Effort: Pulmonary effort is normal.      Breath sounds: Normal breath sounds.   Abdominal:      General: Bowel sounds are normal.      Palpations: Abdomen is soft.   Musculoskeletal:         General: Normal range of motion.      Cervical back: Normal range of motion.   Skin:     General: Skin is warm.      Capillary Refill: Capillary refill takes less than 2 seconds.   Neurological:      General: No focal deficit present.      Mental Status: He is alert and oriented to person, place, and time.   Psychiatric:         Mood and Affect: Mood normal.         Behavior: Behavior normal.         ED Medications  Medications   sodium chloride (PF) 0.9 % injection 3 mL (has no administration in time range)   atorvastatin (LIPITOR) tablet 80 mg (has no administration in time range)   amLODIPine (NORVASC) tablet 5 mg (has no administration in time range)   apixaban (ELIQUIS) tablet 5 mg (has no administration in time range)   FLUoxetine (PROzac) capsule 20 mg (has no administration in time range)   losartan (COZAAR) tablet 50 mg (has no administration in time range)   acetaminophen (TYLENOL) tablet 650 mg (has no administration in time range)   ondansetron (ZOFRAN) injection 4 mg (has no administration in time range)   aluminum-magnesium hydroxide-simethicone (MAALOX) oral  suspension 30 mL (has no administration in time range)   Atropine Sulfate injection 0.4 mg (has no administration in time range)   pneumococcal 20-danya conj vacc (PREVNAR 20) IM Injection 0.5 mL (has no administration in time range)   aspirin chewable tablet 324 mg (324 mg Oral Given 6/22/24 1228)   multi-electrolyte (ISOLYTE-S PH 7.4) bolus 500 mL (0 mL Intravenous Stopped 6/22/24 1343)       Diagnostic Studies  Results Reviewed       Procedure Component Value Units Date/Time    HS Troponin I 2hr [[id number]]  (Normal) Collected: 06/22/24 1424    Lab Status: Final result Specimen: Blood from Arm, Right Updated: 06/22/24 1505     hs TnI 2hr 5 ng/L      Delta 2hr hsTnI 1 ng/L     HS Troponin I 4hr [[id number]]     Lab Status: No result Specimen: Blood     TSH, 3rd generation with Free T4 reflex [[id number]]  (Normal) Collected: 06/22/24 1223    Lab Status: Final result Specimen: Blood from Arm, Right Updated: 06/22/24 1327     TSH 3RD GENERATON 1.696 uIU/mL     FLU/RSV/COVID - if FLU/RSV clinically relevant [[id number]]  (Normal) Collected: 06/22/24 1223    Lab Status: Final result Specimen: Nares from Nose Updated: 06/22/24 1318     SARS-CoV-2 Negative     INFLUENZA A PCR Negative     INFLUENZA B PCR Negative     RSV PCR Negative    Narrative:      FOR PEDIATRIC PATIENTS - copy/paste COVID Guidelines URL to browser: [email or web address]    SARS-CoV-2 assay is a Nucleic Acid Amplification assay intended for the  qualitative detection of nucleic acid from SARS-CoV-2 in nasopharyngeal  swabs. Results are for the presumptive identification of SARS-CoV-2 RNA.    Positive results are indicative of infection with SARS-CoV-2, the virus  causing COVID-19, but do not rule out bacterial infection or co-infection  with other viruses. Laboratories within the [place] and its  territories are required to report all positive results to the appropriate  public health  authorities. Negative results do not preclude SARS-CoV-2  infection and should not be used as the sole basis for treatment or other  patient management decisions. Negative results must be combined with  clinical observations, patient history, and epidemiological information.  This test has not been FDA cleared or approved.    This test has been authorized by FDA under an Emergency Use Authorization  (EUA). This test is only authorized for the duration of time the  declaration that circumstances exist justifying the authorization of the  emergency use of an in vitro diagnostic tests for detection of SARS-CoV-2  virus and/or diagnosis of COVID-19 infection under section 564(b)(1) of  the Act, 21 U.S.C. 360bbb-3(b)(1), unless the authorization is terminated  or revoked sooner. The test has been validated but independent review by FDA  and CLIA is pending.    Test performed using [employer]: This RT-PCR assay targets N2,  a region unique to SARS-CoV-2. A conserved region in the E-gene was chosen  for pan-Sarbecovirus detection which includes SARS-CoV-2.    According to CMS-2020-01-R, this platform meets the definition of high-throughput technology.    HS Troponin 0hr (reflex protocol) [[id number]]  (Normal) Collected: 06/22/24 1223    Lab Status: Final result Specimen: Blood from Arm, Right Updated: 06/22/24 1253     hs TnI 0hr 4 ng/L     Comprehensive metabolic panel [[id number]]  (Abnormal) Collected: 06/22/24 1223    Lab Status: Final result Specimen: Blood from Arm, Right Updated: 06/22/24 1251     Sodium 141 mmol/L      Potassium 3.7 mmol/L      Chloride 103 mmol/L      CO2 29 mmol/L      ANION GAP 9 mmol/L      BUN 14 mg/dL      Creatinine 0.91 mg/dL      Glucose 76 mg/dL      Calcium 9.4 mg/dL      AST 21 U/L      ALT 24 U/L      Alkaline Phosphatase 47 U/L      Total Protein 6.8 g/dL      Albumin 4.3 g/dL      Total Bilirubin 1.03 mg/dL      eGFR 85 ml/min/1.73sq m     Narrative:      National Kidney  Disease Foundation guidelines for Chronic Kidney Disease (CKD):     Stage 1 with normal or high GFR (GFR > 90 mL/min/1.73 square meters)    Stage 2 Mild CKD (GFR = 60-89 mL/min/1.73 square meters)    Stage 3A Moderate CKD (GFR = 45-59 mL/min/1.73 square meters)    Stage 3B Moderate CKD (GFR = 30-44 mL/min/1.73 square meters)    Stage 4 Severe CKD (GFR = 15-29 mL/min/1.73 square meters)    Stage 5 End Stage CKD (GFR <15 mL/min/1.73 square meters)  Note: GFR calculation is accurate only with a steady state creatinine    Magnesium [[id number]]  (Normal) Collected: 06/22/24 1223    Lab Status: Final result Specimen: Blood from Arm, Right Updated: 06/22/24 1251     Magnesium 2.0 mg/dL     Phosphorus [[id number]]  (Normal) Collected: 06/22/24 1223    Lab Status: Final result Specimen: Blood from Arm, Right Updated: 06/22/24 1251     Phosphorus 3.0 mg/dL     CBC and differential [[id number]] Collected: 06/22/24 1223    Lab Status: Final result Specimen: Blood from Arm, Right Updated: 06/22/24 1232     WBC 8.50 Thousand/uL      RBC 4.44 Million/uL      Hemoglobin 13.2 g/dL      Hematocrit 40.2 %      MCV 91 fL      MCH 29.7 pg      MCHC 32.8 g/dL      RDW 15.0 %      MPV 10.2 fL      Platelets 217 Thousands/uL      nRBC 0 /100 WBCs      Segmented % 58 %      Immature Grans % 0 %      Lymphocytes % 27 %      Monocytes % 11 %      Eosinophils Relative 3 %      Basophils Relative 1 %      Absolute Neutrophils 5.01 Thousands/µL      Absolute Immature Grans 0.03 Thousand/uL      Absolute Lymphocytes 2.31 Thousands/µL      Absolute Monocytes 0.89 Thousand/µL      Eosinophils Absolute 0.21 Thousand/µL      Basophils Absolute 0.05 Thousands/µL                    CT head without contrast   Final Result by Pallav N Shah, MD (06/22 1446)      No acute intracranial abnormality.      Sequela of remote right MCA distribution infarction.      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                  Workstation performed:  [id number]         X-ray chest 1 view portable   ED Interpretation by Janes Escamilla MD (06/22 1252)   No acute cardiopulmonary disease      Final Result by Neena Patino MD (06/22 1318)      No acute cardiopulmonary disease.            Workstation performed: [id number]               Procedures  ECG 12 Lead Documentation Only    Date/Time: 6/22/2024 5:12 PM    Performed by: Janes Escamilla MD  Authorized by: Janes Escamilla MD    Indications / Diagnosis:  Bradycardia  ECG reviewed by me, the ED Provider: yes    Patient location:  ED  Previous ECG:     Previous ECG:  Compared to current    Similarity:  Changes noted  Interpretation:     Interpretation: abnormal    Rate:     ECG rate:  50    ECG rate assessment: bradycardic    Rhythm:     Rhythm: sinus rhythm    Ectopy:     Ectopy: none    QRS:     QRS axis:  Normal    QRS intervals:  Normal  Conduction:     Conduction: normal    ST segments:     ST segments:  Normal  T waves:     T waves: normal          ED Course  ED Course as of 06/22/24 1716   Sat Jun 22, 2024   1243 CBC and differential  Reassuring and unremarkable    1243 Feeling unwell for the past week, generalized fatigue chest pressure.  Chest pressure started today.  No shortness of breath or difficulty breathing.  Patient was found to be bradycardic at his primary care providers   1252 Comprehensive metabolic panel(!)  Mildly elevated total bilirubin otherwise unremarkable and reassuring electrolytes.  Renal function appears normal, liver functions normal limits.   1253 MAGNESIUM: 2.0  Reassuring   1253 Phosphorus: 3.0  Reassuring   1351 FLU/RSV/COVID - if FLU/RSV clinically relevant  Reassuring   1351 TSH 3RD GENERATON: 1.696  Reassuring   1351 hs TnI 0hr: 4  Reassuring, will get a 2-hour.             HEART Risk Score      Flowsheet Row Most Recent Value   Heart Score Risk Calculator    History 0 Filed at: 06/22/2024 1712   ECG 1 Filed at: 06/22/2024 1712   Age 2 Filed at: 06/22/2024 1712    Risk Factors 1 Filed at: 06/22/2024 1712   Troponin 0 Filed at: 06/22/2024 1712   HEART Score 4 Filed at: 06/22/2024 1712                                  Medical Decision Making  69-year-old male presenting to the ED with general fatigue and bradycardia.    Differentials including ACS, MI, metabolic abnormality, sick sinus syndrome, thyroid disease, intracranial pathology, viral illness, heart block. I am doubting myocarditis and pericarditis given no recent illnesses and no chest pain.  Patient has not recognized any recent tick bites or any joint swelling.    Cardiac workup ordered as well as an EKG and chest x-ray.  Opponent was found to be 4 with 2-hour at 5 and a delta being 1.  TSH was within normal limits and patient did not have COVID, flu, RSV.  EKG showed a sinus bradycardia rhythm with no signs of a heart block.  And chest x-ray showed no acute cardiopulmonary disease.  CT of the head was normal.      At this time given unknown cause of bradycardia and continued dips down into the 30s.  It was discussed with patient the benefits of being admitted on telemetry and watched overnight.  Patient agreed to admission.  Reached out to  IM who agreed to admit patient.  Patient admitted.         Amount and/or Complexity of Data Reviewed  Labs: ordered. Decision-making details documented in ED Course.  Radiology: ordered and independent interpretation performed.    Risk  OTC drugs.  Prescription drug management.  Decision regarding hospitalization.          Disposition  Final diagnoses:   Chest pain   Sinus bradycardia   Generalized weakness     Time reflects when diagnosis was documented in both MDM as applicable and the Disposition within this note       Time User Action Codes Description Comment    6/22/2024  2:33 PM Neema Vasquez Add [R07.9] Chest pain     6/22/2024  2:33 PM Neema Vasquez Add [R00.1] Sinus bradycardia     6/22/2024  2:33 PM Neema Vasquez Add [R53.1]  Generalized weakness     6/22/2024  2:33 PM Neema Vasquez Modify [R07.9] Chest pain     6/22/2024  2:33 PM FrederickwaleskaNeema Panchal A Modify [R00.1] Sinus bradycardia     6/22/2024  5:04 PM Kelsie Rice Modify [R00.1] Sinus bradycardia     6/22/2024  5:04 PM Kelsie Rice Add [R00.1] Bradycardia           ED Disposition       ED Disposition   Admit    Condition   Stable    Date/Time   Sat Jun 22, 2024 1421    Comment   Case was discussed with ANDREW and the patient's admission status was agreed to be Admission Status: observation status to the service of Dr. Gonzalez .               Follow-up Information    None         Current Discharge Medication List        CONTINUE these medications which have NOT CHANGED    Details   amLODIPine (NORVASC) 5 mg tablet Take 5 mg by mouth daily Taking 1 1/2 daily daily   Refills: 5      atorvastatin (LIPITOR) 80 mg tablet Take 1 tablet (80 mg total) by mouth every evening  Qty: 90 tablet, Refills: 3    Associated Diagnoses: Right sided cerebral hemisphere cerebrovascular accident (CVA) (HCC); Mixed hyperlipidemia      Eliquis 5 MG TAKE 1 TABLET BY MOUTH TWICE A DAY  Qty: 60 tablet, Refills: 5    Associated Diagnoses: Paroxysmal atrial fibrillation (HCC)      FLUoxetine (PROzac) 20 mg capsule TAKE 1 CAPSULE BY MOUTH EVERY DAY  Qty: 90 capsule, Refills: 3    Associated Diagnoses: Right sided cerebral hemisphere cerebrovascular accident (CVA) (HCC)      losartan (COZAAR) 50 mg tablet Take 1 tablet (50 mg total) by mouth daily  Qty: 90 tablet, Refills: 0    Associated Diagnoses: Essential hypertension           No discharge procedures on file.    PDMP Review         Value Time User    PDMP Reviewed  Yes 2/8/2023  2:10 PM PATEL Eubanks             ED Provider  Attending physically available and evaluated Rakesh Appiah. I managed the patient along with the ED Attending.    Electronically Signed by           Janes Escamilla MD  06/22/24 5623

## 2024-06-22 NOTE — PLAN OF CARE

## 2024-06-22 NOTE — ED ATTENDING ATTESTATION
6/22/2024  I, Neema Vasquez MD, saw and evaluated the patient. I have discussed the patient with the resident/non-physician practitioner and agree with the resident's/non-physician practitioner's findings, Plan of Care, and MDM as documented in the resident's/non-physician practitioner's note, except where noted. All available labs and Radiology studies were reviewed.  I was present for key portions of any procedure(s) performed by the resident/non-physician practitioner and I was immediately available to provide assistance.       At this point I agree with the current assessment done in the Emergency Department.  I have conducted an independent evaluation of this patient a history and physical is as follows:    Patient is a 69-year-old male presents to the emergency department after experiencing chest discomfort today.  He describes some tightness and very mild dyspnea both of which have fully resolved.  This occurs in the setting of having felt poorly over the last week with generalized fatigue, intermittent headaches and lightheadedness.  No fevers, nausea or vomiting.  Headaches are generalized.  He has not appreciated any vision changes, new weakness or paresthesias.  History of CVA with residual left sided deficits.  No recent injury.  He did have a routine PCP appointment this past week and was noted to be bradycardic with heart rate in the 40s.  He notes that during his recovery process from CVA very to therapist pointed out that his heart rate was low in the 50s and 60s.  He denies awareness of having it in the 40s before aside from that on EKG at this week's office visit.  He has not been on any new medications and has been taking unchanged doses of amlodipine and Eliquis.  He did discontinue use of a muscle relaxer which had been treating right upper arm spasm.  He noted that this has not been providing him with much relief and discontinued it a while ago.  CVA was suspected to have been  from transient A-fib.  He had a cardiac monitor placed following this episode which revealed only 1 subsequent episode of A-fib.  Monitor has not been working over the last 6 months with battery having been depleted.  He denies having appreciated any palpitations.  He has had slight weight loss secondary to decreased oral intake.    On exam patient appears mildly malaised.  Mucous membranes are moist.  Heart sounds regular though bradycardic.  On multiple inspection of rhythm on monitor he is appreciated to be in persistent sinus bradycardia in the low to mid 40s.  While moving around on my exam heart rate transiently elevated to 51.  Lungs clear to auscultation bilaterally.  Lower extremities nontender and nonedematous.  No neck tenderness. Clear speech.  Slight left facial weakness appreciated.  Tongue deviates to right on protrusion.   Pupils briskly reactive to light.  EOMI.  No nystagmus. Strong eye closure & symmetric brow raise. Ability to insufflate cheeks. Symmetric/ intact sensation in the upper, mid & lower portions of the face.  5/5 strength on head turn.  L UE weakness 3/5 w/ all maneuvers. Symmetric grossly intact sensation in the UE & LE.   No dysmetria w/ RUE.    Strongly suspect that bradycardia is responsible for patient's symptoms.  Uncertain why rate is so low.  This does appear to be sinus though will continue monitoring for any episodes of A-fib.  Checking electrolytes and thyroid function.  Doubt ICH or alternate intracranial pathology though with intermittent headaches will obtain imaging.  Amlodipine not likely to contribute to bradycardia.  If no other source of generalized weakness is appreciated anticipate further in-hospital evaluation.  Fortunately patient is maintaining a normal blood pressure with his bradycardia.      Pulse Readings from Last 3 Encounters:   06/22/24 (!) 43   07/18/23 (!) 54   03/15/23 (!) 52        ED Course  ED Course as of 06/22/24 1434   Sat Jun 22, 2024   1417  Labs and CT unremarkable.  Will continue monitoring and bring in for further evaluation     HR persisted mostly in the low - mid 40s, occasionally rising to the low 50s.    Critical Care Time  Procedures

## 2024-06-22 NOTE — ASSESSMENT & PLAN NOTE
Patient reports fatigue over the last few weeks.  He was to see his primary care physician who was concerned about bradycardia.  Review of his records he does often run lower in the 50s and 60s particular at visits and old EKGs.  In 2022 after his stroke he did have a Holter monitor which was revealed to be normal sinus rhythm in the 70s.  He was scheduled for follow-up with a cardiologist but started to feel continued fatigue including lightheadedness and dizziness when going from sitting to standing or bending over.  He also noticed occasional chest discomfort.  He denies any recent rashes or exposure to ticks  Hold all AV loida blockers   Continue telemetry  Cycle troponins  Consult cardiology for symptomatic bradycardia.  He may be a candidate for pacemaker.  Atropine as needed

## 2024-06-22 NOTE — ASSESSMENT & PLAN NOTE
Patient reports fatigue over the last few weeks.  He was to see his primary care physician who was concerned about bradycardia.  Review of his records he does often run lower in the 50s and 60s particular at visits and old EKGs.  In 2022 after his stroke he did have a Holter monitor which was revealed to be normal sinus rhythm in the 70s.  He was scheduled for follow-up with a cardiologist but started to feel continued fatigue including lightheadedness and dizziness when going from sitting to standing or bending over.  He also noticed occasional chest discomfort.  He denies any recent rashes or exposure to ticks  Hold all AV loida blockers   Telemetry reviewed showed sinus bradycardia as low as 38 and currently in the high 40s and low 50s  Troponin cycled unremarkable  Consult cardiology for symptomatic bradycardia.  Appreciate their support, may benefit for longer-term monitoring prior to EP appointment in August  Atropine as needed  Echocardiogram pending

## 2024-06-23 VITALS
DIASTOLIC BLOOD PRESSURE: 79 MMHG | OXYGEN SATURATION: 98 % | SYSTOLIC BLOOD PRESSURE: 141 MMHG | RESPIRATION RATE: 19 BRPM | TEMPERATURE: 98.4 F | HEART RATE: 58 BPM

## 2024-06-23 LAB
ATRIAL RATE: 44 BPM
ATRIAL RATE: 50 BPM
DME PARACHUTE DELIVERY DATE REQUESTED: NORMAL
DME PARACHUTE ITEM DESCRIPTION: NORMAL
DME PARACHUTE ORDER STATUS: NORMAL
DME PARACHUTE SUPPLIER NAME: NORMAL
DME PARACHUTE SUPPLIER PHONE: NORMAL
P AXIS: 71 DEGREES
P AXIS: 74 DEGREES
PR INTERVAL: 144 MS
PR INTERVAL: 148 MS
QRS AXIS: 66 DEGREES
QRS AXIS: 74 DEGREES
QRSD INTERVAL: 90 MS
QRSD INTERVAL: 90 MS
QT INTERVAL: 452 MS
QT INTERVAL: 472 MS
QTC INTERVAL: 403 MS
QTC INTERVAL: 412 MS
T WAVE AXIS: 35 DEGREES
T WAVE AXIS: 45 DEGREES
VENTRICULAR RATE: 44 BPM
VENTRICULAR RATE: 50 BPM

## 2024-06-23 PROCEDURE — 99205 OFFICE O/P NEW HI 60 MIN: CPT | Performed by: INTERNAL MEDICINE

## 2024-06-23 PROCEDURE — 93010 ELECTROCARDIOGRAM REPORT: CPT | Performed by: INTERNAL MEDICINE

## 2024-06-23 PROCEDURE — 90471 IMMUNIZATION ADMIN: CPT | Performed by: INTERNAL MEDICINE

## 2024-06-23 PROCEDURE — 99232 SBSQ HOSP IP/OBS MODERATE 35: CPT | Performed by: INTERNAL MEDICINE

## 2024-06-23 PROCEDURE — 90677 PCV20 VACCINE IM: CPT | Performed by: INTERNAL MEDICINE

## 2024-06-23 PROCEDURE — 99238 HOSP IP/OBS DSCHRG MGMT 30/<: CPT | Performed by: INTERNAL MEDICINE

## 2024-06-23 RX ADMIN — LOSARTAN POTASSIUM 50 MG: 50 TABLET, FILM COATED ORAL at 08:09

## 2024-06-23 RX ADMIN — APIXABAN 5 MG: 5 TABLET, FILM COATED ORAL at 08:09

## 2024-06-23 RX ADMIN — PNEUMOCOCCAL 20-VALENT CONJUGATE VACCINE 0.5 ML
2.2; 2.2; 2.2; 2.2; 2.2; 2.2; 2.2; 2.2; 2.2; 2.2; 2.2; 2.2; 2.2; 2.2; 2.2; 2.2; 4.4; 2.2; 2.2; 2.2 INJECTION, SUSPENSION INTRAMUSCULAR at 06:02

## 2024-06-23 RX ADMIN — AMLODIPINE BESYLATE 5 MG: 5 TABLET ORAL at 08:09

## 2024-06-23 RX ADMIN — ACETAMINOPHEN 650 MG: 325 TABLET, FILM COATED ORAL at 06:01

## 2024-06-23 NOTE — ASSESSMENT & PLAN NOTE
Patient reports fatigue over the last few weeks.  He was to see his primary care physician who was concerned about bradycardia.  Review of his records he does often run lower in the 50s and 60s particular at visits and old EKGs.  In 2022 after his stroke he did have a Holter monitor which was revealed to be normal sinus rhythm in the 70s.  He was scheduled for follow-up with a cardiologist but started to feel continued fatigue including lightheadedness and dizziness when going from sitting to standing or bending over.  He also noticed occasional chest discomfort.  He denies any recent rashes or exposure to ticks  Hold all AV loida blockers   Telemetry reviewed showed sinus bradycardia as low as 38 and currently in the high 40s and low 50s  Troponin cycled unremarkable  Consult cardiology for symptomatic bradycardia.  Appreciate their support, may benefit for longer-term monitoring prior to EP appointment in August. Jersoncandis patch will be arranged  Echocardiogram/stress test as an outpatient

## 2024-06-23 NOTE — CASE MANAGEMENT
Case Management Progress Note    Patient name Rakesh Appiah  Location S /S -01 MRN [id number]  : 1955 Date 2024       LOS (days): 0  Geometric Mean LOS (GMLOS) (days):   Days to GMLOS:        OBJECTIVE:        Current admission status: Observation  Preferred Pharmacy:   [facility]/pharmacy #[id number] - [place] - [address] [address]  Phone: [phone] Fax: [phone]    Primary Care Provider: Alex Santiago MD    Primary Insurance: [facility]  Secondary Insurance:     PROGRESS NOTE:    Per SLIM - pt need for CPAP order thru [facility]. CPAP order placed to [facility] via [facility] utilizing previous home sleep study. Patient aware will need to f/u with PCP if new sleep study is needed to obtain CPAP.

## 2024-06-23 NOTE — CONSULTS
Consultation - Cardiology Team 1  Rakesh Appiah 69 y.o. male MRN: [id number]  Unit/Bed#: S -01 Encounter: [id number]    Assessment & Plan     Principal Problem:    Bradycardia  Active Problems:    History of stroke    Hypertension    Hyperlipidemia    Paroxysmal atrial fibrillation (HCC)    PADDY (obstructive sleep apnea)    Fatigue      Assessment:  Bradycardia -sinus  Heart rates persistently in the 40s.  All appear to be sinus rhythm on ECG and telemetry.  History of bradycardia.  By reviewing ECGs heart rates 50s to 70s in the past.  No documented hypotension   On no AV loida blockers or other offending meds    Chest tightness associate with shortness of breath  Had an episode lasted 30 minutes yesterday.  Associated with lightheadedness and fatigue.  Troponins negative  ECG no ischemic changes  No history of CAD  Denies exertional symptoms    History of stroke-2018  Status post ILR-brief PAF noted.  Started on Eliquis.  Patient has left-sided weakness, slow speech  JAYE at that time showed moderately severe atheroma intrathoracic aorta    Hypertension-normotensive  Continue losartan 50 mg    Hyperlipidemia-on high intensity statin    PAF-maintaining sinus rhythm  Not on AV loida blocking agent due to history of bradycardia  On Eliquis for stroke prophylaxis    Fatigue  Increased over the past few weeks  Particularly in the morning and afternoon hours  Does not sleep very well but this is chronic  Pt reports PADDY workup negative  TSH normal  Bradycardia as noted above-unclear if contributing    Plan:  Check ambulatory heart rates to assess chronotropic competence  No clear indication for PPM at this time  Would recommend 2 week zio patch on discharge. F/u with Dr. Ludwig as scheduled 8/21  Follow-up with TTE  Consider ischemia evaluation-outpatient stress test  Consider repeat sleep study    History of Present Illness   Physician Requesting Consult: Marshal Ayala MD  Reason for Consult / Principal  Problem: Bradycardia    HPI: Rakesh Appiah is a 69 y.o. year old male with history of stroke, hypertension, hyperlipidemia, PAF, bradycardia, PADDY, implanted loop recorder  ( battery depleted)  who presents with fatigue.  He had an episode yesterday fatigue was associated with chest heaviness, lightheadedness and shortness of breath.  It lasted about 30 minutes.  It occurred while in the car going to the grocery store.  He has been more fatigued the past few weeks.   He was recently at his PCP who told him his heart rate was lower than it had been running and suggested he see cardiology.  He has an appointment in August.  He became concerned yesterday to so decided to come to the ED for evaluation.  He reports the last few weeks he has been more fatigued.  Particularly in the morning he has a hard time getting started for the day.  Then around to 2:30 in the afternoon he feels like he can take a nap.  He does get lightheaded with position changes particularly.  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  He does not feel fatigued at work.  2 weeks ago he was washing his car and he felt hot and lightheaded.  He went in to lay down and felt back to his usual state of health when he woke up.  No palpitations.  No other chest pain or pressure.    JAYE 10/2018 normal LV function.  Moderately severe atheroma intrathoracic aorta.  No PFO.    Inpatient consult to Cardiology  Consult performed by: PATEL Rey  Consult ordered by: Marshal Ayala MD          Review of Systems   Constitutional:  Positive for fatigue.   HENT: Negative.     Eyes: Negative.    Respiratory:  Positive for shortness of breath.    Cardiovascular:  Positive for chest pain.   Gastrointestinal: Negative.    Endocrine: Negative.    Genitourinary: Negative.    Musculoskeletal:  Positive for gait problem.   Neurological:  Positive for weakness.   Hematological: Negative.    Psychiatric/Behavioral:  Negative.         Historical Information   Past Medical History:   Diagnosis Date    Arthritis     in hands    Cervical herniated disc     Colon polyp     Diabetes mellitus (HCC)     borderline    Gout     hx of in 2018, none since    Hyperlipidemia     Hypertension     Irregular heart beat     a-fib, has loop recorder    Kidney stone 2021    hx of    Migraine     S/P Botox injection     in left wrist to help increase mobility    Sleep apnea     mild sleep apnea, cpap was not ordered    Stroke (HCC) 2018    left hemiparesis, wears leg brace and uses cane, unable to use left arm and hand    Use of cane as ambulatory aid     had CVA in 2018     Past Surgical History:   Procedure Laterality Date    APPENDECTOMY      COLONOSCOPY      KNEE ARTHROSCOPY      right knee    KNEE SURGERY Right     NECK SURGERY      TONSILLECTOMY       Social History     Substance and Sexual Activity   Alcohol Use Yes    Comment: socially      Social History     Substance and Sexual Activity   Drug Use No     Social History     Tobacco Use   Smoking Status Former    Current packs/day: 0.00    Types: Cigarettes, Cigars    Quit date:     Years since quittin.4   Smokeless Tobacco Never   Tobacco Comments    stopped on 10/20/2018     Family History:   Family History   Problem Relation Age of Onset    Stroke Mother     Heart disease Father     ALS Father        Meds/Allergies   current meds:   Current Facility-Administered Medications   Medication Dose Route Frequency    acetaminophen (TYLENOL) tablet 650 mg  650 mg Oral Q6H PRN    aluminum-magnesium hydroxide-simethicone (MAALOX) oral suspension 30 mL  30 mL Oral Q6H PRN    amLODIPine (NORVASC) tablet 5 mg  5 mg Oral Daily    apixaban (ELIQUIS) tablet 5 mg  5 mg Oral BID    atorvastatin (LIPITOR) tablet 80 mg  80 mg Oral QPM    Atropine Sulfate injection 0.4 mg  0.4 mg Intravenous Daily PRN    FLUoxetine (PROzac) capsule 20 mg  20 mg Oral Daily    losartan (COZAAR) tablet 50 mg  50  mg Oral Daily    ondansetron (ZOFRAN) injection 4 mg  4 mg Intravenous Q6H PRN    sodium chloride (PF) 0.9 % injection 3 mL  3 mL Intravenous Q1H PRN    and PTA meds:    Medications Prior to Admission:     amLODIPine (NORVASC) 5 mg tablet    atorvastatin (LIPITOR) 80 mg tablet    Eliquis 5 MG    FLUoxetine (PROzac) 20 mg capsule    losartan (COZAAR) 50 mg tablet  No Known Allergies    Objective   Vitals: Blood pressure 141/79, pulse (!) 46, temperature 98.4 °F (36.9 °C), resp. rate 19, SpO2 98%.  Orthostatic Blood Pressures      Flowsheet Row Most Recent Value   Blood Pressure 141/79 filed at 06/23/2024 0808              Intake/Output Summary (Last 24 hours) at 6/23/2024 0811  Last data filed at 6/23/2024 0501  Gross per 24 hour   Intake 980 ml   Output --   Net 980 ml       Invasive Devices       Peripheral Intravenous Line  Duration             Peripheral IV 06/22/24 Proximal;Right;Ventral (anterior) Forearm <1 day                    Physical Exam: /79   Pulse (!) 46   Temp 98.4 °F (36.9 °C)   Resp 19   SpO2 98%   General Appearance:    Alert, cooperative, no distress, appears stated age   Head:    Normocephalic, no scleral icterus   Eyes:    PERRL   Nose:   Nares normal, septum midline, mucosa normal, no drainage    Throat:   Lips, mucosa, and tongue normal   Neck:   Supple, symmetrical, trachea midline     no carotid bruit or JVD   Back:     Symmetric   Lungs:     Clear to auscultation bilaterally, respirations unlabored   Chest Wall:    No tenderness or deformity    Heart:    Regular rate and rhythm, S1 and S2 normal, no murmur, rub   or gallop   Abdomen:     Soft, non-tender, bowel sounds active all four quadrants,     no masses, no organomegaly   Extremities:   Extremities normal, atraumatic, no cyanosis or edema   Pulses:   2+ and symmetric all extremities   Skin:   Skin color, texture, turgor normal, no rashes or lesions   Neurologic:   Alert and oriented to person place and time.        Lab  "Results:   Recent Results (from the past 72 hour(s))   ECG 12 lead    Collection Time: 06/22/24 12:04 PM   Result Value Ref Range    Ventricular Rate 50 BPM    Atrial Rate 50 BPM    NH Interval 144 ms    QRSD Interval 90 ms    QT Interval 452 ms    QTC Interval 412 ms    P Axis 71 degrees    QRS Axis 74 degrees    T Wave Axis 45 degrees   CBC and differential    Collection Time: 06/22/24 12:23 PM   Result Value Ref Range    WBC 8.50 4.31 - 10.16 Thousand/uL    RBC 4.44 3.88 - 5.62 Million/uL    Hemoglobin 13.2 12.0 - 17.0 g/dL    Hematocrit 40.2 36.5 - 49.3 %    MCV 91 82 - 98 fL    MCH 29.7 26.8 - 34.3 pg    MCHC 32.8 31.4 - 37.4 g/dL    RDW 15.0 11.6 - 15.1 %    MPV 10.2 8.9 - 12.7 fL    Platelets 217 149 - 390 Thousands/uL    nRBC 0 /100 WBCs    Segmented % 58 43 - 75 %    Immature Grans % 0 0 - 2 %    Lymphocytes % 27 14 - 44 %    Monocytes % 11 4 - 12 %    Eosinophils Relative 3 0 - 6 %    Basophils Relative 1 0 - 1 %    Absolute Neutrophils 5.01 1.85 - 7.62 Thousands/µL    Absolute Immature Grans 0.03 0.00 - 0.20 Thousand/uL    Absolute Lymphocytes 2.31 0.60 - 4.47 Thousands/µL    Absolute Monocytes 0.89 0.17 - 1.22 Thousand/µL    Eosinophils Absolute 0.21 0.00 - 0.61 Thousand/µL    Basophils Absolute 0.05 0.00 - 0.10 Thousands/µL   HS Troponin 0hr (reflex protocol)    Collection Time: 06/22/24 12:23 PM   Result Value Ref Range    hs TnI 0hr 4 \"Refer to ACS Flowchart\"- see link ng/L   Comprehensive metabolic panel    Collection Time: 06/22/24 12:23 PM   Result Value Ref Range    Sodium 141 135 - 147 mmol/L    Potassium 3.7 3.5 - 5.3 mmol/L    Chloride 103 96 - 108 mmol/L    CO2 29 21 - 32 mmol/L    ANION GAP 9 4 - 13 mmol/L    BUN 14 5 - 25 mg/dL    Creatinine 0.91 0.60 - 1.30 mg/dL    Glucose 76 65 - 140 mg/dL    Calcium 9.4 8.4 - 10.2 mg/dL    AST 21 13 - 39 U/L    ALT 24 7 - 52 U/L    Alkaline Phosphatase 47 34 - 104 U/L    Total Protein 6.8 6.4 - 8.4 g/dL    Albumin 4.3 3.5 - 5.0 g/dL    Total Bilirubin " "1.03 (H) 0.20 - 1.00 mg/dL    eGFR 85 ml/min/1.73sq m   Magnesium    Collection Time: 06/22/24 12:23 PM   Result Value Ref Range    Magnesium 2.0 1.9 - 2.7 mg/dL   Phosphorus    Collection Time: 06/22/24 12:23 PM   Result Value Ref Range    Phosphorus 3.0 2.3 - 4.1 mg/dL   FLU/RSV/COVID - if FLU/RSV clinically relevant    Collection Time: 06/22/24 12:23 PM    Specimen: Nose; Nares   Result Value Ref Range    SARS-CoV-2 Negative Negative    INFLUENZA A PCR Negative Negative    INFLUENZA B PCR Negative Negative    RSV PCR Negative Negative   TSH, 3rd generation with Free T4 reflex    Collection Time: 06/22/24 12:23 PM   Result Value Ref Range    TSH 3RD GENERATON 1.696 0.450 - 4.500 uIU/mL   HS Troponin I 2hr    Collection Time: 06/22/24  2:24 PM   Result Value Ref Range    hs TnI 2hr 5 \"Refer to ACS Flowchart\"- see link ng/L    Delta 2hr hsTnI 1 <20 ng/L   ECG 12 lead    Collection Time: 06/22/24  4:00 PM   Result Value Ref Range    Ventricular Rate 44 BPM    Atrial Rate 44 BPM    GA Interval 148 ms    QRSD Interval 90 ms    QT Interval 472 ms    QTC Interval 403 ms    P Ronce[place] 74 degrees    QRS Axis 66 degrees    T Wave Ronceverte 35 degrees   HS Troponin I 4hr    Collection Time: 06/22/24  5:17 PM   Result Value Ref Range    hs TnI 4hr 5 \"Refer to ACS Flowchart\"- see link ng/L    Delta 4hr hsTnI 1 <20 ng/L     Imaging: I have personally reviewed pertinent reports.    EKG: Sinus bradycardia      Code Status: Level 1 - Full Code  Advance Directive and Living Will:      Power of :    POLST:      Counseling / Coordination of Care  Total floor / unit time spent today 45 minutes.  Greater than 50% of total time was spent with the patient and / or family counseling and / or coordination of care.    "

## 2024-06-23 NOTE — ASSESSMENT & PLAN NOTE
Was initially on CPAP from a sleep study July 2020.  He had a hard time sleeping and self discontinued this with shipping the machine back 1 year ago.  Discussed with case management and placed a reorder prescription.  The patient is amenable to utilizing this

## 2024-06-23 NOTE — DISCHARGE INSTR - AVS FIRST PAGE
Dear Rakesh Appiah,     It was our pleasure to care for you here at [facility].  It is our hope that we were always able to exceed the expected standards for your care during your stay.  You were hospitalized due to fatigue.  You were cared for on the medicine floor under the service of Marshal Ayala MD with the [facility] who covers for your primary care physician (PCP), Alex Santiago MD, while you were hospitalized.  If you have any questions or concerns related to this hospitalization, you may contact us at .  For follow up as well as medication refills, we recommend that you follow up with your primary care physician.  A registered nurse will reach out to you by phone within a few days after your discharge to answer any additional questions that you may have after going home.  However, at this time we provide for you here, the most important instructions / recommendations at discharge:     Notable Medication Adjustments -   Your medications were not adjusted during the hospitalization  Testing Required after Discharge -   Stress test was ordered  Echocardiogram (ultrasound of your heart)   You may need a repeat sleep study, we did reach out to [facility] to see if they would just apply the CPAP machine however if they will not, please work with your primary care physician on repeating the sleep study to obtain this device again as this may be a cause of your fatigue  Zio patch will be applied by the cardiology team for 2 weeks to evaluate your heart rate  ** Please contact your PCP to request testing orders for any of the testing recommended here **  Important follow up information -   You are admitted for a low heart rate.  You had associated fatigue with this and it is unclear if this is the cause of your fatigue or if there is an underlying issue.  To evaluate all causes, please work on obtaining your CPAP device as sleep apnea can be a  significant cause of fatigue.  For your low heart rate, you will see the cardiologist for a Zio patch placement and the results of this can be reviewed at your August appointment.  Other Instructions -   Take good care  Please review this entire after visit summary as additional general instructions including medication list, appointments, activity, diet, any pertinent wound care, and other additional recommendations from your care team that may be provided for you.      Sincerely,     Marshal Ayala MD

## 2024-06-23 NOTE — PLAN OF CARE
Problem: Potential for Falls  Goal: Patient will remain free of falls  Description: INTERVENTIONS:  - Educate patient/family on patient safety including physical limitations  - Instruct patient to call for assistance with activity   - Consult OT/PT to assist with strengthening/mobility   - Keep Call bell within reach  - Keep bed low and locked with side rails adjusted as appropriate  - Keep care items and personal belongings within reach  - Initiate and maintain comfort rounds  - Make Fall Risk Sign visible to staff  - Offer Toileting every 2 Hours, in advance of need  - Initiate/Maintain bed alarm  - Obtain necessary fall risk management equipment: bed alarm  - Apply yellow socks and bracelet for high fall risk patients  - Consider moving patient to room near nurses station  Outcome: Progressing     Problem: PAIN - ADULT  Goal: Verbalizes/displays adequate comfort level or baseline comfort level  Description: Interventions:  - Encourage patient to monitor pain and request assistance  - Assess pain using appropriate pain scale  - Administer analgesics based on type and severity of pain and evaluate response  - Implement non-pharmacological measures as appropriate and evaluate response  - Consider cultural and social influences on pain and pain management  - Notify physician/advanced practitioner if interventions unsuccessful or patient reports new pain  Outcome: Progressing     Problem: INFECTION - ADULT  Goal: Absence or prevention of progression during hospitalization  Description: INTERVENTIONS:  - Assess and monitor for signs and symptoms of infection  - Monitor lab/diagnostic results  - Monitor all insertion sites, i.e. indwelling lines, tubes, and drains  - Monitor endotracheal if appropriate and nasal secretions for changes in amount and color  - [place] appropriate cooling/warming therapies per order  - Administer medications as ordered  - Instruct and encourage patient and family to use good hand  hygiene technique  - Identify and instruct in appropriate isolation precautions for identified infection/condition  Outcome: Progressing  Goal: Absence of fever/infection during neutropenic period  Description: INTERVENTIONS:  - Monitor WBC    Outcome: Progressing

## 2024-06-23 NOTE — PROGRESS NOTES
[facility]  Progress Note  Name: Rakesh Appiah I  MRN: [id number]  Unit/Bed#: S -01 I Date of Admission: 6/22/2024   Date of Service: 6/23/2024 I Hospital Day: 0    Assessment & Plan   * Bradycardia  Assessment & Plan  Patient reports fatigue over the last few weeks.  He was to see his primary care physician who was concerned about bradycardia.  Review of his records he does often run lower in the 50s and 60s particular at visits and old EKGs.  In 2022 after his stroke he did have a Holter monitor which was revealed to be normal sinus rhythm in the 70s.  He was scheduled for follow-up with a cardiologist but started to feel continued fatigue including lightheadedness and dizziness when going from sitting to standing or bending over.  He also noticed occasional chest discomfort.  He denies any recent rashes or exposure to ticks  Hold all AV loida blockers   Telemetry reviewed showed sinus bradycardia as low as 38 and currently in the high 40s and low 50s  Troponin cycled unremarkable  Consult cardiology for symptomatic bradycardia.  Appreciate their support, may benefit for longer-term monitoring prior to EP appointment in August  Atropine as needed  Echocardiogram pending    PADDY (obstructive sleep apnea)  Assessment & Plan  Was initially on CPAP from a sleep study July 2020.  He had a hard time sleeping and self discontinued this with shipping the machine back 1 year ago.  Discussed with case management and placed a reorder prescription.  The patient is amenable to utilizing this    Hyperlipidemia  Assessment & Plan  Continue statin    Hypertension  Assessment & Plan  Patient reports compliance with losartan and amlodipine.  Will continue these with parameters    History of stroke  Assessment & Plan  Patient has residual left-sided weakness.  He utilizes a cane to walk.  He has slower speech but clear.  Continue statin, blood pressure control and Eliquis               VTE  Pharmacologic Prophylaxis: VTE Score: 3 Moderate Risk (Score 3-4) - Pharmacological DVT Prophylaxis Ordered: apixaban (Eliquis).    Mobility:   Basic Mobility Inpatient Raw Score: 22  JH-HLM Goal: 7: Walk 25 feet or more  JH-HLM Achieved: 4: Move to chair/commode  JH-HLM Goal NOT achieved. Continue with multidisciplinary rounding and encourage appropriate mobility to improve upon JH-HLM goals.    Patient Centered Rounds: I performed bedside rounds with nursing staff today.   Discussions with Specialists or Other Care Team Provider: Cardiology    Education and Discussions with Family / Patient: Patient declined call to .     Total Time Spent on Date of Encounter in care of patient: 35 mins. This time was spent on one or more of the following: performing physical exam; counseling and coordination of care; obtaining or reviewing history; documenting in the medical record; reviewing/ordering tests, medications or procedures; communicating with other healthcare professionals and discussing with patient's family/caregivers.    Current Length of Stay: 0 day(s)  Current Patient Status: Observation   Certification Statement: The patient will continue to require additional inpatient hospital stay due to bradycardia  Discharge Plan: Anticipate discharge later today or tomorrow to home.    Code Status: Level 1 - Full Code    Subjective:   Patient reports sleeping well overnight.  There was not an episode where he dipped down into the 30s and he set his alarm went off which woke him.  He talks about his CPAP machine and the challenges he has had sleeping.  On review of his chart from July 2020 he did have mild sleep apnea was recommended for an auto Pap.  I talked with case management and this will be reordered.  The patient understands that we should really evaluate all causes of fatigue attentionally prior to any device placement.  He was not noted to be hypoxic overnight    He denies chest pain, shortness of  breath, fevers or chills    Objective:     Vitals:   Temp (24hrs), Av °F (36.7 °C), Min:97.4 °F (36.3 °C), Max:98.4 °F (36.9 °C)    Temp:  [97.4 °F (36.3 °C)-98.4 °F (36.9 °C)] 98.4 °F (36.9 °C)  HR:  [43-60] 46  Resp:  [14-19] 19  BP: (117-153)/(56-79) 141/79  SpO2:  [95 %-100 %] 98 %  There is no height or weight on file to calculate BMI.     Input and Output Summary (last 24 hours):     Intake/Output Summary (Last 24 hours) at 2024 0859  Last data filed at 2024 0501  Gross per 24 hour   Intake 980 ml   Output --   Net 980 ml       Physical Exam:   Physical Exam  Vitals and nursing note reviewed.   Constitutional:       Appearance: Normal appearance. He is not ill-appearing or diaphoretic.   HENT:      Head: Normocephalic.      Nose: Nose normal. No congestion.      Mouth/Throat:      Mouth: Mucous membranes are moist.      Pharynx: No oropharyngeal exudate.   Eyes:      General: No scleral icterus.     Conjunctiva/sclera: Conjunctivae normal.   Cardiovascular:      Rate and Rhythm: Regular rhythm. Bradycardia present.   Pulmonary:      Effort: Pulmonary effort is normal. No respiratory distress.      Breath sounds: No wheezing.   Abdominal:      General: Bowel sounds are normal. There is no distension.      Palpations: Abdomen is soft.      Tenderness: There is no abdominal tenderness.   Musculoskeletal:      Cervical back: Normal range of motion and neck supple. No rigidity.   Neurological:      Mental Status: He is alert.   Psychiatric:         Mood and Affect: Mood normal.         Behavior: Behavior normal.          Additional Data:     Labs:  Results from last 7 days   Lab Units 24  1223   WBC Thousand/uL 8.50   HEMOGLOBIN g/dL 13.2   HEMATOCRIT % 40.2   PLATELETS Thousands/uL 217   SEGS PCT % 58   LYMPHO PCT % 27   MONO PCT % 11   EOS PCT % 3     Results from last 7 days   Lab Units 24  1223   SODIUM mmol/L 141   POTASSIUM mmol/L 3.7   CHLORIDE mmol/L 103   CO2 mmol/L 29   BUN mg/dL  14   CREATININE mg/dL 0.91   ANION GAP mmol/L 9   CALCIUM mg/dL 9.4   ALBUMIN g/dL 4.3   TOTAL BILIRUBIN mg/dL 1.03*   ALK PHOS U/L 47   ALT U/L 24   AST U/L 21   GLUCOSE RANDOM mg/dL 76                       Lines/Drains:  Invasive Devices       Peripheral Intravenous Line  Duration             Peripheral IV 06/22/24 Proximal;Right;Ventral (anterior) Forearm <1 day                      Telemetry:  Telemetry Orders (From admission, onward)               24 Hour Telemetry Monitoring  Continuous x 24 Hours (Telem)        Expiring   Question:  Reason for 24 Hour Telemetry  Answer:  Arrhythmias requiring acute medical intervention / PPM or ICD malfunction                     Telemetry Reviewed: Sinus Bradycardia  Indication for Continued Telemetry Use: Arrthymias requiring medical therapy             Imaging: No pertinent imaging reviewed.    Recent Cultures (last 7 days):         Last 24 Hours Medication List:   Current Facility-Administered Medications   Medication Dose Route Frequency Provider Last Rate    acetaminophen  650 mg Oral Q6H PRN Marshal Ayala MD      aluminum-magnesium hydroxide-simethicone  30 mL Oral Q6H PRN Marshal Ayala MD      amLODIPine  5 mg Oral Daily Marshal Ayala MD      apixaban  5 mg Oral BID Marshal Ayala MD      atorvastatin  80 mg Oral QPM Marshal Ayala MD      atropine  0.4 mg Intravenous Daily PRN Marshal Ayala MD      FLUoxetine  20 mg Oral Daily Marshal Ayala MD      losartan  50 mg Oral Daily Marshal Ayala MD      ondansetron  4 mg Intravenous Q6H PRN Marshal Ayala MD      sodium chloride (PF)  3 mL Intravenous Q1H PRN Marshal Ayala MD          Today, Patient Was Seen By: Marshal Ayala MD    **Please Note: This note may have been constructed using a voice recognition system.**

## 2024-06-23 NOTE — PLAN OF CARE

## 2024-06-23 NOTE — PLAN OF CARE
Problem: Potential for Falls  Goal: Patient will remain free of falls  Description: INTERVENTIONS:  - Educate patient/family on patient safety including physical limitations  - Instruct patient to call for assistance with activity   - Consult OT/PT to assist with strengthening/mobility   - Keep Call bell within reach  - Keep bed low and locked with side rails adjusted as appropriate  - Keep care items and personal belongings within reach  - Initiate and maintain comfort rounds  - Make Fall Risk Sign visible to staff  - Offer Toileting every 2 Hours, in advance of need  - Initiate/Maintain bed/chair alarm  - Obtain necessary fall risk management equipment  - Apply yellow socks and bracelet for high fall risk patients  - Consider moving patient to room near nurses station  6/23/2024 1407 by Alba Beltran RN  Outcome: Adequate for Discharge  6/23/2024 1134 by Alba Beltran RN  Outcome: Progressing     Problem: PAIN - ADULT  Goal: Verbalizes/displays adequate comfort level or baseline comfort level  Description: Interventions:  - Encourage patient to monitor pain and request assistance  - Assess pain using appropriate pain scale  - Administer analgesics based on type and severity of pain and evaluate response  - Implement non-pharmacological measures as appropriate and evaluate response  - Consider cultural and social influences on pain and pain management  - Notify physician/advanced practitioner if interventions unsuccessful or patient reports new pain  6/23/2024 1407 by Alba Beltran RN  Outcome: Adequate for Discharge  6/23/2024 1134 by Alba Beltran RN  Outcome: Progressing     Problem: INFECTION - ADULT  Goal: Absence or prevention of progression during hospitalization  Description: INTERVENTIONS:  - Assess and monitor for signs and symptoms of infection  - Monitor lab/diagnostic results  - Monitor all insertion sites, i.e. indwelling lines, tubes, and drains  - Monitor endotracheal if  appropriate and nasal secretions for changes in amount and color  - [place] appropriate cooling/warming therapies per order  - Administer medications as ordered  - Instruct and encourage patient and family to use good hand hygiene technique  - Identify and instruct in appropriate isolation precautions for identified infection/condition  6/23/2024 1407 by Alba Beltran RN  Outcome: Adequate for Discharge  6/23/2024 1134 by Alba Beltran RN  Outcome: Progressing  Goal: Absence of fever/infection during neutropenic period  Description: INTERVENTIONS:  - Monitor WBC    6/23/2024 1407 by Alba Beltran RN  Outcome: Adequate for Discharge  6/23/2024 1134 by Alba Beltran RN  Outcome: Progressing     Problem: SAFETY ADULT  Goal: Patient will remain free of falls  Description: INTERVENTIONS:  - Educate patient/family on patient safety including physical limitations  - Instruct patient to call for assistance with activity   - Consult OT/PT to assist with strengthening/mobility   - Keep Call bell within reach  - Keep bed low and locked with side rails adjusted as appropriate  - Keep care items and personal belongings within reach  - Initiate and maintain comfort rounds  - Make Fall Risk Sign visible to staff  - Offer Toileting every 2 Hours, in advance of need  - Initiate/Maintain bed/chair alarm  - Obtain necessary fall risk management equipment  - Apply yellow socks and bracelet for high fall risk patients  - Consider moving patient to room near nurses station  6/23/2024 1407 by Alba Beltran RN  Outcome: Adequate for Discharge  6/23/2024 1134 by Alba Beltran RN  Outcome: Progressing  Goal: Maintain or return to baseline ADL function  Description: INTERVENTIONS:  -  Assess patient's ability to carry out ADLs; assess patient's baseline for ADL function and identify physical deficits which impact ability to perform ADLs (bathing, care of mouth/teeth, toileting, grooming, dressing, etc.)  -  Assess/evaluate cause of self-care deficits   - Assess range of motion  - Assess patient's mobility; develop plan if impaired  - Assess patient's need for assistive devices and provide as appropriate  - Encourage maximum independence but intervene and supervise when necessary  - Involve family in performance of ADLs  - Assess for home care needs following discharge   - Consider OT consult to assist with ADL evaluation and planning for discharge  - Provide patient education as appropriate  6/23/2024 1407 by Alba Beltran RN  Outcome: Adequate for Discharge  6/23/2024 1134 by Alba Beltran RN  Outcome: Progressing  Goal: Maintains/Returns to pre admission functional level  Description: INTERVENTIONS:  - Perform AM-PAC 6 Click Basic Mobility/ Daily Activity assessment daily.  - Set and communicate daily mobility goal to care team and patient/family/caregiver.   - Collaborate with rehabilitation services on mobility goals if consulted  - Perform Range of Motion  - Reposition patient   - Dangle patient   - Stand patient   - Ambulate patient  - Out of bed to chair   - Out of bed for meals   - Out of bed for toileting  - Record patient progress and toleration of activity level   6/23/2024 1407 by Alba Beltran RN  Outcome: Adequate for Discharge  6/23/2024 1134 by Alba Beltran RN  Outcome: Progressing     Problem: DISCHARGE PLANNING  Goal: Discharge to home or other facility with appropriate resources  Description: INTERVENTIONS:  - Identify barriers to discharge w/patient and caregiver  - Arrange for needed discharge resources and transportation as appropriate  - Identify discharge learning needs (meds, wound care, etc.)  - Arrange for interpretive services to assist at discharge as needed  - Refer to Case Management Department for coordinating discharge planning if the patient needs post-hospital services based on physician/advanced practitioner order or complex needs related to functional status,  cognitive ability, or social support system  6/23/2024 1407 by Alba Beltran RN  Outcome: Adequate for Discharge  6/23/2024 1134 by Alba Beltran RN  Outcome: Progressing     Problem: Knowledge Deficit  Goal: Patient/family/caregiver demonstrates understanding of disease process, treatment plan, medications, and discharge instructions  Description: Complete learning assessment and assess knowledge base.  Interventions:  - Provide teaching at level of understanding  - Provide teaching via preferred learning methods  6/23/2024 1407 by Alba Beltran RN  Outcome: Adequate for Discharge  6/23/2024 1134 by Alba Beltran RN  Outcome: Progressing

## 2024-06-23 NOTE — DISCHARGE SUMMARY
[facility]  Discharge- Rakesh Appiah 1955, 69 y.o. male MRN: [id number]  Unit/Bed#: S -[place] Encounter: [id number]  Primary Care Provider: Alex Santiago MD   Date and time admitted to hospital: 6/22/2024 11:54 AM    * Bradycardia  Assessment & Plan  Patient reports fatigue over the last few weeks.  He was to see his primary care physician who was concerned about bradycardia.  Review of his records he does often run lower in the 50s and 60s particular at visits and old EKGs.  In 2022 after his stroke he did have a Holter monitor which was revealed to be normal sinus rhythm in the 70s.  He was scheduled for follow-up with a cardiologist but started to feel continued fatigue including lightheadedness and dizziness when going from sitting to standing or bending over.  He also noticed occasional chest discomfort.  He denies any recent rashes or exposure to ticks  Hold all AV loida blockers   Telemetry reviewed showed sinus bradycardia as low as 38 and currently in the high 40s and low 50s  Troponin cycled unremarkable  Consult cardiology for symptomatic bradycardia.  Appreciate their support, may benefit for longer-term monitoring prior to EP appointment in August. Zio patch will be arranged  Echocardiogram/stress test as an outpatient    PADDY (obstructive sleep apnea)  Assessment & Plan  Was initially on CPAP from a sleep study July 2020.  He had a hard time sleeping and self discontinued this with shipping the machine back 1 year ago.  Discussed with case management and placed a reorder prescription.  The patient is amenable to utilizing this    Hyperlipidemia  Assessment & Plan  Continue statin    Hypertension  Assessment & Plan  Patient reports compliance with losartan and amlodipine.  Will continue these with parameters    History of stroke  Assessment & Plan  Patient has residual left-sided weakness.  He utilizes a cane to walk.  He has slower speech but clear.  Continue statin, blood  pressure control and Eliquis        Medical Problems       Resolved Problems  Date Reviewed: 7/18/2023   None       Discharging Physician / Practitioner: Marshal Ayala MD  PCP: Alex Santiago MD  Admission Date:   Admission Orders (From admission, onward)       Ordered        06/22/24 1421  Place in Observation  Once                          Discharge Date: 06/23/24    Consultations During Hospital Stay:  cardiology    Procedures Performed:   CT head  IMPRESSION:     No acute intracranial abnormality.     Sequela of remote right MCA distribution infarction.     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    Significant Findings / Test Results:   above    Incidental Findings:   none       Test Results Pending at Discharge (will require follow up):   none     Outpatient Tests Requested:  Echocardiogram  Stress test  Sleep study    Complications: None    Reason for Admission: fatigue      Hospital Course:   Rakesh Appiah is a 69 y.o. male patient who originally presented to the hospital on 6/22/2024 due to fatigue. he went to the primary care physician where they diagnosed him with bradycardia.  He notes he has had this for a few weeks.  He was driving in the car and had noticed that he also had some chest discomfort and proceeded to the emergency department.    His troponins were unremarkable and his EKG was pertinent for sinus bradycardia without ST changes.  Cardiology was consulted who recommended an outpatient echocardiogram and stress test as well as Zio patch for 2 weeks.    He has a history of mild sleep apnea which was diagnosed July 2020.  He was prescribed an auto APAP device but because of lack of usage over the year had returned it.  I requested case management to try and have this requested but also talk to the patient about talking with his primary care physician and repeating the sleep study.            Please see above list of diagnoses and related plan for additional information.      Condition at Discharge: fair    Discharge Day Visit / Exam:   * Please refer to separate progress note for these details *    Discussion with Family: Patient declined call to .     Discharge instructions/Information to patient and family:   See after visit summary for information provided to patient and family.      Provisions for Follow-Up Care:  See after visit summary for information related to follow-up care and any pertinent home health orders.      Mobility at time of Discharge:   Basic Mobility Inpatient Raw Score: 22  JH-HLM Goal: 7: Walk 25 feet or more  JH-HLM Achieved: 4: Move to chair/commode  HLM Goal achieved. Continue to encourage appropriate mobility.     Disposition:   Home    Planned Readmission: no     Discharge Statement:  I spent 35 minutes discharging the patient. This time was spent on the day of discharge. I had direct contact with the patient on the day of discharge. Greater than 50% of the total time was spent examining patient, answering all patient questions, arranging and discussing plan of care with patient as well as directly providing post-discharge instructions.  Additional time then spent on discharge activities.    Discharge Medications:  See after visit summary for reconciled discharge medications provided to patient and/or family.      **Please Note: This note may have been constructed using a voice recognition system**

## 2024-06-23 NOTE — UTILIZATION REVIEW
Initial Clinical Review    Admission: Date/Time/Statement:   Admission Orders (From admission, onward)       Ordered        06/22/24 1421  Place in Observation  Once                          Orders Placed This Encounter   Procedures    Place in Observation     Standing Status:   Standing     Number of Occurrences:   1     Order Specific Question:   Level of Care     Answer:   Med Surg [16]     ED Arrival Information       Expected   -    Arrival   6/22/2024 11:43    Acuity   Urgent              Means of arrival   Walk-In    Escorted by   Spouse    Service   Hospitalist    Admission type   Emergency              Arrival complaint   Sterling/CP/Headache/Fatigue             Chief Complaint   Patient presents with    Chest Pain     CP, fatigued, and headache for past few days. Pt also reports also low HR in 40's.        Initial Presentation: 69 y.o. male with hx stroke , HLD, HTN, PAF on Eliquis who presents to ED from home with fatigue and bradycardia . Pt reports feeling fatigued x weeks, went to PCP who noted that he is bradycardic and he was referred to cardiology but it was months away. Pt  reports a chest heaviness at times that is on and off.  Reports dizziness and lightheadedness when going from sitting to standing or bending over. Occasionally feels like it is hard to catch his breath  at rest. ON exam , bradycardic at 43. LUE weakness.CXR shows nothing acute . ECG- SB, HR in 40's. Pt given ASA and IVF in ED. Admitted as OBS to telemetry with symptomatic bradycardia. PLan- telemetry, trend troponin. Cardiology consult. Hold all AV loida blockers . PRN IV Atropine HR <30 . Continue home meds .     Anticipated Length of Stay/Certification Statement: Patient will be admitted on an observation basis with an anticipated length of stay of less than 2 midnights secondary to symptomatic bradycardia.     Date: 6/23     ED Triage Vitals   Temperature Pulse Respirations Blood Pressure SpO2 Pain Score   06/22/24 1159 06/22/24  1157 06/22/24 1157 06/22/24 1159 06/22/24 1157 06/22/24 1343   98.1 °F (36.7 °C) 60 18 134/63 95 % No Pain     Weight (last 2 days)       None            Vital Signs (last 3 days)       Date/Time Temp Pulse Resp BP MAP (mmHg) SpO2 O2 Device Pain    06/23/24 06:05:37 -- 45 -- 136/73 94 98 % -- --    06/22/24 2205 98 °F (36.7 °C) -- -- -- -- -- -- --    06/22/24 21:06:27 97.4 °F (36.3 °C) 47 18 126/69 88 98 % -- --    06/22/24 2100 -- -- -- -- -- 95 % None (Room air) No Pain    06/22/24 19:24:25 -- 44 -- 137/68 91 98 % -- --    06/22/24 1700 -- -- -- -- -- -- None (Room air) No Pain    06/22/24 16:49:10 -- 50 14 147/73 98 97 % -- --    06/22/24 1624 -- -- -- -- -- -- -- No Pain    06/22/24 1602 -- 45 -- 153/73 105 100 % None (Room air) --    06/22/24 1343 -- 43 18 117/56 -- 100 % None (Room air) No Pain    06/22/24 1245 -- 44 -- -- -- 97 % -- --    06/22/24 1159 98.1 °F (36.7 °C) -- -- 134/63 90 -- -- --    06/22/24 1157 -- 60 18 -- -- 95 % None (Room air) --              Pertinent Labs/Diagnostic Test Results:   Radiology:  CT head without contrast   Final Interpretation by Pallav N Shah, MD (06/22 1255)      No acute intracranial abnormality.      Sequela of remote right MCA distribution infarction.      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                  Workstation performed: [id number]         X-ray chest 1 view portable   ED Interpretation by Janes Escamilla MD (06/22 1252)   No acute cardiopulmonary disease      Final Interpretation by Neena Patino MD (06/22 7466)      No acute cardiopulmonary disease.            Workstation performed: [id number]           Cardiology:  6/22 ECG-ED read Rate:     ECG rate:  50     ECG rate assessment: bradycardic    Rhythm:     Rhythm: sinus rhythm    Ectopy:     Ectopy: none    QRS:     QRS axis:  Normal     QRS intervals:  Normal   Conduction:     Conduction: normal    ST segments:     ST segments:  Normal   T waves:     T waves: normal     No orders  "to display     GI:  No orders to display       Results from last 7 days   Lab Units 06/22/24  1223   SARS-COV-2  Negative     Results from last 7 days   Lab Units 06/22/24  1223   WBC Thousand/uL 8.50   HEMOGLOBIN g/dL 13.2   HEMATOCRIT % 40.2   PLATELETS Thousands/uL 217   TOTAL NEUT ABS Thousands/µL 5.01         Results from last 7 days   Lab Units 06/22/24  1223 06/19/24  0705   SODIUM mmol/L 141 144   POTASSIUM mmol/L 3.7 3.7   CHLORIDE mmol/L 103 105   CO2 mmol/L 29 29   ANION GAP mmol/L 9 10   BUN mg/dL 14 10   CREATININE mg/dL 0.91 0.85   EGFR ml/min/1.73sq m 85 93   CALCIUM mg/dL 9.4 9.2   MAGNESIUM mg/dL 2.0  --    PHOSPHORUS mg/dL 3.0  --      Results from last 7 days   Lab Units 06/22/24  1223 06/19/24  0705   AST U/L 21 20   ALT U/L 24 24   ALK PHOS U/L 47 45   TOTAL PROTEIN g/dL 6.8 6.5   ALBUMIN g/dL 4.3 4.3   TOTAL BILIRUBIN mg/dL 1.03* 1.1*         Results from last 7 days   Lab Units 06/22/24  1223 06/19/24  0705   GLUCOSE RANDOM mg/dL 76 98             No results found for: \"BETA-HYDROXYBUTYRATE\"                   Results from last 7 days   Lab Units 06/22/24  1717 06/22/24  1424 06/22/24  1223   HS TNI 0HR ng/L  --   --  4   HS TNI 2HR ng/L  --  5  --    HSTNI D2 ng/L  --  1  --    HS TNI 4HR ng/L 5  --   --    HSTNI D4 ng/L 1  --   --              Results from last 7 days   Lab Units 06/22/24  1223   TSH 3RD GENERATON uIU/mL 1.696                                                             Results from last 7 days   Lab Units 06/22/24  1223   INFLUENZA A PCR  Negative   INFLUENZA B PCR  Negative   RSV PCR  Negative                                               ED Treatment-Medication Administration from 06/22/2024 1143 to 06/22/2024 1623         Date/Time Order Dose Route Action     06/22/2024 1228 aspirin chewable tablet 324 mg 324 mg Oral Given     06/22/2024 1233 multi-electrolyte (ISOLYTE-S PH 7.4) bolus 500 mL 500 mL Intravenous New Bag            Past Medical History:   Diagnosis Date    " Arthritis     in hands    Cervical herniated disc     Colon polyp     Diabetes mellitus (HCC)     borderline    Gout     hx of in 2018, none since    Hyperlipidemia     Hypertension     Irregular heart beat     a-fib, has loop recorder    Kidney stone 09/13/2021    hx of    Migraine     S/P Botox injection     in left wrist to help increase mobility    Sleep apnea     mild sleep apnea, cpap was not ordered    Stroke (HCC) 2018    left hemiparesis, wears leg brace and uses cane, unable to use left arm and hand    Use of cane as ambulatory aid     had CVA in 2018     Present on Admission:   Hypertension   Hyperlipidemia   Bradycardia   Paroxysmal atrial fibrillation (HCC)   PADDY (obstructive sleep apnea)      Admitting Diagnosis: Chest pain [R07.9]  Sinus bradycardia [R00.1]  Generalized weakness [R53.1]  Age/Sex: 69 y.o. male  Admission Orders:  Scheduled Medications:  amLODIPine, 5 mg, Oral, Daily  apixaban, 5 mg, Oral, BID  atorvastatin, 80 mg, Oral, QPM  FLUoxetine, 20 mg, Oral, Daily  losartan, 50 mg, Oral, Daily      Continuous IV Infusions:     PRN Meds:  acetaminophen, 650 mg, Oral, Q6H PRN x1 6/23   aluminum-magnesium hydroxide-simethicone, 30 mL, Oral, Q6H PRN  atropine, 0.4 mg, Intravenous, Daily PRN  ondansetron, 4 mg, Intravenous, Q6H PRN  sodium chloride (PF), 3 mL, Intravenous, Q1H PRN    Reg diet   Telemetry   SCD   OOB as geradro    IP CONSULT TO CARDIOLOGY  IP CONSULT TO [facility] Utilization Review Department  ATTENTION: Please call with any questions or concerns to [phone] and carefully listen to the prompts so that you are directed to the right person. All voicemails are confidential.   For Discharge needs, contact Care Management DC Support Team at [phone] opt. 2  Send all requests for admission clinical reviews, approved or denied determinations and any other requests to dedicated fax number below belonging to the campus where the patient is receiving treatment. List of  dedicated fax numbers for the Facilities:  FACILITY NAME UR FAX NUMBER   ADMISSION DENIALS (Administrative/Medical Necessity) [phone]   DISCHARGE SUPPORT TEAM (NETWORK) [phone]   PARENT CHILD HEALTH (Maternity/NICU/Pediatrics) [phone]   [facility] [phone]   [facility] [phone]   [facility] [phone]   [facility] [phone]   [facility] [phone]   [facility] [phone]   [facility] [phone]   [facility] [phone]   [facility] [phone]   [facility] [phone]   [facility] [phone]   [facility] [phone]

## 2024-06-25 ENCOUNTER — TELEPHONE (OUTPATIENT)
Dept: CARDIOLOGY CLINIC | Facility: CLINIC | Age: 69
End: 2024-06-25

## 2024-06-25 DIAGNOSIS — R00.1 BRADYCARDIA: Primary | ICD-10-CM

## 2024-07-01 ENCOUNTER — HOSPITAL ENCOUNTER (OUTPATIENT)
Dept: NON INVASIVE DIAGNOSTICS | Facility: CLINIC | Age: 69
Discharge: HOME | End: 2024-07-01
Payer: COMMERCIAL

## 2024-07-01 DIAGNOSIS — I65.23 BILATERAL CAROTID ARTERY STENOSIS: ICD-10-CM

## 2024-07-01 PROCEDURE — 93880 EXTRACRANIAL BILAT STUDY: CPT

## 2024-07-01 PROCEDURE — 93880 EXTRACRANIAL BILAT STUDY: CPT | Performed by: SURGERY

## 2024-07-03 LAB

## 2024-07-05 DIAGNOSIS — I65.23 BILATERAL CAROTID ARTERY STENOSIS: Primary | ICD-10-CM

## 2024-07-05 NOTE — RESULT ENCOUNTER NOTE
Tremaine Cameron,    The carotid doppler looks good, still only mild stenosis in the arteries.  We should repeat this in one year which I ordered for you.  Please let me know if you have any questions.    Thanks!    Dr MARIN

## 2024-07-09 ENCOUNTER — TELEPHONE (OUTPATIENT)
Dept: NEUROLOGY | Facility: CLINIC | Age: 69
End: 2024-07-09

## 2024-07-17 ENCOUNTER — CLINICAL SUPPORT (OUTPATIENT)
Dept: CARDIOLOGY CLINIC | Facility: CLINIC | Age: 69
End: 2024-07-17
Payer: COMMERCIAL

## 2024-07-17 DIAGNOSIS — R00.1 BRADYCARDIA: ICD-10-CM

## 2024-07-17 PROCEDURE — 93248 EXT ECG>7D<15D REV&INTERPJ: CPT | Performed by: INTERNAL MEDICINE

## 2024-07-18 ENCOUNTER — OFFICE VISIT (OUTPATIENT)
Dept: NEUROLOGY | Facility: CLINIC | Age: 69
End: 2024-07-18
Payer: COMMERCIAL

## 2024-07-18 VITALS
WEIGHT: 184.8 LBS | BODY MASS INDEX: 28.01 KG/M2 | DIASTOLIC BLOOD PRESSURE: 64 MMHG | SYSTOLIC BLOOD PRESSURE: 134 MMHG | HEART RATE: 48 BPM | OXYGEN SATURATION: 97 % | HEIGHT: 68 IN | TEMPERATURE: 98.3 F

## 2024-07-18 DIAGNOSIS — I10 PRIMARY HYPERTENSION: Primary | ICD-10-CM

## 2024-07-18 DIAGNOSIS — G81.94 LEFT HEMIPARESIS (HCC): ICD-10-CM

## 2024-07-18 DIAGNOSIS — I65.23 BILATERAL CAROTID ARTERY STENOSIS: ICD-10-CM

## 2024-07-18 DIAGNOSIS — I48.0 PAROXYSMAL ATRIAL FIBRILLATION (HCC): ICD-10-CM

## 2024-07-18 DIAGNOSIS — E78.5 HYPERLIPIDEMIA, UNSPECIFIED HYPERLIPIDEMIA TYPE: ICD-10-CM

## 2024-07-18 DIAGNOSIS — Z86.73 HISTORY OF STROKE: ICD-10-CM

## 2024-07-18 DIAGNOSIS — G47.33 OSA (OBSTRUCTIVE SLEEP APNEA): ICD-10-CM

## 2024-07-18 PROCEDURE — 99214 OFFICE O/P EST MOD 30 MIN: CPT

## 2024-07-18 RX ORDER — FLUOXETINE 20 MG/1
20 TABLET, FILM COATED ORAL DAILY
COMMUNITY
Start: 2024-05-15

## 2024-07-18 NOTE — PROGRESS NOTES
Patient ID: Rakesh Appiah is a 69 y.o. male who presents to the [facility].    Assessment/Plan:    History of Stroke:    - VAS carotid artery ultrasound ordered by Dr. Rothman, patient has less than 50% stenosis of the bilateral internal carotid arteries.  Recommended that the patient have repeat carotid artery ultrasound in about 2 years time.  - Would advise the patient to follow with cardiology in regards to recent bradycardia that he has been experiencing.  Patient recently returned a Zio patch to cardiology the other day.  He has an echocardiogram and a stress test coming up in the future.  He will see cardiology in August to discuss if he may need a pacemaker or if there are any other interventions that need to be made at that time in regards to his heart rate.  - Advise completing a sleep study to again rule out any concerns in regards to obstructive sleep apnea.  Patient notes that he will have a sleep study ordered for him in the future by cardiology.    - For ongoing stroke prevention continue: Eliquis 5 mg twice daily, atorvastatin 80 mg once daily   - Discussed the importance of antiplatelet management with the patient to prevent future strokes.   - Recommend to check blood pressure occasionally away from the doctor's office to make sure that those numbers are typically less than 130/80.  If they are frequently higher than that, we recommend checking a little more often and to follow up with primary care team   - Will defer to primary care team for monitoring of cholesterol panel and blood sugar numbers with target LDL cholesterol of less than 70 and hemoglobin A1c less than 7%  - Recommend following a low salt, mediterranean diet   - Recommend routine physical exercise as tolerated     We will plan for him to return to the office in 1 year time to see on of the APPs or Dr. Rothman but would be happy to see him sooner if the need should arise.  If he has any symptoms concerning for TIA or  stroke including sudden painless loss of vision or double vision, difficulty speaking or swallowing, vertigo/room spinning that does not quickly resolve, or weakness/numbness/loss of coordination affecting 1 side of the face or body he should proceed by ambulance to the nearest emergency room immediately.     Subjective:    HPI      For Review:    The patient was last seen in the office on 07/18/2023 by myself.  At the last visit, the patient was being seen for a follow-up in regards to history of stroke.  It was noted that the patient was struggling with a past history of migraine headaches and due to persistent daily headache at the last visit as well.  Is noted that he had improvement of his headaches at his previous visit, and did feel as though it was most likely related to stiffness/soreness in the neck.  It is noted that the patient was having headaches that happen about once a month.  He felt as though the headaches he was getting more more or less chest stress or tension related headaches of the upper back and neck area.  Is noted that the patient was going to continue with his muscle relaxant medication on an as-needed basis for neck stiffness/tightness at the last appointment.  Was also going to take Tylenol as needed for abortive therapy.  Advised to avoid NSAIDs like Advil or naproxen due to the patient's stroke history and also taking Eliquis at the same time.  It was noted that in regards to patient's stroke, he was doing fairly well.  The patient has persistent left-sided hemiparesis and dysarthria of speech as part of his stroke deficits.  At the last appointment he did not note any new strokelike symptoms.  He stated that he was interested in continuing with speech therapy and Occupational Therapy for his dysarthria, which I did encourage him to continue.  For secondary stroke prevention, recommended the patient continue with Eliquis 5 mg twice daily and atorvastatin 80 mg once daily.  Patient was  doing fairly well at controlling and managing his vascular risk factors.  He had no other questions or concerns at the last appointment in regards to stroke history or his headaches.      Interval History:    New stroke symptoms/residual symptoms:    Any new, sudden onset weakness, numbness, facial droop, slurred speech, difficulty speaking, trouble swallowing, persistent vertigo, or sudden double vision or vision loss? No new stroke-like symptoms    Residual symptoms include: weakness of the left UE/LE: upper extremity and lower extremity    Stroke Etiology and Risk Factor modification:    This was a(n) embolic stroke, most likely related to Cardioembolic: Non-Valvular A-Fib    Stroke risk factors were evaluated including: Hyperlipidemia, paroxysmal atrial fibrillation, hypertension, PADDY, bilateral carotid artery stenosis    AP/AC therapy: Eliquis 5 mg twice daily at this time. No bleeding or bruising issues.     Statin therapy:atorvastatin 80 mg once daily.     Blood pressure today and as of late:134/64 BP today in the office. Typically, around this range when he is at home.Two or three times a week he checks his BP.    Most recent LDL: 55 mg/dL as of 06/19/2024    Most recent hemoglobin A1C: 6.0% as of 05/06/2021    Cardiology evaluation? Any cardiac monitoring required?: Does have cardiology follow up in August for bradycardia and further testing at this time.     Endocrinology evaluation? Following proper glycemic treatment/diet? None    Lifestyle history/modifications:    Diet/Exercise regimen: Does work out at the [facility] once or twice a week at this time. Trying to eat relatively healthy at this time, eating more lean meat at this time.     Any physical therapy, occupational therapy or speech therapy performed/required at this time?: No PT/OT, speech has been well. He did complete speech therapy after last appointment in the office.     Any difficulty with sleep? No issues with sleeping, sometimes has a hard  time going back to sleep if he wakes up to go too the bathroom at night    Any history of sleep apnea? CPAP compliance?: Not noticing any problems with sleep apnea, was tested a few years prior he states. States he will have a repeat sleep study in the future.     Post-stroke depression/anxiety? No depression/anxiety     Any history of smoking? Quit smoking 6 years ago    Additional History:    Recently had been in the hospital in June of 2024 for symptoms of bradycardia/slow heart rate. Feeling very tired and nauseous when he went in, feelings of heaviness in his chest as well. He did just take off the Zio patch and returned it on the 11th of July. Has an upcoming echocardiogram and stress test. Will see cardiology in August for follow up he states.     Usually does not have to take the muscle relaxants if he does not need them. Has not had to take them in the last 6 months. No further Botox for spasticity of the left upper and lower extremity.     Not having any significant headaches that he was having previously. Having normal headaches now, nothing lie he was before. Typically can take Tylenol to help get rid of the headaches. Not very bothersome to him at this time.      Lab Results   Component Value Date/Time    CHOLESTEROL 161 10/21/2018 04:38 AM     Lab Results   Component Value Date/Time    TRIG 216 (H) 10/21/2018 04:38 AM     Lab Results   Component Value Date/Time    HDL 35 (L) 10/21/2018 04:38 AM     Lab Results   Component Value Date/Time    LDLCALC 83 10/21/2018 04:38 AM       Lab Results   Component Value Date/Time    HGBA1C 6.0 (H) 05/06/2021 08:35 AM     Lab Results   Component Value Date/Time     05/06/2021 08:35 AM           Past Medical History:   Diagnosis Date    Arthritis     in hands    Cervical herniated disc     Colon polyp     Diabetes mellitus (HCC)     borderline    Gout     hx of in 2018, none since    Hyperlipidemia     Hypertension     Irregular heart beat     a-fib, has loop  "recorder    Kidney stone 09/13/2021    hx of    Migraine     S/P Botox injection     in left wrist to help increase mobility    Sleep apnea     mild sleep apnea, cpap was not ordered    Stroke (HCC) 2018    left hemiparesis, wears leg brace and uses cane, unable to use left arm and hand    Use of cane as ambulatory aid     had CVA in 2018       Current Outpatient Medications:     amLODIPine (NORVASC) 5 mg tablet, Take 5 mg by mouth daily Taking 1 1/2 daily daily , Disp: , Rfl: 5    atorvastatin (LIPITOR) 80 mg tablet, Take 1 tablet (80 mg total) by mouth every evening, Disp: 90 tablet, Rfl: 3    Eliquis 5 MG, TAKE 1 TABLET BY MOUTH TWICE A DAY, Disp: 60 tablet, Rfl: 5    FLUoxetine 20 MG tablet, Take 20 mg by mouth daily, Disp: , Rfl:     losartan (COZAAR) 50 mg tablet, Take 1 tablet (50 mg total) by mouth daily, Disp: 90 tablet, Rfl: 0     Objective:    Physical Exam:                                                                 Vitals:            Constitutional:    /64 (BP Location: Left arm, Patient Position: Sitting, Cuff Size: Adult)   Temp 98.3 °F (36.8 °C) (Temporal)   Ht 5' 8\" (1.727 m)   Wt 83.8 kg (184 lb 12.8 oz)   BMI 28.10 kg/m²   BP Readings from Last 3 Encounters:   07/18/24 134/64   06/23/24 141/79   07/18/23 134/66     Pulse Readings from Last 3 Encounters:   06/23/24 58   07/18/23 (!) 54   03/15/23 (!) 52         Well developed, well nourished, well groomed. No dysmorphic features.       Psychiatric:  Normal behavior and appropriate affect        Neurological Examination:     Mental status/cognitive function:   Orientated to time, place and person. Recent and remote memory intact. Attention span and concentration as well as fund of knowledge are appropriate for age. Normal language and spontaneous speech.     Cranial Nerves:  II-visual fields full.   III, IV, VI-Pupils were equal, round, and reactive to light and accomodation. Extraocular movements were full and conjugate without " nystagmus. Conjugate gaze, normal smooth pursuits, normal saccades   V-facial sensation symmetric.    VII-facial expression symmetric, intact forehead wrinkle, strong eye closure, symmetric smile    VIII-hearing grossly intact bilaterally   IX, X-palate elevation symmetric, no dysarthria.   XI-shoulder shrug strength intact    XII-tongue protrusion midline.    Motor Exam: Increased bulk and tone of the left upper extremity, increased flaccidness of left upper extremity. Power/strength 5/5 right upper and lower extremities, power/strength 4/5 of the left lower extremity, power/strength 3/5 of the left upper extremity.  Sensory: grossly intact light touch in all extremities.   Reflexes:  Right: Brachioradialis 2+, biceps 2+, knee 2+  Left: Brachioradialis 3+, biceps 3+, knee 3+  Coordination: Finger nose finger intact on the right side, could not be tested on the left due to stroke deficits  Gait: steady casual and tandem gait.       ROS:    Review of Systems   Constitutional:  Negative for appetite change, fatigue and fever.   HENT: Negative.  Negative for hearing loss, tinnitus, trouble swallowing and voice change.    Eyes: Negative.  Negative for photophobia, pain and visual disturbance.   Respiratory: Negative.  Negative for shortness of breath.    Cardiovascular: Negative.  Negative for palpitations.   Gastrointestinal: Negative.  Negative for nausea and vomiting.   Endocrine: Negative.  Negative for cold intolerance.   Genitourinary: Negative.  Negative for dysuria, frequency and urgency.   Musculoskeletal:  Negative for back pain, gait problem, myalgias, neck pain and neck stiffness.   Skin: Negative.  Negative for rash.   Allergic/Immunologic: Negative.    Neurological: Negative.  Negative for dizziness, tremors, seizures, syncope, facial asymmetry, speech difficulty, weakness, light-headedness, numbness and headaches.   Hematological: Negative.  Does not bruise/bleed easily.   Psychiatric/Behavioral:  Negative.  Negative for confusion, hallucinations and sleep disturbance.    All other systems reviewed and are negative.      I have spent 25 minutes today on this case including chart review, performing history and exam, patient counseling, and documentation/communication      Jean-Claude Mcgee PA-C  7/18/2024 9:08 AM

## 2024-07-18 NOTE — PROGRESS NOTES
ROS:    Review of Systems   Constitutional:  Negative for appetite change, fatigue and fever.   HENT: Negative.  Negative for hearing loss, tinnitus, trouble swallowing and voice change.    Eyes: Negative.  Negative for photophobia, pain and visual disturbance.   Respiratory: Negative.  Negative for shortness of breath.    Cardiovascular: Negative.  Negative for palpitations.   Gastrointestinal: Negative.  Negative for nausea and vomiting.   Endocrine: Negative.  Negative for cold intolerance.   Genitourinary: Negative.  Negative for dysuria, frequency and urgency.   Musculoskeletal:  Negative for back pain, gait problem, myalgias, neck pain and neck stiffness.   Skin: Negative.  Negative for rash.   Allergic/Immunologic: Negative.    Neurological: Negative.  Negative for dizziness, tremors, seizures, syncope, facial asymmetry, speech difficulty, weakness, light-headedness, numbness and headaches.   Hematological: Negative.  Does not bruise/bleed easily.   Psychiatric/Behavioral: Negative.  Negative for confusion, hallucinations and sleep disturbance.    All other systems reviewed and are negative.

## 2024-07-18 NOTE — PATIENT INSTRUCTIONS
History of Stroke:    - VAS carotid artery ultrasound ordered by Dr. Rothman, patient has less than 50% stenosis of the bilateral internal carotid arteries.  Recommended that the patient have repeat carotid artery ultrasound in about 2 years time.  - Would advise the patient to follow with cardiology in regards to recent bradycardia that he has been experiencing.  Patient recently returned a Zio patch to cardiology the other day.  He has an echocardiogram and a stress test coming up in the future.  He will see cardiology in August to discuss if he may need a pacemaker or if there are any other interventions that need to be made at that time in regards to his heart rate.  - Advise completing a sleep study to again rule out any concerns in regards to obstructive sleep apnea.  Patient notes that he will have a sleep study ordered for him in the future by cardiology.    - For ongoing stroke prevention continue: Eliquis 5 mg twice daily, atorvastatin 80 mg once daily   - Discussed the importance of antiplatelet management with the patient to prevent future strokes.   - Recommend to check blood pressure occasionally away from the doctor's office to make sure that those numbers are typically less than 130/80.  If they are frequently higher than that, we recommend checking a little more often and to follow up with primary care team   - Will defer to primary care team for monitoring of cholesterol panel and blood sugar numbers with target LDL cholesterol of less than 70 and hemoglobin A1c less than 7%  - Recommend following a low salt, mediterranean diet   - Recommend routine physical exercise as tolerated     We will plan for him to return to the office in 1 year time to see on of the APPs or Dr. Rothman but would be happy to see him sooner if the need should arise.  If he has any symptoms concerning for TIA or stroke including sudden painless loss of vision or double vision, difficulty speaking or swallowing,  vertigo/room spinning that does not quickly resolve, or weakness/numbness/loss of coordination affecting 1 side of the face or body he should proceed by ambulance to the nearest emergency room immediately.

## 2024-07-24 ENCOUNTER — TELEPHONE (OUTPATIENT)
Age: 69
End: 2024-07-24

## 2024-07-24 NOTE — TELEPHONE ENCOUNTER
07/24/24  Screened by: Ligia Padilla    Referring Provider Dr. Santiago    Pre- Screening:     There is no height or weight on file to calculate BMI. 28.10  Has patient been referred for a routine screening Colonoscopy? yes  Is the patient between 45-75 years old? yes      Previous Colonoscopy yes   If yes:    Date: 2020    Facility:     Reason:         Does the patient want to see a Gastroenterologist prior to their procedure OR are they having any GI symptoms? no    Has the patient been hospitalized or had abdominal surgery in the past 6 months? no    Does the patient use supplemental oxygen? no    Does the patient take Coumadin, Lovenox, Plavix, Elliquis, Xarelto, or other blood thinning medication? yes    Has the patient had a stroke, cardiac event, or stent placed in the past year? no        If patient is between 45yrs - 49yrs, please advise patient that we will have to confirm benefits & coverage with their insurance company for a routine screening colonoscopy.

## 2024-07-29 ENCOUNTER — HOSPITAL ENCOUNTER (OUTPATIENT)
Dept: NON INVASIVE DIAGNOSTICS | Facility: CLINIC | Age: 69
Discharge: HOME | End: 2024-07-29
Payer: COMMERCIAL

## 2024-07-29 VITALS
DIASTOLIC BLOOD PRESSURE: 64 MMHG | SYSTOLIC BLOOD PRESSURE: 134 MMHG | HEART RATE: 45 BPM | HEIGHT: 68 IN | WEIGHT: 184.75 LBS | BODY MASS INDEX: 28 KG/M2

## 2024-07-29 DIAGNOSIS — R00.1 BRADYCARDIA: ICD-10-CM

## 2024-07-29 LAB
AORTIC ROOT: 2.9 CM
APICAL FOUR CHAMBER EJECTION FRACTION: 64 %
ASCENDING AORTA: 3.7 CM
DOP CALC LVOT AREA: 3.46 CM2
DOP CALC LVOT DIAMETER: 2.1 CM
E WAVE DECELERATION TIME: 214 MS
E/A RATIO: 1.04
FRACTIONAL SHORTENING: 36 (ref 28–44)
INTERVENTRICULAR SEPTUM IN DIASTOLE (PARASTERNAL SHORT AXIS VIEW): 0.9 CM
INTERVENTRICULAR SEPTUM: 0.9 CM (ref 0.6–1.1)
LAAS-AP2: 19.8 CM2
LAAS-AP4: 21.9 CM2
LEFT ATRIUM AREA SYSTOLE SINGLE PLANE A4C: 25.1 CM2
LEFT ATRIUM SIZE: 3.8 CM
LEFT ATRIUM VOLUME (MOD BIPLANE): 75 ML
LEFT ATRIUM VOLUME INDEX (MOD BIPLANE): 37.9 ML/M2
LEFT INTERNAL DIMENSION IN SYSTOLE: 3.2 CM (ref 2.1–4)
LEFT VENTRICULAR INTERNAL DIMENSION IN DIASTOLE: 5 CM (ref 3.5–6)
LEFT VENTRICULAR POSTERIOR WALL IN END DIASTOLE: 0.9 CM
LEFT VENTRICULAR STROKE VOLUME: 78 ML
LVSV (TEICH): 78 ML
MV E'TISSUE VEL-SEP: 9 CM/S
MV PEAK A VEL: 0.71 M/S
MV PEAK E VEL: 74 CM/S
MV STENOSIS PRESSURE HALF TIME: 62 MS
MV VALVE AREA P 1/2 METHOD: 3.55
NUC STRESS EJECTION FRACTION: 54 %
RA PRESSURE ESTIMATED: 5 MMHG
RATE PRESSURE PRODUCT: 8024
RIGHT ATRIUM AREA SYSTOLE A4C: 15.3 CM2
RIGHT VENTRICLE ID DIMENSION: 3.8 CM
SINOTUBULAR JUNCTION: 2.8 CM
SL CV LEFT ATRIUM LENGTH A2C: 4.3 CM
SL CV LV EF: 65
SL CV PED ECHO LEFT VENTRICLE DIASTOLIC VOLUME (MOD BIPLANE) 2D: 120 ML
SL CV PED ECHO LEFT VENTRICLE SYSTOLIC VOLUME (MOD BIPLANE) 2D: 42 ML
SL CV REST NUCLEAR ISOTOPE DOSE: 11 MCI
SL CV SINUS OF VALSALVA 2D: 3.8 CM
SL CV STRESS NUCLEAR ISOTOPE DOSE: 33 MCI
SL CV STRESS RECOVERY BP: NORMAL MMHG
SL CV STRESS RECOVERY HR: 56 BPM
SL CV STRESS RECOVERY O2 SAT: 99 %
STJ: 2.8 CM
STRESS ANGINA INDEX: 0
STRESS BASELINE BP: NORMAL MMHG
STRESS BASELINE HR: 47 BPM
STRESS O2 SAT REST: 99 %
STRESS PEAK HR: 68 BPM
STRESS POST O2 SAT PEAK: 100 %
STRESS POST PEAK BP: 118 MMHG
STRESS/REST PERFUSION RATIO: 1.17
TRICUSPID ANNULAR PLANE SYSTOLIC EXCURSION: 2.6 CM

## 2024-07-29 PROCEDURE — 93018 CV STRESS TEST I&R ONLY: CPT | Performed by: INTERNAL MEDICINE

## 2024-07-29 PROCEDURE — 78452 HT MUSCLE IMAGE SPECT MULT: CPT

## 2024-07-29 PROCEDURE — 93306 TTE W/DOPPLER COMPLETE: CPT | Performed by: INTERNAL MEDICINE

## 2024-07-29 PROCEDURE — A9502 TC99M TETROFOSMIN: HCPCS

## 2024-07-29 PROCEDURE — 78452 HT MUSCLE IMAGE SPECT MULT: CPT | Performed by: INTERNAL MEDICINE

## 2024-07-29 PROCEDURE — 93306 TTE W/DOPPLER COMPLETE: CPT

## 2024-07-29 PROCEDURE — 93017 CV STRESS TEST TRACING ONLY: CPT

## 2024-07-29 PROCEDURE — 93016 CV STRESS TEST SUPVJ ONLY: CPT | Performed by: INTERNAL MEDICINE

## 2024-07-29 RX ORDER — REGADENOSON 0.08 MG/ML
0.4 INJECTION, SOLUTION INTRAVENOUS ONCE
Status: COMPLETED | OUTPATIENT
Start: 2024-07-29 | End: 2024-07-29

## 2024-07-29 RX ADMIN — REGADENOSON 0.4 MG: 0.08 INJECTION, SOLUTION INTRAVENOUS at 12:40

## 2024-07-30 LAB
CHEST PAIN STATEMENT: NORMAL
MAX DIASTOLIC BP: 72 MMHG
MAX PREDICTED HEART RATE: 150 BPM
PROTOCOL NAME: NORMAL
REASON FOR TERMINATION: NORMAL
STRESS POST EXERCISE DUR MIN: 3 MIN
STRESS POST EXERCISE DUR SEC: 0 SEC
STRESS POST PEAK HR: 68 BPM
STRESS POST PEAK SYSTOLIC BP: 120 MMHG
TARGET HR FORMULA: NORMAL
TEST INDICATION: NORMAL

## 2024-08-21 ENCOUNTER — PREP FOR PROCEDURE (OUTPATIENT)
Dept: CARDIOLOGY CLINIC | Facility: CLINIC | Age: 69
End: 2024-08-21

## 2024-08-21 ENCOUNTER — OFFICE VISIT (OUTPATIENT)
Dept: CARDIOLOGY CLINIC | Facility: CLINIC | Age: 69
End: 2024-08-21
Payer: COMMERCIAL

## 2024-08-21 ENCOUNTER — TELEPHONE (OUTPATIENT)
Dept: CARDIOLOGY CLINIC | Facility: CLINIC | Age: 69
End: 2024-08-21

## 2024-08-21 VITALS
HEART RATE: 45 BPM | DIASTOLIC BLOOD PRESSURE: 68 MMHG | HEIGHT: 68 IN | BODY MASS INDEX: 27.8 KG/M2 | WEIGHT: 183.4 LBS | SYSTOLIC BLOOD PRESSURE: 132 MMHG

## 2024-08-21 DIAGNOSIS — R00.1 SINUS BRADYCARDIA: Primary | ICD-10-CM

## 2024-08-21 DIAGNOSIS — R00.1 BRADYCARDIA: Primary | ICD-10-CM

## 2024-08-21 DIAGNOSIS — R00.1 BRADYCARDIA: ICD-10-CM

## 2024-08-21 PROCEDURE — 93000 ELECTROCARDIOGRAM COMPLETE: CPT | Performed by: INTERNAL MEDICINE

## 2024-08-21 PROCEDURE — 99204 OFFICE O/P NEW MOD 45 MIN: CPT | Performed by: INTERNAL MEDICINE

## 2024-08-21 NOTE — LETTER
2024               DEVICE PROCEDURE INSTRUCTIONS    Rakesh Appiah   : 1955  MRN: [id number]  [address] [facility] [address]    Procedure: DUAL CHAMBER PACE MAKER IMPLANT / LOOP EXPLANT     Procedure Date:      Location: [facility]  Address: [address] [place] [address], [place] [address]        Labs to be done  NO LATER THAN 24 : CMP /  CBC (FASTING 8 HOURS)    BLOOD THINNER INSTRUCTIONS (Coumadin / Warfarin / Pradaxa / Eliquis / Xarelto):     ELIQUIS - NO HOLD     Medication Hold:     Losartan - Do not take day before and morning of procedure.      Arrival Time: The Hospital will contact you the day prior to your procedure, usually between 4PM - 6PM to instruct you on the time and place to report. If you do not hear from a [facility]  by 6PM the evening prior to your procedure, please contact the campus you are scheduled at.     DO NOT drink or eat ANYTHING after midnight the night prior to your procedure. You may have a minimal amount of water with your AM medications.    Arrange for a responsible adult to drive you to and from the hospital.    You should continue to take your morning medications with a sip of water UNLESS ADVISED OTHERWISE.       DO NOT stop taking Plavix or Aspirin unless advised otherwise.     If you are currently taking Fish Oil, Krill oil and/or Vitamin E please DO NOT TAKE FOR A WEEK PRIOR TO PROCEDURE.     If you are diabetic, DO NOT take any of your diabetic pills the morning of your procedure. Oral diabetic medications may include Glucophage, Prandin, Glyburide, Micronase, Avandia, Glucovance, Precose, Glynase, and Starlix.     Bring a list of daily medications, vitamins, minerals, herbals and nutritional supplements you take. Please include dosages and the times you take them each day.     If you are packing an overnight bag, pack minimal clothing, you will be given hospital sleepwear.   DO NOT bring money,  valuables or jewelry. The wedding band is ok.     If you use CPAP machine, bring it to the hospital.     Bring your Photo ID and Insurance cards with you.     Please notify us if you have been admitted to the hospital within 30 days.    Pre-Operative Showering Instructions. ONLY FOR DEVICE PROCEDURE.    The two nights prior to your procedure, take a shower each night using the special antiseptic body scrub (Chlorhexidine Scrub Brush) you received from the office or hospital. This scrub will replace your soap at home, the sponge is pre-filled with soap.     The scrub brushes are single use only and should be discarded after use.     Shampoo your hair with regular shampoo and rinse completely before using the antiseptic scrub brush.     Using the sponge side of the scrub brush to wash your entire body from your neck down, with special attention to your armpits, chest and groin area. DO NOT USE the scrub on your face and avoid any open wounds. Do not use any other soap or wash your skin.    DO NOT USE any lotion, powder, deodorant or perfume of any kind on your skin after you shower.    AFTER THE FIRST NIGHT of using the scrub, change your bed linen sheets to a fresh clean set and clean pajamas for bedtime.    AFTER THE SECOND NIGHT of using the scrub, use clean pajamas for bedtime.    DO NOT SHOWER THE MORNING OF SURGERY, you will be prepped and cleansed at the hospital prior to your procedure.       PLEASE  THE SPONGES AT YOUR MOST CONVENIENT [facility] CARDIOLOGY OFFICE.      FAILURE TO FOLLOW ANY OF THESE INSTRUCTIONS COULD RESULT IN THE CANCELLATION OF YOUR PROCEDURE      Please call  [phone] (Veronique) or [phone] (Myesha) if you do not hear from the  by 6:00PM the night before your procedure.    All patients enter through ENTRANCE B.  Parking is available free of charge or park on Parking Deck B.        Thank you,   Tona Tomlin  Surgery Coordinator  [facility]   [address]  [place] [address]  Teams: [phone]

## 2024-08-21 NOTE — PROGRESS NOTES
EPS Consultation/New Patient Evaluation - Rakesh Appiah 69 y.o. male MRN: [id number]       Referring:Alex Santiago MD     CC/HPI:   It was a pleasure to see Rakesh Appiah in our arrhythmia clinic at [facility]. As you know he is a 69 y.o.  with atrial fibrillation, sinus bradycardia, who presents today for evaluation and management of atrial fibrillation.    Patient reports having fatigue and feels very tired when waking up in the morning.  He noted episodes of chest heaviness and shortness of breath 2 months back.  It occurred while he was driving to the grocery store.  It lasted about 30 minutes and resolved spontaneously.  He was also noted to have slow heart rate by his PCP.    He does have history of sleep apnea and is unable to tolerate the CPAP mask due to discomfort.  He was seen in the hospital in June where he was noted to have persistent bradycardia with heart rate in high 40s and low 50s.  He does have a loop recorder but the battery is depleted.  When he was seen in the hospital by our cardiology group he was not deemed to qualify for permanent pacemaker.  2-week cardiac event monitor was performed which revealed 30 episodes of SVT with longest lasting about 21 seconds and fastest heart rate of 158 bpm.  His average heart rate was 53 bpm and lowest was 38 bpm.    He continues to feel fatigue on daily basis. Denies syncope, loss of consciousness, orthopnea, PND.     Past Medical History:  Past Medical History:   Diagnosis Date    Arthritis     in hands    Cervical herniated disc     Colon polyp     Diabetes mellitus (HCC)     borderline    Gout     hx of in 2018, none since    Hyperlipidemia     Hypertension     Irregular heart beat     a-fib, has loop recorder    Kidney stone 09/13/2021    hx of    Migraine     S/P Botox injection     in left wrist to help increase mobility    Sleep apnea     mild sleep apnea, cpap was not ordered    Stroke (HCC) 2018    left hemiparesis, wears  leg brace and uses cane, unable to use left arm and hand    Use of cane as ambulatory aid     had CVA in 2018       Medications:      Current Outpatient Medications:     amLODIPine (NORVASC) 5 mg tablet, Take 5 mg by mouth daily Taking 1 1/2 daily daily , Disp: , Rfl: 5    atorvastatin (LIPITOR) 80 mg tablet, Take 1 tablet (80 mg total) by mouth every evening, Disp: 90 tablet, Rfl: 3    Eliquis 5 MG, TAKE 1 TABLET BY MOUTH TWICE A DAY, Disp: 60 tablet, Rfl: 5    FLUoxetine 20 MG tablet, Take 20 mg by mouth daily, Disp: , Rfl:     losartan (COZAAR) 50 mg tablet, Take 1 tablet (50 mg total) by mouth daily, Disp: 90 tablet, Rfl: 0     Family History   Problem Relation Age of Onset    Stroke Mother     Heart disease Father     ALS Father      Social History     Socioeconomic History    Marital status: Legally      Spouse name: Not on file    Number of children: Not on file    Years of education: Not on file    Highest education level: Not on file   Occupational History    Not on file   Tobacco Use    Smoking status: Former     Current packs/day: 0.00     Types: Cigarettes, Cigars     Quit date:      Years since quittin.6    Smokeless tobacco: Never    Tobacco comments:     stopped on 10/20/2018   Vaping Use    Vaping status: Never Used   Substance and Sexual Activity    Alcohol use: Yes     Comment: socially     Drug use: No    Sexual activity: Not on file   Other Topics Concern    Not on file   Social History Narrative    Not on file     Social Determinants of Health     Financial Resource Strain: Not on file   Food Insecurity: Not on file   Transportation Needs: Not on file   Physical Activity: Not on file   Stress: Not on file   Social Connections: Not on file   Intimate Partner Violence: Not on file   Housing Stability: Not on file     Social History     Tobacco Use   Smoking Status Former    Current packs/day: 0.00    Types: Cigarettes, Cigars    Quit date:     Years since quittin.6  "  Smokeless Tobacco Never   Tobacco Comments    stopped on 10/20/2018     Social History     Substance and Sexual Activity   Alcohol Use Yes    Comment: socially        Review of Systems   Constitutional: Positive for malaise/fatigue. Negative for chills and fever.   HENT: Negative.     Eyes:  Negative for blurred vision and double vision.   Cardiovascular:  Negative for chest pain, dyspnea on exertion, leg swelling, near-syncope, orthopnea, palpitations, paroxysmal nocturnal dyspnea and syncope.   Respiratory:  Negative for cough and sputum production.    Endocrine: Negative.    Skin: Negative.  Negative for rash.   Musculoskeletal: Negative.  Negative for arthritis and joint pain.   Gastrointestinal:  Negative for abdominal pain, nausea and vomiting.   Genitourinary: Negative.    Neurological:  Negative for dizziness and light-headedness.        Left side weakness, residual from stroke   Psychiatric/Behavioral: Negative.  The patient is not nervous/anxious.         Objective:     Vitals: Blood pressure 132/68, pulse (!) 45, height 5' 8\" (1.727 m), weight 83.2 kg (183 lb 6.4 oz)., Body mass index is 27.89 kg/m².,        Physical Exam:    GEN: Rakesh Appiah appears well, alert and oriented x 3, pleasant and cooperative   HEENT: pupils equal, round, and reactive to light; extraocular muscles intact  NECK: supple, no carotid bruits   HEART: regular rhythm, normal S1 and S2, no murmurs, clicks, gallops or rubs   LUNGS: clear to auscultation bilaterally; no wheezes, rales, or rhonchi   ABDOMEN: normal bowel sounds, soft, no tenderness, no distention  EXTREMITIES: peripheral pulses normal; no clubbing, cyanosis, or edema  NEURO: weakness on the left UE and LE.   SKIN: normal without suspicious lesions on exposed skin      Labs & Results:  Below is the patient's most recent value for Albumin, ALT, AST, BUN, Calcium, Chloride, Cholesterol, CO2, Creatinine, GFR, Glucose, HDL, Hematocrit, Hemoglobin, Hemoglobin A1C, LDL, " Magnesium, Phosphorus, Platelets, Potassium, PSA, Sodium, Triglycerides, and WBC.   Lab Results   Component Value Date    ALT 24 2024    AST 21 2024    BUN 14 2024    CALCIUM 9.4 2024     2024    CO2 29 2024    CREATININE 0.91 2024    HDL 35 (L) 10/21/2018    HCT 40.2 2024    HGB 13.2 2024    HGBA1C 6.0 (H) 2021    MG 2.0 2024    PHOS 3.0 2024     2024    K 3.7 2024    TRIG 216 (H) 10/21/2018    WBC 8.50 2024     Note: for a comprehensive list of the patient's lab results, access the Results Review activity.          Cardiac testing:     I personally reviewed the ECG performed in the clinic on 24. It reveals sinus bradycardia at 45 bpm.     Echocardiograms:  Results for orders placed during the hospital encounter of 10/20/18    Echo complete with contrast if indicated    [address] [place], [place] [id number] [phone]    Transthoracic Echocardiogram  2D, M-mode, Doppler, and Color Doppler    Study date:  21-Oct-2018    Patient: ROBERT CAN  MR number: [id number]  Account number: [id number]  : 1955  Age: 63 years  Gender: Male  Status: Inpatient  Location: Bedside  Height: 69 in  Weight: 199 lb  BP: 120/ 67 mmHg    Indications: Stroke.    Diagnoses: I63.9 - Cerebral infarction, unspecified    Sonographer:  Greta Christianson RDCS  Primary Physician:  Alex Santiago MD  Referring Physician:  PATEL Carlton  Group:  [facility]  Interpreting Physician:  Vinicio Araya MD    SUMMARY    LEFT VENTRICLE:  Systolic function was normal. Ejection fraction was estimated to be 60 %.  There were no regional wall motion abnormalities.  Wall thickness was mildly increased.    HISTORY: PRIOR HISTORY: Smoker. Hypertension. Anxiety.    PROCEDURE: The procedure was performed at the bedside. This was a routine study. The transthoracic approach was  used. The study included complete 2D imaging, M-mode, complete spectral Doppler, and color Doppler. The heart rate was 63 bpm,  at the start of the study. Echocardiographic views were limited due to lung interference. Image quality was adequate.    LEFT VENTRICLE: Size was normal. Systolic function was normal. Ejection fraction was estimated to be 60 %. There were no regional wall motion abnormalities. Wall thickness was mildly increased. DOPPLER: Left ventricular diastolic function  parameters were normal.    RIGHT VENTRICLE: The size was normal. Systolic function was normal. Wall thickness was normal.    LEFT ATRIUM: Size was normal.    RIGHT ATRIUM: Size was normal.    MITRAL VALVE: Valve structure was normal. There was normal leaflet separation. DOPPLER: The transmitral velocity was within the normal range. There was no evidence for stenosis. There was no significant regurgitation.    AORTIC VALVE: The valve was trileaflet. Leaflets exhibited normal thickness and normal cuspal separation. DOPPLER: Transaortic velocity was within the normal range. There was no evidence for stenosis. There was no significant  regurgitation.    TRICUSPID VALVE: The valve structure was normal. There was normal leaflet separation. DOPPLER: The transtricuspid velocity was within the normal range. There was no evidence for stenosis. There was no significant regurgitation.    PULMONIC VALVE: Leaflets exhibited normal thickness, no calcification, and normal cuspal separation. DOPPLER: The transpulmonic velocity was within the normal range. There was no significant regurgitation.    PERICARDIUM: There was no pericardial effusion. The pericardium was normal in appearance.    AORTA: The root exhibited normal size.    SYSTEMIC VEINS: IVC: The inferior vena cava was normal in size.    SYSTEM MEASUREMENT TABLES    2D  %FS: 32.87 %  AV Diam: 3.82 cm  EDV(Teich): 120.34 ml  EF(Teich): 61.08 %  ESV(Teich): 46.84 ml  IVSd: 1.29 cm  LA Area: 16.18  cm2  LA Diam: 3.35 cm  LVEDV MOD A4C: 69.46 ml  LVEF MOD A4C: 49 %  LVESV MOD A4C: 35.42 ml  LVIDd: 5.04 cm  LVIDs: 3.38 cm  LVLd A4C: 7.63 cm  LVLs A4C: 5.81 cm  LVPWd: 1.31 cm  RA Area: 18.33 cm2  RVIDd: 3.2 cm  SV MOD A4C: 34.04 ml  SV(Teich): 73.5 ml    MM  TAPSE: 2.36 cm    PW  E': 0.09 m/s  E/E': 8.05  MV A Pranay: 0.78 m/s  MV Dec [place]: 4.36 m/s2  MV DecT: 169.76 ms  MV E Pranay: 0.74 m/s  MV E/A Ratio: 0.95  MV PHT: 49.23 ms  MVA By PHT: 4.47 cm2    [facility] Accredited Echocardiography Laboratory    Prepared and electronically signed by    Vinicio Araya MD  Signed 21-Oct-2018 23:07:32    Results for orders placed during the hospital encounter of 10/25/18    [place] [address]  [phone]    Transesophageal Echocardiogram  2D, Doppler, and Color Doppler    Study date:  30-Oct-2018    Patient: ROBERT CAN  MR number: [id number]  Account number: [id number]  : 1955  Age: 63 years  Gender: Male  Status: Inpatient  Location: Echo lab  Height: 69 in  Weight: 183 lb  BP: 169/ 84 mmHg    Indications: CVA    Diagnoses: I48.1 - Atrial flutter    Sonographer:  SAY Garcia  Primary Physician:  Alex Santiago MD  Referring Physician:  Chaz Kessler MD  Group:  [facility]  Interpreting Physician:  Chaz Kessler MD    SUMMARY    LEFT VENTRICLE:  Systolic function was normal. Ejection fraction was estimated to be 65 %.  There were no regional wall motion abnormalities.  Wall thickness was increased.    LEFT ATRIUM:  The atrium was mildly dilated.    LEFT ATRIAL APPENDAGE:  No thrombus was identified.    ATRIAL SEPTUM:  No defect or patent foramen ovale was identified.  Contrast injection was performed. There was no right-to-left shunt, with provocative maneuvers to increase right atrial pressure.    MITRAL VALVE:  There was trace regurgitation.    TRICUSPID VALVE:  There was mild  regurgitation.  Pulmonary artery systolic pressure was within the normal range.    AORTA:  There was mild-moderately severe atheroma in the visualized portions of the intrathoracic aorta.    HISTORY: PRIOR HISTORY: Smoker, HTN    PROCEDURE: The procedure was performed in the echo lab. This was a routine study. The risks and alternatives of the procedure were explained to the patient and informed consent was obtained. The transesophageal approach was used. The study  included complete 2D imaging, complete spectral Doppler, and color Doppler. The heart rate was 50 bpm, at the start of the study. An adult omniplane probe was inserted by the attending cardiologist. Intubated with ease. One intubation  attempt(s). There was no blood detected on the probe. Intravenous contrast (agitated saline) was administered. Image quality was adequate. MEDICATIONS: Anesthesia.    LEFT VENTRICLE: Size was normal. Systolic function was normal. Ejection fraction was estimated to be 65 %. There were no regional wall motion abnormalities. Wall thickness was increased.    RIGHT VENTRICLE: The size was normal. Systolic function was normal.    LEFT ATRIUM: The atrium was mildly dilated. APPENDAGE: The size was normal. No thrombus was identified.    ATRIAL SEPTUM: No defect or patent foramen ovale was identified. Contrast injection was performed. There was no right-to-left shunt, with provocative maneuvers to increase right atrial pressure.    RIGHT ATRIUM: Size was normal.    MITRAL VALVE: Valve structure was normal. There was mild calcification. There was normal leaflet separation. DOPPLER: The transmitral velocity was within the normal range. There was no evidence for stenosis. There was trace regurgitation.    AORTIC VALVE: The valve was trileaflet. Leaflets exhibited normal thickness and normal cuspal separation. DOPPLER: Transaortic velocity was within the normal range. There was no evidence for stenosis. There was no  significant  regurgitation.    TRICUSPID VALVE: The valve structure was normal. There was normal leaflet separation. DOPPLER: There was mild regurgitation. Pulmonary artery systolic pressure was within the normal range.    PULMONIC VALVE: Leaflets exhibited normal thickness, no calcification, and normal cuspal separation. DOPPLER: The transpulmonic velocity was within the normal range. There was mild regurgitation.    PERICARDIUM: There was no pericardial effusion.    AORTA: The root exhibited normal size. The ascending aorta was normal in size. There was mild-moderately severe atheroma in the visualized portions of the intrathoracic aorta.    [facility] Accredited Echocardiography Laboratory    Prepared and electronically signed by    Chaz Kessler MD  Signed 30-Oct-2018 09:28:00      Catheterizations:   No results found for this or any previous visit.      Stress Tests:  No results found for this or any previous visit.      Holter monitor -   No results found for this or any previous visit.    No results found for this or any previous visit.        ASSESSMENT/PLAN:  Tachybradycardia syndrome  Noted to have bradycardia at PCP office  During hospital admission telemetry showed heart rate in 40s-50s  2-week cardiac event monitor shows 20 episodes of SVT with the fastest heart rate of 158 bpm  Patient notes fatigue throughout the day which is likely from bradycardia noted on the device.  His heart rate is mostly in 50s even during daytime.  He qualifies for pacemaker for tachybradycardia syndrome  We discussed the procedure in detail including risk and benefits  After answering all the question he opted to proceed with it  Our office will be in touch with him to set-up dual chamber PPM and loop explant  Paroxysmal atrial fibrillation  Patient has history of stroke in 2018  Loop recorder placed afterward discovered atrial fibrillation and patient was started on Eliquis  Has residual left-sided weakness  and slow speech  Continue Eliquis  Hypertension  Normotensive   Maintained on losartan 50 mg daily  Sleep apnea  Likely contributing to fatigue  Patient unable to use CPAP due to discomfort  Recommend he discuss alternative options with sleep medicine team

## 2024-08-21 NOTE — TELEPHONE ENCOUNTER
Group Therapy Documentation    PATIENT'S NAME: Florian Mosquera  MRN:   [id number]  :   2011  ACCT. NUMBER: [id number]  DATE OF SERVICE: 3/21/24  START TIME: 10:00 AM  END TIME: 11:00 AM  FACILITATOR(S): Florencia Chowdary LADC; Gene Mei; Adi Medina  TOPIC: BEH Group Therapy  Number of patients attending the group:  11  Group Length:  1 Hours    Dimensions addressed 3, 4, 5, and 6    Summary of Group / Topics Discussed:    Group therapy/Process group:   Clients were given the opportunity to process events, emotions, relationships etc. and gain feedback from the group. They were also asked to give feedback to one another and share their own experiences.      Objectives:      -process emotions      -gain supportive feedback      -problem solve for future emotions and situations with coping skills.      Group Attendance:  Attended group session  Interactive Complexity: No    Patient's response to the group topic/interactions:  cooperative with task and listened actively    Patient appeared to be Actively participating, Attentive, and Engaged.       Client specific details:  Client participated in group therapy and appeared to listen during session. The client gave positive feedback to a peer that shared their relapse prevention plan assignment.       Patient scheduled for DUAL CHAMBER PACER device LOOP EXPLANT  on 9/24/24  at  [facility] with Dr. DÍAZ.      Instructions sent to patient through [email or web address].      Medication holds:   ELIQUIS - NO HOLD     Losartan - Do not take day before and morning of procedure.          Labs to be done NO LATER THAN 9/17/2024  CMP / CBC (FASTING 8 HOURS)      Thank you,  Tona Tomlin

## 2024-08-26 NOTE — TELEPHONE ENCOUNTER
Pt is due for a colonoscopy, however, failed OA due to being on Eliquis. Pt did have an appt, however, cancelled.  I lmom for pt to please call back to reschedule the office visit to discuss a possible repeat colonoscopy. Will call again if do not hear back from pt.

## 2024-09-03 NOTE — TELEPHONE ENCOUNTER
I lmom for pt to please call back to reschedule an office visit to discuss repeat colonoscopy. If do not hear back from pt, will mail recall letter as reminder.

## 2024-09-11 LAB
ALBUMIN SERPL-MCNC: 4.3 G/DL (ref 3.5–5.7)
ALP SERPL-CCNC: 50 U/L (ref 35–120)
ALT SERPL-CCNC: 29 U/L
ANION GAP SERPL CALCULATED.3IONS-SCNC: 9 MMOL/L (ref 3–11)
AST SERPL-CCNC: 29 U/L
BASOPHILS # BLD AUTO: 0 THOU/CMM (ref 0–0.1)
BASOPHILS NFR BLD AUTO: 1 %
BILIRUB SERPL-MCNC: 1.3 MG/DL (ref 0.2–1)
BUN SERPL-MCNC: 12 MG/DL (ref 7–28)
CALCIUM SERPL-MCNC: 9.6 MG/DL (ref 8.5–10.1)
CHLORIDE SERPL-SCNC: 102 MMOL/L (ref 100–109)
CO2 SERPL-SCNC: 31 MMOL/L (ref 21–31)
CREAT SERPL-MCNC: 0.83 MG/DL (ref 0.53–1.3)
CYTOLOGY CMNT CVX/VAG CYTO-IMP: ABNORMAL
DIFFERENTIAL METHOD BLD: NORMAL
EOSINOPHIL # BLD AUTO: 0.2 THOU/CMM (ref 0–0.5)
EOSINOPHIL NFR BLD AUTO: 4 %
ERYTHROCYTE [DISTWIDTH] IN BLOOD BY AUTOMATED COUNT: 14.9 % (ref 12–16)
GFR/BSA.PRED SERPLBLD CYS-BASED-ARV: 94 ML/MIN/{1.73_M2}
GLUCOSE SERPL-MCNC: 97 MG/DL (ref 65–99)
HCT VFR BLD AUTO: 41.5 % (ref 37–48)
HGB BLD-MCNC: 14 G/DL (ref 12.5–17)
INR PPP: 1.2
LYMPHOCYTES # BLD AUTO: 1.4 THOU/CMM (ref 1–3)
LYMPHOCYTES NFR BLD AUTO: 23 %
MCH RBC QN AUTO: 30.5 PG (ref 27–36)
MCHC RBC AUTO-ENTMCNC: 33.7 G/DL (ref 32–37)
MCV RBC AUTO: 91 FL (ref 80–100)
MONOCYTES # BLD AUTO: 0.6 THOU/CMM (ref 0.3–1)
MONOCYTES NFR BLD AUTO: 9 %
NEUTROPHILS # BLD AUTO: 3.9 THOU/CMM (ref 1.8–7.8)
NEUTROPHILS NFR BLD AUTO: 63 %
PLATELET # BLD AUTO: 224 THOU/CMM (ref 140–350)
PMV BLD REES-ECKER: 8.7 FL (ref 7.5–11.3)
POTASSIUM SERPL-SCNC: 4.1 MMOL/L (ref 3.5–5.2)
PROT SERPL-MCNC: 7 G/DL (ref 6.3–8.3)
PROTHROMBIN TIME: 14.9 SEC (ref 12–14.6)
RBC # BLD AUTO: 4.58 MILL/CMM (ref 4–5.4)
SODIUM SERPL-SCNC: 142 MMOL/L (ref 135–145)
WBC # BLD AUTO: 6.2 THOU/CMM (ref 4–10.5)

## 2024-09-12 ENCOUNTER — TELEPHONE (OUTPATIENT)
Dept: CARDIOLOGY CLINIC | Facility: CLINIC | Age: 69
End: 2024-09-12

## 2024-09-12 NOTE — TELEPHONE ENCOUNTER
----- Message from Abdulkadir Ludwig MD sent at 9/11/2024  5:25 PM EDT -----  normal lab results.     Thank you.

## 2024-09-23 PROBLEM — I49.5 TACHY-BRADY SYNDROME (HCC): Status: ACTIVE | Noted: 2024-09-23

## 2024-09-23 NOTE — ASSESSMENT & PLAN NOTE
6/2024 - noted to have worsening bradycardia during visit with PCP, per reports historically had rates in the 50s-60s, not on AV loida agents - outpatient EP consultation recommended  6/2024 - hospital admission for fatigue  Subsequent event monitor showed 30 episodes of SVT longest lasting about 21 seconds fastest rate of 158 bpm with an average heart rate of 53 bpm and lowest heart rate of 38  8/2024 - EP consultation, dual chamber pacemaker + loop explantation recommended (loop at EOS) for symptomatic bradycardia  Normal LV systolic function with LVEF 65% per echo 7/2024

## 2024-09-23 NOTE — H&P
[facility]  H&P: Electrophysiology  Date of Service: 9/24/2024  Hospital Day: 0  Name: Rakesh Appiah I  MRN: [id number]  Unit/Bed#: BE CATH LAB ROOM      Assessment & Plan   Assessment & Plan  Tachy-julius syndrome (HCC)  6/2024 - noted to have worsening bradycardia during visit with PCP, per reports historically had rates in the 50s-60s, not on AV loida agents - outpatient EP consultation recommended  6/2024 - hospital admission for fatigue  Subsequent event monitor showed 30 episodes of SVT longest lasting about 21 seconds fastest rate of 158 bpm with an average heart rate of 53 bpm and lowest heart rate of 38  8/2024 - EP consultation, dual chamber pacemaker + loop explantation recommended (loop at EOS) for symptomatic bradycardia  Normal LV systolic function with LVEF 65% per echo 7/2024    PAF (paroxysmal atrial fibrillation) ([facility])  10/2018 - loop recorder implanted following CVA  4/2020 - asymptomatic atrial fibrillation noted on loop recorder, started on Eliquis anticoagulation (CHADS2 Vasc = 4)  No significant burden noted, not on AV loida medications or antiarrhythmics, no prior cardioversions/ablations  No afib noted on event monitor 7/2024  History of stroke  Residual left sided hemiparesis, limited use of left arm - device to be placed on left side  Hypertension  Well controlled as outpatient, maintained on losartan and amlodipine       PLAN: Dual chamber pacemaker implantation. Will need several hours of bed rest + CXR, anticipate discharge home later today if no issues noted.     All discharge instructions and restrictions reviewed with the patient, follow up arranged.       History of Present Illness   HPI: Rakesh Appiah is a 69 y.o. year old male with low burden of paroxysmal atria fibrillation with [employer] loop recorder in situ at end of service, tachy-julius syndrome and symptomatic bradycardia, normal LV systolic function with LVEF 65% per echo 7/2024, prior  CVA with left sided hemiparesis, hypertension, hyperlipidemia, and obstructive sleep apnea unable to tolerate CPAP. He had a loop recorder implanted 10/2018 following CVA, and was later found to have newly diagnosed atrial fibrillation 2020, at which time he was started on Eliquis. Per reports, he has known baseline sinus bradycardia, and thus is not on AV loida agents.  He was seen by his PCP 2024 due to fatigue at which time he was found to have worsening bradycardia, and outpatient EP evaluation was recommended.  Sometime later he was driving and had chest pain accompanied by worsening fatigue for which he underwent inpatient admission.  During that admission he was noted to have ongoing sinus bradycardia, and thus an outpatient ZIO monitor was recommended.  This demonstrated 30 episodes of SVT longest lasting about 21 seconds fastest rate of 158 bpm with an average heart rate of 53 bpm and lowest heart rate of 38. He was seen in EP consultation by Dr. Ludwig, and ultimately pacemaker implantation was recommended due to his symptomatic bradycardia/tachy-julius syndrome. He presents today to undergo that procedure.    Cardiac testin2024: EF 65% LA mildly dilated    EKG:         Historical Information   Past Medical History:  No date: Arthritis      Comment:  in hands  No date: Cervical herniated disc  No date: Colon polyp  No date: Diabetes mellitus (HCC)      Comment:  borderline  No date: Gout      Comment:  hx of in 2018, none since  No date: Hyperlipidemia  No date: Hypertension  No date: Irregular heart beat      Comment:  es, has loop recorder  2021: Kidney stone      Comment:  hx of  No date: Migraine  No date: S/P Botox injection      Comment:  in left wrist to help increase mobility  No date: Sleep apnea      Comment:  mild sleep apnea, cpap was not ordered  2018: Stroke (HCC)      Comment:  left hemiparesis, wears leg brace and uses cane, unable                to use left arm and  hand  No date: Use of cane as ambulatory aid      Comment:  had CVA in 2018 Past Surgical History:  No date: APPENDECTOMY  No date: COLONOSCOPY  No date: KNEE ARTHROSCOPY      Comment:  right knee  No date: KNEE SURGERY; Right  No date: NECK SURGERY  No date: TONSILLECTOMY   Current Outpatient Medications   Medication Instructions    amLODIPine (NORVASC) 5 mg, Oral, Daily, Taking 1 1/2 daily daily    atorvastatin (LIPITOR) 80 mg, Oral, Every evening    Eliquis 5 MG TAKE 1 TABLET BY MOUTH TWICE A DAY    FLUoxetine 20 mg, Oral, Daily    losartan (COZAAR) 50 mg, Oral, Daily    No Known Allergies   Social History     Tobacco Use    Smoking status: Former     Current packs/day: 0.00     Types: Cigarettes, Cigars     Quit date:      Years since quittin.7    Smokeless tobacco: Never    Tobacco comments:     stopped on 10/20/2018   Vaping Use    Vaping status: Never Used   Substance Use Topics    Alcohol use: Yes     Comment: socially     Drug use: No    Family History   Problem Relation Age of Onset    Stroke Mother     Heart disease Father     ALS Father             Objective                            Vitals I/O    Most Recent Min/Max in 24hrs   Temp 98.2 °F (36.8 °C) Temp  Min: 98.2 °F (36.8 °C)  Max: 98.2 °F (36.8 °C)   Pulse (!) 46 Pulse  Min: 46  Max: 46   Resp 17 Resp  Min: 17  Max: 17   /75 BP  Min: 145/75  Max: 145/75   O2 Sat 96 % SpO2  Min: 96 %  Max: 96 %      Intake/Output Summary (Last 24 hours) at 2024 1200  Last data filed at 2024 1148  Gross per 24 hour   Intake 800 ml   Output --   Net 800 ml                        Physical Exam   GEN: NAD, alert and oriented x 3, well appearing  SKIN: dry without significant lesions or rashes  HEENT: NCAT, PERRL, EOMs intact  NECK: No JVD appreciated  CARDIOVASCULAR: RRR, bradycardic  LUNGS: no respiratory distress, good overall effort, no audible wheezing or rhonchi noted  ABDOMEN: Soft, nontender, nondistended  EXTREMITIES/VASCULAR: perfused  without clubbing, cyanosis, or LE edema b/l  PSYCH: Normal mood and affect  NEURO: CN ll-Xll grossly intact         Labs:    CBC  Recent Labs     09/24/24  0830   WBC 6.19   HGB 13.4   HCT 40.1        BMP  Recent Labs     09/24/24  0830   SODIUM 141   K 3.5      CO2 28   AGAP 7   BUN 11   CREATININE 0.74   CALCIUM 9.0       Coags  Recent Labs     09/24/24  0830   INR 1.32*        Additional Electrolytes  Recent Labs     09/24/24  0830   MG 2.0          Blood Gas  No recent results  No recent results LFTs  No recent results    Infectious  No recent results  Glucose  Recent Labs     09/24/24  0830   GLUC 106           SIGNATURE: Lela Castro PA-C

## 2024-09-23 NOTE — ASSESSMENT & PLAN NOTE
10/2018 - loop recorder implanted following CVA  4/2020 - asymptomatic atrial fibrillation noted on loop recorder, started on Eliquis anticoagulation (CHADS2 Vasc = 4)  No significant burden noted, not on AV loida medications or antiarrhythmics, no prior cardioversions/ablations  No afib noted on event monitor 7/2024

## 2024-09-24 ENCOUNTER — HOSPITAL ENCOUNTER (OUTPATIENT)
Facility: HOSPITAL | Age: 69
Setting detail: OUTPATIENT SURGERY
Discharge: HOME | End: 2024-09-24
Attending: INTERNAL MEDICINE | Admitting: INTERNAL MEDICINE
Payer: COMMERCIAL

## 2024-09-24 ENCOUNTER — APPOINTMENT (OUTPATIENT)
Dept: RADIOLOGY | Facility: HOSPITAL | Age: 69
End: 2024-09-24
Payer: COMMERCIAL

## 2024-09-24 ENCOUNTER — ANESTHESIA (OUTPATIENT)
Dept: NON INVASIVE DIAGNOSTICS | Facility: HOSPITAL | Age: 69
End: 2024-09-24
Payer: COMMERCIAL

## 2024-09-24 ENCOUNTER — ANESTHESIA EVENT (OUTPATIENT)
Dept: NON INVASIVE DIAGNOSTICS | Facility: HOSPITAL | Age: 69
End: 2024-09-24
Payer: COMMERCIAL

## 2024-09-24 VITALS
SYSTOLIC BLOOD PRESSURE: 134 MMHG | TEMPERATURE: 97.5 F | OXYGEN SATURATION: 94 % | HEART RATE: 60 BPM | BODY MASS INDEX: 27.28 KG/M2 | WEIGHT: 180 LBS | HEIGHT: 68 IN | DIASTOLIC BLOOD PRESSURE: 76 MMHG | RESPIRATION RATE: 17 BRPM

## 2024-09-24 DIAGNOSIS — I48.0 PAF (PAROXYSMAL ATRIAL FIBRILLATION) (HCC): ICD-10-CM

## 2024-09-24 DIAGNOSIS — R00.1 SINUS BRADYCARDIA: ICD-10-CM

## 2024-09-24 DIAGNOSIS — R00.1 BRADYCARDIA: ICD-10-CM

## 2024-09-24 DIAGNOSIS — I49.5 TACHY-BRADY SYNDROME (HCC): Primary | ICD-10-CM

## 2024-09-24 LAB
ANION GAP SERPL CALCULATED.3IONS-SCNC: 7 MMOL/L (ref 4–13)
ATRIAL RATE: 44 BPM
ATRIAL RATE: 61 BPM
BASOPHILS # BLD AUTO: 0.04 THOUSANDS/ΜL (ref 0–0.1)
BASOPHILS NFR BLD AUTO: 1 % (ref 0–1)
BUN SERPL-MCNC: 11 MG/DL (ref 5–25)
CALCIUM SERPL-MCNC: 9 MG/DL (ref 8.4–10.2)
CHLORIDE SERPL-SCNC: 106 MMOL/L (ref 96–108)
CO2 SERPL-SCNC: 28 MMOL/L (ref 21–32)
CREAT SERPL-MCNC: 0.74 MG/DL (ref 0.6–1.3)
EOSINOPHIL # BLD AUTO: 0.2 THOUSAND/ΜL (ref 0–0.61)
EOSINOPHIL NFR BLD AUTO: 3 % (ref 0–6)
ERYTHROCYTE [DISTWIDTH] IN BLOOD BY AUTOMATED COUNT: 14.6 % (ref 11.6–15.1)
GFR SERPL CREATININE-BSD FRML MDRD: 93 ML/MIN/1.73SQ M
GLUCOSE P FAST SERPL-MCNC: 106 MG/DL (ref 65–99)
GLUCOSE SERPL-MCNC: 106 MG/DL (ref 65–140)
HCT VFR BLD AUTO: 40.1 % (ref 36.5–49.3)
HGB BLD-MCNC: 13.4 G/DL (ref 12–17)
IMM GRANULOCYTES # BLD AUTO: 0.03 THOUSAND/UL (ref 0–0.2)
IMM GRANULOCYTES NFR BLD AUTO: 1 % (ref 0–2)
INR PPP: 1.32 (ref 0.85–1.19)
LYMPHOCYTES # BLD AUTO: 1.48 THOUSANDS/ΜL (ref 0.6–4.47)
LYMPHOCYTES NFR BLD AUTO: 24 % (ref 14–44)
MAGNESIUM SERPL-MCNC: 2 MG/DL (ref 1.9–2.7)
MCH RBC QN AUTO: 29.6 PG (ref 26.8–34.3)
MCHC RBC AUTO-ENTMCNC: 33.4 G/DL (ref 31.4–37.4)
MCV RBC AUTO: 89 FL (ref 82–98)
MONOCYTES # BLD AUTO: 0.58 THOUSAND/ΜL (ref 0.17–1.22)
MONOCYTES NFR BLD AUTO: 9 % (ref 4–12)
NEUTROPHILS # BLD AUTO: 3.86 THOUSANDS/ΜL (ref 1.85–7.62)
NEUTS SEG NFR BLD AUTO: 62 % (ref 43–75)
NRBC BLD AUTO-RTO: 0 /100 WBCS
P AXIS: 52 DEGREES
P AXIS: 68 DEGREES
PLATELET # BLD AUTO: 222 THOUSANDS/UL (ref 149–390)
PMV BLD AUTO: 9.6 FL (ref 8.9–12.7)
POTASSIUM SERPL-SCNC: 3.5 MMOL/L (ref 3.5–5.3)
PR INTERVAL: 146 MS
PR INTERVAL: 162 MS
PROTHROMBIN TIME: 16.6 SECONDS (ref 12.3–15)
QRS AXIS: 16 DEGREES
QRS AXIS: 20 DEGREES
QRSD INTERVAL: 102 MS
QRSD INTERVAL: 94 MS
QT INTERVAL: 444 MS
QT INTERVAL: 470 MS
QTC INTERVAL: 401 MS
QTC INTERVAL: 446 MS
RBC # BLD AUTO: 4.52 MILLION/UL (ref 3.88–5.62)
SODIUM SERPL-SCNC: 141 MMOL/L (ref 135–147)
T WAVE AXIS: 46 DEGREES
T WAVE AXIS: 49 DEGREES
VENTRICULAR RATE: 44 BPM
VENTRICULAR RATE: 61 BPM
WBC # BLD AUTO: 6.19 THOUSAND/UL (ref 4.31–10.16)

## 2024-09-24 PROCEDURE — C1894 INTRO/SHEATH, NON-LASER: HCPCS | Performed by: INTERNAL MEDICINE

## 2024-09-24 PROCEDURE — 33208 INSRT HEART PM ATRIAL & VENT: CPT | Performed by: INTERNAL MEDICINE

## 2024-09-24 PROCEDURE — 33286 RMVL SUBQ CAR RHYTHM MNTR: CPT | Performed by: INTERNAL MEDICINE

## 2024-09-24 PROCEDURE — 71045 X-RAY EXAM CHEST 1 VIEW: CPT

## 2024-09-24 PROCEDURE — 80048 BASIC METABOLIC PNL TOTAL CA: CPT | Performed by: PHYSICIAN ASSISTANT

## 2024-09-24 PROCEDURE — 93005 ELECTROCARDIOGRAM TRACING: CPT

## 2024-09-24 PROCEDURE — C1785 PMKR, DUAL, RATE-RESP: HCPCS | Performed by: INTERNAL MEDICINE

## 2024-09-24 PROCEDURE — 85025 COMPLETE CBC W/AUTO DIFF WBC: CPT | Performed by: PHYSICIAN ASSISTANT

## 2024-09-24 PROCEDURE — C1898 LEAD, PMKR, OTHER THAN TRANS: HCPCS | Performed by: INTERNAL MEDICINE

## 2024-09-24 PROCEDURE — C1887 CATHETER, GUIDING: HCPCS | Performed by: INTERNAL MEDICINE

## 2024-09-24 PROCEDURE — 93010 ELECTROCARDIOGRAM REPORT: CPT | Performed by: INTERNAL MEDICINE

## 2024-09-24 PROCEDURE — NC001 PR NO CHARGE: Performed by: PHYSICIAN ASSISTANT

## 2024-09-24 PROCEDURE — 76937 US GUIDE VASCULAR ACCESS: CPT | Performed by: INTERNAL MEDICINE

## 2024-09-24 PROCEDURE — C1769 GUIDE WIRE: HCPCS | Performed by: INTERNAL MEDICINE

## 2024-09-24 PROCEDURE — 83735 ASSAY OF MAGNESIUM: CPT | Performed by: PHYSICIAN ASSISTANT

## 2024-09-24 PROCEDURE — 85610 PROTHROMBIN TIME: CPT | Performed by: PHYSICIAN ASSISTANT

## 2024-09-24 PROCEDURE — C1892 INTRO/SHEATH,FIXED,PEEL-AWAY: HCPCS | Performed by: INTERNAL MEDICINE

## 2024-09-24 DEVICE — LEAD 3830 US MKT/ 69CM MRI LBBAP
Type: IMPLANTABLE DEVICE | Site: HEART | Status: FUNCTIONAL
Brand: SELECTSECURE™ MRI SURESCAN™

## 2024-09-24 DEVICE — IPG W1DR01 AZURE XT DR MRI USA
Type: IMPLANTABLE DEVICE | Site: HEART | Status: FUNCTIONAL
Brand: AZURE™ XT DR MRI SURESCAN™

## 2024-09-24 DEVICE — LEAD 457453 MRI US BI RCMCRD MVC
Type: IMPLANTABLE DEVICE | Site: HEART | Status: FUNCTIONAL
Brand: CAPSURE SENSE MRI™ SURESCAN™

## 2024-09-24 DEVICE — ENVELOPE CMRM6122 ABSORB MED MR
Type: IMPLANTABLE DEVICE | Site: HEART | Status: FUNCTIONAL
Brand: TYRX™

## 2024-09-24 RX ORDER — CEFAZOLIN SODIUM 2 G/50ML
2000 SOLUTION INTRAVENOUS ONCE
Status: COMPLETED | OUTPATIENT
Start: 2024-09-24 | End: 2024-09-24

## 2024-09-24 RX ORDER — LIDOCAINE HYDROCHLORIDE 10 MG/ML
INJECTION, SOLUTION EPIDURAL; INFILTRATION; INTRACAUDAL; PERINEURAL CODE/TRAUMA/SEDATION MEDICATION
Status: DISCONTINUED | OUTPATIENT
Start: 2024-09-24 | End: 2024-09-24 | Stop reason: HOSPADM

## 2024-09-24 RX ORDER — GENTAMICIN 40 MG/ML
INJECTION, SOLUTION INTRAMUSCULAR; INTRAVENOUS CODE/TRAUMA/SEDATION MEDICATION
Status: DISCONTINUED | OUTPATIENT
Start: 2024-09-24 | End: 2024-09-24 | Stop reason: HOSPADM

## 2024-09-24 RX ORDER — SODIUM CHLORIDE 9 MG/ML
INJECTION, SOLUTION INTRAVENOUS CONTINUOUS PRN
Status: DISCONTINUED | OUTPATIENT
Start: 2024-09-24 | End: 2024-09-24

## 2024-09-24 RX ORDER — ACETAMINOPHEN 325 MG/1
650 TABLET ORAL EVERY 4 HOURS PRN
Status: DISCONTINUED | OUTPATIENT
Start: 2024-09-24 | End: 2024-09-24 | Stop reason: HOSPADM

## 2024-09-24 RX ORDER — PROPOFOL 10 MG/ML
INJECTION, EMULSION INTRAVENOUS CONTINUOUS PRN
Status: DISCONTINUED | OUTPATIENT
Start: 2024-09-24 | End: 2024-09-24

## 2024-09-24 RX ORDER — PROPOFOL 10 MG/ML
INJECTION, EMULSION INTRAVENOUS AS NEEDED
Status: DISCONTINUED | OUTPATIENT
Start: 2024-09-24 | End: 2024-09-24

## 2024-09-24 RX ORDER — CHLORHEXIDINE GLUCONATE ORAL RINSE 1.2 MG/ML
15 SOLUTION DENTAL ONCE
Status: DISCONTINUED | OUTPATIENT
Start: 2024-09-24 | End: 2024-09-24 | Stop reason: HOSPADM

## 2024-09-24 RX ORDER — METOPROLOL SUCCINATE 25 MG/1
25 TABLET, EXTENDED RELEASE ORAL DAILY
Qty: 30 TABLET | Refills: 3 | Status: SHIPPED | OUTPATIENT
Start: 2024-09-24

## 2024-09-24 RX ORDER — EPHEDRINE SULFATE 50 MG/ML
INJECTION INTRAVENOUS AS NEEDED
Status: DISCONTINUED | OUTPATIENT
Start: 2024-09-24 | End: 2024-09-24

## 2024-09-24 RX ORDER — FENTANYL CITRATE 50 UG/ML
INJECTION, SOLUTION INTRAMUSCULAR; INTRAVENOUS AS NEEDED
Status: DISCONTINUED | OUTPATIENT
Start: 2024-09-24 | End: 2024-09-24

## 2024-09-24 RX ADMIN — CEFAZOLIN SODIUM 2000 MG: 2 SOLUTION INTRAVENOUS at 10:54

## 2024-09-24 RX ADMIN — EPHEDRINE SULFATE 10 MG: 50 INJECTION INTRAVENOUS at 11:27

## 2024-09-24 RX ADMIN — SODIUM CHLORIDE: 0.9 INJECTION, SOLUTION INTRAVENOUS at 10:19

## 2024-09-24 RX ADMIN — PROPOFOL 50 MG: 10 INJECTION, EMULSION INTRAVENOUS at 10:49

## 2024-09-24 RX ADMIN — FENTANYL CITRATE 25 MCG: 50 INJECTION, SOLUTION INTRAMUSCULAR; INTRAVENOUS at 11:10

## 2024-09-24 RX ADMIN — PROPOFOL 100 MCG/KG/MIN: 10 INJECTION, EMULSION INTRAVENOUS at 10:49

## 2024-09-24 NOTE — ANESTHESIA PREPROCEDURE EVALUATION
Procedure:  Cardiac pacer implant DUAL CHAMBER (Chest)  Cardiac loop recorder explant (Chest)    Relevant Problems   CARDIO   (+) Hyperlipidemia   (+) Hypertension   (+) PAF (paroxysmal atrial fibrillation) (HCC)   (+) Tachy-julius syndrome (HCC)      MUSCULOSKELETAL   (+) Gout      NEURO/PSYCH   (+) Acute intractable tension-type headache   (+) Anxiety   (+) New daily persistent headache      PULMONARY   (+) PADDY (obstructive sleep apnea)      Stroke (HCC) left hemiparesis, wears leg brace and uses cane, unable to use left arm and hand     Left Ventricle: Left ventricular cavity size is normal. Wall thickness  is normal. The left ventricular ejection fraction is 65%. Systolic  function is vigorous. Wall motion is normal. Diastolic function is normal.    Right Ventricle: Right ventricular cavity size is normal. Systolic  function is normal.    Left Atrium: The atrium is mildly dilated.    Pulmonic Valve: There is mild regurgitation.    Aorta: The aortic root is normal in size. The ascending aorta is  borderline dilated.  Less  Physical Exam    Airway    Mallampati score: II  TM Distance: >3 FB  Neck ROM: full     Dental       Cardiovascular      Pulmonary      Other Findings        Anesthesia Plan  ASA Score- 3     Anesthesia Type- IV sedation with anesthesia with ASA Monitors.         Additional Monitors:     Airway Plan:            Plan Factors-    Chart reviewed.                      Induction- intravenous.    Postoperative Plan-         Informed Consent- Anesthetic plan and risks discussed with patient.  I personally reviewed this patient with the CRNA. Discussed and agreed on the Anesthesia Plan with the CRNA..

## 2024-09-24 NOTE — Clinical Note
The PACER GENERATOR YAW XT DR KEYUR HULL - [id number] device was inserted. The leads were placed into the connector and visually verified to be in correct position. Injury current obtained.

## 2024-09-24 NOTE — DISCHARGE INSTR - AVS FIRST PAGE
PLEASE NOTE THE FOLLOWING MEDICATION RECOMMENDATIONS:  - start Toprol XL 25 mg daily       LOOP EXPLANT SITE:  Keep loop recorder incision dry for one week. If site is glued, then you may shower (as the glue is waterproof). If not, please keep the area covered and dry while bathing.     Do not use lotions/powders/creams on incision. Remove outer bandage 48 hours after procedure. Any sutures present will be removed at your 2 week follow up appointment. Please call the office if you notice redness, swelling, bleeding, or drainage from incision or if you develop fevers - [phone].      PACEMAKER INSTRUCTIONS:  Please refer to post pacemaker implantation discharge instructions and restrictions and your pacemaker booklet/temporary card.     Keep incision dry for one week. Leave outer bandage in place for 1 week - it is water proof, and as long as it is fully adhered to your skin you may shower with it.  If it appears as though the bandage is coming off and/or there is any communication to the area of device incision, please then keep the whole area dry for the remaining week.  After 1 week, please remove by pulling all edges away from the center of the bandage. After the bandage is removed, you may then shower normally and get the area wet with soap and water, no scrubbing, and pat dry. Do not use lotions/powders/creams on incision.    No overhead reaching/pushing/pulling/lifting greater than 5-10lbs with left arm for six weeks. Please call the office if you notice redness, swelling, bleeding, or drainage from incision or if you develop fevers.       AFTER PACEMAKER CARE:    If you have any questions, please call [phone] to speak with a nurse (8:30am-4pm, or [phone] after hours). For appointments, please call [phone].      WHAT YOU SHOULD KNOW:   A pacemaker is a small, battery-powered device that is placed under your skin in your upper chest area with wires placed through a vein that lead directly  into the heart. It helps regulate your heart rate and prevent your heart from beating too slowly.                 AFTER YOU LEAVE:     Medicines:     Pain medicine:  You may need medicine to take away or decrease pain.     Learn how to take your medicine. Ask what medicine and how much you should take. Be sure you know how, when, and how often to take it. Usually Over the counter pain medicine is sufficient to control pain (Acetominophen or Ibuprofen) Ask your doctor if you may take these. If this does not control your pain, narcotic pain killers may be prescribed, please call if you need prescription.     Do not wait until the pain is severe before you take your medicine. Tell caregivers if your pain does not decrease.    Pain medicine can make you dizzy or sleepy. Prevent falls by calling someone when you get out of bed or if you need help.        Take your medicine as directed.  Call your healthcare provider if you think your medicine is not helping or if you have side effects. Tell him if you are allergic to any medicine.    Follow up with your cardiologist after your procedure:  You will need a follow-up visit approximately 2 weeks after you leave the hospital. Your cardiologist will check your wound and make sure that your pacemaker is working correctly.     Follow the instructions to check your pacemaker:  Your cardiologist or primary healthcare provider will check your pacemaker and the battery regularly.  He will use a computer to check your pacemaker over the telephone or wireless device which will be given to you.     Pacemaker batteries usually last 8 to 10 years. The pacemaker unit will be replaced when the battery gets low. This is a simpler procedure than the original one to implant your pacemaker.    Wound care:  Keep your incision dry for one week.  Do not use lotions/powders/creams on incision.     Leave outer bandage in place for 1 week - it is water proof, and as long as it is fully adhered to  your skin you may shower with it.  If it appears as though the bandage is coming off and/or there is any communication to the area of device incision, please then keep the whole area dry for the remaining week.  After 1 week, please remove by pulling all edges away from the center of the bandage.    Please call the office if you notice redness, swelling, bleeding, or drainage from incision or if you develop fevers.       Activity:   Arm movement and lifting:  Be careful using the arm on the side of your pacemaker. Do not move your arm for the first 24 hours after your procedure. Do not  lift your arm above your shoulder or lift more than 10 pounds for one month after your procedure. Avoid pushing, pulling, or repetitive arm movements for 6 weeks. This helps the leads stay in place and helps your wound heal. Ask your caregiver when you can drive after your procedure. You may move your arm side to side without lifting above your shoulder, and do not need to wear a sling at home.   Driving: you are ok to drive 48 hours after pacemaker is implanted   Sports:  Ask your caregiver when it is okay to play tennis, golf, basketball, or any sport that requires you to lift your arms. Do not play full contact sports, such as football, that could damage your pacemaker. Ask your cardiologist or primary healthcare provider how much and what kinds of physical activity are safe for you.    Living with a pacemaker:   Tell all caregivers you have a pacemaker:  This includes surgeons, radiologists, and medical technicians. You may want to wear a medical alert ID bracelet or necklace that states that you have a pacemaker.    Carry your pacemaker ID card:  Make sure you receive a pacemaker ID card. Carry it with you at all times. It lists important information about your pacemaker. Show it to airport security if you travel.     Avoid electrical interference:  Avoid welding equipment and other equipment with large magnets or electric  fields. These things could interfere with how your pacemaker works. Use your cell phone on the ear opposite from your pacemaker. Do not carry your cell phone in your shirt pocket over your chest.     Some Pacemakers are MRI safe. Ask you doctor if it is safe to proceed with MRI and let the radiologist and staff know you have a pacemaker.     Do not touch the skin around your pacemaker:  This can cause damage to the lead wires or move the pacemaker unit from where it should be.    Contact your cardiologist or primary healthcare provider if:   The area around your pacemaker has increasing amount of pain after surgery. The pain should improve over first few days after implantation.     The skin around your stitches has increasing redness, swelling, or has drainage. This may mean that you have an infection.     You have a fever.     You have chills, a cough, and feel weak or achy. These are also signs of infection.    Your feet or ankles are more swollen than your baseline.     Your Heart rate is less than 50 beats per minute     Seek care immediately if:   Your bandage becomes soaked with blood.     Your pacemaker is swelling rapidly    Your stitches open up.     You feel your heart suddenly beating very slowly or quickly.    You become too weak or dizzy to stand, or you pass out.     Your arm or leg feels warm, tender, and painful. It may look swollen and red.    You have chest pain that does not go away with rest or medicine.     You feel lightheaded, short of breath, and have chest pain.     You cough up blood.        © 2014 [employer] Information is for End User's use only and may not be sold, redistributed or otherwise used for commercial purposes. All illustrations and images included in CareNotes® are the copyrighted property of [employer].D.A[employer]M., Inc. or [employer].  The above information is an  only. It is not intended as medical advice for individual conditions or  treatments. Talk to your doctor, nurse or pharmacist before following any medical regimen to see if it is safe and effective for you.

## 2024-09-24 NOTE — Clinical Note
Defib pad site: left lower flank.Defib pad site: RIGHT UPPER CHEST.Defib pad site assessment: skin integrity intact.

## 2024-09-24 NOTE — Clinical Note
The PACER GENERATOR YAW XT DR KEYUR HULL - [id number] device was inserted. The leads were placed into the connector and visually verified to be in correct position. Injury current obtained.

## 2024-09-26 ENCOUNTER — CLINICAL SUPPORT (OUTPATIENT)
Dept: CARDIOLOGY CLINIC | Facility: CLINIC | Age: 69
End: 2024-09-26

## 2024-09-26 DIAGNOSIS — Z95.818 PRESENCE OF OTHER CARDIAC IMPLANTS AND GRAFTS: Primary | ICD-10-CM

## 2024-09-26 PROCEDURE — RECHECK

## 2024-09-26 NOTE — PROGRESS NOTES
Pt presents into the office today for an Aquacel removal and replacement. Pt had a loop recorder and pacer implant done on 9/24, where the Aquacel rippled and some moisture trickled inside. The incision itself appears to be healing well.       Site reviewed and Aquacel placed on by Dread Trejo PA-C. Pt will continue to wear the Aquacel for a few more days.

## 2024-10-08 ENCOUNTER — IN-CLINIC DEVICE VISIT (OUTPATIENT)
Dept: CARDIOLOGY CLINIC | Facility: CLINIC | Age: 69
End: 2024-10-08

## 2024-10-08 DIAGNOSIS — Z95.0 CARDIAC PACEMAKER: ICD-10-CM

## 2024-10-08 DIAGNOSIS — R00.1 SINUS BRADYCARDIA: Primary | ICD-10-CM

## 2024-10-08 PROCEDURE — 99024 POSTOP FOLLOW-UP VISIT: CPT | Performed by: INTERNAL MEDICINE

## 2024-10-08 NOTE — PROGRESS NOTES
Results for orders placed or performed in visit on 10/08/24   Cardiac EP device report    Narrative    MDT DC PPM (MVP ON) / ACTIVE SYSTEM IS MRI CONDITIONAL  DEVICE INTERROGATED IN THE [place] OFFICE: WOUND CHECK (EXPLANT LOOP/IMPLANT PPM): INCISION CLEAN AND DRY WITH EDGES APPROXIMATED; SUTURES REMOVED @ LOOP EXPLANT SITE; WOUND CARE AND RESTRICTIONS REVIEWED WITH PATIENT (PICS - MEDIA TAB). BATTERY VOLTAGE ADEQUATE (11.8 YR). AP 99.4%  <0.1% (AAIR-DDDR 60 PPM). ALL LEAD PARAMETERS WITHIN NORMAL LIMITS & STABLE SINCE IMPLANT. NO SIGNIFICANT HIGH RATE EPISODES. NO PROGRAMMING CHANGES MADE TO DEVICE PARAMETERS. NORMAL DEVICE FUNCTION.  ES

## 2024-10-17 ENCOUNTER — TELEPHONE (OUTPATIENT)
Dept: CARDIOLOGY CLINIC | Facility: CLINIC | Age: 69
End: 2024-10-17

## 2024-10-17 DIAGNOSIS — I49.5 TACHY-BRADY SYNDROME (HCC): ICD-10-CM

## 2024-10-17 DIAGNOSIS — I48.0 PAF (PAROXYSMAL ATRIAL FIBRILLATION) (HCC): ICD-10-CM

## 2024-10-17 RX ORDER — METOPROLOL SUCCINATE 25 MG/1
25 TABLET, EXTENDED RELEASE ORAL DAILY
Qty: 90 TABLET | Refills: 1 | Status: SHIPPED | OUTPATIENT
Start: 2024-10-17

## 2024-10-17 NOTE — TELEPHONE ENCOUNTER
Pt is not scheduled or in recall for a f/u appt with our office or general cardiology.    When would you like to see this pt for a f/u?

## 2025-01-16 ENCOUNTER — IN-CLINIC DEVICE VISIT (OUTPATIENT)
Dept: CARDIOLOGY CLINIC | Facility: CLINIC | Age: 70
End: 2025-01-16
Payer: COMMERCIAL

## 2025-01-16 ENCOUNTER — RESULTS FOLLOW-UP (OUTPATIENT)
Dept: CARDIOLOGY CLINIC | Facility: CLINIC | Age: 70
End: 2025-01-16

## 2025-01-16 DIAGNOSIS — Z95.0 PRESENCE OF CARDIAC PACEMAKER: Primary | ICD-10-CM

## 2025-01-16 PROCEDURE — 93280 PM DEVICE PROGR EVAL DUAL: CPT | Performed by: INTERNAL MEDICINE

## 2025-01-16 NOTE — PROGRESS NOTES
Results for orders placed or performed in visit on 01/16/25   Cardiac EP device report    Narrative    MDT DC PPM (MVP ON) / ACTIVE SYSTEM IS MRI CONDITIONAL  DEVICE INTERROGATED IN THE [place] OFFICE: BATTERY VOLTAGE ADEQUATE (13.1 YRS). AP 99.1%.  <0.1%. ALL LEAD PARAMETERS WITHIN NORMAL LIMITS. 1 NSVT EPISODE (13 @ 179). 1 AT/AF EPISODE SHOWING AF FOR 35 S. AT/AF BURDEN <0.1%, PT TAKES METO SUCC, ELIQUIS. EF 65% (ECHO 7/29/24). NO PROGRAMMING CHANGES MADE TO DEVICE PARAMETERS. NORMAL DEVICE FUNCTION. [facility]/ES

## 2025-02-25 ENCOUNTER — OFFICE VISIT (OUTPATIENT)
Dept: GASTROENTEROLOGY | Facility: CLINIC | Age: 70
End: 2025-02-25
Payer: COMMERCIAL

## 2025-02-25 ENCOUNTER — TELEPHONE (OUTPATIENT)
Dept: GASTROENTEROLOGY | Facility: CLINIC | Age: 70
End: 2025-02-25

## 2025-02-25 VITALS
DIASTOLIC BLOOD PRESSURE: 70 MMHG | HEIGHT: 68 IN | SYSTOLIC BLOOD PRESSURE: 118 MMHG | BODY MASS INDEX: 27.89 KG/M2 | WEIGHT: 184 LBS | TEMPERATURE: 97.7 F

## 2025-02-25 DIAGNOSIS — Z86.0101 HX OF ADENOMATOUS COLONIC POLYPS: Primary | ICD-10-CM

## 2025-02-25 PROCEDURE — 99204 OFFICE O/P NEW MOD 45 MIN: CPT | Performed by: INTERNAL MEDICINE

## 2025-02-25 RX ORDER — POLYETHYLENE GLYCOL 3350, SODIUM SULFATE ANHYDROUS, SODIUM BICARBONATE, SODIUM CHLORIDE, POTASSIUM CHLORIDE 236; 22.74; 6.74; 5.86; 2.97 G/4L; G/4L; G/4L; G/4L; G/4L
4000 POWDER, FOR SOLUTION ORAL ONCE
Qty: 4000 ML | Refills: 0 | Status: SHIPPED | OUTPATIENT
Start: 2025-02-25 | End: 2025-02-25

## 2025-02-25 RX ORDER — SODIUM CHLORIDE, SODIUM LACTATE, POTASSIUM CHLORIDE, CALCIUM CHLORIDE 600; 310; 30; 20 MG/100ML; MG/100ML; MG/100ML; MG/100ML
125 INJECTION, SOLUTION INTRAVENOUS CONTINUOUS
OUTPATIENT
Start: 2025-02-25

## 2025-02-25 NOTE — PATIENT INSTRUCTIONS
Scheduled date of colonoscopy (as of today): 03/10/2025  Physician performing colonoscopy: Dr. Teri Crystal   Location of colonoscopy: [place]  Bowel prep reviewed with patient: Golytely   Instructions reviewed with patient by: Gisel WILSON   Clearances:  BRITTANY

## 2025-02-25 NOTE — PROGRESS NOTES
[facility] - Outpatient Consultation  Rakesh Appiah 69 y.o. male MRN: [id number]  Encounter: [id number]          REASON FOR CONSULT:  1. Hx of adenomatous colonic polyps  Colonoscopy    polyethylene glycol (Golytely) 4000 mL solution           ASSESSMENT AND PLAN:      1. Hx of adenomatous colonic polyps  Overview:  Multiple polyps in the past both adenomatous and hyperplastic.  Assessment & Plan:  Last colonoscopy was in 2021, 3 polyps removed. Repeat was recommended in 2024.   Ordered today.  Orders:  -     Colonoscopy; Future; Expected date: 02/25/2025  -     polyethylene glycol (Golytely) 4000 mL solution; Take 4,000 mL by mouth once for 1 dose Take 4000 mL by mouth once for 1 dose. Use as directed     Pt advised that he will need to stop Eliquis for procedure.    The assessment and recommendations were discussed with the patient and/or family members, and they verbalized their understanding. All questions were answered to their satisfaction. They are in agreement with the recommendations. The recommendations were also communicated to the primary team/referring provider / are available for their review in the patient's chart.  ______________________________________________________________________    HPI:  69yoM with PADDY on CPAP, DM2, HLD, htn, h/o stroke, h/o afib on Eliquis and sinus bradycardia s/p dual chamber pacemaker, presenting for evaluation of colon polyps.  Previously seen by [facility].  He has not had any issues with colonoscopies in the past.    No GI symptoms currently.  Normal regular bowel movements. No GI bleeding. No recent changes in weight.  No family history of colon cancer.     Colonoscopy 2021:  FINDINGS:  Rectal exam no masses  Small, flat, internal hemorrhoids  6 mm polyp; completely removed en bloc by cold snare and retrieved specimen  Two polyps measuring from 1 mm up to 2 mm in the rectum; performed complete piecemeal removal by cold forceps biopsy  The  remainder of a detailed exam was within normal limits including the appendix opening.  Final Diagnosis   A. Large Intestine, descending polyp:  - Polypoid colonic mucosa with lymphoid aggregate and no significant histopathologic abnormality.  - Negative for dysplasia and malignancy.      B. Rectum, polyps x2:  - Polypoid colorectal mucosa with surface hyperplastic changes.  - Negative for dysplasia and malignancy.       REVIEW OF SYSTEMS:  A complete 14-pt review of systems is negative other than the relevant positive symptoms mentioned in the HPI.    Historical Information   Past Medical History:   Diagnosis Date    Arthritis     in hands    Cervical herniated disc     Colon polyp     Diabetes mellitus (HCC)     borderline    Gout     hx of in 2018, none since    Hyperlipidemia     Hypertension     Irregular heart beat     a-fib, has loop recorder    Kidney stone 09/13/2021    hx of    Migraine     S/P Botox injection     in left wrist to help increase mobility    Sleep apnea     mild sleep apnea, cpap was not ordered    Stroke (HCC) 2018    left hemiparesis, wears leg brace and uses cane, unable to use left arm and hand    Use of cane as ambulatory aid     had CVA in 2018     Past Surgical History:   Procedure Laterality Date    APPENDECTOMY      CARDIAC ELECTROPHYSIOLOGY PROCEDURE N/A 9/24/2024    Procedure: Cardiac pacer implant DUAL CHAMBER;  Surgeon: Abdulkadir Ludwig MD;  [facility];  Service: Cardiology    CARDIAC ELECTROPHYSIOLOGY PROCEDURE N/A 9/24/2024    Procedure: Cardiac loop recorder explant;  Surgeon: Abdulkadir Ludwig MD;  [facility];  Service: Cardiology    COLONOSCOPY      KNEE ARTHROSCOPY      right knee    KNEE SURGERY Right     NECK SURGERY      TONSILLECTOMY       Social History   Social History     Substance and Sexual Activity   Alcohol Use Yes    Comment: socially      Social History     Substance and Sexual Activity   Drug Use No     Social History  "    Tobacco Use   Smoking Status Former    Current packs/day: 0.00    Types: Cigarettes, Cigars    Quit date:     Years since quittin.1   Smokeless Tobacco Never   Tobacco Comments    stopped on 10/20/2018     Family History   Problem Relation Age of Onset    Stroke Mother     Heart disease Father     ALS Father        Meds/Allergies       Current Outpatient Medications:     amLODIPine (NORVASC) 5 mg tablet    atorvastatin (LIPITOR) 80 mg tablet    Eliquis 5 MG    FLUoxetine 20 MG tablet    losartan (COZAAR) 50 mg tablet    metoprolol succinate (TOPROL-XL) 25 mg 24 hr tablet    polyethylene glycol (Golytely) 4000 mL solution    No Known Allergies        Objective     Blood pressure 118/70, temperature 97.7 °F (36.5 °C), temperature source Tympanic, height 5' 8\" (1.727 m), weight 83.5 kg (184 lb). Body mass index is 27.98 kg/m².        PHYSICAL EXAM:      General Appearance:   Alert, cooperative, no distress   HEENT:   Normocephalic, atraumatic, anicteric.     Neck:  Supple, symmetrical, trachea midline   Lungs:   CTAB; respirations unlabored    Heart::   Regular rate   Abdomen:   Soft, non-tender, non-distended; normal bowel sounds    Genitalia:   Deferred    Rectal:   Deferred    Extremities:  No cyanosis, clubbing or edema    Pulses:  2+ and symmetric    Skin:  No jaundice, rashes, or lesions    Lymph nodes:  No palpable cervical lymphadenopathy        Lab Results:   No visits with results within 1 Day(s) from this visit.   Latest known visit with results is:   Admission on 2024, Discharged on 2024   Component Date Value    Sodium 2024 141     Potassium 2024 3.5     Chloride 2024 106     CO2 2024 28     ANION GAP 2024 7     BUN 2024 11     Creatinine 2024 0.74     Glucose 2024 106     Glucose, Fasting 2024 106 (H)     Calcium 2024 9.0     eGFR 2024 93     Magnesium 2024 2.0     WBC 2024 6.19     RBC 2024 4.52  "    Hemoglobin 09/24/2024 13.4     Hematocrit 09/24/2024 40.1     MCV 09/24/2024 89     MCH 09/24/2024 29.6     MCHC 09/24/2024 33.4     RDW 09/24/2024 14.6     MPV 09/24/2024 9.6     Platelets 09/24/2024 222     nRBC 09/24/2024 0     Segmented % 09/24/2024 62     Immature Grans % 09/24/2024 1     Lymphocytes % 09/24/2024 24     Monocytes % 09/24/2024 9     Eosinophils Relative 09/24/2024 3     Basophils Relative 09/24/2024 1     Absolute Neutrophils 09/24/2024 3.86     Absolute Immature Grans 09/24/2024 0.03     Absolute Lymphocytes 09/24/2024 1.48     Absolute Monocytes 09/24/2024 0.58     Eosinophils Absolute 09/24/2024 0.20     Basophils Absolute 09/24/2024 0.04     Protime 09/24/2024 16.6 (H)     INR 09/24/2024 1.32 (H)     Ventricular Rate 09/24/2024 44     Atrial Rate 09/24/2024 44     IN Interval 09/24/2024 146     QRSD Interval 09/24/2024 102     QT Interval 09/24/2024 470     QTC Interval 09/24/2024 401     P Axis 09/24/2024 52     QRS Axis 09/24/2024 16     T Wave [place] 09/24/2024 46     Ventricular Rate 09/24/2024 61     Atrial Rate 09/24/2024 61     IN Interval 09/24/2024 162     QRSD Interval 09/24/2024 94     QT Interval 09/24/2024 444     QTC Interval 09/24/2024 446     P Axis 09/24/2024 68     QRS Axis 09/24/2024 20     T Wave [place] 09/24/2024 49          Radiology Results:   No results found.    I personally reviewed relevant labs as well as personally reviewed images in PACS and reports for relevant radiology studies.  I also personally reviewed prior GI notes and relevant non-GI notes in the patient's chart as well as relevant external medical records in [facility].

## 2025-02-27 ENCOUNTER — TELEPHONE (OUTPATIENT)
Dept: GASTROENTEROLOGY | Facility: CLINIC | Age: 70
End: 2025-02-27

## 2025-02-27 NOTE — TELEPHONE ENCOUNTER
Called pt to inform okay to hold - sp w/ pt he expressed understanding  
Our mutual patient is scheduled for procedure: Colonoscopy    On: _03___/_10    _/_ 25   _      With: Dr. Trent Crystal________    He/She is taking the following blood thinner:  Eliquis        Can this be stopped ___2___ days prior to the procedure?      Physician Approving clearance: ________________________  
show

## 2025-03-07 RX ORDER — SODIUM CHLORIDE, SODIUM LACTATE, POTASSIUM CHLORIDE, CALCIUM CHLORIDE 600; 310; 30; 20 MG/100ML; MG/100ML; MG/100ML; MG/100ML
50 INJECTION, SOLUTION INTRAVENOUS CONTINUOUS
Status: CANCELLED | OUTPATIENT
Start: 2025-03-07

## 2025-03-10 ENCOUNTER — ANESTHESIA (OUTPATIENT)
Dept: GASTROENTEROLOGY | Facility: HOSPITAL | Age: 70
End: 2025-03-10
Payer: COMMERCIAL

## 2025-03-10 ENCOUNTER — ANESTHESIA EVENT (OUTPATIENT)
Dept: GASTROENTEROLOGY | Facility: HOSPITAL | Age: 70
End: 2025-03-10
Payer: COMMERCIAL

## 2025-03-10 ENCOUNTER — HOSPITAL ENCOUNTER (OUTPATIENT)
Dept: GASTROENTEROLOGY | Facility: HOSPITAL | Age: 70
Setting detail: OUTPATIENT SURGERY
Discharge: HOME | End: 2025-03-10
Attending: INTERNAL MEDICINE
Payer: COMMERCIAL

## 2025-03-10 VITALS
TEMPERATURE: 97.4 F | OXYGEN SATURATION: 98 % | HEIGHT: 68 IN | SYSTOLIC BLOOD PRESSURE: 114 MMHG | WEIGHT: 184 LBS | BODY MASS INDEX: 27.89 KG/M2 | HEART RATE: 60 BPM | RESPIRATION RATE: 16 BRPM | DIASTOLIC BLOOD PRESSURE: 67 MMHG

## 2025-03-10 DIAGNOSIS — Z86.0101 HX OF ADENOMATOUS COLONIC POLYPS: ICD-10-CM

## 2025-03-10 PROBLEM — I63.9 CVA (CEREBRAL VASCULAR ACCIDENT) (HCC): Status: ACTIVE | Noted: 2025-03-10

## 2025-03-10 PROCEDURE — 88305 TISSUE EXAM BY PATHOLOGIST: CPT | Performed by: PATHOLOGY

## 2025-03-10 PROCEDURE — 45385 COLONOSCOPY W/LESION REMOVAL: CPT | Performed by: INTERNAL MEDICINE

## 2025-03-10 RX ORDER — SODIUM CHLORIDE, SODIUM LACTATE, POTASSIUM CHLORIDE, CALCIUM CHLORIDE 600; 310; 30; 20 MG/100ML; MG/100ML; MG/100ML; MG/100ML
50 INJECTION, SOLUTION INTRAVENOUS CONTINUOUS
Status: DISCONTINUED | OUTPATIENT
Start: 2025-03-10 | End: 2025-03-14 | Stop reason: HOSPADM

## 2025-03-10 RX ORDER — PROPOFOL 10 MG/ML
INJECTION, EMULSION INTRAVENOUS AS NEEDED
Status: DISCONTINUED | OUTPATIENT
Start: 2025-03-10 | End: 2025-03-10

## 2025-03-10 RX ORDER — SODIUM CHLORIDE, SODIUM LACTATE, POTASSIUM CHLORIDE, CALCIUM CHLORIDE 600; 310; 30; 20 MG/100ML; MG/100ML; MG/100ML; MG/100ML
125 INJECTION, SOLUTION INTRAVENOUS CONTINUOUS
Status: DISCONTINUED | OUTPATIENT
Start: 2025-03-10 | End: 2025-03-14 | Stop reason: HOSPADM

## 2025-03-10 RX ORDER — LIDOCAINE HYDROCHLORIDE 20 MG/ML
INJECTION, SOLUTION EPIDURAL; INFILTRATION; INTRACAUDAL; PERINEURAL AS NEEDED
Status: DISCONTINUED | OUTPATIENT
Start: 2025-03-10 | End: 2025-03-10

## 2025-03-10 RX ADMIN — PROPOFOL 40 MG: 10 INJECTION, EMULSION INTRAVENOUS at 07:39

## 2025-03-10 RX ADMIN — PROPOFOL 40 MG: 10 INJECTION, EMULSION INTRAVENOUS at 07:40

## 2025-03-10 RX ADMIN — SODIUM CHLORIDE, SODIUM LACTATE, POTASSIUM CHLORIDE, AND CALCIUM CHLORIDE 50 ML/HR: .6; .31; .03; .02 INJECTION, SOLUTION INTRAVENOUS at 07:11

## 2025-03-10 RX ADMIN — Medication 40 MG: at 07:47

## 2025-03-10 RX ADMIN — PROPOFOL 40 MG: 10 INJECTION, EMULSION INTRAVENOUS at 07:42

## 2025-03-10 RX ADMIN — PROPOFOL 80 MG: 10 INJECTION, EMULSION INTRAVENOUS at 07:38

## 2025-03-10 RX ADMIN — PROPOFOL 50 MG: 10 INJECTION, EMULSION INTRAVENOUS at 07:49

## 2025-03-10 RX ADMIN — PROPOFOL 50 MG: 10 INJECTION, EMULSION INTRAVENOUS at 07:45

## 2025-03-10 RX ADMIN — PROPOFOL 50 MG: 10 INJECTION, EMULSION INTRAVENOUS at 07:54

## 2025-03-10 RX ADMIN — LIDOCAINE HYDROCHLORIDE 50 MG: 20 INJECTION, SOLUTION EPIDURAL; INFILTRATION; INTRACAUDAL at 07:38

## 2025-03-10 NOTE — H&P
History and Physical -  [facility]  Rakesh Appiah 69 y.o. male MRN: [id number]                  HPI: Rakesh Appiah is a 69 y.o. year old male who presents for hx of colon polyps.      REVIEW OF SYSTEMS: Per the HPI, and otherwise unremarkable.    Historical Information   Past Medical History:   Diagnosis Date    Arthritis     in hands    Cervical herniated disc     Colon polyp     Diabetes mellitus (HCC)     borderline    Gout     hx of in 2018, none since    Hyperlipidemia     Hypertension     Irregular heart beat     a-fib, has loop recorder    Kidney stone 2021    hx of    Migraine     S/P Botox injection     in left wrist to help increase mobility    Sleep apnea     mild sleep apnea, cpap was not ordered    Stroke (HCC) 2018    left hemiparesis, wears leg brace and uses cane, unable to use left arm and hand    Use of cane as ambulatory aid     had CVA in 2018     Past Surgical History:   Procedure Laterality Date    APPENDECTOMY      CARDIAC ELECTROPHYSIOLOGY PROCEDURE N/A 2024    Procedure: Cardiac pacer implant DUAL CHAMBER;  Surgeon: Abdulkadir Ludwig MD;  [facility];  Service: Cardiology    CARDIAC ELECTROPHYSIOLOGY PROCEDURE N/A 2024    Procedure: Cardiac loop recorder explant;  Surgeon: Abdulkadir Ludwig MD;  [facility];  Service: Cardiology    COLONOSCOPY      KNEE ARTHROSCOPY      right knee    KNEE SURGERY Right     NECK SURGERY      TONSILLECTOMY       Social History   Social History     Substance and Sexual Activity   Alcohol Use Yes    Comment: socially      Social History     Substance and Sexual Activity   Drug Use No     Social History     Tobacco Use   Smoking Status Former    Current packs/day: 0.00    Types: Cigarettes, Cigars    Quit date:     Years since quittin.1   Smokeless Tobacco Never   Tobacco Comments    stopped on 10/20/2018     Family History   Problem Relation Age of Onset    Stroke Mother     Heart  disease Father     ALS Father        Meds/Allergies       Current Outpatient Medications:     amLODIPine (NORVASC) 5 mg tablet    atorvastatin (LIPITOR) 80 mg tablet    FLUoxetine 20 MG tablet    losartan (COZAAR) 50 mg tablet    metoprolol succinate (TOPROL-XL) 25 mg 24 hr tablet    Eliquis 5 MG    polyethylene glycol (Golytely) 4000 mL solution    Current Facility-Administered Medications:     lactated ringers infusion, 50 mL/hr, Intravenous, Continuous    No Known Allergies    Objective     There were no vitals taken for this visit.      PHYSICAL EXAM    Gen: NAD  Head: NCAT  CV: RRR  CHEST: Clear  ABD: soft, NT/ND  EXT: no edema      ASSESSMENT/PLAN:  This is a 69 y.o. year old male here for colonoscopy, and he is stable and optimized for his procedure.

## 2025-03-10 NOTE — ANESTHESIA POSTPROCEDURE EVALUATION
Post-Op Assessment Note    CV Status:  Stable    Pain management: adequate       Mental Status:  Alert and awake   Hydration Status:  Euvolemic   PONV Controlled:  Controlled   Airway Patency:  Patent     Post Op Vitals Reviewed: Yes    No anethesia notable event occurred.    Staff: Anesthesiologist           Last Filed PACU Vitals:  Vitals Value Taken Time   Temp 97.4 °F (36.3 °C) 03/10/25 0819   Pulse 60 03/10/25 0819   /67 03/10/25 0819   Resp 16 03/10/25 0819   SpO2 98 % 03/10/25 0819       Modified Josefa:     Vitals Value Taken Time   Activity 2 03/10/25 0821   Respiration 2 03/10/25 0821   Circulation 2 03/10/25 0821   Consciousness 2 03/10/25 0821   Oxygen Saturation 2 03/10/25 0821     Modified Josefa Score: 10

## 2025-03-10 NOTE — ANESTHESIA PREPROCEDURE EVALUATION
Procedure:  COLONOSCOPY    Relevant Problems   CARDIO   (+) Bilateral carotid artery stenosis   (+) Hyperlipidemia   (+) Hypertension   (+) PAF (paroxysmal atrial fibrillation) (HCC)   (+) Tachy-julius syndrome (HCC)      MUSCULOSKELETAL   (+) Gout      NEURO/PSYCH   (+) Acute intractable tension-type headache   (+) Anxiety   (+) New daily persistent headache      PULMONARY   (+) PADDY (obstructive sleep apnea)        Physical Exam    Airway    Mallampati score: II         Dental   Comment: Chipped front     Cardiovascular  Cardiovascular exam normal    Pulmonary  Pulmonary exam normal     Other Findings        Anesthesia Plan  ASA Score- 3     Anesthesia Type- IV sedation with anesthesia with ASA Monitors.         Additional Monitors:     Airway Plan:            Plan Factors-Exercise tolerance (METS): >4 METS.    Chart reviewed.        Patient is not a current smoker.      Obstructive sleep apnea risk education given perioperatively.        Induction-     Postoperative Plan-         Informed Consent- Anesthetic plan and risks discussed with patient.        NPO Status:  Vitals Value Taken Time   Date of last liquid 03/10/25 03/10/25 0701   Time of last liquid 0530 03/10/25 0701   Date of last solid 03/08/25 03/10/25 0701   Time of last solid 1900 03/10/25 0701

## 2025-03-13 ENCOUNTER — RESULTS FOLLOW-UP (OUTPATIENT)
Dept: GASTROENTEROLOGY | Facility: CLINIC | Age: 70
End: 2025-03-13

## 2025-03-13 PROCEDURE — 88305 TISSUE EXAM BY PATHOLOGIST: CPT | Performed by: PATHOLOGY

## 2025-04-09 NOTE — PROGRESS NOTES
OCCUPATIONAL THERAPY INITIAL EVALUATION:    2021  Corrinne Edgewater  1955  [id number]  Christen Arteaga MD  1  Spasticity as late effect of cerebrovascular accident (CVA)        Subjective    "Im going to get botox"    PATIENT GOAL: "I want to be able to move this wrist and hand more"      Assessment/Plan    Skilled Analysis:  Pt is a 76 y o  male referred to Occupational Therapy s/p Spasticity as late effect of cerebrovascular accident (CVA) Socorro Mcintyre, R25 2]  Pt participated in skilled OT evaluation and following formalized testing, presents with the following areas of deficit: fxnl ROM, endurance, volitional muscle activation/termination,  FMC/FMS, GMC/GMS, hypertonicity and in hand manipulation/dexterity impacting indep and completion of ADL/IADL, salient, and leisure tasks  Pt does demo the need for skilled Occupational Therapy services 2x/week for 6-8 weeks with focus on UE NMR, UE strengthening, UE endurance, FMC/GMC, FMS/GMS and tone reduction to address the goals as listed below  Pt in agreement with POC, POC to  w/in 90 days  OT Recommending PT evaluation due to balance deficits and h/o falls  Goals:     Motor Short Term Goals (4 -6)WKS      Strength/Endurance    ·  Pt will increase L UE strength to 3/5  through the use of strengthening exercises and home program for eventual return to life and work roles and salient tasks    · Pt will demo with G tolerance to supine, seated, and in stance exercise x 45 minutes with minimal rest breaks required for increased engagement in life roles and weekly exercise regimen     · Pt will demo with G carryover of Home Exercise Program to improve functional progression towards goals in Plan of care and for improved functional use of LUE         FMC/GMC    · Pt will increase L UE prehension patterns for improved utensil and container management  with ability to isolate pincer and lateral pinch grasp >50% of the time       Functional Performance    · Pt will [de-identified] : 72 year old female (  , Indian ) referred for elevated ferritin , PMH of obesity , fatty liver , diabetes , SANTINO  no longer using CPAP , hypertension , Severe COvid 19 infection in 2020 required hospitalization for 7 months , ventilatory support , tracheostomy , Covid related VTE , atrial fibrillation , tracheal stenosis s/p dilatation , chronic cough , asthma , respiratory insufficiency on O2 PRN . GE reflux on PPI .  She is referred for ferritin of 700 . denies iron intake .  no known family history of hemochromatosis , liver disease. Symptoms : she has dyspnea on mild exertion , less than one block or one flight of stairs, she reports few episodes of diarrhea ( loose stools since February 2025 ) Raspy , hoarse voice for few weeks , also increased GE reflux symptoms , cough at night .  Health Maintenance : last mammogram 2 years ago , colonoscopy 2 - 3years ( showed adenomas )  increase LUE to functional assist with for ADL/IADL and tabletop tasks for improved functional performance of life roles and salient tasks     AROM/PROM    · Pt will demo with decreased  LUE  shoulder sublux to trace for increased AROM for improved ADL and IADL engagement    · Pt will be able to perform near full range of proximal L UE to demo increased strength and ROM of affected UE for improved ADLs/IADLs    · Pt will be able to perform >1/2  range of distal L UE to demo increased strength and ROM of affected UE for improved ADLs/IADLs    · Pt will demo decreased hypertonicity of Modified Linsey Scale (MAS) of L UE to 1 for improved alternate motor patterns, grasp/release, and termination on command for improved dressing/hygiene     · Pt will demo good carryover of clinic and home tone reduction strategies for improved AROM initiation with functional reach with ADL fxn      Modalities    · Pt will tolerate BIONESS/NMES/IASTM for improved motor and sensory performance for overall improved strength, tone reduction, and sensation to inc safety and engagement with ADL/IADL fxn    · Pt/family will demo G understanding and carryover of use of home NMES unit as prescribed to inc carryover of tone reduction and strengthening strategies of L UE      Treatment Interventions  Supine, seated, and in stance neuro re-ed  NMES/FES for strengthening  NMES for tone reduction  BIONESS  Fxnl Grasp and Release  FMC/prehension patterns  Fxnl Tool use (tweezers, tongs)  In hand manipulation  [employer] [facility], [place])  Manual tx (IASTM if able, PROM/stretching)  Hand to target tasks  Supine place and hold    HISTORY OF PRESENT ILLNESS:        Pt is a 71 y o  male who was referred to Occupational Therapy s/p  Left hemiparesis ([facility] [address] ) [G81 [id number]]  Pt is an employed 76 y  o  male seen for OT robert s/p [facility] s/p initial presentation to [facility] 10/20/2018 due to left-sided weakness and facial droop   He also had a speech impairment and was found to have an acute stroke  The patient received tPA at 11:00 p m  on the day of admission after neurology assessment  He had loop recorder placed at time of admission for CVA  during his hospital stay he was dx with a small amount of petechial hemorrhage noted on MRI  CT scan showed positive results for small infarctions within the right frontal lobe and right temporal occipital junction,Pt [facility] 10/25[facility] - 18  Pt is familiar to OP OT services, pt has received OP OT services for L UE NMR, tone reduction, and strengthening tasks  Pt with most recent dc in 2020  Pt has been followed by neurology and recent report stating "He does have some increased discomfort and potentially spasticity on his left hand side as well as some headaches over the course the last few weeks  Notably over that time frame he has been out of his Robaxin" Pt now has received botox for tone reduction and referred again to OP OT services for tone reduction and LUE NMR  Of note, pt has h/o fall in February with no major injuries  Pt continues to work FT, drives, and completes ADLs indep  Pt reports however that he requires increased time to don UB clothing due to hypertonicity   He is unable to functionally grasp/release items for ADLs           PMH:   Past Medical History:   Diagnosis Date    Hypertension     Migraine     Stroke ([facility] [address] )        Pain Levels:     Restin    With Activity:  Low back, at times (0-5)    Objective    Impairment Observations:    Upper Extremity       RUE LUE Comments                 UPPER EXTREMITY FXN Intact Intact Dominant Hand: R                         /Pinch Strength         Dynamometer      - Gross Grasp2 30 lbs 20 lbs subnormal   Pinch Meter       - PINCER 17 lbs 5 lbs     - TRIPOD 15 lbs 10 lbs     - LATERAL 16 lbs  11 lbs              AROM (seated)       Elevation/Depression  3/4 range     Shoulder Flex/Ext  1/2 range     Shoulder Abd 1/2 range     Shoulder Add  1/2 range     Horizontal Abd  1/2 range     Horizontal Add  1/2 range     Elbow Flex  >1/2 range     Elbow Ext  1/2 range     Pronation  Near full     Supination  3/4     Wrist Flex  3/4     Wrist Ext  <1/4 range     Digit Flex  3/4 range     Digit Ex  trace     Composite Grasp  3/4 range     Hook Grasp  Unable to isolate     Opposition  Able to oppose to D1-2     Subluxation  1 finger breadth      Full range all planes                       AROM (supine)         Elevation  Near full     Shoulder Flex/Ext  3/4     Shoulder ABd  3/4     Shoulder ADd  3/4     Horizontal ABd  1/2     Horizontal ADd  1/2     Elbow Flex  3/4     Elbow Ext  1/2     Pronation  1/2     Supination  1/2     Wrist Flex  3/4     Wrist Ext  <1/4                    Full range all planes                          PROM (supine position)         Elevation/Depression      Retraction/Protraction      Shoulder Flex/Ext      Shoulder ABd/ADd      Horizontal ABd/Add      Elbow Flex      Elbow Ext      Wrist Flex      Wrist Ext      Digit Flex      Digit Ext          Full range all planes   Full range all planes                            MMT         Elevation 4+/5 2/5    Shoulder Flex/Ext 4+/5 2/5    Shoulder Abd 4+/5 2/5    Shoulder ADd 4+/5 2/5    Elbow Flex 4+/5 2/5    Elbow Ext 4+/5 2/5    Wrist Flex 4+/5 3/5    Wrist Ext 4+/5 1/5    Gross Grasp 4+/5 3/5          SENSATION      Myofilaments (3 61 Wnl) 3 61 3 61    Sharp Dull  Intact Intact    Proprioception Intact Intact    Hot/Cold Temp Intact Intact          MODIFIED HUNTER SCALE (TONE) 0 1+    No increase in muscle tone (0)      Slight Increase in muscle tone with catch and release or min resist at end range (1)      Slight Increase in muscle tone with catch and release, followed by min resistance through remainder of range (1+)      Increased muscle tone through full range, able to be moved easily (2)      Considerable increase in tone, difficult to move (3) Rigid in Flexion/Extension (4)                                       INTERVENTION COMMENTS:  Diagnosis: Spasticity as late effect of cerebrovascular accident (CVA) Christelle Mccormick, R25 2]  Precautions: fall risk  FOTO: to complete  Insurance: Payor: Nelson Redd  / Plan: [facility] PLAN 361 / Product Type: Blue Fee for Service /   1 of 8 visits, PN due 5/7/21

## 2025-04-21 DIAGNOSIS — I48.0 PAF (PAROXYSMAL ATRIAL FIBRILLATION) (HCC): ICD-10-CM

## 2025-04-21 DIAGNOSIS — I49.5 TACHY-BRADY SYNDROME (HCC): ICD-10-CM

## 2025-04-22 RX ORDER — METOPROLOL SUCCINATE 25 MG/1
25 TABLET, EXTENDED RELEASE ORAL DAILY
Qty: 90 TABLET | Refills: 1 | Status: SHIPPED | OUTPATIENT
Start: 2025-04-22

## 2025-07-18 ENCOUNTER — REMOTE DEVICE CLINIC VISIT (OUTPATIENT)
Dept: CARDIOLOGY CLINIC | Facility: CLINIC | Age: 70
End: 2025-07-18
Payer: COMMERCIAL

## 2025-07-18 DIAGNOSIS — Z95.0 CARDIAC PACEMAKER IN SITU: Primary | ICD-10-CM

## 2025-07-18 PROCEDURE — 93296 REM INTERROG EVL PM/IDS: CPT | Performed by: INTERNAL MEDICINE

## 2025-07-18 PROCEDURE — 93294 REM INTERROG EVL PM/LDLS PM: CPT | Performed by: INTERNAL MEDICINE

## 2025-07-18 NOTE — PROGRESS NOTES
Results for orders placed or performed in visit on 07/18/25   Cardiac EP device report    Narrative    MDT DC PPM (MVP ON) / ACTIVE SYSTEM IS MRI CONDITIONAL  CARELINK TRANSMISSION: BATTERY VOLTAGE ADEQUATE (12.6 YRS). AP>99%, <0.1%. ALL AVAILABLE LEAD PARAMETERS WITHIN NORMAL LIMITS. 2 NSVT EPISODES- 7 BEATS, AVG CL~340MS & 10 BEATS, AVG CL~325MS. EF-54% (NST 7/29/24). PT ON ELIQUIS & METOPROLOL. PVC SINGLES COUNT SINCE 1/16/25- 1.8/H; RUNS-0.2/H. NORMAL DEVICE FUNCTION. GV

## 2025-07-21 ENCOUNTER — OFFICE VISIT (OUTPATIENT)
Dept: NEUROLOGY | Facility: CLINIC | Age: 70
End: 2025-07-21
Payer: COMMERCIAL

## 2025-07-21 VITALS
WEIGHT: 187 LBS | BODY MASS INDEX: 28.43 KG/M2 | OXYGEN SATURATION: 98 % | HEART RATE: 82 BPM | TEMPERATURE: 98.1 F | DIASTOLIC BLOOD PRESSURE: 84 MMHG | SYSTOLIC BLOOD PRESSURE: 132 MMHG

## 2025-07-21 DIAGNOSIS — I65.23 BILATERAL CAROTID ARTERY STENOSIS: ICD-10-CM

## 2025-07-21 DIAGNOSIS — E78.5 HYPERLIPIDEMIA, UNSPECIFIED HYPERLIPIDEMIA TYPE: Primary | ICD-10-CM

## 2025-07-21 DIAGNOSIS — G81.94 LEFT HEMIPARESIS (HCC): ICD-10-CM

## 2025-07-21 DIAGNOSIS — Z86.73 HISTORY OF STROKE: ICD-10-CM

## 2025-07-21 DIAGNOSIS — I10 PRIMARY HYPERTENSION: ICD-10-CM

## 2025-07-21 DIAGNOSIS — I48.0 PAROXYSMAL ATRIAL FIBRILLATION (HCC): ICD-10-CM

## 2025-07-21 PROCEDURE — 99213 OFFICE O/P EST LOW 20 MIN: CPT

## 2025-07-21 NOTE — PROGRESS NOTES
Name: Rakesh Appiah      : 1955      MRN: [id number]  Encounter Provider: Jean-Claude Mcgee PA-C  Encounter Date: 2025   Encounter department: [facility]  :  Assessment & Plan  History of stroke  - VAS carotid complete study ordered  at this time for further evaluation of carotid artery stenosis.  Last carotid artery ultrasound completed in 2024, patient due for repeat annual ultrasound at this time.    - For ongoing stroke prevention continue: Eliquis 5 mg twice daily, atorvastatin 80 mg once daily    - Discussed the importance of antiplatelet/anticoagulation management with the patient to prevent future strokes.   - Recommend to check blood pressure occasionally away from the doctor's office to make sure that those numbers are typically less than 130/80.  If they are frequently higher than that, we recommend checking a little more often and to follow up with primary care team   - Will defer to primary care team for monitoring of cholesterol panel and blood sugar numbers with target LDL cholesterol of less than 70 and hemoglobin A1c less than 7%  - Recommend following a low salt, mediterranean diet   - Recommend routine physical exercise as tolerated     Orders:    VAS carotid complete study; Future    Bilateral carotid artery stenosis    Orders:    VAS carotid complete study; Future    Hyperlipidemia, unspecified hyperlipidemia type         Left hemiparesis (HCC)         Paroxysmal atrial fibrillation (HCC)         Primary hypertension           Patient Instructions   - VAS carotid complete study ordered  at this time for further evaluation of carotid artery stenosis.  Last carotid artery ultrasound completed in 2024, patient due for repeat annual ultrasound at this time.    - For ongoing stroke prevention continue: Eliquis 5 mg twice daily, atorvastatin 80 mg once daily    - Discussed the importance of antiplatelet/anticoagulation management with the  patient to prevent future strokes.   - Recommend to check blood pressure occasionally away from the doctor's office to make sure that those numbers are typically less than 130/80.  If they are frequently higher than that, we recommend checking a little more often and to follow up with primary care team   - Will defer to primary care team for monitoring of cholesterol panel and blood sugar numbers with target LDL cholesterol of less than 70 and hemoglobin A1c less than 7%  - Recommend following a low salt, mediterranean diet   - Recommend routine physical exercise as tolerated     We will plan for him to return to the office in 1 year time to see on of the APPs or Dr. Rothman but would be happy to see him sooner if the need should arise.  If he has any symptoms concerning for TIA or stroke including sudden painless loss of vision or double vision, difficulty speaking or swallowing, vertigo/room spinning that does not quickly resolve, or weakness/numbness/loss of coordination affecting 1 side of the face or body he should proceed by ambulance to the nearest emergency room immediately.      History of Present Illness   HPI     For Review:    The patient was last seen on 07/18/2024 by myself.  At that time the last visit the patient was presenting for a follow-up appointment in regard to his history of stroke.  At the time at last visit, patient was not experiencing any new strokelike symptoms.  Still experiencing left-sided hemiparesis from previous infarct.  The patient is still taking Eliquis 5 mg twice daily for anticoagulation purposes.  Patient was still taking atorvastatin 80 mg daily for secondary stroke prevention as well.  Patient's blood pressure at last visit was 134/64, noted that his blood pressure was typically around this range.  Most recent LDL is 55 mg/dL most recent hemoglobin A1c was 6.0%.  He is going to follow-up with cardiology in regards to need for having his bradycardia evaluated and  potentially needing pacemaker.  Recommended patient complete carotid artery ultrasound as it was already ordered by Dr. Rothman in the past.    Interval History:    New stroke symptoms/residual symptoms:    Any new, sudden onset weakness, numbness, facial droop, slurred speech, difficulty speaking, trouble swallowing, persistent vertigo, or sudden double vision or vision loss? No new stroke-like symptoms     Residual symptoms include: weakness of the left UE/LE: upper extremity and lower extremity    Stroke Etiology and Risk Factor modification:    This was a(n) embolic stroke, most likely related to Cardioembolic: Non-Valvular A-Fib    Stroke risk factors were evaluated including: Hyperlipidemia, paroxysmal atrial fibrillation, hypertension, PADDY, bilateral carotid artery stenosis     AP/AC therapy: Eliquis 5 mg twice daily. No bleeding or bruising issues.     Statin therapy: atorvastatin 80 mg once daily     Blood pressure today and as of late: 132/84 BP today in the office. Does take his blood pressure medication daily. Takes amlodipine, losartan, and metoprolol daily.     Most recent LDL: 62 mg/dL    Most recent hemoglobin A1C: 6.0% as of 05/06/2021     Cardiology evaluation? Any cardiac monitoring required?: Does follow with cardiology still, did have a pacemaker placed in September of 2024. Still continues with anticoagulation for atrial fibrillation.     Endocrinology evaluation? Following proper glycemic treatment/diet?: None    Vascular surgery evaluation? Monitoring of carotid artery ultrasound required? VAS carotid artery ultrasound last completed in 07/24.  Less than 50% stenosis noted of the bilateral internal carotid arteries.    Lifestyle history/modifications:    Diet/Exercise regimen: Does continue to try and work on his diet. Does try to stick to his diet and eat lots of fruits and vegetables. Occasional alcohol usage he states.     Any physical therapy, occupational therapy or speech therapy  performed/required at this time?: No PT/OT/ST    Any difficulty with sleeping? Any history of sleep apnea? CPAP compliance?: No issues with sleeping at night, getting up to use the bathroom at night     Post-stroke depression/anxiety? No depression/anxiety     Any history of smoking or tobacco usage?: Quit smoking almost 7 years ago     Review of Systems   Constitutional:  Negative for appetite change, fatigue and fever.   HENT: Negative.  Negative for hearing loss, tinnitus, trouble swallowing and voice change.    Eyes: Negative.  Negative for photophobia, pain and visual disturbance.   Respiratory: Negative.  Negative for shortness of breath.    Cardiovascular: Negative.  Negative for palpitations.   Gastrointestinal: Negative.  Negative for nausea and vomiting.   Endocrine: Negative.  Negative for cold intolerance.   Genitourinary: Negative.  Negative for dysuria, frequency and urgency.   Musculoskeletal:  Negative for back pain, gait problem, myalgias, neck pain and neck stiffness.   Skin: Negative.  Negative for rash.   Allergic/Immunologic: Negative.    Neurological:  Negative for dizziness, tremors, seizures, syncope, facial asymmetry, speech difficulty, weakness, light-headedness, numbness and headaches.   Hematological: Negative.  Does not bruise/bleed easily.   Psychiatric/Behavioral: Negative.  Negative for confusion, hallucinations and sleep disturbance.    All other systems reviewed and are negative.   I have personally reviewed the MA's review of systems and made changes as necessary.    Medical History Reviewed by provider this encounter:  Tobacco  Allergies  Meds  Problems  Med Hx  Surg Hx  Fam Hx     .  Past Medical History   Past Medical History[1]  Past Surgical History[2]  Family History[3]   reports that he has been smoking cigarettes and cigars. He started smoking about 6 months ago. He has a 0.1 pack-year smoking history. He has never used smokeless tobacco. He reports current alcohol  use of about 2.0 standard drinks of alcohol per week. He reports that he does not use drugs.  Current Outpatient Medications   Medication Instructions    amLODIPine (NORVASC) 5 mg, Daily    atorvastatin (LIPITOR) 80 mg, Oral, Every evening    Eliquis 5 MG TAKE 1 TABLET BY MOUTH TWICE A DAY    FLUoxetine 20 mg, Daily    losartan (COZAAR) 50 mg, Oral, Daily    metoprolol succinate (TOPROL-XL) 25 mg, Oral, Daily   Allergies[4]   Medications Ordered Prior to Encounter[5]   Social History[6]     Objective   /84 (BP Location: Left arm, Patient Position: Sitting, Cuff Size: Standard)   Pulse 82   Temp 98.1 °F (36.7 °C) (Temporal)   Wt 84.8 kg (187 lb)   SpO2 98%   BMI 28.43 kg/m²     Physical Exam  Neurological Exam    Physical Exam:                                                                 Vitals:            Constitutional:    /84 (BP Location: Left arm, Patient Position: Sitting, Cuff Size: Standard)   Pulse 82   Temp 98.1 °F (36.7 °C) (Temporal)   Wt 84.8 kg (187 lb)   SpO2 98%   BMI 28.43 kg/m²   BP Readings from Last 3 Encounters:   07/21/25 132/84   03/10/25 114/67   02/25/25 118/70     Pulse Readings from Last 3 Encounters:   07/21/25 82   03/10/25 60   09/24/24 60         Well developed, well nourished, well groomed. No dysmorphic features.       Psychiatric:  Normal behavior and appropriate affect        Neurological Examination:     Mental status/cognitive function:   Orientated to time, place and person. Recent and remote memory intact. Attention span and concentration as well as fund of knowledge are appropriate for age. Normal language and spontaneous speech.      Cranial Nerves:  II-visual fields full.   III, IV, VI-Pupils were equal, round, and reactive to light and accomodation. Extraocular movements were full and conjugate without nystagmus. Conjugate gaze, normal smooth pursuits, normal saccades   V-facial sensation symmetric.    VII-facial expression symmetric, intact forehead  wrinkle, strong eye closure, symmetric smile    VIII-hearing grossly intact bilaterally   IX, X-palate elevation symmetric, no dysarthria.   XI-shoulder shrug strength intact    XII-tongue deviated to the right    Motor Exam: Increased bulk and tone of the left upper extremity, increased flaccidness of left upper extremity. Power/strength 5/5 right upper and lower extremities, power/strength 4/5 of the left lower extremity, power/strength 3/5 of the left upper extremity.  Sensory: grossly intact light touch in all extremities.   Reflexes:  Right: Brachioradialis 2+, biceps 2+, knee 2+  Left: Brachioradialis 3+, biceps 3+, knee 3+  Coordination: Finger nose finger intact on the right side, could not be tested on the left due to stroke deficits  Gait: steady casual and tandem gait.     Radiology Results Review: I have reviewed radiology reports from 07/01/2024 including: Ultrasound(s).    Administrative Statements   I have spent a total time of 20 minutes in caring for this patient on the day of the visit/encounter including Diagnostic results, Risks and benefits of tx options, Instructions for management, Patient and family education, Importance of tx compliance, Risk factor reductions, Impressions, Counseling / Coordination of care, Documenting in the medical record, Reviewing/placing orders in the medical record (including tests, medications, and/or procedures), and Obtaining or reviewing history  .       [1]   Past Medical History:  Diagnosis Date    Arthritis     in hands    Cervical herniated disc     Colon polyp     Diabetes mellitus (HCC)     borderline    Gout     hx of in 2018, none since    Hyperlipidemia     Hypertension     Irregular heart beat     a-fib, has loop recorder    Kidney stone 09/13/2021    hx of    Migraine     S/P Botox injection     in left wrist to help increase mobility    Sleep apnea     mild sleep apnea, cpap was not ordered    Stroke (HCC) 2018    left hemiparesis, wears leg brace and  uses cane, unable to use left arm and hand    Use of cane as ambulatory aid     had CVA in 2018   [2]   Past Surgical History:  Procedure Laterality Date    APPENDECTOMY      CARDIAC ELECTROPHYSIOLOGY PROCEDURE N/A 9/24/2024    Procedure: Cardiac pacer implant DUAL CHAMBER;  Surgeon: Abdulkadir Ludwig MD;  Location: [facility];  Service: Cardiology    CARDIAC ELECTROPHYSIOLOGY PROCEDURE N/A 9/24/2024    Procedure: Cardiac loop recorder explant;  Surgeon: Abdulkadir Ludwig MD;  Location: [facility];  Service: Cardiology    COLONOSCOPY      KNEE ARTHROSCOPY      right knee    KNEE SURGERY Right     NECK SURGERY      TONSILLECTOMY     [3]   Family History  Problem Relation Name Age of Onset    Stroke Mother      Heart disease Father      ALS Father     [4] No Known Allergies  [5]   Current Outpatient Medications on File Prior to Visit   Medication Sig Dispense Refill    amLODIPine (NORVASC) 5 mg tablet Take 5 mg by mouth in the morning. Taking 1 1/2 daily daily.  5    atorvastatin (LIPITOR) 80 mg tablet Take 1 tablet (80 mg total) by mouth every evening 90 tablet 3    Eliquis 5 MG TAKE 1 TABLET BY MOUTH TWICE A DAY 60 tablet 5    FLUoxetine 20 MG tablet Take 20 mg by mouth in the morning.      losartan (COZAAR) 50 mg tablet Take 1 tablet (50 mg total) by mouth daily 90 tablet 0    metoprolol succinate (TOPROL-XL) 25 mg 24 hr tablet TAKE 1 TABLET (25 MG TOTAL) BY MOUTH DAILY. 90 tablet 1     No current facility-administered medications on file prior to visit.   [6]   Social History  Tobacco Use    Smoking status: Some Days     Current packs/day: 0.25     Average packs/day: 0.3 packs/day for 0.6 years (0.1 ttl pk-yrs)     Types: Cigarettes, Cigars     Start date: 2025     Last attempt to quit: 2018    Smokeless tobacco: Never    Tobacco comments:     Occasional cigar   Vaping Use    Vaping status: Never Used   Substance and Sexual Activity    Alcohol use: Yes     Alcohol/week: 2.0 standard drinks of  alcohol     Types: 2 Standard drinks or equivalent per week     Comment: socially     Drug use: No

## 2025-07-21 NOTE — PATIENT INSTRUCTIONS
- VAS carotid complete study ordered  at this time for further evaluation of carotid artery stenosis.  Last carotid artery ultrasound completed in July 2024, patient due for repeat annual ultrasound at this time.    - For ongoing stroke prevention continue: Eliquis 5 mg twice daily, atorvastatin 80 mg once daily    - Discussed the importance of antiplatelet/anticoagulation management with the patient to prevent future strokes.   - Recommend to check blood pressure occasionally away from the doctor's office to make sure that those numbers are typically less than 130/80.  If they are frequently higher than that, we recommend checking a little more often and to follow up with primary care team   - Will defer to primary care team for monitoring of cholesterol panel and blood sugar numbers with target LDL cholesterol of less than 70 and hemoglobin A1c less than 7%  - Recommend following a low salt, mediterranean diet   - Recommend routine physical exercise as tolerated     We will plan for him to return to the office in 1 year time to see on of the APPs or Dr. Rothman but would be happy to see him sooner if the need should arise.  If he has any symptoms concerning for TIA or stroke including sudden painless loss of vision or double vision, difficulty speaking or swallowing, vertigo/room spinning that does not quickly resolve, or weakness/numbness/loss of coordination affecting 1 side of the face or body he should proceed by ambulance to the nearest emergency room immediately.

## 2025-07-21 NOTE — ASSESSMENT & PLAN NOTE
- VAS carotid complete study ordered  at this time for further evaluation of carotid artery stenosis.  Last carotid artery ultrasound completed in July 2024, patient due for repeat annual ultrasound at this time.    - For ongoing stroke prevention continue: Eliquis 5 mg twice daily, atorvastatin 80 mg once daily    - Discussed the importance of antiplatelet/anticoagulation management with the patient to prevent future strokes.   - Recommend to check blood pressure occasionally away from the doctor's office to make sure that those numbers are typically less than 130/80.  If they are frequently higher than that, we recommend checking a little more often and to follow up with primary care team   - Will defer to primary care team for monitoring of cholesterol panel and blood sugar numbers with target LDL cholesterol of less than 70 and hemoglobin A1c less than 7%  - Recommend following a low salt, mediterranean diet   - Recommend routine physical exercise as tolerated     Orders:    VAS carotid complete study; Future

## (undated) DEVICE — SLITTER ADJUSTABLE

## (undated) DEVICE — CATH GUIDING FIXED SHAPE 43CM

## (undated) DEVICE — DGW .035 FC J3MM 150CM TEF: Brand: EMERALD

## (undated) DEVICE — MICROPUNCTURE INTRODUCER SET SILHOUETTE TRANSITIONLESS WITH STAINLESS STEEL WIRE GUIDE: Brand: MICROPUNCTURE

## (undated) DEVICE — INTRO SHEATH PEEL AWAY 7FR